# Patient Record
Sex: FEMALE | Race: BLACK OR AFRICAN AMERICAN | Employment: OTHER | URBAN - METROPOLITAN AREA
[De-identification: names, ages, dates, MRNs, and addresses within clinical notes are randomized per-mention and may not be internally consistent; named-entity substitution may affect disease eponyms.]

---

## 2019-01-01 ENCOUNTER — DOCUMENTATION ONLY (OUTPATIENT)
Dept: CARDIOLOGY CLINIC | Age: 84
End: 2019-01-01

## 2019-01-01 ENCOUNTER — TELEPHONE (OUTPATIENT)
Dept: CARDIOLOGY CLINIC | Age: 84
End: 2019-01-01

## 2019-01-01 ENCOUNTER — HOME CARE VISIT (OUTPATIENT)
Dept: HOME HEALTH SERVICES | Facility: HOME HEALTH | Age: 84
End: 2019-01-01
Payer: MEDICARE

## 2019-01-01 ENCOUNTER — ANESTHESIA (OUTPATIENT)
Dept: CARDIAC CATH/INVASIVE PROCEDURES | Age: 84
DRG: 242 | End: 2019-01-01
Payer: MEDICARE

## 2019-01-01 ENCOUNTER — OFFICE VISIT (OUTPATIENT)
Dept: CARDIOLOGY CLINIC | Age: 84
End: 2019-01-01

## 2019-01-01 ENCOUNTER — HOME CARE VISIT (OUTPATIENT)
Dept: SCHEDULING | Facility: HOME HEALTH | Age: 84
End: 2019-01-01
Payer: MEDICARE

## 2019-01-01 ENCOUNTER — APPOINTMENT (OUTPATIENT)
Dept: CT IMAGING | Age: 84
DRG: 286 | End: 2019-01-01
Attending: EMERGENCY MEDICINE
Payer: MEDICARE

## 2019-01-01 ENCOUNTER — PATIENT OUTREACH (OUTPATIENT)
Dept: FAMILY MEDICINE CLINIC | Age: 84
End: 2019-01-01

## 2019-01-01 ENCOUNTER — PATIENT OUTREACH (OUTPATIENT)
Dept: CARDIOLOGY CLINIC | Age: 84
End: 2019-01-01

## 2019-01-01 ENCOUNTER — APPOINTMENT (OUTPATIENT)
Dept: GENERAL RADIOLOGY | Age: 84
DRG: 286 | End: 2019-01-01
Attending: EMERGENCY MEDICINE
Payer: MEDICARE

## 2019-01-01 ENCOUNTER — APPOINTMENT (OUTPATIENT)
Dept: VASCULAR SURGERY | Age: 84
DRG: 286 | End: 2019-01-01
Attending: INTERNAL MEDICINE
Payer: MEDICARE

## 2019-01-01 ENCOUNTER — APPOINTMENT (OUTPATIENT)
Dept: CT IMAGING | Age: 84
DRG: 286 | End: 2019-01-01
Attending: INTERNAL MEDICINE
Payer: MEDICARE

## 2019-01-01 ENCOUNTER — HOSPITAL ENCOUNTER (INPATIENT)
Age: 84
LOS: 1 days | Discharge: HOME HEALTH | DRG: 194 | End: 2019-04-09
Attending: EMERGENCY MEDICINE | Admitting: INTERNAL MEDICINE
Payer: MEDICARE

## 2019-01-01 ENCOUNTER — APPOINTMENT (OUTPATIENT)
Dept: NON INVASIVE DIAGNOSTICS | Age: 84
DRG: 286 | End: 2019-01-01
Attending: INTERNAL MEDICINE
Payer: MEDICARE

## 2019-01-01 ENCOUNTER — PATIENT OUTREACH (OUTPATIENT)
Dept: CASE MANAGEMENT | Age: 84
End: 2019-01-01

## 2019-01-01 ENCOUNTER — HOME HEALTH ADMISSION (OUTPATIENT)
Dept: HOME HEALTH SERVICES | Facility: HOME HEALTH | Age: 84
End: 2019-01-01
Payer: MEDICARE

## 2019-01-01 ENCOUNTER — HOSPITAL ENCOUNTER (INPATIENT)
Age: 84
LOS: 3 days | Discharge: HOME | DRG: 242 | End: 2019-12-24
Attending: EMERGENCY MEDICINE | Admitting: INTERNAL MEDICINE
Payer: MEDICARE

## 2019-01-01 ENCOUNTER — APPOINTMENT (OUTPATIENT)
Dept: ULTRASOUND IMAGING | Age: 84
DRG: 286 | End: 2019-01-01
Attending: INTERNAL MEDICINE
Payer: MEDICARE

## 2019-01-01 ENCOUNTER — APPOINTMENT (OUTPATIENT)
Dept: GENERAL RADIOLOGY | Age: 84
DRG: 242 | End: 2019-01-01
Attending: INTERNAL MEDICINE
Payer: MEDICARE

## 2019-01-01 ENCOUNTER — ANESTHESIA EVENT (OUTPATIENT)
Dept: CARDIAC CATH/INVASIVE PROCEDURES | Age: 84
DRG: 242 | End: 2019-01-01
Payer: MEDICARE

## 2019-01-01 ENCOUNTER — DOCUMENTATION ONLY (OUTPATIENT)
Dept: FAMILY MEDICINE CLINIC | Age: 84
End: 2019-01-01

## 2019-01-01 ENCOUNTER — DOCUMENTATION ONLY (OUTPATIENT)
Dept: INTERNAL MEDICINE CLINIC | Age: 84
End: 2019-01-01

## 2019-01-01 ENCOUNTER — TELEPHONE (OUTPATIENT)
Dept: PALLATIVE CARE | Age: 84
End: 2019-01-01

## 2019-01-01 ENCOUNTER — HOSPITAL ENCOUNTER (INPATIENT)
Age: 84
LOS: 10 days | Discharge: HOME HEALTH | DRG: 286 | End: 2019-03-26
Attending: EMERGENCY MEDICINE | Admitting: INTERNAL MEDICINE
Payer: MEDICARE

## 2019-01-01 ENCOUNTER — APPOINTMENT (OUTPATIENT)
Dept: CT IMAGING | Age: 84
DRG: 194 | End: 2019-01-01
Attending: HOSPITALIST
Payer: MEDICARE

## 2019-01-01 ENCOUNTER — DOCUMENTATION ONLY (OUTPATIENT)
Dept: CASE MANAGEMENT | Age: 84
End: 2019-01-01

## 2019-01-01 ENCOUNTER — APPOINTMENT (OUTPATIENT)
Dept: GENERAL RADIOLOGY | Age: 84
DRG: 194 | End: 2019-01-01
Attending: EMERGENCY MEDICINE
Payer: MEDICARE

## 2019-01-01 VITALS
TEMPERATURE: 97.2 F | OXYGEN SATURATION: 97 % | HEART RATE: 62 BPM | RESPIRATION RATE: 18 BRPM | SYSTOLIC BLOOD PRESSURE: 165 MMHG | DIASTOLIC BLOOD PRESSURE: 52 MMHG

## 2019-01-01 VITALS
RESPIRATION RATE: 14 BRPM | DIASTOLIC BLOOD PRESSURE: 50 MMHG | SYSTOLIC BLOOD PRESSURE: 140 MMHG | HEIGHT: 60 IN | HEART RATE: 70 BPM | WEIGHT: 128 LBS | OXYGEN SATURATION: 97 % | BODY MASS INDEX: 25.13 KG/M2

## 2019-01-01 VITALS
BODY MASS INDEX: 28.71 KG/M2 | HEART RATE: 71 BPM | DIASTOLIC BLOOD PRESSURE: 52 MMHG | SYSTOLIC BLOOD PRESSURE: 150 MMHG | OXYGEN SATURATION: 97 % | WEIGHT: 147 LBS | TEMPERATURE: 98.2 F | RESPIRATION RATE: 18 BRPM

## 2019-01-01 VITALS
OXYGEN SATURATION: 96 % | RESPIRATION RATE: 18 BRPM | TEMPERATURE: 97.7 F | SYSTOLIC BLOOD PRESSURE: 166 MMHG | HEART RATE: 67 BPM | DIASTOLIC BLOOD PRESSURE: 53 MMHG

## 2019-01-01 VITALS
BODY MASS INDEX: 27.54 KG/M2 | SYSTOLIC BLOOD PRESSURE: 142 MMHG | DIASTOLIC BLOOD PRESSURE: 80 MMHG | RESPIRATION RATE: 18 BRPM | WEIGHT: 141 LBS | HEART RATE: 65 BPM | OXYGEN SATURATION: 97 % | TEMPERATURE: 98.2 F

## 2019-01-01 VITALS
RESPIRATION RATE: 18 BRPM | BODY MASS INDEX: 27.54 KG/M2 | WEIGHT: 141 LBS | TEMPERATURE: 98 F | HEART RATE: 84 BPM | SYSTOLIC BLOOD PRESSURE: 148 MMHG | DIASTOLIC BLOOD PRESSURE: 56 MMHG | OXYGEN SATURATION: 95 %

## 2019-01-01 VITALS
HEART RATE: 125 BPM | WEIGHT: 154.1 LBS | BODY MASS INDEX: 30.25 KG/M2 | RESPIRATION RATE: 16 BRPM | OXYGEN SATURATION: 92 % | DIASTOLIC BLOOD PRESSURE: 64 MMHG | SYSTOLIC BLOOD PRESSURE: 133 MMHG | HEIGHT: 60 IN | TEMPERATURE: 97.9 F

## 2019-01-01 VITALS
RESPIRATION RATE: 14 BRPM | SYSTOLIC BLOOD PRESSURE: 136 MMHG | DIASTOLIC BLOOD PRESSURE: 60 MMHG | OXYGEN SATURATION: 100 % | HEART RATE: 61 BPM | WEIGHT: 134 LBS | BODY MASS INDEX: 26.31 KG/M2 | HEIGHT: 60 IN

## 2019-01-01 VITALS
SYSTOLIC BLOOD PRESSURE: 154 MMHG | HEART RATE: 88 BPM | DIASTOLIC BLOOD PRESSURE: 72 MMHG | RESPIRATION RATE: 20 BRPM | TEMPERATURE: 98.2 F | OXYGEN SATURATION: 98 %

## 2019-01-01 VITALS
RESPIRATION RATE: 16 BRPM | SYSTOLIC BLOOD PRESSURE: 128 MMHG | HEIGHT: 60 IN | OXYGEN SATURATION: 95 % | WEIGHT: 147.4 LBS | TEMPERATURE: 97 F | DIASTOLIC BLOOD PRESSURE: 29 MMHG | BODY MASS INDEX: 28.94 KG/M2 | HEART RATE: 66 BPM

## 2019-01-01 VITALS — DIASTOLIC BLOOD PRESSURE: 40 MMHG | SYSTOLIC BLOOD PRESSURE: 150 MMHG | OXYGEN SATURATION: 96 % | HEART RATE: 63 BPM

## 2019-01-01 VITALS
OXYGEN SATURATION: 97 % | TEMPERATURE: 97.9 F | HEART RATE: 71 BPM | DIASTOLIC BLOOD PRESSURE: 59 MMHG | SYSTOLIC BLOOD PRESSURE: 140 MMHG | RESPIRATION RATE: 18 BRPM

## 2019-01-01 VITALS
TEMPERATURE: 98 F | OXYGEN SATURATION: 92 % | RESPIRATION RATE: 18 BRPM | SYSTOLIC BLOOD PRESSURE: 148 MMHG | HEART RATE: 81 BPM | DIASTOLIC BLOOD PRESSURE: 73 MMHG

## 2019-01-01 VITALS
DIASTOLIC BLOOD PRESSURE: 60 MMHG | HEART RATE: 68 BPM | RESPIRATION RATE: 18 BRPM | WEIGHT: 141 LBS | SYSTOLIC BLOOD PRESSURE: 124 MMHG | TEMPERATURE: 98.7 F | OXYGEN SATURATION: 95 % | BODY MASS INDEX: 27.54 KG/M2

## 2019-01-01 VITALS
HEART RATE: 82 BPM | SYSTOLIC BLOOD PRESSURE: 150 MMHG | TEMPERATURE: 97.8 F | OXYGEN SATURATION: 97 % | RESPIRATION RATE: 18 BRPM | DIASTOLIC BLOOD PRESSURE: 68 MMHG

## 2019-01-01 VITALS
RESPIRATION RATE: 18 BRPM | WEIGHT: 139 LBS | TEMPERATURE: 98.2 F | DIASTOLIC BLOOD PRESSURE: 62 MMHG | SYSTOLIC BLOOD PRESSURE: 132 MMHG | OXYGEN SATURATION: 97 % | HEART RATE: 64 BPM | BODY MASS INDEX: 27.15 KG/M2

## 2019-01-01 VITALS
RESPIRATION RATE: 16 BRPM | DIASTOLIC BLOOD PRESSURE: 58 MMHG | OXYGEN SATURATION: 97 % | WEIGHT: 147.1 LBS | HEART RATE: 77 BPM | SYSTOLIC BLOOD PRESSURE: 170 MMHG | HEIGHT: 60 IN | BODY MASS INDEX: 28.88 KG/M2

## 2019-01-01 VITALS — OXYGEN SATURATION: 97 % | DIASTOLIC BLOOD PRESSURE: 40 MMHG | SYSTOLIC BLOOD PRESSURE: 140 MMHG | HEART RATE: 72 BPM

## 2019-01-01 VITALS
DIASTOLIC BLOOD PRESSURE: 46 MMHG | SYSTOLIC BLOOD PRESSURE: 138 MMHG | OXYGEN SATURATION: 98 % | TEMPERATURE: 98.3 F | HEART RATE: 67 BPM

## 2019-01-01 VITALS
WEIGHT: 144 LBS | DIASTOLIC BLOOD PRESSURE: 68 MMHG | OXYGEN SATURATION: 97 % | HEART RATE: 72 BPM | TEMPERATURE: 98.2 F | SYSTOLIC BLOOD PRESSURE: 162 MMHG | RESPIRATION RATE: 18 BRPM | BODY MASS INDEX: 28.12 KG/M2

## 2019-01-01 VITALS
DIASTOLIC BLOOD PRESSURE: 52 MMHG | HEART RATE: 68 BPM | OXYGEN SATURATION: 95 % | SYSTOLIC BLOOD PRESSURE: 150 MMHG | TEMPERATURE: 97.5 F | RESPIRATION RATE: 18 BRPM

## 2019-01-01 VITALS
OXYGEN SATURATION: 97 % | DIASTOLIC BLOOD PRESSURE: 72 MMHG | BODY MASS INDEX: 27.86 KG/M2 | RESPIRATION RATE: 16 BRPM | HEART RATE: 42 BPM | HEIGHT: 60 IN | SYSTOLIC BLOOD PRESSURE: 158 MMHG | WEIGHT: 141.9 LBS

## 2019-01-01 VITALS
DIASTOLIC BLOOD PRESSURE: 60 MMHG | SYSTOLIC BLOOD PRESSURE: 124 MMHG | OXYGEN SATURATION: 95 % | HEART RATE: 68 BPM | TEMPERATURE: 98.6 F

## 2019-01-01 VITALS
RESPIRATION RATE: 18 BRPM | OXYGEN SATURATION: 97 % | HEART RATE: 78 BPM | SYSTOLIC BLOOD PRESSURE: 130 MMHG | TEMPERATURE: 98.2 F | DIASTOLIC BLOOD PRESSURE: 72 MMHG

## 2019-01-01 VITALS
OXYGEN SATURATION: 98 % | SYSTOLIC BLOOD PRESSURE: 131 MMHG | TEMPERATURE: 98.2 F | DIASTOLIC BLOOD PRESSURE: 52 MMHG | RESPIRATION RATE: 20 BRPM | HEART RATE: 71 BPM

## 2019-01-01 VITALS
WEIGHT: 138 LBS | TEMPERATURE: 98.3 F | BODY MASS INDEX: 26.95 KG/M2 | OXYGEN SATURATION: 98 % | DIASTOLIC BLOOD PRESSURE: 37 MMHG | HEART RATE: 77 BPM | SYSTOLIC BLOOD PRESSURE: 150 MMHG | RESPIRATION RATE: 18 BRPM

## 2019-01-01 DIAGNOSIS — I35.9 AORTIC VALVE DISORDER: ICD-10-CM

## 2019-01-01 DIAGNOSIS — N18.30 CKD (CHRONIC KIDNEY DISEASE), STAGE III (HCC): ICD-10-CM

## 2019-01-01 DIAGNOSIS — I50.32 CHRONIC DIASTOLIC CONGESTIVE HEART FAILURE (HCC): Primary | ICD-10-CM

## 2019-01-01 DIAGNOSIS — E78.00 PURE HYPERCHOLESTEROLEMIA: ICD-10-CM

## 2019-01-01 DIAGNOSIS — I10 ESSENTIAL HYPERTENSION: ICD-10-CM

## 2019-01-01 DIAGNOSIS — R94.31 ABNORMAL EKG: ICD-10-CM

## 2019-01-01 DIAGNOSIS — I48.0 PAROXYSMAL ATRIAL FIBRILLATION (HCC): Primary | ICD-10-CM

## 2019-01-01 DIAGNOSIS — F02.818 DEMENTIA ASSOCIATED WITH OTHER UNDERLYING DISEASE WITH BEHAVIORAL DISTURBANCE: ICD-10-CM

## 2019-01-01 DIAGNOSIS — Z79.01 ANTICOAGULANT LONG-TERM USE: Chronic | ICD-10-CM

## 2019-01-01 DIAGNOSIS — J18.9 PNEUMONIA OF RIGHT LOWER LOBE DUE TO INFECTIOUS ORGANISM: ICD-10-CM

## 2019-01-01 DIAGNOSIS — I48.91 ATRIAL FIBRILLATION WITH RVR (HCC): ICD-10-CM

## 2019-01-01 DIAGNOSIS — F02.818 DEMENTIA ASSOCIATED WITH OTHER UNDERLYING DISEASE WITH BEHAVIORAL DISTURBANCE: Chronic | ICD-10-CM

## 2019-01-01 DIAGNOSIS — I48.0 PAROXYSMAL ATRIAL FIBRILLATION (HCC): ICD-10-CM

## 2019-01-01 DIAGNOSIS — R60.0 BILATERAL LEG EDEMA: ICD-10-CM

## 2019-01-01 DIAGNOSIS — I48.91 ATRIAL FIBRILLATION WITH RVR (HCC): Primary | ICD-10-CM

## 2019-01-01 DIAGNOSIS — I05.9 MITRAL VALVE DISEASE: ICD-10-CM

## 2019-01-01 DIAGNOSIS — J90 PLEURAL EFFUSION ON RIGHT: ICD-10-CM

## 2019-01-01 DIAGNOSIS — R06.02 SOB (SHORTNESS OF BREATH): ICD-10-CM

## 2019-01-01 DIAGNOSIS — I48.92 ATRIAL FLUTTER, UNSPECIFIED TYPE (HCC): ICD-10-CM

## 2019-01-01 DIAGNOSIS — R09.02 HYPOXIA: ICD-10-CM

## 2019-01-01 DIAGNOSIS — I50.9 ACUTE ON CHRONIC CONGESTIVE HEART FAILURE, UNSPECIFIED HEART FAILURE TYPE (HCC): Primary | ICD-10-CM

## 2019-01-01 DIAGNOSIS — I10 ACCELERATED HYPERTENSION: Primary | ICD-10-CM

## 2019-01-01 DIAGNOSIS — I24.9 ACS (ACUTE CORONARY SYNDROME) (HCC): ICD-10-CM

## 2019-01-01 DIAGNOSIS — I50.32 CHRONIC DIASTOLIC CONGESTIVE HEART FAILURE (HCC): ICD-10-CM

## 2019-01-01 DIAGNOSIS — Z79.01 ANTICOAGULANT LONG-TERM USE: ICD-10-CM

## 2019-01-01 LAB
ALBUMIN SERPL-MCNC: 1.7 G/DL (ref 3.5–5)
ALBUMIN SERPL-MCNC: 1.7 G/DL (ref 3.5–5)
ALBUMIN SERPL-MCNC: 1.8 G/DL (ref 3.5–5)
ALBUMIN SERPL-MCNC: 1.9 G/DL (ref 3.5–5)
ALBUMIN SERPL-MCNC: 2 G/DL (ref 3.5–5)
ALBUMIN SERPL-MCNC: 2.1 G/DL (ref 3.5–5)
ALBUMIN SERPL-MCNC: 2.3 G/DL (ref 3.5–5)
ALBUMIN SERPL-MCNC: 2.7 G/DL (ref 3.5–5)
ALBUMIN/GLOB SERPL: 0.4 {RATIO} (ref 1.1–2.2)
ALBUMIN/GLOB SERPL: 0.5 {RATIO} (ref 1.1–2.2)
ALBUMIN/GLOB SERPL: 0.6 {RATIO} (ref 1.1–2.2)
ALP SERPL-CCNC: 104 U/L (ref 45–117)
ALP SERPL-CCNC: 122 U/L (ref 45–117)
ALP SERPL-CCNC: 123 U/L (ref 45–117)
ALP SERPL-CCNC: 77 U/L (ref 45–117)
ALP SERPL-CCNC: 85 U/L (ref 45–117)
ALP SERPL-CCNC: 92 U/L (ref 45–117)
ALP SERPL-CCNC: 94 U/L (ref 45–117)
ALP SERPL-CCNC: 97 U/L (ref 45–117)
ALP SERPL-CCNC: 97 U/L (ref 45–117)
ALT SERPL-CCNC: 11 U/L (ref 12–78)
ALT SERPL-CCNC: 12 U/L (ref 12–78)
ALT SERPL-CCNC: 15 U/L (ref 12–78)
ALT SERPL-CCNC: 15 U/L (ref 12–78)
ALT SERPL-CCNC: 16 U/L (ref 12–78)
ALT SERPL-CCNC: 16 U/L (ref 12–78)
ALT SERPL-CCNC: 17 U/L (ref 12–78)
ALT SERPL-CCNC: 19 U/L (ref 12–78)
ALT SERPL-CCNC: 28 U/L (ref 12–78)
AMMONIA PLAS-SCNC: <10 UMOL/L
ANION GAP SERPL CALC-SCNC: 10 MMOL/L (ref 5–15)
ANION GAP SERPL CALC-SCNC: 5 MMOL/L (ref 5–15)
ANION GAP SERPL CALC-SCNC: 5 MMOL/L (ref 5–15)
ANION GAP SERPL CALC-SCNC: 6 MMOL/L (ref 5–15)
ANION GAP SERPL CALC-SCNC: 7 MMOL/L (ref 5–15)
ANION GAP SERPL CALC-SCNC: 8 MMOL/L (ref 5–15)
ANION GAP SERPL CALC-SCNC: 9 MMOL/L (ref 5–15)
APPEARANCE UR: ABNORMAL
AST SERPL-CCNC: 15 U/L (ref 15–37)
AST SERPL-CCNC: 19 U/L (ref 15–37)
AST SERPL-CCNC: 20 U/L (ref 15–37)
AST SERPL-CCNC: 22 U/L (ref 15–37)
AST SERPL-CCNC: 23 U/L (ref 15–37)
AST SERPL-CCNC: 25 U/L (ref 15–37)
AST SERPL-CCNC: 27 U/L (ref 15–37)
AST SERPL-CCNC: 29 U/L (ref 15–37)
AST SERPL-CCNC: 32 U/L (ref 15–37)
ATRIAL RATE: 107 BPM
ATRIAL RATE: 63 BPM
ATRIAL RATE: 67 BPM
ATRIAL RATE: 77 BPM
ATRIAL RATE: 85 BPM
BACTERIA SPEC CULT: NORMAL
BACTERIA URNS QL MICRO: ABNORMAL /HPF
BASOPHILS # BLD: 0 K/UL (ref 0–0.1)
BASOPHILS # BLD: 0.1 K/UL (ref 0–0.1)
BASOPHILS NFR BLD: 0 % (ref 0–1)
BASOPHILS NFR BLD: 1 % (ref 0–1)
BILIRUB SERPL-MCNC: 0.1 MG/DL (ref 0.2–1)
BILIRUB SERPL-MCNC: 0.1 MG/DL (ref 0.2–1)
BILIRUB SERPL-MCNC: 0.2 MG/DL (ref 0.2–1)
BILIRUB SERPL-MCNC: 0.3 MG/DL (ref 0.2–1)
BILIRUB SERPL-MCNC: 0.3 MG/DL (ref 0.2–1)
BILIRUB SERPL-MCNC: 0.4 MG/DL (ref 0.2–1)
BILIRUB UR QL: NEGATIVE
BNP SERPL-MCNC: ABNORMAL PG/ML
BNP SERPL-MCNC: ABNORMAL PG/ML
BUN SERPL-MCNC: 32 MG/DL (ref 6–20)
BUN SERPL-MCNC: 33 MG/DL (ref 6–20)
BUN SERPL-MCNC: 33 MG/DL (ref 6–20)
BUN SERPL-MCNC: 34 MG/DL (ref 6–20)
BUN SERPL-MCNC: 35 MG/DL (ref 6–20)
BUN SERPL-MCNC: 36 MG/DL (ref 6–20)
BUN SERPL-MCNC: 40 MG/DL (ref 6–20)
BUN SERPL-MCNC: 49 MG/DL (ref 6–20)
BUN SERPL-MCNC: 52 MG/DL (ref 6–20)
BUN SERPL-MCNC: 56 MG/DL (ref 6–20)
BUN SERPL-MCNC: 57 MG/DL (ref 6–20)
BUN SERPL-MCNC: 59 MG/DL (ref 6–20)
BUN SERPL-MCNC: 60 MG/DL (ref 6–20)
BUN SERPL-MCNC: 61 MG/DL (ref 6–20)
BUN SERPL-MCNC: 61 MG/DL (ref 6–20)
BUN SERPL-MCNC: 62 MG/DL (ref 6–20)
BUN SERPL-MCNC: 63 MG/DL (ref 6–20)
BUN/CREAT SERPL: 18 (ref 12–20)
BUN/CREAT SERPL: 19 (ref 12–20)
BUN/CREAT SERPL: 19 (ref 12–20)
BUN/CREAT SERPL: 20 (ref 12–20)
BUN/CREAT SERPL: 24 (ref 12–20)
BUN/CREAT SERPL: 26 (ref 12–20)
BUN/CREAT SERPL: 30 (ref 12–20)
BUN/CREAT SERPL: 32 (ref 12–20)
BUN/CREAT SERPL: 33 (ref 12–20)
BUN/CREAT SERPL: 34 (ref 12–20)
BUN/CREAT SERPL: 34 (ref 12–20)
BUN/CREAT SERPL: 35 (ref 12–20)
CALCIUM SERPL-MCNC: 7.5 MG/DL (ref 8.5–10.1)
CALCIUM SERPL-MCNC: 7.7 MG/DL (ref 8.5–10.1)
CALCIUM SERPL-MCNC: 7.7 MG/DL (ref 8.5–10.1)
CALCIUM SERPL-MCNC: 7.9 MG/DL (ref 8.5–10.1)
CALCIUM SERPL-MCNC: 8.1 MG/DL (ref 8.5–10.1)
CALCIUM SERPL-MCNC: 8.2 MG/DL (ref 8.5–10.1)
CALCIUM SERPL-MCNC: 8.2 MG/DL (ref 8.5–10.1)
CALCIUM SERPL-MCNC: 8.3 MG/DL (ref 8.5–10.1)
CALCIUM SERPL-MCNC: 8.3 MG/DL (ref 8.5–10.1)
CALCIUM SERPL-MCNC: 8.5 MG/DL (ref 8.5–10.1)
CALCIUM SERPL-MCNC: 8.6 MG/DL (ref 8.5–10.1)
CALCULATED P AXIS, ECG09: 52 DEGREES
CALCULATED P AXIS, ECG09: 58 DEGREES
CALCULATED P AXIS, ECG09: 58 DEGREES
CALCULATED P AXIS, ECG09: 62 DEGREES
CALCULATED R AXIS, ECG10: -7 DEGREES
CALCULATED R AXIS, ECG10: 10 DEGREES
CALCULATED R AXIS, ECG10: 41 DEGREES
CALCULATED R AXIS, ECG10: 41 DEGREES
CALCULATED R AXIS, ECG10: 5 DEGREES
CALCULATED T AXIS, ECG11: -43 DEGREES
CALCULATED T AXIS, ECG11: 177 DEGREES
CALCULATED T AXIS, ECG11: 23 DEGREES
CALCULATED T AXIS, ECG11: 37 DEGREES
CALCULATED T AXIS, ECG11: 7 DEGREES
CC UR VC: NORMAL
CHLORIDE SERPL-SCNC: 107 MMOL/L (ref 97–108)
CHLORIDE SERPL-SCNC: 108 MMOL/L (ref 97–108)
CHLORIDE SERPL-SCNC: 109 MMOL/L (ref 97–108)
CHLORIDE SERPL-SCNC: 110 MMOL/L (ref 97–108)
CHLORIDE SERPL-SCNC: 111 MMOL/L (ref 97–108)
CHOLEST SERPL-MCNC: 153 MG/DL
CK SERPL-CCNC: 42 U/L (ref 26–192)
CK SERPL-CCNC: 63 U/L (ref 26–192)
CO2 SERPL-SCNC: 21 MMOL/L (ref 21–32)
CO2 SERPL-SCNC: 21 MMOL/L (ref 21–32)
CO2 SERPL-SCNC: 22 MMOL/L (ref 21–32)
CO2 SERPL-SCNC: 23 MMOL/L (ref 21–32)
CO2 SERPL-SCNC: 24 MMOL/L (ref 21–32)
CO2 SERPL-SCNC: 25 MMOL/L (ref 21–32)
CO2 SERPL-SCNC: 26 MMOL/L (ref 21–32)
CO2 SERPL-SCNC: 27 MMOL/L (ref 21–32)
CO2 SERPL-SCNC: 27 MMOL/L (ref 21–32)
CO2 SERPL-SCNC: 28 MMOL/L (ref 21–32)
COLOR UR: ABNORMAL
COMMENT, HOLDF: NORMAL
CREAT SERPL-MCNC: 1.37 MG/DL (ref 0.55–1.02)
CREAT SERPL-MCNC: 1.61 MG/DL (ref 0.55–1.02)
CREAT SERPL-MCNC: 1.66 MG/DL (ref 0.55–1.02)
CREAT SERPL-MCNC: 1.66 MG/DL (ref 0.55–1.02)
CREAT SERPL-MCNC: 1.69 MG/DL (ref 0.55–1.02)
CREAT SERPL-MCNC: 1.73 MG/DL (ref 0.55–1.02)
CREAT SERPL-MCNC: 1.73 MG/DL (ref 0.55–1.02)
CREAT SERPL-MCNC: 1.78 MG/DL (ref 0.55–1.02)
CREAT SERPL-MCNC: 1.82 MG/DL (ref 0.55–1.02)
CREAT SERPL-MCNC: 1.85 MG/DL (ref 0.55–1.02)
CREAT SERPL-MCNC: 1.87 MG/DL (ref 0.55–1.02)
CREAT SERPL-MCNC: 1.88 MG/DL (ref 0.55–1.02)
CREAT SERPL-MCNC: 1.9 MG/DL (ref 0.55–1.02)
CREAT SERPL-MCNC: 1.93 MG/DL (ref 0.55–1.02)
CREAT SERPL-MCNC: 1.97 MG/DL (ref 0.55–1.02)
CREAT UR-MCNC: 90.4 MG/DL
DIAGNOSIS, 93000: NORMAL
DIFFERENTIAL METHOD BLD: ABNORMAL
DIGOXIN SERPL-MCNC: 1.1 NG/ML (ref 0.9–2)
DIGOXIN SERPL-MCNC: 1.2 NG/ML (ref 0.9–2)
ECHO AO ROOT DIAM: 2.54 CM
ECHO AV AREA PEAK VELOCITY: 1.2 CM2
ECHO AV AREA VTI: 1.5 CM2
ECHO AV AREA/BSA PEAK VELOCITY: 0.7 CM2/M2
ECHO AV AREA/BSA VTI: 0.9 CM2/M2
ECHO AV MEAN GRADIENT: 33.3 MMHG
ECHO AV PEAK GRADIENT: 60.9 MMHG
ECHO AV PEAK VELOCITY: 390.33 CM/S
ECHO AV REGURGITANT PHT: 331.6 CM
ECHO AV VTI: 81.51 CM
ECHO EST RA PRESSURE: 10 MMHG
ECHO LV INTERNAL DIMENSION DIASTOLIC: 3.48 CM (ref 3.9–5.3)
ECHO LV INTERNAL DIMENSION SYSTOLIC: 2.47 CM
ECHO LV IVSD: 1.37 CM (ref 0.6–0.9)
ECHO LV MASS 2D: 187.3 G (ref 67–162)
ECHO LV MASS INDEX 2D: 112.1 G/M2 (ref 43–95)
ECHO LV POSTERIOR WALL DIASTOLIC: 1.32 CM (ref 0.6–0.9)
ECHO LVOT DIAM: 1.92 CM
ECHO LVOT PEAK GRADIENT: 10.2 MMHG
ECHO LVOT PEAK VELOCITY: 159.97 CM/S
ECHO LVOT SV: 125.5 ML
ECHO LVOT VTI: 43.33 CM
ECHO MV A VELOCITY: 185.13 CM/S
ECHO MV AREA PHT: 2.5 CM2
ECHO MV AREA VTI: 1.8 CM2
ECHO MV E DECELERATION TIME (DT): 317.6 MS
ECHO MV E VELOCITY: 137.1 CM/S
ECHO MV E/A RATIO: 0.74
ECHO MV MAX VELOCITY: 208.76 CM/S
ECHO MV MEAN GRADIENT: 6.8 MMHG
ECHO MV PEAK GRADIENT: 17.4 MMHG
ECHO MV PRESSURE HALF TIME (PHT): 88 MS
ECHO MV REGURGITANT PEAK GRADIENT: 131.1 MMHG
ECHO MV REGURGITANT PEAK VELOCITY: 572.43 CM/S
ECHO MV VTI: 68.99 CM
ECHO PULMONARY ARTERY SYSTOLIC PRESSURE (PASP): 49.9 MMHG
ECHO PV MAX VELOCITY: 154.01 CM/S
ECHO PV PEAK GRADIENT: 9.5 MMHG
ECHO RIGHT VENTRICULAR SYSTOLIC PRESSURE (RVSP): 49.9 MMHG
ECHO RV INTERNAL DIMENSION: 2.48 CM
ECHO TV REGURGITANT MAX VELOCITY: 315.96 CM/S
ECHO TV REGURGITANT PEAK GRADIENT: 39.9 MMHG
EOSINOPHIL # BLD: 0 K/UL (ref 0–0.4)
EOSINOPHIL # BLD: 0.1 K/UL (ref 0–0.4)
EOSINOPHIL NFR BLD: 0 % (ref 0–7)
EOSINOPHIL NFR BLD: 1 % (ref 0–7)
EOSINOPHIL NFR BLD: 1 % (ref 0–7)
EOSINOPHIL NFR BLD: 2 % (ref 0–7)
EOSINOPHIL NFR BLD: 2 % (ref 0–7)
EPITH CASTS URNS QL MICRO: ABNORMAL /LPF
ERYTHROCYTE [DISTWIDTH] IN BLOOD BY AUTOMATED COUNT: 13.7 % (ref 11.5–14.5)
ERYTHROCYTE [DISTWIDTH] IN BLOOD BY AUTOMATED COUNT: 13.9 % (ref 11.5–14.5)
ERYTHROCYTE [DISTWIDTH] IN BLOOD BY AUTOMATED COUNT: 13.9 % (ref 11.5–14.5)
ERYTHROCYTE [DISTWIDTH] IN BLOOD BY AUTOMATED COUNT: 14 % (ref 11.5–14.5)
ERYTHROCYTE [DISTWIDTH] IN BLOOD BY AUTOMATED COUNT: 14.1 % (ref 11.5–14.5)
ERYTHROCYTE [DISTWIDTH] IN BLOOD BY AUTOMATED COUNT: 14.4 % (ref 11.5–14.5)
ERYTHROCYTE [DISTWIDTH] IN BLOOD BY AUTOMATED COUNT: 14.4 % (ref 11.5–14.5)
ERYTHROCYTE [DISTWIDTH] IN BLOOD BY AUTOMATED COUNT: 14.5 % (ref 11.5–14.5)
ERYTHROCYTE [DISTWIDTH] IN BLOOD BY AUTOMATED COUNT: 14.6 % (ref 11.5–14.5)
ERYTHROCYTE [DISTWIDTH] IN BLOOD BY AUTOMATED COUNT: 14.7 % (ref 11.5–14.5)
ERYTHROCYTE [DISTWIDTH] IN BLOOD BY AUTOMATED COUNT: 14.7 % (ref 11.5–14.5)
ERYTHROCYTE [DISTWIDTH] IN BLOOD BY AUTOMATED COUNT: 14.9 % (ref 11.5–14.5)
ERYTHROCYTE [DISTWIDTH] IN BLOOD BY AUTOMATED COUNT: 15 % (ref 11.5–14.5)
ERYTHROCYTE [DISTWIDTH] IN BLOOD BY AUTOMATED COUNT: 15 % (ref 11.5–14.5)
ERYTHROCYTE [SEDIMENTATION RATE] IN BLOOD: >140 MM/HR (ref 0–30)
EST. AVERAGE GLUCOSE BLD GHB EST-MCNC: 105 MG/DL
FOLATE SERPL-MCNC: 9.9 NG/ML (ref 5–21)
GLOBULIN SER CALC-MCNC: 3.7 G/DL (ref 2–4)
GLOBULIN SER CALC-MCNC: 3.9 G/DL (ref 2–4)
GLOBULIN SER CALC-MCNC: 4 G/DL (ref 2–4)
GLOBULIN SER CALC-MCNC: 4.1 G/DL (ref 2–4)
GLOBULIN SER CALC-MCNC: 4.2 G/DL (ref 2–4)
GLOBULIN SER CALC-MCNC: 4.3 G/DL (ref 2–4)
GLOBULIN SER CALC-MCNC: 4.4 G/DL (ref 2–4)
GLOBULIN SER CALC-MCNC: 4.8 G/DL (ref 2–4)
GLOBULIN SER CALC-MCNC: 5.1 G/DL (ref 2–4)
GLUCOSE BLD STRIP.AUTO-MCNC: 107 MG/DL (ref 65–100)
GLUCOSE BLD STRIP.AUTO-MCNC: 110 MG/DL (ref 65–100)
GLUCOSE BLD STRIP.AUTO-MCNC: 111 MG/DL (ref 65–100)
GLUCOSE BLD STRIP.AUTO-MCNC: 120 MG/DL (ref 65–100)
GLUCOSE BLD STRIP.AUTO-MCNC: 70 MG/DL (ref 65–100)
GLUCOSE BLD STRIP.AUTO-MCNC: 75 MG/DL (ref 65–100)
GLUCOSE BLD STRIP.AUTO-MCNC: 77 MG/DL (ref 65–100)
GLUCOSE BLD STRIP.AUTO-MCNC: 78 MG/DL (ref 65–100)
GLUCOSE BLD STRIP.AUTO-MCNC: 82 MG/DL (ref 65–100)
GLUCOSE BLD STRIP.AUTO-MCNC: 88 MG/DL (ref 65–100)
GLUCOSE BLD STRIP.AUTO-MCNC: 89 MG/DL (ref 65–100)
GLUCOSE BLD STRIP.AUTO-MCNC: 92 MG/DL (ref 65–100)
GLUCOSE BLD STRIP.AUTO-MCNC: 98 MG/DL (ref 65–100)
GLUCOSE SERPL-MCNC: 100 MG/DL (ref 65–100)
GLUCOSE SERPL-MCNC: 103 MG/DL (ref 65–100)
GLUCOSE SERPL-MCNC: 117 MG/DL (ref 65–100)
GLUCOSE SERPL-MCNC: 123 MG/DL (ref 65–100)
GLUCOSE SERPL-MCNC: 134 MG/DL (ref 65–100)
GLUCOSE SERPL-MCNC: 145 MG/DL (ref 65–100)
GLUCOSE SERPL-MCNC: 151 MG/DL (ref 65–100)
GLUCOSE SERPL-MCNC: 168 MG/DL (ref 65–100)
GLUCOSE SERPL-MCNC: 73 MG/DL (ref 65–100)
GLUCOSE SERPL-MCNC: 74 MG/DL (ref 65–100)
GLUCOSE SERPL-MCNC: 77 MG/DL (ref 65–100)
GLUCOSE SERPL-MCNC: 77 MG/DL (ref 65–100)
GLUCOSE SERPL-MCNC: 79 MG/DL (ref 65–100)
GLUCOSE SERPL-MCNC: 81 MG/DL (ref 65–100)
GLUCOSE SERPL-MCNC: 86 MG/DL (ref 65–100)
GLUCOSE SERPL-MCNC: 91 MG/DL (ref 65–100)
GLUCOSE SERPL-MCNC: 95 MG/DL (ref 65–100)
GLUCOSE UR STRIP.AUTO-MCNC: NEGATIVE MG/DL
HBA1C MFR BLD: 5.3 % (ref 4.2–6.3)
HCT VFR BLD AUTO: 24.2 % (ref 35–47)
HCT VFR BLD AUTO: 24.8 % (ref 35–47)
HCT VFR BLD AUTO: 25.7 % (ref 35–47)
HCT VFR BLD AUTO: 25.9 % (ref 35–47)
HCT VFR BLD AUTO: 26.2 % (ref 35–47)
HCT VFR BLD AUTO: 26.2 % (ref 35–47)
HCT VFR BLD AUTO: 26.8 % (ref 35–47)
HCT VFR BLD AUTO: 27.1 % (ref 35–47)
HCT VFR BLD AUTO: 27.6 % (ref 35–47)
HCT VFR BLD AUTO: 27.6 % (ref 35–47)
HCT VFR BLD AUTO: 27.9 % (ref 35–47)
HCT VFR BLD AUTO: 29.1 % (ref 35–47)
HCT VFR BLD AUTO: 29.6 % (ref 35–47)
HCT VFR BLD AUTO: 29.9 % (ref 35–47)
HCT VFR BLD AUTO: 31.1 % (ref 35–47)
HCT VFR BLD AUTO: 32 % (ref 35–47)
HDLC SERPL-MCNC: 54 MG/DL
HDLC SERPL: 2.8 {RATIO} (ref 0–5)
HGB BLD-MCNC: 10.1 G/DL (ref 11.5–16)
HGB BLD-MCNC: 7.6 G/DL (ref 11.5–16)
HGB BLD-MCNC: 7.8 G/DL (ref 11.5–16)
HGB BLD-MCNC: 8 G/DL (ref 11.5–16)
HGB BLD-MCNC: 8.1 G/DL (ref 11.5–16)
HGB BLD-MCNC: 8.4 G/DL (ref 11.5–16)
HGB BLD-MCNC: 8.5 G/DL (ref 11.5–16)
HGB BLD-MCNC: 8.6 G/DL (ref 11.5–16)
HGB BLD-MCNC: 8.7 G/DL (ref 11.5–16)
HGB BLD-MCNC: 8.9 G/DL (ref 11.5–16)
HGB BLD-MCNC: 9.2 G/DL (ref 11.5–16)
HGB BLD-MCNC: 9.3 G/DL (ref 11.5–16)
HGB BLD-MCNC: 9.5 G/DL (ref 11.5–16)
HGB BLD-MCNC: 9.9 G/DL (ref 11.5–16)
HGB UR QL STRIP: NEGATIVE
IMM GRANULOCYTES # BLD AUTO: 0 K/UL (ref 0–0.04)
IMM GRANULOCYTES NFR BLD AUTO: 0 % (ref 0–0.5)
IMM GRANULOCYTES NFR BLD AUTO: 1 % (ref 0–0.5)
INR PPP: 1.1 (ref 0.9–1.1)
INR PPP: 1.2 (ref 0.9–1.1)
INR PPP: 1.7 (ref 0.9–1.1)
INR PPP: 2 (ref 0.9–1.1)
INR PPP: 2.4 (ref 0.9–1.1)
INR PPP: 2.4 (ref 0.9–1.1)
INR PPP: 2.5 (ref 0.9–1.1)
INR PPP: 2.7 (ref 0.9–1.1)
INR PPP: 2.8 (ref 0.9–1.1)
INR PPP: 3 (ref 0.9–1.1)
INR PPP: 3.2 (ref 0.9–1.1)
INR PPP: 4 (ref 0.9–1.1)
IRON SATN MFR SERPL: 19 % (ref 20–50)
IRON SERPL-MCNC: 33 UG/DL (ref 35–150)
KETONES UR QL STRIP.AUTO: ABNORMAL MG/DL
LACTATE SERPL-SCNC: 0.9 MMOL/L (ref 0.4–2)
LDLC SERPL CALC-MCNC: 84.2 MG/DL (ref 0–100)
LEFT CCA DIST DIAS: 0 CM/S
LEFT CCA DIST SYS: 84.9 CM/S
LEFT CCA PROX DIAS: 21.8 CM/S
LEFT CCA PROX SYS: 132.2 CM/S
LEFT ECA DIAS: 12.77 CM/S
LEFT ECA SYS: 237.3 CM/S
LEFT ICA DIST DIAS: 15.6 CM/S
LEFT ICA DIST SYS: 108.6 CM/S
LEFT ICA MID DIAS: 19.2 CM/S
LEFT ICA MID SYS: 133.4 CM/S
LEFT ICA PROX DIAS: 17 CM/S
LEFT ICA PROX SYS: 124.7 CM/S
LEFT ICA/CCA SYS: 1.57
LEFT SUBCLAVIAN DIAS: 0 CM/S
LEFT SUBCLAVIAN SYS: 295.1 CM/S
LEFT VERTEBRAL DIAS: 10.45 CM/S
LEFT VERTEBRAL SYS: 134.1 CM/S
LEUKOCYTE ESTERASE UR QL STRIP.AUTO: NEGATIVE
LIPID PROFILE,FLP: NORMAL
LYMPHOCYTES # BLD: 0.6 K/UL (ref 0.8–3.5)
LYMPHOCYTES # BLD: 1.1 K/UL (ref 0.8–3.5)
LYMPHOCYTES # BLD: 1.3 K/UL (ref 0.8–3.5)
LYMPHOCYTES # BLD: 1.6 K/UL (ref 0.8–3.5)
LYMPHOCYTES # BLD: 2.1 K/UL (ref 0.8–3.5)
LYMPHOCYTES # BLD: 2.3 K/UL (ref 0.8–3.5)
LYMPHOCYTES # BLD: 2.4 K/UL (ref 0.8–3.5)
LYMPHOCYTES # BLD: 2.8 K/UL (ref 0.8–3.5)
LYMPHOCYTES # BLD: 3.1 K/UL (ref 0.8–3.5)
LYMPHOCYTES NFR BLD: 14 % (ref 12–49)
LYMPHOCYTES NFR BLD: 14 % (ref 12–49)
LYMPHOCYTES NFR BLD: 21 % (ref 12–49)
LYMPHOCYTES NFR BLD: 22 % (ref 12–49)
LYMPHOCYTES NFR BLD: 24 % (ref 12–49)
LYMPHOCYTES NFR BLD: 33 % (ref 12–49)
LYMPHOCYTES NFR BLD: 40 % (ref 12–49)
LYMPHOCYTES NFR BLD: 40 % (ref 12–49)
LYMPHOCYTES NFR BLD: 42 % (ref 12–49)
MAGNESIUM SERPL-MCNC: 1.5 MG/DL (ref 1.6–2.4)
MAGNESIUM SERPL-MCNC: 1.6 MG/DL (ref 1.6–2.4)
MAGNESIUM SERPL-MCNC: 1.6 MG/DL (ref 1.6–2.4)
MAGNESIUM SERPL-MCNC: 1.7 MG/DL (ref 1.6–2.4)
MAGNESIUM SERPL-MCNC: 1.7 MG/DL (ref 1.6–2.4)
MCH RBC QN AUTO: 27.4 PG (ref 26–34)
MCH RBC QN AUTO: 27.6 PG (ref 26–34)
MCH RBC QN AUTO: 27.6 PG (ref 26–34)
MCH RBC QN AUTO: 27.7 PG (ref 26–34)
MCH RBC QN AUTO: 27.8 PG (ref 26–34)
MCH RBC QN AUTO: 28 PG (ref 26–34)
MCH RBC QN AUTO: 28 PG (ref 26–34)
MCH RBC QN AUTO: 28.1 PG (ref 26–34)
MCH RBC QN AUTO: 28.2 PG (ref 26–34)
MCH RBC QN AUTO: 28.3 PG (ref 26–34)
MCH RBC QN AUTO: 28.4 PG (ref 26–34)
MCH RBC QN AUTO: 28.4 PG (ref 26–34)
MCHC RBC AUTO-ENTMCNC: 30.5 G/DL (ref 30–36.5)
MCHC RBC AUTO-ENTMCNC: 30.5 G/DL (ref 30–36.5)
MCHC RBC AUTO-ENTMCNC: 30.9 G/DL (ref 30–36.5)
MCHC RBC AUTO-ENTMCNC: 31.1 G/DL (ref 30–36.5)
MCHC RBC AUTO-ENTMCNC: 31.2 G/DL (ref 30–36.5)
MCHC RBC AUTO-ENTMCNC: 31.3 G/DL (ref 30–36.5)
MCHC RBC AUTO-ENTMCNC: 31.4 G/DL (ref 30–36.5)
MCHC RBC AUTO-ENTMCNC: 31.4 G/DL (ref 30–36.5)
MCHC RBC AUTO-ENTMCNC: 31.5 G/DL (ref 30–36.5)
MCHC RBC AUTO-ENTMCNC: 31.6 G/DL (ref 30–36.5)
MCHC RBC AUTO-ENTMCNC: 31.6 G/DL (ref 30–36.5)
MCHC RBC AUTO-ENTMCNC: 31.8 G/DL (ref 30–36.5)
MCHC RBC AUTO-ENTMCNC: 31.9 G/DL (ref 30–36.5)
MCHC RBC AUTO-ENTMCNC: 32.1 G/DL (ref 30–36.5)
MCV RBC AUTO: 87.3 FL (ref 80–99)
MCV RBC AUTO: 87.9 FL (ref 80–99)
MCV RBC AUTO: 88 FL (ref 80–99)
MCV RBC AUTO: 88.3 FL (ref 80–99)
MCV RBC AUTO: 88.3 FL (ref 80–99)
MCV RBC AUTO: 88.6 FL (ref 80–99)
MCV RBC AUTO: 88.6 FL (ref 80–99)
MCV RBC AUTO: 88.7 FL (ref 80–99)
MCV RBC AUTO: 88.9 FL (ref 80–99)
MCV RBC AUTO: 89 FL (ref 80–99)
MCV RBC AUTO: 89.1 FL (ref 80–99)
MCV RBC AUTO: 89.3 FL (ref 80–99)
MCV RBC AUTO: 89.7 FL (ref 80–99)
MCV RBC AUTO: 90.6 FL (ref 80–99)
MCV RBC AUTO: 91.6 FL (ref 80–99)
MCV RBC AUTO: 91.6 FL (ref 80–99)
MONOCYTES # BLD: 0.1 K/UL (ref 0–1)
MONOCYTES # BLD: 0.4 K/UL (ref 0–1)
MONOCYTES # BLD: 0.5 K/UL (ref 0–1)
MONOCYTES # BLD: 0.5 K/UL (ref 0–1)
MONOCYTES # BLD: 0.6 K/UL (ref 0–1)
MONOCYTES # BLD: 0.6 K/UL (ref 0–1)
MONOCYTES # BLD: 0.7 K/UL (ref 0–1)
MONOCYTES # BLD: 0.7 K/UL (ref 0–1)
MONOCYTES # BLD: 0.8 K/UL (ref 0–1)
MONOCYTES NFR BLD: 10 % (ref 5–13)
MONOCYTES NFR BLD: 10 % (ref 5–13)
MONOCYTES NFR BLD: 11 % (ref 5–13)
MONOCYTES NFR BLD: 2 % (ref 5–13)
MONOCYTES NFR BLD: 6 % (ref 5–13)
MONOCYTES NFR BLD: 6 % (ref 5–13)
MONOCYTES NFR BLD: 7 % (ref 5–13)
MONOCYTES NFR BLD: 8 % (ref 5–13)
MONOCYTES NFR BLD: 9 % (ref 5–13)
NEUTS SEG # BLD: 2.3 K/UL (ref 1.8–8)
NEUTS SEG # BLD: 2.8 K/UL (ref 1.8–8)
NEUTS SEG # BLD: 3.1 K/UL (ref 1.8–8)
NEUTS SEG # BLD: 3.2 K/UL (ref 1.8–8)
NEUTS SEG # BLD: 3.9 K/UL (ref 1.8–8)
NEUTS SEG # BLD: 4 K/UL (ref 1.8–8)
NEUTS SEG # BLD: 5.9 K/UL (ref 1.8–8)
NEUTS SEG # BLD: 7.3 K/UL (ref 1.8–8)
NEUTS SEG # BLD: 9.1 K/UL (ref 1.8–8)
NEUTS SEG NFR BLD: 46 % (ref 32–75)
NEUTS SEG NFR BLD: 48 % (ref 32–75)
NEUTS SEG NFR BLD: 53 % (ref 32–75)
NEUTS SEG NFR BLD: 54 % (ref 32–75)
NEUTS SEG NFR BLD: 67 % (ref 32–75)
NEUTS SEG NFR BLD: 68 % (ref 32–75)
NEUTS SEG NFR BLD: 77 % (ref 32–75)
NEUTS SEG NFR BLD: 80 % (ref 32–75)
NEUTS SEG NFR BLD: 80 % (ref 32–75)
NITRITE UR QL STRIP.AUTO: NEGATIVE
NRBC # BLD: 0 K/UL (ref 0–0.01)
NRBC BLD-RTO: 0 PER 100 WBC
P-R INTERVAL, ECG05: 216 MS
P-R INTERVAL, ECG05: 220 MS
P-R INTERVAL, ECG05: 270 MS
P-R INTERVAL, ECG05: 278 MS
PH UR STRIP: 6 [PH] (ref 5–8)
PHOSPHATE SERPL-MCNC: 2.9 MG/DL (ref 2.6–4.7)
PHOSPHATE SERPL-MCNC: 3.2 MG/DL (ref 2.6–4.7)
PHOSPHATE SERPL-MCNC: 3.4 MG/DL (ref 2.6–4.7)
PHOSPHATE SERPL-MCNC: 3.6 MG/DL (ref 2.6–4.7)
PISA AR MAX VEL: 440.22 CM/S
PLATELET # BLD AUTO: 214 K/UL (ref 150–400)
PLATELET # BLD AUTO: 216 K/UL (ref 150–400)
PLATELET # BLD AUTO: 235 K/UL (ref 150–400)
PLATELET # BLD AUTO: 237 K/UL (ref 150–400)
PLATELET # BLD AUTO: 240 K/UL (ref 150–400)
PLATELET # BLD AUTO: 246 K/UL (ref 150–400)
PLATELET # BLD AUTO: 263 K/UL (ref 150–400)
PLATELET # BLD AUTO: 263 K/UL (ref 150–400)
PLATELET # BLD AUTO: 271 K/UL (ref 150–400)
PLATELET # BLD AUTO: 286 K/UL (ref 150–400)
PLATELET # BLD AUTO: 297 K/UL (ref 150–400)
PLATELET # BLD AUTO: 299 K/UL (ref 150–400)
PLATELET # BLD AUTO: 299 K/UL (ref 150–400)
PLATELET # BLD AUTO: 311 K/UL (ref 150–400)
PLATELET # BLD AUTO: 316 K/UL (ref 150–400)
PLATELET # BLD AUTO: 331 K/UL (ref 150–400)
PMV BLD AUTO: 10.7 FL (ref 8.9–12.9)
PMV BLD AUTO: 11 FL (ref 8.9–12.9)
PMV BLD AUTO: 11.1 FL (ref 8.9–12.9)
PMV BLD AUTO: 11.1 FL (ref 8.9–12.9)
PMV BLD AUTO: 11.3 FL (ref 8.9–12.9)
PMV BLD AUTO: 11.3 FL (ref 8.9–12.9)
PMV BLD AUTO: 11.4 FL (ref 8.9–12.9)
PMV BLD AUTO: 11.5 FL (ref 8.9–12.9)
PMV BLD AUTO: 11.6 FL (ref 8.9–12.9)
PMV BLD AUTO: 11.6 FL (ref 8.9–12.9)
PMV BLD AUTO: 11.8 FL (ref 8.9–12.9)
PMV BLD AUTO: 12.1 FL (ref 8.9–12.9)
POTASSIUM SERPL-SCNC: 3.6 MMOL/L (ref 3.5–5.1)
POTASSIUM SERPL-SCNC: 3.9 MMOL/L (ref 3.5–5.1)
POTASSIUM SERPL-SCNC: 4 MMOL/L (ref 3.5–5.1)
POTASSIUM SERPL-SCNC: 4 MMOL/L (ref 3.5–5.1)
POTASSIUM SERPL-SCNC: 4.1 MMOL/L (ref 3.5–5.1)
POTASSIUM SERPL-SCNC: 4.2 MMOL/L (ref 3.5–5.1)
POTASSIUM SERPL-SCNC: 4.2 MMOL/L (ref 3.5–5.1)
POTASSIUM SERPL-SCNC: 4.3 MMOL/L (ref 3.5–5.1)
POTASSIUM SERPL-SCNC: 4.4 MMOL/L (ref 3.5–5.1)
POTASSIUM SERPL-SCNC: 4.5 MMOL/L (ref 3.5–5.1)
POTASSIUM SERPL-SCNC: 4.6 MMOL/L (ref 3.5–5.1)
POTASSIUM SERPL-SCNC: 4.9 MMOL/L (ref 3.5–5.1)
PROCALCITONIN SERPL-MCNC: 1.28 NG/ML
PROT SERPL-MCNC: 5.7 G/DL (ref 6.4–8.2)
PROT SERPL-MCNC: 5.7 G/DL (ref 6.4–8.2)
PROT SERPL-MCNC: 5.8 G/DL (ref 6.4–8.2)
PROT SERPL-MCNC: 6 G/DL (ref 6.4–8.2)
PROT SERPL-MCNC: 6.1 G/DL (ref 6.4–8.2)
PROT SERPL-MCNC: 6.2 G/DL (ref 6.4–8.2)
PROT SERPL-MCNC: 6.9 G/DL (ref 6.4–8.2)
PROT SERPL-MCNC: 7.1 G/DL (ref 6.4–8.2)
PROT SERPL-MCNC: 7.4 G/DL (ref 6.4–8.2)
PROT UR STRIP-MCNC: >300 MG/DL
PROTHROMBIN TIME: 11.1 SEC (ref 9–11.1)
PROTHROMBIN TIME: 11.2 SEC (ref 9–11.1)
PROTHROMBIN TIME: 11.3 SEC (ref 9–11.1)
PROTHROMBIN TIME: 11.5 SEC (ref 9–11.1)
PROTHROMBIN TIME: 11.6 SEC (ref 9–11.1)
PROTHROMBIN TIME: 12.5 SEC (ref 9–11.1)
PROTHROMBIN TIME: 17 SEC (ref 9–11.1)
PROTHROMBIN TIME: 20 SEC (ref 9–11.1)
PROTHROMBIN TIME: 23.1 SEC (ref 9–11.1)
PROTHROMBIN TIME: 23.4 SEC (ref 9–11.1)
PROTHROMBIN TIME: 24 SEC (ref 9–11.1)
PROTHROMBIN TIME: 24.3 SEC (ref 9–11.1)
PROTHROMBIN TIME: 24.5 SEC (ref 9–11.1)
PROTHROMBIN TIME: 26.4 SEC (ref 9–11.1)
PROTHROMBIN TIME: 27 SEC (ref 9–11.1)
PROTHROMBIN TIME: 28.6 SEC (ref 9–11.1)
PROTHROMBIN TIME: 30.7 SEC (ref 9–11.1)
PROTHROMBIN TIME: 38.3 SEC (ref 9–11.1)
PTH-INTACT SERPL-MCNC: 168.3 PG/ML (ref 18.4–88)
Q-T INTERVAL, ECG07: 344 MS
Q-T INTERVAL, ECG07: 396 MS
Q-T INTERVAL, ECG07: 418 MS
Q-T INTERVAL, ECG07: 424 MS
Q-T INTERVAL, ECG07: 450 MS
QRS DURATION, ECG06: 86 MS
QRS DURATION, ECG06: 90 MS
QRS DURATION, ECG06: 98 MS
QTC CALCULATION (BEZET), ECG08: 441 MS
QTC CALCULATION (BEZET), ECG08: 460 MS
QTC CALCULATION (BEZET), ECG08: 471 MS
QTC CALCULATION (BEZET), ECG08: 479 MS
QTC CALCULATION (BEZET), ECG08: 492 MS
RBC # BLD AUTO: 2.75 M/UL (ref 3.8–5.2)
RBC # BLD AUTO: 2.81 M/UL (ref 3.8–5.2)
RBC # BLD AUTO: 2.86 M/UL (ref 3.8–5.2)
RBC # BLD AUTO: 2.86 M/UL (ref 3.8–5.2)
RBC # BLD AUTO: 2.9 M/UL (ref 3.8–5.2)
RBC # BLD AUTO: 2.91 M/UL (ref 3.8–5.2)
RBC # BLD AUTO: 3.02 M/UL (ref 3.8–5.2)
RBC # BLD AUTO: 3.07 M/UL (ref 3.8–5.2)
RBC # BLD AUTO: 3.1 M/UL (ref 3.8–5.2)
RBC # BLD AUTO: 3.11 M/UL (ref 3.8–5.2)
RBC # BLD AUTO: 3.15 M/UL (ref 3.8–5.2)
RBC # BLD AUTO: 3.3 M/UL (ref 3.8–5.2)
RBC # BLD AUTO: 3.31 M/UL (ref 3.8–5.2)
RBC # BLD AUTO: 3.34 M/UL (ref 3.8–5.2)
RBC # BLD AUTO: 3.49 M/UL (ref 3.8–5.2)
RBC # BLD AUTO: 3.6 M/UL (ref 3.8–5.2)
RBC #/AREA URNS HPF: ABNORMAL /HPF (ref 0–5)
RBC MORPH BLD: ABNORMAL
RIGHT CCA DIST DIAS: 0 CM/S
RIGHT CCA DIST SYS: 139.8 CM/S
RIGHT CCA PROX DIAS: 11.9 CM/S
RIGHT CCA PROX SYS: 130.9 CM/S
RIGHT ECA DIAS: 0 CM/S
RIGHT ECA SYS: 227.9 CM/S
RIGHT ICA DIST DIAS: 6.4 CM/S
RIGHT ICA DIST SYS: 100 CM/S
RIGHT ICA MID DIAS: 15 CM/S
RIGHT ICA MID SYS: 130.3 CM/S
RIGHT ICA PROX DIAS: 8.8 CM/S
RIGHT ICA PROX SYS: 164.4 CM/S
RIGHT ICA/CCA SYS: 1.2
RIGHT SUBCLAVIAN DIAS: 0 CM/S
RIGHT SUBCLAVIAN SYS: 164.4 CM/S
RIGHT VERTEBRAL DIAS: 17.35 CM/S
RIGHT VERTEBRAL SYS: 102.8 CM/S
SAMPLES BEING HELD,HOLD: NORMAL
SERVICE CMNT-IMP: ABNORMAL
SERVICE CMNT-IMP: NORMAL
SODIUM SERPL-SCNC: 137 MMOL/L (ref 136–145)
SODIUM SERPL-SCNC: 137 MMOL/L (ref 136–145)
SODIUM SERPL-SCNC: 139 MMOL/L (ref 136–145)
SODIUM SERPL-SCNC: 139 MMOL/L (ref 136–145)
SODIUM SERPL-SCNC: 140 MMOL/L (ref 136–145)
SODIUM SERPL-SCNC: 141 MMOL/L (ref 136–145)
SODIUM SERPL-SCNC: 142 MMOL/L (ref 136–145)
SODIUM SERPL-SCNC: 143 MMOL/L (ref 136–145)
SODIUM SERPL-SCNC: 143 MMOL/L (ref 136–145)
SODIUM SERPL-SCNC: 144 MMOL/L (ref 136–145)
SP GR UR REFRACTOMETRY: 1.02 (ref 1–1.03)
TIBC SERPL-MCNC: 174 UG/DL (ref 250–450)
TRIGL SERPL-MCNC: 74 MG/DL (ref ?–150)
TROPONIN I BLD-MCNC: <0.04 NG/ML (ref 0–0.08)
TROPONIN I SERPL-MCNC: 0.05 NG/ML
TROPONIN I SERPL-MCNC: <0.05 NG/ML
TSH SERPL DL<=0.05 MIU/L-ACNC: 6.14 UIU/ML (ref 0.36–3.74)
UA: UC IF INDICATED,UAUC: ABNORMAL
UROBILINOGEN UR QL STRIP.AUTO: 0.2 EU/DL (ref 0.2–1)
VENTRICULAR RATE, ECG03: 123 BPM
VENTRICULAR RATE, ECG03: 63 BPM
VENTRICULAR RATE, ECG03: 67 BPM
VENTRICULAR RATE, ECG03: 77 BPM
VENTRICULAR RATE, ECG03: 85 BPM
VIT B12 SERPL-MCNC: 1239 PG/ML (ref 193–986)
VLDLC SERPL CALC-MCNC: 14.8 MG/DL
WBC # BLD AUTO: 10.5 K/UL (ref 3.6–11)
WBC # BLD AUTO: 11.4 K/UL (ref 3.6–11)
WBC # BLD AUTO: 3 K/UL (ref 3.6–11)
WBC # BLD AUTO: 4.8 K/UL (ref 3.6–11)
WBC # BLD AUTO: 5.9 K/UL (ref 3.6–11)
WBC # BLD AUTO: 6.6 K/UL (ref 3.6–11)
WBC # BLD AUTO: 6.7 K/UL (ref 3.6–11)
WBC # BLD AUTO: 7 K/UL (ref 3.6–11)
WBC # BLD AUTO: 7.2 K/UL (ref 3.6–11)
WBC # BLD AUTO: 7.8 K/UL (ref 3.6–11)
WBC # BLD AUTO: 7.9 K/UL (ref 3.6–11)
WBC # BLD AUTO: 8.7 K/UL (ref 3.6–11)
WBC # BLD AUTO: 8.9 K/UL (ref 3.6–11)
WBC # BLD AUTO: 9.1 K/UL (ref 3.6–11)
WBC # BLD AUTO: 9.1 K/UL (ref 3.6–11)
WBC # BLD AUTO: 9.5 K/UL (ref 3.6–11)
WBC URNS QL MICRO: ABNORMAL /HPF (ref 0–4)

## 2019-01-01 PROCEDURE — 3331090002 HH PPS REVENUE DEBIT

## 2019-01-01 PROCEDURE — 74011250636 HC RX REV CODE- 250/636: Performed by: INTERNAL MEDICINE

## 2019-01-01 PROCEDURE — 85027 COMPLETE CBC AUTOMATED: CPT

## 2019-01-01 PROCEDURE — 65660000001 HC RM ICU INTERMED STEPDOWN

## 2019-01-01 PROCEDURE — 74011250637 HC RX REV CODE- 250/637: Performed by: HOSPITALIST

## 2019-01-01 PROCEDURE — 74011636320 HC RX REV CODE- 636/320: Performed by: INTERNAL MEDICINE

## 2019-01-01 PROCEDURE — 74011250637 HC RX REV CODE- 250/637: Performed by: INTERNAL MEDICINE

## 2019-01-01 PROCEDURE — 74174 CTA ABD&PLVS W/CONTRAST: CPT

## 2019-01-01 PROCEDURE — 3331090001 HH PPS REVENUE CREDIT

## 2019-01-01 PROCEDURE — 36415 COLL VENOUS BLD VENIPUNCTURE: CPT

## 2019-01-01 PROCEDURE — 83036 HEMOGLOBIN GLYCOSYLATED A1C: CPT

## 2019-01-01 PROCEDURE — 71045 X-RAY EXAM CHEST 1 VIEW: CPT

## 2019-01-01 PROCEDURE — 84100 ASSAY OF PHOSPHORUS: CPT

## 2019-01-01 PROCEDURE — C1769 GUIDE WIRE: HCPCS | Performed by: INTERNAL MEDICINE

## 2019-01-01 PROCEDURE — 65660000000 HC RM CCU STEPDOWN

## 2019-01-01 PROCEDURE — 93650 ICAR CATH ABLTJ AV NODE FUNC: CPT | Performed by: INTERNAL MEDICINE

## 2019-01-01 PROCEDURE — 77030010221 HC SPLNT WR POS TELE -B: Performed by: INTERNAL MEDICINE

## 2019-01-01 PROCEDURE — B246ZZ4 ULTRASONOGRAPHY OF RIGHT AND LEFT HEART, TRANSESOPHAGEAL: ICD-10-PCS | Performed by: INTERNAL MEDICINE

## 2019-01-01 PROCEDURE — 97535 SELF CARE MNGMENT TRAINING: CPT | Performed by: OCCUPATIONAL THERAPIST

## 2019-01-01 PROCEDURE — 99218 HC RM OBSERVATION: CPT

## 2019-01-01 PROCEDURE — 80053 COMPREHEN METABOLIC PANEL: CPT

## 2019-01-01 PROCEDURE — 83735 ASSAY OF MAGNESIUM: CPT

## 2019-01-01 PROCEDURE — C1732 CATH, EP, DIAG/ABL, 3D/VECT: HCPCS | Performed by: INTERNAL MEDICINE

## 2019-01-01 PROCEDURE — 99152 MOD SED SAME PHYS/QHP 5/>YRS: CPT

## 2019-01-01 PROCEDURE — 74011250636 HC RX REV CODE- 250/636: Performed by: HOSPITALIST

## 2019-01-01 PROCEDURE — C1894 INTRO/SHEATH, NON-LASER: HCPCS | Performed by: INTERNAL MEDICINE

## 2019-01-01 PROCEDURE — 97116 GAIT TRAINING THERAPY: CPT

## 2019-01-01 PROCEDURE — 84484 ASSAY OF TROPONIN QUANT: CPT

## 2019-01-01 PROCEDURE — G0152 HHCP-SERV OF OT,EA 15 MIN: HCPCS

## 2019-01-01 PROCEDURE — 82962 GLUCOSE BLOOD TEST: CPT

## 2019-01-01 PROCEDURE — 80048 BASIC METABOLIC PNL TOTAL CA: CPT

## 2019-01-01 PROCEDURE — 97530 THERAPEUTIC ACTIVITIES: CPT

## 2019-01-01 PROCEDURE — 82140 ASSAY OF AMMONIA: CPT

## 2019-01-01 PROCEDURE — 92960 CARDIOVERSION ELECTRIC EXT: CPT | Performed by: INTERNAL MEDICINE

## 2019-01-01 PROCEDURE — 74011250636 HC RX REV CODE- 250/636

## 2019-01-01 PROCEDURE — G0299 HHS/HOSPICE OF RN EA 15 MIN: HCPCS

## 2019-01-01 PROCEDURE — 97165 OT EVAL LOW COMPLEX 30 MIN: CPT | Performed by: OCCUPATIONAL THERAPIST

## 2019-01-01 PROCEDURE — 99152 MOD SED SAME PHYS/QHP 5/>YRS: CPT | Performed by: INTERNAL MEDICINE

## 2019-01-01 PROCEDURE — 74011000250 HC RX REV CODE- 250: Performed by: EMERGENCY MEDICINE

## 2019-01-01 PROCEDURE — 77030019908 HC STETH ESOPH SIMS -A: Performed by: NURSE ANESTHETIST, CERTIFIED REGISTERED

## 2019-01-01 PROCEDURE — 85610 PROTHROMBIN TIME: CPT

## 2019-01-01 PROCEDURE — 77030029065 HC DRSG HEMO QCLOT ZMED -B: Performed by: INTERNAL MEDICINE

## 2019-01-01 PROCEDURE — 85025 COMPLETE CBC W/AUTO DIFF WBC: CPT

## 2019-01-01 PROCEDURE — 83605 ASSAY OF LACTIC ACID: CPT

## 2019-01-01 PROCEDURE — 74011000250 HC RX REV CODE- 250: Performed by: INTERNAL MEDICINE

## 2019-01-01 PROCEDURE — 83970 ASSAY OF PARATHORMONE: CPT

## 2019-01-01 PROCEDURE — 97110 THERAPEUTIC EXERCISES: CPT

## 2019-01-01 PROCEDURE — 02HK3JZ INSERTION OF PACEMAKER LEAD INTO RIGHT VENTRICLE, PERCUTANEOUS APPROACH: ICD-10-PCS | Performed by: INTERNAL MEDICINE

## 2019-01-01 PROCEDURE — C1730 CATH, EP, 19 OR FEW ELECT: HCPCS | Performed by: INTERNAL MEDICINE

## 2019-01-01 PROCEDURE — B215YZZ FLUOROSCOPY OF LEFT HEART USING OTHER CONTRAST: ICD-10-PCS | Performed by: INTERNAL MEDICINE

## 2019-01-01 PROCEDURE — 77030018729 HC ELECTRD DEFIB PAD CARD -B: Performed by: INTERNAL MEDICINE

## 2019-01-01 PROCEDURE — 74011000258 HC RX REV CODE- 258: Performed by: EMERGENCY MEDICINE

## 2019-01-01 PROCEDURE — 93005 ELECTROCARDIOGRAM TRACING: CPT

## 2019-01-01 PROCEDURE — 74011636637 HC RX REV CODE- 636/637: Performed by: INTERNAL MEDICINE

## 2019-01-01 PROCEDURE — 70450 CT HEAD/BRAIN W/O DYE: CPT

## 2019-01-01 PROCEDURE — C1751 CATH, INF, PER/CENT/MIDLINE: HCPCS | Performed by: INTERNAL MEDICINE

## 2019-01-01 PROCEDURE — G0151 HHCP-SERV OF PT,EA 15 MIN: HCPCS

## 2019-01-01 PROCEDURE — 77030028837 HC SYR ANGI PWR INJ COEU -A: Performed by: INTERNAL MEDICINE

## 2019-01-01 PROCEDURE — 0JH604Z INSERTION OF PACEMAKER, SINGLE CHAMBER INTO CHEST SUBCUTANEOUS TISSUE AND FASCIA, OPEN APPROACH: ICD-10-PCS | Performed by: INTERNAL MEDICINE

## 2019-01-01 PROCEDURE — 77030004521 HC CATH ANGI DX COOK -B: Performed by: INTERNAL MEDICINE

## 2019-01-01 PROCEDURE — G0158 HHC OT ASSISTANT EA 15: HCPCS

## 2019-01-01 PROCEDURE — 77030008543 HC TBNG MON PRSS MRTM -A: Performed by: INTERNAL MEDICINE

## 2019-01-01 PROCEDURE — 77030027107 HC PTCH EXT REF CARTO3 J&J -F: Performed by: INTERNAL MEDICINE

## 2019-01-01 PROCEDURE — 77030002996 HC SUT SLK J&J -A: Performed by: INTERNAL MEDICINE

## 2019-01-01 PROCEDURE — 82607 VITAMIN B-12: CPT

## 2019-01-01 PROCEDURE — 97535 SELF CARE MNGMENT TRAINING: CPT

## 2019-01-01 PROCEDURE — 72125 CT NECK SPINE W/O DYE: CPT

## 2019-01-01 PROCEDURE — 85652 RBC SED RATE AUTOMATED: CPT

## 2019-01-01 PROCEDURE — 77030037713 HC CLOSR DEV INCIS ZIP STRY -B: Performed by: INTERNAL MEDICINE

## 2019-01-01 PROCEDURE — 97161 PT EVAL LOW COMPLEX 20 MIN: CPT

## 2019-01-01 PROCEDURE — 81001 URINALYSIS AUTO W/SCOPE: CPT

## 2019-01-01 PROCEDURE — 77030038269 HC DRN EXT URIN PURWCK BARD -A

## 2019-01-01 PROCEDURE — 3331090003 HH PPS REVENUE ADJ

## 2019-01-01 PROCEDURE — 80061 LIPID PANEL: CPT

## 2019-01-01 PROCEDURE — 93613 INTRACARDIAC EPHYS 3D MAPG: CPT

## 2019-01-01 PROCEDURE — 77030040361 HC SLV COMPR DVT MDII -B

## 2019-01-01 PROCEDURE — 94761 N-INVAS EAR/PLS OXIMETRY MLT: CPT

## 2019-01-01 PROCEDURE — 76770 US EXAM ABDO BACK WALL COMP: CPT

## 2019-01-01 PROCEDURE — 77010033678 HC OXYGEN DAILY

## 2019-01-01 PROCEDURE — 99153 MOD SED SAME PHYS/QHP EA: CPT | Performed by: INTERNAL MEDICINE

## 2019-01-01 PROCEDURE — 97530 THERAPEUTIC ACTIVITIES: CPT | Performed by: OCCUPATIONAL THERAPIST

## 2019-01-01 PROCEDURE — C1893 INTRO/SHEATH, FIXED,NON-PEEL: HCPCS | Performed by: INTERNAL MEDICINE

## 2019-01-01 PROCEDURE — 93312 ECHO TRANSESOPHAGEAL: CPT

## 2019-01-01 PROCEDURE — C1898 LEAD, PMKR, OTHER THAN TRANS: HCPCS | Performed by: INTERNAL MEDICINE

## 2019-01-01 PROCEDURE — B211YZZ FLUOROSCOPY OF MULTIPLE CORONARY ARTERIES USING OTHER CONTRAST: ICD-10-PCS | Performed by: INTERNAL MEDICINE

## 2019-01-01 PROCEDURE — 82746 ASSAY OF FOLIC ACID SERUM: CPT

## 2019-01-01 PROCEDURE — 77030015398 HC CBL EP EXT STJU -C: Performed by: INTERNAL MEDICINE

## 2019-01-01 PROCEDURE — 77030031139 HC SUT VCRL2 J&J -A: Performed by: INTERNAL MEDICINE

## 2019-01-01 PROCEDURE — 82550 ASSAY OF CK (CPK): CPT

## 2019-01-01 PROCEDURE — 97165 OT EVAL LOW COMPLEX 30 MIN: CPT

## 2019-01-01 PROCEDURE — 83880 ASSAY OF NATRIURETIC PEPTIDE: CPT

## 2019-01-01 PROCEDURE — 71275 CT ANGIOGRAPHY CHEST: CPT

## 2019-01-01 PROCEDURE — G0157 HHC PT ASSISTANT EA 15: HCPCS

## 2019-01-01 PROCEDURE — 90471 IMMUNIZATION ADMIN: CPT

## 2019-01-01 PROCEDURE — 74011000250 HC RX REV CODE- 250: Performed by: HOSPITALIST

## 2019-01-01 PROCEDURE — 65610000006 HC RM INTENSIVE CARE

## 2019-01-01 PROCEDURE — 400013 HH SOC

## 2019-01-01 PROCEDURE — 76060000034 HC ANESTHESIA 1.5 TO 2 HR: Performed by: INTERNAL MEDICINE

## 2019-01-01 PROCEDURE — 96374 THER/PROPH/DIAG INJ IV PUSH: CPT

## 2019-01-01 PROCEDURE — 77030004549 HC CATH ANGI DX PRF MRTM -A: Performed by: INTERNAL MEDICINE

## 2019-01-01 PROCEDURE — 77030016894 HC CBL EP DX CATH3 STJU -B: Performed by: INTERNAL MEDICINE

## 2019-01-01 PROCEDURE — 94760 N-INVAS EAR/PLS OXIMETRY 1: CPT

## 2019-01-01 PROCEDURE — 84443 ASSAY THYROID STIM HORMONE: CPT

## 2019-01-01 PROCEDURE — 77030019569 HC BND COMPR RAD TERU -B: Performed by: INTERNAL MEDICINE

## 2019-01-01 PROCEDURE — 87040 BLOOD CULTURE FOR BACTERIA: CPT

## 2019-01-01 PROCEDURE — 77030018673: Performed by: INTERNAL MEDICINE

## 2019-01-01 PROCEDURE — 5A2204Z RESTORATION OF CARDIAC RHYTHM, SINGLE: ICD-10-PCS | Performed by: INTERNAL MEDICINE

## 2019-01-01 PROCEDURE — 80069 RENAL FUNCTION PANEL: CPT

## 2019-01-01 PROCEDURE — 02K83ZZ MAP CONDUCTION MECHANISM, PERCUTANEOUS APPROACH: ICD-10-PCS | Performed by: INTERNAL MEDICINE

## 2019-01-01 PROCEDURE — 65390000012 HC CONDITION CODE 44 OBSERVATION

## 2019-01-01 PROCEDURE — 33207 INSERT HEART PM VENTRICULAR: CPT | Performed by: INTERNAL MEDICINE

## 2019-01-01 PROCEDURE — 93460 R&L HRT ART/VENTRICLE ANGIO: CPT | Performed by: INTERNAL MEDICINE

## 2019-01-01 PROCEDURE — 76450000000

## 2019-01-01 PROCEDURE — 77030030195 HC CATH ANGI DX PRF4 MRTM -A: Performed by: INTERNAL MEDICINE

## 2019-01-01 PROCEDURE — 74011250636 HC RX REV CODE- 250/636: Performed by: EMERGENCY MEDICINE

## 2019-01-01 PROCEDURE — 96375 TX/PRO/DX INJ NEW DRUG ADDON: CPT

## 2019-01-01 PROCEDURE — 93880 EXTRACRANIAL BILAT STUDY: CPT

## 2019-01-01 PROCEDURE — 80162 ASSAY OF DIGOXIN TOTAL: CPT

## 2019-01-01 PROCEDURE — 94640 AIRWAY INHALATION TREATMENT: CPT

## 2019-01-01 PROCEDURE — 77030015766: Performed by: INTERNAL MEDICINE

## 2019-01-01 PROCEDURE — 97162 PT EVAL MOD COMPLEX 30 MIN: CPT

## 2019-01-01 PROCEDURE — 77030039825 HC MSK NSL PAP SUPERNO2VA VYRM -B: Performed by: NURSE ANESTHETIST, CERTIFIED REGISTERED

## 2019-01-01 PROCEDURE — 99285 EMERGENCY DEPT VISIT HI MDM: CPT

## 2019-01-01 PROCEDURE — 77030010880 HC CBL EP SUPRME STJU -C: Performed by: INTERNAL MEDICINE

## 2019-01-01 PROCEDURE — 4A023N8 MEASUREMENT OF CARDIAC SAMPLING AND PRESSURE, BILATERAL, PERCUTANEOUS APPROACH: ICD-10-PCS | Performed by: INTERNAL MEDICINE

## 2019-01-01 PROCEDURE — C1887 CATHETER, GUIDING: HCPCS | Performed by: INTERNAL MEDICINE

## 2019-01-01 PROCEDURE — 71046 X-RAY EXAM CHEST 2 VIEWS: CPT

## 2019-01-01 PROCEDURE — 83540 ASSAY OF IRON: CPT

## 2019-01-01 PROCEDURE — 84145 PROCALCITONIN (PCT): CPT

## 2019-01-01 PROCEDURE — 74011250636 HC RX REV CODE- 250/636: Performed by: NURSE ANESTHETIST, CERTIFIED REGISTERED

## 2019-01-01 PROCEDURE — 02583ZZ DESTRUCTION OF CONDUCTION MECHANISM, PERCUTANEOUS APPROACH: ICD-10-PCS | Performed by: INTERNAL MEDICINE

## 2019-01-01 PROCEDURE — 90686 IIV4 VACC NO PRSV 0.5 ML IM: CPT | Performed by: INTERNAL MEDICINE

## 2019-01-01 PROCEDURE — 82570 ASSAY OF URINE CREATININE: CPT

## 2019-01-01 PROCEDURE — C1786 PMKR, SINGLE, RATE-RESP: HCPCS | Performed by: INTERNAL MEDICINE

## 2019-01-01 PROCEDURE — 74011250637 HC RX REV CODE- 250/637: Performed by: EMERGENCY MEDICINE

## 2019-01-01 PROCEDURE — 71250 CT THORAX DX C-: CPT

## 2019-01-01 PROCEDURE — 74011000258 HC RX REV CODE- 258: Performed by: INTERNAL MEDICINE

## 2019-01-01 PROCEDURE — 99153 MOD SED SAME PHYS/QHP EA: CPT

## 2019-01-01 PROCEDURE — 77030019698 HC SYR ANGI MDLON MRTM -A: Performed by: INTERNAL MEDICINE

## 2019-01-01 PROCEDURE — 93306 TTE W/DOPPLER COMPLETE: CPT

## 2019-01-01 PROCEDURE — 77030027138 HC INCENT SPIROMETER -A

## 2019-01-01 PROCEDURE — 87086 URINE CULTURE/COLONY COUNT: CPT

## 2019-01-01 PROCEDURE — 96365 THER/PROPH/DIAG IV INF INIT: CPT

## 2019-01-01 PROCEDURE — 74011250637 HC RX REV CODE- 250/637

## 2019-01-01 DEVICE — PACEMAKER
Type: IMPLANTABLE DEVICE | Status: FUNCTIONAL
Brand: ACCOLADE™ MRI SR

## 2019-01-01 DEVICE — ENDOCARDIAL STEROID-ELUTING MR CONDITIONAL STYLET DELIVERED PACE/SENSE LEAD
Type: IMPLANTABLE DEVICE | Status: FUNCTIONAL
Brand: INGEVITY™ MRI

## 2019-01-01 RX ORDER — HEPARIN SODIUM 1000 [USP'U]/ML
INJECTION, SOLUTION INTRAVENOUS; SUBCUTANEOUS AS NEEDED
Status: DISCONTINUED | OUTPATIENT
Start: 2019-01-01 | End: 2019-01-01 | Stop reason: HOSPADM

## 2019-01-01 RX ORDER — MIDAZOLAM HYDROCHLORIDE 1 MG/ML
INJECTION, SOLUTION INTRAMUSCULAR; INTRAVENOUS AS NEEDED
Status: DISCONTINUED | OUTPATIENT
Start: 2019-01-01 | End: 2019-01-01 | Stop reason: HOSPADM

## 2019-01-01 RX ORDER — ONDANSETRON 2 MG/ML
4 INJECTION INTRAMUSCULAR; INTRAVENOUS
Status: DISCONTINUED | OUTPATIENT
Start: 2019-01-01 | End: 2019-01-01 | Stop reason: HOSPADM

## 2019-01-01 RX ORDER — LEVOTHYROXINE SODIUM 50 UG/1
50 TABLET ORAL
Status: DISCONTINUED | OUTPATIENT
Start: 2019-01-01 | End: 2019-01-01 | Stop reason: HOSPADM

## 2019-01-01 RX ORDER — AMLODIPINE BESYLATE 5 MG/1
10 TABLET ORAL DAILY
Status: DISCONTINUED | OUTPATIENT
Start: 2019-01-01 | End: 2019-01-01

## 2019-01-01 RX ORDER — WARFARIN 2 MG/1
2 TABLET ORAL ONCE
Status: COMPLETED | OUTPATIENT
Start: 2019-01-01 | End: 2019-01-01

## 2019-01-01 RX ORDER — LEVOFLOXACIN 5 MG/ML
500 INJECTION, SOLUTION INTRAVENOUS
Status: DISCONTINUED | OUTPATIENT
Start: 2019-01-01 | End: 2019-01-01 | Stop reason: HOSPADM

## 2019-01-01 RX ORDER — WARFARIN 2.5 MG/1
2.5 TABLET ORAL ONCE
Status: COMPLETED | OUTPATIENT
Start: 2019-01-01 | End: 2019-01-01

## 2019-01-01 RX ORDER — IODIXANOL 320 MG/ML
INJECTION, SOLUTION INTRAVASCULAR AS NEEDED
Status: DISCONTINUED | OUTPATIENT
Start: 2019-01-01 | End: 2019-01-01 | Stop reason: HOSPADM

## 2019-01-01 RX ORDER — DIGOXIN 125 MCG
0.12 TABLET ORAL
COMMUNITY
End: 2019-01-01

## 2019-01-01 RX ORDER — HYDRALAZINE HYDROCHLORIDE 20 MG/ML
10 INJECTION INTRAMUSCULAR; INTRAVENOUS
Status: DISCONTINUED | OUTPATIENT
Start: 2019-01-01 | End: 2019-01-01 | Stop reason: HOSPADM

## 2019-01-01 RX ORDER — ALPRAZOLAM 0.25 MG/1
0.25 TABLET ORAL AS NEEDED
Status: DISCONTINUED | OUTPATIENT
Start: 2019-01-01 | End: 2019-01-01 | Stop reason: HOSPADM

## 2019-01-01 RX ORDER — ONDANSETRON 4 MG/1
4 TABLET, FILM COATED ORAL
Qty: 30 TAB | Refills: 0 | Status: SHIPPED | OUTPATIENT
Start: 2019-01-01 | End: 2019-01-01

## 2019-01-01 RX ORDER — LEVOFLOXACIN 5 MG/ML
750 INJECTION, SOLUTION INTRAVENOUS
Status: COMPLETED | OUTPATIENT
Start: 2019-01-01 | End: 2019-01-01

## 2019-01-01 RX ORDER — HYDRALAZINE HYDROCHLORIDE 25 MG/1
25 TABLET, FILM COATED ORAL 3 TIMES DAILY
Status: DISCONTINUED | OUTPATIENT
Start: 2019-01-01 | End: 2019-01-01

## 2019-01-01 RX ORDER — PROPOFOL 10 MG/ML
INJECTION, EMULSION INTRAVENOUS AS NEEDED
Status: DISCONTINUED | OUTPATIENT
Start: 2019-01-01 | End: 2019-01-01 | Stop reason: HOSPADM

## 2019-01-01 RX ORDER — METOPROLOL SUCCINATE 100 MG/1
100 TABLET, EXTENDED RELEASE ORAL DAILY
COMMUNITY

## 2019-01-01 RX ORDER — MIRTAZAPINE 15 MG/1
30 TABLET, FILM COATED ORAL
Status: DISCONTINUED | OUTPATIENT
Start: 2019-01-01 | End: 2019-01-01 | Stop reason: HOSPADM

## 2019-01-01 RX ORDER — FUROSEMIDE 10 MG/ML
40 INJECTION INTRAMUSCULAR; INTRAVENOUS EVERY 12 HOURS
Status: DISCONTINUED | OUTPATIENT
Start: 2019-01-01 | End: 2019-01-01

## 2019-01-01 RX ORDER — FENTANYL CITRATE 50 UG/ML
INJECTION, SOLUTION INTRAMUSCULAR; INTRAVENOUS AS NEEDED
Status: DISCONTINUED | OUTPATIENT
Start: 2019-01-01 | End: 2019-01-01 | Stop reason: HOSPADM

## 2019-01-01 RX ORDER — DEXTROSE 50 % IN WATER (D50W) INTRAVENOUS SYRINGE
12.5-25 AS NEEDED
Status: DISCONTINUED | OUTPATIENT
Start: 2019-01-01 | End: 2019-01-01 | Stop reason: HOSPADM

## 2019-01-01 RX ORDER — METOPROLOL SUCCINATE 50 MG/1
100 TABLET, EXTENDED RELEASE ORAL DAILY
Status: DISCONTINUED | OUTPATIENT
Start: 2019-01-01 | End: 2019-01-01

## 2019-01-01 RX ORDER — BACITRACIN 50000 [IU]/1
INJECTION, POWDER, FOR SOLUTION INTRAMUSCULAR AS NEEDED
Status: DISCONTINUED | OUTPATIENT
Start: 2019-01-01 | End: 2019-01-01 | Stop reason: HOSPADM

## 2019-01-01 RX ORDER — DILTIAZEM HYDROCHLORIDE 180 MG/1
180 CAPSULE, COATED, EXTENDED RELEASE ORAL DAILY
Qty: 90 CAP | Refills: 3 | Status: ON HOLD | OUTPATIENT
Start: 2019-01-01 | End: 2020-01-01

## 2019-01-01 RX ORDER — DILTIAZEM HYDROCHLORIDE 5 MG/ML
10 INJECTION INTRAVENOUS
Status: COMPLETED | OUTPATIENT
Start: 2019-01-01 | End: 2019-01-01

## 2019-01-01 RX ORDER — SODIUM CHLORIDE 0.9 % (FLUSH) 0.9 %
5-40 SYRINGE (ML) INJECTION AS NEEDED
Status: DISCONTINUED | OUTPATIENT
Start: 2019-01-01 | End: 2019-01-01 | Stop reason: HOSPADM

## 2019-01-01 RX ORDER — ACETAMINOPHEN 325 MG/1
650 TABLET ORAL
Status: DISCONTINUED | OUTPATIENT
Start: 2019-01-01 | End: 2019-01-01 | Stop reason: HOSPADM

## 2019-01-01 RX ORDER — SODIUM CHLORIDE 9 MG/ML
INJECTION, SOLUTION INTRAVENOUS
Status: DISCONTINUED | OUTPATIENT
Start: 2019-01-01 | End: 2019-01-01 | Stop reason: HOSPADM

## 2019-01-01 RX ORDER — POLYETHYLENE GLYCOL 3350 17 G/17G
17 POWDER, FOR SOLUTION ORAL ONCE
Status: DISPENSED | OUTPATIENT
Start: 2019-01-01 | End: 2019-01-01

## 2019-01-01 RX ORDER — SODIUM CHLORIDE 0.9 % (FLUSH) 0.9 %
5-40 SYRINGE (ML) INJECTION EVERY 8 HOURS
Status: DISCONTINUED | OUTPATIENT
Start: 2019-01-01 | End: 2019-01-01

## 2019-01-01 RX ORDER — IPRATROPIUM BROMIDE AND ALBUTEROL SULFATE 2.5; .5 MG/3ML; MG/3ML
3 SOLUTION RESPIRATORY (INHALATION)
Status: DISCONTINUED | OUTPATIENT
Start: 2019-01-01 | End: 2019-01-01 | Stop reason: HOSPADM

## 2019-01-01 RX ORDER — LANOLIN ALCOHOL/MO/W.PET/CERES
2 CREAM (GRAM) TOPICAL
COMMUNITY
Start: 2017-04-30 | End: 2019-01-01 | Stop reason: SDUPTHER

## 2019-01-01 RX ORDER — SODIUM BICARBONATE 650 MG/1
650 TABLET ORAL 3 TIMES DAILY
COMMUNITY
End: 2020-01-01

## 2019-01-01 RX ORDER — FUROSEMIDE 10 MG/ML
40 INJECTION INTRAMUSCULAR; INTRAVENOUS DAILY
Status: DISCONTINUED | OUTPATIENT
Start: 2019-01-01 | End: 2019-01-01

## 2019-01-01 RX ORDER — LEVALBUTEROL INHALATION SOLUTION 1.25 MG/3ML
1.25 SOLUTION RESPIRATORY (INHALATION)
Status: DISCONTINUED | OUTPATIENT
Start: 2019-01-01 | End: 2019-01-01 | Stop reason: HOSPADM

## 2019-01-01 RX ORDER — MUPIROCIN 20 MG/G
OINTMENT TOPICAL EVERY 12 HOURS
Status: DISCONTINUED | OUTPATIENT
Start: 2019-01-01 | End: 2019-01-01 | Stop reason: SDUPTHER

## 2019-01-01 RX ORDER — SODIUM CHLORIDE 0.9 % (FLUSH) 0.9 %
SYRINGE (ML) INJECTION
Status: DISPENSED
Start: 2019-01-01 | End: 2019-01-01

## 2019-01-01 RX ORDER — HYDRALAZINE HYDROCHLORIDE 50 MG/1
50 TABLET, FILM COATED ORAL 3 TIMES DAILY
Status: DISCONTINUED | OUTPATIENT
Start: 2019-01-01 | End: 2019-01-01 | Stop reason: HOSPADM

## 2019-01-01 RX ORDER — LOTEPREDNOL ETABONATE 5 MG/ML
SUSPENSION/ DROPS OPHTHALMIC
COMMUNITY
Start: 2019-01-01 | End: 2019-01-01

## 2019-01-01 RX ORDER — FUROSEMIDE 20 MG/1
20 TABLET ORAL DAILY
Status: DISCONTINUED | OUTPATIENT
Start: 2019-01-01 | End: 2019-01-01

## 2019-01-01 RX ORDER — LIDOCAINE HYDROCHLORIDE 20 MG/ML
15 SOLUTION OROPHARYNGEAL ONCE
Status: COMPLETED | OUTPATIENT
Start: 2019-01-01 | End: 2019-01-01

## 2019-01-01 RX ORDER — DIGOXIN 0.25 MG/ML
125 INJECTION INTRAMUSCULAR; INTRAVENOUS DAILY
Status: DISCONTINUED | OUTPATIENT
Start: 2019-01-01 | End: 2019-01-01

## 2019-01-01 RX ORDER — SODIUM CHLORIDE 0.9 % (FLUSH) 0.9 %
5-40 SYRINGE (ML) INJECTION AS NEEDED
Status: DISCONTINUED | OUTPATIENT
Start: 2019-01-01 | End: 2019-01-01

## 2019-01-01 RX ORDER — IPRATROPIUM BROMIDE AND ALBUTEROL SULFATE 2.5; .5 MG/3ML; MG/3ML
3 SOLUTION RESPIRATORY (INHALATION)
Status: COMPLETED | OUTPATIENT
Start: 2019-01-01 | End: 2019-01-01

## 2019-01-01 RX ORDER — HEPARIN SODIUM 200 [USP'U]/100ML
INJECTION, SOLUTION INTRAVENOUS
Status: COMPLETED | OUTPATIENT
Start: 2019-01-01 | End: 2019-01-01

## 2019-01-01 RX ORDER — HYDRALAZINE HYDROCHLORIDE 50 MG/1
50 TABLET, FILM COATED ORAL
Status: COMPLETED | OUTPATIENT
Start: 2019-01-01 | End: 2019-01-01

## 2019-01-01 RX ORDER — ALPRAZOLAM 0.25 MG/1
0.12 TABLET ORAL
COMMUNITY
End: 2020-01-01

## 2019-01-01 RX ORDER — TORSEMIDE 5 MG/1
5 TABLET ORAL DAILY
COMMUNITY
End: 2020-01-01

## 2019-01-01 RX ORDER — SODIUM CHLORIDE 0.9 % (FLUSH) 0.9 %
5-40 SYRINGE (ML) INJECTION EVERY 8 HOURS
Status: DISCONTINUED | OUTPATIENT
Start: 2019-01-01 | End: 2019-01-01 | Stop reason: HOSPADM

## 2019-01-01 RX ORDER — GUAIFENESIN/DEXTROMETHORPHAN 100-10MG/5
5 SYRUP ORAL
Status: DISCONTINUED | OUTPATIENT
Start: 2019-01-01 | End: 2019-01-01 | Stop reason: HOSPADM

## 2019-01-01 RX ORDER — VERAPAMIL HYDROCHLORIDE 2.5 MG/ML
INJECTION, SOLUTION INTRAVENOUS AS NEEDED
Status: DISCONTINUED | OUTPATIENT
Start: 2019-01-01 | End: 2019-01-01 | Stop reason: HOSPADM

## 2019-01-01 RX ORDER — HYDRALAZINE HYDROCHLORIDE 25 MG/1
50 TABLET, FILM COATED ORAL 3 TIMES DAILY
Status: DISCONTINUED | OUTPATIENT
Start: 2019-01-01 | End: 2019-01-01

## 2019-01-01 RX ORDER — LANOLIN ALCOHOL/MO/W.PET/CERES
1 CREAM (GRAM) TOPICAL
Status: DISCONTINUED | OUTPATIENT
Start: 2019-01-01 | End: 2019-01-01 | Stop reason: HOSPADM

## 2019-01-01 RX ORDER — METOPROLOL TARTRATE 100 MG/1
100 TABLET ORAL 2 TIMES DAILY
Qty: 180 TAB | Refills: 3 | Status: SHIPPED | OUTPATIENT
Start: 2019-01-01 | End: 2019-01-01 | Stop reason: ALTCHOICE

## 2019-01-01 RX ORDER — POLYETHYLENE GLYCOL 3350 17 G/17G
17 POWDER, FOR SOLUTION ORAL
COMMUNITY
Start: 2014-12-16 | End: 2020-01-01

## 2019-01-01 RX ORDER — FUROSEMIDE 10 MG/ML
20 INJECTION INTRAMUSCULAR; INTRAVENOUS 2 TIMES DAILY
Status: DISCONTINUED | OUTPATIENT
Start: 2019-01-01 | End: 2019-01-01 | Stop reason: HOSPADM

## 2019-01-01 RX ORDER — DIPHENHYDRAMINE HCL 25 MG
50 CAPSULE ORAL ONCE
Status: COMPLETED | OUTPATIENT
Start: 2019-01-01 | End: 2019-01-01

## 2019-01-01 RX ORDER — SODIUM CHLORIDE 450 MG/100ML
75 INJECTION, SOLUTION INTRAVENOUS CONTINUOUS
Status: DISCONTINUED | OUTPATIENT
Start: 2019-01-01 | End: 2019-01-01 | Stop reason: HOSPADM

## 2019-01-01 RX ORDER — LEVOFLOXACIN 500 MG/1
500 TABLET, FILM COATED ORAL
Qty: 2 TAB | Refills: 0 | Status: SHIPPED | OUTPATIENT
Start: 2019-01-01 | End: 2019-01-01

## 2019-01-01 RX ORDER — LEVOTHYROXINE SODIUM 88 UG/1
88 TABLET ORAL
Status: DISCONTINUED | OUTPATIENT
Start: 2019-01-01 | End: 2019-01-01 | Stop reason: HOSPADM

## 2019-01-01 RX ORDER — DOXAZOSIN 1 MG/1
1 TABLET ORAL
COMMUNITY
End: 2019-01-01

## 2019-01-01 RX ORDER — HYDRALAZINE HYDROCHLORIDE 100 MG/1
100 TABLET, FILM COATED ORAL 3 TIMES DAILY
Qty: 270 TAB | Refills: 2 | Status: SHIPPED | OUTPATIENT
Start: 2019-01-01 | End: 2020-01-01

## 2019-01-01 RX ORDER — ONDANSETRON 4 MG/1
4 TABLET, FILM COATED ORAL
COMMUNITY

## 2019-01-01 RX ORDER — IPRATROPIUM BROMIDE AND ALBUTEROL SULFATE 2.5; .5 MG/3ML; MG/3ML
3 SOLUTION RESPIRATORY (INHALATION)
Qty: 30 NEBULE | Refills: 0 | Status: SHIPPED | OUTPATIENT
Start: 2019-01-01

## 2019-01-01 RX ORDER — SODIUM CHLORIDE 0.9 % (FLUSH) 0.9 %
10 SYRINGE (ML) INJECTION
Status: COMPLETED | OUTPATIENT
Start: 2019-01-01 | End: 2019-01-01

## 2019-01-01 RX ORDER — WARFARIN SODIUM 5 MG/1
2.5 TABLET ORAL
Qty: 2.5 TAB | Refills: 0 | Status: ON HOLD
Start: 2019-01-01 | End: 2020-01-01

## 2019-01-01 RX ORDER — METOPROLOL TARTRATE 50 MG/1
100 TABLET ORAL 2 TIMES DAILY
Status: DISCONTINUED | OUTPATIENT
Start: 2019-01-01 | End: 2019-01-01

## 2019-01-01 RX ORDER — AMLODIPINE BESYLATE 2.5 MG/1
2.5 TABLET ORAL DAILY
Status: DISCONTINUED | OUTPATIENT
Start: 2019-01-01 | End: 2019-01-01

## 2019-01-01 RX ORDER — MAGNESIUM SULFATE 100 %
4 CRYSTALS MISCELLANEOUS AS NEEDED
Status: DISCONTINUED | OUTPATIENT
Start: 2019-01-01 | End: 2019-01-01 | Stop reason: HOSPADM

## 2019-01-01 RX ORDER — SODIUM CHLORIDE 0.9 % (FLUSH) 0.9 %
5-40 SYRINGE (ML) INJECTION AS NEEDED
Status: DISCONTINUED | OUTPATIENT
Start: 2019-01-01 | End: 2019-01-01 | Stop reason: SDUPTHER

## 2019-01-01 RX ORDER — LIDOCAINE HYDROCHLORIDE 10 MG/ML
INJECTION INFILTRATION; PERINEURAL AS NEEDED
Status: DISCONTINUED | OUTPATIENT
Start: 2019-01-01 | End: 2019-01-01 | Stop reason: HOSPADM

## 2019-01-01 RX ORDER — HYDRALAZINE HYDROCHLORIDE 50 MG/1
50 TABLET, FILM COATED ORAL 3 TIMES DAILY
Status: DISCONTINUED | OUTPATIENT
Start: 2019-01-01 | End: 2019-01-01

## 2019-01-01 RX ORDER — MORPHINE SULFATE 2 MG/ML
2 INJECTION, SOLUTION INTRAMUSCULAR; INTRAVENOUS
Status: DISCONTINUED | OUTPATIENT
Start: 2019-01-01 | End: 2019-01-01

## 2019-01-01 RX ORDER — FUROSEMIDE 20 MG/1
TABLET ORAL
Qty: 90 TAB | Refills: 1 | Status: SHIPPED | OUTPATIENT
Start: 2019-01-01 | End: 2019-01-01 | Stop reason: ALTCHOICE

## 2019-01-01 RX ORDER — FUROSEMIDE 40 MG/1
40 TABLET ORAL DAILY
Status: DISCONTINUED | OUTPATIENT
Start: 2019-01-01 | End: 2019-01-01 | Stop reason: HOSPADM

## 2019-01-01 RX ORDER — ACETAMINOPHEN 500 MG
500 TABLET ORAL AS NEEDED
COMMUNITY
End: 2019-01-01

## 2019-01-01 RX ORDER — INSULIN LISPRO 100 [IU]/ML
INJECTION, SOLUTION INTRAVENOUS; SUBCUTANEOUS
Status: DISCONTINUED | OUTPATIENT
Start: 2019-01-01 | End: 2019-01-01

## 2019-01-01 RX ORDER — ACETAMINOPHEN 650 MG/1
650 SUPPOSITORY RECTAL
Status: DISCONTINUED | OUTPATIENT
Start: 2019-01-01 | End: 2019-01-01 | Stop reason: HOSPADM

## 2019-01-01 RX ORDER — FENTANYL CITRATE 50 UG/ML
25-50 INJECTION, SOLUTION INTRAMUSCULAR; INTRAVENOUS
Status: DISCONTINUED | OUTPATIENT
Start: 2019-01-01 | End: 2019-01-01

## 2019-01-01 RX ORDER — ISOSORBIDE MONONITRATE 60 MG/1
1 TABLET, EXTENDED RELEASE ORAL DAILY
Refills: 5 | COMMUNITY
Start: 2019-01-01 | End: 2019-01-01

## 2019-01-01 RX ORDER — DIPHENHYDRAMINE HCL 25 MG
50 CAPSULE ORAL ONCE
Status: DISCONTINUED | OUTPATIENT
Start: 2019-01-01 | End: 2019-01-01

## 2019-01-01 RX ORDER — MUPIROCIN 20 MG/G
OINTMENT TOPICAL 2 TIMES DAILY
Status: DISPENSED | OUTPATIENT
Start: 2019-01-01 | End: 2019-01-01

## 2019-01-01 RX ORDER — POLYETHYLENE GLYCOL 3350 17 G/17G
17 POWDER, FOR SOLUTION ORAL
Status: DISCONTINUED | OUTPATIENT
Start: 2019-01-01 | End: 2019-01-01 | Stop reason: HOSPADM

## 2019-01-01 RX ORDER — PROPOFOL 10 MG/ML
INJECTION, EMULSION INTRAVENOUS
Status: DISCONTINUED | OUTPATIENT
Start: 2019-01-01 | End: 2019-01-01 | Stop reason: HOSPADM

## 2019-01-01 RX ORDER — HYDRALAZINE HYDROCHLORIDE 50 MG/1
50 TABLET, FILM COATED ORAL 4 TIMES DAILY
Qty: 360 TAB | Refills: 3 | Status: SHIPPED | OUTPATIENT
Start: 2019-01-01 | End: 2019-01-01

## 2019-01-01 RX ORDER — METOPROLOL TARTRATE 25 MG/1
50 TABLET, FILM COATED ORAL EVERY 12 HOURS
Status: DISCONTINUED | OUTPATIENT
Start: 2019-01-01 | End: 2019-01-01

## 2019-01-01 RX ORDER — LEVOFLOXACIN 5 MG/ML
750 INJECTION, SOLUTION INTRAVENOUS
Status: DISCONTINUED | OUTPATIENT
Start: 2019-01-01 | End: 2019-01-01

## 2019-01-01 RX ORDER — LABETALOL HCL 20 MG/4 ML
10 SYRINGE (ML) INTRAVENOUS
Status: DISCONTINUED | OUTPATIENT
Start: 2019-01-01 | End: 2019-01-01 | Stop reason: HOSPADM

## 2019-01-01 RX ORDER — LIDOCAINE HYDROCHLORIDE 10 MG/ML
INJECTION, SOLUTION EPIDURAL; INFILTRATION; INTRACAUDAL; PERINEURAL AS NEEDED
Status: DISCONTINUED | OUTPATIENT
Start: 2019-01-01 | End: 2019-01-01 | Stop reason: HOSPADM

## 2019-01-01 RX ORDER — WARFARIN 1 MG/1
0.5 TABLET ORAL ONCE
Status: COMPLETED | OUTPATIENT
Start: 2019-01-01 | End: 2019-01-01

## 2019-01-01 RX ORDER — SODIUM CHLORIDE 0.9 % (FLUSH) 0.9 %
SYRINGE (ML) INJECTION
Status: COMPLETED
Start: 2019-01-01 | End: 2019-01-01

## 2019-01-01 RX ORDER — HYDRALAZINE HYDROCHLORIDE 100 MG/1
100 TABLET, FILM COATED ORAL 3 TIMES DAILY
Qty: 270 TAB | Refills: 2
Start: 2019-01-01 | End: 2019-01-01 | Stop reason: SDUPTHER

## 2019-01-01 RX ORDER — ACETAMINOPHEN 325 MG/1
650 TABLET ORAL ONCE
Status: COMPLETED | OUTPATIENT
Start: 2019-01-01 | End: 2019-01-01

## 2019-01-01 RX ORDER — ACETAMINOPHEN 325 MG/1
TABLET ORAL
Status: COMPLETED
Start: 2019-01-01 | End: 2019-01-01

## 2019-01-01 RX ORDER — DILTIAZEM HYDROCHLORIDE 30 MG/1
30 TABLET, FILM COATED ORAL
Status: DISCONTINUED | OUTPATIENT
Start: 2019-01-01 | End: 2019-01-01 | Stop reason: HOSPADM

## 2019-01-01 RX ORDER — LISINOPRIL 5 MG/1
5 TABLET ORAL DAILY
Status: DISCONTINUED | OUTPATIENT
Start: 2019-01-01 | End: 2019-01-01

## 2019-01-01 RX ORDER — PANTOPRAZOLE SODIUM 40 MG/1
40 TABLET, DELAYED RELEASE ORAL
Status: DISCONTINUED | OUTPATIENT
Start: 2019-01-01 | End: 2019-01-01 | Stop reason: HOSPADM

## 2019-01-01 RX ORDER — MIDAZOLAM HYDROCHLORIDE 1 MG/ML
.5-1 INJECTION, SOLUTION INTRAMUSCULAR; INTRAVENOUS
Status: DISCONTINUED | OUTPATIENT
Start: 2019-01-01 | End: 2019-01-01

## 2019-01-01 RX ORDER — METOPROLOL TARTRATE 50 MG/1
100 TABLET ORAL 2 TIMES DAILY
Status: DISCONTINUED | OUTPATIENT
Start: 2019-01-01 | End: 2019-01-01 | Stop reason: HOSPADM

## 2019-01-01 RX ORDER — WARFARIN 1 MG/1
2.5 TABLET ORAL
Status: COMPLETED | OUTPATIENT
Start: 2019-01-01 | End: 2019-01-01

## 2019-01-01 RX ORDER — SODIUM CHLORIDE 0.9 % (FLUSH) 0.9 %
5-40 SYRINGE (ML) INJECTION EVERY 8 HOURS
Status: DISCONTINUED | OUTPATIENT
Start: 2019-01-01 | End: 2019-01-01 | Stop reason: SDUPTHER

## 2019-01-01 RX ORDER — WARFARIN SODIUM 5 MG/1
5 TABLET ORAL
Status: ON HOLD | COMMUNITY
End: 2020-01-01

## 2019-01-01 RX ORDER — DOXYCYCLINE HYCLATE 100 MG
100 TABLET ORAL 2 TIMES DAILY
Qty: 14 TAB | Refills: 0 | Status: SHIPPED | OUTPATIENT
Start: 2019-01-01 | End: 2019-01-01

## 2019-01-01 RX ORDER — WARFARIN SODIUM 5 MG/1
2.5 TABLET ORAL
Status: ON HOLD | COMMUNITY
End: 2019-01-01 | Stop reason: SDUPTHER

## 2019-01-01 RX ORDER — HYDRALAZINE HYDROCHLORIDE 50 MG/1
100 TABLET, FILM COATED ORAL 3 TIMES DAILY
Status: DISCONTINUED | OUTPATIENT
Start: 2019-01-01 | End: 2019-01-01 | Stop reason: HOSPADM

## 2019-01-01 RX ORDER — WARFARIN 2 MG/1
4 TABLET ORAL ONCE
Status: COMPLETED | OUTPATIENT
Start: 2019-01-01 | End: 2019-01-01

## 2019-01-01 RX ORDER — NALOXONE HYDROCHLORIDE 0.4 MG/ML
0.4 INJECTION, SOLUTION INTRAMUSCULAR; INTRAVENOUS; SUBCUTANEOUS AS NEEDED
Status: DISCONTINUED | OUTPATIENT
Start: 2019-01-01 | End: 2019-01-01 | Stop reason: HOSPADM

## 2019-01-01 RX ORDER — AMLODIPINE BESYLATE 5 MG/1
5 TABLET ORAL DAILY
Status: DISCONTINUED | OUTPATIENT
Start: 2019-01-01 | End: 2019-01-01

## 2019-01-01 RX ORDER — METOPROLOL TARTRATE 50 MG/1
100 TABLET ORAL EVERY 12 HOURS
Status: DISCONTINUED | OUTPATIENT
Start: 2019-01-01 | End: 2019-01-01 | Stop reason: HOSPADM

## 2019-01-01 RX ORDER — SODIUM BICARBONATE 650 MG/1
650 TABLET ORAL 2 TIMES DAILY
Status: DISCONTINUED | OUTPATIENT
Start: 2019-01-01 | End: 2019-01-01 | Stop reason: HOSPADM

## 2019-01-01 RX ORDER — BRINZOLAMIDE 10 MG/ML
1 SUSPENSION/ DROPS OPHTHALMIC 3 TIMES DAILY
COMMUNITY
Start: 2019-01-01

## 2019-01-01 RX ORDER — LEVOTHYROXINE SODIUM 25 UG/1
50 TABLET ORAL
Status: DISCONTINUED | OUTPATIENT
Start: 2019-01-01 | End: 2019-01-01 | Stop reason: HOSPADM

## 2019-01-01 RX ORDER — SIMETHICONE 80 MG
80 TABLET,CHEWABLE ORAL
COMMUNITY
End: 2019-01-01 | Stop reason: ALTCHOICE

## 2019-01-01 RX ORDER — AMLODIPINE BESYLATE 5 MG/1
10 TABLET ORAL DAILY
Status: DISCONTINUED | OUTPATIENT
Start: 2019-01-01 | End: 2019-01-01 | Stop reason: HOSPADM

## 2019-01-01 RX ORDER — ACETYLCYSTEINE 200 MG/ML
600 SOLUTION ORAL; RESPIRATORY (INHALATION) EVERY 6 HOURS
Status: COMPLETED | OUTPATIENT
Start: 2019-01-01 | End: 2019-01-01

## 2019-01-01 RX ORDER — DILTIAZEM HYDROCHLORIDE 30 MG/1
30 TABLET, FILM COATED ORAL
Qty: 120 TAB | Refills: 0 | Status: SHIPPED | OUTPATIENT
Start: 2019-01-01 | End: 2019-01-01 | Stop reason: ALTCHOICE

## 2019-01-01 RX ORDER — LORAZEPAM 2 MG/ML
0.5 INJECTION INTRAMUSCULAR
Status: COMPLETED | OUTPATIENT
Start: 2019-01-01 | End: 2019-01-01

## 2019-01-01 RX ORDER — PREDNISOLONE ACETATE 10 MG/ML
1 SUSPENSION/ DROPS OPHTHALMIC DAILY
COMMUNITY

## 2019-01-01 RX ORDER — FUROSEMIDE 40 MG/1
40 TABLET ORAL DAILY
Status: DISCONTINUED | OUTPATIENT
Start: 2019-01-01 | End: 2019-01-01

## 2019-01-01 RX ORDER — ENOXAPARIN SODIUM 100 MG/ML
1 INJECTION SUBCUTANEOUS EVERY 24 HOURS
Status: DISCONTINUED | OUTPATIENT
Start: 2019-01-01 | End: 2019-01-01 | Stop reason: HOSPADM

## 2019-01-01 RX ORDER — LEVOFLOXACIN 5 MG/ML
750 INJECTION, SOLUTION INTRAVENOUS EVERY 24 HOURS
Status: DISCONTINUED | OUTPATIENT
Start: 2019-01-01 | End: 2019-01-01

## 2019-01-01 RX ADMIN — MIRTAZAPINE 30 MG: 15 TABLET, FILM COATED ORAL at 21:46

## 2019-01-01 RX ADMIN — HYDRALAZINE HYDROCHLORIDE 100 MG: 50 TABLET, FILM COATED ORAL at 08:11

## 2019-01-01 RX ADMIN — HYDRALAZINE HYDROCHLORIDE 100 MG: 50 TABLET, FILM COATED ORAL at 22:15

## 2019-01-01 RX ADMIN — LEVOTHYROXINE SODIUM 50 MCG: 50 TABLET ORAL at 06:51

## 2019-01-01 RX ADMIN — MIDAZOLAM 1 MG: 1 INJECTION INTRAMUSCULAR; INTRAVENOUS at 08:20

## 2019-01-01 RX ADMIN — DEXTROSE MONOHYDRATE 10 MG/HR: 50 INJECTION, SOLUTION INTRAVENOUS at 22:43

## 2019-01-01 RX ADMIN — METOPROLOL TARTRATE 100 MG: 50 TABLET ORAL at 20:19

## 2019-01-01 RX ADMIN — METOPROLOL TARTRATE 100 MG: 50 TABLET ORAL at 08:36

## 2019-01-01 RX ADMIN — Medication 10 ML: at 05:10

## 2019-01-01 RX ADMIN — LEVOTHYROXINE SODIUM 88 MCG: 88 TABLET ORAL at 06:49

## 2019-01-01 RX ADMIN — LEVOFLOXACIN 750 MG: 5 INJECTION, SOLUTION INTRAVENOUS at 06:00

## 2019-01-01 RX ADMIN — PREDNISONE 50 MG: 20 TABLET ORAL at 00:17

## 2019-01-01 RX ADMIN — LISINOPRIL 5 MG: 5 TABLET ORAL at 12:20

## 2019-01-01 RX ADMIN — LEVOTHYROXINE SODIUM 50 MCG: 50 TABLET ORAL at 06:08

## 2019-01-01 RX ADMIN — ENOXAPARIN SODIUM 70 MG: 80 INJECTION, SOLUTION INTRAVENOUS; SUBCUTANEOUS at 17:22

## 2019-01-01 RX ADMIN — HYDRALAZINE HYDROCHLORIDE 25 MG: 25 TABLET, FILM COATED ORAL at 08:46

## 2019-01-01 RX ADMIN — Medication 10 ML: at 06:49

## 2019-01-01 RX ADMIN — METOPROLOL TARTRATE 100 MG: 50 TABLET ORAL at 09:05

## 2019-01-01 RX ADMIN — DILTIAZEM HYDROCHLORIDE 30 MG: 30 TABLET, FILM COATED ORAL at 21:53

## 2019-01-01 RX ADMIN — HYDRALAZINE HYDROCHLORIDE 50 MG: 50 TABLET, FILM COATED ORAL at 22:25

## 2019-01-01 RX ADMIN — MUPIROCIN: 20 OINTMENT TOPICAL at 09:02

## 2019-01-01 RX ADMIN — Medication 10 ML: at 06:02

## 2019-01-01 RX ADMIN — METOPROLOL TARTRATE 100 MG: 50 TABLET ORAL at 21:28

## 2019-01-01 RX ADMIN — FENTANYL CITRATE 50 MCG: 50 INJECTION, SOLUTION INTRAMUSCULAR; INTRAVENOUS at 08:25

## 2019-01-01 RX ADMIN — MIRTAZAPINE 30 MG: 15 TABLET, FILM COATED ORAL at 22:14

## 2019-01-01 RX ADMIN — LORAZEPAM 0.5 MG: 2 INJECTION INTRAMUSCULAR; INTRAVENOUS at 02:11

## 2019-01-01 RX ADMIN — LEVOTHYROXINE SODIUM 50 MCG: 25 TABLET ORAL at 07:16

## 2019-01-01 RX ADMIN — METOPROLOL TARTRATE 50 MG: 25 TABLET ORAL at 09:10

## 2019-01-01 RX ADMIN — HYDRALAZINE HYDROCHLORIDE 25 MG: 25 TABLET, FILM COATED ORAL at 11:41

## 2019-01-01 RX ADMIN — FUROSEMIDE 40 MG: 40 TABLET ORAL at 09:01

## 2019-01-01 RX ADMIN — SODIUM BICARBONATE 650 MG: 650 TABLET ORAL at 11:39

## 2019-01-01 RX ADMIN — SODIUM CHLORIDE 10 MG/HR: 900 INJECTION, SOLUTION INTRAVENOUS at 05:48

## 2019-01-01 RX ADMIN — WARFARIN SODIUM 2 MG: 2 TABLET ORAL at 17:17

## 2019-01-01 RX ADMIN — LEVOFLOXACIN 750 MG: 5 INJECTION, SOLUTION INTRAVENOUS at 06:26

## 2019-01-01 RX ADMIN — DEXTROSE MONOHYDRATE 12.5 MG/HR: 50 INJECTION, SOLUTION INTRAVENOUS at 17:40

## 2019-01-01 RX ADMIN — METOPROLOL TARTRATE 100 MG: 50 TABLET ORAL at 06:52

## 2019-01-01 RX ADMIN — HYDRALAZINE HYDROCHLORIDE 50 MG: 50 TABLET, FILM COATED ORAL at 16:45

## 2019-01-01 RX ADMIN — Medication 20 ML: at 13:29

## 2019-01-01 RX ADMIN — FUROSEMIDE 40 MG: 10 INJECTION, SOLUTION INTRAMUSCULAR; INTRAVENOUS at 09:17

## 2019-01-01 RX ADMIN — DILTIAZEM HYDROCHLORIDE 30 MG: 30 TABLET, FILM COATED ORAL at 17:11

## 2019-01-01 RX ADMIN — PANTOPRAZOLE SODIUM 40 MG: 40 TABLET, DELAYED RELEASE ORAL at 09:07

## 2019-01-01 RX ADMIN — METOPROLOL TARTRATE 100 MG: 50 TABLET ORAL at 08:54

## 2019-01-01 RX ADMIN — LEVOFLOXACIN 500 MG: 5 INJECTION, SOLUTION INTRAVENOUS at 06:09

## 2019-01-01 RX ADMIN — Medication 10 ML: at 17:03

## 2019-01-01 RX ADMIN — HYDRALAZINE HYDROCHLORIDE 100 MG: 50 TABLET, FILM COATED ORAL at 17:03

## 2019-01-01 RX ADMIN — Medication 10 ML: at 01:56

## 2019-01-01 RX ADMIN — HYDRALAZINE HYDROCHLORIDE 25 MG: 25 TABLET, FILM COATED ORAL at 09:10

## 2019-01-01 RX ADMIN — HYDRALAZINE HYDROCHLORIDE 50 MG: 50 TABLET, FILM COATED ORAL at 12:51

## 2019-01-01 RX ADMIN — HYDRALAZINE HYDROCHLORIDE 50 MG: 50 TABLET, FILM COATED ORAL at 09:05

## 2019-01-01 RX ADMIN — HYDRALAZINE HYDROCHLORIDE 50 MG: 50 TABLET, FILM COATED ORAL at 16:26

## 2019-01-01 RX ADMIN — ACETYLCYSTEINE 600 MG: 200 SOLUTION ORAL; RESPIRATORY (INHALATION) at 09:37

## 2019-01-01 RX ADMIN — Medication 10 ML: at 13:42

## 2019-01-01 RX ADMIN — DEXTROSE MONOHYDRATE 15 MG/HR: 50 INJECTION, SOLUTION INTRAVENOUS at 03:55

## 2019-01-01 RX ADMIN — ALPRAZOLAM 0.25 MG: 0.25 TABLET ORAL at 12:32

## 2019-01-01 RX ADMIN — MIDAZOLAM 1 MG: 1 INJECTION INTRAMUSCULAR; INTRAVENOUS at 08:00

## 2019-01-01 RX ADMIN — INFLUENZA VIRUS VACCINE 0.5 ML: 15; 15; 15; 15 SUSPENSION INTRAMUSCULAR at 10:21

## 2019-01-01 RX ADMIN — HYDRALAZINE HYDROCHLORIDE 50 MG: 50 TABLET, FILM COATED ORAL at 16:15

## 2019-01-01 RX ADMIN — ACETAMINOPHEN 650 MG: 325 TABLET ORAL at 02:23

## 2019-01-01 RX ADMIN — PANTOPRAZOLE SODIUM 40 MG: 40 TABLET, DELAYED RELEASE ORAL at 07:45

## 2019-01-01 RX ADMIN — WARFARIN SODIUM 2.5 MG: 2.5 TABLET ORAL at 17:15

## 2019-01-01 RX ADMIN — Medication 10 ML: at 06:45

## 2019-01-01 RX ADMIN — HYDRALAZINE HYDROCHLORIDE 50 MG: 50 TABLET, FILM COATED ORAL at 09:06

## 2019-01-01 RX ADMIN — Medication 10 ML: at 06:00

## 2019-01-01 RX ADMIN — HYDRALAZINE HYDROCHLORIDE 50 MG: 50 TABLET, FILM COATED ORAL at 08:56

## 2019-01-01 RX ADMIN — WARFARIN SODIUM 0.5 MG: 1 TABLET ORAL at 17:51

## 2019-01-01 RX ADMIN — FUROSEMIDE 40 MG: 10 INJECTION, SOLUTION INTRAMUSCULAR; INTRAVENOUS at 10:46

## 2019-01-01 RX ADMIN — METOPROLOL TARTRATE 100 MG: 50 TABLET ORAL at 21:49

## 2019-01-01 RX ADMIN — DILTIAZEM HYDROCHLORIDE 10 MG: 5 INJECTION INTRAVENOUS at 01:56

## 2019-01-01 RX ADMIN — HYDRALAZINE HYDROCHLORIDE 25 MG: 25 TABLET, FILM COATED ORAL at 08:36

## 2019-01-01 RX ADMIN — FENTANYL CITRATE 50 MCG: 50 INJECTION, SOLUTION INTRAMUSCULAR; INTRAVENOUS at 08:35

## 2019-01-01 RX ADMIN — FUROSEMIDE 40 MG: 10 INJECTION, SOLUTION INTRAMUSCULAR; INTRAVENOUS at 08:52

## 2019-01-01 RX ADMIN — Medication 10 ML: at 15:56

## 2019-01-01 RX ADMIN — MUPIROCIN: 20 OINTMENT TOPICAL at 19:52

## 2019-01-01 RX ADMIN — METOPROLOL TARTRATE 100 MG: 50 TABLET ORAL at 09:38

## 2019-01-01 RX ADMIN — Medication 10 ML: at 22:19

## 2019-01-01 RX ADMIN — HYDRALAZINE HYDROCHLORIDE 10 MG: 20 INJECTION INTRAMUSCULAR; INTRAVENOUS at 11:52

## 2019-01-01 RX ADMIN — DILTIAZEM HYDROCHLORIDE 30 MG: 30 TABLET, FILM COATED ORAL at 12:32

## 2019-01-01 RX ADMIN — SODIUM CHLORIDE 7.5 MG/HR: 900 INJECTION, SOLUTION INTRAVENOUS at 03:04

## 2019-01-01 RX ADMIN — HYDRALAZINE HYDROCHLORIDE 100 MG: 50 TABLET, FILM COATED ORAL at 22:34

## 2019-01-01 RX ADMIN — SODIUM CHLORIDE 500 ML: 900 INJECTION, SOLUTION INTRAVENOUS at 04:00

## 2019-01-01 RX ADMIN — HYDRALAZINE HYDROCHLORIDE 25 MG: 25 TABLET, FILM COATED ORAL at 21:42

## 2019-01-01 RX ADMIN — DIPHENHYDRAMINE HYDROCHLORIDE 50 MG: 25 CAPSULE ORAL at 11:21

## 2019-01-01 RX ADMIN — DEXTROSE MONOHYDRATE 12.5 MG/HR: 50 INJECTION, SOLUTION INTRAVENOUS at 02:19

## 2019-01-01 RX ADMIN — METOPROLOL TARTRATE 100 MG: 50 TABLET ORAL at 08:09

## 2019-01-01 RX ADMIN — Medication 10 ML: at 21:18

## 2019-01-01 RX ADMIN — MIRTAZAPINE 30 MG: 15 TABLET, FILM COATED ORAL at 21:49

## 2019-01-01 RX ADMIN — SODIUM CHLORIDE 75 ML/HR: 450 INJECTION, SOLUTION INTRAVENOUS at 10:57

## 2019-01-01 RX ADMIN — PANTOPRAZOLE SODIUM 40 MG: 40 TABLET, DELAYED RELEASE ORAL at 07:47

## 2019-01-01 RX ADMIN — FERROUS SULFATE TAB 325 MG (65 MG ELEMENTAL FE) 325 MG: 325 (65 FE) TAB at 07:47

## 2019-01-01 RX ADMIN — METOPROLOL TARTRATE 100 MG: 50 TABLET ORAL at 09:53

## 2019-01-01 RX ADMIN — HYDRALAZINE HYDROCHLORIDE 50 MG: 50 TABLET, FILM COATED ORAL at 09:51

## 2019-01-01 RX ADMIN — SODIUM CHLORIDE 5 MG/HR: 900 INJECTION, SOLUTION INTRAVENOUS at 15:10

## 2019-01-01 RX ADMIN — PROPOFOL 30 MG: 10 INJECTION, EMULSION INTRAVENOUS at 08:03

## 2019-01-01 RX ADMIN — FERROUS SULFATE TAB 325 MG (65 MG ELEMENTAL FE) 325 MG: 325 (65 FE) TAB at 08:59

## 2019-01-01 RX ADMIN — METOPROLOL TARTRATE 100 MG: 50 TABLET ORAL at 17:15

## 2019-01-01 RX ADMIN — LEVOTHYROXINE SODIUM 50 MCG: 25 TABLET ORAL at 07:23

## 2019-01-01 RX ADMIN — FUROSEMIDE 40 MG: 40 TABLET ORAL at 09:39

## 2019-01-01 RX ADMIN — HYDRALAZINE HYDROCHLORIDE 25 MG: 25 TABLET, FILM COATED ORAL at 16:50

## 2019-01-01 RX ADMIN — LABETALOL HYDROCHLORIDE 10 MG: 5 INJECTION, SOLUTION INTRAVENOUS at 23:51

## 2019-01-01 RX ADMIN — ENOXAPARIN SODIUM 70 MG: 80 INJECTION, SOLUTION INTRAVENOUS; SUBCUTANEOUS at 16:04

## 2019-01-01 RX ADMIN — WARFARIN SODIUM 2.5 MG: 1 TABLET ORAL at 19:53

## 2019-01-01 RX ADMIN — HYDRALAZINE HYDROCHLORIDE 50 MG: 50 TABLET, FILM COATED ORAL at 08:09

## 2019-01-01 RX ADMIN — LEVOTHYROXINE SODIUM 88 MCG: 88 TABLET ORAL at 05:17

## 2019-01-01 RX ADMIN — HYDRALAZINE HYDROCHLORIDE 100 MG: 50 TABLET, FILM COATED ORAL at 11:02

## 2019-01-01 RX ADMIN — SODIUM CHLORIDE 10 MG/HR: 900 INJECTION, SOLUTION INTRAVENOUS at 08:15

## 2019-01-01 RX ADMIN — LIDOCAINE HYDROCHLORIDE 15 ML: 20 SOLUTION ORAL; TOPICAL at 10:24

## 2019-01-01 RX ADMIN — AMLODIPINE BESYLATE 10 MG: 5 TABLET ORAL at 09:05

## 2019-01-01 RX ADMIN — HYDRALAZINE HYDROCHLORIDE 50 MG: 50 TABLET, FILM COATED ORAL at 16:40

## 2019-01-01 RX ADMIN — DEXTROSE MONOHYDRATE 15 MG/HR: 50 INJECTION, SOLUTION INTRAVENOUS at 08:39

## 2019-01-01 RX ADMIN — FUROSEMIDE 40 MG: 10 INJECTION, SOLUTION INTRAMUSCULAR; INTRAVENOUS at 20:38

## 2019-01-01 RX ADMIN — LEVOTHYROXINE SODIUM 50 MCG: 50 TABLET ORAL at 06:02

## 2019-01-01 RX ADMIN — METOPROLOL TARTRATE 100 MG: 50 TABLET ORAL at 17:51

## 2019-01-01 RX ADMIN — ENOXAPARIN SODIUM 70 MG: 80 INJECTION, SOLUTION INTRAVENOUS; SUBCUTANEOUS at 16:26

## 2019-01-01 RX ADMIN — PREDNISONE 50 MG: 20 TABLET ORAL at 11:21

## 2019-01-01 RX ADMIN — DEXTROSE MONOHYDRATE 10 MG/HR: 50 INJECTION, SOLUTION INTRAVENOUS at 09:06

## 2019-01-01 RX ADMIN — HYDRALAZINE HYDROCHLORIDE 50 MG: 50 TABLET, FILM COATED ORAL at 21:53

## 2019-01-01 RX ADMIN — SODIUM CHLORIDE 8 MG/HR: 900 INJECTION, SOLUTION INTRAVENOUS at 10:52

## 2019-01-01 RX ADMIN — MIDAZOLAM HYDROCHLORIDE 1 MG: 1 INJECTION, SOLUTION INTRAMUSCULAR; INTRAVENOUS at 10:39

## 2019-01-01 RX ADMIN — SODIUM BICARBONATE 650 MG: 650 TABLET ORAL at 09:07

## 2019-01-01 RX ADMIN — Medication 10 ML: at 22:16

## 2019-01-01 RX ADMIN — IPRATROPIUM BROMIDE AND ALBUTEROL SULFATE 3 ML: .5; 3 SOLUTION RESPIRATORY (INHALATION) at 03:32

## 2019-01-01 RX ADMIN — ACETAMINOPHEN 650 MG: 325 TABLET ORAL at 17:23

## 2019-01-01 RX ADMIN — LEVOFLOXACIN 500 MG: 5 INJECTION, SOLUTION INTRAVENOUS at 05:17

## 2019-01-01 RX ADMIN — AMLODIPINE BESYLATE 10 MG: 5 TABLET ORAL at 09:52

## 2019-01-01 RX ADMIN — MIRTAZAPINE 30 MG: 15 TABLET, FILM COATED ORAL at 22:26

## 2019-01-01 RX ADMIN — FUROSEMIDE 40 MG: 40 TABLET ORAL at 08:46

## 2019-01-01 RX ADMIN — Medication 10 ML: at 22:34

## 2019-01-01 RX ADMIN — METOPROLOL TARTRATE 100 MG: 50 TABLET ORAL at 20:03

## 2019-01-01 RX ADMIN — HYDRALAZINE HYDROCHLORIDE 25 MG: 25 TABLET, FILM COATED ORAL at 09:38

## 2019-01-01 RX ADMIN — SODIUM BICARBONATE 650 MG: 650 TABLET ORAL at 13:02

## 2019-01-01 RX ADMIN — DILTIAZEM HYDROCHLORIDE 30 MG: 30 TABLET, FILM COATED ORAL at 08:56

## 2019-01-01 RX ADMIN — POLYETHYLENE GLYCOL 3350 17 G: 17 POWDER, FOR SOLUTION ORAL at 09:52

## 2019-01-01 RX ADMIN — SODIUM BICARBONATE 650 MG: 650 TABLET ORAL at 17:03

## 2019-01-01 RX ADMIN — FUROSEMIDE 20 MG: 10 INJECTION, SOLUTION INTRAMUSCULAR; INTRAVENOUS at 08:39

## 2019-01-01 RX ADMIN — METOPROLOL TARTRATE 100 MG: 50 TABLET ORAL at 21:21

## 2019-01-01 RX ADMIN — Medication 10 ML: at 06:52

## 2019-01-01 RX ADMIN — MIRTAZAPINE 30 MG: 15 TABLET, FILM COATED ORAL at 21:53

## 2019-01-01 RX ADMIN — HYDRALAZINE HYDROCHLORIDE 10 MG: 20 INJECTION INTRAMUSCULAR; INTRAVENOUS at 12:25

## 2019-01-01 RX ADMIN — FUROSEMIDE 40 MG: 10 INJECTION, SOLUTION INTRAMUSCULAR; INTRAVENOUS at 08:36

## 2019-01-01 RX ADMIN — METOPROLOL TARTRATE 100 MG: 50 TABLET ORAL at 11:40

## 2019-01-01 RX ADMIN — PREDNISONE 50 MG: 20 TABLET ORAL at 07:24

## 2019-01-01 RX ADMIN — HYDRALAZINE HYDROCHLORIDE 100 MG: 50 TABLET, FILM COATED ORAL at 17:17

## 2019-01-01 RX ADMIN — ACETYLCYSTEINE 600 MG: 200 SOLUTION ORAL; RESPIRATORY (INHALATION) at 04:13

## 2019-01-01 RX ADMIN — SODIUM CHLORIDE 15 MG/HR: 900 INJECTION, SOLUTION INTRAVENOUS at 00:27

## 2019-01-01 RX ADMIN — FUROSEMIDE 40 MG: 10 INJECTION, SOLUTION INTRAMUSCULAR; INTRAVENOUS at 09:12

## 2019-01-01 RX ADMIN — MIRTAZAPINE 30 MG: 15 TABLET, FILM COATED ORAL at 22:15

## 2019-01-01 RX ADMIN — DIPHENHYDRAMINE HYDROCHLORIDE 50 MG: 25 CAPSULE ORAL at 07:23

## 2019-01-01 RX ADMIN — METOPROLOL TARTRATE 100 MG: 50 TABLET ORAL at 17:11

## 2019-01-01 RX ADMIN — Medication 10 ML: at 21:55

## 2019-01-01 RX ADMIN — MIRTAZAPINE 30 MG: 15 TABLET, FILM COATED ORAL at 21:21

## 2019-01-01 RX ADMIN — MIRTAZAPINE 30 MG: 15 TABLET, FILM COATED ORAL at 22:13

## 2019-01-01 RX ADMIN — WARFARIN SODIUM 3 MG: 2 TABLET ORAL at 17:03

## 2019-01-01 RX ADMIN — MIDAZOLAM HYDROCHLORIDE 1 MG: 1 INJECTION, SOLUTION INTRAMUSCULAR; INTRAVENOUS at 10:28

## 2019-01-01 RX ADMIN — DILTIAZEM HYDROCHLORIDE 30 MG: 30 TABLET, FILM COATED ORAL at 11:44

## 2019-01-01 RX ADMIN — SODIUM BICARBONATE 650 MG: 650 TABLET ORAL at 08:11

## 2019-01-01 RX ADMIN — AMLODIPINE BESYLATE 2.5 MG: 5 TABLET ORAL at 08:41

## 2019-01-01 RX ADMIN — MUPIROCIN: 20 OINTMENT TOPICAL at 09:11

## 2019-01-01 RX ADMIN — SODIUM CHLORIDE 1 G: 900 INJECTION, SOLUTION INTRAVENOUS at 08:56

## 2019-01-01 RX ADMIN — HYDRALAZINE HYDROCHLORIDE 50 MG: 50 TABLET, FILM COATED ORAL at 21:21

## 2019-01-01 RX ADMIN — DEXTROSE MONOHYDRATE 15 MG/HR: 50 INJECTION, SOLUTION INTRAVENOUS at 14:57

## 2019-01-01 RX ADMIN — PROPOFOL 50 MCG/KG/MIN: 10 INJECTION, EMULSION INTRAVENOUS at 08:03

## 2019-01-01 RX ADMIN — HYDRALAZINE HYDROCHLORIDE 50 MG: 50 TABLET, FILM COATED ORAL at 16:03

## 2019-01-01 RX ADMIN — ONDANSETRON 4 MG: 2 INJECTION INTRAMUSCULAR; INTRAVENOUS at 09:06

## 2019-01-01 RX ADMIN — LEVOTHYROXINE SODIUM 50 MCG: 25 TABLET ORAL at 06:52

## 2019-01-01 RX ADMIN — FUROSEMIDE 20 MG: 10 INJECTION, SOLUTION INTRAMUSCULAR; INTRAVENOUS at 17:17

## 2019-01-01 RX ADMIN — SODIUM BICARBONATE 650 MG: 650 TABLET ORAL at 17:42

## 2019-01-01 RX ADMIN — IPRATROPIUM BROMIDE AND ALBUTEROL SULFATE 3 ML: .5; 3 SOLUTION RESPIRATORY (INHALATION) at 10:09

## 2019-01-01 RX ADMIN — LEVOTHYROXINE SODIUM 50 MCG: 25 TABLET ORAL at 05:25

## 2019-01-01 RX ADMIN — LEVOTHYROXINE SODIUM 50 MCG: 25 TABLET ORAL at 07:18

## 2019-01-01 RX ADMIN — HYDRALAZINE HYDROCHLORIDE 50 MG: 50 TABLET, FILM COATED ORAL at 11:57

## 2019-01-01 RX ADMIN — DILTIAZEM HYDROCHLORIDE 30 MG: 30 TABLET, FILM COATED ORAL at 21:46

## 2019-01-01 RX ADMIN — SODIUM BICARBONATE 650 MG: 650 TABLET ORAL at 17:17

## 2019-01-01 RX ADMIN — MIRTAZAPINE 30 MG: 15 TABLET, FILM COATED ORAL at 21:41

## 2019-01-01 RX ADMIN — LEVOTHYROXINE SODIUM 50 MCG: 50 TABLET ORAL at 06:32

## 2019-01-01 RX ADMIN — MIRTAZAPINE 30 MG: 15 TABLET, FILM COATED ORAL at 22:34

## 2019-01-01 RX ADMIN — METOPROLOL TARTRATE 100 MG: 50 TABLET ORAL at 08:40

## 2019-01-01 RX ADMIN — HYDRALAZINE HYDROCHLORIDE 25 MG: 25 TABLET, FILM COATED ORAL at 15:36

## 2019-01-01 RX ADMIN — HYDRALAZINE HYDROCHLORIDE 25 MG: 25 TABLET, FILM COATED ORAL at 17:22

## 2019-01-01 RX ADMIN — BENZOCAINE, BUTAMBEN, AND TETRACAINE HYDROCHLORIDE 3 SPRAY: .028; .004; .004 AEROSOL, SPRAY TOPICAL at 10:25

## 2019-01-01 RX ADMIN — FUROSEMIDE 40 MG: 40 TABLET ORAL at 09:06

## 2019-01-01 RX ADMIN — WARFARIN SODIUM 3 MG: 2 TABLET ORAL at 20:15

## 2019-01-01 RX ADMIN — PREDNISONE 50 MG: 20 TABLET ORAL at 17:23

## 2019-01-01 RX ADMIN — HYDRALAZINE HYDROCHLORIDE 50 MG: 50 TABLET, FILM COATED ORAL at 22:14

## 2019-01-01 RX ADMIN — SODIUM CHLORIDE 7.5 MG/HR: 900 INJECTION, SOLUTION INTRAVENOUS at 22:19

## 2019-01-01 RX ADMIN — HYDRALAZINE HYDROCHLORIDE 25 MG: 25 TABLET, FILM COATED ORAL at 21:28

## 2019-01-01 RX ADMIN — HYDRALAZINE HYDROCHLORIDE 10 MG: 20 INJECTION INTRAMUSCULAR; INTRAVENOUS at 04:26

## 2019-01-01 RX ADMIN — HYDRALAZINE HYDROCHLORIDE 25 MG: 25 TABLET, FILM COATED ORAL at 16:14

## 2019-01-01 RX ADMIN — METOPROLOL TARTRATE 100 MG: 50 TABLET ORAL at 08:46

## 2019-01-01 RX ADMIN — FERROUS SULFATE TAB 325 MG (65 MG ELEMENTAL FE) 325 MG: 325 (65 FE) TAB at 08:57

## 2019-01-01 RX ADMIN — DEXTROSE MONOHYDRATE 12.5 MG/HR: 50 INJECTION, SOLUTION INTRAVENOUS at 20:20

## 2019-01-01 RX ADMIN — MIRTAZAPINE 30 MG: 15 TABLET, FILM COATED ORAL at 21:17

## 2019-01-01 RX ADMIN — HYDRALAZINE HYDROCHLORIDE 25 MG: 25 TABLET, FILM COATED ORAL at 21:24

## 2019-01-01 RX ADMIN — DILTIAZEM HYDROCHLORIDE 30 MG: 30 TABLET, FILM COATED ORAL at 07:47

## 2019-01-01 RX ADMIN — METOPROLOL TARTRATE 50 MG: 25 TABLET ORAL at 21:23

## 2019-01-01 RX ADMIN — Medication 10 ML: at 22:00

## 2019-01-01 RX ADMIN — METOPROLOL TARTRATE 100 MG: 50 TABLET ORAL at 08:59

## 2019-01-01 RX ADMIN — HYDRALAZINE HYDROCHLORIDE 10 MG: 20 INJECTION INTRAMUSCULAR; INTRAVENOUS at 04:36

## 2019-01-01 RX ADMIN — METOPROLOL TARTRATE 100 MG: 50 TABLET ORAL at 21:46

## 2019-01-01 RX ADMIN — ACETYLCYSTEINE 600 MG: 200 SOLUTION ORAL; RESPIRATORY (INHALATION) at 17:20

## 2019-01-01 RX ADMIN — HYDRALAZINE HYDROCHLORIDE 100 MG: 50 TABLET, FILM COATED ORAL at 21:17

## 2019-01-01 RX ADMIN — HYDRALAZINE HYDROCHLORIDE 25 MG: 25 TABLET, FILM COATED ORAL at 10:32

## 2019-01-01 RX ADMIN — LEVOTHYROXINE SODIUM 50 MCG: 25 TABLET ORAL at 06:51

## 2019-01-01 RX ADMIN — MUPIROCIN: 20 OINTMENT TOPICAL at 09:00

## 2019-01-01 RX ADMIN — AMLODIPINE BESYLATE 2.5 MG: 5 TABLET ORAL at 08:09

## 2019-01-01 RX ADMIN — ACETYLCYSTEINE 600 MG: 200 SOLUTION ORAL; RESPIRATORY (INHALATION) at 22:11

## 2019-01-01 RX ADMIN — HYDRALAZINE HYDROCHLORIDE 25 MG: 25 TABLET, FILM COATED ORAL at 22:13

## 2019-01-01 RX ADMIN — AMLODIPINE BESYLATE 2.5 MG: 5 TABLET ORAL at 11:40

## 2019-01-01 RX ADMIN — HYDRALAZINE HYDROCHLORIDE 100 MG: 50 TABLET, FILM COATED ORAL at 16:25

## 2019-01-01 RX ADMIN — FUROSEMIDE 40 MG: 40 TABLET ORAL at 08:56

## 2019-01-01 RX ADMIN — METOPROLOL TARTRATE 50 MG: 25 TABLET ORAL at 10:53

## 2019-01-01 RX ADMIN — Medication 10 ML: at 11:41

## 2019-01-01 RX ADMIN — AMLODIPINE BESYLATE 10 MG: 5 TABLET ORAL at 09:01

## 2019-01-01 RX ADMIN — Medication 10 ML: at 16:25

## 2019-01-01 RX ADMIN — ENOXAPARIN SODIUM 70 MG: 80 INJECTION, SOLUTION INTRAVENOUS; SUBCUTANEOUS at 17:35

## 2019-01-01 RX ADMIN — DILTIAZEM HYDROCHLORIDE 30 MG: 30 TABLET, FILM COATED ORAL at 16:15

## 2019-01-01 RX ADMIN — AMLODIPINE BESYLATE 2.5 MG: 5 TABLET ORAL at 09:38

## 2019-01-01 RX ADMIN — PANTOPRAZOLE SODIUM 40 MG: 40 TABLET, DELAYED RELEASE ORAL at 08:11

## 2019-01-01 RX ADMIN — FENTANYL CITRATE 25 MCG: 50 INJECTION, SOLUTION INTRAMUSCULAR; INTRAVENOUS at 10:30

## 2019-01-01 RX ADMIN — HYDRALAZINE HYDROCHLORIDE 100 MG: 50 TABLET, FILM COATED ORAL at 13:02

## 2019-01-01 RX ADMIN — METOPROLOL TARTRATE 100 MG: 50 TABLET ORAL at 20:13

## 2019-01-01 RX ADMIN — WARFARIN SODIUM 4 MG: 2 TABLET ORAL at 17:11

## 2019-01-01 RX ADMIN — Medication 10 ML: at 13:11

## 2019-01-01 RX ADMIN — ACETAMINOPHEN: 325 TABLET ORAL at 04:00

## 2019-01-01 RX ADMIN — HYDRALAZINE HYDROCHLORIDE 50 MG: 50 TABLET, FILM COATED ORAL at 09:01

## 2019-01-01 RX ADMIN — FUROSEMIDE 20 MG: 10 INJECTION, SOLUTION INTRAMUSCULAR; INTRAVENOUS at 09:07

## 2019-01-01 RX ADMIN — Medication 10 ML: at 15:09

## 2019-01-01 RX ADMIN — HYDRALAZINE HYDROCHLORIDE 25 MG: 25 TABLET, FILM COATED ORAL at 15:48

## 2019-01-01 RX ADMIN — HYDRALAZINE HYDROCHLORIDE 25 MG: 25 TABLET, FILM COATED ORAL at 17:34

## 2019-01-01 RX ADMIN — HYDRALAZINE HYDROCHLORIDE 50 MG: 50 TABLET, FILM COATED ORAL at 21:46

## 2019-01-01 RX ADMIN — AMLODIPINE BESYLATE 2.5 MG: 5 TABLET ORAL at 08:46

## 2019-01-01 RX ADMIN — IPRATROPIUM BROMIDE AND ALBUTEROL SULFATE 3 ML: .5; 3 SOLUTION RESPIRATORY (INHALATION) at 10:07

## 2019-01-01 RX ADMIN — PANTOPRAZOLE SODIUM 40 MG: 40 TABLET, DELAYED RELEASE ORAL at 09:53

## 2019-01-01 RX ADMIN — LEVOTHYROXINE SODIUM 50 MCG: 25 TABLET ORAL at 06:17

## 2019-01-01 RX ADMIN — IOPAMIDOL 100 ML: 755 INJECTION, SOLUTION INTRAVENOUS at 15:09

## 2019-01-01 RX ADMIN — ACETAMINOPHEN 650 MG: 325 TABLET ORAL at 20:11

## 2019-01-01 RX ADMIN — FUROSEMIDE 20 MG: 20 TABLET ORAL at 09:52

## 2019-01-01 RX ADMIN — HYDRALAZINE HYDROCHLORIDE 100 MG: 50 TABLET, FILM COATED ORAL at 09:07

## 2019-01-01 RX ADMIN — MUPIROCIN: 20 OINTMENT TOPICAL at 22:00

## 2019-01-01 RX ADMIN — LEVOTHYROXINE SODIUM 50 MCG: 25 TABLET ORAL at 05:46

## 2019-01-01 RX ADMIN — SODIUM CHLORIDE: 900 INJECTION, SOLUTION INTRAVENOUS at 08:00

## 2019-01-01 RX ADMIN — MUPIROCIN: 20 OINTMENT TOPICAL at 18:00

## 2019-01-01 RX ADMIN — FUROSEMIDE 40 MG: 10 INJECTION, SOLUTION INTRAMUSCULAR; INTRAVENOUS at 13:08

## 2019-01-01 RX ADMIN — METOPROLOL TARTRATE 100 MG: 50 TABLET ORAL at 08:52

## 2019-01-01 RX ADMIN — DEXTROSE MONOHYDRATE 10 MG/HR: 50 INJECTION, SOLUTION INTRAVENOUS at 20:53

## 2019-02-26 PROBLEM — F03.90 DEMENTIA (HCC): Chronic | Status: ACTIVE | Noted: 2019-01-01

## 2019-02-26 PROBLEM — I10 ESSENTIAL HYPERTENSION: Status: ACTIVE | Noted: 2019-01-01

## 2019-02-26 PROBLEM — E03.9 ACQUIRED HYPOTHYROIDISM: Chronic | Status: ACTIVE | Noted: 2019-01-01

## 2019-02-26 PROBLEM — J18.9 CAP (COMMUNITY ACQUIRED PNEUMONIA): Status: ACTIVE | Noted: 2019-01-01

## 2019-02-26 PROBLEM — I48.91 ATRIAL FIBRILLATION (HCC): Status: ACTIVE | Noted: 2019-01-01

## 2019-02-26 PROBLEM — Z79.01 ANTICOAGULANT LONG-TERM USE: Chronic | Status: ACTIVE | Noted: 2019-01-01

## 2019-02-28 PROBLEM — N18.4 ACUTE RENAL FAILURE SUPERIMPOSED ON STAGE 4 CHRONIC KIDNEY DISEASE (HCC): Chronic | Status: ACTIVE | Noted: 2019-01-01

## 2019-02-28 PROBLEM — N17.9 ACUTE RENAL FAILURE SUPERIMPOSED ON STAGE 4 CHRONIC KIDNEY DISEASE (HCC): Chronic | Status: ACTIVE | Noted: 2019-01-01

## 2019-03-16 PROBLEM — N18.9 CKD (CHRONIC KIDNEY DISEASE): Status: ACTIVE | Noted: 2019-01-01

## 2019-03-16 PROBLEM — I16.0 HYPERTENSIVE URGENCY: Status: ACTIVE | Noted: 2019-01-01

## 2019-03-16 NOTE — Clinical Note
TRANSFER - OUT REPORT:  
 
Verbal report given to: ROSSY Zapien. Report consisted of patient's Situation, Background, Assessment and  
Recommendations(SBAR). Opportunity for questions and clarification was provided. Patient transported with a Registered Nurse and [address] [place] [address] / [employer] [facility] [employer]. Patient transported to: IVCU.

## 2019-03-16 NOTE — PROGRESS NOTES
1800  TRANSFER - IN REPORT:    Verbal report received from [address], RN(name) on Marie Jean  being received from Emergency   Department (unit) for routine progression of care      Report consisted of patients Situation, Background, Assessment and   Recommendations(SBAR). Information from the following report(s) SBAR, Kardex, Procedure Summary, Intake/Output, MAR and Recent Results was reviewed with the receiving nurse. Opportunity for questions and clarification was provided. Assessment completed upon patients arrival to unit and care assumed. Patient received from ED just after BG was taken by ED Nurse [address]. When patient arrived in CCU I was advised that the patients BG was 73 and the patient was given 2 containers of Orange juice. Blood Glucose rechecked and BG found to be 77. Patient given another container of Orange juice. Will recheck    4180 Shift assessment complete, see flowsheets for details. 1930  Bedside and Verbal shift change report given to Lian Urbina RN (oncoming nurse) by Darek Soriano RN (offgoing nurse). Report included the following information SBAR, Kardex, Procedure Summary, Intake/Output, MAR, Recent Results and Cardiac Rhythm Normal Sinus Rhythm.

## 2019-03-16 NOTE — PROGRESS NOTES
[address] [place] [address]#[address] [place]  [phone]  Fax: [phone]        To Whom may it concern. This letter is to certify that  Alden Carmichael   was admitted  03/16/19                     . Alden Carmichael  At this time remains inpatient at [facility]      If you have any questions, please do not hesitate to contact me.             Dr. Alfonso Love  [id number]

## 2019-03-16 NOTE — H&P
Hospitalist Admission Note    NAME: Ron Mariscal   :  1935   MRN:  [id number]     Date/Time:  3/16/2019 3:45 PM    Patient PCP: Liliane Lopez NP  ______________________________________________________________________  Given the patient's current clinical presentation, I have a high level of concern for decompensation if discharged from the emergency department. Complex decision making was performed, which includes reviewing the patient's available past medical records, laboratory results, and x-ray films. My assessment of this patient's clinical condition and my plan of care is as follows. Assessment / Plan:  Acute Encephalopathy: related to dementia and HTN, will check CT head and get Neurology evaluation. Keep on Clear liquids for now, get speech evaluation. Hypertensive Urgency: with encephalopathy, will start Nicardipine drip, use hydralazine and labetalol prn, monitor in ICU. Hypothyroidism: c/w L-thyroxine. Acute on Chronic Diastolic Heart Failure: EF 55%, Cardiomegaly, will check new ECHO, start IV diuretics, monitor I/O and weight, get Cardiology evaluation. Chest pain/CAD: check serial enzymes, if CT head is OK will resume coumadin, check ECHO, get Cardiology consult. A,. Fib: C/w Digoxin, Resume Coumadin if CT is OK. DM?: on no meds? Will place on SSI, check HbA1C. Dementia: as per family, will monitor  Code Status: Full Code  Surrogate Decision Maker: desean Varma [id number], NCXM [phone] [id number] or Skip Newcomer [phone] [id number]  DVT Prophylaxis: Coumadin  GI Prophylaxis: not indicated  Baseline: lives with DTR Eldred Bloch, has dementia    Critically ill patient high risk for de-compensation      Subjective:   CHIEF COMPLAINT: Confused and lethargic unable to provide a meaningful history    HISTORY OF PRESENT ILLNESS:     Nixon Brooks is a 82 y.o.    female  with PMHx significant for hypertension, recent pneumonia at [facility], presents  to the ED with cc of worsening and progressive lower extremity edema, along with generalized weakness and lethargy. Patient denies acute chest pain, jaw tightness, reports some mild and unchanged shortness of breath. She also complains of posterior neck pain, somewhat aching in nature, exacerbated by cervical rotation or extension. Pain at times radiates from the neck into the left upper extremity. Family is uncertain about any changes in body weight. She has not taken her home medication this morning except her levothyroxine. She denies any abdominal pain, vomiting, diarrhea, or urinary changes.   At this time patient is lying in bed is lethargic and confused, unable to provide a meaningful history, data collected from chart and family in the room. We were asked to admit for work up and evaluation of the above problems. Past Medical History:   Diagnosis Date    Aortic valve disorders     AS    Asthma     Benign hypertensive heart disease without heart failure     Diabetes (HCC)     Fibromyalgia     Gastroparesis     GERD (gastroesophageal reflux disease)     Mitral valve disorders(424.0)     MR    LIVIA (obstructive sleep apnea)     Paroxysmal atrial fibrillation ([address]     Pure hypercholesterolemia 2010        Past Surgical History:   Procedure Laterality Date    ECHO 2D ADULT  2009    LVH, normal LV wall motion and ejection fraction, mild aortic stenosis, moderate aortic regurgitation and mild mitral regurgitation. The ejection fraction was 55-60%.      ECHO 2D ADULT  2011    EF 60%, LAE, mild AS, AI, MR, PA low 40s    EVENT MONITOR POST SYMPTOMS  2010    Rare PACs, no arrythmia with symptoms    LOOP MONITOR  9-10/2011    NSR    STRESS TEST MYOVIEW  2011    no ischemia, EF 63%    US DUPLEX CAROTID BILATERAL  2011    mild bilat disease       Social History     Tobacco Use    Smoking status: Former Smoker     Last attempt to quit: 1963     Years since quittin.2    Smokeless tobacco: Never Used   Substance Use Topics    Alcohol use: Yes        Family History   Problem Relation Age of Onset    Heart Disease Father     Dementia Brother     Heart Disease Brother     Diabetes Brother      Allergies   Allergen Reactions    Advil [Ibuprofen] Unknown (comments)    Aspirin Unknown (comments)    Meloxicam Unknown (comments)    Penicillin G Unknown (comments)    Shellfish Containing Products Rash    Sulfa (Sulfonamide Antibiotics) Unknown (comments)        Prior to Admission medications    Medication Sig Start Date End Date Taking? Authorizing Provider   hydrALAZINE (APRESOLINE) 50 mg tablet Take 1 Tab by mouth three (3) times daily. 3/1/19   Dareen Bernheim, MD   metoprolol tartrate (LOPRESSOR) 100 mg IR tablet Take 1 Tab by mouth two (2) times a day. 3/1/19   Dareen Bernheim, MD   amLODIPine (NORVASC) 10 mg tablet Take 1 Tab by mouth daily. 3/2/19   Dareen Bernheim, MD   isosorbide mononitrate ER (IMDUR) 30 mg tablet Take 1 Tab by mouth daily. 3/2/19   Dareen Bernheim, MD   levoFLOXacin (LEVAQUIN) 500 mg tablet Take 1 Tab by mouth daily. 3/1/19   Dareen Bernheim, MD   predniSONE (DELTASONE) 10 mg tablet Take 10 mg by mouth daily. 3/1/19   Dareen Bernheim, MD   ALPRAZolam Zaire Barcenas) 0.25 mg tablet Take 0.5 Tabs by mouth two (2) times daily as needed for Anxiety. Max Daily Amount: 0.25 mg. 3/1/19   Dareen Bernheim, MD   digoxin (LANOXIN) 0.125 mg tablet Take 1 Tab by mouth daily. 3/2/19   Dareen Bernheim, MD   Ferrous Fumarate 325 mg (106 mg iron) tab Take 1 Tab by mouth daily. 3/1/19   Dareen Bernheim, MD   warfarin (COUMADIN) 5 mg tablet Take 1 Tab by mouth daily. 3/1/19   Dareen Bernheim, MD   indapamide (LOZOL) 2.5 mg tablet Take 2.5 mg by mouth every morning. Gudelia Bar MD   mirtazapine (REMERON) 30 mg tablet Take 1 Tab by mouth nightly. 12/19/18   Altagracia Nagy MD   digoxin (LANOXIN) 0.125 mg tablet Take 0.125 mg by mouth daily. 0.25 mon, wed, fri, sat.  0.125 all other days 7/11/11   Provider, Historical   levothyroxine (SYNTHROID) 50 mcg tablet Take  by mouth daily (before breakfast). Provider, Historical   esomeprazole (NEXIUM) 40 mg capsule Take  by mouth daily. Provider, Historical   simvastatin (ZOCOR) 10 mg tablet Take  by mouth nightly. Provider, Historical       REVIEW OF SYSTEMS:     I am not able to complete the review of systems because:    The patient is intubated and sedated   y The patient has altered mental status due to his acute medical problems    The patient has baseline aphasia from prior stroke(s)   y The patient has baseline dementia and is not reliable historian    The patient is in acute medical distress and unable to provide information           Total of 12 systems reviewed as follows:       POSITIVE= underlined text  Negative = text not underlined  General:  fever, chills, sweats, generalized weakness, weight loss/gain,      loss of appetite   Eyes:    blurred vision, eye pain, loss of vision, double vision  ENT:    rhinorrhea, pharyngitis   Respiratory:   cough, sputum production, SOB, HASKINS, wheezing, pleuritic pain   Cardiology:   chest pain, palpitations, orthopnea, PND, edema, syncope   Gastrointestinal:  abdominal pain , N/V, diarrhea, dysphagia, constipation, bleeding   Genitourinary:  frequency, urgency, dysuria, hematuria, incontinence   Muskuloskeletal :  arthralgia, myalgia, back pain  Hematology:  easy bruising, nose or gum bleeding, lymphadenopathy   Dermatological: rash, ulceration, pruritis, color change / jaundice  Endocrine:   hot flashes or polydipsia   Neurological:  headache, dizziness, confusion, focal weakness, paresthesia,     Speech difficulties, memory loss, gait difficulty  Psychological: Feelings of anxiety, depression, agitation    Objective:   VITALS:    Visit Vitals  /45   Pulse 72   Temp 98.5 °F (36.9 °C)   Resp 11   Wt 69.9 kg (154 lb)   SpO2 98%   BMI 30.08 kg/m²       PHYSICAL EXAM:    General:    Lethargic, confused, no distress, appears stated age. HEENT: Atraumatic, anicteric sclerae, pink conjunctivae     No oral ulcers, mucosa moist, throat clear, dentition poor  Neck:  Supple, symmetrical,  thyroid: non tender  Lungs:   Coarse BS, mild crackles both sides, JVD +  Chest wall:  No tenderness  No Accessory muscle use. Heart:   Regular  rhythm,  No  murmur  + edema  Abdomen:   Soft, non-tender. Not distended. Bowel sounds normal  Extremities: No cyanosis. No clubbing,      Skin turgor normal, Capillary refill normal, Radial pulse 2+  Skin:     Not pale. Not Jaundiced  No rashes   Psych:  Poor  insight. Not depressed. Not anxious or agitated. Neurologic: Lethargic, oriented x2, GCS [id number], no gross focal deficits.    _______________________________________________________________________  Care Plan discussed with:    Comments   Patient y    Family  y    RN y    Care Manager                    Consultant:      _______________________________________________________________________  Expected  Disposition:   Home with Family    HH/PT/OT/RN    SNF/LTC [facility]    ________________________________________________________________________  TOTAL TIME:   Minutes    Critical Care Provided 61    Minutes non procedure based      Comments    y Reviewed previous records   >50% of visit spent in counseling and coordination of care y Discussion with patient and/or family and questions answered       ________________________________________________________________________  Signed: Wiliam Torres MD    Procedures: see electronic medical records for all procedures/Xrays and details which were not copied into this note but were reviewed prior to creation of Plan.     LAB DATA REVIEWED:    Recent Results (from the past 24 hour(s))   EKG, 12 LEAD, INITIAL    Collection Time: 03/16/19 10:12 AM   Result Value Ref Range    Ventricular Rate 77 BPM    Atrial Rate 77 BPM    P-R Interval 216 ms    QRS Duration 90 ms    Q-T Interval 424 ms    QTC Calculation (Bezet) 479 ms    Calculated P Axis 52 degrees    Calculated R Axis -7 degrees    Calculated T Axis 37 degrees    Diagnosis       Sinus rhythm with 1st degree AV block with occasional premature ventricular   complexes  Voltage criteria for left ventricular hypertrophy  Nonspecific T wave abnormality  Prolonged QT  No previous ECGs available     POC TROPONIN-I    Collection Time: 03/16/19 10:39 AM   Result Value Ref Range    Troponin-I (POC) <0.04 0.00 - 0.08 ng/mL   CBC WITH AUTOMATED DIFF    Collection Time: 03/16/19 10:58 AM   Result Value Ref Range    WBC 8.9 3.6 - 11.0 K/uL    RBC 3.49 (L) 3.80 - 5.20 M/uL    HGB 9.9 (L) 11.5 - 16.0 g/dL    HCT 31.1 (L) 35.0 - 47.0 %    MCV 89.1 80.0 - 99.0 FL    MCH 28.4 26.0 - 34.0 PG    MCHC 31.8 30.0 - 36.5 g/dL    RDW 14.0 11.5 - 14.5 %    PLATELET 725 580 - 828 K/uL    MPV 11.6 8.9 - 12.9 FL    NRBC 0.0 0  WBC    ABSOLUTE NRBC 0.00 0.00 - 0.01 K/uL    NEUTROPHILS 67 32 - 75 %    LYMPHOCYTES 24 12 - 49 %    MONOCYTES 9 5 - 13 %    EOSINOPHILS 0 0 - 7 %    BASOPHILS 0 0 - 1 %    IMMATURE GRANULOCYTES 0 0.0 - 0.5 %    ABS. NEUTROPHILS 5.9 1.8 - 8.0 K/UL    ABS. LYMPHOCYTES 2.1 0.8 - 3.5 K/UL    ABS. MONOCYTES 0.8 0.0 - 1.0 K/UL    ABS. EOSINOPHILS 0.0 0.0 - 0.4 K/UL    ABS. BASOPHILS 0.0 0.0 - 0.1 K/UL    ABS. IMM. GRANS. 0.0 0.00 - 0.04 K/UL    DF AUTOMATED     METABOLIC PANEL, COMPREHENSIVE    Collection Time: 03/16/19 10:58 AM   Result Value Ref Range    Sodium 139 136 - 145 mmol/L    Potassium 4.6 3.5 - 5.1 mmol/L    Chloride 108 97 - 108 mmol/L    CO2 22 21 - 32 mmol/L    Anion gap 9 5 - 15 mmol/L    Glucose 79 65 - 100 mg/dL    BUN 32 (H) 6 - 20 MG/DL    Creatinine 1.78 (H) 0.55 - 1.02 MG/DL    BUN/Creatinine ratio 18 12 - 20      GFR est AA 33 (L) >60 ml/min/1.73m2    GFR est non-AA 27 (L) >60 ml/min/1.73m2    Calcium 8.5 8.5 - 10.1 MG/DL    Bilirubin, total 0.4 0.2 - 1.0 MG/DL    ALT (SGPT) 16 12 - 78 U/L    AST (SGOT) 22 15 - 37 U/L    Alk.  phosphatase 122 (H) 45 - 117 U/L    Protein, total 7.4 6.4 - 8.2 g/dL    Albumin 2.3 (L) 3.5 - 5.0 g/dL    Globulin 5.1 (H) 2.0 - 4.0 g/dL    A-G Ratio 0.5 (L) 1.1 - 2.2     TROPONIN I    Collection Time: 03/16/19 10:58 AM   Result Value Ref Range    Troponin-I, Qt. <0.05 <0.05 ng/mL   CK W/ REFLX CKMB    Collection Time: 03/16/19 10:58 AM   Result Value Ref Range    CK 42 26 - 192 U/L   NT-PRO BNP    Collection Time: 03/16/19 10:58 AM   Result Value Ref Range    NT pro-BNP >35,000 (H) <450 PG/ML   URINALYSIS W/ REFLEX CULTURE    Collection Time: 03/16/19 10:58 AM   Result Value Ref Range    Color YELLOW/STRAW      Appearance CLOUDY (A) CLEAR      Specific gravity 1.016 1.003 - 1.030      pH (UA) 6.0 5.0 - 8.0      Protein >300 (A) NEG mg/dL    Glucose NEGATIVE  NEG mg/dL    Ketone TRACE (A) NEG mg/dL    Bilirubin NEGATIVE  NEG      Blood NEGATIVE  NEG      Urobilinogen 0.2 0.2 - 1.0 EU/dL    Nitrites NEGATIVE  NEG      Leukocyte Esterase NEGATIVE  NEG      WBC 5-10 0 - 4 /hpf    RBC 5-10 0 - 5 /hpf    Epithelial cells FEW FEW /lpf    Bacteria 1+ (A) NEG /hpf    UA:UC IF INDICATED URINE CULTURE ORDERED (A) CNI     PROTHROMBIN TIME + INR    Collection Time: 03/16/19 10:58 AM   Result Value Ref Range    INR 2.5 (H) 0.9 - 1.1      Prothrombin time 24.0 (H) 9.0 - 11.1 sec   DIGOXIN    Collection Time: 03/16/19  1:07 PM   Result Value Ref Range    Digoxin level 1.1 0.90 - 2.00 NG/ML

## 2019-03-16 NOTE — PROGRESS NOTES
Pharmacy Levothyroxine IV Dosing Protocol    PTA Levothyroxine PO dose:     Oral interacting medications:       - Approved by [employer], [facility] and [facility]:  o Three (3) day hold unless patient meets listed exception  o 75% bioavailability will be used when converting patients to and from IV/PO levothyroxine therapy per 2014 Guidelines for Treatment of Hypothyroidism recommendation   o Patients will be changed to PO as soon as enteral route is available  o Following 3-day chitra period, daily IV levothyroxine therapy will commence unless specifically stated to start earlier from a prescriber       Impression/Plan:   Monitor for need of IV levothyroxine, verify when/if appropriate     Pharmacy will follow daily     Thank you,  Michelle Matthews PHARMD         [email or web address] pdf#%5B%7B%22num%22%3A67%2C%22gen%22%3A0%7D%2C%7B%22name%22%3A%22XYZ%22%7D%2C33%2C351%2C0%5D

## 2019-03-16 NOTE — ED PROVIDER NOTES
EMERGENCY DEPARTMENT HISTORY AND PHYSICAL EXAM      Date: 3/16/2019  Patient Name: Moises Worley    History of Presenting Illness     Chief Complaint   Patient presents with    Swelling     Pt hospitalized for PNA and FVOL at [facility] 2 weeks ago, sent home with home health, PT, OT. Pts family concerned regarding increased generalized swelling and lethargy, pt on Aldactone, pt has dementia but is her normal self per family, eating well. History Provided By: Patient and Patient's Daughter    HPI: Moises Worley, 82 y.o. female with PMHx significant for hypertension, recent pneumonia at [facility], presents to the ED with cc of worsening and progressive lower extremity edema, along with generalized weakness and lethargy. Patient denies acute chest pain, jaw tightness, reports some mild and unchanged shortness of breath. She also complains of posterior neck pain, somewhat aching in nature, exacerbated by cervical rotation or extension. Pain at times radiates from the neck into the left upper extremity. Family is uncertain about any changes in body weight. She has not taken her home medication this morning except her levothyroxine. She denies any abdominal pain, vomiting, diarrhea, or urinary changes. There are no other complaints, changes, or physical findings at this time. PCP: Fatmata Yang NP    No current facility-administered medications on file prior to encounter. Current Outpatient Medications on File Prior to Encounter   Medication Sig Dispense Refill    hydrALAZINE (APRESOLINE) 50 mg tablet Take 1 Tab by mouth three (3) times daily. 90 Tab 0    metoprolol tartrate (LOPRESSOR) 100 mg IR tablet Take 1 Tab by mouth two (2) times a day. 60 Tab 0    amLODIPine (NORVASC) 10 mg tablet Take 1 Tab by mouth daily. 30 Tab 0    isosorbide mononitrate ER (IMDUR) 30 mg tablet Take 1 Tab by mouth daily. 30 Tab 0    levoFLOXacin (LEVAQUIN) 500 mg tablet Take 1 Tab by mouth daily.  5 Tab 0    predniSONE (DELTASONE) 10 mg tablet Take 10 mg by mouth daily. 5 Tab 0    ALPRAZolam (XANAX) 0.25 mg tablet Take 0.5 Tabs by mouth two (2) times daily as needed for Anxiety. Max Daily Amount: 0.25 mg. 1 Tab 0    digoxin (LANOXIN) 0.125 mg tablet Take 1 Tab by mouth daily. 1 Tab 0    Ferrous Fumarate 325 mg (106 mg iron) tab Take 1 Tab by mouth daily.  warfarin (COUMADIN) 5 mg tablet Take 1 Tab by mouth daily. 1 Tab 0    indapamide (LOZOL) 2.5 mg tablet Take 2.5 mg by mouth every morning.  mirtazapine (REMERON) 30 mg tablet Take 1 Tab by mouth nightly. 90 Tab 3    digoxin (LANOXIN) 0.125 mg tablet Take 0.125 mg by mouth daily. 0.25 mon, wed, fri, sat. 0.125 all other days      levothyroxine (SYNTHROID) 50 mcg tablet Take  by mouth daily (before breakfast).  esomeprazole (NEXIUM) 40 mg capsule Take  by mouth daily.  simvastatin (ZOCOR) 10 mg tablet Take  by mouth nightly. Past History     Past Medical History:  Past Medical History:   Diagnosis Date    Aortic valve disorders     AS    Asthma     Benign hypertensive heart disease without heart failure     Diabetes (HCC)     Fibromyalgia     Gastroparesis     GERD (gastroesophageal reflux disease)     Mitral valve disorders(424.0)     MR    LIVIA (obstructive sleep apnea)     Paroxysmal atrial fibrillation ([facility] [address]     Pure hypercholesterolemia 9/30/2010       Past Surgical History:  Past Surgical History:   Procedure Laterality Date    ECHO 2D ADULT  11/2009    LVH, normal LV wall motion and ejection fraction, mild aortic stenosis, moderate aortic regurgitation and mild mitral regurgitation. The ejection fraction was 55-60%.      ECHO 2D ADULT  1/2011    EF 60%, LAE, mild AS, AI, MR, PA low 40s    EVENT MONITOR POST SYMPTOMS  9/2010    Rare PACs, no arrythmia with symptoms    LOOP MONITOR  9-10/2011    NSR    STRESS TEST MYOVIEW  1/2011    no ischemia, EF 63%    US DUPLEX CAROTID BILATERAL  1/2011    mild bilat disease Family History:  Family History   Problem Relation Age of Onset    Heart Disease Father     Dementia Brother     Heart Disease Brother        Social History:  Social History     Tobacco Use    Smoking status: Former Smoker     Last attempt to quit: 1963     Years since quittin.2    Smokeless tobacco: Never Used   Substance Use Topics    Alcohol use: Yes    Drug use: No       Allergies: Allergies   Allergen Reactions    Advil [Ibuprofen] Unknown (comments)    Aspirin Unknown (comments)    Meloxicam Unknown (comments)    Penicillin G Unknown (comments)    Shellfish Containing Products Rash    Sulfa (Sulfonamide Antibiotics) Unknown (comments)         Review of Systems   Review of Systems   Constitutional: Negative for chills and fever. Denies significant weight gain   HENT: Negative for congestion. Eyes: Negative for visual disturbance. Respiratory: Positive for shortness of breath (mild, unchanged). Negative for cough and chest tightness. Cardiovascular: Positive for leg swelling. Negative for chest pain. Gastrointestinal: Negative for abdominal pain and vomiting. Endocrine: Negative for polyuria. Genitourinary: Negative for dysuria and frequency. Musculoskeletal: Positive for neck pain. Negative for myalgias. Skin: Negative for color change. Allergic/Immunologic: Negative for immunocompromised state. Neurological: Negative for syncope and numbness. Physical Exam   Physical Exam   Nursing note and vitals reviewed.   General appearance: non-toxic, appears generally weak  Eyes: PERRL, EOMI, conjunctiva normal, anicteric sclera  HEENT: mucous membranes moist, oropharynx is slightly pale, mild edema of the right face and periorbital region noted, no calor or rubor  Pulmonary: Breath sounds minimally coarse at the bases, no definite crackles or wheeze  Cardiac: normal rate and regular rhythm, no murmurs, gallops, or rubs, 2+DP pulses, 2+ radial pulses  Abdomen: soft, nontender, nondistended, bowel sounds present  MSK: 1+ pre-tibial and pedal edema  Neuro: Alert, answers questions, grossly moves all 4 extremities, face symmetric  Skin: capillary refill brisk      Diagnostic Study Results     Labs -     Recent Results (from the past 12 hour(s))   EKG, 12 LEAD, INITIAL    Collection Time: 03/16/19 10:12 AM   Result Value Ref Range    Ventricular Rate 77 BPM    Atrial Rate 77 BPM    P-R Interval 216 ms    QRS Duration 90 ms    Q-T Interval 424 ms    QTC Calculation (Bezet) 479 ms    Calculated P Axis 52 degrees    Calculated R Axis -7 degrees    Calculated T Axis 37 degrees    Diagnosis       Sinus rhythm with 1st degree AV block with occasional premature ventricular   complexes  Voltage criteria for left ventricular hypertrophy  Nonspecific T wave abnormality  Prolonged QT  No previous ECGs available     POC TROPONIN-I    Collection Time: 03/16/19 10:39 AM   Result Value Ref Range    Troponin-I (POC) <0.04 0.00 - 0.08 ng/mL   CBC WITH AUTOMATED DIFF    Collection Time: 03/16/19 10:58 AM   Result Value Ref Range    WBC 8.9 3.6 - 11.0 K/uL    RBC 3.49 (L) 3.80 - 5.20 M/uL    HGB 9.9 (L) 11.5 - 16.0 g/dL    HCT 31.1 (L) 35.0 - 47.0 %    MCV 89.1 80.0 - 99.0 FL    MCH 28.4 26.0 - 34.0 PG    MCHC 31.8 30.0 - 36.5 g/dL    RDW 14.0 11.5 - 14.5 %    PLATELET 991 389 - 299 K/uL    MPV 11.6 8.9 - 12.9 FL    NRBC 0.0 0  WBC    ABSOLUTE NRBC 0.00 0.00 - 0.01 K/uL    NEUTROPHILS 67 32 - 75 %    LYMPHOCYTES 24 12 - 49 %    MONOCYTES 9 5 - 13 %    EOSINOPHILS 0 0 - 7 %    BASOPHILS 0 0 - 1 %    IMMATURE GRANULOCYTES 0 0.0 - 0.5 %    ABS. NEUTROPHILS 5.9 1.8 - 8.0 K/UL    ABS. LYMPHOCYTES 2.1 0.8 - 3.5 K/UL    ABS. MONOCYTES 0.8 0.0 - 1.0 K/UL    ABS. EOSINOPHILS 0.0 0.0 - 0.4 K/UL    ABS. BASOPHILS 0.0 0.0 - 0.1 K/UL    ABS. IMM.  GRANS. 0.0 0.00 - 0.04 K/UL    DF AUTOMATED     METABOLIC PANEL, COMPREHENSIVE    Collection Time: 03/16/19 10:58 AM   Result Value Ref Range    Sodium 139 136 - 145 mmol/L    Potassium 4.6 3.5 - 5.1 mmol/L    Chloride 108 97 - 108 mmol/L    CO2 22 21 - 32 mmol/L    Anion gap 9 5 - 15 mmol/L    Glucose 79 65 - 100 mg/dL    BUN 32 (H) 6 - 20 MG/DL    Creatinine 1.78 (H) 0.55 - 1.02 MG/DL    BUN/Creatinine ratio 18 12 - 20      GFR est AA 33 (L) >60 ml/min/1.73m2    GFR est non-AA 27 (L) >60 ml/min/1.73m2    Calcium 8.5 8.5 - 10.1 MG/DL    Bilirubin, total 0.4 0.2 - 1.0 MG/DL    ALT (SGPT) 16 12 - 78 U/L    AST (SGOT) 22 15 - 37 U/L    Alk.  phosphatase 122 (H) 45 - 117 U/L    Protein, total 7.4 6.4 - 8.2 g/dL    Albumin 2.3 (L) 3.5 - 5.0 g/dL    Globulin 5.1 (H) 2.0 - 4.0 g/dL    A-G Ratio 0.5 (L) 1.1 - 2.2     TROPONIN I    Collection Time: 03/16/19 10:58 AM   Result Value Ref Range    Troponin-I, Qt. <0.05 <0.05 ng/mL   CK W/ REFLX CKMB    Collection Time: 03/16/19 10:58 AM   Result Value Ref Range    CK 42 26 - 192 U/L   NT-PRO BNP    Collection Time: 03/16/19 10:58 AM   Result Value Ref Range    NT pro-BNP >35,000 (H) <450 PG/ML   URINALYSIS W/ REFLEX CULTURE    Collection Time: 03/16/19 10:58 AM   Result Value Ref Range    Color YELLOW/STRAW      Appearance CLOUDY (A) CLEAR      Specific gravity 1.016 1.003 - 1.030      pH (UA) 6.0 5.0 - 8.0      Protein >300 (A) NEG mg/dL    Glucose NEGATIVE  NEG mg/dL    Ketone TRACE (A) NEG mg/dL    Bilirubin NEGATIVE  NEG      Blood NEGATIVE  NEG      Urobilinogen 0.2 0.2 - 1.0 EU/dL    Nitrites NEGATIVE  NEG      Leukocyte Esterase NEGATIVE  NEG      WBC 5-10 0 - 4 /hpf    RBC 5-10 0 - 5 /hpf    Epithelial cells FEW FEW /lpf    Bacteria 1+ (A) NEG /hpf    UA:UC IF INDICATED URINE CULTURE ORDERED (A) CNI     PROTHROMBIN TIME + INR    Collection Time: 03/16/19 10:58 AM   Result Value Ref Range    INR 2.5 (H) 0.9 - 1.1      Prothrombin time 24.0 (H) 9.0 - 11.1 sec   DIGOXIN    Collection Time: 03/16/19  1:07 PM   Result Value Ref Range    Digoxin level 1.1 0.90 - 2.00 NG/ML       Radiologic Studies -   CT SPINE CERV WO CONT   Final Result   IMPRESSION:   Spondylosis C5-6. No acute abnormality. XR CHEST PORT   Final Result   Impression: No acute process. CT Results  (Last 48 hours)               03/16/19 1322  CT SPINE CERV WO CONT Final result    Impression:  IMPRESSION:   Spondylosis C5-6. No acute abnormality. Narrative:  EXAM:  CT CERVICAL SPINE WITHOUT CONTRAST       INDICATION: Neck pain, cervical spine. COMPARISON: None. CONTRAST:  None. TECHNIQUE: Multislice helical CT of the cervical spine was performed without   intravenous contrast administration. Sagittal and coronal reconstructions were   generated. CT dose reduction was achieved through use of a standardized   protocol tailored for this examination and automatic exposure control for dose   modulation. FINDINGS:       The alignment is within normal limits. There is no fracture or subluxation. The   odontoid process is intact. The craniocervical junction is within normal limits. The prevertebral soft tissues are within normal limits. There appear to be   bilateral pleural effusions. Spondylosis is noted at C5-6 with bilateral   neuroforaminal narrowing secondary to uncovertebral osteophyte formation. No   substantial spinal stenosis. CXR Results  (Last 48 hours)               03/16/19 1130  XR CHEST PORT Final result    Impression:  Impression: No acute process. Narrative: Indication: Shortness of breath       Comparison: None       Portable exam of the chest obtained at 1119 demonstrates cardiomegaly. There is   no acute process in the lung fields. The osseous structures are unremarkable. Medical Decision Making   I am the first provider for this patient. I reviewed the vital signs, available nursing notes, past medical history, past surgical history, family history and social history. Vital Signs-Reviewed the patient's vital signs.   Patient Vitals for the past 12 hrs:   Temp Pulse Resp BP SpO2   03/16/19 1438  67 13 (!) 186/20 97 %   03/16/19 1400  77 13 (!) 217/51 99 %   03/16/19 1300  71 19 (!) 215/49 95 %   03/16/19 1251    (!) 222/41    03/16/19 1100  72 19 (!) 227/41 99 %   03/16/19 1045  65 15 (!) 222/33 98 %   03/16/19 1032    (!) 235/34    03/16/19 1013 98.5 °F (36.9 °C) 77 12 152/51 98 %       Pulse Oximetry Analysis - 98% on room air    Cardiac Monitor:   Rate: 77 bpm  Rhythm: Normal Sinus Rhythm     EKG interpretation: (Preliminary) 10:12 AM  Sinus rhythm, first-degree AV block, ventricular rate 77, DC interval 216, QRS 90, QTc 479, biphasic T waves noted in V1 and V2 which appear new from prior EKG. ST segments are marginally elevated in V1 and V2. There is no reciprocal ST depression. Records Reviewed: Nursing Notes, Old Medical Records, Previous Radiology Studies and Previous Laboratory Studies    Provider Notes (Medical Decision Making):   82-year-old female with history of hypertension, now accelerated and difficult to control, presents with increasing weakness and complaints of worsening edema. Systolic blood pressure has been over 220. Attempted 2 doses of oral hydralazine, with minimal improvement in SBP. Discussed with cardiology, concern for biphasic T wave changes in the septal leads, and they recommend a nicardipine infusion with admission to the hospitalist.  They will consult on the patient in the hospital.  Chest x-ray reviewed, without overt pulmonary edema despite elevated proBNP. ED Course:   Initial assessment performed. The patients presenting problems have been discussed, and they are in agreement with the care plan formulated and outlined with them. I have encouraged them to ask questions as they arise throughout their visit. ED Course as of Mar 16 1451   Sat Mar 16, 2019   1358 Case discussed with Santosh Rosado NP with [facility].   She will confer with Dr. Abelino Taylor, and have him call back with recommendations. Blood pressure remains elevated with systolic above 297 despite 100 mg of hydralazine. [FH]   0166 Case discussed with Dr. Andrea Michaels, hospitalist, will evaluate patient for admission. Nicardipine drip ordered. He request ED bedside ultrasound to evaluate for pericardial effusion.  [FH]      ED Course User Index  [FH] Kingsley Del Angel MD         Procedure Note - Bedside Ultrasound:  3:35 PM  Performed by: Elli Dos Santos MD  US of chest/heart performed at beside, showing no pericardial effusion. The procedure took 1-15 minutes, and pt tolerated well. CRITICAL CARE NOTE :    3:50 PM      IMPENDING DETERIORATION -Cardiovascular    ASSOCIATED RISK FACTORS - hypertensive urgency    MANAGEMENT- Bedside Assessment and Supervision of Care    INTERPRETATION -  Xrays, ECG, Blood Pressure and Screening Ultrasound    INTERVENTIONS - hemodynamic mngmt    CASE REVIEW - Hospitalist, Medical Sub-Specialist, Nursing and Family    TREATMENT RESPONSE -Improved    PERFORMED BY - Self        NOTES   :      I have spent 40 minutes of critical care time involved in lab review, consultations with specialist, family decision- making, bedside attention and documentation. During this entire length of time I was immediately available to the patient . Mari Garcia. rTudy Jaquez MD      Critical Care Time:   45 min       Disposition:    1. Admit to Hospitalist    Admission Note:  2:51 PM  Patient is being admitted to the hospital by Dr. Mulu Christopher. The results of their tests and reasons for their admission have been discussed with them and available family. They convey agreement and understanding for the need to be admitted and for their admission diagnosis. PLAN:  1. Admit to hospital      Diagnosis     Clinical Impression:   1. Accelerated hypertension    2. Abnormal EKG    3.  Bilateral leg edema        Attestations:    Elli Dos Santos MD

## 2019-03-17 NOTE — PROGRESS NOTES
1900 Bedside and Verbal shift change report given to Daly Parker RN (oncoming nurse) by Cliff Travis RN (offgoing nurse). Report included the following information SBAR, Kardex, ED Summary, Procedure Summary, Intake/Output, MAR, Recent Results and Cardiac Rhythm NSR/ST.      1948 Patient went into Afib 110s - paged cardiologist    2000 Shift assessment complete - patient very drowsy but easy to arouse; follows commands and answers questions appropriately; alert to self & place but disoriented to time & situation; breath sounds clear; bowel sounds hypoactive; skin intact; L PIV infusing - site clean, dry, & intact    Purwick placed at this time - pericare performed     2004 Patient back in NSR    2007 Spoke with Dr. Saira Cárdenas NP, Benji Clark, to notify her of afib & conversion back to NSR - will talk to Sonya Grullon to see if digoxin can be started sooner; okay with systolic being in 363J for cardene titration purposes so MAP can be within goal     2035 Cardene titrated up to 5mg/hr - /40 MAP 58    2141 Cardene titrated up to 7.5mg/hr - /32 MAP 58    2143 BS 82 - no insulin needed    2220 Cardene titrated up to 10mg/hr - /29 MAP 58    2303 Cardene titrated up to 12.5mg/hr - /34 MAP 59    0000 Reassessment - no changes noted to previous assessment     0031 Paged Dr. Sonya Grullon to notify him patient is now on max of cardene & goal MAP still not being reached with persistent widening pulse pressure - BP 130s-140s/20s-30s    0036 Received call back from MABEL Zapien with [facility] - she will speak to Dr. Sonya Grullon & call back with further orders    0041 Received call back from Dr. Sonya Grullon - orders received to keep systolic above 671, MAP does not matter in this case - so titrate cardene down accordingly (he is fine with systolic in [id number])    0044 Cardene titrated down to 12.5mg/hr     0055 Cardene titrated down to 10mg/hr    0128 Cardene titrated down to 7.5mg/hr    0305 Labs drawn & sent     0400 Reassessment - no changes noted to previous assessment     0414 Lab glucose showed 73 - gave 4oz orange juice     0417 HYPOGLYCEMIC EPISODE DOCUMENTATION    Patient with hypoglycemic episode(s) at 0417(time) on 03/17/19(date). BG value(s) pre-treatment 76    Was patient symptomatic? [] yes, [x] no  Patient was treated with the following rescue medications/treatments:    [x] 4oz juice        0420 Cardene titrated up to 10mg/hr    0432 Post treatment BG - 89     0700 Bedside and Verbal shift change report given to Wilfredo Ashby RN (oncoming nurse) by Vidal Mcgrath RN (offgoing nurse). Report included the following information SBAR, Kardex, ED Summary, Intake/Output, MAR, Recent Results and Cardiac Rhythm NSR.

## 2019-03-17 NOTE — PROGRESS NOTES
Problem: Pressure Injury - Risk of  Goal: *Prevention of pressure injury  Document Rafiq Scale and appropriate interventions in the flowsheet. Outcome: Progressing Towards Goal  Pressure Injury Interventions:       Moisture Interventions: Absorbent underpads, Apply protective barrier, creams and emollients, Internal/External urinary devices, Maintain skin hydration (lotion/cream), Minimize layers, Moisture barrier    Activity Interventions: Increase time out of bed, Pressure redistribution bed/mattress(bed type)    Mobility Interventions: Float heels, HOB 30 degrees or less, Pressure redistribution bed/mattress (bed type), Turn and reposition approx. every two hours(pillow and wedges)    Nutrition Interventions: Document food/fluid/supplement intake, Offer support with meals,snacks and hydration    Friction and Shear Interventions: Apply protective barrier, creams and emollients, Foam dressings/transparent film/skin sealants, HOB 30 degrees or less, Lift sheet, Lift team/patient mobility team, Minimize layers, Transferring/repositioning devices               Problem: Falls - Risk of  Goal: *Absence of Falls  Document Judd Fall Risk and appropriate interventions in the flowsheet.   Outcome: Progressing Towards Goal  Fall Risk Interventions:  Mobility Interventions: Bed/chair exit alarm, Patient to call before getting OOB, Strengthening exercises (ROM-active/passive), Utilize walker, cane, or other assistive device    Mentation Interventions: Adequate sleep, hydration, pain control, Bed/chair exit alarm, Evaluate medications/consider consulting pharmacy, Room close to nurse's station, Toileting rounds, Update white board    Medication Interventions: Bed/chair exit alarm, Evaluate medications/consider consulting pharmacy, Teach patient to arise slowly, Patient to call before getting OOB    Elimination Interventions: Bed/chair exit alarm, Call light in reach, Toileting schedule/hourly rounds, Toilet paper/wipes in reach

## 2019-03-17 NOTE — PROGRESS NOTES
Received report from Ana Luisa Gonzalez [facility]. Care assumed. 2030  Assessment as per flow. Son at bedside. 2245  Patient laying quietly in bed. Encouraged her to sleep, but she would not turn off the TV.    0030  No change in assessment. Patient still watching TV.    0330  Assessment unchanged. VSS. No c/o    0500  Labs sent. 0745  Report to Rita Jimenez RN. Bedside shift change report given to Rita Jimenez RN (oncoming nurse) by Chelsea Lobato RN (offgoing nurse). Report included the following information Kardex, Intake/Output, MAR, Recent Results and Cardiac Rhythm sinus arrhythmia.

## 2019-03-17 NOTE — H&P
Hospitalist Progress Note    NAME: Dk Ramachandran   :  1935   MRN:  [id number]     Admit date: 3/16/2019    Today's date: 19    PCP: MABEL Mckenna M.D. Cell [id number]    Assessment / Plan:    Hypertensive Urgency /34 in ED  Acute on Chronic Diastolic Heart Failure POA LVEF 55%  Aortic regurgitation POA with widened pulse pressure  Moderate mitral stenosis POA  Presumed rheumatic heart disease with MS and aortic valve disease  Chest pain/CAD POA  Admitted with increased edema  Did not take BP meds on day of admit, BP elevated  What jumps out at me is the huge pulse pressure, suspect related to the AI        Family says this wide discrepancy has only been in the past 3 months  Echo at 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 Anything needs to be done, can be done about the aortic valve        Will discuss with cardiology    Stage 4 chronic kldney disease POA baseline creat 1.6 to 2.0  Serial labs    Dementia POA   No clear encephalopathy per my assessment  Oriented x 2, family says at baseline  Head CT and C-s[pine CT scans without  Acute changes  I spoke with Dr Melven Severance, I canceled the neuro consult, will reconsult if acute issues arise  Neuro exam appears non focal    Hypothyroidism POA c/w L-thyroxine. Atrial Fib POA  INR 3.2  PO lopressor  Cards following    HbA1C 5.3    Obesity POA Body mass index is 30.08 kg/m².      Code Status: Full Code Surrogate Decision Maker: desean Finch [id number], IXWPD [id number] or Roxann Lopez    DVT Prophylaxis: Coumadin    GI Prophylaxis: not indicated    Baseline: lives with DTR Clarice Keller, has dementia       Subjective:     Chief Complaint / Reason for Physician Visit f/u HTN urgency  \"I felt swollen\". Discussed with RN events overnight. No complaints  Awake and talking, MS at baseline  No CP or SOB  Less facial edema  Legs still feel swollen  Still on cardene gtt    Review of Systems:  Symptom Y/N Comments  Symptom Y/N Comments   Fever/Chills n   Chest Pain n    Poor Appetite    Edema y    Cough n   Abdominal Pain n    Sputum    Joint Pain     SOB/HASKINS n   Headache     Nausea/vomit n   Tolerating PT/OT     Diarrhea n   Tolerating Diet y    Constipation    Other       Could NOT obtain due to:      Objective:     VITALS:   Last 24hrs VS reviewed since prior progress note.  Most recent are:  Patient Vitals for the past 24 hrs:   Temp Pulse Resp BP SpO2   03/17/19 1100  82 22 (!) 151/31 97 %   03/17/19 1000  77 16 (!) 147/32 95 %   03/17/19 0900  76 13 (!) 158/35 97 %   03/17/19 0800  67 10 (!) 137/29 98 %   03/17/19 0730 97.7 °F (36.5 °C) 68 11 (!) 142/34 97 %   03/17/19 0700  67 13 (!) 134/31 96 %   03/17/19 0600  68 10 (!) 143/35 98 %   03/17/19 0500  71 11 (!) 153/33 98 %   03/17/19 0400 98.2 °F (36.8 °C) 71 13 (!) 153/34 97 %   03/17/19 0300  70 17 (!) 148/38 98 %   03/17/19 0200  72 22 (!) 147/32 99 %   03/17/19 0100  65 15 (!) 134/26 97 %   03/17/19 0050  68 11 (!) 134/26 97 %   03/17/19 0000 98.1 °F (36.7 °C) 70 11 (!) 139/27 98 %   03/16/19 2300  69 11 (!) 135/34 97 %   03/16/19 2200  67 9 (!) 138/32 99 %   03/16/19 2100  71 10 (!) 138/33 99 %   03/16/19 2004  80      03/16/19 2000 98.4 °F (36.9 °C) (!) 108 13 160/59 97 %   03/16/19 1900  (!) 101 12 (!) 142/38 97 %   03/16/19 1818 98.3 °F (36.8 °C) 81 17 (!) 173/31 97 %   03/16/19 1730  86 15 (!) 185/37 95 %   03/16/19 1531    191/45  03/16/19 1530  72  191/45 98 %   03/16/19 1510  73  197/54    03/16/19 1500  65 11 197/54 99 %   03/16/19 1438  67 13 (!) 186/20 97 %   03/16/19 1400  77 13 (!) 217/51 99 %   03/16/19 1300  71 19 (!) 215/49 95 %   03/16/19 1251    (!) 222/41        Intake/Output Summary (Last 24 hours) at 3/17/2019 1239  Last data filed at 3/17/2019 0600  Gross per 24 hour   Intake 1010 ml   Output 200 ml   Net 810 ml        Wt Readings from Last 12 Encounters:   03/16/19 69.9 kg (154 lb)   02/27/19 70.4 kg (155 lb 3.3 oz)   07/12/18 61.7 kg (136 lb)   02/20/14 70.3 kg (155 lb)   10/21/13 83 kg (183 lb)   10/05/13 83.5 kg (184 lb)   09/17/12 92.4 kg (203 lb 12.8 oz)   08/13/12 92.4 kg (203 lb 9.6 oz)   05/18/12 96.8 kg (213 lb 4.8 oz)   04/16/12 98.2 kg (216 lb 8 oz)   02/23/12 100.2 kg (221 lb)   09/19/11 99.3 kg (219 lb)       PHYSICAL EXAM:  General: WD, WN. Alert, cooperative, no acute distress    EENT:  PERRL. Anicteric sclerae. MMM  Neck:  No meningismus, no thyromegaly JVP elevated  Resp:  CTA bilaterally, no wheezing or rales. No accessory muscle use  CV:  Regular  Rhythm, grade 2/6 SM at LSB 1+edema  GI:  Soft, Non distended, Non tender.  +Bowel sounds, no rebound  LN:  No cervical or inguinal [place]  Neurologic:  Alert and oriented to birthday, place, nit current year, recognized family in room     normal speech, non focal motor exam  Psych:   Not anxious nor agitated  Skin:  No rashes.   No jaundice    Reviewed most current lab test results and cultures  YES  Reviewed most current radiology test results   YES  Review and summation of old records today    NO  Reviewed patient's current orders and MAR    YES  PMH/SH reviewed - no change compared to H&P  ________________________________________________________________________  Care Plan discussed with:    Comments   Patient x    Family  x Children   RN x    Care Manager     Consultant                        Multidiciplinary team rounds were held today with case manager, nursing, pharmacist and clinical coordinator. Patient's plan of care was discussed; medications were reviewed and discharge planning was addressed. ________________________________________________________________________  Total NON critical care TIME:  35   Minutes    Total CRITICAL CARE TIME Spent:   Minutes non procedure based      Comments   >50% of visit spent in counseling and coordination of care y    ________________________________________________________________________  Mariia Costello MD     Procedures: see electronic medical records for all procedures/Xrays and details which were not copied into this note but were reviewed prior to creation of Plan. LABS:  I reviewed today's most current labs and imaging studies.   Pertinent labs include:  Recent Labs     03/17/19  0305 03/16/19  1058   WBC 7.0 8.9   HGB 8.7* 9.9*   HCT 27.6* 31.1*    286     Recent Labs     03/17/19  0305 03/16/19  1058    139   K 4.4 4.6   * 108   CO2 22 22   GLU 73 79   BUN 34* 32*   CREA 1.69* 1.78*   CA 7.9* 8.5   MG 1.7  --    ALB 1.7* 2.3*   TBILI 0.3 0.4   SGOT 19 22   ALT 12 16   INR 3.2* 2.5*

## 2019-03-17 NOTE — PROGRESS NOTES
[facility] Consult Service Progress NOTE  Pulmonary, Critical Care, and Sleep Medicine    Name: Lloyd Horowitz MRN: [id number]   : 1935 Hospital: [facility]   Date: 3/17/2019  Admission Date: 3/16/2019     Hospital Day: 2    Chart and notes reviewed. Data reviewed. I have evaluated and examined the patient. I have reviewed the notes from other providers and old records readily available and summarized findings below with the flowsheet. Seen earlier today on rounds. Pt is unstable and acutely ill. Excerpts from admission and consult notes reviewed as follows:     \" Jerilyn Miller is a 82 y.o.  female  with PMHx significant for hypertension, recent pneumonia at [facility], presents  to the ED with cc of worsening and progressive lower extremity edema, along with generalized weakness and lethargy. Patient denies acute chest pain, jaw tightness, reports some mild and unchanged shortness of breath. She also complains of posterior neck pain, somewhat aching in nature, exacerbated by cervical rotation or extension. Pain at times radiates from the neck into the left upper extremity. Family is uncertain about any changes in body weight. She has not taken her home medication this morning except her levothyroxine. She denies any abdominal pain, vomiting, diarrhea, or urinary changes. \"     Pt now in CCU. Sluggish. Poor historian. Lying in bed is lethargic and confused. No SOB. On IV cardene. No chest pain. No diaphoresis. Data collected from chart and family in the room. IMPRESSION:   1. HYpertensive Urgency with  2. Acute Encephalopathy: with dementia and HTN  3. Hypothyroidism:   4. Acute on Chronic Diastolic Heart Failure: EF 55%, Cardiomegaly  5. Chest pain/CAD:   6. A,. Fib:   7. Chronic anticoagulation  8. anemia  9. Structural heart disease - echo noted  10. Dementia:  11. Former smoker  15. Body mass index is 30.08 kg/m².   13. Additional workup outlined below  14. Multiorgan dysfunction as outlined above: Pt has one or more acute or chronic illnesses with severe exacerbation with progression or side effects of treatment that poses a threat to life or bodily function  15. Pt is unstable, unpredictable; at risk of sudden decline and decompensation. 12. Pt is requiring Drug therapy requiring intensive monitoring for toxicity      RECOMMENDATIONS/PLAN:   1. CCU monitoring  2. IV cardene  3. Cardiology following  4. Reorient  5. Replete electrolytes  6. Labs to follow electrolytes, renal function and and blood counts  7. DVT, SUP prophylaxis  8. Pt needs IV fluids with additives and Drug therapy requiring intensive monitoring for toxicity  9.  Prescription drug management with home med reconciliation done        Patient PCP: Pat Dean NP  Social History     Tobacco Use    Smoking status: Former Smoker     Last attempt to quit: 1963     Years since quittin.2    Smokeless tobacco: Never Used   Substance Use Topics    Alcohol use: Yes      Family History   Problem Relation Age of Onset    Heart Disease Father     Dementia Brother     Heart Disease Brother     Diabetes Brother         Allergies   Allergen Reactions    Advil [Ibuprofen] Unknown (comments)    Aspirin Unknown (comments)    Meloxicam Unknown (comments)    Penicillin G Unknown (comments)    Shellfish Containing Products Rash    Sulfa (Sulfonamide Antibiotics) Unknown (comments)        MAR reviewed and pertinent medications noted or modified as needed  Current Facility-Administered Medications   Medication    metoprolol tartrate (LOPRESSOR) tablet 50 mg    hydrALAZINE (APRESOLINE) tablet 25 mg    [START ON 3/18/2019] furosemide (LASIX) injection 40 mg    niCARdipine (CARDENE) 25 mg in 0.9% sodium chloride 250 mL infusion    labetalol (NORMODYNE;TRANDATE) 20 mg/4 mL (5 mg/mL) injection 10 mg    furosemide (LASIX) injection 40 mg    morphine injection 2 mg  acetaminophen (TYLENOL) suppository 650 mg    hydrALAZINE (APRESOLINE) 20 mg/mL injection 10 mg    insulin lispro (HUMALOG) injection    glucose chewable tablet 16 g    dextrose (D50W) injection syrg 12.5-25 g    glucagon (GLUCAGEN) injection 1 mg    [START ON 3/19/2019] levothyroxine (SYNTHROID) injection 50 mcg    WARFARIN INFORMATION NOTE (COUMADIN)    mupirocin (BACTROBAN) 2 % ointment    influenza vaccine - (6 mos+)(PF) (FLUARIX QUAD/FLULAVAL QUAD) injection 0.5 mL        Vital Signs: Intake/Output: Intake/Output:   Visit Vitals  /59   Pulse 69   Temp 98.2 °F (36.8 °C)   Resp 11   Wt 69.9 kg (154 lb)   SpO2 96%   BMI 30.08 kg/m²    Temp (24hrs), Av.2 °F (36.8 °C), Min:97.7 °F (36.5 °C), Max:98.4 °F (36.9 °C)        Telemetry:AFIB O2 Device: Room air       Wt Readings from Last 4 Encounters:   19 69.9 kg (154 lb)   19 70.4 kg (155 lb 3.3 oz)   18 61.7 kg (136 lb)   14 70.3 kg (155 lb)          Intake/Output Summary (Last 24 hours) at 3/17/2019 1356  Last data filed at 3/17/2019 1000  Gross per 24 hour   Intake 1010 ml   Output 400 ml   Net 610 ml       Last shift:       0701 -  1900  In: -   Out: 150 [Urine:150]    Last 3 shifts: 03/15 190 -  0700  In: 1010 [I.V.:1010]  Out: 1450 [Urine:1450]     Physical Exam:    General: moderately ill and disoriented;   female; NC O2,    HEAD: Normocephalic, without obvious abnormality, atraumatic   EYES: conjunctivae clear. PERRL, AN Icteric sclerae   NOSE: nares normal, no drainage, no flaring,    THROAT: lips, mucosa dry; No Thrush; ;   Neck: Supple, symmetrical, trachea midline,  No accessory mm use; No Stridor/ cuff leak, No goiter or thyroid tenderness   LYMPH: No abnormally enlarged lymph nodes.  in neck   Chest: normal   Lungs: decreased air exchange bilaterally   Heart: IRRegular rate and rhythm ; NO edema   Abdomen: soft, protuberant, nontender, without guarding   :    Extremity: negative, clubbing   Neuro: lethargic; speech soft, withdraws to pain; unable to check gait and station; does follow simple commands   Psych: disorganized, disoriented, lethargic; Unable to assess; No agitation;    Skin: Warm;    Pulses:Bilateral, Radial, 2+   Capillary refill: normal; well perfused, brisk capillary refill,      DATA:    Labs:    Recent Labs     03/17/19  0305 03/16/19  1058   WBC 7.0 8.9   HGB 8.7* 9.9*    286   INR 3.2* 2.5*     Recent Labs     03/17/19  0305 03/16/19  1058    139   K 4.4 4.6   * 108   CO2 22 22   GLU 73 79   BUN 34* 32*   CREA 1.69* 1.78*   CA 7.9* 8.5   MG 1.7  --    ALB 1.7* 2.3*   SGOT 19 22   ALT 12 16     No results for input(s): PH, PCO2, PO2, HCO3, FIO2 in the last 72 hours. Recent Labs     03/17/19  0305 03/16/19  1600 03/16/19  1058   TROIQ 0.05* <0.05 <0.05     No results found for: BNPP, BNP   Lab Results   Component Value Date/Time    Culture result: NO GROWTH 6 DAYS 02/25/2019 10:00 PM     Lab Results   Component Value Date/Time    TSH 6.14 (H) 03/16/2019 04:00 PM       Imaging:     CXR Results  (Last 48 hours)               03/16/19 1130  XR CHEST PORT Final result    Impression:  Impression: No acute process. Narrative: Indication: Shortness of breath       Comparison: None       Portable exam of the chest obtained at 1119 demonstrates cardiomegaly. There is   no acute process in the lung fields. The osseous structures are unremarkable. Results from Hospital Encounter encounter on 03/16/19   XR CHEST PORT    Narrative Indication: Shortness of breath    Comparison: None    Portable exam of the chest obtained at 1119 demonstrates cardiomegaly. There is  no acute process in the lung fields. The osseous structures are unremarkable. Impression Impression: No acute process.        Results from Hospital Encounter encounter on 03/16/19   XR CHEST PORT    Narrative Indication: Shortness of breath    Comparison: None    Portable exam of the chest obtained at 1119 demonstrates cardiomegaly. There is  no acute process in the lung fields. The osseous structures are unremarkable. Impression Impression: No acute process. Results from [place] encounter on 02/25/19   XR CHEST AP LAT    Narrative Chest dated 2/28/2019    Comparison chest dated 2/25/2019. History is shortness of breath and cough. PA and lateral views of the chest were obtained. It is difficult to accurately  assess the size of the cardiac silhouette. There is hyperaeration of the lungs. There is slight prominence of the pulmonary interstitial markings. There has  been a decrease in the conspicuity of the interstitial markings as compared with  the previous examination. There has been very marked improvement of the aeration  of the lung bases (particularly on the right). This is compatible with resolved  infiltration on the right. Some linear/hazy density is noted at the left lung  base. There is minimal blunting of the left costophrenic angle. This is  suggestive of the presence of a small pleural effusion. Impression IMPRESSION:  1. Apparent, complete resolution of the previously described right basilar  infiltrate. 2. Improved aeration of the left lung base with some residual linear/hazy  density. Presence of a small left pleural effusion. XR CHEST SNGL V    Narrative EXAM: XR CHEST SNGL V    INDICATION: Cough, difficulty breathing, and shortness of breath. COMPARISON: Chest x-ray 9/23/2014. FINDINGS: A portable AP radiograph of the chest was obtained at 22:07 hours. The  patient is on a cardiac monitor. The lungs show right perihilar airspace  infiltrate but otherwise are clear. Atherosclerotic calcifications affect the  aortic arch.  The cardiac and mediastinal contours and pulmonary vascularity are  normal.  The chest wall structures and visualized upper abdomen show no acute  findings with incidental note of degenerative spine and shoulder changes. Impression IMPRESSION: Right perihilar infiltrate suspicious for pneumonia in appropriate  clinical setting. Results from [place] encounter on 03/16/19   CT HEAD WO CONT    Narrative EXAM: CT HEAD WO CONT    INDICATION: encephaloapthy    COMPARISON: None. CONTRAST: None. TECHNIQUE: Unenhanced CT of the head was performed using 5 mm images. Brain and  bone windows were generated. CT dose reduction was achieved through use of a  standardized protocol tailored for this examination and automatic exposure  control for dose modulation. FINDINGS:  The ventricles and sulci are normal in size, shape and configuration and  midline. There is no significant white matter disease. There is no intracranial  hemorrhage, extra-axial collection, mass, mass effect or midline shift. The  basilar cisterns are open. No acute infarct is identified. The bone windows  demonstrate no abnormalities. The visualized portions of the paranasal sinuses  and mastoid air cells are clear. Impression IMPRESSION: No acute abnormality.              My assessment/management discussed with: Consultants, Nursing, Respiratory Therapy, Hospitalist and Family for coordination of care    Medical Decision Making Today:  · Reviewed the flowsheet and previous days notes  · Reviewed and summarized records or history from previous days note or discussions with staff, family  · Parenteral controlled substances - Reviewed/ Adjusted / Weaned / Started  · High Risk Drug therapy requiring intensive monitoring for toxicity: eg steroids, pressors, antibiotics  · Reviewed and/or ordered Clinical lab tests  · Reviewed and/or ordered Radiology tests  · Reviewed and/or ordered of Medicine tests  · Independently visualized radiologic Images  · I have personally reviewed the patients ECG / Bhupinder Pino MD

## 2019-03-17 NOTE — PROGRESS NOTES
0881  Bedside and Verbal shift change report given to Anish Ortega (oncoming nurse) by Zohra Tapia, ROSSY (offgoing nurse). Report included the following information SBAR, Kardex, Procedure Summary, Intake/Output, MAR, Recent Results and Cardiac Rhythm Normal Sinus Rhythm. 9827  Dr. Lacey Potter at bedside, see progress notes for details. 0915  Shift assessment complete, see flowsheets for details. 1200 Reassessment complete, see flowsheets for details. 1600  Reassessment complete, see flowsheets for details. [address] stopped at this time, per physician orders. Will continue to monitor. 1915  Bedside and Verbal shift change report given to Dev Phan (oncoming nurse) by Marilyn Keenan, RN (offgoing nurse). Report included the following information SBAR, Kardex, Procedure Summary, Intake/Output, MAR, Recent Results and Cardiac Rhythm Normal Sinus rhythm.

## 2019-03-17 NOTE — PROGRESS NOTES
Pharmacy Levothyroxine IV Dosing Protocol    PTA Levothyroxine PO dose: 50mCg PO Daily per PTA record regimen    Oral interacting medications: none    - Approved by [employer], [facility] and [facility]:  o Three (3) day hold unless patient meets listed exception  o 75% bioavailability will be used when converting patients to and from IV/PO levothyroxine therapy per 2014 Guidelines for Treatment of Hypothyroidism recommendation   o Patients will be changed to PO as soon as enteral route is available  o Following 3-day chitra period, daily IV levothyroxine therapy will commence unless specifically stated to start earlier from a prescriber     Impression/Plan:   3/17 po meds ordered thus changed Levothyroxine to 50mCg PO Daily per PTA record regimen       Thank you,  Lige Boas, [facility]

## 2019-03-17 NOTE — CONSULTS
Subjective:                 [address] [facility], [address]  [phone]    Date of  Admission: 3/16/2019 10:22 AM     Admission type:Emergency    Ron Mariscal is a 82 y.o. female admitted for Hypertensive urgency [I16.0]. Brought into ER for increased facial and LE edema. Found to be in htn urgency. Had recently been treated at [facility] for pneumonia. She has dementia and is unable to provide a hx - info obtained from review of chart and d/w nursing. Patient Active Problem List    Diagnosis Date Noted    Hypertensive urgency 03/16/2019    CKD (chronic kidney disease) 03/16/2019    Acute renal failure superimposed on stage 4 chronic kidney disease ([address] 02/28/2019    Atrial fibrillation ([address] 02/26/2019    CAP (community acquired pneumonia) 02/26/2019    Essential hypertension 02/26/2019    Anticoagulant long-term use 02/26/2019    Dementia 02/26/2019    Acquired hypothyroidism 02/26/2019    LIVIA on CPAP 05/18/2012    Paroxysmal atrial fibrillation ([address]     Aortic valve disorders 09/30/2010    Pure hypercholesterolemia 09/30/2010    Mitral valve disorders(424.0)     Benign hypertensive heart disease without heart failure       Mark Anthony Murphy NP  Past Medical History:   Diagnosis Date    Aortic valve disorders     AS    Asthma     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 The ejection fraction was 55-60%.      ECHO 2D ADULT  1/2011    EF 60%, LAE, mild AS, AI, MR, PA low 40s    EVENT MONITOR POST SYMPTOMS  2010    Rare PACs, no arrythmia with symptoms    LOOP MONITOR  9-10/2011    NSR    STRESS TEST MYOVIEW  2011    no ischemia, EF 63%    US DUPLEX CAROTID BILATERAL  2011    mild bilat disease     Allergies   Allergen Reactions    Advil [Ibuprofen] Unknown (comments)    Aspirin Unknown (comments)    Meloxicam Unknown (comments)    Penicillin G Unknown (comments)    Shellfish Containing Products Rash    Sulfa (Sulfonamide Antibiotics) Unknown (comments)      Social History     Tobacco Use    Smoking status: Former Smoker     Last attempt to quit: 1963     Years since quittin.2    Smokeless tobacco: Never Used   Substance Use Topics    Alcohol use:  Yes    Drug use: No     Family History   Problem Relation Age of Onset    Heart Disease Father     Dementia Brother     Heart Disease Brother     Diabetes Brother       Current Facility-Administered Medications   Medication Dose Route Frequency    metoprolol tartrate (LOPRESSOR) tablet 50 mg  50 mg Oral Q12H    niCARdipine (CARDENE) 25 mg in 0.9% sodium chloride 250 mL infusion  0-15 mg/hr IntraVENous TITRATE    labetalol (NORMODYNE;TRANDATE) 20 mg/4 mL (5 mg/mL) injection 10 mg  10 mg IntraVENous Q6H PRN    furosemide (LASIX) injection 40 mg  40 mg IntraVENous Q12H    morphine injection 2 mg  2 mg IntraVENous Q4H PRN    acetaminophen (TYLENOL) suppository 650 mg  650 mg Rectal Q4H PRN    hydrALAZINE (APRESOLINE) 20 mg/mL injection 10 mg  10 mg IntraVENous Q6H PRN    insulin lispro (HUMALOG) injection   SubCUTAneous AC&HS    glucose chewable tablet 16 g  4 Tab Oral PRN    dextrose (D50W) injection syrg 12.5-25 g  12.5-25 g IntraVENous PRN    glucagon (GLUCAGEN) injection 1 mg  1 mg IntraMUSCular PRN    [START ON 3/19/2019] levothyroxine (SYNTHROID) injection 50 mcg  50 mcg IntraVENous Q24H    WARFARIN INFORMATION NOTE (COUMADIN)   Other QPM    mupirocin (BACTROBAN) 2 % ointment   Both Nostrils BID    influenza vaccine 2018-19 (6 mos+)(PF) (FLUARIX QUAD/FLULAVAL QUAD) injection 0.5 mL  0.5 mL IntraMUSCular PRIOR TO DISCHARGE         Review of Symptoms:  uto     Subjective:      Visit Vitals  BP (!) 158/35   Pulse 76   Temp 97.7 °F (36.5 °C)   Resp 13   Wt 154 lb (69.9 kg)   SpO2 97%   BMI 30.08 kg/m²       Physical Exam  Abdomen: soft, non-tender. Extremities: trace edema  Heart: regular rate and rhythm  Lungs: crackles at bases  Pulses: 2+ and symmetric    Cardiographics    Telemetry: nsr    ECG: nsr    Echocardiogram: lvef 50-55, pul htn, +TR, +MS    Labs:  Recent Labs     03/17/19  0305 03/16/19  1058   WBC 7.0 8.9   HGB 8.7* 9.9*   HCT 27.6* 31.1*    286     Recent Labs     03/17/19  0305 03/16/19  1058    139   K 4.4 4.6   * 108   CO2 22 22   GLU 73 79   BUN 34* 32*   CREA 1.69* 1.78*   CA 7.9* 8.5   MG 1.7  --    ALB 1.7* 2.3*   TBILI 0.3 0.4   SGOT 19 22   ALT 12 16   INR 3.2* 2.5*       Recent Labs     03/17/19  0305 03/16/19  1600 03/16/19  1058   TROIQ 0.05* <0.05 <0.05         Intake/Output Summary (Last 24 hours) at 3/17/2019 0916  Last data filed at 3/17/2019 0600  Gross per 24 hour   Intake 1010 ml   Output 1400 ml   Net -390 ml         Assessment:     Assessment:       Active Problems:    Acquired hypothyroidism (2/26/2019)      Hypertensive urgency (3/16/2019)      CKD (chronic kidney disease) (3/16/2019)         Plan:     Mariya Luong is hemodynamically stable. D/cing digoxin. Will start bb and try to wean nicardipine gtt. R/o MI by enzymes. She does not need an ischemic w/u. lvef 50-55% with pul htn and MS,TR. Diuresis per primary service - watch cr closely.  Will cont to follow     Jovana Brown MD, [facility]    3/17/2019

## 2019-03-18 PROBLEM — R55 VASO VAGAL EPISODE: Status: ACTIVE | Noted: 2019-01-01

## 2019-03-18 PROBLEM — I65.23 BILATERAL CAROTID ARTERY STENOSIS: Status: ACTIVE | Noted: 2019-01-01

## 2019-03-18 NOTE — ROUTINE PROCESS
1830 Unit unable to take report d/t pat not assigned. [place] still not assigned despite phone call from NS stating pat to go to [phone]. Floor will not accept report. R Andres Katy 69 son updated w/ pat pending transfer. 1845 Bed assigned. Floor unable to take report. 1900 Report given to accepting floor.

## 2019-03-18 NOTE — PROGRESS NOTES
Reason for Admission:   Hypertensive urgency               RRAT Score:   23               Resources/supports as identified by patient/family:   Lives in the home of her daughter and son in law (C/Giuseppe Jot Final) and patient's son Marilyn Nova)                Top Challenges facing patient (as identified by patient/family and CM): Finances/Medication cost?      No concern for financial              Transportation? Transportation provided by family (daughters/son)              Support system or lack thereof? Very involved and supportive family                     Living arrangements? 1 story living, 4-5 steps into home. Self-care/ADLs/Cognition? Patient requires total help with IADL's and some hands-on assistance with ADL's           Current Advanced Directive/Advance Care Plan:  Full code/No AD on file. Discussed benefit of ACP and naming a decision maker with patient, son Marilyn Nova)  and daughter (daughter, Keara Keller was on speaker phone). Family all agreed to meet with ACP agent tomorrow. Chelsea Danny has POA, but patient stated she would name Keara Keller as HCA. Plan for utilizing home health:    Patient has been with [facility] and would like to resume services when she discharges back to home. Likelihood of readmission: Moderate                 Transition of Care Plan:     Patient will transfer out to [facility] today, then expect D/C to home with order to resume skilled home care services with [facility]. Spiritual consult placed for ACP. Care Management Interventions  Palliative Care Criteria Met (RRAT>21 & CHF Dx)?: Yes  Palliative Consult Recommended?: (To set up consult for ACP with )  Mode of Transport at Discharge:  Other (see comment)(Family can transport (daughters/son))  Transition of Care Consult (CM Consult): (S) Home Health(Patient currently has services with [facility], will send referral to resume services at [facility])  [facility] Care: No  Reason Outside [place]: Patient already serviced by other home care/hospice agency  Physical Therapy Consult: Yes  Occupational Therapy Consult: Yes  Speech Therapy Consult: Yes  Current Support Network: Relative's Home(Lives with daughter/S-I-L (Daxa Muñoz) and patient's son Swapnil Erazo). )  Confirm Follow Up Transport: Family  Plan discussed with Pt/Family/Caregiver: Yes  Discharge Location  Discharge Placement: (Expected D/C to home and resume [place] [id number] services with [facility])      CM will continue to assist with transition of care planning. Dontae Nuñez RN, CHPN, CMC     1.  notified regarding ACP  2.   Referral sent to [facility] to resume services

## 2019-03-18 NOTE — PROGRESS NOTES
Hospitalist Progress Note    NAME: Rosa M Eli   :  1935   MRN:  [id number]     Admit date: 3/16/2019    Today's date: 19    PCP: MABEL Alvarado M.D. Cell [phone]    Assessment / Plan:    Hypertensive Urgency /34 in ED  Acute on Chronic Diastolic Heart Failure POA LVEF 55%  Aortic regurgitation POA with widened pulse pressure  Moderate mitral stenosis POA  Presumed rheumatic heart disease with MS and aortic valve disease  Chest pain/CAD POA  Admitted with increased edema, elevated BP  Did not take BP meds on day of admit, BP elevated  Initially on nicardipine gtt in ICU, now weaned off  Markedly increased pulse pressure, suspect related to the AI  Echo at [facility] 2019 LVEF 51 to 55%, severely dilated left atrium         Mild AS, mod to severe aortic regurg         Moderate MS         Holodiastolic flow reversal in the descending aorta         Moderate pulmonary HTN PA pressure  Appears to be isolated systolic HTN  Still on cardene gtt, systolic stable, due to widened pulse pressure, MAP in low 60s      The aortic valve appears to be severely compromised  Also has MS and severely dilated left atrium  Resume oral medications, cards increased lopressor  IV lasix daily  ? Anything needs to be done, can be done about the aortic valve  Start getting OOB  Transfer out of the ICU    Stage 4 chronic kldney disease POA baseline creat 1.6 to 2.0  Serial labs    Dementia POA   No clear encephalopathy per my assessment  Oriented x 2, family says at baseline  Head CT and C-s[pine CT scans without  Acute changes  Canceled neuro consult  Neuro exam appears non focal    Hypothyroidism POA c/w L-thyroxine. Atrial Fib POA  INR 4.0  PO lopressor  Cards following    HbA1C 5.3    Obesity POA Body mass index is 30.08 kg/m².      Code Status: Full Code     Surrogate Decision Maker: desean Santamaria [phone], Alana Haywood [phone] or KRISTOFER [phone]    DVT Prophylaxis: Coumadin    GI Prophylaxis: not indicated    Baseline: lives with DTR Mouna Enriquez, has dementia       Subjective:     Chief Complaint / Reason for Physician Visit f/u HTN urgency  \"I just do not feel good\". Discussed with RN events overnight. No specific complaints  Awake and talking  No CP or SOB  Off of the cardene gtt    Review of Systems:  Symptom Y/N Comments  Symptom Y/N Comments   Fever/Chills n   Chest Pain n    Poor Appetite    Edema n    Cough n   Abdominal Pain n    Sputum    Joint Pain     SOB/HASKINS n   Headache     Nausea/vomit n   Tolerating PT/OT     Diarrhea n   Tolerating Diet y    Constipation    Other       Could NOT obtain due to:      Objective:     VITALS:   Last 24hrs VS reviewed since prior progress note.  Most recent are:  Patient Vitals for the past 24 hrs:   Temp Pulse Resp BP SpO2   03/18/19 1000  73 13 145/46 99 %   03/18/19 0900  83 14 177/43 100 %   03/18/19 0800  73 12 (!) 166/36 98 %   03/18/19 0720 97.8 °F (36.6 °C)       03/18/19 0700  78 14 167/41 97 %   03/18/19 0600  82 12  98 %   03/18/19 0500  74 11  98 %   03/18/19 0400  81 18  98 %   03/18/19 0300 98.3 °F (36.8 °C) 79 17 172/46 98 %   03/18/19 0200  78 14 (!) 158/37 98 %   03/18/19 0100  76 18 (!) 167/18 98 %   03/18/19 0000  75 16  99 %   03/17/19 2300  75 16 (!) 140/36 97 %   03/17/19 2200  90 14 (!) 156/36 97 %   03/17/19 2100  93 21 (!) 167/33 98 %   03/17/19 2000 98.3 °F (36.8 °C) 80 14 (!) 154/31 98 %   03/17/19 1900  78 15 (!) 157/34 97 %   03/17/19 1800  90 22 132/77 98 %   03/17/19 1700  89 19 (!) 179/25 96 %   03/17/19 1650  84  (!) 158/34    03/17/19 1618  69 13  97 %   03/17/19 1608  73  (!) 147/36    03/17/19 1600 98.1 °F (36.7 °C) 70 14 (!) 147/36 96 %   03/17/19 1537  64  (!) 140/38    03/17/19 1500  68 14 (!) 146/32 96 %   03/17/19 1400  61 13 (!) 140/31 97 %   03/17/19 1341    132/59    03/17/19 1300  73 16 (!) 148/32 96 %   03/17/19 1200 98.2 °F (36.8 °C) 69 11 (!) 136/27 96 %   03/17/19 1100  82 22 (!) 151/31 97 %       Intake/Output Summary (Last 24 hours) at 3/18/2019 1045  Last data filed at 3/18/2019 0918  Gross per 24 hour   Intake 941.82 ml   Output 1700 ml   Net -758.18 ml        Wt Readings from Last 12 Encounters:   03/18/19 69.9 kg (154 lb)   02/27/19 70.4 kg (155 lb 3.3 oz)   07/12/18 61.7 kg (136 lb)   02/20/14 70.3 kg (155 lb)   10/21/13 83 kg (183 lb)   10/05/13 83.5 kg (184 lb)   09/17/12 92.4 kg (203 lb 12.8 oz)   08/13/12 92.4 kg (203 lb 9.6 oz)   05/18/12 96.8 kg (213 lb 4.8 oz)   04/16/12 98.2 kg (216 lb 8 oz)   02/23/12 100.2 kg (221 lb)   09/19/11 99.3 kg (219 lb)       PHYSICAL EXAM:  General: WD, WN. Alert, cooperative, no acute distress    EENT:  PERRL. Anicteric sclerae. MMM  Neck:  No meningismus, no thyromegaly JVP elevated  Resp:  CTA bilaterally, no wheezing or rales. No accessory muscle use  CV:  Regular  Rhythm, grade 2/6 SM at LSB, no edema  GI:  Soft, Non distended, Non tender.  +Bowel sounds, no rebound  Neurologic:  Alert and oriented to birthday, place, not current year     normal speech, non focal motor exam  Psych:   Not anxious nor agitated  Skin:  No rashes. No jaundice    Reviewed most current lab test results and cultures  YES  Reviewed most current radiology test results   YES  Review and summation of old records today    NO  Reviewed patient's current orders and MAR    YES  PMH/SH reviewed - no change compared to H&P  ________________________________________________________________________  Care Plan discussed with:    Comments   Patient x    Family      RN x    Care Manager     Consultant                        Multidiciplinary team rounds were held today with , nursing, pharmacist and clinical coordinator. Patient's plan of care was discussed; medications were reviewed and discharge planning was addressed.      ________________________________________________________________________  Total NON critical care TIME:  25 Minutes    Total CRITICAL CARE TIME Spent:   Minutes non procedure based      Comments   >50% of visit spent in counseling and coordination of care y    ________________________________________________________________________  Haseeb Del Castillo MD     Procedures: see electronic medical records for all procedures/Xrays and details which were not copied into this note but were reviewed prior to creation of Plan. LABS:  I reviewed today's most current labs and imaging studies.   Pertinent labs include:  Recent Labs     03/17/19  0305 03/16/19  1058   WBC 7.0 8.9   HGB 8.7* 9.9*   HCT 27.6* 31.1*    286     Recent Labs     03/18/19  0346 03/17/19  0305 03/16/19  1058   NA  --  140 139   K  --  4.4 4.6   CL  --  110* 108   CO2  --  22 22   GLU  --  73 79   BUN  --  34* 32*   CREA  --  1.69* 1.78*   CA  --  7.9* 8.5   MG  --  1.7  --    ALB  --  1.7* 2.3*   TBILI  --  0.3 0.4   SGOT  --  19 22   ALT  --  12 16   INR 4.0* 3.2* 2.5*

## 2019-03-18 NOTE — PHYSICIAN ADVISORY
Letter of Status Determination: Current Status   INPATIENT is Appropriate         Pt Name:  Nikolas Esteban   MR#  [id number]   [facility]#   [id number]   Ej Allen7  @ [facility]   Hospitalization date  3/16/2019 10:22 AM   Current Attending Physician  Shannan Friend MD   Principal diagnosis  Hypertensive encephalopathy    Clinicals  Katarzyna Bhatti is a 82 y.o.  female  with PMHx significant for hypertension, recent pneumonia at [facility], presents  to the ED with cc of worsening and progressive lower extremity edema, along with generalized weakness and lethargy.  Patient denies acute chest pain, jaw tightness, reports some mild and unchanged shortness of breath.  She also complains of posterior neck pain, somewhat aching in nature, exacerbated by cervical rotation or extension.  Pain at times radiates from the neck into the left upper extremity.  Family is uncertain about any changes in body weight.  She has not taken her home medication this morning except her levothyroxine.  She denies any abdominal pain, vomiting, diarrhea, or urinary changes.   At this time patient is lying in bed is lethargic and confused, unable to provide a meaningful history, data collected from chart and family in the room. Pt was placed on Niacrdipine gtt from 3/16 to 3/17, needing critical care monitoring, Pt with acute on chronic CHF, needing IV diuretics, ?  NSTEMI v/s demand ischemia with elevated trop, BP still suboptimally controlled, needs medical optimization          [facility]       STATUS DETERMINATION  On the basis of clinical data, available documentaion, we believe that the current status of this patient as INPATIENT is Appropriate     The final decision of the patient's hospitalization status depends on the attending physician's judgment    Additional comments     Insurance  Payor: Cami Gomez / Plan: [place] & [place] / Product Type: Medicare / Insurance Information                [place] [address] PART A & B Phone:     Subscriber: Yanick Tompkins Subscriber#: [id number]    Group#:  Precert#:         [id number] Phone:     Subscriber: Deena Alvarenga Subscriber#: [place]    Group#: [id number] Precert#:                    Edna Tolbert MD  Cell: [phone]  Physician Advisor

## 2019-03-18 NOTE — PROGRESS NOTES
Problem: Self Care Deficits Care Plan (Adult)  Goal: *Acute Goals and Plan of Care (Insert Text)  Occupational Therapy Goals  Initiated 3/18/2019  1. Patient will perform grooming standing at sink with supervision/set-up within 7 day(s). 2.  Patient will perform upper body dressing with supervision/set-up within 7 day(s). 3.  Patient will perform lower body dressing with supervision/set-up within 7 day(s). 4.  Patient will perform toilet transfers with supervision/set-up within 7 day(s). 5.  Patient will perform all aspects of toileting with supervision/set-up within 7 day(s). 6.  Patient will perform sponge bathing with supervision/set-up within 7 day(s). Occupational Therapy EVALUATION  Patient: Army Cash (2 y.o. female)  Date: 3/18/2019  Primary Diagnosis: Hypertensive urgency [I16.0]       Precautions:  Fall(dementia)    ASSESSMENT :  Based on the objective data described below, the patient presents with GW, decreased activity tolerance, decreased safety awareness and decreased balance which is impairing her functional independence. She is functioning below her independent to supervision/setup baseline, now performing ADLs at a supervision setup to max A level and is supervision to min A for functional mobility. Patient will benefit from acute skilled intervention to address the above impairments. She does have a supportive family. Anticipate she will progress well and be able to return home with [facility] OT and PT at time of discharge.    Patients rehabilitation potential is considered to be Good  Factors which may influence rehabilitation potential include:   []             None noted  []             Mental ability/status  []             Medical condition  []             Home/family situation and support systems  [x]             Safety awareness  []             Pain tolerance/management  []             Other:      PLAN :  Recommendations and Planned Interventions:  [x]               Self Care Training                  []        Therapeutic Activities  [x]               Functional Mobility Training    []        Cognitive Retraining  [x]               Therapeutic Exercises           [x]        Endurance Activities  [x]               Balance Training                   []        Neuromuscular Re-Education  []               Visual/Perceptual Training     [x]   Home Safety Training  [x]               Patient Education                 [x]        Family Training/Education  []               Other (comment):    Frequency/Duration: Patient will be followed by occupational therapy 4 times a week to address goals. Discharge Recommendations: Home Health OT and PT with supervision    Further Equipment Recommendations for Discharge: TBD         OBJECTIVE DATA SUMMARY:     Past Medical History:   Diagnosis Date    Aortic valve disorders     AS    Asthma     Benign hypertensive heart disease without heart failure     Diabetes ([facility] [address] [facility]     Fibromyalgia     Gastroparesis     GERD (gastroesophageal reflux disease)     Mitral valve disorders(424.0)     MR    LIVIA (obstructive sleep apnea)     Paroxysmal atrial fibrillation ([facility]     Pure hypercholesterolemia 9/30/2010     Past Surgical History:   Procedure Laterality Date    ECHO 2D ADULT  11/2009    LVH, normal LV wall motion and ejection fraction, mild aortic stenosis, moderate aortic regurgitation and mild mitral regurgitation. The ejection fraction was 55-60%.  ECHO 2D ADULT  1/2011    EF 60%, LAE, mild AS, AI, MR, PA low 40s    EVENT MONITOR POST SYMPTOMS  9/2010    Rare PACs, no arrythmia with symptoms    LOOP MONITOR  9-10/2011    NSR    STRESS TEST MYOVIEW  1/2011    no ischemia, EF 63%    US DUPLEX CAROTID BILATERAL  1/2011    mild bilat disease     Prior Level of Function/Home Situation: lives with family and was performing ADLs at an independent to supervision level. Ambulates without an AD, but has been encouraged in the past to walk with a RW. Expanded or extensive additional review of patient history:     Home Situation  Home Environment: Private residence  # Steps to Enter: 0  Wheelchair Ramp: Yes  One/Two Story Residence: One story  Living Alone: No(lives with daughter 24/7)  Support Systems: Margaret Cat / lisa [facility], Family member(s)  Patient Expects to be Discharged to[de-identified] Private residence  Current DME Used/Available at Home: Judah Abdiasronnie, gibran, [address] chair  [x]  Right hand dominant   []  Left hand dominant  Cognitive/Behavioral Status:  Neurologic State: Confused; Alert  Orientation Level: Oriented to person;Disoriented to situation;Disoriented to place; Disoriented to time  Cognition: Decreased command following;Decreased attention/concentration;Memory loss  Perception: Appears intact  Perseveration: No perseveration noted  Safety/Judgement: Fall prevention;Decreased insight into deficits    Vision/Perceptual:         Acuity: Within Defined Limits    Corrective Lenses: Reading glasses  Range of Motion:  AROM: Generally decreased, functional  PROM: Within functional limits  Strength:  Strength: Generally decreased, functional  Coordination:  Coordination: Generally decreased, functional          Tone & Sensation:  Tone: Normal  Balance:  Sitting: Impaired  Sitting - Static: Good (unsupported)  Sitting - Dynamic: Fair (occasional)  Standing: Impaired  Standing - Static: Constant support;Good  Standing - Dynamic : Fair  Functional Mobility and Transfers for ADLs:  Bed Mobility:  Rolling: Supervision  Supine to Sit: Supervision     Scooting: Stand-by assistance  Transfers:  Sit to Stand: Minimum assistance;Assist x1     Bed to Chair: Minimum assistance;Assist x1          Toilet Transfer : Minimum assistance           Bathroom Mobility: Minimum assistance  ADL Assessment:  Feeding: Supervision;Setup    Oral Facial Hygiene/Grooming: Supervision;Setup(seated)    Bathing:  Moderate assistance    Upper Body Dressing: Minimum assistance    Lower Body Dressing: Maximum assistance    Toileting: Moderate assistance                Functional Measure:  Barthel Index:    Bathin  Bladder: 0  Bowels: 5  Groomin  Dressin  Feedin  Mobility: 0  Stairs: 0  Toilet Use: 5  Transfer (Bed to Chair and Back): 10  Total: 30       Barthel and G-code impairment scale:  Percentage of impairment CH  0% CI  1-19% CJ  20-39% CK  40-59% CL  60-79% CM  80-99% CN  100%   Barthel Score 0-100 100 99-80 79-60 59-40 20-39 1-19   0   Barthel Score 0-20 20 17-19 13-16 9-12 5-8 1-4 0      The Barthel ADL Index: Guidelines  1. The index should be used as a record of what a patient does, not as a record of what a patient could do. 2. The main aim is to establish degree of independence from any help, physical or verbal, however minor and for whatever reason. 3. The need for supervision renders the patient not independent. 4. A patient's performance should be established using the best available evidence. Asking the patient, friends/relatives and nurses are the usual sources, but direct observation and common sense are also important. However direct testing is not needed. 5. Usually the patient's performance over the preceding 24-48 hours is important, but occasionally longer periods will be relevant. 6. Middle categories imply that the patient supplies over 50 per cent of the effort. 7. Use of aids to be independent is allowed. Ivone Hassan., Barthel, D.W. (0009). Functional evaluation: the Barthel Index. [facility] (14)2. [facility], HONORIO, Delilah Baker.Guero., Vilma Farris, [address] [place] [address] (). Measuring the change indisability after inpatient rehabilitation; comparison of the responsiveness of the Barthel Index and Functional [place] Measure. Journal of Neurology, Neurosurgery, and Psychiatry, 66(4), 120-211. LUCAS Lane, CHARLENE Sorto, & Noreen Marsh MRashardA. (2004.) Assessment of post-stroke quality of life in cost-effectiveness studies:  The usefulness of the Barthel Index and the EuroQoL-5D. Quality of Life Research, 13, 152-35       ADL Intervention and task modifications:  Initiated bed mobility, dressing ADL, transfer, toileting, grooming and safety training. Pain:  Pain Scale 1: Numeric (0 - 10)  Pain Intensity 1: 0              Activity Tolerance:   VSS, activity tolerance was fair. Please refer to the flowsheet for vital signs taken during this treatment. After treatment:   [x] Patient left in no apparent distress sitting up in chair  [] Patient left in no apparent distress in bed  [x] Call bell left within reach  [x] Nursing notified  [x] Caregiver present  [] Bed alarm activated    COMMUNICATION/EDUCATION:   The patients plan of care was discussed with: Physical Therapist and Registered Nurse.  [] Home safety education was provided and the patient/caregiver indicated understanding. [] Patient/family have participated as able in goal setting and plan of care. [] Patient/family agree to work toward stated goals and plan of care. [x] Patient understands intent and goals of therapy, but is neutral about his/her participation. [] Patient is unable to participate in goal setting and plan of care. This patients plan of care is appropriate for delegation to hospitals.     Thank you for this referral.  Ean Martínez, OTR/L  Time Calculation: 32 mins

## 2019-03-18 NOTE — PROGRESS NOTES
[facility] Consult Service Progress NOTE  Pulmonary, Critical Care, and Sleep Medicine    Name: Rosa M Eli MRN: [id number]   : 1935 Hospital: [facility]   Date: 3/18/2019  Admission Date: 3/16/2019     Hospital Day: 3      IMPRESSION:   1. HYpertensive Urgency with  2. Acute Encephalopathy: with dementia and HTN  3. Hypothyroidism:   4. Acute on Chronic Diastolic Heart Failure: EF 55%, Cardiomegaly  5. Chest pain/CAD:   6. A,. Fib:   7. Chronic anticoagulation  8. anemia  9. Structural heart disease - echo noted  10. Dementia:  11. Former smoker  15. Additional workup outlined below  13. Multiorgan dysfunction as outlined above: Pt has one or more acute or chronic illnesses with severe exacerbation with progression or side effects of treatment that poses a threat to life or bodily function      RECOMMENDATIONS/PLAN:   1. CCU monitoring  2. IV cardene off  3. Cardiology following  4. Reoriented  5. Replete electrolytes as needed  6. Labs to follow electrolytes, renal function and and blood counts  7. DVT, SUP prophylaxis  8. Mobilize  9. Advance diet  10.  Transfer to floor today          No acute events overnight  BP better controlled  No acute distress      MAR reviewed and pertinent medications noted or modified as needed  Current Facility-Administered Medications   Medication    metoprolol tartrate (LOPRESSOR) tablet 50 mg    hydrALAZINE (APRESOLINE) tablet 25 mg    furosemide (LASIX) injection 40 mg    levothyroxine (SYNTHROID) tablet 50 mcg    niCARdipine (CARDENE) 25 mg in 0.9% sodium chloride 250 mL infusion    labetalol (NORMODYNE;TRANDATE) 20 mg/4 mL (5 mg/mL) injection 10 mg    morphine injection 2 mg    acetaminophen (TYLENOL) suppository 650 mg    hydrALAZINE (APRESOLINE) 20 mg/mL injection 10 mg    insulin lispro (HUMALOG) injection    glucose chewable tablet 16 g    dextrose (D50W) injection syrg 12.5-25 g    glucagon (GLUCAGEN) injection 1 mg    WARFARIN INFORMATION NOTE (COUMADIN)    mupirocin (BACTROBAN) 2 % ointment    influenza vaccine - (6 mos+)(PF) (FLUARIX QUAD/FLULAVAL QUAD) injection 0.5 mL        Vital Signs: Intake/Output: Intake/Output:   Visit Vitals  BP (!) 166/36   Pulse 73   Temp 97.8 °F (36.6 °C)   Resp 12   Wt 69.8 kg (153 lb 14.1 oz)   SpO2 98%   BMI 30.05 kg/m²    Temp (24hrs), Av.1 °F (36.7 °C), Min:97.8 °F (36.6 °C), Max:98.3 °F (36.8 °C)        Telemetry:AFIB O2 Device: Room air       Wt Readings from Last 4 Encounters:   19 69.8 kg (153 lb 14.1 oz)   19 70.4 kg (155 lb 3.3 oz)   18 61.7 kg (136 lb)   14 70.3 kg (155 lb)          Intake/Output Summary (Last 24 hours) at 3/18/2019 0906  Last data filed at 3/18/2019 0704  Gross per 24 hour   Intake 641.82 ml   Output 1800 ml   Net -1158.18 ml       Last shift:       07 -  1900  In: 50 [P.O.:50]  Out: -     Last 3 shifts: 1901 -  0700  In: 1814.7 [P.O.:120; I.V.:1694.7]  Out: 2100 [Urine:2100]     Physical Exam:    General: moderately ill and disoriented;   female;     HEAD: Normocephalic, without obvious abnormality, atraumatic   EYES: conjunctivae clear. PERRL, AN Icteric sclerae   NOSE: nares normal, no drainage, no flaring,    THROAT: lips, mucosa dry; No Thrush; ;   Neck: Supple, symmetrical, trachea midline,  No accessory mm use; No Stridor/ cuff leak, No goiter or thyroid tenderness   LYMPH: No abnormally enlarged lymph nodes.  in neck   Chest: normal   Lungs: decreased air exchange bilaterally   Heart: IRRegular rate and rhythm ; NO edema   Abdomen: soft, protuberant, nontender, without guarding   :    Extremity: negative, clubbing   Neuro: awake, alert   Psych: No agitation;    Skin: Warm;    Pulses:Bilateral, Radial, 2+   Capillary refill: normal; well perfused, brisk capillary refill,      DATA:    Labs:    Recent Labs     19  0346 19  0305 19  1058   WBC  --  7.0 8. 9   HGB  --  8.7* 9.9*   PLT  --  237 286   INR 4.0* 3.2* 2.5*     Recent Labs     03/17/19  0305 03/16/19  1058    139   K 4.4 4.6   * 108   CO2 22 22   GLU 73 79   BUN 34* 32*   CREA 1.69* 1.78*   CA 7.9* 8.5   MG 1.7  --    ALB 1.7* 2.3*   SGOT 19 22   ALT 12 16     No results for input(s): PH, PCO2, PO2, HCO3, FIO2 in the last 72 hours.   Recent Labs     03/17/19  0305 03/16/19  1600 03/16/19  1058   TROIQ 0.05* <0.05 <0.05     No results found for: BNPP, BNP   Lab Results   Component Value Date/Time    Culture result: MIXED UROGENITAL JOSE ISOLATED 03/16/2019 10:58 AM    Culture result: NO GROWTH 6 DAYS 02/25/2019 10:00 PM     Lab Results   Component Value Date/Time    TSH 6.14 (H) 03/16/2019 04:00 PM       Imaging:           My assessment/management discussed with: Consultants, Nursing, Respiratory Therapy, Hospitalist and Family for coordination of care    Medical Decision Making Today:  · Reviewed the flowsheet and previous days notes  · Reviewed and summarized records or history from previous days note or discussions with staff, family  · Parenteral controlled substances - Reviewed/ Adjusted / Weaned / Started  · High Risk Drug therapy requiring intensive monitoring for toxicity: eg steroids, pressors, antibiotics  · Reviewed and/or ordered Clinical lab tests  · Reviewed and/or ordered Radiology tests  · Reviewed and/or ordered of Medicine tests  · Independently visualized radiologic Images  · I have personally reviewed the patients ECG / Kulwant Gore MD

## 2019-03-18 NOTE — PROGRESS NOTES
Speech pathology  Orders received, chart reviewed and spoke with RN who reports patient tolerating clear liquid diet well and no formal speech evaluation is indicated. RN to discuss advancing diet with MD. If any difficulties arise once diet advanced, please re-consult.   Ramiro Lea M.S. CCC-SLP

## 2019-03-18 NOTE — PROGRESS NOTES
1925: Patient transported to [address] [place] [address] via wheelchair with telemetry monitoring. Belongings taken with her. Report given by day shift nurse. No issues with transport. [address] [place] [address] nurses were at bedside upon patient's arrival to floor.

## 2019-03-18 NOTE — PROGRESS NOTES
Cardiology Progress Note            [address]  [phone]    3/18/2019 9:19 AM    Admit Date: 3/16/2019    Admit Diagnosis: Hypertensive urgency [I16.0]    Subjective:     Melia    denies chest pain.     Visit Vitals  BP (!) 166/36   Pulse 73   Temp 97.8 °F (36.6 °C)   Resp 12   Wt 153 lb 14.1 oz (69.8 kg)   SpO2 98%   BMI 30.05 kg/m²     Current Facility-Administered Medications   Medication Dose Route Frequency    metoprolol tartrate (LOPRESSOR) tablet 100 mg  100 mg Oral Q12H    hydrALAZINE (APRESOLINE) tablet 25 mg  25 mg Oral TID    furosemide (LASIX) injection 40 mg  40 mg IntraVENous DAILY    levothyroxine (SYNTHROID) tablet 50 mcg  50 mcg Oral 6am    niCARdipine (CARDENE) 25 mg in 0.9% sodium chloride 250 mL infusion  0-15 mg/hr IntraVENous TITRATE    labetalol (NORMODYNE;TRANDATE) 20 mg/4 mL (5 mg/mL) injection 10 mg  10 mg IntraVENous Q6H PRN    morphine injection 2 mg  2 mg IntraVENous Q4H PRN    acetaminophen (TYLENOL) suppository 650 mg  650 mg Rectal Q4H PRN    hydrALAZINE (APRESOLINE) 20 mg/mL injection 10 mg  10 mg IntraVENous Q6H PRN    insulin lispro (HUMALOG) injection   SubCUTAneous AC&HS    glucose chewable tablet 16 g  4 Tab Oral PRN    dextrose (D50W) injection syrg 12.5-25 g  12.5-25 g IntraVENous PRN    glucagon (GLUCAGEN) injection 1 mg  1 mg IntraMUSCular PRN    WARFARIN INFORMATION NOTE (COUMADIN)   Other QPM    mupirocin (BACTROBAN) 2 % ointment   Both Nostrils BID    influenza vaccine 2018-19 (6 mos+)(PF) (FLUARIX QUAD/FLULAVAL QUAD) injection 0.5 mL  0.5 mL IntraMUSCular PRIOR TO DISCHARGE         Objective:      Visit Vitals  BP (!) 166/36   Pulse 73   Temp 97.8 °F (36.6 °C)   Resp 12   Wt 153 lb 14.1 oz (69.8 kg)   SpO2 98%   BMI 30.05 kg/m²       Physical Exam:  Abdomen: soft, non-tender  Extremities: extremities normal  Heart: regular rate and rhythm  Lungs: clear to auscultation bilaterally  Pulses: 2+ and symmetric    Data Review:   Labs:    Recent Labs     03/17/19  0305 03/16/19  1058   WBC 7.0 8.9   HGB 8.7* 9.9*   HCT 27.6* 31.1*    286     Recent Labs     03/18/19  0346 03/17/19  0305 03/16/19  1058   NA  --  140 139   K  --  4.4 4.6   CL  --  110* 108   CO2  --  22 22   GLU  --  73 79   BUN  --  34* 32*   CREA  --  1.69* 1.78*   CA  --  7.9* 8.5   MG  --  1.7  --    ALB  --  1.7* 2.3*   TBILI  --  0.3 0.4   SGOT  --  19 22   ALT  --  12 16   INR 4.0* 3.2* 2.5*       Recent Labs     03/17/19  0305 03/16/19  1600 03/16/19  1058   TROIQ 0.05* <0.05 <0.05         Intake/Output Summary (Last 24 hours) at 3/18/2019 0919  Last data filed at 3/18/2019 7544  Gross per 24 hour   Intake 641.82 ml   Output 1800 ml   Net -1158.18 ml        Telemetry: nsr    Assessment:     Active Problems:    Acquired hypothyroidism (2/26/2019)      Hypertensive urgency (3/16/2019)      CKD (chronic kidney disease) (3/16/2019)        Plan:     Marie Jean is doing well - diuresing. Will cont to uptitrate bb for bp control. Cr is improving as well.     Juan Manuel Howell MD, [facility]    3/18/2019

## 2019-03-18 NOTE — PROGRESS NOTES
Pharmacy Dosing of Warfarin    Indication: AFIB    Goal INR: 2-3    PTA Warfarin Dose: 5 mg M and F and 2.5 mg all other days    Concurrent anticoagulants: none    Concurrent antiplatelet: none    Major Interacting Medications    Drugs that may increase INR: PTA levothyroxine may be insignificant   Drugs that may decrease INR:     Conditions that may increase/decrease INR (CHF, C. diff, cirrhosis, thyroid disorder, hypoalbuminemia):   none    Labs:  Recent Labs     03/18/19  0346 03/17/19  0305 03/16/19  1058   INR 4.0* 3.2* 2.5*   HGB  --  8.7* 9.9*   PLT  --  237 286   SGOT  --  19 22   TBILI  --  0.3 0.4   ALB  --  1.7* 2.3*       Impression/Plan:    - INR above goal and rising.  - Will continue to hold warfarin today. - Daily INR ordered.      Thanks,  Bruna Solo, PHARMD

## 2019-03-18 NOTE — PROGRESS NOTES
Problem: Mobility Impaired (Adult and Pediatric)  Goal: *Acute Goals and Plan of Care (Insert Text)  Physical Therapy Goals  Initiated 3/18/2019  1. Patient will move from supine to sit and sit to supine , scoot up and down and roll side to side in bed with modified independence within 7 day(s). 2.  Patient will transfer from bed to chair and chair to bed with supervision/set-up using the least restrictive device within 7 day(s). 3.  Patient will perform sit to stand with modified independence within 7 day(s). 4.  Patient will ambulate with supervision/set-up for 125 feet with the least restrictive device within 7 day(s). 5.  Patient will ascend/descend 2 stairs with 1 handrail(s) with supervision/set-up within 7 day(s). physical Therapy EVALUATION  Patient: Nikolas Esteban (81 y.o. female)  Date: 3/18/2019  Primary Diagnosis: Hypertensive urgency [I16.0]       Precautions:  Fall(dementia)    ASSESSMENT :  Based on the objective data described below, the patient presents with generalized weakness, decreased activity tolerance and  mobility skills. She was lying in bed in when PT arrived. Agreed to therapy and cleared by nursing. Her daughter and son in law were present. PTA she lives with another daughter  in a 1 story home with a ramp. Has a rollator walker she just got and has not been using. No falls reported per family. Patient would like to go home once discharged. Currently demonstrates supervised skill for supine to sit transfers. Started to have a loose stool once on edge of bed. Transferred to [facility] with min a x 1. Needed extended time to have BM and following clean up. Transferred from bsc to chair with min a x 1. All vitals remained stable before, during and after session. She was left up in chair with all needs met and daughter present when the session ended. Patient will benefit from skilled intervention to address the above impairments.   Patients rehabilitation potential is considered to be Fair  Factors which may influence rehabilitation potential include:   []         None noted  [x]         Mental ability/status  [x]         Medical condition  []         Home/family situation and support systems  []         Safety awareness  []         Pain tolerance/management  []         Other:      PLAN :  Recommendations and Planned Interventions:  [x]           Bed Mobility Training             []    Neuromuscular Re-Education  [x]           Transfer Training                   []    Orthotic/Prosthetic Training  [x]           Gait Training                         []    Modalities  [x]           Therapeutic Exercises           []    Edema Management/Control  [x]           Therapeutic Activities            [x]    Patient and Family Training/Education  []           Other (comment):    Frequency/Duration: Patient will be followed by physical therapy  4 times a week to address goals. Discharge Recommendations: Home Health PT  Further Equipment Recommendations for Discharge: None - has rollator at home     SUBJECTIVE:   Patient stated [facility] long do I need to stay in the chair? Raina Boyer    OBJECTIVE DATA SUMMARY:   HISTORY:    Past Medical History:   Diagnosis Date    Aortic valve disorders     AS    Asthma     Benign hypertensive heart disease without heart failure     Diabetes [facility]     Fibromyalgia     Gastroparesis     GERD (gastroesophageal reflux disease)     Mitral valve disorders(424.0)     MR    LIVIA (obstructive sleep apnea)     Paroxysmal atrial fibrillation ([facility]     Pure hypercholesterolemia 9/30/2010     Past Surgical History:   Procedure Laterality Date    ECHO 2D ADULT  11/2009    LVH, normal LV wall motion and ejection fraction, mild aortic stenosis, moderate aortic regurgitation and mild mitral regurgitation. The ejection fraction was 55-60%.      ECHO 2D ADULT  1/2011    EF 60%, LAE, mild AS, AI, MR, PA low 40s    EVENT MONITOR POST SYMPTOMS  9/2010    Rare PACs, no arrythmia with symptoms    LOOP MONITOR  9-10/2011    NSR    STRESS TEST MYOVIEW  1/2011    no ischemia, EF 63%    US DUPLEX CAROTID BILATERAL  1/2011    mild bilat disease     Prior Level of Function/Home Situation:  she lives with another daughter 24/7 in a 1 story home with a ramp. Has a rollator walker she just got and has not been using. No falls reported per family. Patient would like to go home once discharged. Personal factors and/or comorbidities impacting plan of care: None - good home support. Home Situation  Home Environment: Private residence  # Steps to Enter: 0  Wheelchair Ramp: Yes  One/Two Story Residence: One story  Living Alone: No(lives with daughter 24/7)  Support Systems: Hitesh Ko / [facility], Family member(s)  Patient Expects to be Discharged to[de-identified] Private residence  Current DME Used/Available at Home: Shilpi Ott, rollator, Shower chair    EXAMINATION/PRESENTATION/DECISION MAKING:   Critical Behavior:  Neurologic State: Confused, Alert  Orientation Level: Oriented to person, Disoriented to situation, Disoriented to place, Disoriented to time  Cognition: Decreased command following, Decreased attention/concentration, Memory loss  Safety/Judgement: Fall prevention, Decreased insight into deficits  Hearing:   Auditory  Auditory Impairment: None  Skin:    Edema:   Range Of Motion:  AROM: Generally decreased, functional           PROM: Within functional limits           Strength:    Strength: Generally decreased, functional                    Tone & Sensation:   Tone: Normal              Sensation: Intact               Coordination:  Coordination: Generally decreased, functional  Vision:      Functional Mobility:  Bed Mobility:  Rolling: Supervision  Supine to Sit: Supervision     Scooting: Stand-by assistance  Transfers:  Sit to Stand: Minimum assistance;Assist x1  Stand to Sit: Minimum assistance;Assist x1        Bed to Chair: Minimum assistance;Assist x1              Balance:   Sitting: Impaired  Sitting - Static: Good (unsupported)  Sitting - Dynamic: Fair (occasional)  Standing: Impaired  Standing - Static: Constant support;Good  Standing - Dynamic : Fair  Ambulation/Gait Training:              Gait Description (WDL): (deferred due to bowel incontinence)                                         Functional Measure:  Barthel Index:    Bathin  Bladder: 0  Bowels: 5  Groomin  Dressin  Feedin  Mobility: 0  Stairs: 0  Toilet Use: 5  Transfer (Bed to Chair and Back): 10  Total: 35/100       Percentage of impairment   0%   1-19%   20-39%   40-59%   60-79%   80-99%   100%   Barthel Score 0-100 100 99-80 79-60 59-40 20-39 1-19   0     The Barthel ADL Index: Guidelines  1. The index should be used as a record of what a patient does, not as a record of what a patient could do. 2. The main aim is to establish degree of independence from any help, physical or verbal, however minor and for whatever reason. 3. The need for supervision renders the patient not independent. 4. A patient's performance should be established using the best available evidence. Asking the patient, friends/relatives and nurses are the usual sources, but direct observation and common sense are also important. However direct testing is not needed. 5. Usually the patient's performance over the preceding 24-48 hours is important, but occasionally longer periods will be relevant. 6. Middle categories imply that the patient supplies over 50 per cent of the effort. 7. Use of aids to be independent is allowed. Rubie Osler., Barthel, D.W. (7950). Functional evaluation: the Barthel Index. [facility] (14)2. HONORIO Dyer, Katelyn Nguyễn., Bertha [facility] [place], [address] [place] [address] (). Measuring the change indisability after inpatient rehabilitation; comparison of the responsiveness of the Barthel Index and Functional [place] Measure. Journal of Neurology, Neurosurgery, and Psychiatry, 66(4), 523-642.   Antwon Emmanuel LUCAS, CHARLENE Sorto, & Tita Cherry M.A. (2004.) Assessment of post-stroke quality of life in cost-effectiveness studies: The usefulness of the Barthel Index and the EuroQoL-5D. Quality of Life Research, 15, 774-97       Physical Therapy Evaluation Charge Determination   History Examination Presentation Decision-Making   HIGH Complexity :3+ comorbidities / personal factors will impact the outcome/ POC  MEDIUM Complexity : 3 Standardized tests and measures addressing body structure, function, activity limitation and / or participation in recreation  MEDIUM Complexity : Evolving with changing characteristics  Other outcome measures Barthel  HIGH       Based on the above components, the patient evaluation is determined to be of the following complexity level: MEDIUM    Pain:  Pain Scale 1: Numeric (0 - 10)  Pain Intensity 1: 0              Activity Tolerance:   Limited. Please refer to the flowsheet for vital signs taken during this treatment. After treatment:   [x]         Patient left in no apparent distress sitting up in chair  []         Patient left in no apparent distress in bed  [x]         Call bell left within reach  [x]         Nursing notified  [x]         Caregiver present  []         Bed alarm activated    COMMUNICATION/EDUCATION:   The patients plan of care was discussed with: Occupational Therapist and Registered Nurse. [x]         Fall prevention education was provided and the patient/caregiver indicated understanding. [x]         Patient/family have participated as able in goal setting and plan of care. [x]         Patient/family agree to work toward stated goals and plan of care. []         Patient understands intent and goals of therapy, but is neutral about his/her participation. []         Patient is unable to participate in goal setting and plan of care.     Thank you for this referral.  Greg Malone, PT   Time Calculation: 32 mins

## 2019-03-19 PROBLEM — I05.0 MITRAL STENOSIS: Status: ACTIVE | Noted: 2019-01-01

## 2019-03-19 PROBLEM — I35.1 AORTIC INSUFFICIENCY: Status: ACTIVE | Noted: 2019-01-01

## 2019-03-19 PROBLEM — I48.19 PERSISTENT ATRIAL FIBRILLATION (HCC): Status: ACTIVE | Noted: 2019-01-01

## 2019-03-19 NOTE — CARDIO/PULMONARY
Cardiopulmonary Rehab Nursing Entry: 
 
Pt is on CHF Bundle List 
 
Chart reviewed and pt visited for home CHF care instruction. Pt is an 83 yo admitted with hypertensive urgency, acute on chronic diastolic heart failure, EF 55%, mitral stenosis. Former smoker. Cardiac Rehab: Living with Heart Failure Booklet given to Jose Angel Entermary. Educated using teach back method. Discussed diagnosis definition and assessed patient understanding. Reviewed importance of daily weight monitoring and Low Sodium diet (2620-7506 mg. daily). Encouraged activity and rest periods within symptom limitations and as ordered by physician. Reviewed the purpose of medication, potential side effects, compliance, and what to do if dose is missed. Discussed importance of reporting signs and symptoms of exacerbation, and when to report them to the doctor, to prevent re-hospitalization. [facility] all appointments with doctor. Pt aware of evaluation in progress to address valve issue. Per son at bedside pt lives with her daughter who helps provide her care. Family assist with meal prep following a low sodium diet by purchasing frozen and fresh vegetables, avoiding added table salt. Pt get weighed daily at home and daughter tracks her weight. Medications are provided by family as well. Pt is ambulatory inside of her home on a regular basis. Pt and son without additional questions.

## 2019-03-19 NOTE — PROGRESS NOTES
Hospitalist Progress Note NAME: Lars Vera :  1935 MRN:  [id number] Assessment / Plan: Hypertensive Urgency /34 in ED Acute on Chronic Diastolic Heart Failure POA LVEF 55% Aortic regurgitation POA with widened pulse pressure Moderate mitral stenosis POA Presumed rheumatic heart disease with MS and aortic valve disease Chest pain/CAD POA 
-s/p nicardipine gtt in ICU, now weaned off 
-Markedly increased pulse pressure, suspect related to the AI Echo at [facility] 2019 LVEF 51 to 55%, severely dilated left atrium Mild AS, mod to severe aortic regurg Moderate MS Holodiastolic flow reversal in the descending aorta Moderate pulmonary HTN PA pressure 
-continue on metoprolol, lasix and hydralazine 
-continue prn hydralazine and prn labetalol 
-Appreciate CT Surgery evaluation: cardiac cath and TAVR protocol CT C/A/P recommended Stage 3 chronic kidney disease POA baseline creat 1.6 to 2.0 
-monitor 
  
Dementia POA No clear encephalopathy per my assessment Oriented x 2, family says at baseline Head CT and C-spine CT scans without  Acute changes Neuro consult discontinued by Dr. Keller Cranker since patient at cognitive baseline and neuro exam non-focal 
  
Hypothyroidism POA  
-continue L-thyroxine. 
  
Atrial Fib POA 
-INR 3.0 today 
-PO lopressor 
-Cards following 
  
HbA1C 5.3 
  
Obesity POA Body mass index is 30.08 kg/m².  
  
Code Status: Full Code  
  
Surrogate Decision Maker: son Christopher Arambula [phone], Hannah [phone] or [facility] [phone] 
  
DVT Prophylaxis: Coumadin 
  
GI Prophylaxis: not indicated 
  
Baseline: lives with DTR Philomena Singer, has dementia Subjective: Chief Complaint / Reason for Physician Visit: follow-up Hypertension and AI Patient seen for follow-up Family at bedside Patient complains of low energy RN at bedside Review of Systems: 
Symptom Y/N Comments  Symptom Y/N Comments Fever/Chills n   Chest Pain n   
 Poor Appetite    Edema Cough    Abdominal Pain n   
Sputum    Joint Pain SOB/HASKINS n Reports PND  Pruritis/Rash Nausea/vomit    Tolerating PT/OT Diarrhea n   Tolerating Diet y Constipation    Other Could NOT obtain due to:   
 
Objective: VITALS:  
Last 24hrs VS reviewed since prior progress note. Most recent are: 
Patient Vitals for the past 24 hrs: 
 Temp Pulse Resp BP SpO2  
03/19/19 1312    (!) 174/32   
03/19/19 1144 98 °F (36.7 °C) 68 16 (!) 188/28 99 % 03/19/19 0839 97.9 °F (36.6 °C) 87 16 (!) 184/37 99 % 03/19/19 0205 97.9 °F (36.6 °C) 89 16 149/60 98 % 03/19/19 0105  85  185/44   
03/18/19 2335 97.5 °F (36.4 °C) 79 16 183/66 97 % 03/18/19 1943 98 °F (36.7 °C) 94 16 186/40 98 % 03/18/19 1800  92 15 156/57 99 % 03/18/19 1700  95 19 127/55 98 % 03/18/19 1400  80 18 149/43 97 % 03/18/19 1342 97.8 °F (36.6 °C)     Intake/Output Summary (Last 24 hours) at 3/19/2019 1333 Last data filed at 3/19/2019 2148 Gross per 24 hour Intake 750 ml Output 900 ml Net -150 ml PHYSICAL EXAM: 
General: WD, WN. Alert, cooperative, no acute distress   
EENT:  EOMI. Anicteric sclerae. MMM Resp:  CTA bilaterally, no wheezing or rales. No accessory muscle use CV:  Regular  rhythm,  No edema GI:  Soft, Non distended, Non tender.  +Bowel sounds Neurologic:  Alert and oriented X 2, normal speech, Psych:   Poor insight. Not anxious nor agitated Skin:  No rashes. No jaundice Reviewed most current lab test results and cultures  YES Reviewed most current radiology test results   YES Review and summation of old records today    NO Reviewed patient's current orders and MAR    YES 
PMH/SH reviewed - no change compared to H&P 
________________________________________________________________________ Care Plan discussed with: 
  Comments Patient x Family RN x Care Manager Consultant  x Cards (prior to placing CT Surg consult) Multidiciplinary team rounds were held today with , nursing, pharmacist and clinical coordinator. Patient's plan of care was discussed; medications were reviewed and discharge planning was addressed. ________________________________________________________________________ Total NON critical care TIME:  35   Minutes Total CRITICAL CARE TIME Spent:   Minutes non procedure based Comments >50% of visit spent in counseling and coordination of care x   
________________________________________________________________________ Sandra Gomez MD  
 
Procedures: see electronic medical records for all procedures/Xrays and details which were not copied into this note but were reviewed prior to creation of Plan. LABS: 
I reviewed today's most current labs and imaging studies. Pertinent labs include: 
Recent Labs  
  03/19/19 
0159 03/17/19 
0305 WBC 7.9 7.0 HGB 10.1* 8.7* HCT 32.0* 27.6*  
 237 Recent Labs  
  03/19/19 
0159 03/18/19 
0346 03/17/19 
0305   --  140  
K 4.3  --  4.4 *  --  110* CO2 22  --  22 *  --  73 BUN 33*  --  34* CREA 1.37*  --  1.69* CA 8.6  --  7.9*  
MG  --   --  1.7 ALB  --   --  1.7* TBILI  --   --  0.3 SGOT  --   --  19 ALT  --   --  12 INR 3.0* 4.0* 3.2* Signed: Sandra Gomez MD

## 2019-03-19 NOTE — PROGRESS NOTES
Problem: Self Care Deficits Care Plan (Adult) Goal: *Acute Goals and Plan of Care (Insert Text) Occupational Therapy Goals Initiated 3/18/2019 1. Patient will perform grooming standing at sink with supervision/set-up within 7 day(s). 2.  Patient will perform upper body dressing with supervision/set-up within 7 day(s). 3.  Patient will perform lower body dressing with supervision/set-up within 7 day(s). 4.  Patient will perform toilet transfers with supervision/set-up within 7 day(s). 5.  Patient will perform all aspects of toileting with supervision/set-up within 7 day(s). 6.  Patient will perform sponge bathing with supervision/set-up within 7 day(s). Occupational Therapy TREATMENT Patient: Charlotte Coates (83 y.o. female) Date: 3/19/2019 Diagnosis: Hypertensive urgency [I16.0] <principal problem not specified> Precautions: Fall(dementia) Chart, occupational therapy assessment, plan of care, and goals were reviewed. ASSESSMENT: 
Received on [facility] with PCT and Pts two daughters. Cleared for OT and Pt agreeable for therapy. HR remained in mid-high 70s and O2 saturation remained between 93-95% throughout functional activity performance this session. Continues to require Min A with transfer to [facility] and bathroom to wash hands standing at sink using RW for ambulation. Required Min A with donning fresh hospital gown while standing at RW following BM on BSC. Educated Pt on safe HR levels and updated Pt on her vitals throughout functional task performance to increase Pt education and self-efficacy. Returned sitting in chair with Dtr. Present. Progression toward goals: 
[x]       Improving appropriately and progressing toward goals 
[]       Improving slowly and progressing toward goals 
[]       Not making progress toward goals and plan of care will be adjusted PLAN: 
Patient continues to benefit from skilled intervention to address the above impairments. Continue treatment per established plan of care. Discharge Recommendations:  Home Health OT with family assist/supervision, pending progress in skilled acute OT Further Equipment Recommendations for Discharge:  TBD based on progress SUBJECTIVE:  
Patient stated I'm not moving around as well as I was, that's for sure. \" OBJECTIVE DATA SUMMARY:  
Cognitive/Behavioral Status: 
Neurologic State: Alert Orientation Level: Oriented to person;Oriented to place Cognition: Follows commands Functional Mobility and Transfers for ADLs: 
Transfers: 
  
Functional Transfers Bathroom Mobility: Minimum assistance Toilet Transfer : Minimum assistance(to [facility] Balance: 
Sitting: Intact; With support(using BUEs for support) Sitting - Static: Good (unsupported) Sitting - Dynamic: Fair (occasional) Standing: Impaired Standing - Static: Constant support Standing - Dynamic : Fair(using RW) ADL Intervention: 
 
Upper Body Dressing Assistance Hospital Gown: Minimum  assistance(standing at [facility]) Activity Tolerance:  
Fair. Anticipate this will continue to improve with functional activity task performance OOB. Please refer to the flowsheet for vital signs taken during this treatment. After treatment:  
[x] Patient left in no apparent distress sitting up in chair 
[] Patient left in no apparent distress in bed 
[x] Call bell left within reach [x] Nursing notified 
[x] Caregiver present 
[] Bed alarm activated COMMUNICATION/COLLABORATION:  
The patients plan of care was discussed with: Patient and Patient's daughter [facility], OTR/L Time Calculation: 19 mins

## 2019-03-19 NOTE — CONSULTS
Cardiothoracic Surgery Consult Subjective: Chief Complaint: neck pain Jeanette Ndiaye is a 82 y.o. Chronic AFIB, hypertensive, type 2 diabetic, Stage 4 CKD, dementia, remote smoking,black   female who was referred for opinion and advise regarding moderate to severe aortic stenosis and mild to moderate aortic valve regurgitation  by Manuel Jones  / Aniyah Greer NP. Patient was admitted with hypertension, several day history of neck pain and palpations. She lives with her daughter and mostly stays inside her home. Annamary Ripa Her legs have been swollen for several days. Echo for heart murmur revealed AS/AI and MS. Warfarin. AFIB persistent. INR 3 Prednisone indication? Cardiac Testing:  
 
Echocardiogram films were personally reviewed Left Ventricle Normal cavity size and systolic function (ejection fraction normal). Mild concentric hypertrophy observed. The estimated ejection fraction is 61 - 65%. No regional wall motion abnormality noted. Left Atrium Normal cavity size. Right Ventricle Normal cavity size and global systolic function. Right Atrium The cavity size is mildly dilated. Aortic Valve Normal valve structure. There is leaflet calcification with reduced excursion. Aortic valve peak gradient is 60.9 mmHg. Aortic valve mean gradient is 33.3 mmHg. Aortic valve area is 1.5 cm2. There is moderate to severe aortic stenosis. Mild to moderate aortic valve regurgitation Mitral Valve Mitral valve thickening. There is posterior leaflet calcification. Mitral annular calcification. Mild stenosis present. Moderate regurgitation. Tricuspid Valve Normal valve structure and no stenosis. Moderate tricuspid valve regurgitation. Pulmonary arterial systolic pressure is 87.4 mmHg. Moderate pulmonary hypertension. Pulmonic Valve Normal valve structure and no stenosis. Mild regurgitation. Aorta Normal aortic root, ascending aortic, and aortic arch. IVC/Hepatic Veins Normal structure. Pericardium Normal pericardium and no evidence of pericardial effusion. Cardiac catheretization films were personally reviewed Past Medical History:  
Diagnosis Date  Aortic valve disorders AS  Asthma  Benign hypertensive heart disease without heart failure  Diabetes ([address]  Fibromyalgia  Gastroparesis  GERD (gastroesophageal reflux disease)  Mitral valve disorders(424.0) MR  
 LIVIA (obstructive sleep apnea)  Paroxysmal atrial fibrillation (HCC)  Pure hypercholesterolemia 2010 Past Surgical History:  
Procedure Laterality Date  ECHO 2D ADULT  2009 LVH, normal LV wall motion and ejection fraction, mild aortic stenosis, moderate aortic regurgitation and mild mitral regurgitation. The ejection fraction was 55-60%.  ECHO 2D ADULT  2011 EF 60%, LAE, mild AS, AI, MR, PA low 40s  EVENT MONITOR POST SYMPTOMS  2010 Rare PACs, no arrythmia with symptoms  LOOP MONITOR  9-10/2011 NSR  
 STRESS TEST MYOVIEW  2011  
 no ischemia, EF 63%  US DUPLEX CAROTID BILATERAL  2011  
 mild bilat disease Social History Tobacco Use  Smoking status: Former Smoker Last attempt to quit: 1963 Years since quittin.2  Smokeless tobacco: Never Used Substance Use Topics  Alcohol use: Yes Family History Problem Relation Age of Onset  Heart Disease Father  Dementia Brother  Heart Disease Brother  Diabetes Brother Prior to Admission medications Medication Sig Start Date End Date Taking? Authorizing Provider  
hydrALAZINE (APRESOLINE) 50 mg tablet Take 1 Tab by mouth three (3) times daily. 3/1/19   Antoni Cortes MD  
metoprolol tartrate (LOPRESSOR) 100 mg IR tablet Take 1 Tab by mouth two (2) times a day. 3/1/19   Antoni Cortes MD  
amLODIPine (NORVASC) 10 mg tablet Take 1 Tab by mouth daily.  3/2/19   Antoni Cortes MD  
 isosorbide mononitrate ER (IMDUR) 30 mg tablet Take 1 Tab by mouth daily. 3/2/19   Lewis Kern MD  
levoFLOXacin (LEVAQUIN) 500 mg tablet Take 1 Tab by mouth daily. 3/1/19   Lewis Kern MD  
predniSONE (DELTASONE) 10 mg tablet Take 10 mg by mouth daily. 3/1/19   Lewis Kern MD  
ALPRAZolam Diana Nest) 0.25 mg tablet Take 0.5 Tabs by mouth two (2) times daily as needed for Anxiety. Max Daily Amount: 0.25 mg. 3/1/19   Lewis Kern MD  
digoxin (LANOXIN) 0.125 mg tablet Take 1 Tab by mouth daily. 3/2/19   Lewis Kern MD  
Ferrous Fumarate 325 mg (106 mg iron) tab Take 1 Tab by mouth daily. 3/1/19   Lewis Kern MD  
warfarin (COUMADIN) 5 mg tablet Take 1 Tab by mouth daily. 3/1/19   Lewis Kern MD  
indapamide (LOZOL) 2.5 mg tablet Take 2.5 mg by mouth every morning. Other, MD Gudelia  
mirtazapine (REMERON) 30 mg tablet Take 1 Tab by mouth nightly. 12/19/18   Trisha Alex MD  
digoxin (LANOXIN) 0.125 mg tablet Take 0.125 mg by mouth daily. 0.25 mon, wed, fri, sat. 0.125 all other days 7/11/11   Provider, Historical  
levothyroxine (SYNTHROID) 50 mcg tablet Take  by mouth daily (before breakfast). Provider, Historical  
esomeprazole (NEXIUM) 40 mg capsule Take  by mouth daily. Provider, Historical  
simvastatin (ZOCOR) 10 mg tablet Take  by mouth nightly. Provider, Historical  
   
Allergies Allergen Reactions  Advil [Ibuprofen] Unknown (comments)  Aspirin Unknown (comments)  Meloxicam Unknown (comments)  Penicillin G Unknown (comments)  Shellfish Containing Products Rash  Sulfa (Sulfonamide Antibiotics) Unknown (comments) Recreational drug use:NO Magdaleno status or cause of death in parents and siblings if 
Heart problems, stroke, or vascular disease in family members : NO 
 
HISTORY OF NON COMPLIANCE WITH MEDICINES:  NO?? REVIEW OF SYSTEMS:   
 [x] Unable to obtain  ROS due to  [x]mental status change  []sedated   []intubated []Total of 13 systems reviewed as follows: 
Constitutional: negative fever, negative chills Eyes:   negative for amauroses fugax ENT:   negative sore throat,oral absecess Endocrine Negative for thyroid replacement Rx; goiter; DM Respiratory:  negative chronic cough,sputum production Cards:  negative for  palpitations, lower extremity edema, varicosities, Claudication GI:   negative for dysphagia, bleeding, nausea, vomiting, diarrhea, and  
  abdominal pain Genitourinary: negative for frequency, dysuria, BPH in men. Integument:  negative for rash and pruritus Hematologic:  negative for easy bruising; bleeding dyscarsia Musculoskel: negative for muscle weakness or implants inhibiting ambulation Neurological:  negative for stroke, TIA, syncope, dizziness Behavl/Psych: negative for feelings of anxiety, depression Objective:  
 
Visit Vitals BP (!) 174/32 (BP 1 Location: Right arm, BP Patient Position: Sitting; Other (comment)) Comment (BP Patient Position): legs elevated Pulse 68 Temp 98 °F (36.7 °C) Resp 16 Ht 5' (1.524 m) Wt 148 lb 14.4 oz (67.5 kg) SpO2 99% BMI 29.08 kg/m² EXAM: 
General:  Alert, cooperative, no distress, poor historian Mouth/Throat: edentulous Chest: No lesions, surgical scars or pectus. Neck: Supple, symmetrical, trachea midline, no adenopathy, no thyroid enlargement/tenderness/nodules, no carotid bruit and no JVD. Lungs:   Clear to auscultation bilaterally. Heart:  Irregular rate and rhythm  2/6 RUSB and left axilla murmur, no click, no rub and no gallop. Abdomen:   Soft, non-tender. Bowel sounds normal. No masses,  No organomegaly. Extremities: Extremities normal, atraumatic, no cyanosis . +2 lower leg edema. Pulses: 2+ and symmetric all extremities. Skin:  No rashes or lesions Neurologic:  Gross motor and sensory apparatus intact. Labs:  
Recent Labs  
  03/19/19 
0159 03/18/19 
4130 WBC 7.9  --   
HGB 10.1*  --   
 HCT 32.0*  --   
  --   
  --   
K 4.3  --   
BUN 33*  --   
CREA 1.37*  --   
*  --   
GLUCPOC  --  92 INR 3.0*  --   
 
 
DIAGNOSTICS: 
 
Carotid Artery Doppler: · There is moderate stenosis in the right ICA (50-69%). · The right vertebral is antegrade. · There is moderate stenosis in the left ICA (50-69%). · The left vertebral is antegrade. Assessment:  
 
Active Problems: 
  Persistent atrial fibrillation ([address] (2/26/2019) Acquired hypothyroidism (2/26/2019) Hypertensive urgency (3/16/2019) CKD (chronic kidney disease) (3/16/2019) Aortic insufficiency (3/19/2019) Mitral stenosis (3/19/2019) STS Risk Calculator: 
Procedure: Isolated AVR CALCULATE Risk of Mortality:  5.743% Renal Failure:  6.381% Permanent Stroke:  4.696% Prolonged Ventilation:  24.835% DSW Infection:  0.122% Reoperation:  5.856% Morbidity or Mortality:  35.196% Short Length of Stay:  10.471% Long Length of Stay:  17.573% Plan:  
 
Recommed cardiac cath TAVR protocol CTA Chest/ABD Signed By: ANTONETTE Lyons March 19, 2019   
  
reneeons

## 2019-03-19 NOTE — PROGRESS NOTES
Pharmacy Dosing of Warfarin Indication: AFIB Goal INR: 2-3 PTA Warfarin Dose: 5 mg M and F and 2.5 mg all other days Concurrent anticoagulants: none Concurrent antiplatelet: none Major Interacting Medications ? Drugs that may increase INR: PTA levothyroxine may be insignificant ? Drugs that may decrease INR:  
 
Conditions that may increase/decrease INR (CHF, C. diff, cirrhosis, thyroid disorder, hypoalbuminemia):   none Labs: 
Recent Labs  
  03/19/19 
0159 03/18/19 
0346 03/17/19 
0305 03/16/19 
1058 INR 3.0* 4.0* 3.2* 2.5* HGB 10.1*  --  8.7* 9.9*  
  --  237 286 SGOT  --   --  19 22 TBILI  --   --  0.3 0.4 ALB  --   --  1.7* 2.3* Impression/Plan: - INR 3 today. - Will be confederative and hold warfarin x 1 more day 
- Daily INR ordered. Thanks, Nusrat Doan, PHARMD

## 2019-03-19 NOTE — PROGRESS NOTES
Problem: Falls - Risk of  Goal: *Absence of Falls  Document Judd Fall Risk and appropriate interventions in the flowsheet.   Outcome: Progressing Towards Goal  Fall Risk Interventions:  Mobility Interventions: Bed/chair exit alarm, Patient to call before getting OOB, PT Consult for mobility concerns, PT Consult for assist device competence    Mentation Interventions: Bed/chair exit alarm, Adequate sleep, hydration, pain control, Family/sitter at bedside    Medication Interventions: Bed/chair exit alarm, Patient to call before getting OOB, Teach patient to arise slowly    Elimination Interventions: Bed/chair exit alarm, Call light in reach, Toilet paper/wipes in reach

## 2019-03-19 NOTE — ACP (ADVANCE CARE PLANNING)
Request by  to assist with Advance Medical Directive (AMD) in CardioPulmonary Care. Consulted with patients nurse. Explained document to patient and patient's daughters, who were present in the room. Left paperwork with patient and patient's daughters to read over and complete. Advised of  availability. Chaplains will follow up as needed. [address]  Paging Service [phone]

## 2019-03-19 NOTE — PROGRESS NOTES
Cardiac Electrophysiology Progress Note            [address], [place], [address] [facility]  [phone]    3/19/2019 9:01 AM    Admit Date: 3/16/2019    Admit Diagnosis: Hypertensive urgency [I16.0]    Subjective:     Army Croft   denies chest pain, chest pressure/discomfort, dyspnea. Continues to complain of neck pain. Transferred to floor yesterday. Visit Vitals  BP (!) 184/37 (BP 1 Location: Right arm, BP Patient Position: Sitting; Other (comment)) Comment (BP Patient Position): legs elevated   Pulse 87   Temp 97.9 °F (36.6 °C)   Resp 16   Ht 5' (1.524 m)   Wt 148 lb 14.4 oz (67.5 kg)   SpO2 99%   BMI 29.08 kg/m²     Current Facility-Administered Medications   Medication Dose Route Frequency    metoprolol tartrate (LOPRESSOR) tablet 100 mg  100 mg Oral Q12H    hydrALAZINE (APRESOLINE) tablet 25 mg  25 mg Oral TID    furosemide (LASIX) injection 40 mg  40 mg IntraVENous DAILY    levothyroxine (SYNTHROID) tablet 50 mcg  50 mcg Oral 6am    labetalol (NORMODYNE;TRANDATE) 20 mg/4 mL (5 mg/mL) injection 10 mg  10 mg IntraVENous Q6H PRN    acetaminophen (TYLENOL) suppository 650 mg  650 mg Rectal Q4H PRN    hydrALAZINE (APRESOLINE) 20 mg/mL injection 10 mg  10 mg IntraVENous Q6H PRN    glucose chewable tablet 16 g  4 Tab Oral PRN    dextrose (D50W) injection syrg 12.5-25 g  12.5-25 g IntraVENous PRN    glucagon (GLUCAGEN) injection 1 mg  1 mg IntraMUSCular PRN    WARFARIN INFORMATION NOTE (COUMADIN)   Other QPM    mupirocin (BACTROBAN) 2 % ointment   Both Nostrils BID    influenza vaccine 2018-19 (6 mos+)(PF) (FLUARIX QUAD/FLULAVAL QUAD) injection 0.5 mL  0.5 mL IntraMUSCular PRIOR TO DISCHARGE         Objective:      Visit Vitals  BP (!) 184/37 (BP 1 Location: Right arm, BP Patient Position: Sitting; Other (comment))   Pulse 87   Temp 97.9 °F (36.6 °C)   Resp 16   Ht 5' (1.524 m)   Wt 148 lb 14.4 oz (67.5 kg)   SpO2 99%   BMI 29.08 kg/m²       Physical Exam:  Abdomen: soft, non-tender  Extremities: extremities normal  Heart: regular rate and rhythm, + LAKEISHA  Lungs: clear to auscultation bilaterally  Pulses: 2+ and symmetric    Data Review:   Labs:    Recent Labs     03/19/19  0159 03/17/19  0305 03/16/19  1058   WBC 7.9 7.0 8.9   HGB 10.1* 8.7* 9.9*   HCT 32.0* 27.6* 31.1*    237 286     Recent Labs     03/19/19  0159 03/18/19  0346 03/17/19  0305 03/16/19  1058     --  140 139   K 4.3  --  4.4 4.6   *  --  110* 108   CO2 22  --  22 22   *  --  73 79   BUN 33*  --  34* 32*   CREA 1.37*  --  1.69* 1.78*   CA 8.6  --  7.9* 8.5   MG  --   --  1.7  --    ALB  --   --  1.7* 2.3*   TBILI  --   --  0.3 0.4   SGOT  --   --  19 22   ALT  --   --  12 16   INR 3.0* 4.0* 3.2* 2.5*       Recent Labs     03/17/19  0305 03/16/19  1600 03/16/19  1058   TROIQ 0.05* <0.05 <0.05         Intake/Output Summary (Last 24 hours) at 3/19/2019 0901  Last data filed at 3/19/2019 2611  Gross per 24 hour   Intake 1150 ml   Output 900 ml   Net 250 ml        Telemetry: sinus rhythm, PACs, PVCs    Assessment:     Active Problems:    Acquired hypothyroidism (2/26/2019)      Hypertensive urgency (3/16/2019)      CKD (chronic kidney disease) (3/16/2019)        Plan:     Keturah Navarro continues to improve. Cr improving. SBP remains high. Bp responded well to PRN labetalol earlier today. Will add amlodipine. Will continue to follow. Rupali Ríos ANP  Patient seen and examined by me with nurse practitioner. I personally performed all components of the history, physical, and medical decision making and agree with the assessment and plan with minor modifications as noted. Cr improving. Max dose of lopressor. Will start amlodipine.  Cont to follow    Ramirez Moreira MD, [facility]        3/19/2019  9:01 AM

## 2019-03-19 NOTE — PROGRESS NOTES
0700  Bedside shift change report given to Maria Luz Selby RN (oncoming nurse) by [address] ROSSY Sawant (offgoing nurse). Report included the following information SBAR, Kardex, ED Summary, Intake/Output, MAR, Accordion, Recent Results, Med Rec Status and Cardiac Rhythm irregular.

## 2019-03-19 NOTE — ACP (ADVANCE CARE PLANNING)
Request by Julia Plunkett to assist with Advance Medical Directive (AMD) in CardioPulmonary Care. Consulted with patients nurse. Explained document to patient and daughters, who were present in the room. Assisted patient in completing the document. Made a copy for patients chart and handed it to patient's nurse. Returned original document and another copy to the patient. CHUY Gamble [facility]  Paging Service [phone])

## 2019-03-19 NOTE — PROGRESS NOTES
Request by Jason Goldberg to assist with Advance Medical Directive (AMD) in CardioPulmonary Care. Consulted with patients nurse. Explained document to patient and daughters, who were present in the room. Assisted patient in completing the document. Made a copy for patients chart and handed it to patient's nurse. Returned original document and another copy to the patient. CHUY Gamble [facility]  Paging Service [phone])

## 2019-03-19 NOTE — PROGRESS NOTES
RAPID RESPONSE TEAM    Rounded on patient due to recent transfer out of CCU on 3/18. Discussed with primary RN Aurelia John. No acute concerns. No patient complaints. BP is elevated, primary RN will administer PRN anti-hypertensive. MEWS 1. No RRT interventions indicated at this time. RN encouraged to call with any questions or concerns.     [facility]  Rapid Response ROSSY Aguilar

## 2019-03-19 NOTE — PROGRESS NOTES
Bedside shift change report given to Rigoberto Morrison RN (oncoming nurse). Report included the following information SBAR, Kardex, MAR and Recent Results. SHIFT SUMMARY:  1363 /66. HR 79     2350 PRN dose of labetalol given. 0105 /44. HR 85    0205 /60    CONCERNS TO ADDRESS WITH MD:        [address] NURSING NOTE   Admission Date 3/16/2019   Admission Diagnosis Hypertensive urgency [I16.0]   Consults IP CONSULT TO CARDIOLOGY      Cardiac Monitoring [x] Yes [] No      Purposeful Hourly Rounding [x] Yes    Judd Score Total Score: 4   Judd score 3 or > [x] Bed Alarm [] Avasys [] 1:1 sitter [] Patient refused (Signed refusal form in chart)   Rafiq Score Rafiq Score: 18   Rafiq score 14 or < [] PMT consult [] Wound Care consult    []  Specialty bed  [] Nutrition consult      Influenza Vaccine Received Flu Vaccine for Current Season (usually Sept-March): No    Patient/Guardian Refused (Notify MD): No      Oxygen needs? [x] Room air Oxygen @  []1L    []2L    []3L   []4L    []5L   []6L via  NC   Chronic home O2 use?  [] Yes [] No  Perform O2 challenge test and document in progress note using smartphrase (.Homeoxygen)      Last bowel movement Last Bowel Movement Date: 03/18/19      Urinary Catheter       External Female Catheter 03/16/19-Urine Output (mL): 300 ml     LDAs               Peripheral IV 03/17/19 Anterior;Proximal;Right Forearm (Active)   Site Assessment Clean, dry, & intact 3/18/2019  7:43 PM   Phlebitis Assessment 0 3/18/2019  7:43 PM   Infiltration Assessment 0 3/18/2019  7:43 PM   Dressing Status Clean, dry, & intact 3/18/2019  7:43 PM   Dressing Type Transparent 3/18/2019  7:43 PM   Hub Color/Line Status Pink 3/18/2019  7:43 PM   Alcohol Cap Used Yes 3/17/2019  8:00 PM          External Female Catheter 03/16/19 (Active)   Site Assessment Clean, dry, & intact 3/18/2019  7:43 PM   Repositioned Yes 3/18/2019  7:43 PM   Perineal Care Yes 3/18/2019  7:43 PM   Wick Changed Yes 3/18/2019  7:43 PM Suction Canister/Tubing Changed Yes 3/18/2019  7:43 PM   Urine Output (mL) 300 ml 3/18/2019 11:57 PM                   Readmission Risk Assessment Tool Score High Risk            23       Total Score        3 Has Seen PCP in Last 6 Months (Yes=3, No=0)    4 IP Visits Last 12 Months (1-3=4, 4=9, >4=11)    5 Pt. Coverage (Medicare=5 , Medicaid, or Self-Pay=4)    11 Charlson Comorbidity Score (Age + Comorbid Conditions)        Criteria that do not apply:    . Living with Significant Other. Assisted Living. LTAC. SNF.  or   Rehab    Patient Length of Stay (>5 days = 3)       Expected Length of Stay 4d 2h   Actual Length of Stay 3

## 2019-03-19 NOTE — PROGRESS NOTES
[address] [place] [address] met with patient today to provide an introduction to self, the [address] at [facility]. Bundled Payment Care Program: 
 
Patient was provided a copy of the [facility] letter. Patient states that they have a functioning scale at home. Patient was not provided a scale during this visit. Reinforced the importance of checking their weight at the same time daily and calling the cardiologist if their weight is up 3 lbs. in a day or 5 lbs. in a week (or per MD guidelines). Patient  has not received a \"Living with Heart Failure\" patient education book. Hug At Home Program: 
 
Provided patient demonstration of Thumb Friendly program on training iPad. Patient was interested in the [id number] given to patient. Lulu at [facility] nurse set up patients profile on the [address] [facility] and assisted patient in  setting up a PIN for iPad, Logging into iPad, recording data on iPad, exploring FAQ section, exploring sodium samara Patient was educated on the return process for iPad at the completion of the [address] program.  
 
All questions answered at this time. Provided patient contact number for the [address] [place] [address] for further questions or concerns. Patient verbalized understanding of all information discussed. Appointments:  
 
Patient stated best day(s) of the week for provider appointment(s)any day Patient stated best time of day for provider appointment(s):  AM

## 2019-03-19 NOTE — PROGRESS NOTES
Spiritual Care Assessment/Progress Note [address] 
 
 
NAME: Aspen Enriquez      MRN: [id number] AGE: 82 y.o. SEX: female Judaism Affiliation: [facility]  
Language: [place] 3/19/2019     Total Time (in minutes): 30 Spiritual Assessment begun in [facility] CARDIOPULMONARY CARE through conversation with: 
  
    [x]Patient        [x] Family    [] Friend(s) Reason for Consult: Advance medical directive consult Spiritual beliefs: (Please include comment if needed) [x] Identifies with a lisa tradition:     
   [] Supported by a lisa community:        
   [] Claims no spiritual orientation:       
   [] Seeking spiritual identity:            
   [] Adheres to an individual form of spirituality:       
   [] Not able to assess:                   
 
    
Identified resources for coping:  
   [] Prayer                           
   [] Music                  [] Guided Imagery [x] Family/friends                 [] Pet visits [] Devotional reading                         [] Unknown 
   [] Other:                                        
 
 
Interventions offered during this visit: (See comments for more details) Patient Interventions: Advance medical directive consult, Affirmation of emotions/emotional suffering, Coping skills reviewed/reinforced, Initial/Spiritual assessment, patient floor, Normalization of emotional/spiritual concerns Family/Friend(s): Advance medical directive consult, Affirmation of emotions/emotional suffering, Normalization of emotional/spiritual concerns, Coping skills reviewed/reinforced Plan of Care: 
 
 [x] Support spiritual and/or cultural needs [x] Support AMD and/or advance care planning process    
 [] Support grieving process 
 [] Coordinate Rites and/or Rituals  
 [] Coordination with community clergy  [] No spiritual needs identified at this time 
 [] Detailed Plan of Care below (See Comments)  [] Make referral to Music Therapy 
[] Make referral to Pet Therapy    
[] Make referral to Addiction services 
[] Make referral to [facility] 
[] Make referral to Spiritual Care Partner 
[] No future visits requested       
[x] Follow up visits as needed Comments: Request by  to assist with Advance Medical Directive (AMD) in CardioPulmonary Care. Consulted with patients nurse. Explained document to patient and patient's daughters, who were present in the room. Left paperwork with patient and patient's daughters to read over and complete. Advised of  availability. Chaplains will follow up as needed. [address]  Paging Service [phone])

## 2019-03-20 NOTE — PROGRESS NOTES
Problem: Pressure Injury - Risk of 
Goal: *Prevention of pressure injury Description Document Rafiq Scale and appropriate interventions in the flowsheet. Outcome: Progressing Towards Goal 
  
Problem: Falls - Risk of 
Goal: *Absence of Falls Description Document Carolina Nicholsonian Fall Risk and appropriate interventions in the flowsheet.  
Outcome: Progressing Towards Goal

## 2019-03-20 NOTE — PROGRESS NOTES
Problem: Pressure Injury - Risk of 
Goal: *Prevention of pressure injury Description Document Rafiq Scale and appropriate interventions in the flowsheet. Outcome: Progressing Towards Goal 
  
Problem: Patient Education: Go to Patient Education Activity Goal: Patient/Family Education Outcome: Progressing Towards Goal 
  
Problem: Falls - Risk of 
Goal: *Absence of Falls Description Document Fang Shaina Fall Risk and appropriate interventions in the flowsheet. Outcome: Progressing Towards Goal 
  
Problem: Patient Education: Go to Patient Education Activity Goal: Patient/Family Education Outcome: Progressing Towards Goal

## 2019-03-20 NOTE — PROGRESS NOTES
Problem: Self Care Deficits Care Plan (Adult) Goal: *Acute Goals and Plan of Care (Insert Text) Description Occupational Therapy Goals Initiated 3/18/2019 1. Patient will perform grooming standing at sink with supervision/set-up within 7 day(s). 2.  Patient will perform upper body dressing with supervision/set-up within 7 day(s). 3.  Patient will perform lower body dressing with supervision/set-up within 7 day(s). 4.  Patient will perform toilet transfers with supervision/set-up within 7 day(s). 5.  Patient will perform all aspects of toileting with supervision/set-up within 7 day(s). 6.  Patient will perform sponge bathing with supervision/set-up within 7 day(s). Outcome: Progressing Towards Goal 
  
OCCUPATIONAL THERAPY TREATMENT Patient: Aspen Enriquez (46 y.o. female) Date: 3/20/2019 Diagnosis: Hypertensive urgency [I16.0] <principal problem not specified> Precautions: Fall(dementia) Chart, occupational therapy assessment, plan of care, and goals were reviewed. ASSESSMENT: 
Received sitting in chair on arrival, cleared for therapy by RN and agreeable to participate. RN reports Pt's BP has averaged in 150s/30s today and she has not been symptomatic. This continued throughout session (see below). Pt with no c/o dizziness this session, yet verbalized she generally feels \"groggy\" today. Physically she is moving around well (CGA with RW for functional mobility), yet continues to be limited by fatigue following performance of 1 dynamic ADL task. She is still functioning below her independent to Supervision/Setup level at baseline with ADL tasks, without using AD for mobility, and is currently performing grooming tasks standing at sink with SBA using RW for mobility. [facility] upon D/C, once BP stabilizes, in order to maximize Pt's safety and independence with dynamic ADL task performance in her home environment at [facility]. BP: 
Sitting; Pre-Activity: 145/29 (HR 60) Standing at RW: 141/34 (HR 60) Progression toward goals: 
?       Improving appropriately and progressing toward goals ? Improving slowly and progressing toward goals ? Not making progress toward goals and plan of care will be adjusted PLAN: 
Patient continues to benefit from skilled intervention to address the above impairments. Continue treatment per established plan of care. Discharge Recommendations:  Home Health OT Further Equipment Recommendations for Discharge:  None for OT SUBJECTIVE:  
Patient stated ? I feel groggy. \" OBJECTIVE DATA SUMMARY:  
Cognitive/Behavioral Status: 
Neurologic State: Alert Orientation Level: Disoriented to time;Oriented to person;Oriented to place;Oriented to situation Cognition: Follows commands Functional Mobility and Transfers for ADLs: 
Transfers: 
Sit to Stand: Contact guard assistance Bed to Chair: Contact guard assistance Balance: 
Sitting: Intact; Without support Sitting - Static: Good (unsupported) Sitting - Dynamic: Not tested Standing: Intact; With support Standing - Static: Good;Constant support Standing - Dynamic : Good;Constant support ADL Intervention: 
  
Grooming Brushing Teeth: Stand-by assistance(standing at sink using RW) Activity Tolerance:  
Fair. Required RB following brushing her teeth and performing item retrieval in preparation for functional task performance using RW . Please refer to the flowsheet for vital signs taken during this treatment. After treatment:  
? Patient left in no apparent distress sitting up in chair ? Patient left in no apparent distress in bed 
? Call bell left within reach ? Nursing notified ? Caregiver present ? Chair alarm activated COMMUNICATION/COLLABORATION:  
The patient?s plan of care was discussed with: Registered Nurse and Patient and Patient's 3 [facility], OTR/L Time Calculation: 22 mins

## 2019-03-20 NOTE — PROGRESS NOTES
Pharmacy Dosing of Warfarin Indication: AFIB Goal INR: 2-3 PTA Warfarin Dose: 5 mg M and F and 2.5 mg all other days Concurrent anticoagulants: none Concurrent antiplatelet: none Major Interacting Medications Drugs that may increase INR: PTA levothyroxine may be insignificant Drugs that may decrease INR:  
 
Conditions that may increase/decrease INR (CHF, C. diff, cirrhosis, thyroid disorder, hypoalbuminemia):   none Labs: 
Recent Labs  
  03/20/19 
0358 03/19/19 
0159  03/18/19 
1501 INR 1.7* 3.0*  --  4.0* HGB 8.4* 10.1*   < >  --   
 331  --   --   
SGOT 20  --   --   --   
TBILI 0.2  --   --   --   
ALB 1.7*  --   --   --   
 < > = values in this interval not displayed. Impression/Plan: - INR was above goal and we have been holding warfarin. 
- INR is now down to 1.7 
- Per Dr. Anya Rojo and mandi Rogers continue holding warfarin for now (cath planned for Friday; no heparin either). - Daily INR ordered. Thanks, Lalo Gandara, PHARMD

## 2019-03-20 NOTE — PROGRESS NOTES
Bedside shift change report given to Muriel Morrow RN (oncoming nurse). Report included the following information SBAR, Kardex, MAR and Cardiac Rhythm SR.  
 
SHIFT SUMMARY: 
No c/o CONCERNS TO ADDRESS WITH MD: 
Decreasing diastolic. Parameters? [address] NURSING NOTE Admission Date 3/16/2019 Admission Diagnosis Hypertensive urgency [I16.0] Consults IP CONSULT TO CARDIOLOGY 
IP CONSULT TO THORACIC SURGERY Cardiac Monitoring [x] Yes [] No  
  
Purposeful Hourly Rounding [x] Yes   
Judd Score Total Score: 4 Judd score 3 or > [x] Bed Alarm [] Avasys [] 1:1 sitter [] Patient refused (Signed refusal form in chart) Rafiq Score Rafiq Score: 19 Rafiq score 14 or < [] PMT consult [] Wound Care consult  
 []  Specialty bed  [] Nutrition consult Influenza Vaccine Received Flu Vaccine for Current Season (usually Sept-March): No  
 Patient/Guardian Refused (Notify MD): No  
  
Oxygen needs? [x] Room air Oxygen @  []1L    []2L    []3L   []4L    []5L   []6L via NC Chronic home O2 use? [] Yes [x] No 
Perform O2 challenge test and document in progress note using smartphrase (.Homeoxygen) Last bowel movement Last Bowel Movement Date: 03/18/19 Urinary Catheter External Female Catheter 03/16/19-Urine Output (mL): 150 ml LDAs Peripheral IV 03/17/19 Anterior;Proximal;Right Forearm (Active) Site Assessment Clean, dry, & intact 3/19/2019  8:30 PM  
Phlebitis Assessment 0 3/19/2019  8:30 PM  
Infiltration Assessment 0 3/19/2019  8:30 PM  
Dressing Status Clean, dry, & intact 3/19/2019  8:30 PM  
Dressing Type Transparent 3/19/2019  8:30 PM  
Hub Color/Line Status Pink;Flushed 3/19/2019  8:30 PM  
Alcohol Cap Used Yes 3/17/2019  8:00 PM  
      
External Female Catheter 03/16/19 (Active) Site Assessment Clean, dry, & intact 3/20/2019  6:34 AM  
Repositioned Yes 3/20/2019  6:34 AM  
Perineal Care Yes 3/20/2019  6:34 AM  
Wick Changed Yes 3/20/2019  6:34 AM  
 Suction Canister/Tubing Changed No 3/19/2019  8:39 AM  
Urine Output (mL) 150 ml 3/19/2019  8:30 PM  
            
  
Readmission Risk Assessment Tool Score High Risk   
      
 23 Total Score 3 Has Seen PCP in Last 6 Months (Yes=3, No=0)  
 4 IP Visits Last 12 Months (1-3=4, 4=9, >4=11) 5 Pt. Coverage (Medicare=5 , Medicaid, or Self-Pay=4) 11 Charlson Comorbidity Score (Age + Comorbid Conditions) Criteria that do not apply:  
 . Living with Significant Other. Assisted Living. LTAC. SNF. or  
Rehab Patient Length of Stay (>5 days = 3) Expected Length of Stay 4d 2h Actual Length of Stay 4

## 2019-03-20 NOTE — PROGRESS NOTES
Cardiac Electrophysiology Progress Note [address], [address] [facility]  [phone] 
 
3/20/2019 8:51 AM 
 
Admit Date: 3/16/2019 Admit Diagnosis: Hypertensive urgency [I16.0] Subjective:  
 
Renee Morgan   denies chest pain, chest pressure/discomfort, dyspnea, palpitations. Aware of lower extremity edema today. Visit Vitals BP (!) 157/32 (BP 1 Location: Right arm, BP Patient Position: Lying right side) Pulse 68 Temp 98.4 °F (36.9 °C) Resp 16 Ht 5' (1.524 m) Wt 148 lb 9.4 oz (67.4 kg) SpO2 96% BMI 29.02 kg/m² Current Facility-Administered Medications Medication Dose Route Frequency  metoprolol tartrate (LOPRESSOR) tablet 100 mg  100 mg Oral Q12H  furosemide (LASIX) injection 40 mg  40 mg IntraVENous DAILY  levothyroxine (SYNTHROID) tablet 50 mcg  50 mcg Oral 6am  
 labetalol (NORMODYNE;TRANDATE) 20 mg/4 mL (5 mg/mL) injection 10 mg  10 mg IntraVENous Q6H PRN  
 acetaminophen (TYLENOL) suppository 650 mg  650 mg Rectal Q4H PRN  
 hydrALAZINE (APRESOLINE) 20 mg/mL injection 10 mg  10 mg IntraVENous Q6H PRN  
 glucose chewable tablet 16 g  4 Tab Oral PRN  
 dextrose (D50W) injection syrg 12.5-25 g  12.5-25 g IntraVENous PRN  
 glucagon (GLUCAGEN) injection 1 mg  1 mg IntraMUSCular PRN  
 WARFARIN INFORMATION NOTE (COUMADIN)   Other QPM  
 mupirocin (BACTROBAN) 2 % ointment   Both Nostrils BID  influenza vaccine 2018-19 (6 mos+)(PF) (FLUARIX QUAD/FLULAVAL QUAD) injection 0.5 mL  0.5 mL IntraMUSCular PRIOR TO DISCHARGE Objective:  
  
Visit Vitals BP (!) 157/32 (BP 1 Location: Right arm, BP Patient Position: Lying right side) Pulse 68 Temp 98.4 °F (36.9 °C) Resp 16 Ht 5' (1.524 m) Wt 148 lb 9.4 oz (67.4 kg) SpO2 96% BMI 29.02 kg/m² Physical Exam: Abdomen: soft, non-tender Extremities:+ edmea Heart: regular rate and rhythm, II/VI murmur Lungs: clear to auscultation bilaterally Pulses: 2+ and symmetric Data Review:  
Labs:   
Recent Labs  
  03/20/19 
0358 03/19/19 
0159 WBC 6.6 7.9 HGB 8.4* 10.1* HCT 26.8* 32.0*  
 331 Recent Labs  
  03/20/19 0358 03/19/19 0159 03/18/19 
0346  140  --   
K 4.3 4.3  --   
* 109*  --   
CO2 24 22  --   
GLU 74 103*  --   
BUN 40* 33*  --   
CREA 1.97* 1.37*  --   
CA 7.7* 8.6  --   
MG 1.5*  --   --   
PHOS 3.6  --   --   
ALB 1.7*  --   --   
TBILI 0.2  --   --   
SGOT 20  --   --   
ALT 11*  --   --   
INR 1.7* 3.0* 4.0* No results for input(s): TROIQ, CPK, CKMB in the last 72 hours. Intake/Output Summary (Last 24 hours) at 3/20/2019 9523 Last data filed at 3/20/2019 9697 Gross per 24 hour Intake 200 ml Output 150 ml Net 50 ml Telemetry: sinus, pacs, pvcs Assessment:  
 
Active Problems: 
  Persistent atrial fibrillation ([address] (2/26/2019) Acquired hypothyroidism (2/26/2019) Hypertensive urgency (3/16/2019) CKD (chronic kidney disease) (3/16/2019) Aortic insufficiency (3/19/2019) Mitral stenosis (3/19/2019) Plan:  
 
Jeanette Ndiaye is a pleasant 82 y.o. PAF, hypertensive, DM II, Stage 4 CKD, dementia, remote smoking hx with EF 61-65%, Moderate to severe aortic valve stenosis. Cr up today - will continue to follow. Continues to be hypertensive. Per CTS consult, will plan for cardiac cath on Friday with Dr Vianney Boyer. CASA Ivan Patient seen and examined by me with nurse practitioner. I personally performed all components of the history, physical, and medical decision making and agree with the assessment and plan with minor modifications as noted. Cr up. Will cont to follow. Per cts - will plan for lhc per dr Patrick Pettit on Friday. I discussed the risks/benefits/alternatives of the procedure with the patients son (POA) Risks include (but are not limited to) bleeding, heart block, infection, cva/mi/tamponade/death. The patients son [facility]) understands and agrees to proceed. Jade Ball MD, [facility] 
 
 
 
3/20/2019 
8:51 AM

## 2019-03-20 NOTE — PROGRESS NOTES
0700: Received bedside report from Joycelyn Honeycutt, off going nurse. Assumed care of patient, 
 
[phone]: Cardiology aware of /82, with diastolic trending down. HR 65. Ordered to give PRN hydralazine, will recheck.

## 2019-03-20 NOTE — CARDIO/PULMONARY
Cardiopulmonary Rehab Nursing Entry: 
  
Pt is on CHF Bundle List 
  
Chart reviewed and pt visited for home CHF care instruction. Pt is an 81 yo admitted with hypertensive urgency, acute on chronic diastolic heart failure, EF 55%, mitral stenosis. Former smoker. 
  
Cardiac Rehab: Living with Heart Failure Booklet given to Agapito Pulido during teaching session yesterday. This was a follow-up visit to answer questions and reinforce prior teaching re: CHF, S&Ss, medication management, Low NA diet, daily weights and when to call the doctor. Family verbalized understanding of heart failure information. Plans for pt to have cardiac cath pre-TAVR per CTS entry. Test discussed with daughter. Questions addressed, no additional concerns voiced.

## 2019-03-20 NOTE — PROGRESS NOTES
Hospitalist Progress Note NAME: Ron Mariscal :  1935 MRN:  [id number] Assessment / Plan: Hypertensive Urgency /34 in ED Acute on Chronic Diastolic Heart Failure POA LVEF 55% Aortic regurgitation POA with widened pulse pressure Moderate mitral stenosis POA Presumed rheumatic heart disease with MS and aortic valve disease Chest pain/CAD POA 
-s/p nicardipine gtt in ICU, now weaned off 
-Markedly increased pulse pressure, related to the AI Echo at [facility] 2019 LVEF 51 to 55%, severely dilated left atrium Mild AS, mod to severe aortic regurg Moderate MS Holodiastolic flow reversal in the descending aorta Moderate pulmonary HTN PA pressure 
-continue on metoprolol, lasix 
-continue prn hydralazine and prn labetalol 
-Appreciate CT Surgery evaluation: cardiac cath and TAVR protocol CT C/A/P recommended; Dr. Patric Wilburn to do cardiac cath ? on Friday Stage 3/4 chronic kidney disease POA baseline creat 1.6 to 2.0 
-monitor 
  
Dementia POA No clear encephalopathy per my assessment Oriented x 2, family says at baseline Head CT and C-spine CT scans without  Acute changes Neuro consult discontinued by Dr. Donn Alcala since patient at cognitive baseline and neuro exam non-focal 
  
Hypothyroidism POA  
-continue L-thyroxine. 
  
Atrial Fib POA 
-INR 1.7 today 
-PO lopressor 
-Cards following 
  
HbA1C 5.3 
  
Obesity POA Body mass index is 30.08 kg/m².  
  
Code Status: Full Code  
  
Surrogate Decision Maker: desean Varma [phone], Hannah [phone] or Skip [facility] [phone] 
  
DVT Prophylaxis: Coumadin 
  
GI Prophylaxis: not indicated 
  
Baseline: lives with DTR Eldred Bloch, has dementia Subjective: Chief Complaint / Reason for Physician Visit: follow-up Hypertension and AI Patient seen for follow-up Family at bedside Patient complains of low energy RN at bedside Review of Systems: 
Symptom Y/N Comments  Symptom Y/N Comments Fever/Chills n   Chest Pain n   
Poor Appetite    Edema Cough    Abdominal Pain n   
Sputum    Joint Pain SOB/HASKINS n   Pruritis/Rash Nausea/vomit    Tolerating PT/OT Diarrhea n   Tolerating Diet y Constipation    Other Could NOT obtain due to:   
 
Objective: VITALS:  
Last 24hrs VS reviewed since prior progress note. Most recent are: 
Patient Vitals for the past 24 hrs: 
 Temp Pulse Resp BP SpO2  
03/20/19 0852 98 °F (36.7 °C) 73 16 (!) 153/34 97 % 03/20/19 0634 98.4 °F (36.9 °C) 68 16 (!) 157/32 96 % 03/19/19 2250 98.4 °F (36.9 °C) 75 18 (!) 156/27   
03/19/19 2030 98 °F (36.7 °C) 65 18 (!) 144/20 95 % 03/19/19 1857 97.7 °F (36.5 °C) 64 20 (!) 182/24 99 % 03/19/19 1800    (!) 157/20   
03/19/19 1556 98.3 °F (36.8 °C) 75 20 (!) 184/32 97 % 03/19/19 1312    (!) 174/32   
03/19/19 1144 98 °F (36.7 °C) 68 16 (!) 188/28 99 % Intake/Output Summary (Last 24 hours) at 3/20/2019 3993 Last data filed at 3/20/2019 1211 Gross per 24 hour Intake 200 ml Output 150 ml Net 50 ml PHYSICAL EXAM: 
General: WD, WN. Alert, cooperative, no acute distress   
EENT:  EOMI. Anicteric sclerae. MMM Resp:  CTA bilaterally, no wheezing or rales. No accessory muscle use CV:  regular  Rhythm, normal rate,  Trace BLE edema GI:  Soft, Non distended, Non tender.  +Bowel sounds Neurologic:  Alert and oriented X 2, normal speech, Psych:   Poor insight. Not anxious nor agitated Skin:  No rashes. No jaundice Reviewed most current lab test results and cultures  YES Reviewed most current radiology test results   YES Review and summation of old records today    NO Reviewed patient's current orders and MAR    YES 
PMH/SH reviewed - no change compared to H&P 
________________________________________________________________________ Care Plan discussed with: 
  Comments Patient x Family  x 2 brothers and sister RN x Care Manager Consultant  x Cards and CTS service Multidiciplinary team rounds were held today with , nursing, pharmacist and clinical coordinator. Patient's plan of care was discussed; medications were reviewed and discharge planning was addressed. ________________________________________________________________________ Total NON critical care TIME:  45   Minutes Total CRITICAL CARE TIME Spent:   Minutes non procedure based Comments >50% of visit spent in counseling and coordination of care x prolonged discussion with family about plan, candidacy concerns for TAVR, importance of work-up, risks of TAVR work-up, etc...   
________________________________________________________________________ Marsha Lira MD  
 
Procedures: see electronic medical records for all procedures/Xrays and details which were not copied into this note but were reviewed prior to creation of Plan. LABS: 
I reviewed today's most current labs and imaging studies. Pertinent labs include: 
Recent Labs  
  03/20/19 0358 03/19/19 0159 WBC 6.6 7.9 HGB 8.4* 10.1* HCT 26.8* 32.0*  
 331 Recent Labs  
  03/20/19 0358 03/19/19 0159 03/18/19 
0346  140  --   
K 4.3 4.3  --   
* 109*  --   
CO2 24 22  --   
GLU 74 103*  --   
BUN 40* 33*  --   
CREA 1.97* 1.37*  --   
CA 7.7* 8.6  --   
MG 1.5*  --   --   
PHOS 3.6  --   --   
ALB 1.7*  --   --   
TBILI 0.2  --   --   
SGOT 20  --   --   
ALT 11*  --   --   
INR 1.7* 3.0* 4.0* Signed: Marsha Lira MD

## 2019-03-20 NOTE — PROGRESS NOTES
Problem: Mobility Impaired (Adult and Pediatric) Goal: *Acute Goals and Plan of Care (Insert Text) Description Physical Therapy Goals Initiated 3/18/2019 1. Patient will move from supine to sit and sit to supine , scoot up and down and roll side to side in bed with modified independence within 7 day(s). 2.  Patient will transfer from bed to chair and chair to bed with supervision/set-up using the least restrictive device within 7 day(s). 3.  Patient will perform sit to stand with modified independence within 7 day(s). 4.  Patient will ambulate with supervision/set-up for 125 feet with the least restrictive device within 7 day(s). 5.  Patient will ascend/descend 2 stairs with 1 handrail(s) with supervision/set-up within 7 day(s). Note: PHYSICAL THERAPY TREATMENT Patient: Army Cash (5 y.o. female) Date: 3/20/2019 Diagnosis: Hypertensive urgency [I16.0] Precautions: Fall, (dementia) Chart, physical therapy assessment, plan of care and goals were reviewed. ASSESSMENT: pt tolerated tx well, no LOB or SOB, does well with bed mob and transfers, stable with RW, did well with toilet transfers and ther-ex, vc's for safety and proper RW use. Progression toward goals: 
?    Improving appropriately and progressing toward goals ? Improving slowly and progressing toward goals ? Not making progress toward goals and plan of care will be adjusted PLAN: 
Patient continues to benefit from skilled intervention to address the above impairments. Continue treatment per established plan of care. Discharge Recommendations:  Home Health Further Equipment Recommendations for Discharge:  rollator OBJECTIVE DATA SUMMARY:  
Critical Behavior: 
Neurologic State: Alert Orientation Level: Oriented to person, Oriented to place Cognition: Follows commands Safety/Judgement: Fall prevention, Decreased insight into deficits Functional Mobility Training: 
Bed Mobility: Rolling: Supervision Supine to Sit: Supervision Scooting: Supervision Level of Assistance: Supervision Interventions: Verbal cues Transfers: 
Sit to Stand: Contact guard assistance Stand to Sit: Contact guard assistance Bed to Chair: Contact guard assistance Interventions: Tactile cues; Verbal cues Level of Assistance: Contact guard assistance Balance: 
Sitting: Intact; Without support Sitting - Static: Good (unsupported) Sitting - Dynamic: Not tested Standing: Intact; With support Standing - Static: Good;Constant support Standing - Dynamic : Good;Constant support Ambulation/Gait Training: 
Distance (ft): 80 Feet (ft) Assistive Device: Walker, rolling;Gait belt Ambulation - Level of Assistance: Contact guard assistance Gait Abnormalities: Decreased step clearance Right Side Weight Bearing: Full Left Side Weight Bearing: Full Base of Support: Narrowed Stance: (equal) Speed/Rivka: Pace decreased (<100 feet/min) Step Length: Left shortened;Right shortened Therapeutic Exercises:  
sitting EXERCISE Sets Reps Active Active Assist  
Passive Comments Ankle pumps 1 10 ? ? ? bilat Heel raises 1 10 ? ? ? \" Toe tap 1 10 ? ? ? \" Knee ext 1 10 ? ? ? \" Hip flex 1 10 ? ? ? \"  
 
Pain: 
Pain Scale 1: Numeric (0 - 10) Pain Intensity 1: 0 Activity Tolerance: fair After treatment:  
?    Patient left in no apparent distress sitting up in chair ? Patient left in no apparent distress in bed 
? Call bell left within reach ? Nursing notified ? Caregiver present ? Bed alarm activated COMMUNICATION/COLLABORATION:  
The patient?s plan of care was discussed with: Registered Nurse Alexandr Guidry PTA Time Calculation: 30 mins

## 2019-03-21 NOTE — PROGRESS NOTES
Attempted to see pt this am and pt is refusing all meds and tx. Will continue to follow ans tx when pt is agreeable and when cleared by nursing.

## 2019-03-21 NOTE — CONSULTS
Palliative Medicine Consult [place]: [phone] ([phone]) Patient Name: Yaniv Lock YOB: 1935 Received consult and reviewed chart Spoke with attending who asked that we defer consult until after patient has had cardiac cath, as her family has had many conversation regarding goals of care over the past few days, and might feel bombarded with another specialty coming in Will defer this consult until after patient has cath tomorrow Tiffani Clark NP

## 2019-03-21 NOTE — PROGRESS NOTES
Hospitalist Progress Note NAME: Arden Berg :  1935 MRN:  [id number] Assessment / Plan: Hypertensive Urgency /34 in ED Acute on Chronic Diastolic Heart Failure POA LVEF 55% Aortic regurgitation POA with widened pulse pressure Moderate mitral stenosis POA Presumed rheumatic heart disease with MS and aortic valve disease Chest pain/CAD POA 
-s/p nicardipine gtt in ICU, now weaned off 
-Markedly increased pulse pressure, related to the AI Echo at [facility] 2019 LVEF 51 to 55%, severely dilated left atrium Mild AS, mod to severe aortic regurg Moderate MS Holodiastolic flow reversal in the descending aorta Moderate pulmonary HTN PA pressure 
-continue on metoprolol, lasix 
-continue prn hydralazine and prn labetalol 
-Case discussed with Dr. Dionne Gutiérrez today, appreciate assistance; he plans to discuss further with family but still planning for cardiac cath tomorrow 
-patient needs pre-treatment for Iodine allergy, this has been ordered (clarified approximate timing of cardiac cath with Dr. 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 
-monitor, remains around baseline today 
  
Dementia POA : No clear encephalopathy per my assessment, Oriented x 2, family says at baseline; Head CT and C-spine CT scans without  Acute changes Neuro consult discontinued by Dr. True Beltran since patient at cognitive baseline and neuro exam non-focal 
-restart home remeron 
  
Hypothyroidism POA  
-continue L-thyroxine. 
  
Atrial Fib POA 
-INR 1.2 today 
-PO lopressor 
-Cards following 
  
HbA1C 5.3 
  
Obesity POA Body mass index is 30.08 kg/m².  
  
Code Status: Full Code  
  
Surrogate Decision Maker: son Juice Mendoza [phone], Hannah [phone] or Dorota Manuel [phone] 
  
DVT Prophylaxis: Coumadin 
  
GI Prophylaxis: not indicated 
  
 Baseline: lives with DTR Yunior Montesinos, has dementia Subjective: Chief Complaint / Reason for Physician Visit: follow-up Hypertension and AI Patient seen for follow-up Patient denies complaints other than fatigue and not sleeping well Family at bedside Review of Systems: 
Symptom Y/N Comments  Symptom Y/N Comments Fever/Chills    Chest Pain n   
Poor Appetite    Edema y persists Cough    Abdominal Pain n   
Sputum    Joint Pain SOB/HASKINS n   Pruritis/Rash Nausea/vomit    Tolerating PT/OT Diarrhea n   Tolerating Diet y Constipation    Other Could NOT obtain due to:   
 
Objective: VITALS:  
Last 24hrs VS reviewed since prior progress note. Most recent are: 
Patient Vitals for the past 24 hrs: 
 Temp Pulse Resp BP SpO2  
03/21/19 1025 97.8 °F (36.6 °C) 64 18 (!) 160/37 96 % 03/21/19 0802 97.6 °F (36.4 °C) 70 16 (!) 181/31 97 % 03/21/19 0342 97.6 °F (36.4 °C) 65 18 157/47 97 % 03/20/19 2311 97.6 °F (36.4 °C) 78 18 (!) 154/29 98 % 03/20/19 1924 98 °F (36.7 °C) 70 18 168/46 99 % 03/20/19 1300    (!) 150/38   
03/20/19 1200    (!) 162/36  Intake/Output Summary (Last 24 hours) at 3/21/2019 1149 Last data filed at 3/21/2019 1128 Gross per 24 hour Intake 720 ml Output 800 ml Net -80 ml PHYSICAL EXAM: 
General: WD, WN. Alert, cooperative, no acute distress   
EENT:  EOMI. Anicteric sclerae. MMM Resp:  CTA bilaterally, no wheezing or rales. No accessory muscle use CV:  regular  Rhythm, normal rate,  BLE edema unchanged and overall mild GI:  Soft, Non distended, Non tender.  +Bowel sounds Neurologic:  Alert and oriented X 2, normal speech, Psych:   Poor insight. Not anxious nor agitated Skin:  No rashes. No jaundice Reviewed most current lab test results and cultures  YES Reviewed most current radiology test results   YES Review and summation of old records today    NO Reviewed patient's current orders and MAR    YES 
 PMH/SH reviewed - no change compared to H&P 
________________________________________________________________________ Care Plan discussed with: 
  Comments Patient x Family  x daughters RN x Care Manager Consultant  x Cards Multidiciplinary team rounds were held today with , nursing, pharmacist and clinical coordinator. Patient's plan of care was discussed; medications were reviewed and discharge planning was addressed. ________________________________________________________________________ Total NON critical care TIME:  35   Minutes Total CRITICAL CARE TIME Spent:   Minutes non procedure based Comments >50% of visit spent in counseling and coordination of care x   
________________________________________________________________________ Alisson Ward MD  
 
Procedures: see electronic medical records for all procedures/Xrays and details which were not copied into this note but were reviewed prior to creation of Plan. LABS: 
I reviewed today's most current labs and imaging studies. Pertinent labs include: 
Recent Labs  
  03/21/19 
1052 03/20/19 
0358 03/19/19 
0159 WBC 4.8 6.6 7.9 HGB 8.6* 8.4* 10.1* HCT 27.6* 26.8* 32.0*  
 263 331 Recent Labs  
  03/21/19 
1052 03/20/19 
0358 03/19/19 
0159  140 140  
K 4.2 4.3 4.3  109* 109* CO2 28 24 22 * 74 103* BUN 49* 40* 33* CREA 1.87* 1.97* 1.37* CA 8.1* 7.7* 8.6 MG  --  1.5*  --   
PHOS  --  3.6  --   
ALB 2.0* 1.7*  --   
TBILI 0.2 0.2  --   
SGOT 27 20  --   
ALT 17 11*  --   
INR 1.2* 1.7* 3.0* Signed: Alisson Ward MD

## 2019-03-21 NOTE — PROGRESS NOTES
0700: Received bedside report from Natasha [facility], off going nurse. Assumed care of patient. Nightshift unable to draw labs, pt refusing but also confused. Pt still refusing. Will try once Pt's daughter arrives. 0830: Cardiology aware pt refusing labs, and medications. Palliative consult ordered. Will continue to monitor. 1100: Patient reoriented and cooperative, agreeable to taking meds and drawing labs. Agreeable to cardiac cath on Friday, Consent signed by Son COLBY and on chart. 1150: Magnesium lab ordered for add on, lab aware. 1820: TRANSFER - OUT REPORT: 
 
Verbal report given to Ronda Daugherty (name[facility]  being [facility] to [facility] ([facility] for routine progression of care Report consisted of patients Situation, Background, Assessment and  
Recommendations(SBAR). Information from the following report(s) SBAR, Kardex, Intake/Output, MAR, Recent Results and Cardiac Rhythm NSR was reviewed with the receiving nurse. Lines:  
Peripheral IV 03/17/19 Anterior;Proximal;Right Forearm (Active) Site Assessment Clean, dry, & intact 3/21/2019  3:59 PM  
Phlebitis Assessment 0 3/21/2019  3:59 PM  
Infiltration Assessment 0 3/21/2019  3:59 PM  
Dressing Status Clean, dry, & intact 3/21/2019  3:59 PM  
Dressing Type Transparent 3/21/2019  3:59 PM  
Hub Color/Line Status Blue;Flushed;Patent 3/21/2019  3:59 PM  
Alcohol Cap Used Yes 3/17/2019  8:00 PM  
  
 
Opportunity for questions and clarification was provided. Patient transported with: 
 Monitor Tech

## 2019-03-21 NOTE — CONSULTS
Subjective:  
  
Date of  Admission: 3/16/2019 10:22 AM  
 
Admission type:Emergency Jay Berg is a 82 y.o. female admitted for Hypertensive urgency [I16.0]. Initially presented with leg swelling, facial swelling and elevated BP. Reportedly was recently admitted at OSH for pneumonia. on recent Echo valvular heart disease was noted. Seen by CTS. Cardiac cath requested. Per family and patient clinically she is much better with BP management. SOB has also improved with treatment of pneumonia. She or daughters denies chest pain, chest pressure/discomfort, dyspnea, palpitations, irregular heart beats, near-syncope, syncope, orthopnea, paroxysmal nocturnal dyspnea, exertional chest pressure/discomfort, claudication. Patient Active Problem List  
 Diagnosis Date Noted  Aortic insufficiency 03/19/2019  Mitral stenosis 03/19/2019  Bilateral carotid artery stenosis 03/18/2019  Vaso vagal episode 03/18/2019  Hypertensive urgency 03/16/2019  CKD (chronic kidney disease) 03/16/2019  Acute renal failure superimposed on stage 4 chronic kidney disease ([address] 02/28/2019  Persistent atrial fibrillation ([address] 02/26/2019  CAP (community acquired pneumonia) 02/26/2019  Essential hypertension 02/26/2019  Anticoagulant long-term use 02/26/2019  Dementia 02/26/2019  Acquired hypothyroidism 02/26/2019  LIVIA on CPAP 05/18/2012  Paroxysmal atrial fibrillation (HCC)  Aortic valve disorders 09/30/2010  Pure hypercholesterolemia 09/30/2010  Mitral valve disorders(424.0)  Benign hypertensive heart disease without heart failure Tabby Burnett NP Past Medical History:  
Diagnosis Date  Aortic valve disorders AS  Asthma  Benign hypertensive heart disease without heart failure  Diabetes ([address]  Fibromyalgia  Gastroparesis  GERD (gastroesophageal reflux disease)  Mitral valve disorders(424.0)  MR  
  LIVIA (obstructive sleep apnea)  Paroxysmal atrial fibrillation (HCC)  Pure hypercholesterolemia 9/30/2010 Past Surgical History:  
Procedure Laterality Date  ECHO 2D ADULT  11/2009 LVH, normal LV wall motion and ejection fraction, mild aortic stenosis, moderate aortic regurgitation and mild mitral regurgitation. The ejection fraction was 55-60%.  ECHO 2D ADULT  1/2011 EF 60%, LAE, mild AS, AI, MR, PA low 40s  EVENT MONITOR POST SYMPTOMS  9/2010 Rare PACs, no arrythmia with symptoms  LOOP MONITOR  9-10/2011 NSR  
 STRESS TEST MYOVIEW  1/2011  
 no ischemia, EF 63%  US DUPLEX CAROTID BILATERAL  1/2011  
 mild bilat disease Allergies Allergen Reactions  Advil [Ibuprofen] Unknown (comments)  Aspirin Unknown (comments)  Meloxicam Unknown (comments)  Penicillin G Unknown (comments)  Shellfish Containing Products Rash  Sulfa (Sulfonamide Antibiotics) Unknown (comments) Family History Problem Relation Age of Onset  Heart Disease Father  Dementia Brother  Heart Disease Brother  Diabetes Brother Current Facility-Administered Medications Medication Dose Route Frequency  hydrALAZINE (APRESOLINE) tablet 25 mg  25 mg Oral TID  predniSONE (DELTASONE) tablet 50 mg  50 mg Oral ONCE  
 [START ON 3/22/2019] predniSONE (DELTASONE) tablet 50 mg  50 mg Oral ONCE  
 [START ON 3/22/2019] predniSONE (DELTASONE) tablet 50 mg  50 mg Oral ONCE  
 [START ON 3/22/2019] diphenhydrAMINE (BENADRYL) capsule 50 mg  50 mg Oral ONCE  
 No warfarin today (cath planned 3/22)  1 Each Other ONCE  mirtazapine (REMERON) tablet 30 mg  30 mg Oral QHS  amLODIPine (NORVASC) tablet 2.5 mg  2.5 mg Oral DAILY  metoprolol tartrate (LOPRESSOR) tablet 100 mg  100 mg Oral Q12H  furosemide (LASIX) injection 40 mg  40 mg IntraVENous DAILY  levothyroxine (SYNTHROID) tablet 50 mcg  50 mcg Oral 6am  
  labetalol (NORMODYNE;TRANDATE) 20 mg/4 mL (5 mg/mL) injection 10 mg  10 mg IntraVENous Q6H PRN  
 acetaminophen (TYLENOL) suppository 650 mg  650 mg Rectal Q4H PRN  
 hydrALAZINE (APRESOLINE) 20 mg/mL injection 10 mg  10 mg IntraVENous Q6H PRN  
 glucose chewable tablet 16 g  4 Tab Oral PRN  
 dextrose (D50W) injection syrg 12.5-25 g  12.5-25 g IntraVENous PRN  
 glucagon (GLUCAGEN) injection 1 mg  1 mg IntraMUSCular PRN  
 WARFARIN INFORMATION NOTE (COUMADIN)   Other QPM  
 mupirocin (BACTROBAN) 2 % ointment   Both Nostrils BID Review of Symptoms: 
Constitutional: negative Eyes: negative Ears, nose, mouth, throat, and face: negative Respiratory: No exertional dyspnea, orthopnea, PND, cough, hemoptysis, URI. Cardiovascular: No CP, palpitations, sweating, lightheadedness, dizziness, syncope, presyncope. Gastrointestinal: No nausea, vomiting, diarrhea, constipation, abdominal pain, hematemesis, melena, hematochezia Genitourinary:No urinary complaints. Musculoskeletal:negative Neurological: negative. Per daughter she has some memory issues, but functional at home. Behvioral/Psych: negative Endocrine: negative Subjective:  
  
Visit Vitals BP (!) 145/30 Pulse 73 Temp 97.8 °F (36.6 °C) Resp 18 Ht 5' (1.524 m) Wt 149 lb 7.6 oz (67.8 kg) SpO2 99% BMI 29.19 kg/m² Physical Exam 
Abdomen: soft, non-tender. Bowel sounds normal.  
Extremities: no cyanosis, 1+edema Heart: regular rate and rhythm, S1 normal, A2 decrease intensity but audible. 2/6 soft systolic murmur over aortic are and LSB. No click, rub or gallop Lungs: clear to auscultation bilaterally Neck: supple, no carotid bruit and no JVD Neurologic: Grossly normal 
Pulses: 2+  
 
 
Cardiographics Telemetry: normal sinus rhythm ECG: normal sinus rhythm, LVH, repolarization changes Echocardiogram: reviewed. Labs:  
Recent Results (from the past 24 hour(s)) CBC WITH AUTOMATED DIFF  
 Collection Time: 03/21/19 10:52 AM  
Result Value Ref Range WBC 4.8 3.6 - 11.0 K/uL  
 RBC 3.11 (L) 3.80 - 5.20 M/uL HGB 8.6 (L) 11.5 - 16.0 g/dL HCT 27.6 (L) 35.0 - 47.0 % MCV 88.7 80.0 - 99.0 FL  
 MCH 27.7 26.0 - 34.0 PG  
 MCHC 31.2 30.0 - 36.5 g/dL  
 RDW 14.0 11.5 - 14.5 % PLATELET 719 587 - 274 K/uL MPV 11.0 8.9 - 12.9 FL  
 NRBC 0.0 0  WBC ABSOLUTE NRBC 0.00 0.00 - 0.01 K/uL NEUTROPHILS 68 32 - 75 % LYMPHOCYTES 22 12 - 49 % MONOCYTES 8 5 - 13 % EOSINOPHILS 2 0 - 7 % BASOPHILS 0 0 - 1 % IMMATURE GRANULOCYTES 0 0.0 - 0.5 % ABS. NEUTROPHILS 3.2 1.8 - 8.0 K/UL  
 ABS. LYMPHOCYTES 1.1 0.8 - 3.5 K/UL  
 ABS. MONOCYTES 0.4 0.0 - 1.0 K/UL  
 ABS. EOSINOPHILS 0.1 0.0 - 0.4 K/UL  
 ABS. BASOPHILS 0.0 0.0 - 0.1 K/UL  
 ABS. IMM. GRANS. 0.0 0.00 - 0.04 K/UL  
 DF AUTOMATED METABOLIC PANEL, COMPREHENSIVE Collection Time: 03/21/19 10:52 AM  
Result Value Ref Range Sodium 140 136 - 145 mmol/L Potassium 4.2 3.5 - 5.1 mmol/L Chloride 107 97 - 108 mmol/L  
 CO2 28 21 - 32 mmol/L Anion gap 5 5 - 15 mmol/L Glucose 134 (H) 65 - 100 mg/dL BUN 49 (H) 6 - 20 MG/DL Creatinine 1.87 (H) 0.55 - 1.02 MG/DL  
 BUN/Creatinine ratio 26 (H) 12 - 20 GFR est AA 31 (L) >60 ml/min/1.73m2 GFR est non-AA 26 (L) >60 ml/min/1.73m2 Calcium 8.1 (L) 8.5 - 10.1 MG/DL Bilirubin, total 0.2 0.2 - 1.0 MG/DL  
 ALT (SGPT) 17 12 - 78 U/L  
 AST (SGOT) 27 15 - 37 U/L Alk. phosphatase 97 45 - 117 U/L Protein, total 6.2 (L) 6.4 - 8.2 g/dL Albumin 2.0 (L) 3.5 - 5.0 g/dL Globulin 4.2 (H) 2.0 - 4.0 g/dL A-G Ratio 0.5 (L) 1.1 - 2.2 PROTHROMBIN TIME + INR Collection Time: 03/21/19 10:52 AM  
Result Value Ref Range INR 1.2 (H) 0.9 - 1.1 Prothrombin time 12.5 (H) 9.0 - 11.1 sec MAGNESIUM Collection Time: 03/21/19 10:52 AM  
Result Value Ref Range Magnesium 1.6 1.6 - 2.4 mg/dL Assessment: 
 
 Assessment:  
  
 Active Problems: Persistent atrial fibrillation [address] (2/26/2019) Acquired hypothyroidism (2/26/2019) Hypertensive urgency (3/16/2019) CKD (chronic kidney disease) (3/16/2019) Aortic insufficiency (3/19/2019) Mitral stenosis (3/19/2019) Plan: 1. Valvular heart disease: mod MS, mild to mod AR, AS. Reviewed her TTE study. AS based upon reading appears more moderate than sever. Clinically she does not appear to have any symptoms to suggest severe valvular heart disease. Option of clinical f/u with out pt repeat TTE, IAN or right and left heart cath d/w pt and family. Recommend IAN for better evaluation, prior to invasive evaluation. Will schedule her for IAN tomorrow. Further recommendations based upon IAN findings. 2. HTN: continue current meds. 3. Renal insuff: monitor. Improving. 4. PAF: coumadin on hold.

## 2019-03-21 NOTE — PROGRESS NOTES
RAPID RESPONSE TEAM 
 
Rounded on patient due to recent transfer out of CCU. Discussed with primary RN, Moy Flanagan. No acute concerns, VSS, MEWS 1. No RRT interventions indicated at this time. Please call with any questions or concerns. Srikanth Breaux Rapid Response RN Justyn Carbajal

## 2019-03-21 NOTE — PROGRESS NOTES
IDR completed with hospitalist, pharmacy, and RN. Pt is scheduled to have cardiac cath tomorrow. Plan is for pt to return home and receive [place] (resume with [facility] [address]). Palliative was consulted to discuss goals of care with pt and family; however, they will defer until procedure is done tomorrow. Possible d/c on 3/23. GIOVANY Weeks Care Manager [phone]

## 2019-03-21 NOTE — PROGRESS NOTES
Bedside shift change report given to [id number] (oncoming nurse) by Judy Desai (offgoing nurse). Report included the following information SBAR, updates Melvin Howard

## 2019-03-21 NOTE — PROGRESS NOTES
Cardiology Progress Note [address]  [phone] 
 
3/21/2019 9:16 AM 
 
Admit Date: 3/16/2019 Admit Diagnosis: Hypertensive urgency [I16.0] Subjective:  
 
Marie Jean   Is refusing blood draws and medicine Visit Vitals BP (!) 181/31 (BP 1 Location: Left arm, BP Patient Position: Sitting) Pulse 70 Temp 97.6 °F (36.4 °C) Resp 16 Ht 5' (1.524 m) Wt 149 lb 7.6 oz (67.8 kg) SpO2 97% BMI 29.19 kg/m² Current Facility-Administered Medications Medication Dose Route Frequency  hydrALAZINE (APRESOLINE) tablet 25 mg  25 mg Oral TID  amLODIPine (NORVASC) tablet 2.5 mg  2.5 mg Oral DAILY  metoprolol tartrate (LOPRESSOR) tablet 100 mg  100 mg Oral Q12H  furosemide (LASIX) injection 40 mg  40 mg IntraVENous DAILY  levothyroxine (SYNTHROID) tablet 50 mcg  50 mcg Oral 6am  
 labetalol (NORMODYNE;TRANDATE) 20 mg/4 mL (5 mg/mL) injection 10 mg  10 mg IntraVENous Q6H PRN  
 acetaminophen (TYLENOL) suppository 650 mg  650 mg Rectal Q4H PRN  
 hydrALAZINE (APRESOLINE) 20 mg/mL injection 10 mg  10 mg IntraVENous Q6H PRN  
 glucose chewable tablet 16 g  4 Tab Oral PRN  
 dextrose (D50W) injection syrg 12.5-25 g  12.5-25 g IntraVENous PRN  
 glucagon (GLUCAGEN) injection 1 mg  1 mg IntraMUSCular PRN  
 WARFARIN INFORMATION NOTE (COUMADIN)   Other QPM  
 mupirocin (BACTROBAN) 2 % ointment   Both Nostrils BID Objective:  
  
Visit Vitals BP (!) 181/31 (BP 1 Location: Left arm, BP Patient Position: Sitting) Pulse 70 Temp 97.6 °F (36.4 °C) Resp 16 Ht 5' (1.524 m) Wt 149 lb 7.6 oz (67.8 kg) SpO2 97% BMI 29.19 kg/m² Physical Exam: Abdomen: soft, non-tender Extremities: extremities normal 
Heart: regular rate and rhythm Lungs: clear to auscultation bilaterally Pulses: 2+ and symmetric Data Review:  
Labs:   
Recent Labs  
  03/20/19 
0358 03/19/19 
0159 WBC 6.6 7.9 HGB 8.4* 10.1* HCT 26.8* 32.0*  
  331 Recent Labs  
  03/20/19 
0358 03/19/19 
0159  140  
K 4.3 4.3 * 109* CO2 24 22 GLU 74 103* BUN 40* 33* CREA 1.97* 1.37* CA 7.7* 8.6 MG 1.5*  --   
PHOS 3.6  --   
ALB 1.7*  --   
TBILI 0.2  --   
SGOT 20  --   
ALT 11*  --   
INR 1.7* 3.0* No results for input(s): TROIQ, CPK, CKMB in the last 72 hours. Intake/Output Summary (Last 24 hours) at 3/21/2019 0472 Last data filed at 3/20/2019 1740 Gross per 24 hour Intake 480 ml Output 600 ml Net -120 ml Telemetry: nsr Assessment:  
 
Active Problems: 
  Persistent atrial fibrillation ([facility] [address] (2/26/2019) Acquired hypothyroidism (2/26/2019) Hypertensive urgency (3/16/2019) CKD (chronic kidney disease) (3/16/2019) Aortic insufficiency (3/19/2019) Mitral stenosis (3/19/2019) Plan:  
 
Marie Jean is refusing blood draws and any meds. Ideally, would have started hydralazine for bp. D/w daughter - had to fight her to take some meds earlier today. Will enter palliative care consult. Juan Manuel Howell MD, [facility] 
 
3/21/2019

## 2019-03-21 NOTE — PROGRESS NOTES
Pt's family stating \"We talked with , and don't want to do the cath, only want to do the IAN. \" Magdi Wolf will be back in the morning to talk with MD & sign consents.

## 2019-03-21 NOTE — PROGRESS NOTES
Pharmacy Dosing of Warfarin Indication: AFIB Goal INR: 2-3 PTA Warfarin Dose: 5 mg M and F and 2.5 mg all other days Concurrent anticoagulants: none Concurrent antiplatelet: none Major Interacting Medications ? Drugs that may increase INR: PTA levothyroxine may be insignificant ? Drugs that may decrease INR:  
 
Conditions that may increase/decrease INR (CHF, C. diff, cirrhosis, thyroid disorder, hypoalbuminemia):   none Labs: 
Recent Labs  
  03/21/19 
1052 03/20/19 
0358  03/19/19 
0159 INR 1.2* 1.7*  --  3.0* HGB 8.6* 8.4*  --  10.1*  263  --  331 SGOT 27 20  --   --   
TBILI 0.2 0.2  --   --   
ALB 2.0* 1.7*   < >  --   
 < > = values in this interval not displayed. Impression/Plan: - INR is now down to 1.2 
- Per Dr. Abelino Taylor and Dylan Hand cath planned for Friday 3/22; no warfarin/heparin - Palliative care consulted - Daily INR ordered. Thanks, Ivanna Ward, [facility]

## 2019-03-21 NOTE — PROGRESS NOTES
Problem: Self Care Deficits Care Plan (Adult) Goal: *Acute Goals and Plan of Care (Insert Text) Description Occupational Therapy Goals Initiated 3/18/2019 1. Patient will perform grooming standing at sink with supervision/set-up within 7 day(s). 2.  Patient will perform upper body dressing with supervision/set-up within 7 day(s). 3.  Patient will perform lower body dressing with supervision/set-up within 7 day(s). 4.  Patient will perform toilet transfers with supervision/set-up within 7 day(s). 5.  Patient will perform all aspects of toileting with supervision/set-up within 7 day(s). 6.  Patient will perform sponge bathing with supervision/set-up within 7 day(s). Outcome: Progressing Towards Goal 
 OCCUPATIONAL THERAPY TREATMENT Patient: Kian Solis (37 y.o. female) Date: 3/21/2019 Diagnosis: Hypertensive urgency [I16.0] <principal problem not specified> Procedure(s) (LRB): LEFT AND RIGHT HEART CATH / CORONARY ANGIOGRAPHY (N/A) Precautions: Fall(dementia) Chart, occupational therapy assessment, plan of care, and goals were reviewed. ASSESSMENT: 
Patient requires cues for encouragement to participate in therapy session, then requires cues for safety and proper/most efficient technique throughout. Patient had multiple family members in the room at the time. Left her up in the chair with the bed alarm activated and call bell and phone within reach. Progression toward goals: 
?       Improving appropriately and progressing toward goals ? Improving slowly and progressing toward goals ? Not making progress toward goals and plan of care will be adjusted PLAN: 
Patient continues to benefit from skilled intervention to address the above impairments. Continue treatment per established plan of care. Discharge Recommendations:  Home Health Further Equipment Recommendations for Discharge:  None anticipated SUBJECTIVE:  
Patient stated ? Can we do it later? ? 
 
 OBJECTIVE DATA SUMMARY:  
Cognitive/Behavioral Status: 
Neurologic State: Alert;Confused Orientation Level: Disoriented to time;Disoriented to situation;Oriented to person;Oriented to place Cognition: Decreased command following;Poor safety awareness Perception: Appears intact Perseveration: No perseveration noted Safety/Judgement: Awareness of environment;Decreased awareness of need for safety Functional Mobility and Transfers for ADLs: 
Bed Mobility: 
  
 
Transfers: 
Sit to Stand: Contact guard assistance Functional Transfers Bathroom Mobility: Contact guard assistance Toilet Transfer : Contact guard assistance Cues: Verbal cues provided;Visual cues provided Balance: 
Standing: Impaired Standing - Static: Good;Constant support Standing - Dynamic : Fair ADL Intervention: 
  
 
Grooming Washing Hands: Stand-by assistance Brushing/Combing Hair: Stand-by assistance Cues: Verbal cues provided Lower Body Dressing Assistance Socks: Moderate assistance Position Performed: Seated in chair Cues: Verbal cues provided;Visual cues provided Toileting Bladder Hygiene: Contact guard assistance Clothing Management: Contact guard assistance Cues: Verbal cues provided Cognitive Retraining Safety/Judgement: Awareness of environment;Decreased awareness of need for safety Pain: 
Pain Scale 1: Numeric (0 - 10) Pain Intensity 1: 0 Activity Tolerance:  
Fair Please refer to the flowsheet for vital signs taken during this treatment. After treatment:  
? Patient left in no apparent distress sitting up in chair ? Patient left in no apparent distress in bed 
? Call bell left within reach ? Nursing notified ? Caregiver present ? Bed alarm activated COMMUNICATION/COLLABORATION:  
The patient?s plan of care was discussed with: Physical Therapist and Registered Nurse Genevieve Lundborg, OTR/L Time Calculation: 24 mins

## 2019-03-22 NOTE — PROGRESS NOTES
made follow up visit to patient for a palliative consult. Patient had just returned from a heart cath and it did not seem like the best time for this visit. There was several family members in the room as well.  just visited for a couple of minutes and excused himself from the room. Spiritual Care will follow up as needed. Phil Blackwood Pager: [phone]

## 2019-03-22 NOTE — PROGRESS NOTES
Hospitalist Progress Note NAME: Marie Jean :  1935 MRN:  [id number] Assessment / Plan: Hypertensive Urgency /34 in ED Acute on Chronic Diastolic Heart Failure POA LVEF 55% Aortic regurgitation POA with widened pulse pressure Moderate mitral stenosis POA Presumed rheumatic heart disease with MS and aortic valve disease Chest pain/CAD POA 
-s/p nicardipine gtt in ICU, now weaned off 
-Markedly increased pulse pressure, related to the AI Echo at [facility] 2019 LVEF 51 to 55%, severely dilated left atrium Mild AS, mod to severe aortic regurg Moderate MS Holodiastolic flow reversal in the descending aorta Moderate pulmonary HTN PA pressure 
-continue on metoprolol, lasix 
-continue prn hydralazine and prn labetalol 
-Case discussed with Dr. Lance Ritchie today post IAN, appreciate assistance 
-CT TAVR protocol ordered (patient will need pretreatment for IV contrast allergy as she did not receive yesterday - ordered again with Prednisone 50 mg to be dosed at 13 hours, 7 hours and 1 hour before and 50 mg Benadryl ordered at 1 hour before - CT ordered changed to occur tomorrow am 
 
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 
-monitor, remains around baseline today 
-will receive mucomyst and IVF's (500 mL over 240 minutes) starting tomorrow am 4 am to hydrate kidneys before CT with contrast 
 
Dementia POA : No clear encephalopathy per my assessment, Oriented x 2, family says at baseline; Head CT and C-spine CT scans without  Acute changes Neuro consult discontinued by Dr. Rios Bound since patient at cognitive baseline and neuro exam non-focal 
-restart home remeron 
  
Hypothyroidism POA  
-continue L-thyroxine. 
  
Atrial Fib POA 
-INR 1.1 today-->cardiology ordered treatment dose Lovenox; monitor Hgb 
-PO lopressor 
-Cards following 
  
HbA1C 5.3 
  
Home Remeron restarted on 3/21 Obesity POA Body mass index is 30.08 kg/m².  
  
 Code Status: Full Code  
  
Surrogate Decision Maker: desean Michelle [phone], Mynor Mejia [phone] or XOKA [phone] 
  
DVT Prophylaxis: Coumadin 
  
GI Prophylaxis: not indicated 
  
Baseline: lives with DTR Mynor Mejia, has dementia Subjective: Chief Complaint / Reason for Physician Visit: follow-up Hypertension and AI Patient seen for follow-up Patient denies complaints other than mild sore throat s/p IAN; tolerating PO; slept better Family at bedside Review of Systems: 
Symptom Y/N Comments  Symptom Y/N Comments Fever/Chills    Chest Pain n   
Poor Appetite    Edema y Mild and unchanged Cough    Abdominal Pain n   
Sputum    Joint Pain SOB/HASKINS n   Pruritis/Rash Nausea/vomit    Tolerating PT/OT Diarrhea n   Tolerating Diet y Constipation    Other Could NOT obtain due to:   
 
Objective: VITALS:  
Last 24hrs VS reviewed since prior progress note. Most recent are: 
Patient Vitals for the past 24 hrs: 
 Temp Pulse Resp BP SpO2  
03/22/19 1137 98.3 °F (36.8 °C) 71 16 (!) 152/32 97 % 03/22/19 1125  75 13 (!) 158/33 96 % 03/22/19 1120  74 13 (!) 160/39 97 % 03/22/19 1115  73 12 (!) 159/31 96 % 03/22/19 1110  75 14 (!) 165/35 97 % 03/22/19 1105  74 13 (!) 158/36 98 % 03/22/19 1100  74 14 (!) 156/36 100 % 03/22/19 1055  71 13 168/57 100 % 03/22/19 1050  70 17 195/60 100 % 03/22/19 1045  73 13 149/53 100 % 03/22/19 1040  79 14 172/60 100 % 03/22/19 1035  84 15 191/67 100 % 03/22/19 1030  67 15 181/51 99 % 03/22/19 1014  78 18 (!) 175/36 97 % 03/22/19 0733 98.3 °F (36.8 °C) 72 17 (!) 151/37 99 % 03/22/19 0546  69  (!) 137/30   
03/22/19 0436  73  (!) 164/29   
03/22/19 0431  62  (!) 164/29   
03/22/19 0407 97.9 °F (36.6 °C) 67 16 (!) 171/36 99 % 03/22/19 0003  72   99 % 03/22/19 0002 97.9 °F (36.6 °C) 74 16 125/50 99 % 03/21/19 2141  72  (!) 135/35   
03/21/19 2003  73  (!) 170/33   
03/21/19 1842  72  (!) 166/37 99 % 03/21/19 1841 98.1 °F (36.7 °C) 77 18 (!) 166/37 99 % 03/21/19 1834  78  (!) 185/33 98 % 03/21/19 1832 98.1 °F (36.7 °C) 71 18 (!) 185/33  No intake or output data in the 24 hours ending 03/22/19 1715 PHYSICAL EXAM: 
General: WD, WN. Alert, cooperative, no acute distress   
EENT:  EOMI. Anicteric sclerae. MMM Resp:  CTA bilaterally, no wheezing or rales. No accessory muscle use CV:  regular  Rhythm, normal rate,  BLE edema unchanged and overall mild GI:  Soft, Non distended, Non tender.  +Bowel sounds Neurologic:  Alert and oriented X 2, normal speech, Psych:   Poor insight. Not anxious nor agitated Skin:  No rashes. No jaundice Reviewed most current lab test results and cultures  YES Reviewed most current radiology test results   YES Review and summation of old records today    NO Reviewed patient's current orders and MAR    YES 
PMH/SH reviewed - no change compared to H&P 
________________________________________________________________________ Care Plan discussed with: 
  Comments Patient x Family  x daughters RN x Care Manager Consultant  x  [address] [place] [address] Multidiciplinary team rounds were held today with , nursing, pharmacist and clinical coordinator. Patient's plan of care was discussed; medications were reviewed and discharge planning was addressed. ________________________________________________________________________ Total NON critical care TIME:  35   Minutes Total CRITICAL CARE TIME Spent:   Minutes non procedure based Comments >50% of visit spent in counseling and coordination of care x   
________________________________________________________________________ Samara Davila MD  
 
Procedures: see electronic medical records for all procedures/Xrays and details which were not copied into this note but were reviewed prior to creation of Plan. LABS: 
I reviewed today's most current labs and imaging studies. Pertinent labs include: 
Recent Labs  
  03/22/19 
0429 03/21/19 
1052 03/20/19 
0358 WBC 5.9 4.8 6.6 HGB 7.6* 8.6* 8.4* HCT 24.2* 27.6* 26.8*  
 299 263 Recent Labs  
  03/22/19 
0429 03/21/19 
1052 03/20/19 
0358  140 140  
K 4.2 4.2 4.3  107 109* CO2 27 28 24 GLU 77 134* 74 BUN 56* 49* 40* CREA 1.66* 1.87* 1.97* CA 7.9* 8.1* 7.7* MG 1.6 1.6 1.5* PHOS 3.4  --  3.6 ALB 1.9* 2.0* 1.7* TBILI 0.2 0.2 0.2 SGOT 23 27 20 ALT 16 17 11* INR 1.1 1.2* 1.7* Signed: Jd Villasenor MD

## 2019-03-22 NOTE — PROGRESS NOTES
Cardiac Electrophysiology Progress Note [address] [place] [address], [place], [address] [facility]  [phone] 
 
3/22/2019 10:07 AM 
 
Admit Date: 3/16/2019 Admit Diagnosis: Hypertensive urgency [I16.0] Subjective:  
 
Lars Vera   denies chest pain, chest pressure/discomfort, dyspnea, palpitations. Reports lower extremity edema. Awaiting IAN. Visit Vitals BP (!) 151/37 Pulse 72 Temp 98.3 °F (36.8 °C) Resp 17 Ht 5' (1.524 m) Wt 152 lb 4.8 oz (69.1 kg) SpO2 99% BMI 29.74 kg/m² Current Facility-Administered Medications Medication Dose Route Frequency  hydrALAZINE (APRESOLINE) tablet 25 mg  25 mg Oral TID  predniSONE (DELTASONE) tablet 50 mg  50 mg Oral ONCE  predniSONE (DELTASONE) tablet 50 mg  50 mg Oral ONCE  predniSONE (DELTASONE) tablet 50 mg  50 mg Oral ONCE  
 diphenhydrAMINE (BENADRYL) capsule 50 mg  50 mg Oral ONCE  mirtazapine (REMERON) tablet 30 mg  30 mg Oral QHS  amLODIPine (NORVASC) tablet 2.5 mg  2.5 mg Oral DAILY  metoprolol tartrate (LOPRESSOR) tablet 100 mg  100 mg Oral Q12H  furosemide (LASIX) injection 40 mg  40 mg IntraVENous DAILY  levothyroxine (SYNTHROID) tablet 50 mcg  50 mcg Oral 6am  
 labetalol (NORMODYNE;TRANDATE) 20 mg/4 mL (5 mg/mL) injection 10 mg  10 mg IntraVENous Q6H PRN  
 acetaminophen (TYLENOL) suppository 650 mg  650 mg Rectal Q4H PRN  
 hydrALAZINE (APRESOLINE) 20 mg/mL injection 10 mg  10 mg IntraVENous Q6H PRN  
 glucose chewable tablet 16 g  4 Tab Oral PRN  
 dextrose (D50W) injection syrg 12.5-25 g  12.5-25 g IntraVENous PRN  
 glucagon (GLUCAGEN) injection 1 mg  1 mg IntraMUSCular PRN Objective:  
  
Visit Vitals BP (!) 151/37 Pulse 72 Temp 98.3 °F (36.8 °C) Resp 17 Ht 5' (1.524 m) Wt 152 lb 4.8 oz (69.1 kg) SpO2 99% BMI 29.74 kg/m² Physical Exam: Abdomen: soft, non-tender Extremities: extremities normal 
Heart: regular rate and rhythm, + LAKEISHA Lungs: clear to auscultation bilaterally Pulses: 2+ and symmetric Data Review:  
Labs:   
Recent Labs  
  03/22/19 
0429 03/21/19 
1052 03/20/19 
0358 WBC 5.9 4.8 6.6 HGB 7.6* 8.6* 8.4* HCT 24.2* 27.6* 26.8*  
 299 263 Recent Labs  
  03/22/19 
0429 03/21/19 
1052 03/20/19 
0358  140 140  
K 4.2 4.2 4.3  107 109* CO2 27 28 24 GLU 77 134* 74 BUN 56* 49* 40* CREA 1.66* 1.87* 1.97* CA 7.9* 8.1* 7.7* MG 1.6 1.6 1.5* PHOS 3.4  --  3.6 ALB 1.9* 2.0* 1.7* TBILI 0.2 0.2 0.2 SGOT 23 27 20 ALT 16 17 11* INR 1.1 1.2* 1.7* No results for input(s): TROIQ, CPK, CKMB in the last 72 hours. Intake/Output Summary (Last 24 hours) at 3/22/2019 1007 Last data filed at 3/21/2019 1700 Gross per 24 hour Intake 540 ml Output 900 ml Net -360 ml Telemetry: sinus, PACs, ventricular rate 78 Assessment:  
 
Active Problems: 
  Persistent atrial fibrillation ([address] (2/26/2019) Acquired hypothyroidism (2/26/2019) Hypertensive urgency (3/16/2019) CKD (chronic kidney disease) (3/16/2019) Aortic insufficiency (3/19/2019) Mitral stenosis (3/19/2019) Plan:  
 
Nati Taveras is a pleasant 82 y.o. PAF, hypertensive, DM II, Stage 4 CKD, dementia, remote smoking hx with EF 61-65%, Moderate to severe aortic valve stenosis. Cr elevated but stable. Pt without cardiac complaints, awaiting IAN to assess AI. Dr Nuñez Nones to perform R/L heart cath later today. Rupali Velez ANP Patient seen and examined by me with nurse practitioner. I personally performed all components of the history, physical, and medical decision making and agree with the assessment and plan with minor modifications as noted. For IAN and r/l HC today. Cr improved. Cont med rx for htn.  
 
Tata Harrington MD, [facility] 
 
 
 
3/22/2019 
10:07 AM

## 2019-03-22 NOTE — PROGRESS NOTES
Physical Therapy 3/22/2019 Pt off the floor this AM for IAN. Returned this afternoon and pt in room with multiple family members present. Pt reports she is exhausted from testing this morning and can not do anything with therapy right now. Per family pt will be here through next week. Will follow up next treatment day.  
 
Thank Nuzhat Dow, PT, DPT

## 2019-03-22 NOTE — PROGRESS NOTES
3/22/2019 3:30 PM 
 
Admit Date: 3/16/2019 Admit Diagnosis: Hypertensive urgency [I16.0] Subjective:  
 
Keturah Navarro   denies chest pain, chest pressure/discomfort, dyspnea, palpitations, irregular heart beats, near-syncope. Visit Vitals BP (!) 152/32 Pulse 71 Temp 98.3 °F (36.8 °C) Resp 16 Ht 5' (1.524 m) Wt 152 lb 4.8 oz (69.1 kg) SpO2 97% BMI 29.74 kg/m² Current Facility-Administered Medications Medication Dose Route Frequency  hydrALAZINE (APRESOLINE) tablet 25 mg  25 mg Oral TID  predniSONE (DELTASONE) tablet 50 mg  50 mg Oral ONCE  predniSONE (DELTASONE) tablet 50 mg  50 mg Oral ONCE  
 diphenhydrAMINE (BENADRYL) capsule 50 mg  50 mg Oral ONCE  mirtazapine (REMERON) tablet 30 mg  30 mg Oral QHS  amLODIPine (NORVASC) tablet 2.5 mg  2.5 mg Oral DAILY  metoprolol tartrate (LOPRESSOR) tablet 100 mg  100 mg Oral Q12H  furosemide (LASIX) injection 40 mg  40 mg IntraVENous DAILY  levothyroxine (SYNTHROID) tablet 50 mcg  50 mcg Oral 6am  
 labetalol (NORMODYNE;TRANDATE) 20 mg/4 mL (5 mg/mL) injection 10 mg  10 mg IntraVENous Q6H PRN  
 acetaminophen (TYLENOL) suppository 650 mg  650 mg Rectal Q4H PRN  
 hydrALAZINE (APRESOLINE) 20 mg/mL injection 10 mg  10 mg IntraVENous Q6H PRN  
 glucose chewable tablet 16 g  4 Tab Oral PRN  
 dextrose (D50W) injection syrg 12.5-25 g  12.5-25 g IntraVENous PRN  
 glucagon (GLUCAGEN) injection 1 mg  1 mg IntraMUSCular PRN Objective:  
  
Visit Vitals BP (!) 152/32 Pulse 71 Temp 98.3 °F (36.8 °C) Resp 16 Ht 5' (1.524 m) Wt 152 lb 4.8 oz (69.1 kg) SpO2 97% BMI 29.74 kg/m² Physical Exam: Abdomen: soft, non-tender. Bowel sounds normal.  
Extremities: no cyanosis or edema Heart: regular rate and rhythm, S1, S2 normal, no click, rub or gallop. 2/6 SM over aortic area. Lungs: clear to auscultation bilaterally Neurologic: Grossly normal 
 
Data Review:  
Labs: Recent Results (from the past 24 hour(s)) PROTHROMBIN TIME + INR Collection Time: 03/22/19  4:29 AM  
Result Value Ref Range INR 1.1 0.9 - 1.1 Prothrombin time 11.6 (H) 9.0 - 11.1 sec CBC WITH AUTOMATED DIFF Collection Time: 03/22/19  4:29 AM  
Result Value Ref Range WBC 5.9 3.6 - 11.0 K/uL  
 RBC 2.75 (L) 3.80 - 5.20 M/uL HGB 7.6 (L) 11.5 - 16.0 g/dL HCT 24.2 (L) 35.0 - 47.0 % MCV 88.0 80.0 - 99.0 FL  
 MCH 27.6 26.0 - 34.0 PG  
 MCHC 31.4 30.0 - 36.5 g/dL  
 RDW 14.1 11.5 - 14.5 % PLATELET 433 187 - 503 K/uL MPV 11.1 8.9 - 12.9 FL  
 NRBC 0.0 0  WBC ABSOLUTE NRBC 0.00 0.00 - 0.01 K/uL NEUTROPHILS 48 32 - 75 % LYMPHOCYTES 40 12 - 49 % MONOCYTES 10 5 - 13 % EOSINOPHILS 2 0 - 7 % BASOPHILS 0 0 - 1 % IMMATURE GRANULOCYTES 0 0.0 - 0.5 % ABS. NEUTROPHILS 2.8 1.8 - 8.0 K/UL  
 ABS. LYMPHOCYTES 2.4 0.8 - 3.5 K/UL  
 ABS. MONOCYTES 0.6 0.0 - 1.0 K/UL  
 ABS. EOSINOPHILS 0.1 0.0 - 0.4 K/UL  
 ABS. BASOPHILS 0.0 0.0 - 0.1 K/UL  
 ABS. IMM. GRANS. 0.0 0.00 - 0.04 K/UL  
 DF AUTOMATED METABOLIC PANEL, COMPREHENSIVE Collection Time: 03/22/19  4:29 AM  
Result Value Ref Range Sodium 141 136 - 145 mmol/L Potassium 4.2 3.5 - 5.1 mmol/L Chloride 108 97 - 108 mmol/L  
 CO2 27 21 - 32 mmol/L Anion gap 6 5 - 15 mmol/L Glucose 77 65 - 100 mg/dL BUN 56 (H) 6 - 20 MG/DL Creatinine 1.66 (H) 0.55 - 1.02 MG/DL  
 BUN/Creatinine ratio 34 (H) 12 - 20 GFR est AA 36 (L) >60 ml/min/1.73m2 GFR est non-AA 30 (L) >60 ml/min/1.73m2 Calcium 7.9 (L) 8.5 - 10.1 MG/DL Bilirubin, total 0.2 0.2 - 1.0 MG/DL  
 ALT (SGPT) 16 12 - 78 U/L  
 AST (SGOT) 23 15 - 37 U/L Alk. phosphatase 94 45 - 117 U/L Protein, total 5.8 (L) 6.4 - 8.2 g/dL Albumin 1.9 (L) 3.5 - 5.0 g/dL Globulin 3.9 2.0 - 4.0 g/dL A-G Ratio 0.5 (L) 1.1 - 2.2 MAGNESIUM Collection Time: 03/22/19  4:29 AM  
Result Value Ref Range Magnesium 1.6 1.6 - 2.4 mg/dL PHOSPHORUS Collection Time: 03/22/19  4:29 AM  
Result Value Ref Range Phosphorus 3.4 2.6 - 4.7 MG/DL Telemetry: normal sinus rhythm Assessment:  
 
Active Problems: 
  Persistent atrial fibrillation ([address] (2/26/2019) Acquired hypothyroidism (2/26/2019) Hypertensive urgency (3/16/2019) CKD (chronic kidney disease) (3/16/2019) Aortic insufficiency (3/19/2019) Mitral stenosis (3/19/2019) Plan: 1. IAN reviewed. Severe AR, mod to severe MR, Mod MS. D/w CTS. Not candidate for open surgery. Case reviewed with Dr. Beverly Rueda. Will get CT TAVR protocal to assess for aortic valve and annulus calcification and may be candidate for TAVR if calcification is noted. Based upon CT scan will get right and left heart cath next wk. Switch lasix to PO. D/w pt and family.

## 2019-03-22 NOTE — PROGRESS NOTES
TRANSFER - OUT REPORT: 
 
Verbal report given to Rupali BLAIR (name) on Vicente Larson  being transferred to [facility] (unit) for routine progression of care Report consisted of patients Situation, Background, Assessment and  
Recommendations(SBAR). Information from the following report(s) SBAR, Procedure Summary, Intake/Output, MAR and Recent Results was reviewed with the receiving nurse. Lines:  
Peripheral IV 03/17/19 Anterior;Proximal;Right Forearm (Active) Site Assessment Clean, dry, & intact 3/22/2019  4:36 AM  
Phlebitis Assessment 0 3/22/2019  4:36 AM  
Infiltration Assessment 0 3/22/2019  4:36 AM  
Dressing Status Clean, dry, & intact 3/22/2019  4:36 AM  
Dressing Type Transparent 3/22/2019 12:03 AM  
Hub Color/Line Status Patent 3/22/2019  4:36 AM  
Alcohol Cap Used Yes 3/17/2019  8:00 PM  
  
 
Opportunity for questions and clarification was provided. Patient transported with: 
 Monitor

## 2019-03-23 NOTE — PROGRESS NOTES
Initial Nutrition Assessment: 
 
INTERVENTIONS/RECOMMENDATIONS:  
· Meals/Snacks: General/healthful diet: Recommend Cardiac diet ASSESSMENT:  
Patient medically noted for HTN, HF, aortic valve stenosis, mitral stenosis, CKD, and AFIB. PMH for dementia. Chart reviewed for length of stay. Plans for CT scan to determine if patient is a candidate for TAVR. Has been eating 75% of meals per flowsheets. Continue to encourage intake of meals. Diet Order: Consistent carb 
% Eaten:   
Patient Vitals for the past 72 hrs: 
 % Diet Eaten  
03/21/19 0900 75 %  
03/20/19 1740 75 %  
03/20/19 1443 75 % Pertinent Medications: [x]Reviewed []Other: Norvasc, Lasix, Hydralazine, Synthroid, Lopressor, Remeron, Prednisone Pertinent Labs: [x]Reviewed []Other:  002--11 Food Allergies: []None [x]Other: Shellfish Last BM: 3/19   [x]Active     []Hyperactive  []Hypoactive       [] Absent BS Skin:    [x] Intact   [] Incision  [] Breakdown: [] Edema []Other: Anthropometrics:  
Height: 5' (152.4 cm) Weight: 67.4 kg (148 lb 8 oz) IBW (%IBW):   ( ) UBW (%UBW):   (  %) Last Weight Metrics: 
Weight Loss Metrics 3/22/2019 2/27/2019 7/12/2018 2/20/2014 10/21/2013 10/5/2013 9/17/2012 Today's Wt 148 lb 8 oz 155 lb 3.3 oz 136 lb 155 lb 183 lb 184 lb 203 lb 12.8 oz  
BMI 29 kg/m2 30.31 kg/m2 21.95 kg/m2 29.3 kg/m2 34.6 kg/m2 34.78 kg/m2 39.8 kg/m2 BMI: Body mass index is 29 kg/m². This BMI is indicative of: 
 []Underweight    []Normal    [x]Overweight    [] Obesity   [] Extreme Obesity (BMI>40) Estimated Nutrition Needs (Based on):  
1492 Kcals/day(BMR (1067) x 1. 3AF) , 68 g(1.0 g/kg bw) Protein Carbohydrate: At Least 130 g/day  Fluids: 1400 mL/day (1ml/kcal) Pt expected to meet estimated nutrient needs: [x]Yes []No 
 
NUTRITION DIAGNOSES:  
Problem:  No nutritional diagnosis at this time Etiology: related to Signs/Symptoms: as evidenced by NUTRITION INTERVENTIONS: 
 Meals/Snacks: General/healthful diet GOAL:  
PO intake >50% of meals next 3-5 days LEARNING NEEDS (Diet, Food/Nutrient-Drug Interaction):  
 [x] None Identified 
 [] Identified and Education Provided/Documented 
 [] Identified and Pt declined/was not appropriate Cultural, Confucianism, OR Ethnic Dietary Needs:  
 [x] None Identified 
 [] Identified and Addressed 
 
 [x] Interdisciplinary Care Plan Reviewed/Documented  
 [x] Discharge Planning: Heart healthy diet MONITORING /EVALUATION:  
Food/Nutrient Intake Outcomes: Total energy intake Physical Signs/Symptoms Outcomes: Weight/weight change NUTRITION RISK:  
 [] High              [x] Moderate           []  Low  []  Minimal/Uncompromised PT SEEN FOR:  
 []  MD Consult: []Calorie Count []Diabetic Diet Education []Diet Education []Electrolyte Management []General Nutrition Management and Supplements []Management of Tube Feeding []TPN Recommendations []  RN Referral:  []MST score >=2 
   []Enteral/Parenteral Nutrition PTA []Pregnant: Gestational DM or Multigestation 
   []Pressure Ulcer/Wound Care needs 
     
[]  Low BMI [x]  DANDRE Sousa Pager [phone] Weekend Pager [phone]

## 2019-03-23 NOTE — PROGRESS NOTES
Hospitalist Progress Note NAME: Kian Solis :  1935 MRN:  [id number] Assessment / Plan: Hypertensive Urgency /34 in ED Acute on Chronic Diastolic Heart Failure POA LVEF 55% Aortic regurgitation POA with widened pulse pressure Moderate mitral stenosis POA Presumed rheumatic heart disease with MS and aortic valve disease Chest pain/CAD POA 
-s/p nicardipine gtt in ICU, now weaned off 
-Markedly increased pulse pressure, related to the AI Echo at [facility] 2019 LVEF 51 to 55%, severely dilated left atrium Mild AS, mod to severe aortic regurg Moderate MS Holodiastolic flow reversal in the descending aorta Moderate pulmonary HTN PA pressure 
-continue on metoprolol, lasix 
-continue prn hydralazine and prn labetalol 
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 
-monitor, remains around baseline today 
-did receive mucomyst and IVF's this am in attempts to protect kidneys Dementia POA : No clear encephalopathy per my assessment, Oriented x 2, family says at baseline; Head CT and C-spine CT scans without  Acute changes Neuro consult discontinued by Dr. Troy Potter since patient at cognitive baseline and neuro exam non-focal 
-restart home remeron 
  
Hypothyroidism POA  
-continue L-thyroxine. 
  
Atrial Fib POA 
-INR 1.1 today-->cardiology ordered treatment dose Lovenox; monitor Hgb 
-PO lopressor 
-Cards following 
  
HbA1C 5.3 
  
Home Remeron restarted on 3/21 Obesity POA Body mass index is 30.08 kg/m².  
  
Code Status: Full Code  
  
Surrogate Decision Maker: desean Dyer [phone], [facility] [phone] or Danna Alfaro [phone] 
  
DVT Prophylaxis: Coumadin 
  
GI Prophylaxis: not indicated 
  
 Baseline: lives with DTR Meredith Rojas, has dementia Subjective: Chief Complaint / Reason for Physician Visit: follow-up Hypertension and AI Patient seen for follow-up Awaiting CT Case discussed with RN Review of Systems: 
Symptom Y/N Comments  Symptom Y/N Comments Fever/Chills    Chest Pain n   
Poor Appetite    Edema Cough    Abdominal Pain n   
Sputum    Joint Pain SOB/HASKINS n   Pruritis/Rash Nausea/vomit    Tolerating PT/OT Diarrhea n   Tolerating Diet y Constipation    Other Could NOT obtain due to:   
 
Objective: VITALS:  
Last 24hrs VS reviewed since prior progress note. Most recent are: 
Patient Vitals for the past 24 hrs: 
 Temp Pulse Resp BP SpO2  
03/23/19 0759 97.5 °F (36.4 °C) 67 17 (!) 152/103 96 % 03/23/19 0423 98.1 °F (36.7 °C) 71 12 (!) 171/39 94 % 03/23/19 0029 98.3 °F (36.8 °C) 69 14 159/45 96 % 03/22/19 2219   14 (!) 158/32   
03/22/19 1958 98.2 °F (36.8 °C) 84 18 169/47 97 % 03/22/19 1700 98 °F (36.7 °C) 67 18 (!) 161/35 98 % 03/22/19 1137 98.3 °F (36.8 °C) 71 16 (!) 152/32 97 % 03/22/19 1125  75 13 (!) 158/33 96 % 03/22/19 1120  74 13 (!) 160/39 97 % Intake/Output Summary (Last 24 hours) at 3/23/2019 1115 Last data filed at 3/22/2019 2310 Gross per 24 hour Intake 480 ml Output 300 ml Net 180 ml PHYSICAL EXAM: 
General: WD, WN. Alert, cooperative, no acute distress   
EENT:  EOMI. Anicteric sclerae. MMM Resp:  CTA bilaterally, no wheezing or rales. No accessory muscle use CV:  regular  Rhythm, normal rate,  BLE edema unchanged and overall mild GI:  Soft, Non distended, Non tender.  +Bowel sounds Neurologic:  Alert and oriented X 2, normal speech, Psych:   Poor insight. Not anxious nor agitated Skin:  No rashes. No jaundice Reviewed most current lab test results and cultures  YES Reviewed most current radiology test results   YES Review and summation of old records today    NO 
 Reviewed patient's current orders and MAR    YES 
PMH/SH reviewed - no change compared to H&P 
________________________________________________________________________ Care Plan discussed with: 
  Comments Patient x Family  x daughter RN x Care Manager Consultant Multidiciplinary team rounds were held today with , nursing, pharmacist and clinical coordinator. Patient's plan of care was discussed; medications were reviewed and discharge planning was addressed. ________________________________________________________________________ Total NON critical care TIME:  20   Minutes Total CRITICAL CARE TIME Spent:   Minutes non procedure based Comments >50% of visit spent in counseling and coordination of care    
________________________________________________________________________ Julius Danielle MD  
 
Procedures: see electronic medical records for all procedures/Xrays and details which were not copied into this note but were reviewed prior to creation of Plan. LABS: 
I reviewed today's most current labs and imaging studies. Pertinent labs include: 
Recent Labs  
  03/23/19 0420 03/22/19 0429 03/21/19 
1052 WBC 3.0* 5.9 4.8 HGB 9.2* 7.6* 8.6* HCT 29.1* 24.2* 27.6*  
 271 299 Recent Labs  
  03/23/19 0420 03/22/19 0429 03/21/19 
1052  141 140  
K 4.9 4.2 4.2  108 107 CO2 25 27 28 * 77 134* BUN 52* 56* 49* CREA 1.61* 1.66* 1.87* CA 8.5 7.9* 8.1*  
MG  --  1.6 1.6 PHOS  --  3.4  --   
ALB 2.1* 1.9* 2.0*  
TBILI 0.2 0.2 0.2 SGOT 25 23 27 ALT 15 16 17 INR 1.1 1.1 1.2* Signed: Julius Danielle MD

## 2019-03-23 NOTE — PROGRESS NOTES
[facility] FLOOR Progress Note Admit Date: 3/16/2019 POD: * No surgery date entered * Procedure:  Procedure(s): LEFT AND RIGHT HEART CATH / CORONARY ANGIOGRAPHY Subjective:  
 
TAVR work-up; denies chest pain or dyspnea Objective:  
 
Blood pressure (!) 152/103, pulse 67, temperature 97.5 °F (36.4 °C), resp. rate 17, height 5' (1.524 m), weight 148 lb 8 oz (67.4 kg), SpO2 96 %. Temp (24hrs), Av.1 °F (36.7 °C), Min:97.5 °F (36.4 °C), Max:98.3 °F (36.8 °C) Oxygen: CXR Medications reviewed Admission Weight: Last Weight Weight: 154 lb (69.9 kg) Weight: 148 lb 8 oz (67.4 kg) Intake / Output / Drain: 
Current Shift: No intake/output data recorded. Last 24 hrs.:  1901 -  0700 In: 480 [P.O.:480] Out: 300 [Urine:300] EXAM: 
Visit Vitals BP (!) 152/103 Pulse 67 Temp 97.5 °F (36.4 °C) Resp 17 Ht 5' (1.524 m) Wt 148 lb 8 oz (67.4 kg) SpO2 96% BMI 29.00 kg/m² Labs: 
Recent Results (from the past 24 hour(s)) PROTHROMBIN TIME + INR Collection Time: 19  4:20 AM  
Result Value Ref Range INR 1.1 0.9 - 1.1 Prothrombin time 11.2 (H) 9.0 - 11.1 sec CBC WITH AUTOMATED DIFF Collection Time: 19  4:20 AM  
Result Value Ref Range WBC 3.0 (L) 3.6 - 11.0 K/uL  
 RBC 3.31 (L) 3.80 - 5.20 M/uL HGB 9.2 (L) 11.5 - 16.0 g/dL HCT 29.1 (L) 35.0 - 47.0 % MCV 87.9 80.0 - 99.0 FL  
 MCH 27.8 26.0 - 34.0 PG  
 MCHC 31.6 30.0 - 36.5 g/dL  
 RDW 14.0 11.5 - 14.5 % PLATELET 029 734 - 511 K/uL MPV 11.1 8.9 - 12.9 FL  
 NRBC 0.0 0  WBC ABSOLUTE NRBC 0.00 0.00 - 0.01 K/uL NEUTROPHILS 77 (H) 32 - 75 % LYMPHOCYTES 21 12 - 49 % MONOCYTES 2 (L) 5 - 13 % EOSINOPHILS 0 0 - 7 % BASOPHILS 0 0 - 1 % IMMATURE GRANULOCYTES 0 0.0 - 0.5 % ABS. NEUTROPHILS 2.3 1.8 - 8.0 K/UL  
 ABS. LYMPHOCYTES 0.6 (L) 0.8 - 3.5 K/UL  
 ABS. MONOCYTES 0.1 0.0 - 1.0 K/UL  
 ABS. EOSINOPHILS 0.0 0.0 - 0.4 K/UL ABS. BASOPHILS 0.0 0.0 - 0.1 K/UL  
 ABS. IMM. GRANS. 0.0 0.00 - 0.04 K/UL  
 DF MANUAL    
 RBC COMMENTS NORMOCYTIC, NORMOCHROMIC METABOLIC PANEL, COMPREHENSIVE Collection Time: 03/23/19  4:20 AM  
Result Value Ref Range Sodium 137 136 - 145 mmol/L Potassium 4.9 3.5 - 5.1 mmol/L Chloride 107 97 - 108 mmol/L  
 CO2 25 21 - 32 mmol/L Anion gap 5 5 - 15 mmol/L Glucose 168 (H) 65 - 100 mg/dL BUN 52 (H) 6 - 20 MG/DL Creatinine 1.61 (H) 0.55 - 1.02 MG/DL  
 BUN/Creatinine ratio 32 (H) 12 - 20 GFR est AA 37 (L) >60 ml/min/1.73m2 GFR est non-AA 31 (L) >60 ml/min/1.73m2 Calcium 8.5 8.5 - 10.1 MG/DL Bilirubin, total 0.2 0.2 - 1.0 MG/DL  
 ALT (SGPT) 15 12 - 78 U/L  
 AST (SGOT) 25 15 - 37 U/L Alk. phosphatase 104 45 - 117 U/L Protein, total 6.9 6.4 - 8.2 g/dL Albumin 2.1 (L) 3.5 - 5.0 g/dL Globulin 4.8 (H) 2.0 - 4.0 g/dL A-G Ratio 0.4 (L) 1.1 - 2.2 A/P Plan for CTA today Signed By: Kay Barton MD

## 2019-03-23 NOTE — PROGRESS NOTES
Cardiology Progress Note [address], [address] [facility]  [phone] 
 
3/23/2019 8:29 AM 
 
Admit Date: 3/16/2019 Admit Diagnosis: Hypertensive urgency [I16.0] Subjective:  
 
Glen Stoll   denies chest pain. Visit Vitals BP (!) 152/103 Pulse 67 Temp 97.5 °F (36.4 °C) Resp 17 Ht 5' (1.524 m) Wt 148 lb 8 oz (67.4 kg) SpO2 96% BMI 29.00 kg/m² Current Facility-Administered Medications Medication Dose Route Frequency  furosemide (LASIX) tablet 40 mg  40 mg Oral DAILY  acetylcysteine (MUCOMYST) 200 mg/mL (20 %) solution 600 mg  600 mg Oral Q6H  
 enoxaparin (LOVENOX) injection 70 mg ++Partial Syringe++  1 mg/kg SubCUTAneous Q24H  hydrALAZINE (APRESOLINE) tablet 25 mg  25 mg Oral TID  mirtazapine (REMERON) tablet 30 mg  30 mg Oral QHS  amLODIPine (NORVASC) tablet 2.5 mg  2.5 mg Oral DAILY  metoprolol tartrate (LOPRESSOR) tablet 100 mg  100 mg Oral Q12H  levothyroxine (SYNTHROID) tablet 50 mcg  50 mcg Oral 6am  
 labetalol (NORMODYNE;TRANDATE) 20 mg/4 mL (5 mg/mL) injection 10 mg  10 mg IntraVENous Q6H PRN  
 acetaminophen (TYLENOL) suppository 650 mg  650 mg Rectal Q4H PRN  
 hydrALAZINE (APRESOLINE) 20 mg/mL injection 10 mg  10 mg IntraVENous Q6H PRN  
 glucose chewable tablet 16 g  4 Tab Oral PRN  
 dextrose (D50W) injection syrg 12.5-25 g  12.5-25 g IntraVENous PRN  
 glucagon (GLUCAGEN) injection 1 mg  1 mg IntraMUSCular PRN Objective:  
  
Visit Vitals BP (!) 152/103 Pulse 67 Temp 97.5 °F (36.4 °C) Resp 17 Ht 5' (1.524 m) Wt 148 lb 8 oz (67.4 kg) SpO2 96% BMI 29.00 kg/m² Physical Exam: Abdomen: soft, non-tender Extremities: extremities normal 
Heart: regular rate and rhythm Lungs: clear to auscultation bilaterally Pulses: 2+ and symmetric Data Review:  
Labs:   
Recent Labs  
  03/23/19 
0420 03/22/19 
0429 03/21/19 
1052 WBC 3.0* 5.9 4.8 HGB 9.2* 7.6* 8.6* HCT 29.1* 24.2* 27.6*  
  271 299 Recent Labs  
  03/23/19 
0420 03/22/19 
0429 03/21/19 
1052  141 140  
K 4.9 4.2 4.2  108 107 CO2 25 27 28 * 77 134* BUN 52* 56* 49* CREA 1.61* 1.66* 1.87* CA 8.5 7.9* 8.1*  
MG  --  1.6 1.6 PHOS  --  3.4  --   
ALB 2.1* 1.9* 2.0*  
TBILI 0.2 0.2 0.2 SGOT 25 23 27 ALT 15 16 17 INR 1.1 1.1 1.2* No results for input(s): TROIQ, CPK, CKMB in the last 72 hours. Intake/Output Summary (Last 24 hours) at 3/23/2019 3631 Last data filed at 3/22/2019 2310 Gross per 24 hour Intake 480 ml Output 300 ml Net 180 ml Telemetry: nsr Assessment:  
 
Active Problems: 
  Persistent atrial fibrillation ([facility] [address] (2/26/2019) Acquired hypothyroidism (2/26/2019) Hypertensive urgency (3/16/2019) CKD (chronic kidney disease) (3/16/2019) Aortic insufficiency (3/19/2019) Mitral stenosis (3/19/2019) Plan:  
 
Reza Lucas is doing well. Dinesh demonstrated AV issues - not a surgical candidate. CT scan pending to determine whether she is a candidate for TAVR. Cont med rx for htn Ez Rowe MD, [facility] 
 
3/23/2019

## 2019-03-24 NOTE — PROGRESS NOTES
Cardiology Progress Note [address], [address] [place] [address]  [phone] 
 
3/24/2019 9:20 AM 
 
Admit Date: 3/16/2019 Admit Diagnosis: Hypertensive urgency [I16.0] Subjective:  
 
Nati Taveras   denies chest pain. Visit Vitals BP (!) 174/34 Pulse 69 Temp 98.2 °F (36.8 °C) Resp 16 Ht 5' (1.524 m) Wt 147 lb 12.8 oz (67 kg) SpO2 98% BMI 28.87 kg/m² Current Facility-Administered Medications Medication Dose Route Frequency  hydrALAZINE (APRESOLINE) tablet 50 mg  50 mg Oral TID  furosemide (LASIX) tablet 40 mg  40 mg Oral DAILY  enoxaparin (LOVENOX) injection 70 mg ++Partial Syringe++  1 mg/kg SubCUTAneous Q24H  
 mirtazapine (REMERON) tablet 30 mg  30 mg Oral QHS  amLODIPine (NORVASC) tablet 2.5 mg  2.5 mg Oral DAILY  metoprolol tartrate (LOPRESSOR) tablet 100 mg  100 mg Oral Q12H  levothyroxine (SYNTHROID) tablet 50 mcg  50 mcg Oral 6am  
 labetalol (NORMODYNE;TRANDATE) 20 mg/4 mL (5 mg/mL) injection 10 mg  10 mg IntraVENous Q6H PRN  
 acetaminophen (TYLENOL) suppository 650 mg  650 mg Rectal Q4H PRN  
 hydrALAZINE (APRESOLINE) 20 mg/mL injection 10 mg  10 mg IntraVENous Q6H PRN  
 glucose chewable tablet 16 g  4 Tab Oral PRN  
 dextrose (D50W) injection syrg 12.5-25 g  12.5-25 g IntraVENous PRN  
 glucagon (GLUCAGEN) injection 1 mg  1 mg IntraMUSCular PRN Objective:  
  
Visit Vitals BP (!) 174/34 Pulse 69 Temp 98.2 °F (36.8 °C) Resp 16 Ht 5' (1.524 m) Wt 147 lb 12.8 oz (67 kg) SpO2 98% BMI 28.87 kg/m² Physical Exam: Abdomen: soft, non-tender Extremities: extremities normal 
Heart: regular rate and rhythm Lungs: clear to auscultation bilaterally Pulses: 2+ and symmetric Data Review:  
Labs:   
Recent Labs  
  03/24/19 
0500 03/23/19 
0420 03/22/19 
0429 WBC 9.1 3.0* 5.9 HGB 8.1* 9.2* 7.6* HCT 25.7* 29.1* 24.2*  
 316 271 Recent Labs  
  03/24/19 
0500 03/23/19 
0420 03/22/19 2449 03/21/19 
1052  137 141 140  
K 4.3 4.9 4.2 4.2  107 108 107 CO2 27 25 27 28 * 168* 77 134* BUN 61* 52* 56* 49* CREA 1.93* 1.61* 1.66* 1.87* CA 8.5 8.5 7.9* 8.1*  
MG  --   --  1.6 1.6 PHOS  --   --  3.4  --   
ALB 2.0* 2.1* 1.9* 2.0*  
TBILI 0.1* 0.2 0.2 0.2 SGOT 15 25 23 27 ALT 15 15 16 17 INR 1.1 1.1 1.1 1.2* No results for input(s): TROIQ, CPK, CKMB in the last 72 hours. Intake/Output Summary (Last 24 hours) at 3/24/2019 0920 Last data filed at 3/23/2019 2144 Gross per 24 hour Intake 980 ml Output 750 ml Net 230 ml Telemetry: nsr Assessment:  
 
Active Problems: 
  Persistent atrial fibrillation ([address] (2/26/2019) Acquired hypothyroidism (2/26/2019) Hypertensive urgency (3/16/2019) CKD (chronic kidney disease) (3/16/2019) Aortic insufficiency (3/19/2019) Mitral stenosis (3/19/2019) Plan:  
 
Lillie Farooq is hypertensive. Will increase hydralazine. Cr bumped post ct scan. Dr Mckenna Parrish will review ct scan tmrw with CT surgery. Van Martins MD, [facility] 
 
3/24/2019

## 2019-03-24 NOTE — PROGRESS NOTES
[facility] FLOOR Progress Note Admit Date: 3/16/2019 POD: * No surgery date entered * Procedure:  Procedure(s): LEFT AND RIGHT HEART CATH / CORONARY ANGIOGRAPHY Subjective:  
 
Denies chest pain or dyspnea; CT scan yesterday Objective:  
 
Blood pressure (!) 153/34, pulse 65, temperature 97.6 °F (36.4 °C), resp. rate 14, height 5' (1.524 m), weight 147 lb 12.8 oz (67 kg), SpO2 98 %. Temp (24hrs), Av.5 °F (36.4 °C), Min:97.4 °F (36.3 °C), Max:97.6 °F (36.4 °C) Oxygen: CXR Medications reviewed Admission Weight: Last Weight Weight: 154 lb (69.9 kg) Weight: 147 lb 12.8 oz (67 kg) Intake / Output / Drain: 
Current Shift: No intake/output data recorded. Last 24 hrs.:  1901 -  0700 In: 1700 [P.O.:1700] Out: 7869 [[id number]] EXAM: 
Visit Vitals BP (!) 153/34 Pulse 65 Temp 97.6 °F (36.4 °C) Resp 14 Ht 5' (1.524 m) Wt 147 lb 12.8 oz (67 kg) SpO2 98% BMI 28.87 kg/m² Labs: 
Recent Results (from the past 24 hour(s)) PROTHROMBIN TIME + INR Collection Time: 19  5:00 AM  
Result Value Ref Range INR 1.1 0.9 - 1.1 Prothrombin time 11.5 (H) 9.0 - 11.1 sec CBC WITH AUTOMATED DIFF Collection Time: 19  5:00 AM  
Result Value Ref Range WBC 9.1 3.6 - 11.0 K/uL  
 RBC 2.91 (L) 3.80 - 5.20 M/uL HGB 8.1 (L) 11.5 - 16.0 g/dL HCT 25.7 (L) 35.0 - 47.0 % MCV 88.3 80.0 - 99.0 FL  
 MCH 27.8 26.0 - 34.0 PG  
 MCHC 31.5 30.0 - 36.5 g/dL  
 RDW 14.4 11.5 - 14.5 % PLATELET 213 666 - 732 K/uL MPV 11.4 8.9 - 12.9 FL  
 NRBC 0.0 0  WBC ABSOLUTE NRBC 0.00 0.00 - 0.01 K/uL NEUTROPHILS 80 (H) 32 - 75 % LYMPHOCYTES 14 12 - 49 % MONOCYTES 6 5 - 13 % EOSINOPHILS 0 0 - 7 % BASOPHILS 0 0 - 1 % IMMATURE GRANULOCYTES 0 0.0 - 0.5 % ABS. NEUTROPHILS 7.3 1.8 - 8.0 K/UL  
 ABS. LYMPHOCYTES 1.3 0.8 - 3.5 K/UL  
 ABS. MONOCYTES 0.5 0.0 - 1.0 K/UL  
 ABS. EOSINOPHILS 0.0 0.0 - 0.4 K/UL ABS. BASOPHILS 0.0 0.0 - 0.1 K/UL  
 ABS. IMM. GRANS. 0.0 0.00 - 0.04 K/UL  
 DF AUTOMATED METABOLIC PANEL, COMPREHENSIVE Collection Time: 03/24/19  5:00 AM  
Result Value Ref Range Sodium 143 136 - 145 mmol/L Potassium 4.3 3.5 - 5.1 mmol/L Chloride 108 97 - 108 mmol/L  
 CO2 27 21 - 32 mmol/L Anion gap 8 5 - 15 mmol/L Glucose 145 (H) 65 - 100 mg/dL BUN 61 (H) 6 - 20 MG/DL Creatinine 1.93 (H) 0.55 - 1.02 MG/DL  
 BUN/Creatinine ratio 32 (H) 12 - 20 GFR est AA 30 (L) >60 ml/min/1.73m2 GFR est non-AA 25 (L) >60 ml/min/1.73m2 Calcium 8.5 8.5 - 10.1 MG/DL Bilirubin, total 0.1 (L) 0.2 - 1.0 MG/DL  
 ALT (SGPT) 15 12 - 78 U/L  
 AST (SGOT) 15 15 - 37 U/L Alk. phosphatase 85 45 - 117 U/L Protein, total 6.1 (L) 6.4 - 8.2 g/dL Albumin 2.0 (L) 3.5 - 5.0 g/dL Globulin 4.1 (H) 2.0 - 4.0 g/dL A-G Ratio 0.5 (L) 1.1 - 2.2 A/P Monitor Will discuss with Dr Fabiola Gamez Signed By: Elio Mckeon MD

## 2019-03-24 NOTE — PROGRESS NOTES
Hospitalist Progress Note NAME: Ainsley Sicard :  1935 MRN:  [id number] Assessment / Plan: Hypertensive Urgency /34 in ED Acute on Chronic Diastolic Heart Failure POA LVEF 55% Aortic regurgitation POA with widened pulse pressure Moderate mitral stenosis POA Presumed rheumatic heart disease with MS and aortic valve disease Chest pain/CAD POA 
-s/p nicardipine gtt in ICU, now weaned off 
-Markedly increased pulse pressure, related to the AI Echo at [facility] 2019 LVEF 51 to 55%, severely dilated left atrium Mild AS, mod to severe aortic regurg Moderate MS Holodiastolic flow reversal in the descending aorta Moderate pulmonary HTN PA pressure 
-continue on metoprolol, lasix; Hydralazine started today 
-continue prn hydralazine and prn labetalol 
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 
-monitor, Cr a little higher today post CT but still within baseline range; may hold daily lasix if increases in BUN/Cr tomorrow 
-did receive mucomyst and IVF's prior to CT on 3/23 Dementia POA : No clear encephalopathy per my assessment, Oriented x 2, family says at baseline; Head CT and C-spine CT scans without  Acute changes Neuro consult discontinued by Dr. Tony Wu since patient at cognitive baseline and neuro exam non-focal 
-continue home remeron 
  
Hypothyroidism POA  
-continue L-thyroxine. 
  
Atrial Fib POA 
-INR 1.1 today-->cardiology ordered treatment dose Lovenox; monitor Hgb 
-PO lopressor 
-Cards following 
  
HbA1C 5.3 Obesity POA Body mass index is 30.08 kg/m².  
  
Code Status: Full Code  
  
Surrogate Decision Maker: desean Grewal [phone], [facility] [phone] or Rebecca Toribio [phone] 
  
DVT Prophylaxis: Coumadin 
  
GI Prophylaxis: not indicated 
  
Baseline: lives with DTR Zulema Reeder, has dementia Subjective: Chief Complaint / Reason for Physician Visit: follow-up Hypertension and AI 
Patient seen for follow-up Denies complaints BP elevated and Cardiology restarted hydralazine Daughter at bedside Review of Systems: 
Symptom Y/N Comments  Symptom Y/N Comments Fever/Chills    Chest Pain n   
Poor Appetite    Edema Cough    Abdominal Pain n   
Sputum    Joint Pain SOB/HASKINS n   Pruritis/Rash Nausea/vomit    Tolerating PT/OT Diarrhea n   Tolerating Diet y Constipation    Other Could NOT obtain due to:   
 
Objective: VITALS:  
Last 24hrs VS reviewed since prior progress note. Most recent are: 
Patient Vitals for the past 24 hrs: 
 Temp Pulse Resp BP SpO2  
03/24/19 1126 97.4 °F (36.3 °C) 68 18 (!) 170/31 97 % 03/24/19 0720  69   98 % 03/24/19 0719 98.2 °F (36.8 °C) 68 16 (!) 174/34   
03/24/19 0454 97.6 °F (36.4 °C) 65 14 (!) 153/34 98 % 03/24/19 0045 97.6 °F (36.4 °C) 69 14 (!) 140/31 94 % 03/23/19 2144  72  (!) 137/33   
03/23/19 2038 97.5 °F (36.4 °C) 77 16 (!) 131/29 93 % 03/23/19 1527 97.4 °F (36.3 °C) 79 17 (!) 151/32 95 % Intake/Output Summary (Last 24 hours) at 3/24/2019 1338 Last data filed at 3/24/2019 1126 Gross per 24 hour Intake 1220 ml Output 750 ml Net 470 ml PHYSICAL EXAM: 
General: WD, WN. Alert, cooperative, no acute distress   
EENT:  EOMI. Anicteric sclerae. MMM Resp:  CTA bilaterally, no wheezing or rales. No accessory muscle use CV:  Regular  Rhythm, normal rate,  BLE edema mild GI:  Soft, Non distended, Non tender.  +Bowel sounds Neurologic:  Alert and oriented X 2, normal speech, Psych:   Poor insight. Not anxious nor agitated Skin:  No rashes. No jaundice Reviewed most current lab test results and cultures  YES Reviewed most current radiology test results   YES Review and summation of old records today    NO Reviewed patient's current orders and MAR    YES 
PMH/SH reviewed - no change compared to H&P 
________________________________________________________________________ Care Plan discussed with: 
  Comments Patient x Family  x daughter RN x Care Manager Consultant Multidiciplinary team rounds were held today with , nursing, pharmacist and clinical coordinator. Patient's plan of care was discussed; medications were reviewed and discharge planning was addressed. ________________________________________________________________________ Total NON critical care TIME:  20   Minutes Total CRITICAL CARE TIME Spent:   Minutes non procedure based Comments >50% of visit spent in counseling and coordination of care    
________________________________________________________________________ Carmine Stark MD  
 
Procedures: see electronic medical records for all procedures/Xrays and details which were not copied into this note but were reviewed prior to creation of Plan. LABS: 
I reviewed today's most current labs and imaging studies. Pertinent labs include: 
Recent Labs  
  03/24/19 
0500 03/23/19 0420 03/22/19 0429 WBC 9.1 3.0* 5.9 HGB 8.1* 9.2* 7.6* HCT 25.7* 29.1* 24.2*  
 316 271 Recent Labs  
  03/24/19 
0500 03/23/19 0420 03/22/19 0429  137 141  
K 4.3 4.9 4.2  107 108 CO2 27 25 27 * 168* 77 BUN 61* 52* 56* CREA 1.93* 1.61* 1.66* CA 8.5 8.5 7.9*  
MG  --   --  1.6 PHOS  --   --  3.4 ALB 2.0* 2.1* 1.9* TBILI 0.1* 0.2 0.2 SGOT 15 25 23 ALT 15 15 16 INR 1.1 1.1 1.1 Signed: Carmine Stark MD

## 2019-03-25 NOTE — PROGRESS NOTES
Attempted to see patient who is currently LASHAE for an echo. Will follow up as able today vs tomorrow. Family in the room and notified.   
Shirley Arizmendi, PT, DPT

## 2019-03-25 NOTE — ROUTINE PROCESS
2340: Pt's daughter and MD in the room. Pt's family and pt WOULD like to proceed with [facility], however Cr is elevated. Renal consult placed. I will call it in once office opens. Per MD, if Cr improves tomorrow, we might cath pt tomorrow. 36: RN paged hospitalist bc pt's daughter requesting Miralax for pt, hasnt had BM in several days. Nephro cx called in. 
 
1710: Pt finally able to provide urine sample, encouraged her to let us know when she has to urinate again so that we can send remainder of samples.

## 2019-03-25 NOTE — PROGRESS NOTES
Pharmacy Medication Reconciliation The patient was unable to be interviewed regarding current PTA medication list, use and drug allergies because daughter helps patient with medications. Daughter, Donald Ramirez, was present in room and obtained permission from patient to discuss drug regimen with visitor(s) present. The patient's  daughter was questioned regarding use of any other inhalers, topical products, over the counter medications, herbal medications, vitamin products or ophthalmic/nasal/otic medication use. Daughter stated that patient had be recently discharged from another hospital ([address] [place] [address]) and the discharge medication list given to them there is what the patient follows at home/before [facility] [address] [facility]. Daughter also stated that amlodipine was stopped because MD thought was causing fluid retention. However, patient still has the medication at home and saw that [facility] was [facility] [address]. Daughter also stated that patient has not had all natural adithya and tumeric supplements within the past couple of weeks but patient's Md, Dr Yissel Bland, told them they could continue daily. Allergy Update: Advil [ibuprofen]; Aspirin; Meloxicam; Penicillin g; Shellfish containing products; and Sulfa (sulfonamide antibiotics) Recommendations/Findings: The following amendments were made to the patient's active medication list [facility] [address] [facility]:  
1) Additions: Tumeric, adtihya 2) Deletions: none 3) Changes:  
- ALPRAZolam (XANAX) 0.25 mg tablet:  1 tablet Daily PRN 
- digoxin (LANOXIN) 0.125 mg tablet: 1 tab 3x per week (M, W, F) 
- isosorbide mononitrate ER 30 daily to isosorbide mononitrate 60mg CR 1 tablet daily - warfarin 5mg tablet daily to warfarin 2.5 mg 5x per week: Sun, Tues, Wed, Thurs, Sat 
 
 
-Clarified PTA med list with Patient's daughter, Donald Ramirez. PTA medication list was corrected to the following:  
 
Prior to Admission Medications Prescriptions Last Dose Informant Patient Reported? Taking? ALPRAZolam (XANAX) 0.25 mg tablet 2/25/2019 at Unknown time Child Yes Yes Sig: Take 0.25 mg by mouth as needed for Anxiety. Ferrous Fumarate 325 mg (106 mg iron) tab 3/18/2019 at Unknown time Child Yes Yes Sig: Take 1 Tab by mouth daily. TURMERIC PO 2/25/2019 at Unknown time Child Yes Yes Sig: Take 1 Cap by mouth daily. amLODIPine (NORVASC) 10 mg tablet Not Taking at Unknown time Child No No  
Sig: Take 1 Tab by mouth daily. digoxin (LANOXIN) 0.125 mg tablet 3/18/2019 at Unknown time Child Yes Yes Sig: Take 0.125 mg by mouth every Monday, Wednesday, Friday. esomeprazole (NEXIUM) 40 mg capsule 3/18/2019 at Unknown time Child Yes Yes Sig: Take 40mg  by mouth every morning. ginger, Zingiber officinalis, (GINGER EXTRACT) 250 mg cap 2/25/2019 at Unknown time Child Yes Yes Sig: Take 1 Cap by mouth daily. hydrALAZINE (APRESOLINE) 50 mg tablet 3/18/2019 at Unknown time Child No Yes Sig: Take 1 Tab by mouth three (3) times daily. indapamide (LOZOL) 2.5 mg tablet 3/18/2019 at Unknown time Child Yes Yes Sig: Take 2.5 mg by mouth every morning. isosorbide mononitrate ER (IMDUR) 60 mg CR tablet 3/18/2019 at Unknown time Child Yes Yes Sig: Take 1 Tab by mouth daily. levothyroxine (SYNTHROID) 50 mcg tablet 3/18/2019 at Unknown time Child Yes Yes Sig: Take  by mouth daily (before breakfast). metoprolol tartrate (LOPRESSOR) 100 mg IR tablet 3/18/2019 at Unknown time Child No Yes Sig: Take 1 Tab by mouth two (2) times a day. mirtazapine (REMERON) 30 mg tablet 3/18/2019 at Unknown time Child No Yes Sig: Take 1 Tab by mouth nightly. simvastatin (ZOCOR) 10 mg tablet 3/18/2019 at Unknown time Child Yes Yes Sig: Take  by mouth nightly. warfarin (COUMADIN) 2.5 mg tablet 3/18/2019 at Unknown time Child Yes Yes Sig: Take 2.5 mg by mouth five (5) days a week.  Catrachita Tay, Wed, [address], Sat  
 warfarin (COUMADIN) 5 mg tablet 3/18/2019 at Unknown time Child Yes Yes Sig: Take 5 mg by mouth two (2) days a week. Mon, Fri Facility-Administered Medications: None Thank you, Violeta Mitchell PharmD. Candidate, Class of 2019

## 2019-03-25 NOTE — PROGRESS NOTES
Problem: Self Care Deficits Care Plan (Adult) Goal: *Acute Goals and Plan of Care (Insert Text) Description Occupational Therapy Goals Initiated 3/18/2019; Reviewed 3/25/19 1. Patient will perform grooming standing at sink with supervision/set-up within 7 day(s). MET 2. Patient will perform upper body dressing with supervision/set-up within 7 day(s). 3.  Patient will perform lower body dressing with supervision/set-up within 7 day(s). 4.  Patient will perform toilet transfers with supervision/set-up within 7 day(s). SBA on 3/25/19 5. Patient will perform all aspects of toileting with supervision/set-up within 7 day(s). MET 6. Patient will perform sponge bathing with supervision/set-up within 7 day(s). Outcome: Progressing Towards Goal 
 OCCUPATIONAL THERAPY TREATMENT: WEEKLY REASSESSMENT Patient: Lillie Farooq (22 y.o. female) Date: 3/25/2019 Diagnosis: Hypertensive urgency [I16.0] <principal problem not specified> Procedure(s) (LRB): LEFT AND RIGHT HEART CATH / CORONARY ANGIOGRAPHY (N/A) Precautions: Fall(dementia) Chart, occupational therapy assessment, plan of care, and goals were reviewed. ASSESSMENT: Patient seen by OT for weekly re-assessment. Per daughter will always decline activity, but she makes sure patient does things. Daughter is a CNA who stays with her and provides her care. Participated in toileting and grooming at the sink. She is supervision/SBA for this. Declined bathing activity at this time. Daughter verbalized being upset that OT giving patient the option to do things if she feels like it and is not receptive to this technique. Plans to resume HH. Progression toward goals: ?            Improving appropriately and progressing toward goals ? Improving slowly and progressing toward goals ? Not making progress toward goals and plan of care will be adjusted PLAN: 
Goals have been updated based on progression since last assessment. Patient continues to benefit from skilled intervention to address the above impairments. Continue to follow patient 2 times a week to address goals. Planned Interventions: 
?                    Self Care Training                  ? Therapeutic Activities ? Functional Mobility Training    ? Cognitive Retraining 
? Therapeutic Exercises           ? Endurance Activities ? Balance Training                   ? Neuromuscular Re-Education ? Visual/Perceptual Training     ? Home Safety Training 
? Patient Education                 ? Family Training/Education ? Other (comment): 
Discharge Recommendations: Home Health Further Equipment Recommendations for Discharge: none SUBJECTIVE:  
Patient stated ? can I go to bed now?? OBJECTIVE DATA SUMMARY:  
Cognitive/Behavioral Status: 
Neurologic State: Alert Orientation Level: Oriented X4 Cognition: Appropriate decision making; Appropriate for age attention/concentration Functional Mobility and Transfers for ADLs: 
Bed Mobility: 
Supine to Sit: Supervision Sit to Supine: Supervision Transfers: 
Sit to Stand: Supervision ADL Intervention: 
  
 
Grooming Grooming Assistance: Set-up Toileting Toileting Assistance: Stand-by assistance Bladder Hygiene: Stand-by assistance Pain: 
Pain Scale 1: Numeric (0 - 10) Pain Intensity 1: 0 Activity Tolerance:  
fair Please refer to the flowsheet for vital signs taken during this treatment. After treatment:  
? Patient left in no apparent distress sitting up in chair ? Patient left in no apparent distress in bed 
? Call bell left within reach ? Nursing notified ? Caregiver present ? Bed alarm activated COMMUNICATION/COLLABORATION:  
The patient?s plan of care was discussed with: Registered Nurse Holger Morrison Time Calculation: 12 mins

## 2019-03-25 NOTE — PROGRESS NOTES
Hospitalist Progress Note NAME: Mariya Luong :  1935 MRN:  [id number] Assessment / Plan: Hypertensive Urgency /34 in ED Acute on Chronic Diastolic Heart Failure POA LVEF 55% Aortic regurgitation POA with widened pulse pressure Moderate mitral stenosis POA Presumed rheumatic heart disease with MS and aortic valve disease Chest pain/CAD POA 
-s/p nicardipine gtt in ICU, now weaned off 
-Markedly increased pulse pressure, related to the AI Echo at [facility] 2019 LVEF 51 to 55%, severely dilated left atrium Mild AS, mod to severe aortic regurg Moderate MS Holodiastolic flow reversal in the descending aorta Moderate pulmonary HTN PA pressure 
-continue on metoprolol, Hydralazine; Lasix stopped this am before dose give with increasing Cr 
-continue prn hydralazine and prn labetalol 
-CT TAVR protocol shoed: \"IMPRESSION: 1. CTA TAVR  protocol performed. Measurements provided. 2. Occluded appearing celiac artery and SMA. 3. Renal artery origin stenoses. 4. Pleural effusions. \" 
 
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 
-Nephrology consulted today 
-renal ultrasound without hydronephrosis 
-monitor renal function and avoid nephrotoxins as able 
-IVF's per Nephrology Dementia POA : No clear encephalopathy per my assessment, Oriented x 2, family says at baseline; Head CT and C-spine CT scans without  Acute changes Neuro consult discontinued by Dr. Freddy Arnett since patient at cognitive baseline and neuro exam non-focal 
-continue home remeron 
  
Hypothyroidism POA  
-continue L-thyroxine. 
  
Atrial Fib POA 
-INR 1.1 today-->cardiology ordered treatment dose Lovenox; monitor Hgb (essentially stable) -PO lopressor 
-Cards following 
  
HbA1C 5.3 Miralax ordered today Obesity POA Body mass index is 30.08 kg/m².  
  
Code Status: Full Code  
  
Surrogate Decision Maker: desean Campuzano [phone], [facility] [phone] or Kati Paniagua [phone] 
  
 DVT Prophylaxis: Coumadin 
  
GI Prophylaxis: not indicated 
  
Baseline: lives with DTR Flavia Montenegro, has dementia Subjective: Chief Complaint / Reason for Physician Visit: follow-up Hypertension and AI Patient seen for follow-up Denies complaints No cath today with renal function trends Review of Systems: 
Symptom Y/N Comments  Symptom Y/N Comments Fever/Chills    Chest Pain n   
Poor Appetite    Edema Cough    Abdominal Pain n   
Sputum    Joint Pain SOB/HASKINS n   Pruritis/Rash Nausea/vomit    Tolerating PT/OT Diarrhea    Tolerating Diet y Constipation y   Other Could NOT obtain due to:   
 
Objective: VITALS:  
Last 24hrs VS reviewed since prior progress note. Most recent are: 
Patient Vitals for the past 24 hrs: 
 Temp Pulse Resp BP SpO2  
03/25/19 1100 98.2 °F (36.8 °C) 60 16 (!) 146/36 99 % 03/25/19 0717 98.2 °F (36.8 °C) (!) 58 14 (!) 159/30 97 % 03/25/19 0312 98.5 °F (36.9 °C) 63 14 (!) 146/31 94 % 03/24/19 2259 98.1 °F (36.7 °C) 62 18 (!) 148/37 93 % 03/24/19 2012  77     
03/24/19 2008 98.4 °F (36.9 °C) (!) 54 14 (!) 148/25 96 % 03/24/19 1556 97.5 °F (36.4 °C) 72 17 (!) 151/31 95 % Intake/Output Summary (Last 24 hours) at 3/25/2019 1346 Last data filed at 3/25/2019 4641 Gross per 24 hour Intake 680 ml Output 550 ml Net 130 ml PHYSICAL EXAM: 
General: WD, WN. Alert, cooperative, no acute distress   
EENT:  EOMI. Anicteric sclerae. MMM Resp:  CTA bilaterally, no wheezing or rales. No accessory muscle use CV:  Regular  Rhythm, normal rate,  BLE edema mild GI:  Soft, Non distended, Non tender.  +Bowel sounds Neurologic:  Alert and oriented X 2, normal speech, Psych:   Poor insight. Not anxious nor agitated Skin:  No rashes. No jaundice Reviewed most current lab test results and cultures  YES Reviewed most current radiology test results   YES Review and summation of old records today    NO 
 Reviewed patient's current orders and MAR    YES 
PMH/SH reviewed - no change compared to H&P 
________________________________________________________________________ Care Plan discussed with: 
  Comments Patient x Family  x daughter RN x Care Manager Consultant Multidiciplinary team rounds were held today with , nursing, pharmacist and clinical coordinator. Patient's plan of care was discussed; medications were reviewed and discharge planning was addressed. ________________________________________________________________________ Total NON critical care TIME:  25   Minutes Total CRITICAL CARE TIME Spent:   Minutes non procedure based Comments >50% of visit spent in counseling and coordination of care    
________________________________________________________________________ Carmine Stark MD  
 
Procedures: see electronic medical records for all procedures/Xrays and details which were not copied into this note but were reviewed prior to creation of Plan. LABS: 
I reviewed today's most current labs and imaging studies. Pertinent labs include: 
Recent Labs  
  03/25/19 0323 03/24/19 
0500 03/23/19 
0420 WBC 7.8 9.1 3.0* HGB 8.0* 8.1* 9.2* HCT 25.9* 25.7* 29.1*  
 299 316 Recent Labs  
  03/25/19 
0323 03/24/19 
0500 03/23/19 
0420  143 137  
K 4.0 4.3 4.9 * 108 107 CO2 25 27 25 GLU 77 145* 168* BUN 63* 61* 52* CREA 1.97* 1.93* 1.61* CA 8.1* 8.5 8.5 ALB 2.0* 2.0* 2.1* TBILI 0.1* 0.1* 0.2 SGOT 29 15 25 ALT 19 15 15 INR 1.1 1.1 1.1 Signed: Carmine Stark MD

## 2019-03-25 NOTE — CONSULTS
Nephrology Consult Note Alcides Han  
 
www. [email or web address]              Phone - [phone] Patient: Lars Vera YOB: 1935 Date- 3/25/2019 MRN: [id number] REASON FOR CONSULTATION: RENAL FAILURE  
CONSULTING PHYSICIAN:    
ADMIT DATE:3/16/2019 PATIENT PCP:Mike Murphy NP  
 
ASSESSMENT:  
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 
  Persistent atrial fibrillation ([address] (2/26/2019) Acquired hypothyroidism (2/26/2019) Hypertensive urgency (3/16/2019) CKD (chronic kidney disease) (3/16/2019) Aortic insufficiency (3/19/2019) Mitral stenosis (3/19/2019) PLAN:  
· Start ivf · Avoid diuretics if possible · Avoid acei or arb · Start epogen · Check iron panel · Check renal usg · Check urine pr/cr · Increase norvasc dose to 5 mg daily [x] High complexity decision making was performed 
[x] Patient is at high-risk of decompensation with multiple organ involvement Subjective: HPI: Lars Vera is a 82 y.o.  female. She is admitted with hypertensive urgency and diastolic heart failure. She has aortic regurgitation. Her cr. Level is 1.6- 2.1 in last 2 weeks. She has h/o dm and htn. She was on antidm meds in past 
She denies any h/o renal stone No h/o NSAIDS use She had CT WITH IV contrast on 23rd for preparation for TAVR Her bp is high No c/o sob, No c/o chest pain, No c/o nausea or vomiting No c/o  fever. She was give mucomyst po with her iv dye for CT CHEST. She will need cardiac cath this week per DR. Leung Review of Systems:     A 11 point review of system was performed today. Pertinent positives and negatives are mentioned in the HPI. The reminder of the ROS is negative and noncontributory. Past Medical History:  
Diagnosis Date  Aortic valve disorders AS  Asthma  Benign hypertensive heart disease without heart failure  Diabetes [address] [address]  Fibromyalgia  Gastroparesis  GERD (gastroesophageal reflux disease)  Hypertension  Mitral valve disorders(424.0) MR  
 LIVIA (obstructive sleep apnea)  Paroxysmal atrial fibrillation (HCC)  Pure hypercholesterolemia 9/30/2010 Past Surgical History:  
Procedure Laterality Date  ECHO 2D ADULT  11/2009 LVH, normal LV wall motion and ejection fraction, mild aortic stenosis, moderate aortic regurgitation and mild mitral regurgitation. The ejection fraction was 55-60%.  ECHO 2D ADULT  1/2011 EF 60%, LAE, mild AS, AI, MR, PA low 40s  EVENT MONITOR POST SYMPTOMS  9/2010 Rare PACs, no arrythmia with symptoms  LOOP MONITOR  9-10/2011 NSR  
 STRESS TEST MYOVIEW  1/2011  
 no ischemia, EF 63%  US DUPLEX CAROTID BILATERAL  1/2011  
 mild bilat disease Prior to Admission medications Medication Sig Start Date End Date Taking? Authorizing Provider  
warfarin (COUMADIN) 5 mg tablet Take 5 mg by mouth two (2) days a week. Mon, Fri   Yes Provider, Historical  
amLODIPine (NORVASC) 10 mg tablet Take 1 Tab by mouth daily. 3/2/19  Yes Semaj Rangel MD  
hydrALAZINE (APRESOLINE) 50 mg tablet Take 1 Tab by mouth three (3) times daily. 3/1/19   Semaj Rangel MD  
metoprolol tartrate (LOPRESSOR) 100 mg IR tablet Take 1 Tab by mouth two (2) times a day. 3/1/19   Semaj Rangel MD  
isosorbide mononitrate ER (IMDUR) 30 mg tablet Take 1 Tab by mouth daily. 3/2/19   Semaj Rangel MD  
ALPRAZolam Cole Meres) 0.25 mg tablet Take 0.5 Tabs by mouth two (2) times daily as needed for Anxiety. Max Daily Amount: 0.25 mg. 3/1/19   Semaj Rangel MD  
digoxin (LANOXIN) 0.125 mg tablet Take 1 Tab by mouth daily. Patient taking differently: Take 0.125 mg by mouth every Monday, Wednesday, Friday.  3/2/19   Semaj Rangel MD  
 Ferrous Fumarate 325 mg (106 mg iron) tab Take 1 Tab by mouth daily. 3/1/19   Shwetha Elizabeth MD  
warfarin (COUMADIN) 5 mg tablet Take 1 Tab by mouth daily. Patient taking differently: Take 2.5 mg by mouth five (5) days a week. Emmanuel Villatoro, Sat 3/1/19   Shwetha Elizabeth MD  
indapamide (LOZOL) 2.5 mg tablet Take 2.5 mg by mouth every morning. Other, MD Gudelia  
mirtazapine (REMERON) 30 mg tablet Take 1 Tab by mouth nightly. 18   José Manuel Amor MD  
levothyroxine (SYNTHROID) 50 mcg tablet Take  by mouth daily (before breakfast). Provider, Historical  
esomeprazole (NEXIUM) 40 mg capsule Take  by mouth daily. Provider, Historical  
simvastatin (ZOCOR) 10 mg tablet Take  by mouth nightly. Provider, Historical  
 
Allergies Allergen Reactions  Advil [Ibuprofen] Unknown (comments)  Aspirin Unknown (comments)  Meloxicam Unknown (comments)  Penicillin G Unknown (comments)  Shellfish Containing Products Rash  Sulfa (Sulfonamide Antibiotics) Unknown (comments) Social History Tobacco Use  Smoking status: Former Smoker Last attempt to quit: 1963 Years since quittin.2  Smokeless tobacco: Never Used Substance Use Topics  Alcohol use: Yes Family History Problem Relation Age of Onset  Heart Disease Father  Dementia Brother  Heart Disease Brother  Diabetes Brother Objective:   
 
Patient Vitals for the past 24 hrs: 
 Temp Pulse Resp BP SpO2  
19 0717 98.2 °F (36.8 °C) (!) 58 14 (!) 159/30 97 % 19 0312 98.5 °F (36.9 °C) 63 14 (!) 146/31 94 % 19 2259 98.1 °F (36.7 °C) 62 18 (!) 148/37 93 % 19  77     
19 98.4 °F (36.9 °C) (!) 54 14 (!) 148/25 96 % 19 1556 97.5 °F (36.4 °C) 72 17 (!) 151/31 95 % 19 1126 97.4 °F (36.3 °C) 68 18 (!) 170/31 97 % No intake/output data recorded.  
Physical Exam: 
General:Alert, No distress,  
 Eyes:No scleral icterus, No conjunctival pallor Neck:Supple,no mass palpable,no thyromegaly Lungs:Clears to auscultation Bilaterally, normal respiratory effort CVS:RRR, S1 S2 normal,  No rub, Abdomen:Soft, Non tender, No hepatosplenomegaly Extremities: no LE edema Skin:No rash or lesions, Warm and DRY Psych: appropriate affect :  No velasquez Musculoskeletal : no redness, no joint tenderness NEURO: Normal Speech, Non focal     
 
CODE STATUS:  full Care Plan discussed with:  Patient and family Chart reviewed. 
 
  TOTAL TIME: 76 Minutes  
y Reviewed previous records  
y Discussion with patient and/or family and questions answered  
y >50% of visit spent in counseling and coordination of care  
  
 
ECG[de-identified] Rev:no Xray/CT/US/MRI REV:yes Lab Data Personally Reviewed: (see below) Recent Labs  
  03/25/19 
0323 03/24/19 
0500 03/23/19 
0420 WBC 7.8 9.1 3.0* HGB 8.0* 8.1* 9.2*  
 299 316 ANEU 4.0 7.3 2.3 INR 1.1 1.1 1.1  143 137  
K 4.0 4.3 4.9 GLU 77 145* 168* BUN 63* 61* 52* CREA 1.97* 1.93* 1.61* ALT 19 15 15 SGOT 29 15 25 TBILI 0.1* 0.1* 0.2 AP 77 85 104 CA 8.1* 8.5 8.5 Lab Results Component Value Date/Time Color YELLOW/STRAW 03/16/2019 10:58 AM  
 Appearance CLOUDY (A) 03/16/2019 10:58 AM  
 Specific gravity 1.016 03/16/2019 10:58 AM  
 Specific gravity 1.025 02/26/2019 08:08 AM  
 pH (UA) 6.0 03/16/2019 10:58 AM  
 Protein >300 (A) 03/16/2019 10:58 AM  
 Glucose NEGATIVE  03/16/2019 10:58 AM  
 Ketone TRACE (A) 03/16/2019 10:58 AM  
 Bilirubin NEGATIVE  03/16/2019 10:58 AM  
 Urobilinogen 0.2 03/16/2019 10:58 AM  
 Nitrites NEGATIVE  03/16/2019 10:58 AM  
 Leukocyte Esterase NEGATIVE  03/16/2019 10:58 AM  
 Epithelial cells FEW 03/16/2019 10:58 AM  
 Bacteria 1+ (A) 03/16/2019 10:58 AM  
 WBC 5-10 03/16/2019 10:58 AM  
 RBC 5-10 03/16/2019 10:58 AM  
 
 
No results found for: IRON, FE, TIBC, IBCT, PSAT, FERR Lab Results Component Value Date/Time Culture result: MIXED UROGENITAL JOSE ISOLATED 03/16/2019 10:58 AM  
 Culture result: NO GROWTH 6 DAYS 02/25/2019 10:00 PM  
 
Prior to Admission Medications Prescriptions Last Dose Informant Patient Reported? Taking? ALPRAZolam (XANAX) 0.25 mg tablet Unknown at Unknown time  No No  
Sig: Take 0.5 Tabs by mouth two (2) times daily as needed for Anxiety. Max Daily Amount: 0.25 mg. Ferrous Fumarate 325 mg (106 mg iron) tab 3/15/2019 at Unknown time  Yes No  
Sig: Take 1 Tab by mouth daily. amLODIPine (NORVASC) 10 mg tablet 2/21/2019 at Unknown time  No Yes Sig: Take 1 Tab by mouth daily. digoxin (LANOXIN) 0.125 mg tablet 3/15/2019 at Unknown time  No No  
Sig: Take 1 Tab by mouth daily. Patient taking differently: Take 0.125 mg by mouth every Monday, Wednesday, Friday. esomeprazole (NEXIUM) 40 mg capsule 3/15/2019 at Unknown time  Yes No  
Sig: Take  by mouth daily. hydrALAZINE (APRESOLINE) 50 mg tablet 3/15/2019 at Unknown time  No No  
Sig: Take 1 Tab by mouth three (3) times daily. indapamide (LOZOL) 2.5 mg tablet 3/15/2019 at Unknown time  Yes No  
Sig: Take 2.5 mg by mouth every morning. isosorbide mononitrate ER (IMDUR) 30 mg tablet 3/15/2019 at Unknown time  No No  
Sig: Take 1 Tab by mouth daily. levothyroxine (SYNTHROID) 50 mcg tablet 3/16/2019 at Unknown time  Yes No  
Sig: Take  by mouth daily (before breakfast). metoprolol tartrate (LOPRESSOR) 100 mg IR tablet 3/15/2019 at Unknown time  No No  
Sig: Take 1 Tab by mouth two (2) times a day. mirtazapine (REMERON) 30 mg tablet 3/15/2019 at Unknown time  No No  
Sig: Take 1 Tab by mouth nightly. simvastatin (ZOCOR) 10 mg tablet 3/15/2019 at Unknown time  Yes No  
Sig: Take  by mouth nightly. warfarin (COUMADIN) 5 mg tablet 3/14/2019 at Unknown time  No No  
Sig: Take 1 Tab by mouth daily. Patient taking differently: Take 2.5 mg by mouth five (5) days a week.  Josiane Medel, Wed, [address], Sat  
 warfarin (COUMADIN) 5 mg tablet 3/15/2019  Yes Yes Sig: Take 5 mg by mouth two (2) days a week. Mon, Fri Facility-Administered Medications: None Imaging: 
 
Medications list Personally Reviewed   [x]      Yes     []               No   
Thank you for allowing us to participate in the care this patient. We will follow patient with you. Signed By: Ermias Brizuela MD 
[facility] [address] [facility] [address] [place], [address] [facility] Phone - [phone] Fax - [phone] [address], [address] [facility] Phone - [phone] Fax - [phone]    
www. [facility]

## 2019-03-25 NOTE — PROGRESS NOTES
3/25/2019 10:05 AM 
 
Admit Date: 3/16/2019 Admit Diagnosis: Hypertensive urgency [I16.0] Subjective:  
 
Aspen Enriquez denies chest pain, chest pressure/discomfort, dyspnea, palpitations, irregular heart beats, near-syncope, syncope, fatigue, orthopnea, paroxysmal nocturnal dyspnea. Visit Vitals BP (!) 159/30 Pulse (!) 58 Temp 98.2 °F (36.8 °C) Resp 14 Ht 5' (1.524 m) Wt 148 lb 14.4 oz (67.5 kg) SpO2 97% BMI 29.08 kg/m² Current Facility-Administered Medications Medication Dose Route Frequency  sodium chloride (NS) flush  polyethylene glycol (MIRALAX) packet 17 g  17 g Oral ONCE  polyethylene glycol (MIRALAX) packet 17 g  17 g Oral DAILY PRN  
 hydrALAZINE (APRESOLINE) tablet 50 mg  50 mg Oral TID  enoxaparin (LOVENOX) injection 70 mg ++Partial Syringe++  1 mg/kg SubCUTAneous Q24H  
 mirtazapine (REMERON) tablet 30 mg  30 mg Oral QHS  amLODIPine (NORVASC) tablet 2.5 mg  2.5 mg Oral DAILY  metoprolol tartrate (LOPRESSOR) tablet 100 mg  100 mg Oral Q12H  levothyroxine (SYNTHROID) tablet 50 mcg  50 mcg Oral 6am  
 labetalol (NORMODYNE;TRANDATE) 20 mg/4 mL (5 mg/mL) injection 10 mg  10 mg IntraVENous Q6H PRN  
 acetaminophen (TYLENOL) suppository 650 mg  650 mg Rectal Q4H PRN  
 hydrALAZINE (APRESOLINE) 20 mg/mL injection 10 mg  10 mg IntraVENous Q6H PRN  
 glucose chewable tablet 16 g  4 Tab Oral PRN  
 dextrose (D50W) injection syrg 12.5-25 g  12.5-25 g IntraVENous PRN  
 glucagon (GLUCAGEN) injection 1 mg  1 mg IntraMUSCular PRN Objective:  
  
Visit Vitals BP (!) 159/30 Pulse (!) 58 Temp 98.2 °F (36.8 °C) Resp 14 Ht 5' (1.524 m) Wt 148 lb 14.4 oz (67.5 kg) SpO2 97% BMI 29.08 kg/m² Physical Exam: Abdomen: soft, non-tender. Bowel sounds normal. No masses,  no organomegaly Extremities: extremities normal, atraumatic, no cyanosis or edema Heart: regular rate and rhythm, S1, S2 normal, no click, rub or gallop. 2/6 SM over aortic area and LSB. Lungs: clear to auscultation bilaterally Neurologic: Grossly normal 
 
Data Review:  
Labs:   
Recent Results (from the past 24 hour(s)) PROTHROMBIN TIME + INR Collection Time: 03/25/19  3:23 AM  
Result Value Ref Range INR 1.1 0.9 - 1.1 Prothrombin time 11.3 (H) 9.0 - 11.1 sec CBC WITH AUTOMATED DIFF Collection Time: 03/25/19  3:23 AM  
Result Value Ref Range WBC 7.8 3.6 - 11.0 K/uL  
 RBC 2.90 (L) 3.80 - 5.20 M/uL HGB 8.0 (L) 11.5 - 16.0 g/dL HCT 25.9 (L) 35.0 - 47.0 % MCV 89.3 80.0 - 99.0 FL  
 MCH 27.6 26.0 - 34.0 PG  
 MCHC 30.9 30.0 - 36.5 g/dL  
 RDW 14.5 11.5 - 14.5 % PLATELET 040 127 - 442 K/uL MPV 11.0 8.9 - 12.9 FL  
 NRBC 0.0 0  WBC ABSOLUTE NRBC 0.00 0.00 - 0.01 K/uL NEUTROPHILS 53 32 - 75 % LYMPHOCYTES 40 12 - 49 % MONOCYTES 7 5 - 13 % EOSINOPHILS 0 0 - 7 % BASOPHILS 0 0 - 1 % IMMATURE GRANULOCYTES 0 0.0 - 0.5 % ABS. NEUTROPHILS 4.0 1.8 - 8.0 K/UL  
 ABS. LYMPHOCYTES 3.1 0.8 - 3.5 K/UL  
 ABS. MONOCYTES 0.5 0.0 - 1.0 K/UL  
 ABS. EOSINOPHILS 0.0 0.0 - 0.4 K/UL  
 ABS. BASOPHILS 0.0 0.0 - 0.1 K/UL  
 ABS. IMM. GRANS. 0.0 0.00 - 0.04 K/UL  
 DF AUTOMATED METABOLIC PANEL, COMPREHENSIVE Collection Time: 03/25/19  3:23 AM  
Result Value Ref Range Sodium 144 136 - 145 mmol/L Potassium 4.0 3.5 - 5.1 mmol/L Chloride 109 (H) 97 - 108 mmol/L  
 CO2 25 21 - 32 mmol/L Anion gap 10 5 - 15 mmol/L Glucose 77 65 - 100 mg/dL BUN 63 (H) 6 - 20 MG/DL Creatinine 1.97 (H) 0.55 - 1.02 MG/DL  
 BUN/Creatinine ratio 32 (H) 12 - 20 GFR est AA 29 (L) >60 ml/min/1.73m2 GFR est non-AA 24 (L) >60 ml/min/1.73m2 Calcium 8.1 (L) 8.5 - 10.1 MG/DL Bilirubin, total 0.1 (L) 0.2 - 1.0 MG/DL  
 ALT (SGPT) 19 12 - 78 U/L  
 AST (SGOT) 29 15 - 37 U/L Alk. phosphatase 77 45 - 117 U/L Protein, total 5.7 (L) 6.4 - 8.2 g/dL Albumin 2.0 (L) 3.5 - 5.0 g/dL Globulin 3.7 2.0 - 4.0 g/dL A-G Ratio 0.5 (L) 1.1 - 2.2 Telemetry: normal sinus rhythm Assessment:  
 
Active Problems: 
  Persistent atrial fibrillation ([address] (2/26/2019) Acquired hypothyroidism (2/26/2019) Hypertensive urgency (3/16/2019) CKD (chronic kidney disease) (3/16/2019) Aortic insufficiency (3/19/2019) Mitral stenosis (3/19/2019) Plan:  
 
1. Will review CT chest today to assess for calcification and feasibility for TAVR. Due to rising Cr hold cardiac cath today. 2. CKD: consult nephrology due to rising Cr. 3. On lovenox for AC. Coumadin on hold.

## 2019-03-26 NOTE — PROGRESS NOTES
Transitional Care Discharge HUG Note [facility] Patient: Renee Morgan MRN: [id number]  SSN: [id number] YOB: 1935  Age: 82 y.o. Sex: female Admit Date: 3/16/2019 LOS: 10 days Assessment /Risk 
 
SURINDER Diagnosis: 1. Acute chronic diastolic HF - OFF HF bundle d/t Catherization 2. A fib- persistent 3. Hypertensive urgeny Readmission Risks:  HIGH 1. Family knowledge deficit regarding end of life issues - 
2. Fluctuating AMS -  
3. Multiple end organ disease processe 4. Noncompliant intermittent Fluid Status: not accurate Nurse Navigator: Cathleen Engle SURINDER appointment with Dr Jordan Alonso 4/2/2019 @1040 
 
25.0 - 29.9 Overweight / Body mass index is 28.79 kg/m². Code status: Full Recommended Disposition: Home w/Family Subjective:  
 
81 yo female who was recently admitted at [facility] for PNA. Discharged  Then developed progressive SOB, BLE edema , fatigue with weakness. She has been admitted at [facility] for heart failure and was included in the bundle until yesterday, status changed due to cardiac cath that demonstrated severe AS, moderate MS. Family at this time has decline the TAVR patient would be a poor surgical candidate. [facility]. Strong family support from multiple children, she lives with mPOA. Family states patient is often noncompliant with meds, \"refuses them\". Number of Admissions this year  2, 1 ED obs Heart Failure Bundle NO Advance Care Plan:  
 
Persons included in Conversation:  POA Length of ACP Conversation in minutes:  45 minutes Authorized Decision Maker (if patient is incapable of making informed decisions): This person is: Other Legally Authorized Decision Maker (e.g. Next of Kin) Araceli Rain, daughter Review of Existing Advance Directive: 
No changes made this admission This conversation needs to be continued in view of no further cardiac procedures recommended. ROS:  
 
A comprehensive ROS was performed and was negative except for as per HPI. Objective: Allergies Allergen Reactions  Advil [Ibuprofen] Unknown (comments)  Aspirin Unknown (comments)  Meloxicam Unknown (comments)  Penicillin G Unknown (comments)  Shellfish Containing Products Rash  Sulfa (Sulfonamide Antibiotics) Unknown (comments) Past Medical History:  
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  
 LIVIA (obstructive sleep apnea)  Paroxysmal atrial fibrillation (HCC)  Pure hypercholesterolemia 2010 Past Surgical History:  
Procedure Laterality Date  ECHO 2D ADULT  2009 LVH, normal LV wall motion and ejection fraction, mild aortic stenosis, moderate aortic regurgitation and mild mitral regurgitation. The ejection fraction was 55-60%.  ECHO 2D ADULT  2011 EF 60%, LAE, mild AS, AI, MR, PA low 40s  EVENT MONITOR POST SYMPTOMS  2010 Rare PACs, no arrythmia with symptoms  LOOP MONITOR  -10/2011 NSR  
 STRESS TEST MYOVIEW  2011  
 no ischemia, EF 63%  US DUPLEX CAROTID BILATERAL  2011  
 mild bilat disease Social History Tobacco Use Smoking Status Former Smoker  Last attempt to quit: 1963  Years since quittin.2 Smokeless Tobacco Never Used Current Facility-Administered Medications Medication Dose Route Frequency  amLODIPine (NORVASC) tablet 10 mg  10 mg Oral DAILY  sodium chloride (NS) flush 5-40 mL  5-40 mL IntraVENous Q8H  
 sodium chloride (NS) flush 5-40 mL  5-40 mL IntraVENous PRN  polyethylene glycol (MIRALAX) packet 17 g  17 g Oral DAILY PRN  
  0.45% sodium chloride infusion  75 mL/hr IntraVENous CONTINUOUS  
 hydrALAZINE (APRESOLINE) tablet 50 mg  50 mg Oral TID  enoxaparin (LOVENOX) injection 70 mg ++Partial Syringe++  1 mg/kg SubCUTAneous Q24H  
 mirtazapine (REMERON) tablet 30 mg  30 mg Oral QHS  metoprolol tartrate (LOPRESSOR) tablet 100 mg  100 mg Oral Q12H  levothyroxine (SYNTHROID) tablet 50 mcg  50 mcg Oral 6am  
 labetalol (NORMODYNE;TRANDATE) 20 mg/4 mL (5 mg/mL) injection 10 mg  10 mg IntraVENous Q6H PRN  
 acetaminophen (TYLENOL) suppository 650 mg  650 mg Rectal Q4H PRN  
 hydrALAZINE (APRESOLINE) 20 mg/mL injection 10 mg  10 mg IntraVENous Q6H PRN  
 glucose chewable tablet 16 g  4 Tab Oral PRN  
 dextrose (D50W) injection syrg 12.5-25 g  12.5-25 g IntraVENous PRN  
 glucagon (GLUCAGEN) injection 1 mg  1 mg IntraMUSCular PRN Prior to Admission Medications Prescriptions Last Dose Informant Patient Reported? Taking? ALPRAZolam (XANAX) 0.25 mg tablet 2/25/2019 at Unknown time Child Yes Yes Sig: Take 0.25 mg by mouth as needed for Anxiety. Ferrous Fumarate 325 mg (106 mg iron) tab 3/18/2019 at Unknown time Child Yes Yes Sig: Take 1 Tab by mouth daily. TURMERIC PO 2/25/2019 at Unknown time Child Yes Yes Sig: Take 1 Cap by mouth daily. amLODIPine (NORVASC) 10 mg tablet Not Taking at Unknown time Child No No  
Sig: Take 1 Tab by mouth daily. digoxin (LANOXIN) 0.125 mg tablet 3/18/2019 at Unknown time Child Yes Yes Sig: Take 0.125 mg by mouth every Monday, Wednesday, Friday. esomeprazole (NEXIUM) 40 mg capsule 3/18/2019 at Unknown time Child Yes Yes Sig: Take 40 mg by mouth daily. ginger, Zingiber officinalis, (GINGER EXTRACT) 250 mg cap 2/25/2019 at Unknown time Child Yes Yes Sig: Take 1 Cap by mouth daily. hydrALAZINE (APRESOLINE) 50 mg tablet 3/18/2019 at Unknown time Child No Yes Sig: Take 1 Tab by mouth three (3) times daily. indapamide (LOZOL) 2.5 mg tablet 3/18/2019 at Unknown time Child Yes Yes Sig: Take 2.5 mg by mouth every morning. isosorbide mononitrate ER (IMDUR) 60 mg CR tablet 3/18/2019 at Unknown time Child Yes Yes Sig: Take 1 Tab by mouth daily. levothyroxine (SYNTHROID) 50 mcg tablet 3/18/2019 at Unknown time Child Yes Yes Sig: Take  by mouth daily (before breakfast). metoprolol tartrate (LOPRESSOR) 100 mg IR tablet 3/18/2019 at Unknown time Child No Yes Sig: Take 1 Tab by mouth two (2) times a day. mirtazapine (REMERON) 30 mg tablet 3/18/2019 at Unknown time Child No Yes Sig: Take 1 Tab by mouth nightly. simvastatin (ZOCOR) 10 mg tablet 3/18/2019 at Unknown time Child Yes Yes Sig: Take  by mouth nightly. warfarin (COUMADIN) 2.5 mg tablet 3/18/2019 at Unknown time Child Yes Yes Sig: Take 2.5 mg by mouth five (5) days a week. Sun, Tues, Wed, Thurs, Sat  
warfarin (COUMADIN) 5 mg tablet 3/18/2019 at Unknown time Child Yes Yes Sig: Take 5 mg by mouth two (2) days a week. Mon, Fri Facility-Administered Medications: None Patient Vitals for the past 24 hrs: 
 Temp Pulse Resp BP SpO2  
03/26/19 1400  65  (!) 130/29 96 % 03/26/19 1300  65  (!) 128/31 96 % 03/26/19 1230  63  (!) 149/32 95 % 03/26/19 1200  69  (!) 157/38 97 % 03/26/19 1152     96 % 03/26/19 1145  61  (!) 141/31 96 % 03/26/19 1130  61  (!) 149/29 96 % 03/26/19 1115  60  (!) 134/33 97 % 03/26/19 1100  64  (!) 150/30 97 % 03/26/19 1055  (!) 59  (!) 146/27 98 % 03/26/19 1050  62  (!) 154/34 97 % 03/26/19 1025  65  (!) 149/31 98 % 03/26/19 1020  (!) 57  (!) 154/26 97 % 03/26/19 1015  (!) 58  (!) 151/33 97 % 03/26/19 1000  61  (!) 156/30 98 % 03/26/19 0945  61  (!) 155/29 98 % 03/26/19 0930  (!) 58  (!) 170/22 98 % 03/26/19 0915  60  103/81 100 % 03/26/19 0900  (!) 59  (!) 163/27 98 % 03/26/19 0855  62  (!) 178/37 99 % 03/26/19 0853 97 °F (36.1 °C) 63 16 (!) 178/33 99 % 03/26/19 0740  71 17 (!) 204/82 100 % 03/26/19 0651  71  (!) 170/35   
03/26/19 0348 98.3 °F (36.8 °C) 67 16 (!) 132/32 98 % 03/25/19 2259 98.5 °F (36.9 °C) 92 16 155/43 95 % 03/25/19 2121  81  179/85   
03/25/19 1900 98.3 °F (36.8 °C) 67 16 (!) 156/35 97 % Intake and Output: 
 
Intake/Output Summary (Last 24 hours) at 3/26/2019 1528 Last data filed at 3/25/2019 1807 Gross per 24 hour Intake  Output 350 ml Net -350 ml PHYSICAL EXAM: 
 
Visit Vitals BP (!) 130/29 Pulse 65 Temp 97 °F (36.1 °C) Resp 16 Ht 5' (1.524 m) Wt 66.9 kg (147 lb 6.4 oz) SpO2 96% BMI 28.79 kg/m² PHYSICAL EXAM: 
 
Alert, answers questions, fatigued Family at bedside BLE +1 edema Moves all ext equally. CTA 
RRR Non focal 
 
 
Lab/Data Review:  
Recent Results (from the past 24 hour(s)) CREATININE, UR, RANDOM Collection Time: 03/25/19  5:11 PM  
Result Value Ref Range Creatinine, urine 90.40 mg/dL PROTHROMBIN TIME + INR Collection Time: 03/26/19  4:01 AM  
Result Value Ref Range INR 1.1 0.9 - 1.1 Prothrombin time 11.1 9.0 - 11.1 sec VITAMIN B12 Collection Time: 03/26/19  4:01 AM  
Result Value Ref Range Vitamin B12 1,239 (H) 193 - 986 pg/mL FOLATE Collection Time: 03/26/19  4:01 AM  
Result Value Ref Range Folate 9.9 5.0 - 21.0 ng/mL PTH INTACT Collection Time: 03/26/19  4:01 AM  
Result Value Ref Range Calcium 7.5 (L) 8.5 - 10.1 MG/DL  
 PTH, Intact 168.3 (H) 18.4 - 88.0 pg/mL RENAL FUNCTION PANEL Collection Time: 03/26/19  4:01 AM  
Result Value Ref Range Sodium 143 136 - 145 mmol/L Potassium 4.3 3.5 - 5.1 mmol/L Chloride 110 (H) 97 - 108 mmol/L  
 CO2 26 21 - 32 mmol/L Anion gap 7 5 - 15 mmol/L Glucose 95 65 - 100 mg/dL BUN 62 (H) 6 - 20 MG/DL Creatinine 1.82 (H) 0.55 - 1.02 MG/DL  
 BUN/Creatinine ratio 34 (H) 12 - 20 GFR est AA 32 (L) >60 ml/min/1.73m2 GFR est non-AA 27 (L) >60 ml/min/1.73m2 Calcium 7.7 (L) 8.5 - 10.1 MG/DL Phosphorus 2.9 2.6 - 4.7 MG/DL Albumin 1.8 (L) 3.5 - 5.0 g/dL CBC W/O DIFF Collection Time: 03/26/19  4:01 AM  
Result Value Ref Range WBC 7.2 3.6 - 11.0 K/uL  
 RBC 2.86 (L) 3.80 - 5.20 M/uL HGB 8.0 (L) 11.5 - 16.0 g/dL HCT 26.2 (L) 35.0 - 47.0 % MCV 91.6 80.0 - 99.0 FL  
 MCH 28.0 26.0 - 34.0 PG  
 MCHC 30.5 30.0 - 36.5 g/dL  
 RDW 14.6 (H) 11.5 - 14.5 % PLATELET 287 918 - 160 K/uL MPV 11.5 8.9 - 12.9 FL  
 NRBC 0.0 0  WBC ABSOLUTE NRBC 0.00 0.00 - 0.01 K/uL IRON PROFILE Collection Time: 03/26/19  4:01 AM  
Result Value Ref Range Iron 33 (L) 35 - 150 ug/dL TIBC 174 (L) 250 - 450 ug/dL Iron % saturation 19 (L) 20 - 50 % Imaging Reviewed:  
 
Ct Head Wo Cont Result Date: 3/16/2019 IMPRESSION: No acute abnormality. Cta Chest W Or W Wo Cont Result Date: 3/25/2019 IMPRESSION: 1. CTA TAVR  protocol performed. Measurements provided. 2. Occluded appearing celiac artery and SMA. 3. Renal artery origin stenoses. 4. Pleural effusions. Ct Spine Cerv Wo Cont Result Date: 3/16/2019 IMPRESSION: Spondylosis C5-6. No acute abnormality. Us Retroperitoneum Comp Result Date: 3/25/2019 IMPRESSION: Left renal cyst. Otherwise unremarkable exam.  
 
Xr Chest [facility] Result Date: 3/16/2019 Impression: No acute process. Cta Abdomen Pelv W Cont Result Date: 3/25/2019 IMPRESSION: 1. CTA TAVR  protocol performed. Measurements provided. 2. Occluded appearing celiac artery and SMA. 3. Renal artery origin stenoses. 4. Pleural effusions. . 
Assessment:  
 
Active Problems: 
  Persistent atrial fibrillation ([address] (2/26/2019) Acquired hypothyroidism (2/26/2019) Hypertensive urgency (3/16/2019) CKD (chronic kidney disease) (3/16/2019) Aortic insufficiency (3/19/2019) Mitral stenosis (3/19/2019) Plan: The objective of todays visit was to review the transitional care plan with the patient. We discussed the importance of transitional care as it relates to reducing the likelihood of readmission. We reviewed the goals for the first 30 days following hospital discharge. The patient and I discussed wrap around services including nurse navigation, care coordination, home health, transitional care appointments with their primary care provider and specialist team and the role of dispatch health. Patient education focused on readmission zones as described as The Red Zone: High risk for readmission, days 1-21 The Yellow Zone: Moderate risk for readmission, days 22-29 The Green Zone: Lower risk for readmission, days 30 and after 1. Family declined DNR status 2. HU Tool Education for HF, PNA given to daughter . Family education needed, patient probably qualifies for hospice. Family unlikely to accept at this time 3. [facility] [place] [place] Calendar distributed. 3. All labs, I/O's and imaging have been reviewed. 4. Medication Reconciliation  performed at discharge. Accessiblity good RX rationale explained not completed dc not complete at time of this note Compliancy noncompliant at times 5. Collaborated with  yancy on this plan of care. Greater than 45 minutes were spent in patient management, greater than half of which was spent in counseling and coordination of care. 
   
Signed By: Michael Wright NP March 26, 2019

## 2019-03-26 NOTE — ROUTINE PROCESS
0720: PT taken off of floor for CCL. 0850: Pt just got back to [facility], site cdi, no complaints. 1250: RN paged nephro doctor to inquire about d/c. RN also informed CM of pt's possible d/c. Pt okay for d/c from cardiologists standpoint. 1315: Per nephrology, pt okay for d/c. 
 
: RN paging hospitalist to inquire about d/c. 
 
[phone]: Per hospitalist, he would like PT to see pt before d/c. I have called PT to facilitate. 1440: Per hospitalist, pt does NOT need to see PT before leaving, as long as home health is set up. Will stay in touch with CM. 
 
1545: RN called cardiologist to confirm that pt is not being started on new cardiac meds. 1614: Discharge info reviewed with pt and family, who verbalized understanding. All questions answered. Pt discharged safely via wheelchair.

## 2019-03-26 NOTE — PROGRESS NOTES
3/26/2019 8:45 AM 
 
Admit Date: 3/16/2019 Admit Diagnosis: Hypertensive urgency [I16.0] Subjective:  
 
Yaniv Lock   denies chest pain, chest pressure/discomfort, dyspnea, palpitations, irregular heart beats, near-syncope, syncope, fatigue, orthopnea, paroxysmal nocturnal dyspnea, exertional chest pressure/discomfort, claudication, lower extremity edema. Visit Vitals BP (!) 204/82 (BP 1 Location: Left arm, BP Patient Position: At rest) Pulse 71 Temp 98.3 °F (36.8 °C) Resp 17 Ht 5' (1.524 m) Wt 147 lb 6.4 oz (66.9 kg) SpO2 100% BMI 28.79 kg/m² Current Facility-Administered Medications Medication Dose Route Frequency  fentaNYL citrate (PF) injection    PRN  
 midazolam (VERSED) injection    PRN  
 lidocaine (PF) (XYLOCAINE) 10 mg/mL (1 %) injection    PRN  
 heparinized saline 2 units/mL infusion    CONTINUOUS  
 heparin (porcine) 1,000 unit/mL injection    PRN  
 nitroglycerin 100 mcg/ml compounded injection    PRN  
 verapamil (ISOPTIN) 2.5 mg/mL injection    PRN  
 iodixanol (VISIPAQUE) 320 mg iodine/mL contrast injection    PRN  
 amLODIPine (NORVASC) tablet 10 mg  10 mg Oral DAILY  polyethylene glycol (MIRALAX) packet 17 g  17 g Oral DAILY PRN  
 0.45% sodium chloride infusion  75 mL/hr IntraVENous CONTINUOUS  
 hydrALAZINE (APRESOLINE) tablet 50 mg  50 mg Oral TID  enoxaparin (LOVENOX) injection 70 mg ++Partial Syringe++  1 mg/kg SubCUTAneous Q24H  
 mirtazapine (REMERON) tablet 30 mg  30 mg Oral QHS  metoprolol tartrate (LOPRESSOR) tablet 100 mg  100 mg Oral Q12H  levothyroxine (SYNTHROID) tablet 50 mcg  50 mcg Oral 6am  
 labetalol (NORMODYNE;TRANDATE) 20 mg/4 mL (5 mg/mL) injection 10 mg  10 mg IntraVENous Q6H PRN  
 acetaminophen (TYLENOL) suppository 650 mg  650 mg Rectal Q4H PRN  
  hydrALAZINE (APRESOLINE) 20 mg/mL injection 10 mg  10 mg IntraVENous Q6H PRN  
 glucose chewable tablet 16 g  4 Tab Oral PRN  
 dextrose (D50W) injection syrg 12.5-25 g  12.5-25 g IntraVENous PRN  
 glucagon (GLUCAGEN) injection 1 mg  1 mg IntraMUSCular PRN Objective:  
  
Visit Vitals BP (!) 204/82 (BP 1 Location: Left arm, BP Patient Position: At rest) Pulse 71 Temp 98.3 °F (36.8 °C) Resp 17 Ht 5' (1.524 m) Wt 147 lb 6.4 oz (66.9 kg) SpO2 100% BMI 28.79 kg/m² Physical Exam: Abdomen: soft, non-tender. Bowel sounds normal.  
Extremities: no cyanosis or edema Heart: regular rate and rhythm, S1, S2 normal, no murmur, click, rub or gallop Lungs: clear to auscultation bilaterally Neurologic: Grossly normal 
 
Data Review:  
Labs:   
Recent Results (from the past 24 hour(s)) CREATININE, UR, RANDOM Collection Time: 03/25/19  5:11 PM  
Result Value Ref Range Creatinine, urine 90.40 mg/dL PROTHROMBIN TIME + INR Collection Time: 03/26/19  4:01 AM  
Result Value Ref Range INR 1.1 0.9 - 1.1 Prothrombin time 11.1 9.0 - 11.1 sec VITAMIN B12 Collection Time: 03/26/19  4:01 AM  
Result Value Ref Range Vitamin B12 1,239 (H) 193 - 986 pg/mL FOLATE Collection Time: 03/26/19  4:01 AM  
Result Value Ref Range Folate 9.9 5.0 - 21.0 ng/mL PTH INTACT Collection Time: 03/26/19  4:01 AM  
Result Value Ref Range Calcium 7.5 (L) 8.5 - 10.1 MG/DL  
 PTH, Intact PENDING pg/mL RENAL FUNCTION PANEL Collection Time: 03/26/19  4:01 AM  
Result Value Ref Range Sodium 143 136 - 145 mmol/L Potassium 4.3 3.5 - 5.1 mmol/L Chloride 110 (H) 97 - 108 mmol/L  
 CO2 26 21 - 32 mmol/L Anion gap 7 5 - 15 mmol/L Glucose 95 65 - 100 mg/dL BUN 62 (H) 6 - 20 MG/DL Creatinine 1.82 (H) 0.55 - 1.02 MG/DL  
 BUN/Creatinine ratio 34 (H) 12 - 20 GFR est AA 32 (L) >60 ml/min/1.73m2 GFR est non-AA 27 (L) >60 ml/min/1.73m2 Calcium 7.7 (L) 8.5 - 10.1 MG/DL Phosphorus 2.9 2.6 - 4.7 MG/DL Albumin 1.8 (L) 3.5 - 5.0 g/dL CBC W/O DIFF Collection Time: 03/26/19  4:01 AM  
Result Value Ref Range WBC 7.2 3.6 - 11.0 K/uL  
 RBC 2.86 (L) 3.80 - 5.20 M/uL HGB 8.0 (L) 11.5 - 16.0 g/dL HCT 26.2 (L) 35.0 - 47.0 % MCV 91.6 80.0 - 99.0 FL  
 MCH 28.0 26.0 - 34.0 PG  
 MCHC 30.5 30.0 - 36.5 g/dL  
 RDW 14.6 (H) 11.5 - 14.5 % PLATELET 594 786 - 456 K/uL MPV 11.5 8.9 - 12.9 FL  
 NRBC 0.0 0  WBC ABSOLUTE NRBC 0.00 0.00 - 0.01 K/uL IRON PROFILE Collection Time: 03/26/19  4:01 AM  
Result Value Ref Range Iron 33 (L) 35 - 150 ug/dL TIBC 174 (L) 250 - 450 ug/dL Iron % saturation 19 (L) 20 - 50 % Telemetry: normal sinus rhythm Assessment:  
 
Active Problems: 
  Persistent atrial fibrillation ([address] (2/26/2019) Acquired hypothyroidism (2/26/2019) Hypertensive urgency (3/16/2019) CKD (chronic kidney disease) (3/16/2019) Aortic insufficiency (3/19/2019) Mitral stenosis (3/19/2019) Plan: 1. On cath today no significant CAD. Based upon cath mod MS, normal CO and CI, mod pulmonary HTN, no evidence of severe MR or AS, wide pulse pressure consistent with AI. CT reviewed. Will d/w family regarding further option. Concern is if TAVR fails, she won't be have any open surgical option. D/w Dr. Yola Thomas. Will also d/w Derek. 2. CKD: nephrology following. IV contrast for cath use 40 ml.  
3. On lovenox for AC. Coumadin on hold. 4. BP elevated. Increase norvasc dose.

## 2019-03-26 NOTE — PROGRESS NOTES
Occupational Therapy: Pt currently off the floor in CCL. Will return and continue to follow.  
 
[facility], OTR/L

## 2019-03-26 NOTE — PROGRESS NOTES
Physical Therapy Note: 
Call received from CM to see patient prior to discharge. Arrived and spoke with nsg who indicated patient is discharging shortly and deferred. Agree with plan in place with HHPT recommended.

## 2019-03-26 NOTE — PROGRESS NOTES
Patient converted to Afib/Flutter at 2235 per Telemetry. Per Telemetry, pt converted back to NSR at 0200. Bedside report given to ALAN ROBISON [facility], RN. Made cath lab nurses aware that patient was premedicated prior to her last procedure due to her shellfish/contrast allergy.

## 2019-03-26 NOTE — PROGRESS NOTES
Cm acknowledged patient on IVCU. Chart review. HH - on services with At [id number] [facility]. Review of referrals. Not yet submitted. This CM completed referral to [facility] to verify services. PT/OTnotes pending - patient recently returned from Cardiac Cath Lab. Anticipate discharge home with home health. CM will continue to follow. 2070 Matt Moore is onsite and requested to see CM. Family has informed this CM they would like to change [facility] agencies. They have requested [facility]. New FOC signed. Copy to family and on chart. 830 Krystle Wang Referral for [facility] submitted to [facility]. [facility] informed of families decision to choose another agency. April CM acknowledged discharge order. Nursing has informed CM that attending would like patient evaluated by PT prior to discharge. Nursing has informed PT. CM contacted Eliana Thacker at [facility] office. [phone] They can accept services. SOC to be determined by agency. They will contact the patient. [address] Quesada informed we are waiting on PCP appointment and presentation of 2nd IM letter. Nursing has informed this CM PT evaluation prior to discharge is no longer necessary per attending as long as SNPTOT are set up after discharge. Informed Nursing [facility] has accepted. Chart review. Patient was designated as [facility]. No longer designated as [facility]. CM reviewed with Yumiko Crawley, RN  and Bel CARMEN, patient is no longer appropriate for [facility] due to cardiac intervention. [address] Cm acknowledge additional consult for [facility]. Submitted referral to [facility]. CM Specialist has been informed of need for 2nd IM. 
 
1459 CM Specialist informed CM that family had questions regarding discharge. CM met with family. Informed them that [facility] office has agreed to provide service. Informed family they should be contacted within 48 hours to schedule first appointment.   [facility] contact number is included on AVS. Also informed family that follow up appointments will be listed on AVS and that nursing will review with them prior to discharge. CM asked if there were any questions and there was no response. CM tasks are complete for discharge planning. Izora Homans, RN CM Ext [phone]

## 2019-03-26 NOTE — PROGRESS NOTES
Patient LASHAE to cardiac cath lab and now on bedrest for hemostasis protocol post procedure. Will defer and follow up as able once bed rest is released.  
Lavelle Nieves, PT, DPT

## 2019-03-26 NOTE — PROGRESS NOTES
Medicare pt has received, reviewed, and signed 2nd IM letter informing them of their right to appeal the discharge. Signed copied has been placed on pt bedside chart. Michelle Badillo [phone]

## 2019-03-26 NOTE — PROGRESS NOTES
Nephrology Progress Note Alcides Han  
 
[facility]. [facility]                  Phone - [phone] Patient: Nati Taveras Date- 3/26/2019 Admit Date: 3/16/2019 YOB: 1935 CC: Follow up for ckd Subjective: Interval History:  
-  Cr. Slightly better S/p cath this am  
bp high No c/o sob, No c/o chest pain, No c/o nausea or vomiting No c/o  fever. ROS:- as above Assessment:  
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 · proteinuria Plan:  
· Increase norvasc dose · Start po iron · May need epogen if hb remains low after iron replacement Physical Exam:  
GEN: NAD NECK- Supple, no mass RESP: Clear b/l, no wheezing, No accessory muscle use CVS: RRR,S1,S2 ABDO: soft , non tender, No mass EXT:+ Edema NEURO: normal speech, non focal 
 
Care Plan discussed with: patient and family Objective:  
Patient Vitals for the past 24 hrs: 
 Temp Pulse Resp BP SpO2  
03/26/19 1100  64  (!) 150/30 97 % 03/26/19 1055  (!) 59  (!) 146/27 98 % 03/26/19 1050  62  (!) 154/34 97 % 03/26/19 1025  65  (!) 149/31 98 % 03/26/19 1020  (!) 57  (!) 154/26 97 % 03/26/19 1015  (!) 58  (!) 151/33 97 % 03/26/19 1000  61  (!) 156/30 98 % 03/26/19 0945  61  (!) 155/29 98 % 03/26/19 0930  (!) 58  (!) 170/22 98 % 03/26/19 0915  60  103/81 100 % 03/26/19 0900  (!) 59  (!) 163/27 98 % 03/26/19 0855  62  (!) 178/37 99 % 03/26/19 0853 97 °F (36.1 °C) 63 16 (!) 178/33 99 % 03/26/19 0740  71 17 (!) 204/82 100 % 03/26/19 0651  71  (!) 170/35   
03/26/19 0348 98.3 °F (36.8 °C) 67 16 (!) 132/32 98 % 03/25/19 2259 98.5 °F (36.9 °C) 92 16 155/43 95 % 03/25/19 2121  81  179/85   
03/25/19 1900 98.3 °F (36.8 °C) 67 16 (!) 156/35 97 % 03/25/19 1521 97.6 °F (36.4 °C) 69 16 155/41 98 % Last 3 Recorded Weights in this Encounter 03/24/19 5238 03/25/19 0320 03/26/19 2728 Weight: 67 kg (147 lb 12.8 oz) 67.5 kg (148 lb 14.4 oz) 66.9 kg (147 lb 6.4 oz) 03/24 1901 - 03/26 0700 In: 200 [P.O.:200] Out: 900 [Urine:900] Chart reviewed. Pertinent Notes reviewed. Medication list  reviewed Current Facility-Administered Medications Medication  amLODIPine (NORVASC) tablet 10 mg  
 sodium chloride (NS) flush 5-40 mL  sodium chloride (NS) flush 5-40 mL  polyethylene glycol (MIRALAX) packet 17 g  
 0.45% sodium chloride infusion  hydrALAZINE (APRESOLINE) tablet 50 mg  
 enoxaparin (LOVENOX) injection 70 mg ++Partial Syringe++  mirtazapine (REMERON) tablet 30 mg  
 metoprolol tartrate (LOPRESSOR) tablet 100 mg  levothyroxine (SYNTHROID) tablet 50 mcg  labetalol (NORMODYNE;TRANDATE) 20 mg/4 mL (5 mg/mL) injection 10 mg  
 acetaminophen (TYLENOL) suppository 650 mg  
 hydrALAZINE (APRESOLINE) 20 mg/mL injection 10 mg  
 glucose chewable tablet 16 g  
 dextrose (D50W) injection syrg 12.5-25 g  
 glucagon (GLUCAGEN) injection 1 mg Data Review : 
Recent Labs  
  03/26/19 
0401 03/25/19 
0323 03/24/19 
0500 WBC 7.2 7.8 9.1 HGB 8.0* 8.0* 8.1*  
 297 299 ANEU  --  4.0 7.3 INR 1.1 1.1 1.1  144 143  
K 4.3 4.0 4.3 GLU 95 77 145* BUN 62* 63* 61* CREA 1.82* 1.97* 1.93* ALT  --  19 15 SGOT  --  29 15 TBILI  --  0.1* 0.1* AP  --  77 85  
CA 7.5*  7.7* 8.1* 8.5 PHOS 2.9  --   --   
 
Lab Results Component Value Date/Time Culture result: MIXED UROGENITAL JOSE ISOLATED 03/16/2019 10:58 AM  
 Culture result: NO GROWTH 6 DAYS 02/25/2019 10:00 PM  
 
No results found for: SDES Recent Labs  
  03/26/19 
0401 TIBC 174* PSAT 19* Lab Results Component Value Date/Time Creatinine, urine 90.40 03/25/2019 05:11 PM  
 
João Chairez MD 
[facility] 
 www. [facility] [employer] [address] [place], [address] Phone - [phone] Fax - [phone]

## 2019-03-27 NOTE — TELEPHONE ENCOUNTER
Returned daughter's call, Hannah on HIPPA and emergency contact-as pt has dementia. She advised that pt has been taking Spironolactone 50 mg daily since last year. Daughter not sure if given in hospital and if pt should continue. I tried looking in hospital notes and could not find any mention of Spironolactone. The daughter wants to know if mother should continue medication. appt on 4/11/19 with Allison Mcfarlane. Please advise, thanks. Message   Received: Yesterday   Message Contents   MD Toni Sena LPN   Caller: Unspecified (Yesterday,  9:04 AM)             Hold for now. Returned Hannah's call,verified on HIPPA and emergency contact, advised to hold the Spironolactone for now. She advised that her mom's face looks swollen. No other body parts are swollen. No reaction to medication or anything else. Advised to monitor for swelling in feet, ankles, legs, spreading up and any trouble breathing. Advised to call us back if this should start to happen. Daughter verbalized understanding.

## 2019-03-27 NOTE — TELEPHONE ENCOUNTER
Pt daughter called w/ concerns about what medication the pt should take.  One of the medications the pt used to take was spironolactone before she was in the hospital. Dr. Mj Hugo told her to stop taking certain med but that medication was not on the list to continue to take or to stop taking, Please give daughter a call regarding her concerns    thanks

## 2019-03-28 NOTE — PROGRESS NOTES
Hospital Discharge Follow-Up Date/Time:  3/28/2019 1:54 PM 
 
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  
BOGDAN Dementia POA Hypothyroidism POA Atrial Fib POA The physician discharge summary was not available at the time of outreach. Patient was contacted within two business days of discharge. Challenges reviewed: - Complains of \"anxiety at times\" and generalized fatigue. 
- Not currently taking Luli extract 250mg and not taking Turmeric PO, has utilized all that was on hand and has not ordered more. - Currently on a 1800 calorie AHA diet, per daughter. Method of communication with provider :phone with PCP office. Inpatient RRAT score: 26 on 3-26-19. Was this a readmission? no  
Patient stated reason for the readmission: not applicable Nurse Navigator (NN) contacted the patient and daughter via speakerphone to perform post hospital discharge assessment. Verified name and  with daughterRonaldo, as identifiers. Provided introduction to self, and explanation of the Nurse Navigator role. Reviewed discharge instructions and red flags with patient and daughter who verbalized understanding. Patient and daughter given an opportunity to ask questions and does not have any further questions or concerns at this time. The patient and daughter agrees to contact the PCP office for questions related to their healthcare. NN provided contact information for future reference. Disease Specific:   N/A to this admission. Summary of patient's problems: 1. Complains of \"anxiety at times\" and generalized fatigue. 2. Not currently taking Luli extract 250mg and not taking Turmeric PO, has utilized all that was on hand and has not ordered more. Notation made on today's medication reconciliation. 3. Currently on a 1800 calorie AHA diet, per daughter and tolerating well. Patient states she is getting enough to eat. Home Health orders at discharge: PT, OT, SN, Personal Care Aide Home Health company: [employer] [facility] office. Date of initial visit: SN plans to admit either 3-29-19 or 3-30-19. [facility] office does not have aide services available for patient at this time, daughter and PCP office have been notified per [facility] office. Durable Medical Equipment ordered/company: None upon discharge. Durable Medical Equipment received: None upon discharge. Barriers to care? depression, lack of knowledge about disease, stages of grief, support system Advance Care Planning:  
Does patient have an Advance Directive:  Patient has a [address] Directive for [facility], dated 3-, scanned into [employer] [facility]. NN left a message for  [employer] Reasoning[employer]ABRA JUNIOR, with [employer] and a message for SANDI Dey with [employer] to discuss. Patient has a diagnosis of dementia and on physician note by Genna Stout, dated 3-25-19, dementia is listed as a diagnosis. Daughter, Bertha Lindquist, states AMD was completed during last hospital admission and Bertha Lindquist states she completed the document and the patient signed the document. Medications from [facility] After Visit Summary dated 3-26-19:  
New Medications at Discharge: none Changed Medications at Discharge: none Discontinued Medications at Discharge: STOP taking: 
digoxin 0.125 mg tablet (LANOXIN) indapamide 2.5 mg tablet (LOZOL) 
isosorbide mononitrate ER 60 mg CR tablet (IMDUR) 
simvastatin 10 mg tablet (ZOCOR) Medication reconciliation was performed with patient's daughter, Maria Elena, who verbalizes understanding of administration of home medications. There were no barriers to obtaining medications identified at this time. Maria Elena states patient takes medications as ordered; however they are currently out of Ginger extract 250mg and Turmeric PO and they have not ordered more. Notation was made on today's medication reconciliation. Referral to Pharm D needed: no  
 
Current Outpatient Medications Medication Sig  warfarin (COUMADIN) 2.5 mg tablet Take 2.5 mg by mouth five (5) days a week. Michelle Riedel, Wed, Sandip, Sat  ALPRAZolam (XANAX) 0.25 mg tablet Take 0.25 mg by mouth as needed for Anxiety.  warfarin (COUMADIN) 5 mg tablet Take 5 mg by mouth two (2) days a week. Mon, Fri  
 hydrALAZINE (APRESOLINE) 50 mg tablet Take 1 Tab by mouth three (3) times daily.  metoprolol tartrate (LOPRESSOR) 100 mg IR tablet Take 1 Tab by mouth two (2) times a day.  amLODIPine (NORVASC) 10 mg tablet Take 1 Tab by mouth daily.  Ferrous Fumarate 325 mg (106 mg iron) tab Take 1 Tab by mouth daily.  mirtazapine (REMERON) 30 mg tablet Take 1 Tab by mouth nightly.  levothyroxine (SYNTHROID) 50 mcg tablet Take  by mouth daily (before breakfast).  esomeprazole (NEXIUM) 40 mg capsule Take 40 mg by mouth daily.  ginger, Zingiber officinalis, (GINGER EXTRACT) 250 mg cap Take 1 Cap by mouth daily.  TURMERIC PO Take 1 Cap by mouth daily. No current facility-administered medications for this visit. [facility] follow up appointment(s):  
Future Appointments Date Time Provider Fernando Laura 4/11/2019 11:00 AM Ekta Azlu NP [facility] [facility] follow up appointment(s): Patient has SURINDER visit scheduled with Dr. Rosalie Rico on 4-2-19, appointment with Dr. Roya Quesada Nephrology on 5-3-19, appointment with Dr. Aylin Bernal. [facility] on 5-23-19. [facility]:  out of service area Goals  Attends follow-up appointments as directed. 3-28-19: Patient has SURINDER visit scheduled with Dr. Shahab Alvarez on 4-2-19, appointment with Dr. Nati Guerrier Nephrology on 5-3-19, appointment with Dr. Aylin Bernal. [facility] on 5-23-19, and cardio follow-up with Domingo Beck NP (at the office of Dr. Shen Stanford. Madhu/[facility]) on 4-11-19. Family members provide all transportation. ALVARO  
  
  Returns to baseline activity level. 3-2819: Per daughter, Evens Vallejo, patient has not returned to her baseline activity level. [facility] resources in place to maintain patient in the community (ie. Home Health, DME equipment, refer to, medication assistant plan, etc.) 3-28-19: [address] [facility]  [place] office to provide SN, PT, and OT services. SN plans to admit either 3-29-19 or 3-30-19. Aide services were also ordered through home health; however [facility] notified daughter that they did not have aide services available at this time and notification has also been sent to patient's PCP, joelle Singer/[facility]. Daughter states that patient is never left unattended and a family member is with her at all times.  [place]

## 2019-03-29 NOTE — TELEPHONE ENCOUNTER
Daughter called in , Cruz Hilliard on HIPPA and emergency contact, she advised that pt's face if swelling more than yesterday. No swelling in feet,ankles, arms hands. No sob. No pain or other symptoms. Pt is not on prednisone. She is not showing any signs of allergic reaction to any thing. She states that pt face is starting to swell like before she went in the hospital. She wants to know if she should give her the Spironolactone 50 mg. Please advise if okay for daily or as needed or at all. You did advise to hold yesterday until f/u visit on 4/11/19 but the daughter is worried with the swelling. Thanks. Message   Received: Today   Message Contents   MD Tano Wayne LPN   Caller: Unspecified (Today,  8:46 AM)             No. Needs to f/u with her PCP. F/u with us in office. Left message to call me back. Pt daughter returned my call, verified pt with two pt identifiers, told her that  is advising not to restart the [address]. He is advising to f/u with the PCP or us in office. She advised she did see NP today and she noted no swelling. I advised ER if worse or try to f/u with us sooner. Daughter verbalized understanding.

## 2019-03-30 NOTE — DISCHARGE SUMMARY
Discharge Summary PATIENT ID: Adalid Peña MRN: [id number] YOB: 1935 DATE OF ADMISSION: 3/16/2019 10:22 AM   
DATE OF DISCHARGE: 3/30/2019 PRIMARY CARE PROVIDER: Corie Potter MD  
 
 
DISCHARGING PHYSICIAN: Isidoro Pérez MD   
To contact this individual call [phone]. CONSULTATIONS: IP CONSULT TO CARDIOLOGY 
IP CONSULT TO NEPHROLOGY PROCEDURES/SURGERIES: Procedure(s): LEFT AND RIGHT HEART CATH / CORONARY ANGIOGRAPHY Left Ventriculography [id number] [facility] COURSE:  
 
Reinaldo Romberg is a 82 y.o.  female  with PMHx significant for hypertension, recent pneumonia at [facility], presents  to the ED with cc of worsening and progressive lower extremity edema, along with generalized weakness and lethargy.  Patient denies acute chest pain, jaw tightness, reports some mild and unchanged shortness of breath.  S 
 
Hypertensive Urgency /34 in ED 
s/p nicardipine gtt in ICU, now weaned off ,adjusted meds no the blood  pressure is fairly controlled Acute on Chronic Diastolic Heart Failure POA LVEF 55%  Compensated now Aortic regurgitation POA with widened pulse pressure Moderate mitral stenosis POA Presumed rheumatic heart disease with MS and aortic valve disease Chest pain/CAD POA 
-Markedly increased pulse pressure, related to the AI Echo at [facility] 2/2019 LVEF 51 to 55%, severely dilated left atrium 
       Mild AS, mod to severe aortic regurg 
       Moderate MS 
       Holodiastolic flow reversal in the descending aorta        Moderate pulmonary HTN PA pressure 
-continue on metoprolol, Hydralazine  Adjusted meds now  The blood pressure is fairly controlled 
-CT TAVR protocol shoed: \"IMPRESSION: 1. CTA TAVR  protocol performed. Measurements provided. 2. Occluded appearing celiac artery and SMA. 3. Renal artery origin stenoses. 4. Pleural effusions.   Family would like to proceed with conservative management. 
  
 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  
BOGDAN 
-Nephrology consulted  
-renal ultrasound without hydronephrosis 
-monitor renal function and avoid nephrotoxins a 
-IVF's per Nephrology - BMP  tomrrow 
  Dementia POA : No clear encephalopathy per my assessment, Oriented x 2, family says at baseline; Head CT and C-spine CT scans without  Acute changes Neuro consult discontinued by Dr. Abraham Amaya since patient at cognitive baseline and neuro exam non-focal 
-continue home remeron 
  
Hypothyroidism POA  
-continue L-thyroxine. 
  
Atrial Fib POA 
-INR 1.1 today-->cardiology ordered treatment dose Lovenox; monitor Hgb (essentially stable) -PO lopressor 
-Cards following 
  
HbA1C 5.3 
  
Miralax ordered today 
  
Obesity POA Body mass index is 30.08 kg/m².  
  
Code Status: Full Code  
  
Surrogate Decision Maker: desean Zaragoza Angry [phone], Hannah [phone] or [place] [phone] 
 Dementia POA No clear encephalopathy per my assessment Oriented x 2, family says at baseline Head CT and C-s[pine CT scans without  Acute changes I spoke with Dr Thuy Valero, I canceled the neuro consult, will reconsult if acute issues arise Neuro exam appears non focal 
  
Hypothyroidism POA c/w L-thyroxine. 
  
Atrial Fib POA INR 3.2 PO lopressor Cards following 
  
HbA1C 5.3 
  
Obesity POA Body mass index is 30.08 kg/m².  
  
Code Status: Full Code  
  
Surrogate Decision Maker: desean Zaragoza Angry [phone], BBQAU [phone] or [address] [facility] [phone] 
  
DVT Prophylaxis: Coumadin 
  
GI Prophylaxis: not indicated 
  
Baseline: lives with DTR Mynor Mejia, has dementia DISCHARGE DIAGNOSES / PLAN:   
 
1. Hypertensive urgency and pulmonary edema 2. Acute Kidney Injury No results found for this or any previous visit (from the past 24 hour(s)). PENDING TEST RESULTS:  
At the time of discharge the following test results are still pending: none FOLLOW UP APPOINTMENTS:   
Follow-up Information Follow up With Specialties Details Why Contact Info Nikole Guerrero NP Nurse Practitioner Go on 4/2/2019 For hospital follow up appointment at 10:40AM  [address] [facility] [address] [facility] 
[phone] Galdino Longo MD Cardiology On 4/11/2019 Cardiology - follow up with Cathleen Leigh NP, April 11 at [address] [facility] 
[phone] [facility]   They have accepted services and will call you to schedule home visit. [facility] [phone] ADDITIONAL CARE RECOMMENDATIONS: none DIET: Cardiac Diet ACTIVITY: Activity as tolerated DISCHARGE MEDICATIONS: 
Discharge Medication List as of 3/26/2019  3:48 PM  
  
CONTINUE these medications which have NOT CHANGED Details  
!! warfarin (COUMADIN) 2.5 mg tablet Take 2.5 mg by mouth five (5) days a week. Sun, Tues, Wed, Thurs, Sat, Historical Med ALPRAZolam (XANAX) 0.25 mg tablet Take 0.25 mg by mouth as needed for Anxiety. , Historical Med  
  
ginger, Zingiber officinalis, (GINGER EXTRACT) 250 mg cap Take 1 Cap by mouth daily. , Historical Med  
  
TURMERIC PO Take 1 Cap by mouth daily. , Historical Med  
  
!! warfarin (COUMADIN) 5 mg tablet Take 5 mg by mouth two (2) days a week. Mon, Fri, Historical Med  
  
hydrALAZINE (APRESOLINE) 50 mg tablet Take 1 Tab by mouth three (3) times daily. , Normal, Disp-90 Tab, R-0  
  
metoprolol tartrate (LOPRESSOR) 100 mg IR tablet Take 1 Tab by mouth two (2) times a day., Normal, Disp-60 Tab, R-0 Ferrous Fumarate 325 mg (106 mg iron) tab Take 1 Tab by mouth daily. , OTC  
  
mirtazapine (REMERON) 30 mg tablet Take 1 Tab by mouth nightly., Normal, Disp-90 Tab, R-3  
  
levothyroxine (SYNTHROID) 50 mcg tablet Take  by mouth daily (before breakfast). , Historical Med  
  
esomeprazole (NEXIUM) 40 mg capsule Take 40 mg by mouth daily. , Historical Med  
  
amLODIPine (NORVASC) 10 mg tablet Take 1 Tab by mouth daily. , Normal, Disp-30 Tab, R-0  
  
 !! - Potential duplicate medications found. Please discuss with provider. STOP taking these medications  
  
 digoxin (LANOXIN) 0.125 mg tablet Comments:  
Reason for Stopping:   
   
 isosorbide mononitrate ER (IMDUR) 60 mg CR tablet Comments:  
Reason for Stopping:   
   
 indapamide (LOZOL) 2.5 mg tablet Comments:  
Reason for Stopping:   
   
 simvastatin (ZOCOR) 10 mg tablet Comments:  
Reason for Stopping:   
   
  
 
 
 
DISPOSITION: 
  Home With: 
 OT  PT  HH  RN  
  
 Long term SNF/Inpatient Rehab Independent/assisted living Hospice Other:  
 
 
PATIENT CONDITION AT DISCHARGE:  
 
Functional status Poor Deconditioned Independent Cognition Prudence Brittany Forgetful Dementia Code status Full code DNR   
 
PHYSICAL EXAMINATION AT DISCHARGE Visit Vitals BP (!) 128/29 Pulse 66 Temp 97 °F (36.1 °C) Resp 16 Ht 5' (1.524 m) Wt 66.9 kg (147 lb 6.4 oz) SpO2 95% BMI 28.79 kg/m² No data recorded. O2 Flow Rate (L/min): 2 l/min O2 Device: Room air No data found. No intake or output data in the 24 hours ending 03/30/19 1644 Last shift: 
  No intake/output data recorded. Last 3 shifts: 
  No intake/output data recorded. General:   Alert, cooperative, no acute distress Head:   Atraumatic Eyes:   Conjunctivae clear ENT:  Oral mucosa normal  
Neck:  Supple, trachea midline, no adenopathy No JVD Back:    No CVA tenderness Chest wall:    No tenderness or deformities Lungs:   bilateral wheezing Heart:   Regular rhythm, no murmur Abdomen:    Soft, non-tender No masses or organomegaly Extremities:  No edema or DVT signs Pulses:  Symmetric all extremities Skin:  Warm and dry No rashes or lesions Neurologic:  Oriented No focal deficits CHRONIC MEDICAL DIAGNOSES: 
Problem List as of 3/26/2019 Date Reviewed: 3/16/2019 Codes Class Noted - Resolved Aortic insufficiency ICD-10-CM: I35.1 ICD-9-CM: 424.1  3/19/2019 - Present Mitral stenosis ICD-10-CM: I05.0 ICD-9-CM: 394.0  3/19/2019 - Present Bilateral carotid artery stenosis ICD-10-CM: I65.23 ICD-9-CM: 433.10, 433.30  3/18/2019 - Present Vaso vagal episode ICD-10-CM: R55 
ICD-9-CM: 780.2  3/18/2019 - Present Hypertensive urgency ICD-10-CM: I16.0 ICD-9-CM: 401.9  3/16/2019 - Present CKD (chronic kidney disease) ICD-10-CM: N18.9 ICD-9-CM: 585.9  3/16/2019 - Present Acute renal failure superimposed on stage 4 chronic kidney disease (HCC) (Chronic) ICD-10-CM: N17.9, N18.4 ICD-9-CM: 584.9, 585.4  2/28/2019 - Present Persistent atrial fibrillation (HCC) ICD-10-CM: I48.1 ICD-9-CM: 427.31  2/26/2019 - Present CAP (community acquired pneumonia) ICD-10-CM: J18.9 ICD-9-CM: 807  2/26/2019 - Present Essential hypertension ICD-10-CM: I10 
ICD-9-CM: 401.9  2/26/2019 - Present Anticoagulant long-term use (Chronic) ICD-10-CM: Z79.01 
ICD-9-CM: V58.61  2/26/2019 - Present Dementia (Chronic) ICD-10-CM: F03.90 ICD-9-CM: 294.20  2/26/2019 - Present Acquired hypothyroidism (Chronic) ICD-10-CM: E03.9 ICD-9-CM: 244.9  2/26/2019 - Present LIVIA on CPAP ICD-10-CM: G47.33, Z99.89 ICD-9-CM: 327.23, V46.8  5/18/2012 - Present Paroxysmal atrial fibrillation (HCC) ICD-10-CM: I48.0 ICD-9-CM: 427.31  Unknown - Present Mitral valve disorders(424.0) ICD-10-CM: I05.9 ICD-9-CM: 424.0  Unknown - Present Overview Signed 9/30/2010  3:30 PM by Cyn Short MR Benign hypertensive heart disease without heart failure ICD-10-CM: I11.9 ICD-9-CM: 402.10  Unknown - Present Aortic valve disorders ICD-10-CM: I35.9 ICD-9-CM: 424.1  9/30/2010 - Present Overview Signed 9/30/2010  3:34 PM by Cyn Short AS Pure hypercholesterolemia ICD-10-CM: E78.00 ICD-9-CM: 272.0  9/30/2010 - Present RESOLVED: Atrial fibrillation ([facility] [address] ICD-10-CM: I48.91 
ICD-9-CM: 427.31  9/30/2010 - 9/19/2011 RESOLVED: Aortic valve disorders ICD-10-CM: I35.9 ICD-9-CM: 424.1  9/30/2010 - 9/30/2010 Overview Signed 9/30/2010  3:32 PM by Maynor Valadez AS Greater than  45 minutes were spent with the patient on counseling and coordination of care Signed:  
Cassidy Parra MD 
3/30/2019 
4:44 PM

## 2019-04-03 NOTE — PROGRESS NOTES
NN received notification from Valentine Christian, [facility], that patient had had a 3 pound weight gain. This NN met with patient's daughter, Tyrone Moran, who reports difficulty reading their home scale and states she believes patient has been 154 pounds all week; however [facility] is currently traveling and does not have access to patient's weight log. Tyrone Hollisalana reports patient saw her PCP, Dr. Bernice Barger, on 4-2-19 and was given a new prescription for Lasix; however [facility] does not currently have access to the dosage. This NN confirmed with Kandice Escoto, at the office of Dr. Anna Miller that new prescription for Lasix 20mg take 1/2 daily as needed for swelling. Tyrone Hollisalana states she has gotten the Lasix filled and gave patient 1-2 tab this morning. Tyrone Hollisalana also reports patient \"has some swelling in her face but it is better today. \" Lolytisha Bunnyalana states she keeps patient's feet elevated many times throughout the day. Tyrone Bunnyalana states patient has no SOB today. Red flags reviewed with Tyrone Moran today and she verbalizes an understanding, states she will call cardiologist and/or PCP right away should red flags present or should patient have additional 3 pound weight gain overnight or 5 pounds in one week.

## 2019-04-05 NOTE — TELEPHONE ENCOUNTER
Verified patient with two identifiers. Spoke with Tyrone Moran on HIPAA, pt was taken off of Lasix by Dr. Leela Armendariz d/t kidney function being strained. She has gained weight, weighting 149, face full, legs swollen. PCP told her to take 1/2 tab of 20 mg today and tomorrow and to only take if weight rises and/or swelling occurs. Tyrone Moran asking is ok, pt has an appt with you on 4/11.

## 2019-04-05 NOTE — TELEPHONE ENCOUNTER
Pt's daughter wanted to inform that pts pcp put her back on lasix due to 10 lb weight gain.   Thanks, [facility] Bita

## 2019-04-05 NOTE — TELEPHONE ENCOUNTER
Verified patient with two identifiers. Spoke with Jacey Méndez on HIPAA, advising her pt can take lasix for 2 days as prescribed by PCP then only as needed. She verbalized understanding.

## 2019-04-06 PROBLEM — R06.02 SHORTNESS OF BREATH: Status: ACTIVE | Noted: 2019-01-01

## 2019-04-06 PROBLEM — I48.91 ATRIAL FIBRILLATION WITH RVR (HCC): Status: ACTIVE | Noted: 2019-01-01

## 2019-04-06 NOTE — ED PROVIDER NOTES
EMERGENCY DEPARTMENT HISTORY AND PHYSICAL EXAM  
     
 
Date: 4/5/2019 Patient Name: Wellington Barger History of Presenting Illness Chief Complaint Patient presents with  Wheezing Daughter brought the patient in because her lungs sounded wheezy and her BP was elevated on their home machine. No wheezing heard in triage. Patient has a known mitral and aortic valve insufficiency and a fib. History Provided By:  Patient and daughter HPI: Wellington Barger is a 82 y.o. female, pmhx mitral and ao valve insufficiency, who presents ambulatory to the ED with her daughter who reports she was concerned about the patient's blood pressure this evening and felt that she was \"wheezing\" while talking this evening. Patient has no complaints herself. Per daughter she checked her blood pressure at 8 PM tonight it was 188/34 after which she gave her her nighttime dose of hydralazine and metoprolol. She did not provide any additional doses and was previously been prescribed. Patient reportedly \"never has complaints\" patient's daughter was concerned due to her recent hospitalization and new diagnosis of TAVR. Patient was hospitalized from February 16-27 and evaluated by Dr. Jaxon Rust of cardiology. Patient had transesophageal echo and catheterization that confirmed diagnosis of her valve abnormalities. Patient and family had extensive discussion with cardiology about potential for valvular repair. Due to patient's age and other comorbidities it was determined that surgery was too risky and patient, family and cardiology elected for medical management. Patient previously on digoxin, simvastatin and isosorbide which is currently being held until the 11th of this month. Patient chronically takes Coumadin for anticoagulation. Also takes Lasix as needed.   Patient's daughter reports that she spoke with nurse practitioner with primary care who recommended patient receive 10 mg twice daily of Lasix today and tomorrow due to intermittent \"facial swelling\" that the daughter noticed. Patient is return to her 10 mg daily dosing in 2 days. Patient specifically denies any associated fevers, chills, nausea, vomiting, diarrhea, abd pain, CP,  urinary sxs, changes in BM, or headache. PCP: Amie Phillips MD 
 
Social Hx: former tobacco  denies EtOH , denies Illicit Drugs There are no other complaints, changes, or physical findings at this time. Allergies Allergen Reactions  Advil [Ibuprofen] Unknown (comments)  Aspirin Unknown (comments)  Meloxicam Unknown (comments)  Penicillin G Unknown (comments)  Shellfish Containing Products Rash  Sulfa (Sulfonamide Antibiotics) Unknown (comments) Current Facility-Administered Medications Medication Dose Route Frequency Provider Last Rate Last Dose  levoFLOXacin (LEVAQUIN) 750 mg in D5W IVPB  750 mg IntraVENous Q24H Qian Bennett MD      
 sodium chloride (NS) flush 5-40 mL  5-40 mL IntraVENous Q8H Ann Marie Ivy MD      
 sodium chloride (NS) flush 5-40 mL  5-40 mL IntraVENous PRN Ann Marie Ivy MD      
 acetaminophen (TYLENOL) tablet 650 mg  650 mg Oral Q4H PRN Gely Ratliff MD      
 ondansetron [facility]) injection 4 mg  4 mg IntraVENous Q4H PRN Ann Marie Ivy MD      
 ALPRAZolam Anupam Stone) tablet 0.25 mg  0.25 mg Oral PRN Gely Ratliff MD      
 pantoprazole (PROTONIX) tablet 40 mg  40 mg Oral ACB Ann Marie Ivy MD      
 levothyroxine (SYNTHROID) tablet 50 mcg  50 mcg Oral ACB Ann Marie Ivy MD      
 mirtazapine (REMERON) tablet 30 mg  30 mg Oral QHS Gely Ratliff MD      
 albuterol-ipratropium (DUO-NEB) 2.5 MG-0.5 MG/3 ML  3 mL Nebulization Q4H PRN Ann Marie Ivy MD      
 furosemide (LASIX) tablet 20 mg  20 mg Oral DAILY Ann Marie Ivy MD      
 hydrALAZINE (APRESOLINE) tablet 50 mg  50 mg Oral TID Gely Ratliff MD      
  amLODIPine (NORVASC) tablet 10 mg  10 mg Oral DAILY Ian Ivy MD      
 metoprolol tartrate (LOPRESSOR) tablet 100 mg  100 mg Oral BID Emily Guerra MD      
 sodium chloride (NS) flush 5-40 mL  5-40 mL IntraVENous Q8H Pacheco Landeros MD   10 mL at 19 [id number]  sodium chloride (NS) flush 5-40 mL  5-40 mL IntraVENous PRN Pacheco Landeros MD   10 mL at 19 9812 Past History Past Medical History: 
Past Medical History:  
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  
 LIVIA (obstructive sleep apnea)  Paroxysmal atrial fibrillation (HCC)  Pure hypercholesterolemia 2010 Past Surgical History: 
Past Surgical History:  
Procedure Laterality Date  ECHO 2D ADULT  2009 LVH, normal LV wall motion and ejection fraction, mild aortic stenosis, moderate aortic regurgitation and mild mitral regurgitation. The ejection fraction was 55-60%.  ECHO 2D ADULT  2011 EF 60%, LAE, mild AS, AI, MR, PA low 40s  EVENT MONITOR POST SYMPTOMS  2010 Rare PACs, no arrythmia with symptoms  LOOP MONITOR  9-10/2011 NSR  
 STRESS TEST MYOVIEW  2011  
 no ischemia, EF 63%  US DUPLEX CAROTID BILATERAL  2011  
 mild bilat disease Family History: 
Family History Problem Relation Age of Onset  Heart Disease Father  Dementia Brother  Heart Disease Brother  Diabetes Brother Social History: 
Social History Tobacco Use  Smoking status: Former Smoker Last attempt to quit: 1963 Years since quittin.3  Smokeless tobacco: Never Used Substance Use Topics  Alcohol use: Yes  Drug use: No  
 
 
Allergies: Allergies Allergen Reactions  Advil [Ibuprofen] Unknown (comments)  Aspirin Unknown (comments)  Meloxicam Unknown (comments)  Penicillin G Unknown (comments)  Shellfish Containing Products Rash  Sulfa (Sulfonamide Antibiotics) Unknown (comments) Review of Systems Review of Systems Constitutional: Negative. Negative for fever. Eyes: Negative. Respiratory: Negative. Negative for shortness of breath. Cardiovascular: Negative for chest pain. Gastrointestinal: Negative for abdominal pain, nausea and vomiting. Endocrine: Negative. Genitourinary: Negative. Negative for difficulty urinating, dysuria and hematuria. Musculoskeletal: Negative. Skin: Negative. Neurological: Negative. Psychiatric/Behavioral: Negative for suicidal ideas. All other systems reviewed and are negative. Physical Exam  
Physical Exam  
Constitutional: She is oriented to person, place, and time. She appears well-developed and well-nourished. No distress. HENT:  
Head: Normocephalic and atraumatic. Nose: Nose normal.  
Eyes: Conjunctivae and EOM are normal. No scleral icterus. Neck: Normal range of motion. No tracheal deviation present. Cardiovascular: Normal rate, regular rhythm, normal heart sounds and intact distal pulses. Exam reveals no friction rub. No murmur heard. Pulmonary/Chest: Effort normal and breath sounds normal. No stridor. No respiratory distress. She has no wheezes. She has no rales. Abdominal: Soft. Bowel sounds are normal. She exhibits no distension. There is no tenderness. There is no rebound. Musculoskeletal: Normal range of motion. She exhibits no tenderness. Neurological: She is alert and oriented to person, place, and time. No cranial nerve deficit. Skin: Skin is warm and dry. No rash noted. She is not diaphoretic. Psychiatric: She has a normal mood and affect. Her speech is normal and behavior is normal. Judgment and thought content normal. Cognition and memory are normal.  
Nursing note and vitals reviewed. Diagnostic Study Results Labs - Recent Results (from the past 12 hour(s)) EKG, 12 LEAD, INITIAL Collection Time: 04/05/19 10:13 PM  
Result Value Ref Range Ventricular Rate 85 BPM  
 Atrial Rate 85 BPM  
 P-R Interval 220 ms QRS Duration 98 ms Q-T Interval 396 ms QTC Calculation (Bezet) 471 ms Calculated P Axis 58 degrees Calculated R Axis 5 degrees Calculated T Axis 23 degrees Diagnosis Sinus rhythm with 1st degree AV block with frequent premature ventricular  
complexes Voltage criteria for left ventricular hypertrophy When compared with ECG of 16-MAR-2019 10:12, 
T wave inversion no longer evident in Anterior leads CBC WITH AUTOMATED DIFF Collection Time: 04/05/19 11:13 PM  
Result Value Ref Range WBC 11.4 (H) 3.6 - 11.0 K/uL  
 RBC 3.34 (L) 3.80 - 5.20 M/uL HGB 9.5 (L) 11.5 - 16.0 g/dL HCT 29.6 (L) 35.0 - 47.0 % MCV 88.6 80.0 - 99.0 FL  
 MCH 28.4 26.0 - 34.0 PG  
 MCHC 32.1 30.0 - 36.5 g/dL  
 RDW 15.0 (H) 11.5 - 14.5 % PLATELET 931 792 - 799 K/uL MPV 12.1 8.9 - 12.9 FL  
 NRBC 0.0 0  WBC ABSOLUTE NRBC 0.00 0.00 - 0.01 K/uL NEUTROPHILS 80 (H) 32 - 75 % LYMPHOCYTES 14 12 - 49 % MONOCYTES 6 5 - 13 % EOSINOPHILS 0 0 - 7 % BASOPHILS 0 0 - 1 % IMMATURE GRANULOCYTES 0 0.0 - 0.5 % ABS. NEUTROPHILS 9.1 (H) 1.8 - 8.0 K/UL  
 ABS. LYMPHOCYTES 1.6 0.8 - 3.5 K/UL  
 ABS. MONOCYTES 0.7 0.0 - 1.0 K/UL  
 ABS. EOSINOPHILS 0.0 0.0 - 0.4 K/UL  
 ABS. BASOPHILS 0.0 0.0 - 0.1 K/UL  
 ABS. IMM. GRANS. 0.0 0.00 - 0.04 K/UL  
 DF AUTOMATED METABOLIC PANEL, COMPREHENSIVE Collection Time: 04/05/19 11:13 PM  
Result Value Ref Range Sodium 137 136 - 145 mmol/L Potassium 3.6 3.5 - 5.1 mmol/L Chloride 107 97 - 108 mmol/L  
 CO2 21 21 - 32 mmol/L Anion gap 9 5 - 15 mmol/L Glucose 123 (H) 65 - 100 mg/dL BUN 33 (H) 6 - 20 MG/DL  Creatinine 1.66 (H) 0.55 - 1.02 MG/DL  
 BUN/Creatinine ratio 20 12 - 20    
 GFR est AA 36 (L) >60 ml/min/1.73m2 GFR est non-AA 30 (L) >60 ml/min/1.73m2 Calcium 8.2 (L) 8.5 - 10.1 MG/DL Bilirubin, total 0.3 0.2 - 1.0 MG/DL  
 ALT (SGPT) 28 12 - 78 U/L  
 AST (SGOT) 32 15 - 37 U/L Alk. phosphatase 123 (H) 45 - 117 U/L Protein, total 7.1 6.4 - 8.2 g/dL Albumin 2.7 (L) 3.5 - 5.0 g/dL Globulin 4.4 (H) 2.0 - 4.0 g/dL A-G Ratio 0.6 (L) 1.1 - 2.2 CK W/ REFLX CKMB Collection Time: 04/05/19 11:13 PM  
Result Value Ref Range CK 63 26 - 192 U/L  
TROPONIN I Collection Time: 04/05/19 11:13 PM  
Result Value Ref Range Troponin-I, Qt. <0.05 <0.05 ng/mL NT-PRO BNP Collection Time: 04/05/19 11:13 PM  
Result Value Ref Range NT pro-BNP 34,252 (H) <450 PG/ML  
SAMPLES BEING HELD Collection Time: 04/05/19 11:13 PM  
Result Value Ref Range SAMPLES BEING HELD DK GRN   
 COMMENT Add-on orders for these samples will be processed based on acceptable specimen integrity and analyte stability, which may vary by analyte. EKG, 12 LEAD, SUBSEQUENT Collection Time: 04/06/19  1:09 AM  
Result Value Ref Range Ventricular Rate 123 BPM  
 Atrial Rate 107 BPM  
 QRS Duration 90 ms Q-T Interval 344 ms QTC Calculation (Bezet) 492 ms Calculated R Axis 10 degrees Calculated T Axis 177 degrees Diagnosis Atrial fibrillation with rapid ventricular response Minimal voltage criteria for LVH, may be normal variant ST & T wave abnormality, consider lateral ischemia When compared with ECG of 05-APR-2019 22:13, 
MANUAL COMPARISON REQUIRED, DATA IS UNCONFIRMED PROTHROMBIN TIME + INR Collection Time: 04/06/19  5:28 AM  
Result Value Ref Range INR 2.8 (H) 0.9 - 1.1 Prothrombin time 27.0 (H) 9.0 - 11.1 sec LACTIC ACID Collection Time: 04/06/19  5:29 AM  
Result Value Ref Range Lactic acid 0.9 0.4 - 2.0 MMOL/L Radiologic Studies -  
XR CHEST PA LAT Final Result IMPRESSION:  
 Right basilar airspace disease and associated pleural effusion. CT CHEST WO CONT    (Results Pending) CT Results  (Last 48 hours) None CXR Results  (Last 48 hours) 04/05/19 2339  XR CHEST PA LAT Final result Impression:  IMPRESSION:  
Right basilar airspace disease and associated pleural effusion. Narrative:  INDICATION:   sob COMPARISON: March 16, 2019 FINDINGS:  
   
Frontal and lateral views of the chest demonstrate borderline heart enlargement. The lungs are adequately expanded. Right basilar airspace disease and associated  
pleural effusion. Possible small left pleural effusion. The osseous structures  
are unremarkable. Medical Decision Making I am the first provider for this patient. I reviewed the vital signs, available nursing notes, past medical history, past surgical history, family history and social history. Vital Signs-Reviewed the patient's vital signs. Patient Vitals for the past 12 hrs: 
 Temp Pulse Resp BP SpO2  
04/06/19 0642 98.5 °F (36.9 °C) 89 17 172/52 95 % 04/06/19 0434 97.6 °F (36.4 °C) 78 16 (!) 123/33 95 % 04/06/19 0430  77 16 (!) 127/33 95 % 04/06/19 0415  77 16 (!) 128/32 96 % 04/06/19 0400  77 17 147/46 94 % 04/06/19 0345  77 21 (!) 156/36 93 % 04/06/19 0330  75 17 (!) 158/34 93 % 04/06/19 0315  76 28 (!) 159/32 92 % 04/06/19 0300  79 26 144/83 94 % 04/06/19 0245  79 19 176/40 94 % 04/06/19 0230  79 23 (!) 172/37 94 % 04/06/19 0215  81 23 (!) 167/37 92 % 04/06/19 0204  90 24 142/55 92 % 04/06/19 0200  (!) 120 23 166/56 91 % 04/06/19 0159  (!) 132  166/56   
04/06/19 0158  (!) 119 20 159/69 92 % 04/06/19 0156  (!) 130  159/69   
04/06/19 0140  (!) 125 24  95 % 04/06/19 0117  (!) 126 26  94 % 04/06/19 0033  (!) 119 26  94 % 04/06/19 0004     98 % 04/05/19 2340  (!) 120 25  97 % 04/05/19 2211 98 °F (36.7 °C) (!) 55 18 (!) 174/31 100 % Pulse Oximetry Analysis - 100% on RA Cardiac Monitor:  
Rate: 55 bpm 
Rhythm: Sinus bradycardia Records Reviewed: Nursing Notes, Old Medical Records, Previous electrocardiograms, Previous Radiology Studies and Previous Laboratory Studies Provider Notes (Medical Decision Making): DDX: 
Acute exacerbation of chronic disease, pneumonia, URI, transient elevated blood pressure Plan: 
Labs, chest x-ray, EKG Impression: 
afib with rvr, hypoxia, sob ED Course:  
Initial assessment performed. The patients presenting problems have been discussed, and they are in agreement with the care plan formulated and outlined with them. I have encouraged them to ask questions as they arise throughout their visit. I reviewed our electronic medical record system for any past medical records that were available that may contribute to the patients current condition, the nursing notes and and vital signs from today's visit Nursing notes will be reviewed as they become available in realtime while the pt has been in the ED. Shaina Olivier MD 
 
EKG interpretation 2213: NSR, frequent PVCs, normal Axis, rate 85; , QRS 98, QTc 471; no acute ischemia; Shaina Olivier MD 
 
I personally reviewed pt's imaging. Official read by radiology noted above. Shaina Olivier MD 
 
ED Course as of Apr 06 0647 Sat Apr 06, 2019  
0124 1:25 AM 
Patient noted to have irregularity to cardiac rhythm on monitor. Repeat EKG shows A. fib with RVR. Will provide diltiazem bolus and reevaluate. [MK] 2238 2:53 AM 
Patient with improvement in heart rate after developed and Ativan administration. Patient noted to continue to be in A. fib but at a rate of 70s. Will attempt to trial patient off of supplemental oxygen and that she was put on for satting in the high 80s with A. fib with RVR.   If patient maintains sats and continues to feel well we will plan for discharge with cardiology follow-up. [MK] ED Course User Index [MK] Roberto Schwartz MD  
 
 
PROGRESS NOTE: 
4:23 AM 
Pt noted to have improvement in HR and respiratory status after dilt and neb. Weaned off supplemental o2. Will have staff ambulate pt without o2 to determine if she desats Carlin Silva MD 
 
PROGRESS NOTE Nursing staff patient weaned off of oxygen completely and continue. Patient ambulated down the hallway of treatment area and did not desat. Patient needs to be without complaints. CONSULT NOTE:  
5:37 AM 
Carlin Silva MD spoke with Dr. Bella Sung, Specialty: Cardiology Consulted Dr. Bella Sung due to pt with afib and rvr off dig. Discussed pt's HPI and available diagnostic results thus far, specifically noting improvement of rvr after single adminstration of dilt. Requested conditions as far as restarting patient's digoxin at this time are awaiting further consultation by cardiology this morning. Dr. Bella Sung notes we can hold off on restarting her dig at this time and that they will evaluate her this morning with further recommendations. Carlin Silva MD 
 
 
CONSULT NOTE:  
Kirsten Marques MD spoke with Dr. Kika Mckinley, Specialty: Gerald Mckinley due to concerns for healthcare associated pna with pleural effusion and afib with rvr. Discussed pt's HPI and available diagnostic results thus far. Expressed concerns for needed admission. Consultant will evaluate for admission. Carlin Silva MD 
 
ADMISSION NOTE: 
6045 Patient is being admitted to the hospital by Dr. Kika Mckinley. The results of their tests and reasons for their admission have been discussed with them and/or available family. They convey agreement and understanding for the need to be admitted and for their admission diagnosis. Carlin Silva MD 
 
 
 
Critical Care Time: CRITICAL CARE NOTE:6:35 AM 
 IMPENDING DETERIORATION -Airway, Respiratory, Cardiovascular, CNS and Metabolic ASSOCIATED RISK FACTORS - Hypotension, Hypoxia, Dysrhythmia, Vascular Compromise and CNS Decompensation MANAGEMENT- Bedside Assessment and Supervision of Care INTERPRETATION -  Xrays, ECG and Blood Pressure INTERVENTIONS - hemodynamic mngmt, vascular control and Metobolic interventions CASE REVIEW - Hospitalist, Medical Sub-Specialist, Nursing and Family TREATMENT RESPONSE -Improved PERFORMED BY - Self NOTES   : 
I have spent 75 minutes of critical care time involved in lab review, consultations with specialist, family decision- making, bedside attention and documentation excluding time spent on any separately billed procedures. During this entire length of time I was immediately available to the patient . Bhupinder Diaz MD 
 
 
Diagnosis Clinical Impression: 1. Atrial fibrillation with RVR ([address] 2. SOB (shortness of breath) 3. Pleural effusion on right 4. Pneumonia of right lower lobe due to infectious organism [facility]) Disposition: 
Admit to hospitalist 
 
 
 
 
 
 
 
Please note that this dictation was completed with [employer], the computer voice recognition software. Quite often unanticipated grammatical, syntax, homophones, and other interpretive errors are inadvertently transcribed by the computer software. Please disregard these errors. Please excuse any errors that have escaped final proofreading This note will not be viewable in [address]

## 2019-04-06 NOTE — PROGRESS NOTES
Pt admitted ~ 6:30AM.  Chart reviewed. Discussed with RN Change to IV lasix due to elevated probnp and evidence of pleural effusion on CT chest 
Will cont' with other plans set forth by Dr Dg Delgado. [address] cardiology consultation

## 2019-04-06 NOTE — CONSULTS
Cardiology Consult Note Date of  Admission: 4/5/2019 10:18 PM  
 
Admission type:Emergency Subjective:  
 
Ms. Alex Quevedo is admitted with SOB, ? Wheezing. Noted to have rapid afib in the ER and converted to sinus rhythm with bolus of IV cardizem. Asked to see her regarding digoxin therapy. She has moderate to severe AI and MR. Being evaluated for TAVR. Cath revealed no significant CAD. Per daughter plan is to pursue medical therapy due to comorbid conditions. She is on oxygen mask. Breathing is better. Denies any chest pain. Her diuretics were held due to renal insufficiency. She is in sinus rhythm with PAC's. Patient Active Problem List  
 Diagnosis Date Noted  Shortness of breath 04/06/2019  Atrial fibrillation with RVR ([address] 04/06/2019  Aortic insufficiency 03/19/2019  Mitral stenosis 03/19/2019  Bilateral carotid artery stenosis 03/18/2019  Vaso vagal episode 03/18/2019  Hypertensive urgency 03/16/2019  CKD (chronic kidney disease) 03/16/2019  Acute renal failure superimposed on stage 4 chronic kidney disease ([address] 02/28/2019  Persistent atrial fibrillation ([address] 02/26/2019  CAP (community acquired pneumonia) 02/26/2019  Essential hypertension 02/26/2019  Anticoagulant long-term use 02/26/2019  Dementia 02/26/2019  Acquired hypothyroidism 02/26/2019  LIVIA on CPAP 05/18/2012  Paroxysmal atrial fibrillation (HCC)  Aortic valve disorder 09/30/2010  Pure hypercholesterolemia 09/30/2010  Mitral valve disease  Benign hypertensive heart disease without heart failure Leonard Rodriguez MD 
Past Medical History:  
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  
 LIVIA (obstructive sleep apnea)  Paroxysmal atrial fibrillation (HCC)  Pure hypercholesterolemia 9/30/2010 Past Surgical History:  
Procedure Laterality Date  ECHO 2D ADULT  11/2009 LVH, normal LV wall motion and ejection fraction, mild aortic stenosis, moderate aortic regurgitation and mild mitral regurgitation. The ejection fraction was 55-60%.  ECHO 2D ADULT  1/2011 EF 60%, LAE, mild AS, AI, MR, PA low 40s  EVENT MONITOR POST SYMPTOMS  9/2010 Rare PACs, no arrythmia with symptoms  LOOP MONITOR  9-10/2011 NSR  
 STRESS TEST MYOVIEW  1/2011  
 no ischemia, EF 63%  US DUPLEX CAROTID BILATERAL  1/2011  
 mild bilat disease Allergies Allergen Reactions  Advil [Ibuprofen] Unknown (comments)  Aspirin Unknown (comments)  Meloxicam Unknown (comments)  Penicillin G Unknown (comments)  Shellfish Containing Products Rash  Sulfa (Sulfonamide Antibiotics) Unknown (comments) Family History Problem Relation Age of Onset  Heart Disease Father  Dementia Brother  Heart Disease Brother  Diabetes Brother Current Facility-Administered Medications Medication Dose Route Frequency  sodium chloride (NS) flush 5-40 mL  5-40 mL IntraVENous Q8H  
 sodium chloride (NS) flush 5-40 mL  5-40 mL IntraVENous PRN  
 acetaminophen (TYLENOL) tablet 650 mg  650 mg Oral Q4H PRN  
 ondansetron (ZOFRAN) injection 4 mg  4 mg IntraVENous Q4H PRN  
 ALPRAZolam (XANAX) tablet 0.25 mg  0.25 mg Oral PRN  pantoprazole (PROTONIX) tablet 40 mg  40 mg Oral ACB  levothyroxine (SYNTHROID) tablet 50 mcg  50 mcg Oral ACB  mirtazapine (REMERON) tablet 30 mg  30 mg Oral QHS  albuterol-ipratropium (DUO-NEB) 2.5 MG-0.5 MG/3 ML  3 mL Nebulization Q4H PRN  
 hydrALAZINE (APRESOLINE) tablet 50 mg  50 mg Oral TID  amLODIPine (NORVASC) tablet 10 mg  10 mg Oral DAILY  metoprolol tartrate (LOPRESSOR) tablet 100 mg  100 mg Oral BID  WARFARIN INFORMATION NOTE (COUMADIN)   Other QPM  
  [START ON 4/8/2019] levoFLOXacin (LEVAQUIN) 750 mg in D5W IVPB  750 mg IntraVENous Q48H  warfarin (COUMADIN) tablet 0.5 mg  0.5 mg Oral ONCE  
 furosemide (LASIX) injection 40 mg  40 mg IntraVENous DAILY Review of Symptoms: A comprehensive review of systems was negative except for that written in the HPI. Physical Exam 
 
Visit Vitals /48 (BP 1 Location: Left arm, BP Patient Position: At rest) Pulse 84 Temp 98.5 °F (36.9 °C) Resp 18 Ht 5' (1.524 m) Wt 156 lb 15.5 oz (71.2 kg) SpO2 96% BMI 30.66 kg/m² General Appearance:  Well developed, well nourished,alert and oriented x 1, and individual in mild distress. Ears/Nose/Mouth/Throat:   Hearing grossly normal. 
  
    Neck: Supple. Chest:   Lungs clear to auscultation bilaterally. Cardiovascular:  Regular rate and rhythm, S1, S2 normal, no murmur. Abdomen:   Soft, non-tender, bowel sounds are active. Extremities: No edema bilaterally. Skin: Warm and dry. Cardiographics Telemetry: normal sinus rhythm ECG: normal EKG, normal sinus rhythm, unchanged from previous tracings, nonspecific ST and T waves changes Echocardiogram: Not done Labs:  
Recent Results (from the past 24 hour(s)) EKG, 12 LEAD, INITIAL Collection Time: 04/05/19 10:13 PM  
Result Value Ref Range Ventricular Rate 85 BPM  
 Atrial Rate 85 BPM  
 P-R Interval 220 ms QRS Duration 98 ms Q-T Interval 396 ms QTC Calculation (Bezet) 471 ms Calculated P Axis 58 degrees Calculated R Axis 5 degrees Calculated T Axis 23 degrees Diagnosis Sinus rhythm with 1st degree AV block with frequent premature ventricular  
complexes Voltage criteria for left ventricular hypertrophy When compared with ECG of 16-MAR-2019 10:12, 
T wave inversion no longer evident in Anterior leads CBC WITH AUTOMATED DIFF Collection Time: 04/05/19 11:13 PM  
Result Value Ref Range WBC 11.4 (H) 3.6 - 11.0 K/uL RBC 3.34 (L) 3.80 - 5.20 M/uL HGB 9.5 (L) 11.5 - 16.0 g/dL HCT 29.6 (L) 35.0 - 47.0 % MCV 88.6 80.0 - 99.0 FL  
 MCH 28.4 26.0 - 34.0 PG  
 MCHC 32.1 30.0 - 36.5 g/dL  
 RDW 15.0 (H) 11.5 - 14.5 % PLATELET 154 030 - 747 K/uL MPV 12.1 8.9 - 12.9 FL  
 NRBC 0.0 0  WBC ABSOLUTE NRBC 0.00 0.00 - 0.01 K/uL NEUTROPHILS 80 (H) 32 - 75 % LYMPHOCYTES 14 12 - 49 % MONOCYTES 6 5 - 13 % EOSINOPHILS 0 0 - 7 % BASOPHILS 0 0 - 1 % IMMATURE GRANULOCYTES 0 0.0 - 0.5 % ABS. NEUTROPHILS 9.1 (H) 1.8 - 8.0 K/UL  
 ABS. LYMPHOCYTES 1.6 0.8 - 3.5 K/UL  
 ABS. MONOCYTES 0.7 0.0 - 1.0 K/UL  
 ABS. EOSINOPHILS 0.0 0.0 - 0.4 K/UL  
 ABS. BASOPHILS 0.0 0.0 - 0.1 K/UL  
 ABS. IMM. GRANS. 0.0 0.00 - 0.04 K/UL  
 DF AUTOMATED METABOLIC PANEL, COMPREHENSIVE Collection Time: 04/05/19 11:13 PM  
Result Value Ref Range Sodium 137 136 - 145 mmol/L Potassium 3.6 3.5 - 5.1 mmol/L Chloride 107 97 - 108 mmol/L  
 CO2 21 21 - 32 mmol/L Anion gap 9 5 - 15 mmol/L Glucose 123 (H) 65 - 100 mg/dL BUN 33 (H) 6 - 20 MG/DL Creatinine 1.66 (H) 0.55 - 1.02 MG/DL  
 BUN/Creatinine ratio 20 12 - 20 GFR est AA 36 (L) >60 ml/min/1.73m2 GFR est non-AA 30 (L) >60 ml/min/1.73m2 Calcium 8.2 (L) 8.5 - 10.1 MG/DL Bilirubin, total 0.3 0.2 - 1.0 MG/DL  
 ALT (SGPT) 28 12 - 78 U/L  
 AST (SGOT) 32 15 - 37 U/L Alk. phosphatase 123 (H) 45 - 117 U/L Protein, total 7.1 6.4 - 8.2 g/dL Albumin 2.7 (L) 3.5 - 5.0 g/dL Globulin 4.4 (H) 2.0 - 4.0 g/dL A-G Ratio 0.6 (L) 1.1 - 2.2 CK W/ REFLX CKMB Collection Time: 04/05/19 11:13 PM  
Result Value Ref Range CK 63 26 - 192 U/L  
TROPONIN I Collection Time: 04/05/19 11:13 PM  
Result Value Ref Range Troponin-I, Qt. <0.05 <0.05 ng/mL NT-PRO BNP Collection Time: 04/05/19 11:13 PM  
Result Value Ref Range NT pro-BNP 34,252 (H) <450 PG/ML  
SAMPLES BEING HELD  Collection Time: 04/05/19 11:13 PM  
 Result Value Ref Range SAMPLES BEING HELD DK GRN   
 COMMENT Add-on orders for these samples will be processed based on acceptable specimen integrity and analyte stability, which may vary by analyte. EKG, 12 LEAD, SUBSEQUENT Collection Time: 04/06/19  1:09 AM  
Result Value Ref Range Ventricular Rate 123 BPM  
 Atrial Rate 107 BPM  
 QRS Duration 90 ms Q-T Interval 344 ms QTC Calculation (Bezet) 492 ms Calculated R Axis 10 degrees Calculated T Axis 177 degrees Diagnosis Atrial fibrillation with rapid ventricular response Minimal voltage criteria for LVH, may be normal variant ST & T wave abnormality, consider lateral ischemia When compared with ECG of 05-APR-2019 22:13, 
MANUAL COMPARISON REQUIRED, DATA IS UNCONFIRMED CULTURE, BLOOD Collection Time: 04/06/19  5:26 AM  
Result Value Ref Range Special Requests: NO SPECIAL REQUESTS Culture result: NO GROWTH AFTER 2 HOURS    
PROTHROMBIN TIME + INR Collection Time: 04/06/19  5:28 AM  
Result Value Ref Range INR 2.8 (H) 0.9 - 1.1 Prothrombin time 27.0 (H) 9.0 - 11.1 sec CULTURE, BLOOD Collection Time: 04/06/19  5:29 AM  
Result Value Ref Range Special Requests: NO SPECIAL REQUESTS Culture result: NO GROWTH AFTER 2 HOURS    
LACTIC ACID Collection Time: 04/06/19  5:29 AM  
Result Value Ref Range Lactic acid 0.9 0.4 - 2.0 MMOL/L Assessment: 
 
 Assessment:  
  
 Active Problems: 
  Mitral valve disease () Overview: MR Aortic valve disorder (9/30/2010) Overview: AS Shortness of breath (4/6/2019) Atrial fibrillation with RVR ([address] (4/6/2019) Plan: Active Problems: 
  Mitral valve disease () Overview: MR Aortic valve disorder (9/30/2010) Overview: AS Shortness of breath (4/6/2019)- will diurese. Atrial fibrillation with RVR ([address] (4/6/2019)- transient.  She is on high dose beta blocker. Continue for now. Has normal LVEF. With her renal insufficiency, hold off on digoxin. Thank you for the consult. Will follow.  
 
 
Bethanie Marie MD

## 2019-04-06 NOTE — PROGRESS NOTES
PCU SHIFT NURSING NOTE Bedside and Verbal shift change report given to Jensen Carl RN (oncoming nurse) by Daisha Varma (offgoing nurse). Report included the following information SBAR, Kardex, Procedure Summary, Intake/Output, MAR, Recent Results, Med Rec Status and Cardiac Rhythm Sinus Tach. Shift Summary:  
 
 
Admission Date 4/5/2019 Admission Diagnosis Shortness of breath [R06.02] Atrial fibrillation with RVR ([address] [I48.91] Consults IP CONSULT TO CARDIOLOGY 
IP CONSULT TO HOSPITALIST Consults []PT []OT []Speech  
[]Case Management  
  
[] Palliative Cardiac Monitoring Order []Yes []No  
 
IV drips []Yes Drip:                            Dose: 
Drip:                            Dose: 
Drip:                            Dose:  
[]No  
 
GI Prophylaxis []Yes []No  
 
 
 
DVT Prophylaxis SCDs:     
     
 Song stockings:     
  
[] Medication []Contraindicated []None Activity Level Activity Level: Up with Assistance Activity Assistance: Partial (one person) Purposeful Rounding every 1-2 hour? []Yes Jade Score  Total Score: 1 Bed Alarm (If score 3 or >) []Yes  
[] Refused (See signed refusal form in chart) Rafiq Score  Rafiq Score: 20 Rafiq Score (if score 14 or less) []PMT consult  
[]Wound Care consult []Specialty bed  
[] Nutrition consult Needs prior to discharge:  
Home O2 required:   
[]Yes []No  
 If yes, how much O2 required? Other:  
 Last Bowel Movement:    
  
Influenza Vaccine Received Flu Vaccine for Current Season (usually Sept-March): Yes Pneumonia Vaccine Diet Active Orders Diet DIET CARDIAC Regular LDAs Peripheral IV 04/06/19 Left Hand (Active) Site Assessment Clean, dry, & intact 4/6/2019  5:44 PM  
Phlebitis Assessment 0 4/6/2019  5:44 PM  
Infiltration Assessment 0 4/6/2019  5:44 PM  
Dressing Status Clean, dry, & intact 4/6/2019  5:44 PM  
 Dressing Type Tape;Transparent 4/6/2019  5:44 PM  
Hub Color/Line Status Blue;Capped;Flushed 4/6/2019  5:44 PM  
Action Taken Open ports on tubing capped 4/6/2019  5:44 PM  
Alcohol Cap Used Yes 4/6/2019  5:44 PM  
                  
Urinary Catheter Intake & Output Date 04/05/19 1900 - 04/06/19 0659 04/06/19 0700 - 04/07/19 5556 Shift 8666-2954 24 Hour Total 0932-2745 5957-1781 24 Hour Total  
INTAKE  
I.V.(mL/kg/hr) 2 2 0(0)  0 Cardizem Volume 2 2 Volume (levoFLOXacin (LEVAQUIN) 750 mg in D5W IVPB) 0 0 0  0 Shift Total(mL/kg) 2(0) 2(0) 0(0)  0(0) OUTPUT Urine(mL/kg/hr)   1450(1.7)  1450 Urine Voided   1450  1450 Urine Occurrence(s)   1 x  1 x Shift Total(mL/kg)   1450(20.4)  1450(20.4) NET 2 2 -1450  -1450 Weight (kg) 71.2 71.2 71.2 71.2 71.2 Readmission Risk Assessment Tool Score High Risk   
      
 23 Total Score 3 Has Seen PCP in Last 6 Months (Yes=3, No=0)  
 4 IP Visits Last 12 Months (1-3=4, 4=9, >4=11) 5 Pt. Coverage (Medicare=5 , Medicaid, or Self-Pay=4) 11 Charlson Comorbidity Score (Age + Comorbid Conditions) Criteria that do not apply:  
 . Living with Significant Other. Assisted Living. LTAC. SNF. or  
Rehab Patient Length of Stay (>5 days = 3) Expected Length of Stay - - - Actual Length of Stay 1

## 2019-04-06 NOTE — PROGRESS NOTES
Pharmacy Daily Dosing of Warfarin Indication: A.fib Goal INR: 2-3 PTA Warfarin Dose: warfarin 5 mg /daily Concurrent anticoagulants Concurrent antiplatelet: 
 
Major Interacting Medications Drugs that may increase INR:  Levofloxacin Drugs that may decrease INR: 
 
Conditions that may increase/decrease INR (CHF, C. diff, cirrhosis, thyroid disorder, hypoalbuminemia): 
 
Labs: 
Recent Labs 04/06/19 
0528 04/05/19 
2313 INR 2.8*  --   
HGB  --  9.5* PLT  --  263 SGOT  --  32  
TBILI  --  0.3 ALB  --  2.7* Impression/Plan:  
Therapeutic INR 2.8 with pt started on LQ 4/6 am 
Warfarin 0.5mg 4/6 to monitor INR direction Daily INR 
CBC w/o diff [facility]ODay Pharmacy will follow daily and adjust the dose as appropriate.  
 
Thank you,  
Sonido Seals, [facility]

## 2019-04-06 NOTE — DISCHARGE INSTRUCTIONS
Patient Education        Atrial Fibrillation: Care Instructions  Your Care Instructions    Atrial fibrillation is an irregular and often fast heartbeat. Treating this condition is important for several reasons. It can cause blood clots, which can travel from your heart to your brain and cause a stroke. If you have a fast heartbeat, you may feel lightheaded, dizzy, and weak. An irregular heartbeat can also increase your risk for heart failure. Atrial fibrillation is often the result of another heart condition, such as high blood pressure or coronary artery disease. Making changes to improve your heart condition will help you stay healthy and active. Follow-up care is a key part of your treatment and safety. Be sure to make and go to all appointments, and call your doctor if you are having problems. It's also a good idea to know your test results and keep a list of the medicines you take. How can you care for yourself at home? Medicines    · Take your medicines exactly as prescribed. Call your doctor if you think you are having a problem with your medicine. You will get more details on the specific medicines your doctor prescribes.     · If your doctor has given you a blood thinner to prevent a stroke, be sure you get instructions about how to take your medicine safely. Blood thinners can cause serious bleeding problems.     · Do not take any vitamins, over-the-counter drugs, or herbal products without talking to your doctor first.    Lifestyle changes    · Do not smoke. Smoking can increase your chance of a stroke and heart attack. If you need help quitting, talk to your doctor about stop-smoking programs and medicines. These can increase your chances of quitting for good.     · Eat a heart-healthy diet.     · Stay at a healthy weight. Lose weight if you need to.     · Limit alcohol to 2 drinks a day for men and 1 drink a day for women. Too much alcohol can cause health problems.     · Avoid colds and flu.  Get a pneumococcal vaccine shot. If you have had one before, ask your doctor whether you need another dose. Get a flu shot every year. If you must be around people with colds or flu, wash your hands often. Activity    · If your doctor recommends it, get more exercise. Walking is a good choice. Bit by bit, increase the amount you walk every day. Try for at least 30 minutes on most days of the week. You also may want to swim, bike, or do other activities. Your doctor may suggest that you join a cardiac rehabilitation program so that you can have help increasing your physical activity safely.     · Start light exercise if your doctor says it is okay. Even a small amount will help you get stronger, have more energy, and manage stress. Walking is an easy way to get exercise. Start out by walking a little more than you did in the hospital. Gradually increase the amount you walk.     · When you exercise, watch for signs that your heart is working too hard. You are pushing too hard if you cannot talk while you are exercising. If you become short of breath or dizzy or have chest pain, sit down and rest immediately.     · Check your pulse regularly. Place two fingers on the artery at the palm side of your wrist, in line with your thumb. If your heartbeat seems uneven or fast, talk to your doctor. When should you call for help? Call 911 anytime you think you may need emergency care. For example, call if:    · You have symptoms of a heart attack. These may include:  ? Chest pain or pressure, or a strange feeling in the chest.  ? Sweating. ? Shortness of breath. ? Nausea or vomiting. ? Pain, pressure, or a strange feeling in the back, neck, jaw, or upper belly or in one or both shoulders or arms. ? Lightheadedness or sudden weakness. ? A fast or irregular heartbeat. After you call 911, the  may tell you to chew 1 adult-strength or 2 to 4 low-dose aspirin. Wait for an ambulance.  Do not try to drive yourself.     · You have symptoms of a stroke. These may include:  ? Sudden numbness, tingling, weakness, or loss of movement in your face, arm, or leg, especially on only one side of your body. ? Sudden vision changes. ? Sudden trouble speaking. ? Sudden confusion or trouble understanding simple statements. ? Sudden problems with walking or balance. ? A sudden, severe headache that is different from past headaches.     · You passed out (lost consciousness).    Call your doctor now or seek immediate medical care if:    · You have new or increased shortness of breath.     · You feel dizzy or lightheaded, or you feel like you may faint.     · Your heart rate becomes irregular.     · You can feel your heart flutter in your chest or skip heartbeats. Tell your doctor if these symptoms are new or worse.    Watch closely for changes in your health, and be sure to contact your doctor if you have any problems. Where can you learn more? Go to http://temi-eric.info/. Enter U020 in the search box to learn more about \"Atrial Fibrillation: Care Instructions. \"  Current as of: July 22, 2018  Content Version: 11.9  © 3539-2970 [employer]. Care instructions adapted under license by [employer] (which disclaims liability or warranty for this information). If you have questions about a medical condition or this instruction, always ask your healthcare professional. [address] any warranty or liability for your use of this information. Patient Education        Shortness of Breath: Care Instructions  Your Care Instructions  Shortness of breath has many causes. Sometimes conditions such as anxiety can lead to shortness of breath. Some people get mild shortness of breath when they exercise. Trouble breathing also can be a symptom of a serious problem, such as asthma, lung disease, emphysema, heart problems, and pneumonia.   If your shortness of breath continues, you may need tests and treatment. Watch for any changes in your breathing and other symptoms. Follow-up care is a key part of your treatment and safety. Be sure to make and go to all appointments, and call your doctor if you are having problems. It's also a good idea to know your test results and keep a list of the medicines you take. How can you care for yourself at home? · Do not smoke or allow others to smoke around you. If you need help quitting, talk to your doctor about stop-smoking programs and medicines. These can increase your chances of quitting for good. · Get plenty of rest and sleep. · Take your medicines exactly as prescribed. Call your doctor if you think you are having a problem with your medicine. · Find healthy ways to deal with stress. ? Exercise daily. ? Get plenty of sleep. ? Eat regularly and well. When should you call for help? Call 911 anytime you think you may need emergency care. For example, call if:    · You have severe shortness of breath.     · You have symptoms of a heart attack. These may include:  ? Chest pain or pressure, or a strange feeling in the chest.  ? Sweating. ? Shortness of breath. ? Nausea or vomiting. ? Pain, pressure, or a strange feeling in the back, neck, jaw, or upper belly or in one or both shoulders or arms. ? Lightheadedness or sudden weakness. ? A fast or irregular heartbeat. After you call 911, the  may tell you to chew 1 adult-strength or 2 to 4 low-dose aspirin. Wait for an ambulance. Do not try to drive yourself.    Call your doctor now or seek immediate medical care if:    · Your shortness of breath gets worse or you start to wheeze.  Wheezing is a high-pitched sound when you breathe.     · You wake up at night out of breath or have to prop your head up on several pillows to breathe.     · You are short of breath after only light activity or while at rest.    Watch closely for changes in your health, and be sure to contact your doctor if:    · You do not get better over the next 1 to 2 days. Where can you learn more? Go to http://temi-eric.info/. Enter S780 in the search box to learn more about \"Shortness of Breath: Care Instructions. \"  Current as of: September 5, 2018  Content Version: 11.9  © 8433-5402 [employer]. Care instructions adapted under license by [employer] (which disclaims liability or warranty for this information). If you have questions about a medical condition or this instruction, always ask your healthcare professional. [address] any warranty or liability for your use of this information.

## 2019-04-06 NOTE — PROGRESS NOTES
PCU SHIFT NURSING NOTE Bedside shift change report given to Nikhil Lang RN (oncoming nurse) by Rosemary Quinn RN (offgoing nurse). Report included the following information SBAR, Kardex, ED Summary, Procedure Summary, Intake/Output, MAR, Recent Results, Med Rec Status, Cardiac Rhythm ST, Alarm Parameters  and Quality Measures. Shift Summary:  
 
 
 
Admission Date 4/5/2019 Admission Diagnosis Shortness of breath [R06.02] Atrial fibrillation with RVR ([address] [I48.91] Consults IP CONSULT TO CARDIOLOGY 
IP CONSULT TO HOSPITALIST Consults [x]PT [x]OT []Speech [x]Case Management  
  
[] Palliative Cardiac Monitoring Order  
[x]Yes []No  
 
IV drips []Yes Drip:                            Dose: 
Drip:                            Dose: 
Drip:                            Dose:  
[x]No  
 
GI Prophylaxis [x]Yes []No  
 
 
 
DVT Prophylaxis SCDs:     
     
 Song stockings:     
  
[x] Medication []Contraindicated []None Activity Level Activity Level: Up with Assistance Activity Assistance: Partial (one person) Purposeful Rounding every 1-2 hour? [x]Yes Jade Score  Total Score: 1 Bed Alarm (If score 3 or >) [x]Yes  
[] Refused (See signed refusal form in chart) Rafiq Score  Rafiq Score: 20 Rafiq Score (if score 14 or less) [x]PMT consult  
[]Wound Care consult []Specialty bed  
[x] Nutrition consult Needs prior to discharge:  
Home O2 required:   
[]Yes  
[x]No  
 If yes, how much O2 required? Other:  
 Last Bowel Movement:    
  
Influenza Vaccine Received Flu Vaccine for Current Season (usually Sept-March): Yes Pneumonia Vaccine Diet Active Orders Diet DIET CARDIAC Regular LDAs Peripheral IV 04/06/19 Left Hand (Active) Site Assessment Clean, dry, & intact 4/6/2019  3:00 PM  
Phlebitis Assessment 0 4/6/2019  3:00 PM  
Infiltration Assessment 0 4/6/2019  3:00 PM  
 Dressing Status Clean, dry, & intact 4/6/2019  3:00 PM  
Dressing Type Tape;Transparent 4/6/2019  3:00 PM  
Hub Color/Line Status Blue;Capped;Flushed 4/6/2019  3:00 PM  
Action Taken Open ports on tubing capped 4/6/2019  3:00 PM  
Alcohol Cap Used Yes 4/6/2019  3:00 PM  
                  
Urinary Catheter Intake & Output Date 04/05/19 0700 - 04/06/19 0659 04/06/19 0700 - 04/07/19 5691 Shift 0700-1859 1900-0659 24 Hour Total 0700-1859 1900-0659 24 Hour Total  
INTAKE  
I.V.  2(0) 2(0) 0  0 Cardizem Volume  2 2 Volume (levoFLOXacin (LEVAQUIN) 750 mg in D5W IVPB)  0 0 0  0 Shift Total(mL/kg)  2(0) 2(0) 0(0)  0(0) OUTPUT Urine    950  950 Urine Voided    950  950 Urine Occurrence(s)    1 x  1 x Shift Total(mL/kg)    950(13.3)  950(13.3) NET  2 2 -950  -950 Weight (kg)  71.2 71.2 71.2 71.2 71.2 Readmission Risk Assessment Tool Score High Risk   
      
 23 Total Score 3 Has Seen PCP in Last 6 Months (Yes=3, No=0)  
 4 IP Visits Last 12 Months (1-3=4, 4=9, >4=11) 5 Pt. Coverage (Medicare=5 , Medicaid, or Self-Pay=4) 11 Charlson Comorbidity Score (Age + Comorbid Conditions) Criteria that do not apply:  
 . Living with Significant Other. Assisted Living. LTAC. SNF. or  
Rehab Patient Length of Stay (>5 days = 3) Expected Length of Stay - - - Actual Length of Stay 1

## 2019-04-06 NOTE — ED NOTES
Verbal report given to Mika Ray RN on Link Perkin at shift change for routine progression of care. Report consisted of patients Situation, Background, Assessment and Recommendations(SBAR). Information from the following report(s) SBAR was reviewed with the receiving nurse. Opportunity for questions and clarification was provided.

## 2019-04-06 NOTE — PROGRESS NOTES
Problem: Falls - Risk of 
Goal: *Absence of Falls Description Document Sondra Oconnor Fall Risk and appropriate interventions in the flowsheet. Outcome: Progressing Towards Goal 
  
Problem: Patient Education: Go to Patient Education Activity Goal: Patient/Family Education Outcome: Progressing Towards Goal 
  
Problem: Breathing Pattern - Ineffective Goal: *Absence of hypoxia Outcome: Progressing Towards Goal 
Goal: *Use of effective breathing techniques Outcome: Progressing Towards Goal 
  
Problem: Patient Education: Go to Patient Education Activity Goal: Patient/Family Education Outcome: Progressing Towards Goal

## 2019-04-06 NOTE — H&P
HISTORY AND PHYSICAL 
 
 
PCP: Khloe Mac MD 
History source: the patient's daughter CC: wheezing HPI: 82 y.o lady w/ HFPEF, HTN, dementia, paroxysmal afib, moderate MV stenosis and regurgitation, severe aortic valve regurgitation, who was brought by her daughter for wheezing. The patient is a limited historian. The daughter reports that the patient has been having intermittent wheezing throughout the day, but never complained of any dyspnea. She checked her blood pressure and it was 188/134, so she brought here here. The patient denies any complaints to me; no dyspnea, chest pain, wheezing, but had a cough in the ED that resolved on its own. She developed afib w/ rvr in the ED but converted to NSR after receiving IV diltiazem. The daughter also endorses that the patient developed facial swelling but only on the left side of her face over the past week. She called her PCP's office and was instructed to take furosemide 10 mg PRN. She normally takes furosemide 20 mg, digoxin, ISMN, simvastatin, all of which have been stopped until follow-up with her cardiologist on 4/11/19 (unclear why). She was recently admitted here for CHF and hypertensive urgency, underwent a IAN and a cath, but it was ultimately decided against a TAVR after risk/benefit assessment. PMH/PSH: 
Past Medical History:  
Diagnosis Date  Aortic valve disorders AS  Asthma  Benign hypertensive heart disease without heart failure  Diabetes ([address]  Fibromyalgia  Gastroparesis  GERD (gastroesophageal reflux disease)  Hypertension  Mitral valve disorders(424.0) MR  
 LIVIA (obstructive sleep apnea)  Paroxysmal atrial fibrillation (HCC)  Pure hypercholesterolemia 9/30/2010 Past Surgical History:  
Procedure Laterality Date  ECHO 2D ADULT  11/2009  LVH, normal LV wall motion and ejection fraction, mild aortic stenosis, moderate aortic regurgitation and mild mitral regurgitation. The ejection fraction was 55-60%.  ECHO 2D ADULT  1/2011 EF 60%, LAE, mild AS, AI, MR, PA low 40s  EVENT MONITOR POST SYMPTOMS  9/2010 Rare PACs, no arrythmia with symptoms  LOOP MONITOR  9-10/2011 NSR  
 STRESS TEST MYOVIEW  1/2011  
 no ischemia, EF 63%  US DUPLEX CAROTID BILATERAL  1/2011  
 mild bilat disease Home meds:  
Prior to Admission medications Medication Sig Start Date End Date Taking? Authorizing Provider  
doxycycline (VIBRA-TABS) 100 mg tablet Take 1 Tab by mouth two (2) times a day for 7 days. 4/6/19 4/13/19 Yes Chacorta Ballard MD  
furosemide (LASIX) 20 mg tablet Take 20 mg by mouth daily as needed for Other (swelling). pt takes . 5 of  a  20 mg tab by mouth as needed for swelling[10 mg] start 4-5-19 pt to take 10 mg 2 x day x 2 days start 4-5-19-4-6-19 then resume 10 mg daily prn  swelling., by mouth. 4/2/19   Onelia Cruz MD  
levothyroxine (SYNTHROID) 50 mcg tablet Take 50 mcg by mouth Daily (before breakfast). 4/1/19   Provider, Historical  
ondansetron (ZOFRAN ODT) 4 mg disintegrating tablet Take 4 mg by mouth every eight (8) hours as needed for Nausea. 4/1/19   Provider, Historical  
warfarin (COUMADIN) 2.5 mg tablet Take 2.5 mg by mouth five (5) days a week. Sun, Tues, Wed, Thurs, Sat    Provider, Historical  
ALPRAZolam Marjean Pepper) 0.25 mg tablet Take 0.25 mg by mouth as needed for Anxiety. Provider, Historical  
ginger, Zingiber officinalis, (GINGER EXTRACT) 250 mg cap Take 1 Cap by mouth daily. Provider, Historical  
TURMERIC PO Take 1 Cap by mouth daily. Provider, Historical  
warfarin (COUMADIN) 5 mg tablet Take 5 mg by mouth two (2) days a week. Mon, Fri    Provider, Historical  
hydrALAZINE (APRESOLINE) 50 mg tablet Take 1 Tab by mouth three (3) times daily.  3/1/19   Jerzy Sheppard MD  
metoprolol tartrate (LOPRESSOR) 100 mg IR tablet Take 1 Tab by mouth two (2) times a day. 3/1/19   Alfredo Farris MD  
amLODIPine (NORVASC) 10 mg tablet Take 1 Tab by mouth daily. 3/2/19   Alfredo Farris MD  
Ferrous Fumarate 325 mg (106 mg iron) tab Take 1 Tab by mouth daily. 3/1/19   Alfredo Farris MD  
mirtazapine (REMERON) 30 mg tablet Take 1 Tab by mouth nightly. 18   Malachi Scales MD  
esomeprazole (NEXIUM) 40 mg capsule Take 40 mg by mouth daily. Provider, Historical  
 
 
Allergies: Allergies Allergen Reactions  Advil [Ibuprofen] Unknown (comments)  Aspirin Unknown (comments)  Meloxicam Unknown (comments)  Penicillin G Unknown (comments)  Shellfish Containing Products Rash  Sulfa (Sulfonamide Antibiotics) Unknown (comments) FH: 
Family History Problem Relation Age of Onset  Heart Disease Father  Dementia Brother  Heart Disease Brother  Diabetes Brother SH: Social History Tobacco Use  Smoking status: Former Smoker Last attempt to quit: 1963 Years since quittin.3  Smokeless tobacco: Never Used Substance Use Topics  Alcohol use: Yes ROS: Review of systems not obtained due to patient factors. PHYSICAL EXAM: 
Visit Vitals BP (!) 123/33 Pulse 78 Temp 97.6 °F (36.4 °C) Resp 16 Ht 5' (1.524 m) Wt 71.2 kg (156 lb 15.5 oz) SpO2 95% BMI 30.66 kg/m² Gen: NAD, non-toxic, sleeping comfortably HEENT: anicteric sclerae, normal conjunctiva, MMM Neck: supple, trachea midline, no adenopathy Heart: RRR, systolic and diastolic murmurs, no JVD, trace b/l LE edema but has compression socks on 
Lungs: diminished breath sounds in R base otherwise clear, non-labored respirations Abd: soft, NT, ND, BS+ Extr: warm Skin: dry, no rash Neuro/psych: grossly non-focal, not anxious nor agitated Labs/Imaging: 
Recent Results (from the past 24 hour(s)) EKG, 12 LEAD, INITIAL Collection Time: 19 10:13 PM  
Result Value Ref Range  Ventricular Rate 85 BPM  
 Atrial Rate 85 BPM  
 P-R Interval 220 ms QRS Duration 98 ms Q-T Interval 396 ms QTC Calculation (Bezet) 471 ms Calculated P Axis 58 degrees Calculated R Axis 5 degrees Calculated T Axis 23 degrees Diagnosis Sinus rhythm with 1st degree AV block with frequent premature ventricular  
complexes Voltage criteria for left ventricular hypertrophy When compared with ECG of 16-MAR-2019 10:12, 
T wave inversion no longer evident in Anterior leads CBC WITH AUTOMATED DIFF Collection Time: 04/05/19 11:13 PM  
Result Value Ref Range WBC 11.4 (H) 3.6 - 11.0 K/uL  
 RBC 3.34 (L) 3.80 - 5.20 M/uL HGB 9.5 (L) 11.5 - 16.0 g/dL HCT 29.6 (L) 35.0 - 47.0 % MCV 88.6 80.0 - 99.0 FL  
 MCH 28.4 26.0 - 34.0 PG  
 MCHC 32.1 30.0 - 36.5 g/dL  
 RDW 15.0 (H) 11.5 - 14.5 % PLATELET 658 447 - 132 K/uL MPV 12.1 8.9 - 12.9 FL  
 NRBC 0.0 0  WBC ABSOLUTE NRBC 0.00 0.00 - 0.01 K/uL NEUTROPHILS 80 (H) 32 - 75 % LYMPHOCYTES 14 12 - 49 % MONOCYTES 6 5 - 13 % EOSINOPHILS 0 0 - 7 % BASOPHILS 0 0 - 1 % IMMATURE GRANULOCYTES 0 0.0 - 0.5 % ABS. NEUTROPHILS 9.1 (H) 1.8 - 8.0 K/UL  
 ABS. LYMPHOCYTES 1.6 0.8 - 3.5 K/UL  
 ABS. MONOCYTES 0.7 0.0 - 1.0 K/UL  
 ABS. EOSINOPHILS 0.0 0.0 - 0.4 K/UL  
 ABS. BASOPHILS 0.0 0.0 - 0.1 K/UL  
 ABS. IMM. GRANS. 0.0 0.00 - 0.04 K/UL  
 DF AUTOMATED METABOLIC PANEL, COMPREHENSIVE Collection Time: 04/05/19 11:13 PM  
Result Value Ref Range Sodium 137 136 - 145 mmol/L Potassium 3.6 3.5 - 5.1 mmol/L Chloride 107 97 - 108 mmol/L  
 CO2 21 21 - 32 mmol/L Anion gap 9 5 - 15 mmol/L Glucose 123 (H) 65 - 100 mg/dL BUN 33 (H) 6 - 20 MG/DL Creatinine 1.66 (H) 0.55 - 1.02 MG/DL  
 BUN/Creatinine ratio 20 12 - 20 GFR est AA 36 (L) >60 ml/min/1.73m2 GFR est non-AA 30 (L) >60 ml/min/1.73m2 Calcium 8.2 (L) 8.5 - 10.1 MG/DL  Bilirubin, total 0.3 0.2 - 1.0 MG/DL  
 ALT (SGPT) 28 12 - 78 U/L  
 AST (SGOT) 32 15 - 37 U/L Alk. phosphatase 123 (H) 45 - 117 U/L Protein, total 7.1 6.4 - 8.2 g/dL Albumin 2.7 (L) 3.5 - 5.0 g/dL Globulin 4.4 (H) 2.0 - 4.0 g/dL A-G Ratio 0.6 (L) 1.1 - 2.2 CK W/ REFLX CKMB Collection Time: 04/05/19 11:13 PM  
Result Value Ref Range CK 63 26 - 192 U/L  
TROPONIN I Collection Time: 04/05/19 11:13 PM  
Result Value Ref Range Troponin-I, Qt. <0.05 <0.05 ng/mL NT-PRO BNP Collection Time: 04/05/19 11:13 PM  
Result Value Ref Range NT pro-BNP 34,252 (H) <450 PG/ML  
SAMPLES BEING HELD Collection Time: 04/05/19 11:13 PM  
Result Value Ref Range SAMPLES BEING HELD DK GRN   
 COMMENT Add-on orders for these samples will be processed based on acceptable specimen integrity and analyte stability, which may vary by analyte. EKG, 12 LEAD, SUBSEQUENT Collection Time: 04/06/19  1:09 AM  
Result Value Ref Range Ventricular Rate 123 BPM  
 Atrial Rate 107 BPM  
 QRS Duration 90 ms Q-T Interval 344 ms QTC Calculation (Bezet) 492 ms Calculated R Axis 10 degrees Calculated T Axis 177 degrees Diagnosis Atrial fibrillation with rapid ventricular response Minimal voltage criteria for LVH, may be normal variant ST & T wave abnormality, consider lateral ischemia When compared with ECG of 05-APR-2019 22:13, 
MANUAL COMPARISON REQUIRED, DATA IS UNCONFIRMED PROTHROMBIN TIME + INR Collection Time: 04/06/19  5:28 AM  
Result Value Ref Range INR 2.8 (H) 0.9 - 1.1 Prothrombin time 27.0 (H) 9.0 - 11.1 sec LACTIC ACID Collection Time: 04/06/19  5:29 AM  
Result Value Ref Range Lactic acid 0.9 0.4 - 2.0 MMOL/L Recent Labs 04/05/19 2313 WBC 11.4* HGB 9.5* HCT 29.6*  Recent Labs 04/05/19 2313   
K 3.6  CO2 21 BUN 33* CREA 1.66* * CA 8.2* Recent Labs 04/05/19 2313 SGOT 32 ALT 28 * TBILI 0.3 TP 7.1 ALB 2.7*  
GLOB 4.4*  
 
 Recent Labs 04/05/19 
2313 TROIQ <0.05 Recent Labs 04/06/19 
7046 INR 2.8* PTP 27.0* No results for input(s): PH, PCO2, PO2 in the last 72 hours. Xr Chest Pa Lat Result Date: 4/5/2019 IMPRESSION: Right basilar airspace disease and associated pleural effusion. Assessment & Plan:  
 
Wheezing: on history but no evidence of this here. CXR notes R basilar ASD and associated effusion. Clinically not c/w with pneumonia and aside from a minimal leukocytosis no other indication of infection. Currently stable on room air. 
-admit to tele observation 
-will obtain CT chest wo contrast to further evaluate this 
-received IV levofloxacin in the ED, will hold additional abx until CT 
-resume PO furosemide 20 mg daily. Depending on clinical progression and CT findings she may need IV Paroxysmal afib w/ rvr: 
-s/p IV diltiazem in the ED, now in NSR, monitor on tele 
-continue metoprolol 
-continue coumadin - consult pharmacy to dose Severe aortic valve regurgitation: decision was made not to undergo TAVR during last admit. Cardiology has been consulted by the ED. Moderate mitral valve stenosis and regurgitation Chronic diastolic CHF 
 
HTN: 
-continue metoprolol, amlodipine, hydralazine with hold parameters CKD stage III-IV: stable Hypothyroidism 
-continue synthroid Dementia without behavioral disturbances DVT ppx: anticoagulated Code status: full, pt's POA is son Artemio Barroso per the daughter Disposition: prior living arrangement when ready Signed By: Mustapha Burnett MD   
 April 6, 2019

## 2019-04-07 NOTE — PROGRESS NOTES
Problem: Falls - Risk of 
Goal: *Absence of Falls Description Document Alvinat President Fall Risk and appropriate interventions in the flowsheet. Outcome: Progressing Towards Goal 
  
Problem: Patient Education: Go to Patient Education Activity Goal: Patient/Family Education Outcome: Progressing Towards Goal 
  
Problem: Heart Failure: Day 1 Goal: Consults, if ordered Outcome: Progressing Towards Goal 
Goal: Medications Outcome: Progressing Towards Goal 
Goal: Respiratory Outcome: Progressing Towards Goal 
Goal: Psychosocial 
Outcome: Progressing Towards Goal 
Goal: *Anxiety reduced or absent Outcome: Progressing Towards Goal

## 2019-04-07 NOTE — PROGRESS NOTES
Pharmacy Daily Dosing of Warfarin Indication: A.fib Goal INR: 2-3 PTA Warfarin Dose: warfarin 5 mg /daily Concurrent anticoagulants Concurrent antiplatelet: 
 
Major Interacting Medications ? Drugs that may increase INR:  Levofloxacin ? Drugs that may decrease INR: 
 
Conditions that may increase/decrease INR (CHF, C. diff, cirrhosis, thyroid disorder, hypoalbuminemia): 
 
Labs: 
Recent Labs 04/07/19 
1350 04/07/19 
0431 04/06/19 
0528 04/05/19 
2313 INR 2.5*  --  2.8*  --   
HGB  --  8.9*  --  9.5* PLT  --  246  --  263 SGOT  --   --   --  32  
TBILI  --   --   --  0.3 ALB  --   --   --  2.7* Diet:  cardiac Impression/Plan: · Therapeutic INR 2.5 with pt started on LQ 4/6 am 
· Warfarin 2.5mg 4/7 to monitor INR direction · Daily INR · CBC w/o diff QODay Pharmacy will follow daily and adjust the dose as appropriate.  
 
Thank you,  
Dian Verma, [facility]

## 2019-04-07 NOTE — PROGRESS NOTES
Pharmacy Automatic Renal Dosing Protocol - Antimicrobials Indication for Antimicrobials: HAP Current Regimen of Each Antimicrobial: 
Levaquin 750 mg every 48 hrs (Start Date ; Day # 2) Micro: 
 BCx - ng - pending Recent Labs 19 
0431 19 
2313 CREA 1.88* 1.66* BUN 35* 33* WBC 9.1 11.4* Temp (24hrs), Av.4 °F (36.9 °C), Min:97.7 °F (36.5 °C), Max:100.1 °F (37.8 °C) Creatinine Clearance (mL/min) or Dialysis:  
Estimated Creatinine Clearance: 21.1 mL/min (A) (based on SCr of 1.88 mg/dL (H)). Estimated Creatinine Clearance (using IBW):16.9 mL/min Impression/Plan: · Afebrile · WBC wnl · SCr worse · Adjusted Levofloxacin to 500mg IV q48h with next dose  6am 
· Antimicrobial stop date - pending Pharmacy will follow daily and adjust medications as appropriate for renal function and/or serum levels.  
 
Thank you, 
Juanita Sloan, [facility]

## 2019-04-07 NOTE — PROGRESS NOTES
Problem: Falls - Risk of 
Goal: *Absence of Falls Description Document Jeremiah Madison Fall Risk and appropriate interventions in the flowsheet. Outcome: Progressing Towards Goal 
  
Problem: Patient Education: Go to Patient Education Activity Goal: Patient/Family Education Outcome: Progressing Towards Goal 
  
Problem: Breathing Pattern - Ineffective Goal: *Absence of hypoxia Outcome: Progressing Towards Goal 
Goal: *Use of effective breathing techniques Outcome: Progressing Towards Goal 
  
Problem: Patient Education: Go to Patient Education Activity Goal: Patient/Family Education Outcome: Progressing Towards Goal 
  
Problem: Patient Education: Go to Patient Education Activity Goal: Patient/Family Education Outcome: Progressing Towards Goal 
  
Problem: Heart Failure: Day 1 Goal: Off Pathway (Use only if patient is Off Pathway) Outcome: Progressing Towards Goal 
  
Problem: Heart Failure: Day 2 Goal: Off Pathway (Use only if patient is Off Pathway) Outcome: Progressing Towards Goal

## 2019-04-07 NOTE — PROGRESS NOTES
PCU SHIFT NURSING NOTE Bedside and Verbal shift change report given to Carla Robison RN (oncoming nurse) by Tyshawn Lindsey RN//Surendra Dumont RN (offgoing nurse). Report included the following information SBAR, Kardex, ED Summary, Procedure Summary, Intake/Output, MAR, Recent Results, Med Rec Status, Cardiac Rhythm ST, Alarm Parameters  and Quality Measures. Shift Summary:  
 
 
 
Admission Date 4/5/2019 Admission Diagnosis Shortness of breath [R06.02] Atrial fibrillation with RVR ([address] [I48.91] Consults IP CONSULT TO CARDIOLOGY 
IP CONSULT TO HOSPITALIST Consults [x]PT [x]OT []Speech [x]Case Management  
  
[x] Palliative Cardiac Monitoring Order  
[x]Yes []No  
 
IV drips []Yes Drip:                            Dose: 
Drip:                            Dose: 
Drip:                            Dose:  
[x]No  
 
GI Prophylaxis [x]Yes []No  
 
 
 
DVT Prophylaxis SCDs:     
     
 Song stockings:     
  
[x] Medication []Contraindicated []None Activity Level Activity Level: Up with Assistance Activity Assistance: Partial (one person) Purposeful Rounding every 1-2 hour? [x]Yes Jade Score  Total Score: 1 Bed Alarm (If score 3 or >) [x]Yes  
[] Refused (See signed refusal form in chart) Rafiq Score  Rafiq Score: 20 Rafiq Score (if score 14 or less) [x]PMT consult  
[]Wound Care consult [x]Specialty bed  
[x] Nutrition consult Needs prior to discharge:  
Home O2 required:   
[]Yes  
[x]No  
 If yes, how much O2 required? Other:  
 Last Bowel Movement: Last Bowel Movement Date: 04/07/19 Influenza Vaccine Received Flu Vaccine for Current Season (usually Sept-March): Yes Pneumonia Vaccine Diet Active Orders Diet DIET CARDIAC Regular LDAs Peripheral IV 04/06/19 Left Hand (Active) Site Assessment Clean, dry, & intact 4/7/2019  7:25 AM  
Phlebitis Assessment 0 4/7/2019  7:25 AM  
 Infiltration Assessment 0 4/7/2019  7:25 AM  
Dressing Status Clean, dry, & intact 4/7/2019  7:25 AM  
Dressing Type Tape;Transparent 4/7/2019  7:25 AM  
Hub Color/Line Status Blue;Capped;Flushed 4/7/2019  7:25 AM  
Action Taken Open ports on tubing capped 4/7/2019  7:25 AM  
Alcohol Cap Used Yes 4/7/2019  7:25 AM  
                  
Urinary Catheter Intake & Output Date 04/06/19 0700 - 04/07/19 0659 04/07/19 0700 - 04/08/19 7604 Shift 1664-7179 9208-7664 24 Hour Total 5682-3003 0725-1297 24 Hour Total  
INTAKE  
I.V.(mL/kg/hr) 0(0)  0(0) Volume (levoFLOXacin (LEVAQUIN) 750 mg in D5W IVPB) 0  0 Shift Total(mL/kg) 0(0)  0(0) OUTPUT Urine(mL/kg/hr) 1450(1.7) 400(0.4) 1850(1) 400  400 Urine Voided 1500 650 6827 400  400 Urine Occurrence(s) 1 x  1 x Shift Total(mL/kg) 1450(20.4) 400(5.4) 1850(24.9) 400(5.4)  400(5.4) NET -1450 -400 -1850 -400  -400 Weight (kg) 71.2 74.3 74.3 74.3 74.3 74.3 Readmission Risk Assessment Tool Score High Risk   
      
 23 Total Score 3 Has Seen PCP in Last 6 Months (Yes=3, No=0)  
 4 IP Visits Last 12 Months (1-3=4, 4=9, >4=11) 5 Pt. Coverage (Medicare=5 , Medicaid, or Self-Pay=4) 11 Charlson Comorbidity Score (Age + Comorbid Conditions) Criteria that do not apply:  
 . Living with Significant Other. Assisted Living. LTAC. SNF. or  
Rehab Patient Length of Stay (>5 days = 3) Expected Length of Stay - - - Actual Length of Stay 1

## 2019-04-07 NOTE — PROGRESS NOTES
Cardiology Progress Note 4/7/2019 11:48 AM 
 
Admit Date: 4/5/2019 Admit Diagnosis: Shortness of breath [R06.02]; Atrial fibrillation with RVR ([address] [I48.91] Subjective:  
 
[place] has paroxysmal afib. Now in sinus rhythm. Visit Vitals /62 (BP 1 Location: Left arm, BP Patient Position: At rest) Pulse 73 Temp 97.8 °F (36.6 °C) Resp 18 Ht 5' (1.524 m) Wt 163 lb 12.8 oz (74.3 kg) SpO2 97% Breastfeeding? No  
BMI 31.99 kg/m² Current Facility-Administered Medications Medication Dose Route Frequency  furosemide (LASIX) tablet 40 mg  40 mg Oral DAILY  [START ON 4/8/2019] levoFLOXacin (LEVAQUIN) 500 mg in D5W IVPB  500 mg IntraVENous Q48H  
 sodium chloride (NS) flush 5-40 mL  5-40 mL IntraVENous Q8H  
 sodium chloride (NS) flush 5-40 mL  5-40 mL IntraVENous PRN  
 acetaminophen (TYLENOL) tablet 650 mg  650 mg Oral Q4H PRN  
 ondansetron (ZOFRAN) injection 4 mg  4 mg IntraVENous Q4H PRN  
 ALPRAZolam (XANAX) tablet 0.25 mg  0.25 mg Oral PRN  pantoprazole (PROTONIX) tablet 40 mg  40 mg Oral ACB  levothyroxine (SYNTHROID) tablet 50 mcg  50 mcg Oral ACB  mirtazapine (REMERON) tablet 30 mg  30 mg Oral QHS  albuterol-ipratropium (DUO-NEB) 2.5 MG-0.5 MG/3 ML  3 mL Nebulization Q4H PRN  
 hydrALAZINE (APRESOLINE) tablet 50 mg  50 mg Oral TID  amLODIPine (NORVASC) tablet 10 mg  10 mg Oral DAILY  metoprolol tartrate (LOPRESSOR) tablet 100 mg  100 mg Oral BID  WARFARIN INFORMATION NOTE (COUMADIN)   Other QPM  
 ferrous sulfate tablet 325 mg  1 Tab Oral DAILY WITH BREAKFAST Objective:  
  
Physical Exam: 
Visit Vitals /62 (BP 1 Location: Left arm, BP Patient Position: At rest) Pulse 73 Temp 97.8 °F (36.6 °C) Resp 18 Ht 5' (1.524 m) Wt 163 lb 12.8 oz (74.3 kg) SpO2 97% Breastfeeding? No  
BMI 31.99 kg/m² General Appearance:  Well developed, well nourished,alert and oriented x 3, and individual in no acute distress. Ears/Nose/Mouth/Throat:   Hearing grossly normal. 
  
    Neck: Supple. Chest:   Lungs clear to auscultation bilaterally. Cardiovascular:  Regular rate and rhythm, S1, S2 normal, no murmur. Abdomen:   Soft, non-tender, bowel sounds are active. Extremities: No edema bilaterally. Skin: Warm and dry. Data Review:  
Labs:   
Recent Results (from the past 24 hour(s)) CBC W/O DIFF Collection Time: 04/07/19  4:31 AM  
Result Value Ref Range WBC 9.1 3.6 - 11.0 K/uL  
 RBC 3.15 (L) 3.80 - 5.20 M/uL HGB 8.9 (L) 11.5 - 16.0 g/dL HCT 27.9 (L) 35.0 - 47.0 % MCV 88.6 80.0 - 99.0 FL  
 MCH 28.3 26.0 - 34.0 PG  
 MCHC 31.9 30.0 - 36.5 g/dL  
 RDW 15.0 (H) 11.5 - 14.5 % PLATELET 636 310 - 044 K/uL MPV 11.8 8.9 - 12.9 FL  
 NRBC 0.0 0  WBC ABSOLUTE NRBC 0.00 0.00 - 0.01 K/uL METABOLIC PANEL, BASIC Collection Time: 04/07/19  4:31 AM  
Result Value Ref Range Sodium 139 136 - 145 mmol/L Potassium 3.6 3.5 - 5.1 mmol/L Chloride 109 (H) 97 - 108 mmol/L  
 CO2 21 21 - 32 mmol/L Anion gap 9 5 - 15 mmol/L Glucose 81 65 - 100 mg/dL BUN 35 (H) 6 - 20 MG/DL Creatinine 1.88 (H) 0.55 - 1.02 MG/DL  
 BUN/Creatinine ratio 19 12 - 20 GFR est AA 31 (L) >60 ml/min/1.73m2 GFR est non-AA 26 (L) >60 ml/min/1.73m2 Calcium 8.3 (L) 8.5 - 10.1 MG/DL MAGNESIUM Collection Time: 04/07/19  4:31 AM  
Result Value Ref Range Magnesium 1.7 1.6 - 2.4 mg/dL PHOSPHORUS Collection Time: 04/07/19  4:31 AM  
Result Value Ref Range Phosphorus 3.2 2.6 - 4.7 MG/DL Telemetry: normal sinus rhythm Assessment:  
 
Active Problems: 
  Mitral valve disease () Overview: MR Aortic valve disorder (9/30/2010) Overview: AS Shortness of breath (4/6/2019) Atrial fibrillation with RVR ([address] (4/6/2019) Plan: Will d/c amlodipine and start oral cardizem to help with afib. Continue metoprolol.  
 
Merlyn Lemus MD

## 2019-04-07 NOTE — PROGRESS NOTES
Hospitalist Progress Note NAME: Alphonso Valdivia :  1935 MRN:  [id number] Assessment / Plan: PNA and b/l effusion 
-CT chest showed 1. Moderate layering bilateral pleural effusions have increased in size since the prior study. Bilateral lower lobe and right middle lobe atelectasis has worsened. Mild patchy airspace disease throughout aerated portions of both lungs may 
represent infection or sequela of pulmonary edema. -empiric Levaquin started 
-received IV lasix on , change to PO lasix today 
-PT/OT/CM consulted 
  
Paroxysmal afib w/ rvr 
-s/p IV diltiazem in the ED, back in afib this morning but now in NSR 
-continue metoprolol, digoxin held due to renal failure 
-continue coumadin - consult pharmacy to dose 
 
-family declined TAVR during last admit. 
  
Moderate mitral valve stenosis and regurgitation 
  
Chronic diastolic CHF  
HTN  
Severe aortic valve regurgitation 
-continue metoprolol, amlodipine, hydralazine with hold parameters -appreciate cardiology following 
  
CKD stage III-IV, stable 
  
Hypothyroidism 
-continue synthroid 
  
Dementia without behavioral disturbances 
  
DVT ppx: anticoagulated Code status: full, pt's POA is son [place] per the daughter Disposition: prior living arrangement when ready Subjective: Chief Complaint / Reason for Physician Visit Pt seen with family at bedside. No issue overnight. Discussed with RN events overnight. Review of Systems: 
Symptom Y/N Comments  Symptom Y/N Comments Fever/Chills    Chest Pain Poor Appetite    Edema Cough    Abdominal Pain Sputum    Joint Pain SOB/HASKINS    Pruritis/Rash Nausea/vomit    Tolerating PT/OT Diarrhea    Tolerating Diet Constipation    Other Could NOT obtain due to: dementia Objective: VITALS:  
Last 24hrs VS reviewed since prior progress note. Most recent are: 
Patient Vitals for the past 24 hrs: 
 Temp Pulse Resp BP SpO2 04/07/19 1007     99 % 04/07/19 0859  87  135/41   
04/07/19 0725 97.9 °F (36.6 °C) 86 16 159/48 97 % 04/07/19 0432 97.7 °F (36.5 °C) 81 16 139/72 99 % 04/06/19 2328 97.9 °F (36.6 °C) 90 18 (!) 137/38 93 % 04/06/19 2004 100.1 °F (37.8 °C) 88 18 151/49 93 % 04/06/19 2000     96 % 04/06/19 1744 98.3 °F (36.8 °C) 89 18 152/51 96 % 04/06/19 1600 98.4 °F (36.9 °C) 84 18 150/48 98 % 04/06/19 1308 98.4 °F (36.9 °C) 83 18 142/41 97 % 04/06/19 1051 98.5 °F (36.9 °C) 84 18 132/48 96 % Intake/Output Summary (Last 24 hours) at 4/7/2019 1045 Last data filed at 4/7/2019 7115 Gross per 24 hour Intake 0 ml Output 1800 ml Net -1800 ml PHYSICAL EXAM: 
General: WD, WN. Alert, cooperative, no acute distress   
EENT:  EOMI. Anicteric sclerae. MMM Resp:  CTA bilaterally, no wheezing or rales. No accessory muscle use CV:  Regular  rhythm,  No edema GI:  Soft, Non distended, Non tender.  +Bowel sounds Neurologic:  Alert and oriented X 1, normal speech Psych:   Not anxious nor agitated Skin:  No rashes. No jaundice Reviewed most current lab test results and cultures  YES Reviewed most current radiology test results   YES Review and summation of old records today    NO Reviewed patient's current orders and MAR    YES 
PMH/SH reviewed - no change compared to H&P 
________________________________________________________________________ Care Plan discussed with: 
  Comments Patient x Family  x Daughter/son RN x Care Manager Consultant Multidiciplinary team rounds were held today with , nursing, pharmacist and clinical coordinator. Patient's plan of care was discussed; medications were reviewed and discharge planning was addressed. ________________________________________________________________________ Total NON critical care TIME:  35   Minutes Total CRITICAL CARE TIME Spent:   Minutes non procedure based Comments >50% of visit spent in counseling and coordination of care    
________________________________________________________________________ Homar Philippe MD  
 
Procedures: see electronic medical records for all procedures/Xrays and details which were not copied into this note but were reviewed prior to creation of Plan. LABS: 
I reviewed today's most current labs and imaging studies. Pertinent labs include: 
Recent Labs 04/07/19 
0431 04/05/19 
2313 WBC 9.1 11.4* HGB 8.9* 9.5* HCT 27.9* 29.6*  263 Recent Labs 04/07/19 
2395 04/06/19 
6791 04/05/19 
2313   --  137  
K 3.6  --  3.6 *  --  107 CO2 21  --  21  
GLU 81  --  123* BUN 35*  --  33* CREA 1.88*  --  1.66* CA 8.3*  --  8.2* MG 1.7  --   --   
PHOS 3.2  --   --   
ALB  --   --  2.7* TBILI  --   --  0.3 SGOT  --   --  32 ALT  --   --  28 INR  --  2.8*  --   
 
 
Signed: Homar Philippe MD

## 2019-04-08 PROBLEM — I48.91 ATRIAL FIBRILLATION, RAPID (HCC): Status: ACTIVE | Noted: 2019-01-01

## 2019-04-08 NOTE — PROGRESS NOTES
Problem: Mobility Impaired (Adult and Pediatric) Goal: *Acute Goals and Plan of Care (Insert Text) Description Physical Therapy Goals Initiated 4/8/2019 1. Patient will move from supine to sit and sit to supine , scoot up and down and roll side to side in bed with modified independence within 7 day(s). 2.  Patient will transfer from bed to chair and chair to bed with modified independence using the least restrictive device within 7 day(s). 3.  Patient will perform sit to stand with modified independence within 7 day(s). 4.  Patient will ambulate with supervision/set-up for 200 feet with the least restrictive device within 7 day(s). 5.  Patient will ascend/descend 4 stairs with 1 handrail(s) with supervision/set-up within 7 day(s). Outcome: Progressing Towards Goal 
 PHYSICAL THERAPY EVALUATION Patient: Rocío Horton (70 y.o. female) Date: 4/8/2019 Primary Diagnosis: Shortness of breath [R06.02] Atrial fibrillation with RVR ([address] [I48.91] Atrial fibrillation, rapid ([address] [I48.91] Precautions:     
 
ASSESSMENT : 
Based on the objective data described below, the patient presents with mild gait and balance impairments, generalized weakness, decreased AROM, and decreased activity tolerance/endurance following admission for a-fib with RVR. Pt received supine in bed with DTR at bedside and agreeable to PT evaluation. Pt cleared by nursing for mobility. Patient alert and oriented x 4. No pain complaints. VSS on RA during session. All functional mobility performed with CGA to SBA, including bed mobility, transfers, and gait training. She ambulated 80' with CGA and no AD, however demonstrates mildly unsteady gait (occasionally reaching out for support) with narrowed KENDRICK and slow gait speed. Gait distance limited by patient due to increased fatigue and patient reports that she does not ambulate long distances at baseline.  Pt was left sitting in bedside chair with all needs met, RN aware, and DTR present following therapy session. Patient has 24/7 family support at baseline and had recently begun HHPT, therefore recommend patient return home with family support and resumption of HHPT services at discharge. PT will continue to follow to address mobility impairments as noted above. Recommend with nursing patient to complete as able in order to maintain strength, endurance and independence: OOB to chair 3x/day and walking daily with CG assist. Thank you for your assistance. Patient will benefit from skilled intervention to address the above impairments. Patient?s rehabilitation potential is considered to be Good Factors which may influence rehabilitation potential include:  
? None noted ? Mental ability/status ? Medical condition ? Home/family situation and support systems ? Safety awareness 
? Pain tolerance/management 
? Other: PLAN : 
Recommendations and Planned Interventions: 
?           Bed Mobility Training             ? Neuromuscular Re-Education ? Transfer Training                   ? Orthotic/Prosthetic Training 
? Gait Training                         ? Modalities ? Therapeutic Exercises           ? Edema Management/Control ? Therapeutic Activities            ? Patient and Family Training/Education ? Other (comment): Frequency/Duration: Patient will be followed by physical therapy  3 times a week to address goals. Discharge Recommendations: Home Health Further Equipment Recommendations for Discharge: None SUBJECTIVE:  
Patient stated ? Are we going out there?? OBJECTIVE DATA SUMMARY:  
HISTORY:   
Past Medical History:  
Diagnosis Date Aortic valve disorders AS Asthma Benign hypertensive heart disease without heart failure Diabetes ([address] Fibromyalgia Gastroparesis GERD (gastroesophageal reflux disease) Hypertension Mitral valve disorders(424.0) MR  
 LIVIA (obstructive sleep apnea) Paroxysmal atrial fibrillation (HCC) Pure hypercholesterolemia 9/30/2010 Past Surgical History:  
Procedure Laterality Date ECHO 2D ADULT  11/2009 LVH, normal LV wall motion and ejection fraction, mild aortic stenosis, moderate aortic regurgitation and mild mitral regurgitation. The ejection fraction was 55-60%. ECHO 2D ADULT  1/2011 EF 60%, LAE, mild AS, AI, MR, PA low 40s EVENT MONITOR POST SYMPTOMS  9/2010 Rare PACs, no arrythmia with symptoms LOOP MONITOR  9-10/2011 NSR  
 STRESS TEST MYOVIEW  1/2011  
 no ischemia, EF 63% US DUPLEX CAROTID BILATERAL  1/2011  
 mild bilat disease Prior Level of Function/Home Situation: patient is independent for mobility at baseline. DTR lives with patient and patient has other family members who provide support as needed (has 24/7 supervision). Denies fall history. Does get minimal assist from family for bathing and dressing due to limited endurance. Personal factors and/or comorbidities impacting plan of care: asthma; mitral valve disorder; HTN; aortic valve disorder Home Situation Home Environment: Private residence # Steps to Enter: 4 Wheelchair Ramp: Yes One/Two Story Residence: One story Living Alone: No 
Support Systems: Child(raul), Family member(s), Friends \ neighbors Patient Expects to be Discharged to[de-identified] Private residence Current DME Used/Available at Home: Grab bars, [employer], rollator Tub or Shower Type: Shower(built-in seat) EXAMINATION/PRESENTATION/DECISION MAKING:  
Critical Behavior: 
Neurologic State: Alert Orientation Level: Oriented to time, Oriented to place, Oriented to person, Oriented to situation Hearing: Auditory Auditory Impairment: None Skin:  Intact Edema: None Range Of Motion: 
AROM: Generally decreased, functional 
  
  
  
 PROM: Within functional limits Strength:   
Strength: Generally decreased, functional 
  
  
  
  
  
  
Tone & Sensation:  
Tone: Normal 
  
  
  
  
Sensation: Intact Coordination: 
Coordination: Generally decreased, functional 
Vision:  
Acuity: Within Defined Limits Corrective Lenses: Glasses(Dtr reports Pt has glasses yet does not use) Functional Mobility: 
Bed Mobility: 
Rolling: Stand-by assistance Supine to Sit: Stand-by assistance(HOB elevated) Scooting: Stand-by assistance Transfers: 
Sit to Stand: Contact guard assistance Stand to Sit: Stand-by assistance Balance:  
Sitting: Intact Standing: Impaired Standing - Static: Good Standing - Dynamic : Good Ambulation/Gait Training: 
Distance (ft): 115 Feet (ft) Assistive Device: Gait belt Ambulation - Level of Assistance: Contact guard assistance;Assist x1;Additional time Gait Description (WDL): Exceptions to [facility] ([place], [place]) Gait Abnormalities: Decreased step clearance; Altered arm swing Base of Support: Narrowed Speed/Rivka: Slow Step Length: Left shortened;Right shortened Functional Measure: 
Barthel Index: 
Bathin Bladder: 10 Bowels: 10 
Groomin Dressin Feeding: 10 Mobility: 5 Stairs: 5 Toilet Use: 5 Transfer (Bed to Chair and Back): 10 Total: 65/100 Percentage of impairment  
0% 1-19% 20-39% 40-59% 60-79% 80-99% 100% Barthel Score 0-100 100 99-80 79-60 59-40 20-39 1-19 
 0 The Barthel ADL Index: Guidelines 1. The index should be used as a record of what a patient does, not as a record of what a patient could do. 2. The main aim is to establish degree of independence from any help, physical or verbal, however minor and for whatever reason. 3. The need for supervision renders the patient not independent.  
4. A patient's performance should be established using the best available evidence. Asking the patient, friends/relatives and nurses are the usual sources, but direct observation and common sense are also important. However direct testing is not needed. 5. Usually the patient's performance over the preceding 24-48 hours is important, but occasionally longer periods will be relevant. 6. Middle categories imply that the patient supplies over 50 per cent of the effort. 7. Use of aids to be independent is allowed. Irene Saver., Barthel, D.W. (2794). Functional evaluation: the Barthel Index. [address] [facility] (14)2. Natan King carl HONORIO Chávez, Bebo Post., Maribel Metcalf., Linda, [facility] (1999). Measuring the change indisability after inpatient rehabilitation; comparison of the responsiveness of the Barthel Index and Functional [place] Measure. Journal of Neurology, Neurosurgery, and Psychiatry, 66(4), 808-453. Alcira Plummer NTRENA, CHARLENE Sorto, & Carol Oquendo M.A. (2004.) Assessment of post-stroke quality of life in cost-effectiveness studies: The usefulness of the Barthel Index and the EuroQoL-5D. [facility], 13, 847-67 Physical Therapy Evaluation Charge Determination History Examination Presentation Decision-Making HIGH Complexity :3+ comorbidities / personal factors will impact the outcome/ POC  HIGH Complexity : 4+ Standardized tests and measures addressing body structure, function, activity limitation and / or participation in recreation  LOW Complexity : Stable, uncomplicated  Other outcome measures Barthel index  MEDIUM Based on the above components, the patient evaluation is determined to be of the following complexity level: MEDIUM Pain: 
Pain Scale 1: Numeric (0 - 10) Pain Intensity 1: 0 Activity Tolerance:  
Fair - limited endurance; VSS on RA; no pain complaints Please refer to the flowsheet for vital signs taken during this treatment. After treatment: ?         Patient left in no apparent distress sitting up in chair ? Patient left in no apparent distress in bed 
? Call bell left within reach ? Nursing notified ? Caregiver present ? Bed alarm activated COMMUNICATION/EDUCATION:  
The patient?s plan of care was discussed with: Physical Therapist, Occupational Therapist, Registered Nurse and . ?         Fall prevention education was provided and the patient/caregiver indicated understanding. ? Patient/family have participated as able in goal setting and plan of care. ?         Patient/family agree to work toward stated goals and plan of care. ?         Patient understands intent and goals of therapy, but is neutral about his/her participation. ? Patient is unable to participate in goal setting and plan of care. Thank you for this referral. 
Romayne Garret, PT, DPT Time Calculation: 19 mins

## 2019-04-08 NOTE — PROGRESS NOTES
Pharmacy Daily Dosing of Warfarin Indication: A.fib Goal INR: 2-3 PTA Warfarin Dose: warfarin 5 mg /daily Concurrent anticoagulants Concurrent antiplatelet: 
 
Major Interacting Medications Drugs that may increase INR:  Levofloxacin Drugs that may decrease INR: 
 
Conditions that may increase/decrease INR (CHF, C. diff, cirrhosis, thyroid disorder, hypoalbuminemia): 
 
Labs: 
Recent Labs 04/08/19 
0402 04/07/19 
1350 04/07/19 
0431 04/06/19 
0528 04/05/19 
2313 INR 2.4* 2.5*  --  2.8*  --   
HGB  --   --  8.9*  --  9.5* PLT  --   --  246  --  263 SGOT  --   --   --   --  32  
TBILI  --   --   --   --  0.3 ALB  --   --   --   --  2.7* Impression/Plan:  
Therapeutic INR. Warfarin 4 mg for tonight Daily INR 
CBC w/o diff [facility] will follow daily and adjust the dose as appropriate. Thank you, Hina Fatih, PHARMD

## 2019-04-08 NOTE — PROGRESS NOTES
Orders received, chart reviewed and patient evaluated by physical therapy. Recommend patient to discharge to home with 24/7 family support and HHPT pending progress with skilled acute care physical therapy. Recommend with nursing patient to complete as able in order to maintain strength, endurance and independence: OOB to chair 3x/day with CGA and ambulating with CGA. Thank you for your assistance. Full evaluation to follow.   
 
Thank you, 
Saturnino Steinberg, PT, DPT

## 2019-04-08 NOTE — PROGRESS NOTES
PCU SHIFT NURSING NOTE Bedside and Verbal shift change report given to Heaven Ozuna RN (oncoming nurse) by Ousmane Dalal (offgoing nurse). Report included the following information SBAR, Kardex, Procedure Summary, Intake/Output, MAR, Recent Results, Med Rec Status and Cardiac Rhythm NSR. Shift Summary:  
 
 
Admission Date 4/5/2019 Admission Diagnosis Shortness of breath [R06.02] Atrial fibrillation with RVR ([address] [I48.91] Consults IP CONSULT TO CARDIOLOGY 
IP CONSULT TO HOSPITALIST Consults [x]PT [x]OT []Speech  
[]Case Management  
  
[] Palliative Cardiac Monitoring Order  
[x]Yes []No  
 
IV drips []Yes Drip:                            Dose: 
Drip:                            Dose: 
Drip:                            Dose:  
[]No  
 
GI Prophylaxis [x]Yes []No  
 
 
 
DVT Prophylaxis SCDs:     
     
 Song stockings:     
  
[x] Medication []Contraindicated []None Activity Level Activity Level: Recliner, Up with Assistance Activity Assistance: Partial (one person) Purposeful Rounding every 1-2 hour? [x]Yes Jade Score  Total Score: 1 Bed Alarm (If score 3 or >) [x]Yes  
[] Refused (See signed refusal form in chart) Rafiq Score  Rafiq Score: 20 Rafiq Score (if score 14 or less) [x]PMT consult  
[]Wound Care consult []Specialty bed  
[x] Nutrition consult Needs prior to discharge:  
Home O2 required:   
[]Yes  
[x]No  
 If yes, how much O2 required? Other:  
 Last Bowel Movement: Last Bowel Movement Date: 04/07/19 Influenza Vaccine Received Flu Vaccine for Current Season (usually Sept-March): Yes Pneumonia Vaccine Diet Active Orders Diet DIET CARDIAC Regular LDAs Peripheral IV 04/06/19 Left Hand (Active) Site Assessment Clean, dry, & intact 4/7/2019  5:00 PM  
Phlebitis Assessment 0 4/7/2019  5:00 PM  
Infiltration Assessment 0 4/7/2019  5:00 PM  
 Dressing Status Clean, dry, & intact 4/7/2019  5:00 PM  
Dressing Type Tape;Transparent 4/7/2019  5:00 PM  
Hub Color/Line Status Blue;Capped;Flushed 4/7/2019  5:00 PM  
Action Taken Open ports on tubing capped 4/7/2019  5:00 PM  
Alcohol Cap Used Yes 4/7/2019  5:00 PM  
                  
Urinary Catheter Intake & Output Date 04/06/19 1900 - 04/07/19 0659 04/07/19 0700 - 04/08/19 9118 Shift 5364-5734 24 Hour Total [id number] 1887-4404 24 Hour Total  
INTAKE  
P.O.   1100  1100  
  P. O.   1100  1100  
I. V.(mL/kg/hr)  0 0(0)  0 Volume (levoFLOXacin (LEVAQUIN) 750 mg in D5W IVPB)  0 0  0 Shift Total(mL/kg)  0(0) 1100(14.8)  1100(14.8) OUTPUT Urine(mL/kg/hr) 400 1850 1750(2)  1750 Urine Voided 400 1850 1750  1750 Urine Occurrence(s)  1 x Shift Total(mL/kg) 400(5.4) 1850(24.9) 1750(23.6)  1750(23.6) NET -400 -1850 -650  -650 Weight (kg) 74.3 74.3 74.3 74.3 74.3 Readmission Risk Assessment Tool Score High Risk   
      
 23 Total Score 3 Has Seen PCP in Last 6 Months (Yes=3, No=0)  
 4 IP Visits Last 12 Months (1-3=4, 4=9, >4=11) 5 Pt. Coverage (Medicare=5 , Medicaid, or Self-Pay=4) 11 Charlson Comorbidity Score (Age + Comorbid Conditions) Criteria that do not apply:  
 . Living with Significant Other. Assisted Living. LTAC. SNF. or  
Rehab Patient Length of Stay (>5 days = 3) Expected Length of Stay - - - Actual Length of Stay 1

## 2019-04-08 NOTE — PROGRESS NOTES
[facility] unable to provide DME to pt. CM sent referral to [facility] via [employer] and they can only provide the nebulizer for pt. They cannot supply a hospital bed to pt's location. They will deliver the nebulizer to the pt tomorrow at the hospital. MARQUISE sent referral for the hospital bed to [facility] via [employer]. [facility] accepted pt for home health.  
4:40pm - MARQUISE met with pt and pt's daughter to inform them about the nebulizer being delivered tomorrow to their room. Pt's daughter stated they don't need a hospital bed now. They got one from a family member that was not using it. Watson Yip, [facility]

## 2019-04-08 NOTE — PROGRESS NOTES
Orders received, chart reviewed and patient evaluated by Occupational Therapy. Recommend patient to discharge home with 24/7 family assist and resume Home Health OT.   
 
Full evaluation to follow.  
  
Stone Barrera, OTR/L

## 2019-04-08 NOTE — FACE TO FACE
Home Health Care Discharge Planning: [facility] Face to Face Encounter NAME: Haider Hill :  1935 MRN:  [id number] Primary Diagnosis:  
 
 
Date of Face to Face:  2019 11:28 AM        
PNA Afib rvr Face to Face Encounter findings are related to primary reason for home care:   YES 
 
1. I certify that the patient needs intermittent skilled nursing care, physical therapy and/or speech therapy. I will not be following this patient in the Community and Dr. Annie Olea, Sherry Grubbs MD will be responsible for signing the [address] 2. Initial Orders for Care: Physical Therapy and Occupational Therapy 3. I certify that this patient is homebound because of illness or injury, need the aid of supportive devices such as crutches, canes, wheelchairs, and walkers; the use of special transportation; or the assistance of another person in order to leave their place of residence. There exists a normal inability to leave home and leaving home requires a considerable and taxing effort. 4. I certify that this patient is under my care and that I had a Face-to-Face Encounter that meets the physician Face-to-Face Encounter requirements. Document the physical findings from the [address] Encounter that support the need for skilled services: Has new finding of weakness and altered mobility that requires skilled physical/occupational and/or speech therapy services for evaluation and interventions. Yennifer Hitchcock MD 
Discharging Physician Office:  [phone] Fax:    [phone]

## 2019-04-08 NOTE — PROGRESS NOTES
Reason for Readmission:    Shortness of breath, Atrial fibrillation with RVR  
      
RRAT Score and Risk Level:  23 high risk Level of Readmission:    1 Care Conference scheduled:   Not at this time Resources/supports as identified by patient/family:   Good family support Top Challenges facing patient (as identified by patient/family and CM): Finances/Medication cost?  No issues - has Medicare and [employer] as a secondary Transportation  family can transport pt by car Support system or lack thereof? Good support system - daughter Tony Armas and son in law Vanita Lady and son Archana Cash Living arrangements? Lives with children Self-care/ADLs/Cognition? Receives assistance with her ADL's and IADL's/ alert Current Advanced Directive/Advance Care Plan:  Full Code/ on file Plan for utilizing home health:   Yes - [facility] Likelihood of additional readmission:   Moderate risk Transition of Care Plan:    Based on readmission, the patient's previous Plan of Care - home with [facility]. 
 has been evaluated and/or modified. The current Transition of Care Plan is:  Home with f/u appts and [facility]. Pt is a Level 1 Readmit with a recent discharge from [facility] on  3/26/19 and went home with home health. Pt is a 82 y.o  [place] female admitted with Shortness of Breath, Atrial Fibrillation. CM met with pt and pt's daughter at the bedside. Demographic information verified and this CM will add Archana Cahs to the emergency contact list. Archana Cash is the POA. Pt lives with her children in a 1 story home with 3 steps to the entrance and has a ramp. Pt had [facility] prior to admission and would like them again at discharge. FOC form signed and referral sent via connect care and they accepted. CM consult noted for a nebulizer and hospital bed. CM sent referral to [facility] via [employer] for the DME.  Pt's family can transport pt home by car at discharge. Care Management Interventions PCP Verified by CM: Yes(Dr. Shaina Christensen) Mode of Transport at Discharge: Other (see comment)(family can transport by car) Transition of Care Consult (CM Consult): Discharge Planning, Home Health [address] [place] [address] Road: Yes Discharge Durable Medical Equipment: Yes(nebulizer and hospital bed) Physical Therapy Consult: Yes Occupational Therapy Consult: Yes Speech Therapy Consult: No 
Current Support Network: Own Home, Other(lives with son, daughter, and son in law in a 1 story home with a 3 steps and a ramp) Confirm Follow Up Transport: Family Plan discussed with Pt/Family/Caregiver: Yes Freedom of Choice Offered: Yes Discharge Location Discharge Placement: Home with home health Geni Bhatti, [address] [place] [address]

## 2019-04-08 NOTE — PHYSICIAN ADVISORY
Letter of Status Determination:  
Recommend hospitalization status upgraded from OBSERVATION  to INPATIENT  Status Pt Name:  Margarita Singer MR#  
ROBERTO # [id number] / 
[id number] Payor: Gregory Craven / Plan: Brigid Loc Patiñojordin / Product Type: Medicare /   
Tipser#  [id number] Room and Hospital  2241/01  @ [facility] Hospitalization date  4/5/2019 10:18 PM  
Current Attending Physician  Ela Parekh MD  
Principal diagnosis  SOB Clinicals 82 y.o lady w/ HFPEF, HTN, dementia, paroxysmal afib, moderate MV stenosis and regurgitation, severe aortic valve regurgitation, who was brought by her daughter for wheezing. The patient is a limited historian. The daughter reports that the patient has been having intermittent wheezing throughout the day, but never complained of any dyspnea. She checked her blood pressure and it was 188/134, so she brought here here. The patient denies any complaints to me; no dyspnea, chest pain, wheezing, but had a cough in the ED that resolved on its own. She developed afib w/ rvr in the ED but converted to NSR after receiving IV diltiazem. The daughter also endorses that the patient developed facial swelling but only on the left side of her face over the past week. She called her PCP's office and was instructed to take furosemide 10 mg PRN. She normally takes furosemide 20 mg, digoxin, ISMN, simvastatin, all of which have been stopped until follow-up with her cardiologist on 4/11/19 (unclear why). She was recently admitted here for CHF and hypertensive urgency, underwent a IAN and a cath, but it was ultimately decided against a TAVR after risk/benefit assessment Pt diagnosed with pneumonia, Pleural effusion,In A fib with RVR in ED was on IV diltiazem, on IV abx, severe AVR, mildly hypotensive this am,  
  
Milliman (MCG) criteria Does  NOT apply STATUS DETERMINATION  INPATIENT The final decision of the patient's hospitalization status depends on the attending physician's judgment Additional comments Payor: Ilia Molina / Plan: [facility] / Product Type: Medicare /   
  
 
Rebecca Coulter MD 
Cell: [phone] Physician Advisor

## 2019-04-08 NOTE — PROGRESS NOTES
Problem: Self Care Deficits Care Plan (Adult) Goal: *Acute Goals and Plan of Care (Insert Text) Description Occupational Therapy Goals Initiated 4/8/2019 1. Patient will perform toilet transfers with Supervision within 7 day(s). 2.  Patient will perform CM and sada care aspects of toileting with Supervision within 7 day(s). 3.  Patient will perform grooming tasks standing at sink with Supervision within 7 day(s). 4.  Patient will complete safe item retrieval from high/low surfaces with Supervision usingleast restrictive device in preparation for self-care/ADL tasks within 7 day(s). Outcome: Progressing Towards Goal 
 OCCUPATIONAL THERAPY EVALUATION Patient: Lyn Webster (79 y.o. female) Date: 4/8/2019 Primary Diagnosis: Shortness of breath [R06.02] Atrial fibrillation with RVR ([address] [I48.91] Atrial fibrillation, rapid ([address] [I48.91] Precautions:    
 
ASSESSMENT : 
Based on the objective data described below, the patient presents with mildly decreased functional activity tolerance following episode of Afib this admission. Pt transported by Dtr to ED on 4/5 with c/o SOB and L sided facial swelling and high BP. Pt with recent hospitalization at [facility] from 2/16-2/27. Chest xray revealed moderate pleural effusions; CT Chest revealed lower/right lobe atelectasis. Cleared for OT by nursing and agreeable to participate in therapy. Pt. received supine in bed on arrival, A&Ox4, vitals stable and daughter present. Dtr lives at home with Pt and provides Min A to Pt with LB ADLs at baseline. UE ROM and strength are generally decreased, yet functional.  Requires SBA with bed mobility and CGA with bathroom mobility. Pt is performing UB ADLs independently and LB ADLs with Min A which is Pts reported baseline, per Dtr. Guided Pt through safe toilet transfer with CGA. Pt requires Min verbal/visual cues during ADL task performance d/t baseline dementia.  Recommend skilled acute OT to maximize Pts functional activity tolerance and independence with safely performing dynamic ADL routine at [facility] and to reduce overall caregiver burden. Recommend patient discharge home with Home Health OT. Patient will benefit from skilled intervention to address the above impairments. Patients rehabilitation potential is considered to be Good Factors which may influence rehabilitation potential include: ? None noted ? Mental ability/status 
x? Medical condition ? Home/family situation and support systems ? Safety awareness ? Pain tolerance/management ? Other: PLAN : 
Recommendations and Planned Interventions: 
x               Self Care Training                   ? Therapeutic Activities ? Functional Mobility Training    ? Cognitive Retraining 
? Therapeutic Exercises            x       Endurance Activities ? Balance Training                      ? Neuromuscular Re-Education ? Visual/Perceptual Training     ? Home Safety Training 
x              Patient Education                   ? Family Training/Education ? Other (comment): Frequency/Duration: Patient will be followed by occupational therapy 3 times a week to address goals. Discharge Recommendations: Continue/Resume Home Health OT Further Equipment Recommendations for Discharge: TBD based on progress SUBJECTIVE:  
Patient stated You want me to go in there?\"  (prior to toilet transfer) OBJECTIVE DATA SUMMARY:  
HISTORY:  
Past Medical History:  
Diagnosis Date  Aortic valve disorders AS  Asthma  Benign hypertensive heart disease without heart failure  Diabetes ([address]  Fibromyalgia  Gastroparesis  GERD (gastroesophageal reflux disease)  Hypertension  Mitral valve disorders(424.0)  MR  
  LIVIA (obstructive sleep apnea)  Paroxysmal atrial fibrillation (HCC)  Pure hypercholesterolemia 9/30/2010 Past Surgical History:  
Procedure Laterality Date  ECHO 2D ADULT  11/2009 LVH, normal LV wall motion and ejection fraction, mild aortic stenosis, moderate aortic regurgitation and mild mitral regurgitation. The ejection fraction was 55-60%.  ECHO 2D ADULT  1/2011 EF 60%, LAE, mild AS, AI, MR, PA low 40s  EVENT MONITOR POST SYMPTOMS  9/2010 Rare PACs, no arrythmia with symptoms  LOOP MONITOR  9-10/2011 NSR  
 STRESS TEST MYOVIEW  1/2011  
 no ischemia, EF 63%  US DUPLEX CAROTID BILATERAL  1/2011  
 mild bilat disease Prior Level of Function/Environment/Context: Lives at home with daughter who provides 24/7 supervision and assistance with LB ADLs, bathing/dressing and all IADL tasks at baseline. Does not use AD for functional mobility at baseline. Home Situation Home Environment: Private residence # Steps to Enter: 4 Wheelchair Ramp: Yes One/Two Story Residence: One story Living Alone: No 
Support Systems: Child(raul), Family member(s), Friends \ neighbors Patient Expects to be Discharged to[de-identified] Private residence Current DME Used/Available at Home: [employer], [address], rollator Tub or Shower Type: Shower(built-in seat) Hand dominance: Right EXAMINATION OF PERFORMANCE DEFICITS: 
Cognitive/Behavioral Status: 
Neurologic State: Alert Orientation Level: Oriented to time;Oriented to place;Oriented to person;Oriented to situation Skin: Intact Edema: None observed Hearing: Auditory Auditory Impairment: None Vision/Perceptual:   
 
Acuity: Within Defined Limits Corrective Lenses: Glasses(Dtr reports Pt has glasses yet does not use) Range of Motion: 
AROM: Generally decreased, functional 
PROM: Within functional limits Strength: 
Strength: Generally decreased, functional 
Coordination: 
Coordination: Generally decreased, functional 
 Fine Motor Skills-Upper: Left Intact; Right Intact Gross Motor Skills-Upper: Left Intact; Right Intact Tone & Sensation: 
Tone: Normal 
Sensation: Intact Balance: 
Sitting: Intact Standing: Impaired Standing - Static: Good Standing - Dynamic : Good Functional Mobility and Transfers for ADLs: 
Bed Mobility: 
Rolling: Stand-by assistance Supine to Sit: Stand-by assistance(HOB elevated) Scooting: Stand-by assistance Transfers: 
Sit to Stand: Contact guard assistance Stand to Sit: Stand-by assistance Bathroom Mobility: Contact guard assistance Toilet Transfer : Contact guard assistance Shower Transfer: Contact guard assistance ADL Assessment: 
Feeding: Independent Oral Facial Hygiene/Grooming: Setup Bathing: Minimum assistance(this is Pts reported baseline, per Dtr) Upper Body Dressing: Independent Lower Body Dressing: Minimum assistance(verbal/visual cueing for sequencing) Toileting: Supervision ADL Intervention and task modifications: 
 
Lower Body Dressing Assistance Socks: Minimum assistance Functional Measure: 
Barthel Index: 
 
Bathin Bladder: 10 Bowels: 10 
Groomin Dressin Feeding: 10 Mobility: 5 Stairs: 5 Toilet Use: 5 Transfer (Bed to Chair and Back): 10 Total: 65/100 Percentage of impairment  
0% 1-19% 20-39% 40-59% 60-79% 80-99% 100% Barthel Score 0-100 100 99-80 79-60 59-40 20-39 1-19 
 0 The Barthel ADL Index: Guidelines 1. The index should be used as a record of what a patient does, not as a record of what a patient could do. 2. The main aim is to establish degree of independence from any help, physical or verbal, however minor and for whatever reason. 3. The need for supervision renders the patient not independent. 4. A patient's performance should be established using the best available evidence.  Asking the patient, friends/relatives and nurses are the usual sources, but direct observation and common sense are also important. However direct testing is not needed. 5. Usually the patient's performance over the preceding 24-48 hours is important, but occasionally longer periods will be relevant. 6. Middle categories imply that the patient supplies over 50 per cent of the effort. 7. Use of aids to be independent is allowed. Link Heir., Barthel, D.W. (9276). Functional evaluation: the Barthel Index. [address] (14)2. HONORIO Mcconnell, Barney Donato., Vicky Domingo., Linda, [address] (1999). Measuring the change indisability after inpatient rehabilitation; comparison of the responsiveness of the Barthel Index and Functional Bennett Measure. Journal of Neurology, Neurosurgery, and Psychiatry, 66(4), 243-827. Ronda Schmitz NRashardJKAELA, CHARLENE Sorto, & Laura Otto MKAELA. (2004.) Assessment of post-stroke quality of life in cost-effectiveness studies: The usefulness of the Barthel Index and the EuroQoL-5D. [facility], 13, 996-29 Occupational Therapy Evaluation Charge Determination History Examination Decision-Making LOW Complexity : Brief history review  LOW Complexity : 1-3 performance deficits relating to physical, cognitive , or psychosocial skils that result in activity limitations and / or participation restrictions  MEDIUM Complexity : Patient may present with comorbidities that affect occupational performnce. Miniml to moderate modification of tasks or assistance (eg, physical or verbal ) with assesment(s) is necessary to enable patient to complete evaluation Based on the above components, the patient evaluation is determined to be of the following complexity level: MEDIUM Activity Tolerance:  
Fair. See assessment above. Please refer to the flowsheet for vital signs taken during this treatment. After treatment:  
x? Patient left in no apparent distress sitting up in chair ? Patient left in no apparent distress in bed 
x? Call bell left within reach 
x? Nursing notified 
x? Caregiver present 
x? Chair alarm activated COMMUNICATION/EDUCATION:  
The patients plan of care was discussed with: Physical Therapist, Registered Nurse and Patient. x? Home safety education was provided and the patient/caregiver indicated understanding. x? Patient/family have participated as able in goal setting and plan of care. x? Patient/family agree to work toward stated goals and plan of care. ? Patient understands intent and goals of therapy, but is neutral about his/her participation. ? Patient is unable to participate in goal setting and plan of care. Thank you for this referral. 
Marleny Vila, OTR/L Time Calculation: 20 mins

## 2019-04-08 NOTE — PROGRESS NOTES
PCU SHIFT NURSING NOTE Bedside and Verbal shift change report given to Debbie Quezada RN (oncoming nurse) by Barbara Flores RN//Surendra Diaz RN (offgoing nurse). Report included the following information SBAR, Kardex, ED Summary, Procedure Summary, Intake/Output, MAR, Recent Results, Med Rec Status, Cardiac Rhythm NSR with PAC's, Alarm Parameters  and Quality Measures. Shift Summary:  
 
 
 
Admission Date 4/5/2019 Admission Diagnosis Shortness of breath [R06.02] Atrial fibrillation with RVR ([address] [I48.91] Consults IP CONSULT TO CARDIOLOGY 
IP CONSULT TO HOSPITALIST Consults [x]PT [x]OT []Speech [x]Case Management  
  
[] Palliative Cardiac Monitoring Order  
[x]Yes []No  
 
IV drips []Yes Drip:                            Dose: 
Drip:                            Dose: 
Drip:                            Dose:  
[x]No  
 
GI Prophylaxis [x]Yes []No  
 
 
 
DVT Prophylaxis SCDs:     
     
 Song stockings:     
  
[x] Medication []Contraindicated []None Activity Level Activity Level: Recliner, Up with Assistance Activity Assistance: Partial (one person) Purposeful Rounding every 1-2 hour? [x]Yes Jade Score  Total Score: 1 Bed Alarm (If score 3 or >) [x]Yes  
[] Refused (See signed refusal form in chart) Rafiq Score  Rafiq Score: 20 Rafiq Score (if score 14 or less) [x]PMT consult  
[]Wound Care consult [x]Specialty bed  
[x] Nutrition consult Needs prior to discharge:  
Home O2 required:   
[]Yes  
[x]No  
 If yes, how much O2 required? Other:  
 Last Bowel Movement: Last Bowel Movement Date: 04/07/19 Influenza Vaccine Received Flu Vaccine for Current Season (usually Sept-March): Yes Pneumonia Vaccine Diet Active Orders Diet DIET CARDIAC Regular LDAs Peripheral IV 04/06/19 Left Hand (Active) Site Assessment Clean, dry, & intact 4/8/2019  7:08 AM  
 Phlebitis Assessment 0 4/8/2019  7:08 AM  
Infiltration Assessment 0 4/8/2019  7:08 AM  
Dressing Status Clean, dry, & intact 4/8/2019  7:08 AM  
Dressing Type Tape;Transparent 4/8/2019  7:08 AM  
Hub Color/Line Status Blue;Capped;Flushed 4/8/2019  7:08 AM  
Action Taken Open ports on tubing capped 4/8/2019  7:08 AM  
Alcohol Cap Used Yes 4/8/2019  7:08 AM  
                  
Urinary Catheter Intake & Output Date 04/07/19 0700 - 04/08/19 0659 04/08/19 0700 - 04/09/19 5312 Shift 1581-6535 7028-6197 24 Hour Total 0700-1859 1900-0659 24 Hour Total  
INTAKE  
P.O. 1100  1100     
  P. O. 1100  1100     
I. V.(mL/kg/hr) 0(0) 100(0.1) 100(0.1) 0  0 Volume (levoFLOXacin (LEVAQUIN) 750 mg in D5W IVPB) 0  0 Volume (levoFLOXacin (LEVAQUIN) 500 mg in D5W IVPB)  100 100 0  0 Shift Total(mL/kg) 1100(14.8) 100(1.4) 1200(17) 0(0)  0(0) OUTPUT Urine(mL/kg/hr) 1750(2) 250(0.3) 2000(1.2) 275  275 Urine Voided 2851 036 6644 275  275 Shift Total(mL/kg) 1750(23.6) 250(3.5) 0748(88.5) 275(3.9)  275(3.9) NET -650 -150 -800 -275  -275 Weight (kg) 74.3 70.5 70.5 70.5 70.5 70.5 Readmission Risk Assessment Tool Score High Risk   
      
 23 Total Score 3 Has Seen PCP in Last 6 Months (Yes=3, No=0)  
 4 IP Visits Last 12 Months (1-3=4, 4=9, >4=11) 5 Pt. Coverage (Medicare=5 , Medicaid, or Self-Pay=4) 11 Charlson Comorbidity Score (Age + Comorbid Conditions) Criteria that do not apply:  
 . Living with Significant Other. Assisted Living. LTAC. SNF. or  
Rehab Patient Length of Stay (>5 days = 3) Expected Length of Stay - - - Actual Length of Stay 1

## 2019-04-08 NOTE — PROGRESS NOTES
Problem: Falls - Risk of 
Goal: *Absence of Falls Description Document Durenda Dennis Fall Risk and appropriate interventions in the flowsheet. 4/8/2019 0718 by Jared Hernández RN Outcome: Progressing Towards Goal 
4/8/2019 0715 by Jared Hernández RN Outcome: Progressing Towards Goal 
  
Problem: Patient Education: Go to Patient Education Activity Goal: Patient/Family Education 4/8/2019 0718 by Jared Hernández RN Outcome: Progressing Towards Goal 
4/8/2019 0715 by Jared Hernández RN Outcome: Progressing Towards Goal 
  
Problem: Breathing Pattern - Ineffective Goal: *Absence of hypoxia 4/8/2019 0718 by Jared Hernández RN Outcome: Progressing Towards Goal 
4/8/2019 0715 by Jared Hernández RN Outcome: Progressing Towards Goal 
Goal: *Use of effective breathing techniques 4/8/2019 0718 by Jared Hernández RN Outcome: Progressing Towards Goal 
4/8/2019 0715 by Jared Hernández RN Outcome: Progressing Towards Goal 
  
Problem: Patient Education: Go to Patient Education Activity Goal: Patient/Family Education 4/8/2019 0718 by Jared Hernández RN Outcome: Progressing Towards Goal 
4/8/2019 0715 by Jared Hernández RN Outcome: Progressing Towards Goal 
  
Problem: Patient Education: Go to Patient Education Activity Goal: Patient/Family Education Outcome: Progressing Towards Goal 
  
Problem: Afib Pathway: Day 1 Goal: Off Pathway (Use only if patient is Off Pathway) Outcome: Progressing Towards Goal 
Goal: Activity/Safety Outcome: Progressing Towards Goal 
Goal: Consults, if ordered Outcome: Progressing Towards Goal 
Goal: Diagnostic Test/Procedures Outcome: Progressing Towards Goal 
Goal: Nutrition/Diet Outcome: Progressing Towards Goal 
Goal: Discharge Planning Outcome: Progressing Towards Goal 
Goal: Medications Outcome: Progressing Towards Goal 
Goal: Respiratory Outcome: Progressing Towards Goal 
Goal: Treatments/Interventions/Procedures Outcome: Progressing Towards Goal 
Goal: Psychosocial 
Outcome: Progressing Towards Goal 
Goal: *Optimal pain control at patient's stated goal 
Outcome: Progressing Towards Goal 
Goal: *Hemodynamically stable Outcome: Progressing Towards Goal 
Goal: *Stable cardiac rhythm Outcome: Progressing Towards Goal 
Goal: *Lungs clear or at baseline Outcome: Progressing Towards Goal 
Goal: *Labs within defined limits Outcome: Progressing Towards Goal 
Goal: *Describes available resources and support systems Outcome: Progressing Towards Goal 
  
Problem: Afib Pathway: Day 2 Goal: Off Pathway (Use only if patient is Off Pathway) Outcome: Progressing Towards Goal 
Goal: Activity/Safety Outcome: Progressing Towards Goal 
Goal: Consults, if ordered Outcome: Progressing Towards Goal 
Goal: Diagnostic Test/Procedures Outcome: Progressing Towards Goal 
Goal: Nutrition/Diet Outcome: Progressing Towards Goal 
Goal: Discharge Planning Outcome: Progressing Towards Goal 
Goal: Medications Outcome: Progressing Towards Goal 
Goal: Respiratory Outcome: Progressing Towards Goal 
Goal: Treatments/Interventions/Procedures Outcome: Progressing Towards Goal 
Goal: Psychosocial 
Outcome: Progressing Towards Goal 
Goal: *Hemodynamically stable Outcome: Progressing Towards Goal 
Goal: *Optimal pain control at patient's stated goal 
Outcome: Progressing Towards Goal 
Goal: *Stable cardiac rhythm Outcome: Progressing Towards Goal 
Goal: *Lungs clear or at baseline Outcome: Progressing Towards Goal 
Goal: *Describes available resources and support systems Outcome: Progressing Towards Goal 
  
Problem: Afib Pathway: Day 3 Goal: Off Pathway (Use only if patient is Off Pathway) 4/8/2019 0718 by Jaron Katz RN Outcome: Progressing Towards Goal 
4/8/2019 0715 by Jaron Katz RN Outcome: Progressing Towards Goal 
Goal: Activity/Safety Outcome: Progressing Towards Goal 
Goal: Diagnostic Test/Procedures Outcome: Progressing Towards Goal 
Goal: Nutrition/Diet Outcome: Progressing Towards Goal 
Goal: Discharge Planning Outcome: Progressing Towards Goal 
Goal: Medications Outcome: Progressing Towards Goal 
Goal: Respiratory Outcome: Progressing Towards Goal 
Goal: Treatments/Interventions/Procedures Outcome: Progressing Towards Goal 
Goal: Psychosocial 
Outcome: Progressing Towards Goal 
  
Problem: Afib: Discharge Outcomes (not in CAT) Goal: *Hemodynamically stable Outcome: Progressing Towards Goal 
Goal: *Stable cardiac rhythm Outcome: Progressing Towards Goal 
Goal: *Lungs clear or at baseline Outcome: Progressing Towards Goal 
Goal: *Optimal pain control at patient's stated goal 
Outcome: Progressing Towards Goal 
Goal: *Identifies cardiac risk factors Outcome: Progressing Towards Goal 
Goal: *Verbalizes home exercise program, activity guidelines, cardiac precautions Outcome: Progressing Towards Goal 
Goal: *Verbalizes understanding and describes prescribed diet Outcome: Progressing Towards Goal 
Goal: *Verbalizes understanding and describes medication purposes and frequencies Outcome: Progressing Towards Goal 
Goal: *Anxiety reduced or absent Outcome: Progressing Towards Goal 
Goal: *Understands and describes signs and symptoms to report to providers(Stroke Metric) Outcome: Progressing Towards Goal 
Goal: *Describes follow-up/return visits to physicians Outcome: Progressing Towards Goal 
Goal: *Describes available resources and support systems Outcome: Progressing Towards Goal 
Goal: *Influenza immunization Outcome: Progressing Towards Goal 
Goal: *Pneumococcal immunization Outcome: Progressing Towards Goal 
Goal: *Describes smoking cessation resources Outcome: Progressing Towards Goal 
  
Problem: Patient Education: Go to Patient Education Activity Goal: Patient/Family Education 4/8/2019 0718 by Mathieu Smith RN Outcome: Progressing Towards Goal 
 4/8/2019 0715 by Rajiv Rodriguez RN Outcome: Progressing Towards Goal 
  
Problem: Heart Failure: Day 1 Goal: Off Pathway (Use only if patient is Off Pathway) Outcome: Progressing Towards Goal 
Goal: Activity/Safety Outcome: Progressing Towards Goal 
Goal: Consults, if ordered Outcome: Progressing Towards Goal 
Goal: Diagnostic Test/Procedures Outcome: Progressing Towards Goal 
Goal: Nutrition/Diet Outcome: Progressing Towards Goal 
Goal: Discharge Planning Outcome: Progressing Towards Goal 
Goal: Medications Outcome: Progressing Towards Goal 
Goal: Respiratory Outcome: Progressing Towards Goal 
Goal: Treatments/Interventions/Procedures Outcome: Progressing Towards Goal 
Goal: Psychosocial 
Outcome: Progressing Towards Goal 
Goal: *Oxygen saturation within defined limits Outcome: Progressing Towards Goal 
Goal: *Hemodynamically stable Outcome: Progressing Towards Goal 
Goal: *Optimal pain control at patient's stated goal 
Outcome: Progressing Towards Goal 
Goal: *Anxiety reduced or absent Outcome: Progressing Towards Goal 
  
Problem: Heart Failure: Day 2 Goal: Off Pathway (Use only if patient is Off Pathway) Outcome: Progressing Towards Goal 
Goal: Activity/Safety Outcome: Progressing Towards Goal 
Goal: Consults, if ordered Outcome: Progressing Towards Goal 
Goal: Diagnostic Test/Procedures Outcome: Progressing Towards Goal 
Goal: Nutrition/Diet Outcome: Progressing Towards Goal 
Goal: Discharge Planning Outcome: Progressing Towards Goal 
Goal: Medications Outcome: Progressing Towards Goal 
Goal: Respiratory Outcome: Progressing Towards Goal 
Goal: Treatments/Interventions/Procedures Outcome: Progressing Towards Goal 
Goal: Psychosocial 
Outcome: Progressing Towards Goal 
Goal: *Oxygen saturation within defined limits Outcome: Progressing Towards Goal 
Goal: *Hemodynamically stable Outcome: Progressing Towards Goal 
Goal: *Optimal pain control at patient's stated goal 
 Outcome: Progressing Towards Goal 
Goal: *Anxiety reduced or absent Outcome: Progressing Towards Goal 
Goal: *Demonstrates progressive activity Outcome: Progressing Towards Goal 
  
Problem: Heart Failure: Day 3 Goal: Off Pathway (Use only if patient is Off Pathway) Outcome: Progressing Towards Goal 
Goal: Activity/Safety 4/8/2019 0718 by Nallely Daly RN Outcome: Progressing Towards Goal 
4/8/2019 0715 by Nallely Daly RN Outcome: Progressing Towards Goal 
Goal: Diagnostic Test/Procedures 4/8/2019 0718 by Nallely Daly RN Outcome: Progressing Towards Goal 
4/8/2019 0715 by Nallely Daly RN Outcome: Progressing Towards Goal 
Goal: Nutrition/Diet 4/8/2019 0718 by Nallely Daly RN Outcome: Progressing Towards Goal 
4/8/2019 0715 by Nallely Daly RN Outcome: Progressing Towards Goal 
Goal: Discharge Planning 4/8/2019 0718 by Nallely Daly RN Outcome: Progressing Towards Goal 
4/8/2019 0715 by Nallely Daly RN Outcome: Progressing Towards Goal 
Goal: Medications 4/8/2019 0718 by Nallely Daly RN Outcome: Progressing Towards Goal 
4/8/2019 0715 by Nallely Daly RN Outcome: Progressing Towards Goal 
Goal: Respiratory 4/8/2019 0718 by Nallely Daly RN Outcome: Progressing Towards Goal 
4/8/2019 0715 by Nallely Daly RN Outcome: Progressing Towards Goal 
Goal: Treatments/Interventions/Procedures 4/8/2019 0718 by Nallely Daly RN Outcome: Progressing Towards Goal 
4/8/2019 0715 by Nallely Daly RN Outcome: Progressing Towards Goal 
Goal: Psychosocial 
4/8/2019 0718 by Nallely Daly RN Outcome: Progressing Towards Goal 
4/8/2019 0715 by Nallely Daly RN Outcome: Progressing Towards Goal 
Goal: *Oxygen saturation within defined limits 4/8/2019 0718 by Nallely Daly RN Outcome: Progressing Towards Goal 
4/8/2019 0715 by Nallely Daly RN Outcome: Progressing Towards Goal 
Goal: *Hemodynamically stable 4/8/2019 0718 by Mathieu Smith RN Outcome: Progressing Towards Goal 
4/8/2019 0715 by Mathieu Smith RN Outcome: Progressing Towards Goal 
Goal: *Optimal pain control at patient's stated goal 
4/8/2019 0718 by Mathieu Smith RN Outcome: Progressing Towards Goal 
4/8/2019 0715 by Mathieu Smith RN Outcome: Progressing Towards Goal 
Goal: *Anxiety reduced or absent 4/8/2019 0718 by Mathieu Smith RN Outcome: Progressing Towards Goal 
4/8/2019 0715 by Mathieu Smith RN Outcome: Progressing Towards Goal 
Goal: *Demonstrates progressive activity 4/8/2019 0718 by Mathieu Smith RN Outcome: Progressing Towards Goal 
4/8/2019 0715 by Mathieu Smith RN Outcome: Progressing Towards Goal 
  
Problem: Heart Failure: Day 4 Goal: Off Pathway (Use only if patient is Off Pathway) Outcome: Progressing Towards Goal 
Goal: Activity/Safety Outcome: Progressing Towards Goal 
Goal: Diagnostic Test/Procedures Outcome: Progressing Towards Goal 
Goal: Nutrition/Diet Outcome: Progressing Towards Goal 
Goal: Discharge Planning Outcome: Progressing Towards Goal 
Goal: Medications Outcome: Progressing Towards Goal 
Goal: Respiratory Outcome: Progressing Towards Goal 
Goal: Treatments/Interventions/Procedures Outcome: Progressing Towards Goal 
Goal: Psychosocial 
Outcome: Progressing Towards Goal 
Goal: *Oxygen saturation within defined limits Outcome: Progressing Towards Goal 
Goal: *Hemodynamically stable Outcome: Progressing Towards Goal 
Goal: *Optimal pain control at patient's stated goal 
Outcome: Progressing Towards Goal 
Goal: *Anxiety reduced or absent Outcome: Progressing Towards Goal 
Goal: *Demonstrates progressive activity Outcome: Progressing Towards Goal 
  
Problem: Heart Failure: Day 5 Goal: Off Pathway (Use only if patient is Off Pathway) Outcome: Progressing Towards Goal 
Goal: Activity/Safety Outcome: Progressing Towards Goal 
Goal: Diagnostic Test/Procedures Outcome: Progressing Towards Goal 
Goal: Nutrition/Diet Outcome: Progressing Towards Goal 
Goal: Discharge Planning Outcome: Progressing Towards Goal 
Goal: Medications Outcome: Progressing Towards Goal 
Goal: Respiratory Outcome: Progressing Towards Goal 
Goal: Treatments/Interventions/Procedures Outcome: Progressing Towards Goal 
Goal: Psychosocial 
Outcome: Progressing Towards Goal 
  
Problem: Heart Failure: Discharge Outcomes Goal: *Demonstrates ability to perform prescribed activity without shortness of breath or discomfort Outcome: Progressing Towards Goal 
Goal: *Left ventricular function assessment completed prior to or during stay, or planned for post-discharge Outcome: Progressing Towards Goal 
Goal: *ACEI prescribed if LVEF less than 40% and no contraindications or ARB prescribed Outcome: Progressing Towards Goal 
Goal: *Verbalizes understanding and describes prescribed diet Outcome: Progressing Towards Goal 
Goal: *Verbalizes understanding/describes prescribed medications Outcome: Progressing Towards Goal 
Goal: *Describes available resources and support systems Description 
(eg: Home Health, Palliative Care, [employer]ive) Outcome: Progressing Towards Goal 
Goal: *Describes smoking cessation resources Outcome: Progressing Towards Goal 
Goal: *Understands and describes signs and symptoms to report to providers(Stroke Metric) Outcome: Progressing Towards Goal 
Goal: *Describes/verbalizes understanding of follow-up/return appt Description 
(eg: to physicians, diabetes treatment coordinator, and other resources Outcome: Progressing Towards Goal 
Goal: *Describes importance of continuing daily weights and changes to report to physician Outcome: Progressing Towards Goal

## 2019-04-08 NOTE — PROGRESS NOTES
Hospitalist Progress Note NAME: Evaristo Friday :  1935 MRN:  [id number] Assessment / Plan: PNA and b/l effusion 
-CT chest showed 1. Moderate layering bilateral pleural effusions have increased in size since the prior study. Bilateral lower lobe and right middle lobe atelectasis has worsened. Mild patchy airspace disease throughout aerated portions of both lungs may 
represent infection or sequela of pulmonary edema. -empiric Levaquin started 
-received IV lasix on , will cont' with PO lasix tody 
-PT/OT/CM consulted 
  
Paroxysmal afib w/ rvr  
Moderate mitral valve stenosis and regurgitation Severe AV regurg 
-s/p IV diltiazem in the ED, NSR during my encounter this AM, but went back into afib  
-continue metoprolol, digoxin held due to renal failure 
-cont' cardizem, titrate prn.  norvasc discontinued 
-continue coumadin - consult pharmacy to dose 
-family declined TAVR during last admit. Chronic diastolic CHF  
HTN  
Severe aortic valve regurgitation 
-continue metoprolol, amlodipine, hydralazine with hold parameters -appreciate cardiology following 
  
CKD stage III-IV, stable 
  
Hypothyroidism 
-continue synthroid 
  
Dementia without behavioral disturbances 
  
DVT ppx: anticoagulated Code status: full, pt's POA is son Yola [place] per the daughter Disposition: prior living arrangement when ready Subjective: Chief Complaint / Reason for Physician Visit Pt seen with family at bedside. No issue overnight. Was in NSR during my initial encounter today, however went back into afib RVR. Discussed with RN events overnight. Review of Systems: 
Symptom Y/N Comments  Symptom Y/N Comments Fever/Chills    Chest Pain Poor Appetite    Edema Cough    Abdominal Pain Sputum    Joint Pain SOB/HASKINS    Pruritis/Rash Nausea/vomit    Tolerating PT/OT Diarrhea    Tolerating Diet Constipation    Other Could NOT obtain due to: dementia Objective: VITALS:  
Last 24hrs VS reviewed since prior progress note. Most recent are: 
Patient Vitals for the past 24 hrs: 
 Temp Pulse Resp BP SpO2  
04/08/19 1144  91  (!) 138/35   
04/08/19 1053 98.3 °F (36.8 °C) 79 18 149/46 96 % 04/08/19 0900 98.4 °F (36.9 °C) 97 18 154/70 96 % 04/08/19 0854 98.4 °F (36.9 °C) 96 18 (!) 149/98 97 % 04/08/19 0708 98.4 °F (36.9 °C) 91 18 93/63 96 % 04/08/19 0344 98.6 °F (37 °C) 80 19 150/58 93 % 04/07/19 2326 99.3 °F (37.4 °C) 85 18 123/48 95 % 04/07/19 2010 98.7 °F (37.1 °C) 88 18 (!) 152/37 97 % 04/07/19 2000     98 % 04/07/19 1819 98 °F (36.7 °C) 90 18 156/54 98 % 04/07/19 1643 97.9 °F (36.6 °C) 84 18 150/47 98 % 04/07/19 1456 97.9 °F (36.6 °C) 87 18 142/59 96 % Intake/Output Summary (Last 24 hours) at 4/8/2019 1253 Last data filed at 4/8/2019 1000 Gross per 24 hour Intake 850 ml Output 1875 ml Net -1025 ml PHYSICAL EXAM: 
General: WD, WN. Alert, cooperative, no acute distress   
EENT:  EOMI. Anicteric sclerae. MMM Resp:  CTA bilaterally, no wheezing or rales. No accessory muscle use CV:  Regular  rhythm,  No edema GI:  Soft, Non distended, Non tender.  +Bowel sounds Neurologic:  Alert and oriented X 1, normal speech Psych:   Not anxious nor agitated Skin:  No rashes. No jaundice Reviewed most current lab test results and cultures  YES Reviewed most current radiology test results   YES Review and summation of old records today    NO Reviewed patient's current orders and MAR    YES 
PMH/SH reviewed - no change compared to H&P 
________________________________________________________________________ Care Plan discussed with: 
  Comments Patient x Family  x Daughter/son RN x Care Manager Consultant Multidiciplinary team rounds were held today with , nursing, pharmacist and clinical coordinator.   Patient's plan of care was discussed; medications were reviewed and discharge planning was addressed. ________________________________________________________________________ Total NON critical care TIME:  35   Minutes Total CRITICAL CARE TIME Spent:   Minutes non procedure based Comments >50% of visit spent in counseling and coordination of care    
________________________________________________________________________ Robina Murillo MD  
 
Procedures: see electronic medical records for all procedures/Xrays and details which were not copied into this note but were reviewed prior to creation of Plan. LABS: 
I reviewed today's most current labs and imaging studies. Pertinent labs include: 
Recent Labs 04/07/19 
0431 04/05/19 
2313 WBC 9.1 11.4* HGB 8.9* 9.5* HCT 27.9* 29.6*  263 Recent Labs 04/08/19 
0402 04/07/19 
1350 04/07/19 
0431 04/06/19 
0528 04/05/19 
2313   --  139  --  137  
K 3.6  --  3.6  --  3.6 *  --  109*  --  107 CO2 23  --  21  --  21  
  --  81  --  123* BUN 36*  --  35*  --  33* CREA 1.85*  --  1.88*  --  1.66* CA 8.1*  --  8.3*  --  8.2* MG  --   --  1.7  --   --   
PHOS  --   --  3.2  --   --   
ALB  --   --   --   --  2.7* TBILI  --   --   --   --  0.3 SGOT  --   --   --   --  32 ALT  --   --   --   --  28 INR 2.4* 2.5*  --  2.8*  --   
 
 
Signed: Robina Murillo MD

## 2019-04-09 NOTE — PROGRESS NOTES
Problem: Falls - Risk of 
Goal: *Absence of Falls Description Document Panda Lambert Fall Risk and appropriate interventions in the flowsheet. Outcome: Progressing Towards Goal 
  
Problem: Patient Education: Go to Patient Education Activity Goal: Patient/Family Education Outcome: Progressing Towards Goal 
  
Problem: Breathing Pattern - Ineffective Goal: *Absence of hypoxia Outcome: Progressing Towards Goal 
Goal: *Use of effective breathing techniques Outcome: Progressing Towards Goal 
  
Problem: Patient Education: Go to Patient Education Activity Goal: Patient/Family Education Outcome: Progressing Towards Goal 
  
Problem: Patient Education: Go to Patient Education Activity Goal: Patient/Family Education Outcome: Progressing Towards Goal 
  
Problem: Afib Pathway: Day 1 Goal: Off Pathway (Use only if patient is Off Pathway) Outcome: Progressing Towards Goal 
Goal: Activity/Safety Outcome: Progressing Towards Goal 
Goal: Consults, if ordered Outcome: Progressing Towards Goal 
Goal: Diagnostic Test/Procedures Outcome: Progressing Towards Goal 
Goal: Nutrition/Diet Outcome: Progressing Towards Goal 
Goal: Discharge Planning Outcome: Progressing Towards Goal 
Goal: Medications Outcome: Progressing Towards Goal 
Goal: Respiratory Outcome: Progressing Towards Goal 
Goal: Treatments/Interventions/Procedures Outcome: Progressing Towards Goal 
Goal: Psychosocial 
Outcome: Progressing Towards Goal 
Goal: *Optimal pain control at patient's stated goal 
Outcome: Progressing Towards Goal 
Goal: *Hemodynamically stable Outcome: Progressing Towards Goal 
Goal: *Stable cardiac rhythm Outcome: Progressing Towards Goal 
Goal: *Lungs clear or at baseline Outcome: Progressing Towards Goal 
Goal: *Labs within defined limits Outcome: Progressing Towards Goal 
Goal: *Describes available resources and support systems Outcome: Progressing Towards Goal 
  
Problem: Afib Pathway: Day 2 
 Goal: Off Pathway (Use only if patient is Off Pathway) Outcome: Progressing Towards Goal 
Goal: Activity/Safety Outcome: Progressing Towards Goal 
Goal: Consults, if ordered Outcome: Progressing Towards Goal 
Goal: Diagnostic Test/Procedures Outcome: Progressing Towards Goal 
Goal: Nutrition/Diet Outcome: Progressing Towards Goal 
Goal: Discharge Planning Outcome: Progressing Towards Goal 
Goal: Medications Outcome: Progressing Towards Goal 
Goal: Respiratory Outcome: Progressing Towards Goal 
Goal: Treatments/Interventions/Procedures Outcome: Progressing Towards Goal 
Goal: Psychosocial 
Outcome: Progressing Towards Goal 
Goal: *Hemodynamically stable Outcome: Progressing Towards Goal 
Goal: *Optimal pain control at patient's stated goal 
Outcome: Progressing Towards Goal 
Goal: *Stable cardiac rhythm Outcome: Progressing Towards Goal 
Goal: *Lungs clear or at baseline Outcome: Progressing Towards Goal 
Goal: *Describes available resources and support systems Outcome: Progressing Towards Goal 
  
Problem: Afib Pathway: Day 3 Goal: Off Pathway (Use only if patient is Off Pathway) Outcome: Progressing Towards Goal 
Goal: Activity/Safety Outcome: Progressing Towards Goal 
Goal: Diagnostic Test/Procedures Outcome: Progressing Towards Goal 
Goal: Nutrition/Diet Outcome: Progressing Towards Goal 
Goal: Discharge Planning Outcome: Progressing Towards Goal 
Goal: Medications Outcome: Progressing Towards Goal 
Goal: Respiratory Outcome: Progressing Towards Goal 
Goal: Treatments/Interventions/Procedures Outcome: Progressing Towards Goal 
Goal: Psychosocial 
Outcome: Progressing Towards Goal 
  
Problem: Afib: Discharge Outcomes (not in CAT) Goal: *Hemodynamically stable Outcome: Progressing Towards Goal 
Goal: *Stable cardiac rhythm Outcome: Progressing Towards Goal 
Goal: *Lungs clear or at baseline Outcome: Progressing Towards Goal 
Goal: *Optimal pain control at patient's stated goal 
 Outcome: Progressing Towards Goal 
Goal: *Identifies cardiac risk factors Outcome: Progressing Towards Goal 
Goal: *Verbalizes home exercise program, activity guidelines, cardiac precautions Outcome: Progressing Towards Goal 
Goal: *Verbalizes understanding and describes prescribed diet Outcome: Progressing Towards Goal 
Goal: *Verbalizes understanding and describes medication purposes and frequencies Outcome: Progressing Towards Goal 
Goal: *Anxiety reduced or absent Outcome: Progressing Towards Goal 
Goal: *Understands and describes signs and symptoms to report to providers(Stroke Metric) Outcome: Progressing Towards Goal 
Goal: *Describes follow-up/return visits to physicians Outcome: Progressing Towards Goal 
Goal: *Describes available resources and support systems Outcome: Progressing Towards Goal 
Goal: *Influenza immunization Outcome: Progressing Towards Goal 
Goal: *Pneumococcal immunization Outcome: Progressing Towards Goal 
Goal: *Describes smoking cessation resources Outcome: Progressing Towards Goal 
  
Problem: Patient Education: Go to Patient Education Activity Goal: Patient/Family Education Outcome: Progressing Towards Goal 
  
Problem: Heart Failure: Day 1 Goal: Off Pathway (Use only if patient is Off Pathway) Outcome: Progressing Towards Goal 
Goal: Activity/Safety Outcome: Progressing Towards Goal 
Goal: Consults, if ordered Outcome: Progressing Towards Goal 
Goal: Diagnostic Test/Procedures Outcome: Progressing Towards Goal 
Goal: Nutrition/Diet Outcome: Progressing Towards Goal 
Goal: Discharge Planning Outcome: Progressing Towards Goal 
Goal: Medications Outcome: Progressing Towards Goal 
Goal: Respiratory Outcome: Progressing Towards Goal 
Goal: Treatments/Interventions/Procedures Outcome: Progressing Towards Goal 
Goal: Psychosocial 
Outcome: Progressing Towards Goal 
Goal: *Oxygen saturation within defined limits Outcome: Progressing Towards Goal 
 Goal: *Hemodynamically stable Outcome: Progressing Towards Goal 
Goal: *Optimal pain control at patient's stated goal 
Outcome: Progressing Towards Goal 
Goal: *Anxiety reduced or absent Outcome: Progressing Towards Goal 
  
Problem: Heart Failure: Day 2 Goal: Off Pathway (Use only if patient is Off Pathway) Outcome: Progressing Towards Goal 
Goal: Activity/Safety Outcome: Progressing Towards Goal 
Goal: Consults, if ordered Outcome: Progressing Towards Goal 
Goal: Diagnostic Test/Procedures Outcome: Progressing Towards Goal 
Goal: Nutrition/Diet Outcome: Progressing Towards Goal 
Goal: Discharge Planning Outcome: Progressing Towards Goal 
Goal: Medications Outcome: Progressing Towards Goal 
Goal: Respiratory Outcome: Progressing Towards Goal 
Goal: Treatments/Interventions/Procedures Outcome: Progressing Towards Goal 
Goal: Psychosocial 
Outcome: Progressing Towards Goal 
Goal: *Oxygen saturation within defined limits Outcome: Progressing Towards Goal 
Goal: *Hemodynamically stable Outcome: Progressing Towards Goal 
Goal: *Optimal pain control at patient's stated goal 
Outcome: Progressing Towards Goal 
Goal: *Anxiety reduced or absent Outcome: Progressing Towards Goal 
Goal: *Demonstrates progressive activity Outcome: Progressing Towards Goal 
  
Problem: Heart Failure: Day 3 Goal: Off Pathway (Use only if patient is Off Pathway) Outcome: Progressing Towards Goal 
Goal: Activity/Safety Outcome: Progressing Towards Goal 
Goal: Diagnostic Test/Procedures Outcome: Progressing Towards Goal 
Goal: Nutrition/Diet Outcome: Progressing Towards Goal 
Goal: Discharge Planning Outcome: Progressing Towards Goal 
Goal: Medications Outcome: Progressing Towards Goal 
Goal: Respiratory Outcome: Progressing Towards Goal 
Goal: Treatments/Interventions/Procedures Outcome: Progressing Towards Goal 
Goal: Psychosocial 
Outcome: Progressing Towards Goal 
Goal: *Oxygen saturation within defined limits Outcome: Progressing Towards Goal 
Goal: *Hemodynamically stable Outcome: Progressing Towards Goal 
Goal: *Optimal pain control at patient's stated goal 
Outcome: Progressing Towards Goal 
Goal: *Anxiety reduced or absent Outcome: Progressing Towards Goal 
Goal: *Demonstrates progressive activity Outcome: Progressing Towards Goal 
  
Problem: Heart Failure: Day 4 Goal: Off Pathway (Use only if patient is Off Pathway) Outcome: Progressing Towards Goal 
Goal: Activity/Safety Outcome: Progressing Towards Goal 
Goal: Diagnostic Test/Procedures Outcome: Progressing Towards Goal 
Goal: Nutrition/Diet Outcome: Progressing Towards Goal 
Goal: Discharge Planning Outcome: Progressing Towards Goal 
Goal: Medications Outcome: Progressing Towards Goal 
Goal: Respiratory Outcome: Progressing Towards Goal 
Goal: Treatments/Interventions/Procedures Outcome: Progressing Towards Goal 
Goal: Psychosocial 
Outcome: Progressing Towards Goal 
Goal: *Oxygen saturation within defined limits Outcome: Progressing Towards Goal 
Goal: *Hemodynamically stable Outcome: Progressing Towards Goal 
Goal: *Optimal pain control at patient's stated goal 
Outcome: Progressing Towards Goal 
Goal: *Anxiety reduced or absent Outcome: Progressing Towards Goal 
Goal: *Demonstrates progressive activity Outcome: Progressing Towards Goal 
  
Problem: Heart Failure: Day 5 Goal: Off Pathway (Use only if patient is Off Pathway) Outcome: Progressing Towards Goal 
Goal: Activity/Safety Outcome: Progressing Towards Goal 
Goal: Diagnostic Test/Procedures Outcome: Progressing Towards Goal 
Goal: Nutrition/Diet Outcome: Progressing Towards Goal 
Goal: Discharge Planning Outcome: Progressing Towards Goal 
Goal: Medications Outcome: Progressing Towards Goal 
Goal: Respiratory Outcome: Progressing Towards Goal 
Goal: Treatments/Interventions/Procedures Outcome: Progressing Towards Goal 
Goal: Psychosocial 
Outcome: Progressing Towards Goal 
  
 Problem: Heart Failure: Discharge Outcomes Goal: *Demonstrates ability to perform prescribed activity without shortness of breath or discomfort Outcome: Progressing Towards Goal 
Goal: *Left ventricular function assessment completed prior to or during stay, or planned for post-discharge Outcome: Progressing Towards Goal 
Goal: *ACEI prescribed if LVEF less than 40% and no contraindications or ARB prescribed Outcome: Progressing Towards Goal 
Goal: *Verbalizes understanding and describes prescribed diet Outcome: Progressing Towards Goal 
Goal: *Verbalizes understanding/describes prescribed medications Outcome: Progressing Towards Goal 
Goal: *Describes available resources and support systems Description 
(eg: Home Health, Palliative Care, [employer] Directive) Outcome: Progressing Towards Goal 
Goal: *Describes smoking cessation resources Outcome: Progressing Towards Goal 
Goal: *Understands and describes signs and symptoms to report to providers(Stroke Metric) Outcome: Progressing Towards Goal 
Goal: *Describes/verbalizes understanding of follow-up/return appt Description 
(eg: to physicians, diabetes treatment coordinator, and other resources Outcome: Progressing Towards Goal 
Goal: *Describes importance of continuing daily weights and changes to report to physician Outcome: Progressing Towards Goal

## 2019-04-09 NOTE — PROGRESS NOTES
PCU SHIFT NURSING NOTE Bedside shift change report given to Xochitl Ivan RN (oncoming nurse) by Cynthia Clark RN (offgoing nurse). Report included the following information SBAR, Kardex, Recent Results and Cardiac Rhythm intermittent a fib, sinus arrhythmia. Shift Summary:  
1920: Pt up in recliner watching TV in no distress, daughter in room. Denies needs at this time. 2200: Daughter assisting pt to bathroom, then to bed. Pt ambulating well c daughter's assistance. 0715: Bedside shift change report given to Nory BLAIR (oncoming nurse) by Tonia Avery RN (offgoing nurse). Report included the following information SBAR, Kardex, Intake/Output, MAR, Recent Results and Cardiac Rhythm a fib/ sinus arhythmia c PACs. Admission Date 4/5/2019 Admission Diagnosis Shortness of breath [R06.02] Atrial fibrillation with RVR ([address] [I48.91] Atrial fibrillation, rapid ([address] [I48.91] Consults IP CONSULT TO CARDIOLOGY 
IP CONSULT TO HOSPITALIST Consults []PT []OT []Speech  
[]Case Management  
  
[] Palliative Cardiac Monitoring Order []Yes []No  
 
IV drips []Yes Drip:                            Dose: 
Drip:                            Dose: 
Drip:                            Dose:  
[]No  
 
GI Prophylaxis []Yes []No  
 
 
 
DVT Prophylaxis SCDs:     
     
 Song stockings:     
  
[] Medication []Contraindicated []None Activity Level Activity Level: Recliner, Up with Assistance Activity Assistance: Partial (one person) Purposeful Rounding every 1-2 hour? []Yes Jade Score  Total Score: 1 Bed Alarm (If score 3 or >) []Yes  
[] Refused (See signed refusal form in chart) Rafiq Score  Rafiq Score: 20 Rafiq Score (if score 14 or less) []PMT consult  
[]Wound Care consult []Specialty bed  
[] Nutrition consult Needs prior to discharge:  
Home O2 required:   
[]Yes []No  
 If yes, how much O2 required? Other: Last Bowel Movement: Last Bowel Movement Date: 04/07/19 Influenza Vaccine Received Flu Vaccine for Current Season (usually Sept-March): Yes Pneumonia Vaccine Diet Active Orders Diet DIET CARDIAC Regular LDAs Peripheral IV 04/06/19 Left Hand (Active) Site Assessment Clean, dry, & intact 4/8/2019  5:09 PM  
Phlebitis Assessment 0 4/8/2019  5:09 PM  
Infiltration Assessment 0 4/8/2019  5:09 PM  
Dressing Status Clean, dry, & intact 4/8/2019  5:09 PM  
Dressing Type Tape;Transparent 4/8/2019  5:09 PM  
Hub Color/Line Status Blue;Capped;Flushed 4/8/2019  5:09 PM  
Action Taken Open ports on tubing capped 4/8/2019  5:09 PM  
Alcohol Cap Used Yes 4/8/2019  5:09 PM  
                  
Urinary Catheter Intake & Output Date 04/07/19 1900 - 04/08/19 0659 04/08/19 0700 - 04/09/19 5962 Shift 7192-4885 24 Hour Total 0074-4963 7135-6363 24 Hour Total  
INTAKE  
P.O.  1100 400  400  
  P. O.  1100 400  400  
I. V.(mL/kg/hr) 100 100 0(0)  0 Volume (levoFLOXacin (LEVAQUIN) 750 mg in D5W IVPB)  0 Volume (levoFLOXacin (LEVAQUIN) 500 mg in D5W IVPB) 100 100 0  0 Shift Total(mL/kg) 100(1.4) 1200(17) 400(5.7)  400(5.7) OUTPUT Urine(mL/kg/hr) 250 2000 1225(1.4)  1225 Urine Voided 250 2000 1225  1225 Shift Total(mL/kg) 250(3.5) 4961(38.8) 6034(69.2)  8693(43.9) NET -150 -800 -825  -825 Weight (kg) 70.5 70.5 70.5 70.5 70.5 Readmission Risk Assessment Tool Score High Risk   
      
 23 Total Score 3 Has Seen PCP in Last 6 Months (Yes=3, No=0)  
 4 IP Visits Last 12 Months (1-3=4, 4=9, >4=11) 5 Pt. Coverage (Medicare=5 , Medicaid, or Self-Pay=4) 11 Charlson Comorbidity Score (Age + Comorbid Conditions) Criteria that do not apply:  
 . Living with Significant Other. Assisted Living. LTAC. SNF. or  
Rehab Patient Length of Stay (>5 days = 3) Expected Length of Stay - - - Actual Length of Stay 1

## 2019-04-09 NOTE — DISCHARGE SUMMARY
Discharge Summary Name: Sesar German [id number] YOB: 1935 (Age: 82 y.o.) Date of Admission: 4/5/2019 Date of Discharge: 4/9/2019 Attending Physician: Alfonse Simmonds, MD 
 
Discharge Diagnosis:  
CAP, POA with b/l effusion  
Paroxysmal afib w/ rvr  
Moderate mitral valve stenosis and regurgitation Severe AV regurg   
Chronic diastolic CHF  
HTN  
Severe aortic valve regurgitation CKD stage III-IV  
Hypothyroidism  
Dementia CKD 3 Consultations: 
IP CONSULT TO CARDIOLOGY 
IP CONSULT TO HOSPITALIST Brief Admission History/Reason for Admission Per Zi Tobar MD:  
82 y.o lady w/ HFPEF, HTN, dementia, paroxysmal afib, moderate MV stenosis and regurgitation, severe aortic valve regurgitation, who was brought by her daughter for wheezing. The patient is a limited historian. The daughter reports that the patient has been having intermittent wheezing throughout the day, but never complained of any dyspnea. She checked her blood pressure and it was 188/134, so she brought here here. The patient denies any complaints to me; no dyspnea, chest pain, wheezing, but had a cough in the ED that resolved on its own. She developed afib w/ rvr in the ED but converted to NSR after receiving IV diltiazem. The daughter also endorses that the patient developed facial swelling but only on the left side of her face over the past week. She called her PCP's office and was instructed to take furosemide 10 mg PRN. She normally takes furosemide 20 mg, digoxin, ISMN, simvastatin, all of which have been stopped until follow-up with her cardiologist on 4/11/19 (unclear why). She was recently admitted here for CHF and hypertensive urgency, underwent a IAN and a cath, but it was ultimately decided against a TAVR after risk/benefit assessment. 
  
Brief Hospital Course by Main Problems: PNA and b/l effusion CT chest showed moderate layering bilateral pleural effusions have increased in size since the prior study. Bilateral lower lobe and right middle lobe atelectasis has worsened. Mild patchy airspace disease throughout aerated portions of both lungs may represent infection or sequela of pulmonary edema. Cont' Levaquin, will need to complete as prescribed. 
  
Paroxysmal afib w/ rvr  
Moderate mitral valve stenosis and regurgitation Severe AV regurg 
s/p IV diltiazem in the ED, NSR during my encounter this AM, but went back into afib . Continue metoprolol, digoxin held due to renal failure. Cont' cardizem and warfarin. Norvasc discontinued as per cardiology's recs. Family declined TAVR during last admit. 
   
Chronic diastolic CHF  
HTN  
Severe aortic valve regurgitation Continue metoprolol, amlodipine, hydralazine with hold parameters CKD stage III-IV, stable 
  
Hypothyroidism Continue synthroid 
  
Dementia without behavioral disturbances 
  
 
Discharge Exam: 
Patient seen and examined by me on discharge day. Pertinent Findings: 
Visit Vitals /64 Pulse (!) 125 Temp 97.9 °F (36.6 °C) Resp 16 Ht 5' (1.524 m) Wt 69.9 kg (154 lb 1.6 oz) SpO2 92% Breastfeeding? No  
BMI 30.10 kg/m² Gen:    Not in distress Chest: Clear lungs CVS:   Regular rhythm. No edema Abd:  Soft, not distended, not tender Discharge/Recent Laboratory Results: 
Recent Labs 04/08/19 
0402 04/07/19 
4001  139  
K 3.6 3.6 * 109* CO2 23 21 BUN 36* 35*  81 CA 8.1* 8.3* PHOS  --  3.2 MG  --  1.7 Recent Labs 04/07/19 
0431 HGB 8.9* HCT 27.9* WBC 9.1  Discharge Medications: 
Current Discharge Medication List  
  
START taking these medications Details  
albuterol-ipratropium (DUO-NEB) 2.5 mg-0.5 mg/3 ml nebu 3 mL by Nebulization route every six (6) hours as needed for Other (wheeze).  
Qty: 30 Nebule, Refills: 0  
  
dilTIAZem (CARDIZEM) 30 mg tablet Take 1 Tab by mouth Before breakfast, lunch, dinner and at bedtime. Qty: 120 Tab, Refills: 0  
  
levoFLOXacin (LEVAQUIN) 500 mg tablet Take 1 Tab by mouth every fourty-eight (48) hours for 2 doses. Qty: 2 Tab, Refills: 0  
  
OTHER 1 nebulizer machine1 Qty: 1 Act, Refills: 0 CONTINUE these medications which have NOT CHANGED Details  
furosemide (LASIX) 20 mg tablet Take 20 mg by mouth daily as needed for Other (swelling). pt takes . 5 of  a  20 mg tab by mouth as needed for swelling[10 mg] start 4-5-19 pt to take 10 mg 2 x day x 2 days start 4-5-19-4-6-19 then resume 10 mg daily prn  swelling., by mouth.  
  
levothyroxine (SYNTHROID) 50 mcg tablet Take 50 mcg by mouth Daily (before breakfast). ondansetron (ZOFRAN ODT) 4 mg disintegrating tablet Take 4 mg by mouth every eight (8) hours as needed for Nausea. !! warfarin (COUMADIN) 2.5 mg tablet Take 2.5 mg by mouth five (5) days a week. Esther Camacho, Wed, [address] [place] [address], Sat ALPRAZolam (XANAX) 0.25 mg tablet Take 0.25 mg by mouth as needed for Anxiety. ginger, Zingiber officinalis, (GINGER EXTRACT) 250 mg cap Take 1 Cap by mouth daily. TURMERIC PO Take 1 Cap by mouth daily. !! warfarin (COUMADIN) 5 mg tablet Take 5 mg by mouth two (2) days a week. Mon, Fri  
  
hydrALAZINE (APRESOLINE) 50 mg tablet Take 1 Tab by mouth three (3) times daily. Qty: 90 Tab, Refills: 0  
  
metoprolol tartrate (LOPRESSOR) 100 mg IR tablet Take 1 Tab by mouth two (2) times a day. Qty: 60 Tab, Refills: 0 Ferrous Fumarate 325 mg (106 mg iron) tab Take 1 Tab by mouth daily. mirtazapine (REMERON) 30 mg tablet Take 1 Tab by mouth nightly. Qty: 90 Tab, Refills: 3  
  
esomeprazole (NEXIUM) 40 mg capsule Take 40 mg by mouth daily. !! - Potential duplicate medications found. Please discuss with provider. STOP taking these medications  
  
 amLODIPine (NORVASC) 10 mg tablet Comments:  
Reason for Stopping:   
   
  
 
 
DISPOSITION:   
Home with Family: Home with HH/PT/OT/RN: x  
SNF/LTC:   
LUL:   
OTHER:   
 
 
 
Follow up with: PCP : Carey Tamayo MD 
Follow-up Information Follow up With Specialties Details Why Contact Info Carey Tamayo MD [facility] [address] 
[phone] Herrera Pearl NP Nurse Practitioner Go on 4/11/2019 For scheduled Cardiology appointment at 11:00AM [address] 
[phone] [facility]  PT, OT and Nursing services  [address] [place] [id number] 
[phone] Gutierrez Mcelroy MD Cardiology In 2 weeks  [address] [place] [address] [address] 
[phone] Code: Full Diet: cardiac Total time in minutes spent coordinating this discharge (includes going over instructions, follow-up, prescriptions, and preparing report for sign off to her PCP) :  35 minutes

## 2019-04-09 NOTE — PROGRESS NOTES
Transitional Care Team: Initial HUG Note    Date of Assessment: 04/09/19  Time of Assessment:  9:58 AM    Nikkie Lewis is a 82 y.o. female inpatient at [facility]. Assessment & Plan   Pt alert without dyspnea. Dementia limits ability to understand her disease process- but daughter at bedside is very aware of her progressive nature of her HF. Planning for return home with [facility] services to resume         Primary Diagnosis: heart failure  Advance Directive:  Advanced care plan with designated health care proxy  . Current Code Status:  full    Referral to Hospice/ Palliative Care Appropriate: yes. Awareness of Medical Conditions: (Trajectory of illness and pts expectations). Pt not aware. Daughter is. Still wants full care, not thinking palliative yet    Discharge Needs: (to include safety issues) risk to fall    Barriers Identified: acceptance of disease progression  Patient is willing to go to SNF/Inpatient Rehab if recommended: if recommended    Medication Review:  Was performed. Can patient afford medications:  yes. Patient is Compliant with Medication regimen:yes    Who manages medications at home: daughter    Best Patient Contact Number: [phone] DTR    HUG (Healthy Understanding of Goals) program introduced to patient/family. The Transitional Care Team bridges the gaps in care and education surrounding discharge from the acute care facility. The objective is to empower the patient and family in taking a proactive role in preventing readmission within the first thirty days after discharge. The team is also involved in the efforts to reduce readmission to the acute care setting after stabilization and discharge from the acute care environment either to skilled nursing facilities or community.      HUG RN/NP reviewed [facility] Calendar/ follow up appointments/ Ambulatory Nurse Navigator name and contact information   Future Appointments   Date Time Provider Fernando Laura   4/10/2019 10:00 AM Sonia Myers RN [facility]   4/11/2019 11:00 AM [facility]eda [facility]   4/11/2019 To Be Determined Oleg Worrell [facility]   4/16/2019 To Be Determined Oleg Worrell [facility]   4/18/2019 To Be Determined Oleg [facility]   4/23/2019 To Be Determined O[facility]   4/26/2019 To Be Determined Michi Kirk, PT [address] [facility]         [facility]: call information given to outside service area       Patient education focused on readmission zones as described as: The Red Zone: High risk for readmission, days 1-21  The Yellow Zone: Moderate  risk for readmission, days 22-29   The Green Zone: Lower risk for readmission, days                30 and after    The [facility] Team will attempt to follow the patient from a distance while inpatient as well as be available for further transition/disposition needs. [facility] will continue to offer support during the 30- 90 day discharge from acute care setting. Will notify Ambulatory , SURINDER RN.     Past Medical History:   Diagnosis Date    Aortic valve disorders     AS    Asthma     Benign hypertensive heart disease without heart failure     Diabetes (HCC)     Fibromyalgia     Gastroparesis     GERD (gastroesophageal reflux disease)     Hypertension     Mitral valve disorders(424.0)     MR    LIVIA (obstructive sleep apnea)     Paroxysmal atrial fibrillation ([facility] [address]     Pure hypercholesterolemia 9/30/2010

## 2019-04-09 NOTE — CDMP QUERY
Account Number: [de-identified] MRN: [id number] Patient: Colette Rodriguez Created: [id number]:00 Clinician Name: Chaparro Hutton RN, CCDS Dr. Thania Tillman : 
Patient admitted with SOB &Afib noted to have CXR R basilar ASD & associated effusion. If possible, please document in progress notes and d/c summary if you are evaluating and/or treating any of the following:  
 
=> SOB & Afib  d/t Pneumonia POA  
=> Pneumonia not POA 
=> Other explanation of clinical findings  
=> Clinically Undetermined (no explanation for clinical findings) The medical record reflects the following: 
   Risk Factors: advanced age, valve disorders. Clinical Indicators: tachypneic 22-28, SOB,  slightly elevated WBC 11.4  w/ neutrophils 80%; ED imp: Pneumonia.  
   Treatment: IV levaquin,duoneb, blood cx, CXR & CT CHest.  
Thank You, Chaparro Hutton RN, CCDS

## 2019-04-09 NOTE — PROGRESS NOTES
[facility]. 
 
 
 
 
 
4/9/2019 RE: Jaciel Schwartz (YOB: 1935) To Whom it May Concern: This is to certify that Jaciel Schwartz was admitted to [facility] from 4/5/2019 to 4/9/2019 for treatment of a medical illness. Please feel free to contact my office if you have any questions or concerns. Thank you for your assistance in this matter. Sincerely, Everardo Chen MD 
[phone] office

## 2019-04-09 NOTE — PROGRESS NOTES
Pt will receive the nebulizer this morning from [facility]. F/u appts made and documented on the discharge paperwork. [facility] will provide [facility] services. Pt's son will transport pt home by car. Care Management Interventions PCP Verified by CM: Yes(Dr. Ayana Meek) Mode of Transport at Discharge: Other (see comment)(pt's son will transport pt home by car) Transition of Care Consult (CM Consult): Home Health [facility] - INPATIENT: Yes Discharge Durable Medical Equipment: Yes(nebulizer) Physical Therapy Consult: Yes Occupational Therapy Consult: Yes Speech Therapy Consult: No 
Current Support Network: Own Home, Other(lives with son, daughter, and son in law in a 1 story home with a 3 steps and a ramp) Confirm Follow Up Transport: Family Plan discussed with Pt/Family/Caregiver: Yes Freedom of Choice Offered: Yes Discharge Location Discharge Placement: Home with home health [facility], [address]

## 2019-04-09 NOTE — PROGRESS NOTES
Problem: Self Care Deficits Care Plan (Adult) Goal: *Acute Goals and Plan of Care (Insert Text) Description Occupational Therapy Goals Initiated 4/8/2019 1. Patient will perform toilet transfers with Supervision within 7 day(s). 2.  Patient will perform CM and sada care aspects of toileting with Supervision within 7 day(s). 3.  Patient will perform grooming tasks standing at sink with Supervision within 7 day(s). 4.  Patient will complete safe item retrieval from high/low surfaces with Supervision using least restrictive device in preparation for self-care/ADL tasks within 7 day(s). Outcome: Progressing Towards Goal 
 OCCUPATIONAL THERAPY TREATMENT Patient: Haider Hill (12 y.o. female) Date: 4/9/2019 Diagnosis: Shortness of breath [R06.02] Atrial fibrillation with RVR ([address] [I48.91] Atrial fibrillation, rapid ([address] [I48.91] <principal problem not specified> Precautions:   
Chart, occupational therapy assessment, plan of care, and goals were reviewed. ASSESSMENT: 
Cleared for therapy by RN and agreeable to participate, Dtr present and vitals stable yet HR fluctuating between 104-116. Pt donned her socks with Setup Assist sitting in chair. Guided Pt through brushing her teeth standing at sink with verbal cues to scan for self care items and complete item retrieval from shelf. Progressing to only requiring Supervision with performing self-care tasks at sink and CGA with bathroom mobility. Pt returned sitting in chair, all needs met and RN present. Progression toward goals: 
?       Improving appropriately and progressing toward goals ? Improving slowly and progressing toward goals ? Not making progress toward goals and plan of care will be adjusted PLAN: 
Patient continues to benefit from skilled intervention to address the above impairments. Continue treatment per established plan of care.  
Discharge Recommendations:  Home Health OT 
 Further Equipment Recommendations for Discharge:  None for OT SUBJECTIVE:  
Patient stated ? Where's my medication? \"  (perseveration on medication during ADLs) OBJECTIVE DATA SUMMARY:  
Cognitive/Behavioral Status: 
  
 
Functional Mobility and Transfers for ADLs: 
Transfers: 
  
Functional Transfers Bathroom Mobility: Stand-by assistance ADL Intervention: 
  
 
Grooming Brushing Teeth: Supervision/set-up(standing at sink) Lower Body Dressing Assistance Socks: Supervision/set-up(sitting in chair) Activity Tolerance:  
Good. See assessment above. Please refer to the flowsheet for vital signs taken during this treatment. After treatment:  
? Patient left in no apparent distress sitting up in chair ? Patient left in no apparent distress in bed 
? Call bell left within reach ? Nursing notified ? Caregiver present ? Bed alarm activated COMMUNICATION/COLLABORATION:  
The patient?s plan of care was discussed with: Patient's Daughter and Patient [facility]", OTR/L Time Calculation: 17 mins

## 2019-04-09 NOTE — CDMP QUERY
Account Number: [de-identified] MRN: [id number] Patient: Shira Breaux Created: [id number]:21:00 Clinician Name: Tom Buchanan RN, CCDS Dr. Jacque Cardenas : 
Patient admitted with SOB & AFib, noted to have  elevated probnp and evidence of pleural effusion on CT chest. If possible, please document in progress notes and d/c summary if you are evaluating and/or treating any of the following:  
 
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  
=> Acute on Chronic Diastolic CHF POA  
=> Chronic Diastolic CHF  
=> Other explanation of clinical findings  
=> Clinically Undetermined (no explanation for clinical findings) The medical record reflects the following: 
   Risk Factors: 83 yo F w/ moderate MV stenosis and regurgitation, severe aortic valve regurgitation,:PAfib, elevated BP Clinical Indicators: SOB, trace b/l LE edema but has compression socks on, elevated NTpBNP 34,252, CT chest showed 1. Moderate layering bilateral pleural effusions have increased in size since the prior study. Bilateral lower lobe and right middle lobe atelectasis has worsened. Treatment: cardiology c/s, diurese, continue metoprolol, amlodipine, hydralazine, strict I&O, daiily wgt.   
Thank Mika Arboleda RN, CCDS

## 2019-04-11 PROBLEM — I50.32 CHRONIC DIASTOLIC CONGESTIVE HEART FAILURE (HCC): Status: ACTIVE | Noted: 2019-01-01

## 2019-04-11 NOTE — PROGRESS NOTES
Chief Complaint   Patient presents with   [address] Follow up      1. Have you been to the ER, urgent care clinic since your last visit? Hospitalized since your last visit? Yes When: [address] 4/5/2019    2. Have you seen or consulted any other health care providers outside of the [address] [place] [address] since your last visit? Include any pap smears or colon screening.  No

## 2019-04-11 NOTE — PROGRESS NOTES
Starr Davidson DNP, ANP-BC  Subjective/HPI:     Alphonso Valdivia is a 82 y.o. female is here for hospital follow-up admitted for A. fib RVR, severe aortic stenosis, diastolic heart failure, CKD and dementia. Transesophageal echocardiogram  · Aortic valve leaflet calcification present. Abnormal left aortic leaflet mobility. Severe aortic valve regurgitation is present. · Mitral annular calcification. Moderate mitral valve stenosis. Moderate mitral valve regurgitation. · Estimated left ventricular ejection fraction is 61 - 65%. · Left atrial cavity size is mildly dilated. · Mild tricuspid valve regurgitation is present. Mild pulmonary hypertension is present. Diagnostic   Dominance: Left   Left Main   The vessel is angiographically normal.   Left Anterior Descending   The vessel is angiographically normal.   First Diagonal Branch   The vessel is angiographically normal.   Second Diagonal Branch   The vessel is angiographically normal.   Left Circumflex   The vessel was visualized by angiography. The vessel is angiographically normal.   First Obtuse Marginal Branch   The vessel is angiographically normal.   Right Coronary Artery   The vessel is small. The vessel is angiographically normal.     Aortic Valve 3/26/19 0731--3/26/19 0846 before discharge     Date/Time AV SEP AV Area AV Flow AV Index AV Gradient   03/26/19 07:33:02 19.95 sec/beat 2.09 cm2 261.38 cc 1.28 cm2/m2 7.98 mmHg   Mitral Valve 3/26/19 0731--3/26/19 0846 before discharge     MV DFP MV Area MV Flow MV Index MV Gradient   29.79 sec/beat 1.64 cm2 175.05 cc 1 cm2/m2 7.97 mmHg        Hospitalist note    Brief Admission History/Reason for Admission Per Magda Villalba MD:   82 y.o lady w/ HFPEF, HTN, dementia, paroxysmal afib, moderate MV stenosis and regurgitation, severe aortic valve regurgitation, who was brought by her daughter for wheezing. The patient is a limited historian.  The daughter reports that the patient has been having intermittent wheezing throughout the day, but never complained of any dyspnea. She checked her blood pressure and it was 188/134, so she brought here here. The patient denies any complaints to me; no dyspnea, chest pain, wheezing, but had a cough in the ED that resolved on its own. She developed afib w/ rvr in the ED but converted to NSR after receiving IV diltiazem. The daughter also endorses that the patient developed facial swelling but only on the left side of her face over the past week. She called her PCP's office and was instructed to take furosemide 10 mg PRN. She normally takes furosemide 20 mg, digoxin, ISMN, simvastatin, all of which have been stopped until follow-up with her cardiologist on 4/11/19 (unclear why). She was recently admitted here for CHF and hypertensive urgency, underwent a IAN and a cath, but it was ultimately decided against a TAVR after risk/benefit assessment.     Brief Hospital Course by Main Problems:   PNA and b/l effusion  CT chest showed moderate layering bilateral pleural effusions have increased in size since the prior study.  Bilateral lower lobe and right middle lobe atelectasis has worsened. Mild patchy airspace disease throughout aerated portions of both lungs may represent infection or sequela of pulmonary edema. Cont' Levaquin, will need to complete as prescribed.     Paroxysmal afib w/ rvr   Moderate mitral valve stenosis and regurgitation  Severe AV regurg  s/p IV diltiazem in the ED, NSR during my encounter this AM, but went back into afib .  Continue metoprolol, digoxin held due to renal failure. Cont' cardizem and warfarin. Norvasc discontinued as per cardiology's recs.     Family declined TAVR during last admit.      Chronic diastolic CHF   HTN   Severe aortic valve regurgitation  Continue metoprolol, amlodipine, hydralazine with hold parameters     CKD stage III-IV, stable     Hypothyroidism  Continue synthroid     Dementia without behavioral disturbances      PCP Provider  Linda Cassidy MD  Past Medical History:   Diagnosis Date    Aortic valve disorders     AS    Asthma     Benign hypertensive heart disease without heart failure     Diabetes ([address]     Fibromyalgia     Gastroparesis     GERD (gastroesophageal reflux disease)     Hypertension     Mitral valve disorders(424.0)     MR    LIVIA (obstructive sleep apnea)     Paroxysmal atrial fibrillation [address] [address]     Pure hypercholesterolemia 9/30/2010      Past Surgical History:   Procedure Laterality Date    ECHO 2D ADULT  11/2009    LVH, normal LV wall motion and ejection fraction, mild aortic stenosis, moderate aortic regurgitation and mild mitral regurgitation. The ejection fraction was 55-60%.  ECHO 2D ADULT  1/2011    EF 60%, LAE, mild AS, AI, MR, PA low 40s    EVENT MONITOR POST SYMPTOMS  9/2010    Rare PACs, no arrythmia with symptoms    LOOP MONITOR  9-10/2011    NSR    STRESS TEST MYOVIEW  1/2011    no ischemia, EF 63%    US DUPLEX CAROTID BILATERAL  1/2011    mild bilat disease     Allergies   Allergen Reactions    Latex, Natural Rubber Itching    Metformin Nausea and Vomiting     Other reaction(s): nausea  HEADACHE      Advil [Ibuprofen] Unknown (comments)    Aspirin Unknown (comments)    Citalopram Other (comments)     HEADACHE    Codeine Other (comments)    Iodinated Contrast- Oral And Iv Dye Itching    Meloxicam Unknown (comments)    Penicillin G Unknown (comments)    Shellfish Containing Products Rash    Sulfa (Sulfonamide Antibiotics) Unknown (comments)    Tramadol Nausea and Vomiting and Other (comments)     HEADACHE        Family History   Problem Relation Age of Onset    Heart Disease Father     Dementia Brother     Heart Disease Brother     Diabetes Brother       Current Outpatient Medications   Medication Sig    polyethylene glycol (MIRALAX) 17 gram packet MIRALAX POWD    acetaminophen (TYLENOL) 500 mg tablet Take 500 mg by mouth.     doxazosin (CARDURA) 1 mg tablet Take 1 mg by mouth.  simethicone (GAS-X) 80 mg chewable tablet Take 80 mg by mouth every six (6) hours as needed for Flatulence.  furosemide (LASIX) 20 mg tablet Take 1/2 tab daily as needed if weight is >148lbs.  hydrALAZINE (APRESOLINE) 50 mg tablet Take 1 Tab by mouth four (4) times daily.  metoprolol tartrate (LOPRESSOR) 100 mg IR tablet Take 1 Tab by mouth two (2) times a day.  dilTIAZem CD (CARDIZEM CD) 180 mg ER capsule Take 1 Cap by mouth daily. D/C QID dose    ondansetron hcl (ZOFRAN) 4 mg tablet Take 1 Tab by mouth every eight (8) hours as needed for Nausea.  albuterol-ipratropium (DUO-NEB) 2.5 mg-0.5 mg/3 ml nebu 3 mL by Nebulization route every six (6) hours as needed for Other (wheeze).  levoFLOXacin (LEVAQUIN) 500 mg tablet Take 1 Tab by mouth every fourty-eight (48) hours for 2 doses.  OTHER 1 nebulizer machine1    levothyroxine (SYNTHROID) 50 mcg tablet Take 50 mcg by mouth Daily (before breakfast).  warfarin (COUMADIN) 2.5 mg tablet Take 2.5 mg by mouth five (5) days a week. Sun, Tues, Wed, Thurs, Sat    ALPRAZolam (XANAX) 0.25 mg tablet Take 0.25 mg by mouth as needed for Anxiety.  warfarin (COUMADIN) 5 mg tablet Take 5 mg by mouth two (2) days a week. Mon, Fri    Ferrous Fumarate 325 mg (106 mg iron) tab Take 1 Tab by mouth daily.  mirtazapine (REMERON) 30 mg tablet Take 1 Tab by mouth nightly.  esomeprazole (NEXIUM) 40 mg capsule Take 40 mg by mouth daily.  ginger, Zingiber officinalis, (GINGER EXTRACT) 250 mg cap Take 1 Cap by mouth daily.  TURMERIC PO Take 1 Cap by mouth daily. No current facility-administered medications for this visit.        Vitals:    04/11/19 1111 04/11/19 1135   BP: 168/60 170/58   Pulse: 77    Resp: 16    SpO2: 97%    Weight: 147 lb 1.6 oz (66.7 kg)    Height: 5' (1.524 m)      Social History     Socioeconomic History    Marital status:      Spouse name: Not on file    Number of children: Not on file    Years of education: Not on file    Highest education level: Not on file   Occupational History    Not on file   Social Needs    Financial resource strain: Not on file    Food insecurity:     Worry: Not on file     Inability: Not on file    Transportation needs:     Medical: Not on file     Non-medical: Not on file   Tobacco Use    Smoking status: Former Smoker     Last attempt to quit: 1963     Years since quittin.3    Smokeless tobacco: Never Used   Substance and Sexual Activity    Alcohol use: Yes    Drug use: No    Sexual activity: Not on file   Lifestyle    Physical activity:     Days per week: Not on file     Minutes per session: Not on file    Stress: Not on file   Relationships    Social connections:     Talks on phone: Not on file     Gets together: Not on file     Attends Scientologist service: Not on file     Active member of club or organization: Not on file     Attends meetings of clubs or organizations: Not on file     Relationship status: Not on file    Intimate partner violence:     Fear of current or ex partner: Not on file     Emotionally abused: Not on file     Physically abused: Not on file     Forced sexual activity: Not on file   Other Topics Concern    Not on file   Social History Narrative    Not on file       I have reviewed the nurses notes, vitals, problem list, allergy list, medical history, family, social history and medications. Review of Symptoms:    General: Pt denies excessive weight gain or loss. HEENT: Denies blurred vision, headaches, epistaxis and difficulty swallowing. Respiratory: Denies shortness of breath, + mild HASKINS, denies wheezing or stridor.   Cardiovascular: Denies precordial pain, palpitations, edema or PND  Gastrointestinal: Denies poor appetite, indigestion, abdominal pain or blood in stool  Musculoskeletal: Denies pain or swelling from muscles or joints  Neurologic: Denies tremor, paresthesias, or sensory motor disturbance  Skin: Denies rash, itching or texture change. Physical Exam:      General: Well developed, in no acute distress, cooperative and alert  HEENT: No carotid bruits, no JVD, trach is midline. Neck Supple  Heart:  Normal S1/S2 negative S3 or S4. Regular, 2/6 diastolic murmur, no gallop or rub.   Respiratory: Clear bilaterally x 4, no wheezing or rales  Abdomen:   Soft, non-tender, no masses, bowel sounds are active.   Extremities:  No edema, normal cap refill, no cyanosis, atraumatic. Neuro: A&Ox2, speech clear, gait slow and cautious  Skin: Skin color is normal. No rashes or lesions.  Non diaphoretic  Vascular: 2+ pulses symmetric in all extremities    Cardiographics    ECG: Normal sinus rhythm  Results for orders placed or performed during the hospital encounter of 04/05/19   EKG, 12 LEAD, INITIAL   Result Value Ref Range    Ventricular Rate 85 BPM    Atrial Rate 85 BPM    P-R Interval 220 ms    QRS Duration 98 ms    Q-T Interval 396 ms    QTC Calculation (Bezet) 471 ms    Calculated P Axis 58 degrees    Calculated R Axis 5 degrees    Calculated T Axis 23 degrees    Diagnosis       Sinus rhythm with 1st degree AV block with frequent premature ventricular   complexes  Left ventricular hypertrophy  Confirmed by [facility] ([id number]) on 4/6/2019 8:46:30 PM           Cardiology Labs:  Lab Results   Component Value Date/Time    Cholesterol, total 153 03/16/2019 04:00 PM    HDL Cholesterol 54 03/16/2019 04:00 PM    LDL, calculated 84.2 03/16/2019 04:00 PM    Triglyceride 74 03/16/2019 04:00 PM    CHOL/HDL Ratio 2.8 03/16/2019 04:00 PM       Lab Results   Component Value Date/Time    Sodium 140 04/08/2019 04:02 AM    Potassium 3.6 04/08/2019 04:02 AM    Chloride 109 (H) 04/08/2019 04:02 AM    CO2 23 04/08/2019 04:02 AM    Anion gap 8 04/08/2019 04:02 AM    Glucose 100 04/08/2019 04:02 AM    BUN 36 (H) 04/08/2019 04:02 AM    Creatinine 1.85 (H) 04/08/2019 04:02 AM    BUN/Creatinine ratio 19 04/08/2019 04:02 AM    GFR est AA 32 (L) 04/08/2019 04:02 AM    GFR est non-AA 26 (L) 04/08/2019 04:02 AM    Calcium 8.1 (L) 04/08/2019 04:02 AM    Bilirubin, total 0.3 04/05/2019 11:13 PM    AST (SGOT) 32 04/05/2019 11:13 PM    Alk. phosphatase 123 (H) 04/05/2019 11:13 PM    Protein, total 7.1 04/05/2019 11:13 PM    Albumin 2.7 (L) 04/05/2019 11:13 PM    Globulin 4.4 (H) 04/05/2019 11:13 PM    A-G Ratio 0.6 (L) 04/05/2019 11:13 PM    ALT (SGPT) 28 04/05/2019 11:13 PM           Assessment:     Assessment:     Diagnoses and all orders for this visit:    1. Paroxysmal atrial fibrillation (HCC)  -     AMB POC EKG ROUTINE W/ 12 LEADS, INTER & REP    2. Mitral valve disease    3. Dementia associated with other underlying disease with behavioral disturbance    4. Anticoagulant long-term use    5. Aortic valve disorder    Other orders  -     furosemide (LASIX) 20 mg tablet; Take 1/2 tab daily as needed if weight is >148lbs. -     hydrALAZINE (APRESOLINE) 50 mg tablet; Take 1 Tab by mouth four (4) times daily. -     metoprolol tartrate (LOPRESSOR) 100 mg IR tablet; Take 1 Tab by mouth two (2) times a day. -     dilTIAZem CD (CARDIZEM CD) 180 mg ER capsule; Take 1 Cap by mouth daily. D/C QID dose        ICD-10-CM ICD-9-CM    1. Paroxysmal atrial fibrillation (HCC) I48.0 427.31 AMB POC EKG ROUTINE W/ 12 LEADS, INTER & REP   2. Mitral valve disease I05.9 394.9    3. Dementia associated with other underlying disease with behavioral disturbance F02.81 294.8      294.11    4. Anticoagulant long-term use Z79.01 V58.61    5. Aortic valve disorder I35.9 424.1      Orders Placed This Encounter    AMB POC EKG ROUTINE W/ 12 LEADS, INTER & REP     Order Specific Question:   Reason for Exam:     Answer:   routine    polyethylene glycol (MIRALAX) 17 gram packet     Sig: MIRALAX POWD    acetaminophen (TYLENOL) 500 mg tablet     Sig: Take 500 mg by mouth.  doxazosin (CARDURA) 1 mg tablet     Sig: Take 1 mg by mouth.     DISCONTD: ferrous sulfate 325 mg (65 mg iron) tablet     Sig: Take 2 Tabs by mouth.  simethicone (GAS-X) 80 mg chewable tablet     Sig: Take 80 mg by mouth every six (6) hours as needed for Flatulence.  furosemide (LASIX) 20 mg tablet     Sig: Take 1/2 tab daily as needed if weight is >148lbs. Dispense:  90 Tab     Refill:  1    hydrALAZINE (APRESOLINE) 50 mg tablet     Sig: Take 1 Tab by mouth four (4) times daily. Dispense:  360 Tab     Refill:  3    metoprolol tartrate (LOPRESSOR) 100 mg IR tablet     Sig: Take 1 Tab by mouth two (2) times a day. Dispense:  180 Tab     Refill:  3    dilTIAZem CD (CARDIZEM CD) 180 mg ER capsule     Sig: Take 1 Cap by mouth daily. D/C QID dose     Dispense:  90 Cap     Refill:  3        Plan:   Patient is a 82-year-old female admitted for pleural effusion, pneumonia, A. fib RVR, diastolic dysfunction. 1.  Atrial fibrillation: Remains in sinus rhythm continue metoprolol and diltiazem. For ease of medication administration diltiazem was changed to once a day dosing and given slightly higher dose for hypertension management. Continue warfarin therapy followed by primary care  2. Severe aortic valve regurgitation: Minimally symptomatic has some mild dyspnea on exertion multifactorial, patient and family have declined pursuing TAVR, to do medical management  3. Diastolic dysfunction: Weight stable 147 pounds, has been getting as needed 10 mg furosemide since hospital discharge. Changed instructions for patient to take 10 mg of furosemide if her weight is greater than or equal to 148 pounds in the morning. If her weight goes above 150 pounds to call office for dosing instructions. 4.  Hypertension: Increase dose of diltiazem 180 mg daily  Patient to be followed by nurse navigator heart failure team.  Follow-up with Dr. Blair Denver in 3 months    Craven Cogan, NP    This note was created using voice recognition software. Despite editing, there may be syntax errors.

## 2019-04-12 NOTE — PROGRESS NOTES
Hospital Discharge Follow-Up Date/Time:  2019 3:06 PM 
 
Patient was admitted to [facility] on 19 and discharged on  for: 
 
Discharge Diagnosis:  
CAP, POA with b/l effusion  
Paroxysmal afib w/ rvr  
Moderate mitral valve stenosis and regurgitation Severe AV regurg   
Chronic diastolic CHF  
HTN  
Severe aortic valve regurgitation CKD stage III-IV  
Hypothyroidism  
Dementia The physician discharge summary was available at the time of outreach. Challenges:  
- Last dose of Levaquin will be today, 19.  
- Patient is not taking Cardura 1mg, Luli Extract 250mg, and Turmeric, and not taking Gas-X 80mg per daughter, Maximo Vera. Notation made on today's medication reconciliation. 
- Daughter, Maximo Vera, states patient weighed 145 pounds this morning and Maximo Vera reports she has recently purchased a new digital scale as she had difficulty reading the lines on the old scale. Duran Morris is requesting hospital bed for patient and NN encouraged her to speak with patient's PCP during their 19 visit. Component Latest Ref Rng & Units 2019 2019 2019 3/25/2019  
 
      4:02 AM  4:31 AM 11:13 PM  3:23 AM  
BUN 
    6 - 20 MG/DL 36 (H) 35 (H) 33 (H) 63 (H) Creatinine 
    0.55 - 1.02 MG/DL 1.85 (H) 1.88 (H) 1.66 (H) 1.97 (H) Component Latest Ref Rng & Units 2019  
 
      3:23 AM  4:02 AM  1:50 PM  5:28 AM  
INR 
    0.9 - 1.1   2.0 (H) 2.4 (H) 2.5 (H) 2.8 (H) Prothrombin time 9.0 - 11.1 sec 20.0 (H) 23.4 (H) 24.5 (H) 27.0 (H) Inpatient RRAT score: 23 on 19. Was this a readmission? yes Patient stated reason for the readmission: Patient unable to respond to this question, no answer given. Nurse Navigator (NN) contacted the patient and daughter, Maximo Vera, by telephone to perform post hospital discharge assessment. Verified name and  with daughter, Maximo Vera, as identifiers.  Provided introduction to self, and explanation of the Nurse Navigator role. Reviewed discharge instructions and red flags with patient and daughter and daughter verbalized understanding. Patient and daughter given an opportunity to ask questions and does not have any further questions or concerns at this time. The daughter agrees to contact the PCP office for questions related to their healthcare. NN provided contact information for future reference. Disease Specific:   N/A to this admission; however patient has a history of Chronic diastolic CHF  
 
Summary of patient's problems: 
1. Last dose of Levaquin will be today, 4-12-19. 2. Patient is not taking Cardura 1mg, Ginger Extract 250mg, and Turmeric, and not taking Gas-X 80mg per daughter, Amy Courtney. Notation made on today's medication reconciliation. 3. Daughter, Amy Courtney, states patient weighed 145 pounds this morning and Amy Courtney reports she has recently purchased a new digital scale as she had difficulty reading the lines on the old scale. 4. Amy Courtney is requesting hospital bed for patient and NN encouraged her to speak with patient's PCP during their 4-12-19 visit. Home Health orders at discharge: PT, OT, SN Home Health company: [facility] care - [facility] office. Date of initial visit:  on 4-10-19. Durable Medical Equipment ordered/company: None upon discharge. Durable Medical Equipment received: None upon discharge. Barriers to care? lack of knowledge about disease, stages of grief Advance Care Planning:  
Does patient have an Advance Directive:  Patient has [place] [facility], dated 3-19-19, in [employer]. Medications per After Visit Summary from [facility] dated 4-9-19:  
New Medications at Discharge: START taking these medications  
  Details  
albuterol-ipratropium (DUO-NEB) 2.5 mg-0.5 mg/3 ml nebu 3 mL by Nebulization route every six (6) hours as needed for Other (wheeze).  
Qty: 30 Nebule, Refills: 0  
   
 dilTIAZem (CARDIZEM) 30 mg tablet Take 1 Tab by mouth Before breakfast, lunch, dinner and at bedtime. Qty: 120 Tab, Refills: 0  
   
levoFLOXacin (LEVAQUIN) 500 mg tablet Take 1 Tab by mouth every fourty-eight (48) hours for 2 doses. Qty: 2 Tab, Refills: 0  
   
OTHER 1 nebulizer machine1 Qty: 1 Act, Refills: 0 Changed Medications at Discharge: none Discontinued Medications at Discharge: STOP taking these medications  
   
  amLODIPine (NORVASC) 10 mg tablet Comments:  
Reason for Stopping:   
     
 
Medication reconciliation was performed with patient's daughter, Nataly Rogers,, who verbalizes understanding of administration of home medications. Per Nataly Rogers, patient is not taking: Cardura 1mg, Ginger Extract 250mg, Turmeric, and Gas-X 80mg chewable tab. Notation was made on today's medication reconciliation. There were no barriers to obtaining medications identified at this time and Nataly Rogers states patient's medications are affordable. Referral to Pharm D needed: no  
 
Current Outpatient Medications Medication Sig  polyethylene glycol (MIRALAX) 17 gram packet MIRALAX POWD  
 acetaminophen (TYLENOL) 500 mg tablet Take 500 mg by mouth.  furosemide (LASIX) 20 mg tablet Take 1/2 tab daily as needed if weight is >148lbs.  hydrALAZINE (APRESOLINE) 50 mg tablet Take 1 Tab by mouth four (4) times daily.  metoprolol tartrate (LOPRESSOR) 100 mg IR tablet Take 1 Tab by mouth two (2) times a day.  dilTIAZem CD (CARDIZEM CD) 180 mg ER capsule Take 1 Cap by mouth daily. D/C QID dose  ondansetron hcl (ZOFRAN) 4 mg tablet Take 1 Tab by mouth every eight (8) hours as needed for Nausea.  albuterol-ipratropium (DUO-NEB) 2.5 mg-0.5 mg/3 ml nebu 3 mL by Nebulization route every six (6) hours as needed for Other (wheeze).  OTHER 1 nebulizer machine1  
 levothyroxine (SYNTHROID) 50 mcg tablet Take 50 mcg by mouth Daily (before breakfast).  warfarin (COUMADIN) 2.5 mg tablet Take 2.5 mg by mouth five (5) days a week. Vincent Man, Wed, [address], Sat  ALPRAZolam (XANAX) 0.25 mg tablet Take 0.25 mg by mouth as needed for Anxiety.  warfarin (COUMADIN) 5 mg tablet Take 5 mg by mouth two (2) days a week. Mon, Fri  
 Ferrous Fumarate 325 mg (106 mg iron) tab Take 1 Tab by mouth daily.  mirtazapine (REMERON) 30 mg tablet Take 1 Tab by mouth nightly.  esomeprazole (NEXIUM) 40 mg capsule Take 40 mg by mouth daily.  doxazosin (CARDURA) 1 mg tablet Take 1 mg by mouth.  simethicone (GAS-X) 80 mg chewable tablet Take 80 mg by mouth every six (6) hours as needed for Flatulence.  ginger, Zingiber officinalis, (GINGER EXTRACT) 250 mg cap Take 1 Cap by mouth daily.  TURMERIC PO Take 1 Cap by mouth daily. No current facility-administered medications for this visit. [facility] follow up appointment(s):  
Future Appointments Date Time Provider Fernando Laura 4/16/2019 To Be Determined [facility] [address] [facility]  
4/18/2019 To Be Determined [facility] [address]  
4/23/2019 To Be Determined [facility] [address]  
4/26/2019 To Be Determined [place] [address]  
7/2/2019 11:00 AM Antonio Leung MD [address] [facility] [facility] follow up appointment(s): Patient has hospital follow-up appointment scheduled with Dr. Jeramie Colby/PCP on 4-16-19. [facility]:  out of service area Goals  Attends follow-up appointments as directed. 3-28-19: Patient has SURINDER visit scheduled with Dr. Laura Schwratz on 4-2-19, appointment with Dr. Sona vIey Nephrology on 5-3-19, appointment with Dr. Chandrakant Leong. Niya/[facility] on 5-23-19, and cardio follow-up with Suni Rausch NP (at the office of Dr. Anat Barnard. Madhu/[facility]) on 4-11-19. Family members provide all transportation.  Ever Bentley  
 
 4-12-19: Patient has hospital follow-up scheduled with Dr. Chad Johnson on 4-16-19 and patient saw Suni Rausch NP/cardio on 4-11-19 as scheduled. Patient's daughter, Derek Albright, states that Suni Rausch NP asked patient to return for follow-up visit with Dr. Liza Kennedy in 3 months; however discharge instructions dated 4-9-19 states that patient should see Dr. Liza Kennedy in 2 weeks. Daughter, Derek Albright, states that she will call the office of Dr. Liza Kennedy to double-check on correct follow-up appointment date. Currently, patient has appointment with Dr. Liza Kennedy in 3 months. ALVARO  
  
  Returns to baseline activity level. 3-2819: Per daughter, Derek Albright, patient has not returned to her baseline activity level. Ever Bentley  
 
4-12-19: Patient states she is not back to her baseline activity level; however she states, \"I'm getting there though. \" Sia Velázquez resources in place to maintain patient in the community (ie. Home Health, DME equipment, refer to, medication assistant plan, etc.) 3-28-19: [address] [facility] to provide SN, PT, and OT services. SN plans to admit either 3-29-19 or 3-30-19. Aide services were also ordered through home health; however [facility] notified daughter that they did not have aide services available at this time and notification has also been sent to patient's PCP, per Lucrecia/LITTLE. Daughter states that patient is never left unattended and a family member is with her at all times. ALVARO 
 
4-12-19: Per daughter, Junaid Hoffman [facility] ([facility] Neck office) SN saw patient on 4-10-19, PT and OT plan to come soon and have contacted daughter.  Ever Bentley

## 2019-04-25 NOTE — PROGRESS NOTES
Goals  Attends follow-up appointments as directed. 3-28-19: Patient has SURINDER visit scheduled with Dr. Rancho Sultana on 4-2-19, appointment with Dr. Francoise Carcamo Nephrology on 5-3-19, appointment with Dr. Daniel Pozo. Niya/[facility] on 5-23-19, and cardio follow-up with Coretta Mireles NP (at the office of Dr. Trent Vanessa. Madhu/[facility]) on 4-11-19. Family members provide all transportation. Rosalia Peterson  
 
4-12-19: Patient has hospital follow-up scheduled with Dr. Conenr Marsh on 4-16-19 and patient saw Coretta Mireles NP/cardio on 4-11-19 as scheduled. Patient's daughter, Vanita Storey, states that Coretta Mireles NP asked patient to return for follow-up visit with Dr. Kinza Burgos in 3 months; however discharge instructions dated 4-9-19 states that patient should see Dr. Kinza Burgos in 2 weeks. Daughter, Vanita Storey, states that she will call the office of Dr. Kinza Burgos to double-check on correct follow-up appointment date. Currently, patient has appointment with Dr. Kinza Burgos in 3 months. ALVARO  
 
4-25-19: Per daughter, patient saw Dr. Conner Marsh on 4-16-19 as scheduled. Next follow-up with Coretta Mireles NP is scheduled for July 2019 (daughter unable to give exact date of appointment). Patient has attended all follow-up appointments to date as scheduled and daughter provides all transportation. ALVARO  
  
  Returns to baseline activity level. 3-2819: Per daughter, Vanita Storey, patient has not returned to her baseline activity level. Rosalia Peterson  
 
4-12-19: Patient states she is not back to her baseline activity level; however she states, \"I'm getting there though. \" Rosalia Peterson  
 
4-25-19: Per daughter, Vanita Storey, patient has returned to her baseline activity level. [address] [place] [address] resources in [address] to maintain patient in the community (ie. Home Health, DME equipment, refer to, medication assistant plan, etc.) 3-28-19:  [address] [facility]  Neck office to provide SN, PT, and OT services. SN plans to admit either 3-29-19 or 3-30-19. Aide services were also ordered through home health; however [facility] notified daughter that they did not have aide services available at this time and notification has also been sent to patient's PCP, per Lucrecia/LITTLE. Daughter states that patient is never left unattended and a family member is with her at all times. ALVARO 
 
4-12-19: Per daughter Shahla Cabezas [facility] ([facility] Neck office) SN saw patient on 4-10-19, PT and OT plan to come soon and have contacted daughter. Delos Barthel  
 
4-25-19: Per Lucia Carbajal, [facility] continues to provide services; however she believes SN will discharge next week and PT plans to discharge patient on 4-26-19. Hannah states OT plans to visit again on 5-2-19.  Delos Barthel

## 2019-04-29 NOTE — PROGRESS NOTES
called and advised patient 30 day period up, and she can return Ipad. Patient verbalized understanding, and was grateful for the opportunity to use Ipad.

## 2019-05-07 NOTE — PROGRESS NOTES
Patient has graduated from the Transitions of Care Coordination  program on 5-7-19. Patient's symptoms are stable at this time. Patient/family has the ability to self-manage. Care management goals have been completed at this time. No further nurse navigator follow up scheduled. Goals Addressed This Visit's Progress  COMPLETED: Attends follow-up appointments as directed. 3-28-19: Patient has SURINDER visit scheduled with Dr. Moses Whitmore on 4-2-19, appointment with Dr. Teresa Will Nephrology on 5-3-19, appointment with Dr. Siva Núñez/[facility] on 5-23-19, and cardio follow-up with Sp Lopez NP (at the office of Dr. Rakel Leung/[facility]) on 4-11-19. Family members provide all transportation. Ermias Son  
 
4-12-19: Patient has hospital follow-up scheduled with Dr. Danna Canales on 4-16-19 and patient saw Sp Lopez NP/cardio on 4-11-19 as scheduled. Patient's daughter, Rachele Rosenberg, states that Sp Lopez NP asked patient to return for follow-up visit with Dr. Daniel Aviles in 3 months; however discharge instructions dated 4-9-19 states that patient should see Dr. Daniel Aviles in 2 weeks. Daughter, Rachele Rosenberg, states that she will call the office of Dr. Daniel Aviles to double-check on correct follow-up appointment date. Currently, patient has appointment with Dr. Daniel Aviles in 3 months. ALVARO  
 
4-25-19: Per daughter, patient saw Dr. Danna Canales on 4-16-19 as scheduled. Next follow-up with Sp Lopez NP is scheduled for July 2019 (daughter unable to give exact date of appointment). Patient has attended all follow-up appointments to date as scheduled and daughter provides all transportation. ALVARO  
 
5-7-19: Per daughter, Herson Meyer, patient has kept all appointments to date as scheduled. Goal met. ALVARO  
  
  COMPLETED: Returns to baseline activity level.      
  3-2819: Per daughter, Rachele Rosenberg, patient has not returned to her baseline activity level. Joshua Kaurase  
 
4-12-19: Patient states she is not back to her baseline activity level; however she states, \"I'm getting there though. \" Johsua Lease  
 
4-25-19: Per daughter, Carissa Awad, patient has returned to her baseline activity level. Joshua Kaurase  
 
5-7-19: Per daughter, Carissa Awad, patient remains at her baseline activity level and is \"doing good. \" Goal met. ALVARO  
  
  COMPLETED: Supportive resources in place to maintain patient in the community (ie. Home Health, DME equipment, refer to, medication assistant plan, etc.) 3-28-19: [address] [facility] office to provide SN, PT, and OT services. SN plans to admit either 3-29-19 or 3-30-19. Aide services were also ordered through [facility]; however [facility] notified daughter that they did not have aide services available at this time and notification has also been sent to patient's PCP, joelle Singer/LITTLE. Daughter states that patient is never left unattended and a family member is with her at all times. ALVARO 
 
4-12-19: Per daughter, Susan Samson [facility] ([facility] Neck office) SN saw patient on 4-10-19, PT and OT plan to come soon and have contacted daughter. Joshua Cook  
 
4-25-19: Per Carissa Awad, [facility] continues to provide services; however she believes SN will discharge next week and PT plans to discharge patient on 4-26-19. Hannah states OT plans to visit again on 5-2-19. ALVARO  
 
5-7-19: Per daughter, Agustín Dalal, PT and OT have discharged and SN last visit is planned for today. Goal met. Joshua Cook Patient's daughter, Carissa Awad, has nurse navigator's contact information for any further questions, concerns, or needs. Patients upcoming visits:   
Future Appointments Date Time Provider Fernando Laura 7/2/2019 11:00 AM Roslyn Leung MD [address] [facility]

## 2019-07-01 NOTE — PROGRESS NOTES
Received labs and office notes from [facility]. They are questioning what blood thinner pt should be on.  Will give to another nurse in office for the appt tomorrow-for  to review, as I will be at another office in the am.

## 2019-07-02 NOTE — PROGRESS NOTES
Chief Complaint   Patient presents with    Irregular Heart Beat     1. Have you been to the ER, urgent care clinic since your last visit? Hospitalized since your last visit? Yes [facility]  ED 6/30/2019    2. Have you seen or consulted any other health care providers outside of the [address] [place] [address] since your last visit? Include any pap smears or colon screening.  No

## 2019-07-02 NOTE — PROGRESS NOTES
[address] [place] [address], [address] [place] [address]  [phone]     Subjective:      Arturo Conn is a 82 y.o. female is here for 3 month f/u. However, was in the ED 6/30 for sob and cp. EKG showed AFL, rate 118. Labs at baseline except BNP, which is quite high >35 K, likely chronic and is related to her CKD. Her CXR shows small effusions but no pulmonary or interstitial edema.  -ve cardiac enzymes. No significant CAD per cath in 3/19. Saw her PCP yesterday who increased her Lasix to 20 mg daily until f/u on Friday  Today, she reports improved sob, not back at baseline but better than when she presented to the ED. No further cp. Has some mild ankle swelling  Daughter reports that mother was wheezing when she went to the ED. The patient denies chest pain, orthopnea, PND, palpitations, syncope, or presyncope.        Patient Active Problem List    Diagnosis Date Noted    Chronic diastolic congestive heart failure ([address] 04/11/2019    Atrial fibrillation, rapid ([address] 04/08/2019    Shortness of breath 04/06/2019    Atrial fibrillation with RVR ([address] 04/06/2019    Aortic insufficiency 03/19/2019    Mitral stenosis 03/19/2019    Bilateral carotid artery stenosis 03/18/2019    Vaso vagal episode 03/18/2019    Hypertensive urgency 03/16/2019    CKD (chronic kidney disease) 03/16/2019    Acute renal failure superimposed on stage 4 chronic kidney disease ([address] 02/28/2019    Persistent atrial fibrillation ([address] 02/26/2019    CAP (community acquired pneumonia) 02/26/2019    Essential hypertension 02/26/2019    Anticoagulant long-term use 02/26/2019    Dementia 02/26/2019    Acquired hypothyroidism 02/26/2019    LIVIA on CPAP 05/18/2012    Paroxysmal atrial fibrillation ([address]     Aortic valve disorder 09/30/2010    Pure hypercholesterolemia 09/30/2010    Mitral valve disease     Benign hypertensive heart disease without heart failure       Antonia Landa MD  Past Medical History:   Diagnosis Date    Aortic valve disorders     AS    Asthma     Benign hypertensive heart disease without heart failure     Diabetes (HCC)     Fibromyalgia     Gastroparesis     GERD (gastroesophageal reflux disease)     Hypertension     Mitral valve disorders(424.0)     MR    LIVIA (obstructive sleep apnea)     Paroxysmal [facility] fibrillation ([facility]     Pure hypercholesterolemia 9/30/2010      Past Surgical History:   Procedure Laterality Date    ECHO 2D ADULT  11/2009    LVH, normal LV wall motion and ejection fraction, mild aortic stenosis, moderate aortic regurgitation and mild mitral regurgitation. The ejection fraction was 55-60%.      ECHO 2D ADULT  1/2011    EF 60%, LAE, mild AS, AI, MR, PA low 40s    EVENT MONITOR POST SYMPTOMS  9/2010    Rare PACs, no arrythmia with symptoms    LOOP MONITOR  9-10/2011    NSR    STRESS TEST MYOVIEW  1/2011    no ischemia, EF 63%    US DUPLEX CAROTID BILATERAL  1/2011    mild bilat disease     Allergies   Allergen Reactions    Latex, Natural Rubber Itching    Metformin Nausea and Vomiting     Other reaction(s): nausea  HEADACHE      Advil [Ibuprofen] Unknown (comments)    Aspirin Unknown (comments)    Citalopram Other (comments)     HEADACHE    Codeine Other (comments)    Iodinated Contrast- Oral And Iv Dye Itching    Meloxicam Unknown (comments)    Penicillin G Unknown (comments)    Shellfish Containing Products Rash    Sulfa (Sulfonamide Antibiotics) Unknown (comments)    Tramadol Nausea and Vomiting and Other (comments)     HEADACHE        Family History   Problem Relation Age of Onset    Heart Disease Father     Dementia Brother     Heart Disease Brother     Diabetes Brother       Social History     Socioeconomic History    Marital status:      Spouse name: Not on file    Number of children: Not on file    Years of education: Not on file    Highest education level: Not on file   Occupational History    Not on file Social Needs    Financial resource strain: Not on file    Food insecurity:     Worry: Not on file     Inability: Not on file    Transportation needs:     Medical: Not on file     Non-medical: Not on file   Tobacco Use    Smoking status: Former Smoker     Last attempt to quit: 1963     Years since quittin.5    Smokeless tobacco: Never Used   Substance and Sexual Activity    Alcohol use: Yes    Drug use: No    Sexual activity: Not on file   Lifestyle    Physical activity:     Days per week: Not on file     Minutes per session: Not on file    Stress: Not on file   Relationships    Social connections:     Talks on phone: Not on file     Gets together: Not on file     Attends Sikhism service: Not on file     Active member of club or organization: Not on file     Attends meetings of clubs or organizations: Not on file     Relationship status: Not on file    Intimate partner violence:     Fear of current or ex partner: Not on file     Emotionally abused: Not on file     Physically abused: Not on file     Forced sexual activity: Not on file   Other Topics Concern    Not on file   Social History Narrative    Not on file      Current Outpatient Medications   Medication Sig    besifloxacin (BESIVANCE) 0.6 % drps ophthalmic suspension PLACE 1 DROP IN THE RIGHT EYE 4 TIMES A DAY FOR 2 WEEKS    brinzolamide (AZOPT) 1 % ophthalmic suspension INSTILL 1 DROP INTO RIGHT EYE 3 TIMES DAILY.  loteprednol etabonate (LOTEMAX) 0.5 % ophthalmic suspension PLACE 1 DROP IN THE RIGHT EYE 6 TIMES DAILY FOR 2 WEEKS    polyethylene glycol (MIRALAX) 17 gram packet MIRALAX POWD    acetaminophen (TYLENOL) 500 mg tablet Take 500 mg by mouth.  simethicone (GAS-X) 80 mg chewable tablet Take 80 mg by mouth every six (6) hours as needed for Flatulence.  furosemide (LASIX) 20 mg tablet Take 1/2 tab daily as needed if weight is >148lbs.     hydrALAZINE (APRESOLINE) 50 mg tablet Take 1 Tab by mouth four (4) times daily.    metoprolol tartrate (LOPRESSOR) 100 mg IR tablet Take 1 Tab by mouth two (2) times a day.  dilTIAZem CD (CARDIZEM CD) 180 mg ER capsule Take 1 Cap by mouth daily. D/C QID dose    albuterol-ipratropium (DUO-NEB) 2.5 mg-0.5 mg/3 ml nebu 3 mL by Nebulization route every six (6) hours as needed for Other (wheeze).  OTHER 1 nebulizer machine1    levothyroxine (SYNTHROID) 50 mcg tablet Take 80 mcg by mouth Daily (before breakfast).  warfarin (COUMADIN) 2.5 mg tablet Take 2.5 mg by mouth five (5) days a week. Sun, Tues, Wed, Thurs, Sat    ALPRAZolam (XANAX) 0.25 mg tablet Take 0.25 mg by mouth as needed for Anxiety.  warfarin (COUMADIN) 5 mg tablet Take 5 mg by mouth two (2) days a week. Mon, Fri    mirtazapine (REMERON) 30 mg tablet Take 1 Tab by mouth nightly.  esomeprazole (NEXIUM) 40 mg capsule Take 40 mg by mouth daily.  vit C/E/Zn/coppr/lutein/zeaxan (PRESERVISION AREDS-2 PO) Take 1 Cap by mouth two (2) times a day.  doxazosin (CARDURA) 1 mg tablet Take 1 mg by mouth.  ondansetron hcl (ZOFRAN) 4 mg tablet Take 1 Tab by mouth every eight (8) hours as needed for Nausea.  ginger, Zingiber officinalis, (GINGER EXTRACT) 250 mg cap Take 1 Cap by mouth daily.  TURMERIC PO Take 1 Cap by mouth daily.  Ferrous Fumarate 325 mg (106 mg iron) tab Take 1 Tab by mouth daily. No current facility-administered medications for this visit. Review of Symptoms:  11 systems reviewed, negative other than as stated in the HPI    Physical ExamPhysical Exam:    Vitals:    07/02/19 1110   Weight: 141 lb 14.4 oz (64.4 kg)   Height: 5' (1.524 m)     Body mass index is 27.71 kg/m². General PE   Gen:  NAD  Mental Status - Alert. General Appearance - Not in acute distress. Chest and Lung Exam   Inspection: Accessory muscles - No use of accessory muscles in breathing.    Auscultation:   Breath sounds: - Normal.   Cardiovascular   Inspection: Jugular vein - Bilateral - Inspection Normal. Palpation/Percussion:   Apical Impulse: - Normal.   Auscultation: Rhythm - Regular. Heart Sounds - S1 WNL and S2 WNL. No S3 or S4. Murmurs & Other Heart Sounds: Auscultation of the heart reveals - 3/6 LAKEISHA  Peripheral Vascular   Upper Extremity: Inspection - Bilateral - No Cyanotic nailbeds or Digital clubbing. Lower Extremity:   Palpation: Edema - Bilateral - No edema. Abdomen:   Soft, non-tender, bowel sounds are active. Neuro: A&O times 3, CN and motor grossly WNL    Labs:   Lab Results   Component Value Date/Time    Cholesterol, total 153 03/16/2019 04:00 PM    Cholesterol, total 164 03/01/2019 04:55 AM    HDL Cholesterol 54 03/16/2019 04:00 PM    HDL Cholesterol 64 03/01/2019 04:55 AM    LDL, calculated 84.2 03/16/2019 04:00 PM    LDL, calculated 84.6 03/01/2019 04:55 AM    Triglyceride 74 03/16/2019 04:00 PM    Triglyceride 77 03/01/2019 04:55 AM    CHOL/HDL Ratio 2.8 03/16/2019 04:00 PM    CHOL/HDL Ratio 2.6 03/01/2019 04:55 AM     Lab Results   Component Value Date/Time    CK 81 06/30/2019 06:45 AM     Lab Results   Component Value Date/Time    Sodium 144 06/30/2019 06:45 AM    Potassium 4.2 06/30/2019 06:45 AM    Chloride 106 06/30/2019 06:45 AM    CO2 27 06/30/2019 06:45 AM    Anion gap 11 06/30/2019 06:45 AM    Glucose 105 (H) 06/30/2019 06:45 AM    BUN 45 (H) 06/30/2019 06:45 AM    Creatinine 1.66 (H) 06/30/2019 06:45 AM    BUN/Creatinine ratio 27 (H) 06/30/2019 06:45 AM    GFR est AA 36 (L) 06/30/2019 06:45 AM    GFR est non-AA 30 (L) 06/30/2019 06:45 AM    Calcium 8.4 (L) 06/30/2019 06:45 AM    Bilirubin, total 0.2 06/30/2019 06:45 AM    AST (SGOT) 44 (H) 06/30/2019 06:45 AM    Alk. phosphatase 190 (H) 06/30/2019 06:45 AM    Protein, total 7.0 06/30/2019 06:45 AM    Albumin 2.7 (L) 06/30/2019 06:45 AM    Globulin 4.3 (H) 06/30/2019 06:45 AM    A-G Ratio 0.6 (L) 06/30/2019 06:45 AM    ALT (SGPT) 55 06/30/2019 06:45 AM       EKG:  SR     Assessment:     Assessment:      1.  Paroxysmal atrial fibrillation ([facility]    2. 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 Aortic valve disorder    4. Mitral valve disease    5. Essential hypertension    6. Chronic diastolic congestive heart failure ([address]    7. CKD (chronic kidney disease), stage III ([facility]        Orders Placed This Encounter    AMB POC EKG ROUTINE W/ 12 LEADS, INTER & REP     Order Specific Question:   Reason for Exam:     Answer:   routine    besifloxacin (BESIVANCE) 0.6 % drps ophthalmic suspension     Sig: PLACE 1 DROP IN THE RIGHT EYE 4 TIMES A DAY FOR 2 WEEKS    brinzolamide (AZOPT) 1 % ophthalmic suspension     Sig: INSTILL 1 DROP INTO RIGHT EYE 3 TIMES DAILY.  loteprednol etabonate (LOTEMAX) 0.5 % ophthalmic suspension     Sig: PLACE 1 DROP IN THE RIGHT EYE 6 TIMES DAILY FOR 2 WEEKS    vit C/E/Zn/coppr/lutein/zeaxan (PRESERVISION AREDS-2 PO)     Sig: Take 1 Cap by mouth two (2) times a day. Plan:     Patient presents for 3 month f/u. However, was in the ED 6/30 for sob and cp. EKG showed AFL, rate 118. Labs at baseline except BNP, which is quite high >35 K, likely chronic and is related to her CKD. Her CXR shows small effusions but no pulmonary or interstitial edema.  -ve cardiac enzymes. No significant CAD per cath in 3/19. Saw her PCP yesterday who increased her Lasix to 20 mg daily until f/u on Friday  Today, she reports improved sob, not back at baseline but better than when she presented to the ED. No further cp. Has some mild ankle swelling    Daughter reports that mother was wheezing when she went to the ED. Atrial fibrillation:   Presenting in sinus rhythm continue metoprolol and diltiazem. For ease of medication administration diltiazem was changed to once a day dosing and given slightly higher dose for hypertension management.   Continue warfarin therapy followed by primary care    Severe aortic valve regurgitation per IAN in 3/19  Some mild vargas, multifactorial, patient and family have declined pursuing TAVR, to do medical management  If s/s worsens, will recommend comfort measures  Refer to palliative medicine to assist with goal planning and symptom management    No significant CAD. No significant MR. Mod MS. Per L/R heart cath 8/29    Diastolic dysfunction:   Weight stable 141 lbs. Changed instructions for patient to take 10 mg of furosemide if her weight is greater than or equal to 148 pounds in the morning. If her weight goes above 150 pounds to call office for dosing instructions. However, Dr Gomez Lackey (kidney md) changed it to 10 mg daily. Her pcp increased it to 20 mg daily 7/1 - 7/5 and has f/u with pcp on friday    Hypertension:   Elevated. Will simplify her dosing of hydralazine to 100 mg TID. Has f/u with PCP on Friday  Continue Diltiazem, BB    Hx Inpatient admission in April 2019 for pleural effusion, pneumonia, A. fib RVR, diastolic dysfunction. Continue current care and will f/u with Dr Rosalina Piña in one month as they live on that side of [facility]    Opal Elkins NP       Patient seen and examined by me with nurse practitioner. Esteban Jovel personally performed all components of the history, physical, and medical decision making and agree with the assessment and plan with minor modifications as noted. Clinically impression is that her valvular heart disease progressing. As per previous decision by pt and family, conservative management. Palliative medicine consult. If resp status worsen, consider comfort measures. Daughter requesting to f/u with Dr. Rosalina Piña at [facility], due to driving distance.      Logan Donovan MD

## 2019-07-10 NOTE — TELEPHONE ENCOUNTER
[address] [facility] Office  Nursing Note  [phone]  Fax [phone]      Name:  Angelito Austin  YOB: 1935    Received outpatient Palliative Medicine referral from Dora Bonilla NP to see pt for goal of care planning and symptom management. Chart  reviewed. Angelito Austin is a 82 y.o. female with PMH that includes chronic diastolic CHF, afib, CKD (stage 4), DM2, gastroparesis, dementia. Pt underwent a cardiac cath in March 2019 during a hospitalization at [facility] which showed no significant CAD. IAN in March 2019 showed severe aortic valve regurgitation (EF was 61-65%). Patient and family have declined pursuing TAVR, have elected medical management. Pt was seen at [facility] ED on 6/30/19 for SOB, chest pain, afib. Pt's most recent office visit with cardiologist  Luke Howe was on 7/2/19. See his note for complete HPI, treatment history, and plan. Nurse called and pt's daughter and caregiver Britni Newton answered the phone (listed on Permission to Release PHI Form). Daughter had been told that Palliative Medicine would see pt in her home. Pt lives in [place] [address] which is 66 miles from [address] [place] [address] program has a geographic radius of 20 miles from [facility]. Nurse offered clinic appt for pt. Closest office is at [facility] which is about 1 1/2 hours from pt's home. 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 She monitors her mother's symptoms and feels comfortable in her knowledge of when to call pt's PCP or cardiologist.  She reports that pt has occasional SOB and foot, ankle edema. Dtr weighs pt daily at 7am and knows to call MD if pt gains more than 3 lbs in a day or 5 lbs in a week. Dtr says pt has not experienced SOB or chest pain since the 6/30/19 ED visit.   Dtr says pt's goals of care are clear right now- pt wants to pursue medical management, and even when pt reaches the point that she qualifies for hospice dtr does not think they will be interested. Nurse encouraged dtr to be open to hospice when that time comes - Hospice would be available in pt's area and things that are covered include meds, DME, oxygen, RN visits, aides, respite. Due to the distance from pt's home and the fact that pt/family want to continue with current plan of care, dtr declines scheduling a Palliative office visit for her mother at this time. She wrote down our phone number and says she will call back if she has questions. Nurse also suggested that if pt is re-hospitalized at [facility], dtr can request that the inpatient Palliative team see pt when she is in the hospital.    ACP:    LEIGHANN WEISS signed on 3/19/19.   Pt indicates that she wants all treatments to prolong her life within the limits of generally accepted health care standards.                                                                                                                                                  RUBI RaiN, RN-BC  Clinical Referral Navigator  [phone]

## 2019-08-19 NOTE — PROGRESS NOTES
Verified patient with two patient identifiers. Medications reviewed/approved by Dr. Aislinn Caballero. A verbal from Dr. Aislinn Caballero was given to remove any medications that were deleted during the visit. Medication(s) removed:  metoprolol tartrate (LOPRESSOR) 100 mg IR tablet   metoprolol tartrate (LOPRESSOR) 100 mg IR tablet   vit C/E/Zn/coppr/lutein/zeaxan (PRESERVISION AREDS-2 PO)     Chief Complaint   Patient presents with    Irregular Heart Beat     1 month follow up    CHF    Valvular Heart Disease    Carotid Artery Stenosis    Hypertension    Cholesterol Problem     1. Have you been to the ER, urgent care clinic since your last visit? Hospitalized since your last visit?no    2. Have you seen or consulted any other health care providers outside of the [address] [place] [address] since your last visit? Include any pap smears or colon screening.  8/16/19 pcp Dr. Anil Paiz seen for chf.

## 2019-08-19 NOTE — LETTER
8/19/19 Patient: Cheryl Wang YOB: 1935 Date of Visit: 8/19/2019 Sabi Ruiz MD 
[address] [facility] [place] [address] VIA Facsimile: [phone] Dear Sabi Ruiz MD, Thank you for referring Ms. Anila Patel to [address] [place] [address] for evaluation. My notes for this consultation are attached. If you have questions, please do not hesitate to call me. I look forward to following your patient along with you.  
 
 
Sincerely, 
 
Clovis Louis MD

## 2019-08-19 NOTE — PROGRESS NOTES
Lalito Cline is a 82 y.o. female is here for hospitla f/u/establish local cardiac care. S/p multiple hospitalizations over past several mos--[facility], then [facility]. Hx hypertension/hypertensive urgency, chronic diastolic CHF, PAF/RVR, mod AS with severe AI, mod MS w/ MAC, ARF/CKD, mild dementia, carotid artery disease, dyslipidemia, LIVIA, hypothyroidism, on antihypertensives, anticoag (warfarin)--followed by PCP, Renal.  S/p w/u incl cardiac cath at [facility] by Structural Heart Team for aortic/mitral valvular disease--ultimately opted for conservative medical rx. Seen by Dr. Ferny Hernandes last month at [facility], and recently by PCP. Lasix dosing adjusted, now \"prn\" as has been losing weigh. Poor appetite, some constipation. Stable HASKINS, minimal edema, occasional palpitations. .  The patient denies chest pain, orthopnea, PND,  syncope, presyncope.        Patient Active Problem List    Diagnosis Date Noted    Chronic diastolic congestive heart failure ([address] 04/11/2019    Atrial fibrillation, rapid ([address] 04/08/2019    Shortness of breath 04/06/2019    Atrial fibrillation with RVR ([address] 04/06/2019    Aortic insufficiency 03/19/2019    Mitral stenosis 03/19/2019    Bilateral carotid artery stenosis 03/18/2019    Vaso vagal episode 03/18/2019    Hypertensive urgency 03/16/2019    CKD (chronic kidney disease) 03/16/2019    Acute renal failure superimposed on stage 4 chronic kidney disease ([address] 02/28/2019    Persistent atrial fibrillation ([address] 02/26/2019    CAP (community acquired pneumonia) 02/26/2019    Essential hypertension 02/26/2019    Anticoagulant long-term use 02/26/2019    Dementia 02/26/2019    Acquired hypothyroidism 02/26/2019    LIVIA on CPAP 05/18/2012    Paroxysmal atrial fibrillation ([address]     Aortic valve disorder 09/30/2010    Pure hypercholesterolemia 09/30/2010    Mitral valve disease     Benign hypertensive heart disease without heart failure       Dallas Ewing MD  Past Medical History:   Diagnosis Date    Aortic valve disorders     AS    Asthma     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 The ejection fraction was 55-60%.      ECHO 2D ADULT  1/2011    EF 60%, LAE, mild AS, AI, MR, PA low 40s    EVENT MONITOR POST SYMPTOMS  9/2010    Rare PACs, no arrythmia with symptoms    LOOP MONITOR  9-10/2011    NSR    STRESS TEST MYOVIEW  1/2011    no ischemia, EF 63%    US DUPLEX CAROTID BILATERAL  1/2011    mild bilat disease     Allergies   Allergen Reactions    Latex, Natural Rubber Itching    Metformin Nausea and Vomiting     Other reaction(s): nausea  HEADACHE      Advil [Ibuprofen] Unknown (comments)    Aspirin Unknown (comments)    Citalopram Other (comments)     HEADACHE    Codeine Other (comments)    Iodinated Contrast Media Itching    Meloxicam Unknown (comments)    Penicillin G Unknown (comments)    Shellfish Containing Products Rash    Sulfa (Sulfonamide Antibiotics) Unknown (comments)    Tramadol Nausea and Vomiting and Other (comments)     HEADACHE        Family History   Problem Relation Age of Onset    Heart Disease Father     Dementia Brother     Heart Disease Brother     Diabetes Brother       Social History     Socioeconomic History    Marital status:      Spouse name: Not on file    Number of children: Not on file    Years of education: Not on file    Highest education level: Not on file   Occupational History    Not on file   Social Needs    Financial resource strain: Not on file    Food insecurity:     Worry: Not on file     Inability: Not on file    Transportation needs:     Medical: Not on file     Non-medical: Not on file   Tobacco Use    Smoking status: Former Smoker     Last attempt to quit: 1963     Years since quittin.8    Smokeless tobacco: Never Used   Substance and Sexual Activity    Alcohol use: Yes    Drug use: No    Sexual activity: Not on file   Lifestyle    Physical activity:     Days per week: Not on file     Minutes per session: Not on file    Stress: Not on file   Relationships    Social connections:     Talks on phone: Not on file     Gets together: Not on file     Attends Yazdanism service: Not on file     Active member of club or organization: Not on file     Attends meetings of clubs or organizations: Not on file     Relationship status: Not on file    Intimate partner violence:     Fear of current or ex partner: Not on file     Emotionally abused: Not on file     Physically abused: Not on file     Forced sexual activity: Not on file   Other Topics Concern    Not on file   Social History Narrative    Not on file      Current Outpatient Medications   Medication Sig    prednisoLONE acetate (PRED FORTE) 1 % ophthalmic suspension Administer 1 Drop to right eye three (3) times daily.  ondansetron hcl (ZOFRAN) 4 mg tablet Take 4 mg by mouth every eight (8) hours as needed for Nausea.  sodium bicarbonate 650 mg tablet Take 650 mg by mouth two (2) times a day.  metoprolol succinate (TOPROL-XL) 100 mg tablet Take 200 mg by mouth daily.  besifloxacin (BESIVANCE) 0.6 % drps ophthalmic suspension Administer 1 Drop to right eye three (3) times daily.  brinzolamide (AZOPT) 1 % ophthalmic suspension Administer 1 Drop to right eye three (3) times daily.  hydrALAZINE (APRESOLINE) 100 mg tablet Take 1 Tab by mouth three (3) times daily.     polyethylene glycol (MIRALAX) 17 gram packet MIRALAX POWD    dilTIAZem CD (CARDIZEM CD) 180 mg ER capsule Take 1 Cap by mouth daily. D/C QID dose    albuterol-ipratropium (DUO-NEB) 2.5 mg-0.5 mg/3 ml nebu 3 mL by Nebulization route every six (6) hours as needed for Other (wheeze).  OTHER 1 nebulizer machine1    levothyroxine (SYNTHROID) 88 mcg tablet Take 88 mcg by mouth Daily (before breakfast).  warfarin (COUMADIN) 2.5 mg tablet Take 5 mg by mouth. Sun, Tues, Wed, Thurs, Sat     ALPRAZolam (XANAX) 0.25 mg tablet Take 0.25 mg by mouth as needed for Anxiety.  warfarin (COUMADIN) 5 mg tablet Take 5 mg by mouth two (2) days a week. Mon, Fri    mirtazapine (REMERON) 30 mg tablet Take 1 Tab by mouth nightly.  esomeprazole (NEXIUM) 40 mg capsule Take 40 mg by mouth daily.  loteprednol etabonate (LOTEMAX) 0.5 % ophthalmic suspension PLACE 1 DROP IN THE RIGHT EYE 6 TIMES DAILY FOR 2 WEEKS    acetaminophen (TYLENOL) 500 mg tablet Take 500 mg by mouth as needed.  furosemide (LASIX) 20 mg tablet Take 1/2 tab daily as needed if weight is >148lbs. (Patient taking differently: Take 1 tab daily as needed if weight is >148lbs.)     No current facility-administered medications for this visit. Review of Symptoms:    CONST  No weight change. No fever, chills, sweats    ENT No visual changes, URI sx, sore throat    CV  See HPI   RESP  No cough, or sputum, wheezing. Also see HPI   GI  No abdominal pain or change in bowel habits. No heartburn or dysphagia. No melena or rectal bleeding.   No dysuria, urgency, frequency, hematuria   MSKEL  No joint pain, swelling. No muscle pain. SKIN  No rash or lesions. NEURO  No headache, syncope, or seizure. No weakness, loss of sensation, or paresthesias. PSYCH  No low mood or depression  No anxiety. HE/LYMPH  No easy bruising, abnormal bleeding, or enlarged glands.         Physical ExamPhysical Exam:    Visit Vitals  /50 (BP 1 Location: Left arm, BP Patient Position: Sitting)   Pulse 70   Resp 14 Ht 5' (1.524 m)   Wt 128 lb (58.1 kg)   SpO2 97% Comment: ra   BMI 25.00 kg/m²     Gen: NAD thin AAF alert   HEENT:  PERRL, throat clear  Neck: no adenopathy, no thyromegaly, no JVD   Heart:  sl irregular,Nl P4M5,  II/VI systolic/diastolic murmurs, no gallop or rub.   Lungs:  clear  Abdomen:   Soft, non-tender, bowel sounds are active.   Extremities:  minimal edema  Pulse: symmetric  Neuro: A&O times 3, No focal neuro deficits    Cardiographics    Labs:   Lab Results   Component Value Date/Time    Sodium 144 06/30/2019 06:45 AM    Sodium 140 04/08/2019 04:02 AM    Sodium 139 04/07/2019 04:31 AM    Sodium 137 04/05/2019 11:13 PM    Sodium 143 03/26/2019 04:01 AM    Potassium 4.2 06/30/2019 06:45 AM    Potassium 3.6 04/08/2019 04:02 AM    Potassium 3.6 04/07/2019 04:31 AM    Potassium 3.6 04/05/2019 11:13 PM    Potassium 4.3 03/26/2019 04:01 AM    Chloride 106 06/30/2019 06:45 AM    Chloride 109 (H) 04/08/2019 04:02 AM    Chloride 109 (H) 04/07/2019 04:31 AM    Chloride 107 04/05/2019 11:13 PM    Chloride 110 (H) 03/26/2019 04:01 AM    CO2 27 06/30/2019 06:45 AM    CO2 23 04/08/2019 04:02 AM    CO2 21 04/07/2019 04:31 AM    CO2 21 04/05/2019 11:13 PM    CO2 26 03/26/2019 04:01 AM    Anion gap 11 06/30/2019 06:45 AM    Anion gap 8 04/08/2019 04:02 AM    Anion gap 9 04/07/2019 04:31 AM    Anion gap 9 04/05/2019 11:13 PM    Anion gap 7 03/26/2019 04:01 AM    Glucose 105 (H) 06/30/2019 06:45 AM    Glucose 100 04/08/2019 04:02 AM    Glucose 81 04/07/2019 04:31 AM    Glucose 123 (H) 04/05/2019 11:13 PM    Glucose 95 03/26/2019 04:01 AM    BUN 45 (H) 06/30/2019 06:45 AM    BUN 36 (H) 04/08/2019 04:02 AM    BUN 35 (H) 04/07/2019 04:31 AM    BUN 33 (H) 04/05/2019 11:13 PM    BUN 62 (H) 03/26/2019 04:01 AM    Creatinine 1.66 (H) 06/30/2019 06:45 AM    Creatinine 1.85 (H) 04/08/2019 04:02 AM    Creatinine 1.88 (H) 04/07/2019 04:31 AM    Creatinine 1.66 (H) 04/05/2019 11:13 PM    Creatinine 1.82 (H) 03/26/2019 04:01 AM BUN/Creatinine ratio 27 (H) 06/30/2019 06:45 AM    BUN/Creatinine ratio 19 04/08/2019 04:02 AM    BUN/Creatinine ratio 19 04/07/2019 04:31 AM    BUN/Creatinine ratio 20 04/05/2019 11:13 PM    BUN/Creatinine ratio 34 (H) 03/26/2019 04:01 AM    GFR est AA 36 (L) 06/30/2019 06:45 AM    GFR est AA 32 (L) 04/08/2019 04:02 AM    GFR est AA 31 (L) 04/07/2019 04:31 AM    GFR est AA 36 (L) 04/05/2019 11:13 PM    GFR est AA 32 (L) 03/26/2019 04:01 AM    GFR est non-AA 30 (L) 06/30/2019 06:45 AM    GFR est non-AA 26 (L) 04/08/2019 04:02 AM    GFR est non-AA 26 (L) 04/07/2019 04:31 AM    GFR est non-AA 30 (L) 04/05/2019 11:13 PM    GFR est non-AA 27 (L) 03/26/2019 04:01 AM    Calcium 8.4 (L) 06/30/2019 06:45 AM    Calcium 8.1 (L) 04/08/2019 04:02 AM    Calcium 8.3 (L) 04/07/2019 04:31 AM    Calcium 8.2 (L) 04/05/2019 11:13 PM    Calcium 7.5 (L) 03/26/2019 04:01 AM    Calcium 7.7 (L) 03/26/2019 04:01 AM    Bilirubin, total 0.2 06/30/2019 06:45 AM    Bilirubin, total 0.3 04/05/2019 11:13 PM    Bilirubin, total 0.1 (L) 03/25/2019 03:23 AM    Bilirubin, total 0.1 (L) 03/24/2019 05:00 AM    Bilirubin, total 0.2 03/23/2019 04:20 AM    AST (SGOT) 44 (H) 06/30/2019 06:45 AM    AST (SGOT) 32 04/05/2019 11:13 PM    AST (SGOT) 29 03/25/2019 03:23 AM    AST (SGOT) 15 03/24/2019 05:00 AM    AST (SGOT) 25 03/23/2019 04:20 AM    Alk. phosphatase 190 (H) 06/30/2019 06:45 AM    Alk. phosphatase 123 (H) 04/05/2019 11:13 PM    Alk. phosphatase 77 03/25/2019 03:23 AM    Alk. phosphatase 85 03/24/2019 05:00 AM    Alk.  phosphatase 104 03/23/2019 04:20 AM    Protein, total 7.0 06/30/2019 06:45 AM    Protein, total 7.1 04/05/2019 11:13 PM    Protein, total 5.7 (L) 03/25/2019 03:23 AM    Protein, total 6.1 (L) 03/24/2019 05:00 AM    Protein, total 6.9 03/23/2019 04:20 AM    Albumin 2.7 (L) 06/30/2019 06:45 AM    Albumin 2.7 (L) 04/05/2019 11:13 PM    Albumin 1.8 (L) 03/26/2019 04:01 AM    Albumin 2.0 (L) 03/25/2019 03:23 AM    Albumin 2.0 (L) 03/24/2019 05:00 AM    Globulin 4.3 (H) 06/30/2019 06:45 AM    Globulin 4.4 (H) 04/05/2019 11:13 PM    Globulin 3.7 03/25/2019 03:23 AM    Globulin 4.1 (H) 03/24/2019 05:00 AM    Globulin 4.8 (H) 03/23/2019 04:20 AM    A-G Ratio 0.6 (L) 06/30/2019 06:45 AM    A-G Ratio 0.6 (L) 04/05/2019 11:13 PM    A-G Ratio 0.5 (L) 03/25/2019 03:23 AM    A-G Ratio 0.5 (L) 03/24/2019 05:00 AM    A-G Ratio 0.4 (L) 03/23/2019 04:20 AM    ALT (SGPT) 55 06/30/2019 06:45 AM    ALT (SGPT) 28 04/05/2019 11:13 PM    ALT (SGPT) 19 03/25/2019 03:23 AM    ALT (SGPT) 15 03/24/2019 05:00 AM    ALT (SGPT) 15 03/23/2019 04:20 AM     Lab Results   Component Value Date/Time    CK 81 06/30/2019 06:45 AM     Lab Results   Component Value Date/Time    Cholesterol, total 153 03/16/2019 04:00 PM    Cholesterol, total 164 03/01/2019 04:55 AM    HDL Cholesterol 54 03/16/2019 04:00 PM    HDL Cholesterol 64 03/01/2019 04:55 AM    LDL, calculated 84.2 03/16/2019 04:00 PM    LDL, calculated 84.6 03/01/2019 04:55 AM    Triglyceride 74 03/16/2019 04:00 PM    Triglyceride 77 03/01/2019 04:55 AM    CHOL/HDL Ratio 2.8 03/16/2019 04:00 PM    CHOL/HDL Ratio 2.6 03/01/2019 04:55 AM     No results found for this or any previous visit.     Assessment:         Patient Active Problem List    Diagnosis Date Noted    Chronic diastolic congestive heart failure ([facility] 04/11/2019    Atrial fibrillation, rapid ([facility] 04/08/2019    Shortness of breath 04/06/2019    Atrial fibrillation with RVR ([facility] 04/06/2019    Aortic insufficiency 03/19/2019    Mitral stenosis 03/19/2019    Bilateral carotid artery stenosis 03/18/2019    Vaso vagal episode 03/18/2019    Hypertensive urgency 03/16/2019    CKD (chronic kidney disease) 03/16/2019    Acute renal failure superimposed on stage 4 chronic kidney disease ([facility] 02/28/2019    Persistent atrial fibrillation ([facility] 02/26/2019    CAP (community acquired pneumonia) 02/26/2019    Essential hypertension 02/26/2019    Anticoagulant long-term use 02/26/2019    Dementia 02/26/2019    Acquired hypothyroidism 02/26/2019    LIVIA on CPAP 05/18/2012    Paroxysmal atrial fibrillation (HCC)     Aortic valve disorder 09/30/2010    Pure hypercholesterolemia 09/30/2010    Mitral valve disease     Benign hypertensive heart disease without heart failure      82 y.o. female is here for hospitla f/u/establish local cardiac care. S/p multiple hospitalizations over past several mos--[facility], then [facility]. Hx hypertension/hypertensive urgency, chronic diastolic CHF, PAF/RVR, mod AS with severe AI, mod MS w/ MAC, ARF/CKD, mild dementia, carotid artery disease, dyslipidemia, LIVIA, hypothyroidism, on antihypertensives, anticoag (warfarin)--followed by PCP, Renal.  S/p w/u incl cardiac cath at [facility] by Structural Heart Team for aortic/mitral valvular disease--ultimately opted for conservative medical rx. Seen by Dr. Jessica Mcginnis last month at [facility], and recently by PCP. Lasix dosing adjusted, now \"prn\" as has been losing weigh. Poor appetite, some constipation. Stable HASKINS, minimal edema, occasional palpitations. .     Plan:     Stable on current meds  Daily weights, lasix \"prn\"  Weight loss, poor intake--Boost or equivalent  Labs per PCP--f/u as planned  Conservative/medical rx (as discussed), palliative if needed  Discussed at length with pt and daughter  RTC 3 mos, sooner prn      Ben Richmond MD

## 2019-08-29 NOTE — TELEPHONE ENCOUNTER
8/19/19 pt was seen. AVS states:     STOP taking:    GAS-X 80 mg chewable tablet     metoprolol tartrate 100 mg IR tablet (LOPRESSOR)     PRESERVISION AREDS-2 PO    Her metoprolol is now succinate (long acting).

## 2019-08-29 NOTE — TELEPHONE ENCOUNTER
Pt's daughter Jamie Blackwell called about pt's avs from 8/19/19 visit, she read on there that she is to stop taking metoprolol and preservision she was wanting a call back thank you!  [phone]

## 2019-11-11 NOTE — LETTER
11/11/19 Patient: Moises Dunn YOB: 1935 Date of Visit: 11/11/2019 Wilkie Cockayne, MD 
[address] [facility] [place] [address] VIA Facsimile: [phone] Dear Wilkie Cockayne, MD, Thank you for referring Ms. Lloyd Vila to [address] [place] [address] Kathleen for evaluation. My notes for this consultation are attached. If you have questions, please do not hesitate to call me. I look forward to following your patient along with you.  
 
 
Sincerely, 
 
Justo Barrett MD

## 2019-11-11 NOTE — PROGRESS NOTES
Dora Pascal is a 82 y.o. female is here for routine f/u. 82 y.o. female is here for hospitla f/u/establish local cardiac care. S/p multiple hospitalizations over past several mos--[facility], then [facility]. Hx hypertension/hypertensive urgency, chronic diastolic CHF, PAF/RVR, mod AS with severe AI, mod MS w/ MAC, ARF/CKD, mild dementia, carotid artery disease, dyslipidemia, LIVIA, hypothyroidism, on antihypertensives, anticoag (warfarin)--followed by PCP, Renal.  S/p w/u incl cardiac cath at [facility] by Structural Heart Team for aortic/mitral valvular disease--ultimately opted for conservative medical rx. Seen by Dr. Cristal Henderson previously at [facility], and recently by PCP. Lasix dosing adjusted, now on demadex (more prn). Poor appetite, some constipation. Stable HASKINS, minimal edema, occasional palpitations. Christen Scott Patient Active Problem List  
 Diagnosis Date Noted  Chronic diastolic congestive heart failure ([address] 04/11/2019  Atrial fibrillation, rapid ([address] 04/08/2019  Shortness of breath 04/06/2019  Atrial fibrillation with RVR ([address] 04/06/2019  Aortic insufficiency 03/19/2019  Mitral stenosis 03/19/2019  Bilateral carotid artery stenosis 03/18/2019  Vaso vagal episode 03/18/2019  Hypertensive urgency 03/16/2019  CKD (chronic kidney disease) 03/16/2019  Acute renal failure superimposed on stage 4 chronic kidney disease ([address] 02/28/2019  Persistent atrial fibrillation 02/26/2019  CAP (community acquired pneumonia) 02/26/2019  Essential hypertension 02/26/2019  Anticoagulant long-term use 02/26/2019  Dementia ([address] 02/26/2019  Acquired hypothyroidism 02/26/2019  LIVIA on CPAP 05/18/2012  Paroxysmal atrial fibrillation (HCC)  Aortic valve disorder 09/30/2010  Pure hypercholesterolemia 09/30/2010  Mitral valve disease  Benign hypertensive heart disease without heart failure Keegan De Souza MD 
Past Medical History:  
Diagnosis Date  Aortic valve disorders AS  Asthma  Benign hypertensive heart disease without heart failure  Diabetes ([address]  Fibromyalgia  Gastroparesis  GERD (gastroesophageal reflux disease)  Hypertension  Mitral valve disorders(424.0) MR  
 LIVIA (obstructive sleep apnea)  Paroxysmal atrial fibrillation (HCC)  Pure hypercholesterolemia 9/30/2010 Past Surgical History:  
Procedure Laterality Date  ECHO 2D ADULT  11/2009 LVH, normal LV wall motion and ejection fraction, mild aortic stenosis, moderate aortic regurgitation and mild mitral regurgitation. The ejection fraction was 55-60%.  ECHO 2D ADULT  1/2011 EF 60%, LAE, mild AS, AI, MR, PA low 40s  EVENT MONITOR POST SYMPTOMS  9/2010 Rare PACs, no arrythmia with symptoms  LOOP MONITOR  9-10/2011 NSR  
 STRESS TEST MYOVIEW  1/2011  
 no ischemia, EF 63%  US DUPLEX CAROTID BILATERAL  1/2011  
 mild bilat disease Allergies Allergen Reactions  Latex, Natural Rubber Itching  Metformin Nausea and Vomiting Other reaction(s): nausea HEADACHE  Advil [Ibuprofen] Unknown (comments)  Aspirin Unknown (comments)  Citalopram Other (comments) HEADACHE  Codeine Other (comments)  Iodinated Contrast Media Itching  Meloxicam Unknown (comments)  Penicillin G Unknown (comments)  Shellfish Containing Products Rash  Sulfa (Sulfonamide Antibiotics) Unknown (comments)  Tramadol Nausea and Vomiting and Other (comments) HEADACHE Family History Problem Relation Age of Onset  Heart Disease Father  Dementia Brother  Heart Disease Brother  Diabetes Brother Social History Socioeconomic History  Marital status:  Spouse name: Not on file  Number of children: Not on file  Years of education: Not on file  Highest education level: Not on file Occupational History  Not on file Social Needs  Financial resource strain: Not on file  Food insecurity:  
  Worry: Not on file Inability: Not on file  Transportation needs:  
  Medical: Not on file Non-medical: Not on file Tobacco Use  Smoking status: Former Smoker Last attempt to quit: 1963 Years since quittin.9  Smokeless tobacco: Never Used Substance and Sexual Activity  Alcohol use: Yes  Drug use: No  
 Sexual activity: Not on file Lifestyle  Physical activity:  
  Days per week: Not on file Minutes per session: Not on file  Stress: Not on file Relationships  Social connections:  
  Talks on phone: Not on file Gets together: Not on file Attends Sikhism service: Not on file Active member of club or organization: Not on file Attends meetings of clubs or organizations: Not on file Relationship status: Not on file  Intimate partner violence:  
  Fear of current or ex partner: Not on file Emotionally abused: Not on file Physically abused: Not on file Forced sexual activity: Not on file Other Topics Concern  Not on file Social History Narrative  Not on file Current Outpatient Medications Medication Sig  torsemide (DEMADEX) 5 mg tablet Take  by mouth daily.  prednisoLONE acetate (PRED FORTE) 1 % ophthalmic suspension Administer 1 Drop to right eye four (4) times daily.  ondansetron hcl (ZOFRAN) 4 mg tablet Take 4 mg by mouth every eight (8) hours as needed for Nausea.  sodium bicarbonate 650 mg tablet Take 650 mg by mouth two (2) times a day.  metoprolol succinate (TOPROL-XL) 100 mg tablet Take 200 mg by mouth daily.  besifloxacin (BESIVANCE) 0.6 % drps ophthalmic suspension Administer 1 Drop to right eye four (4) times daily.  brinzolamide (AZOPT) 1 % ophthalmic suspension Administer 1 Drop to right eye three (3) times daily.   
 loteprednol etabonate (LOTEMAX) 0.5 % ophthalmic suspension PLACE 1 DROP IN THE RIGHT EYE 6 TIMES DAILY FOR 2 WEEKS  hydrALAZINE (APRESOLINE) 100 mg tablet Take 1 Tab by mouth three (3) times daily.  polyethylene glycol (MIRALAX) 17 gram packet MIRALAX POWD  
 acetaminophen (TYLENOL) 500 mg tablet Take 500 mg by mouth as needed.  dilTIAZem CD (CARDIZEM CD) 180 mg ER capsule Take 1 Cap by mouth daily. D/C QID dose  albuterol-ipratropium (DUO-NEB) 2.5 mg-0.5 mg/3 ml nebu 3 mL by Nebulization route every six (6) hours as needed for Other (wheeze).  OTHER 1 nebulizer machine1  
 levothyroxine (SYNTHROID) 88 mcg tablet Take 88 mcg by mouth Daily (before breakfast).  warfarin (COUMADIN) 2.5 mg tablet Take 5 mg by mouth. Maxime Smith, Wed, [address] [place] [address], Sat  ALPRAZolam (XANAX) 0.25 mg tablet Take 0.25 mg by mouth as needed for Anxiety.  warfarin (COUMADIN) 5 mg tablet Take 5 mg by mouth two (2) days a week. Mon, Fri  
 mirtazapine (REMERON) 30 mg tablet Take 1 Tab by mouth nightly.  esomeprazole (NEXIUM) 40 mg capsule Take 40 mg by mouth daily. No current facility-administered medications for this visit. Review of Symptoms: 
 
CONST  No weight change. No fever, chills, sweats ENT No visual changes, URI sx, sore throat CV  See HPI  
RESP  No cough, or sputum, wheezing. Also see HPI  
GI  No abdominal pain or change in bowel habits. No heartburn or dysphagia. No melena or rectal bleeding.   No dysuria, urgency, frequency, hematuria MSKEL  No joint pain, swelling. No muscle pain. SKIN  No rash or lesions. NEURO  No headache, syncope, or seizure. No weakness, loss of sensation, or paresthesias. PSYCH  No low mood or depression No anxiety. HE/LYMPH  No easy bruising, abnormal bleeding, or enlarged glands. Physical ExamPhysical Exam:   
Visit Vitals /60 (BP 1 Location: Left arm, BP Patient Position: Sitting) Pulse 61 Resp 14 Ht 5' (1.524 m) Wt 134 lb (60.8 kg) SpO2 100% BMI 26.17 kg/m² Gen: NAD 
 HEENT:  PERRL, throat clear Neck: no adenopathy, no thyromegaly, no JVD Heart:  Regular,Nl S1S2, II/VI murmur, no gallop or rub.  
Lungs:  clear Abdomen:   Soft, non-tender, bowel sounds are active.  
Extremities:  Trace  edema Pulse: symmetric Neuro: A&O times 3, No focal neuro deficits Cardiographics Labs:  
Lab Results Component Value Date/Time  Sodium 144 06/30/2019 06:45 AM  
 Sodium 140 04/08/2019 04:02 AM  
 Sodium 139 04/07/2019 04:31 AM  
 Sodium 137 04/05/2019 11:13 PM  
 Sodium 143 03/26/2019 04:01 AM  
 Potassium 4.2 06/30/2019 06:45 AM  
 Potassium 3.6 04/08/2019 04:02 AM  
 Potassium 3.6 04/07/2019 04:31 AM  
 Potassium 3.6 04/05/2019 11:13 PM  
 Potassium 4.3 03/26/2019 04:01 AM  
 Chloride 106 06/30/2019 06:45 AM  
 Chloride 109 (H) 04/08/2019 04:02 AM  
 Chloride 109 (H) 04/07/2019 04:31 AM  
 Chloride 107 04/05/2019 11:13 PM  
 Chloride 110 (H) 03/26/2019 04:01 AM  
 CO2 27 06/30/2019 06:45 AM  
 CO2 23 04/08/2019 04:02 AM  
 CO2 21 04/07/2019 04:31 AM  
 CO2 21 04/05/2019 11:13 PM  
 CO2 26 03/26/2019 04:01 AM  
 Anion gap 11 06/30/2019 06:45 AM  
 Anion gap 8 04/08/2019 04:02 AM  
 Anion gap 9 04/07/2019 04:31 AM  
 Anion gap 9 04/05/2019 11:13 PM  
 Anion gap 7 03/26/2019 04:01 AM  
 Glucose 105 (H) 06/30/2019 06:45 AM  
 Glucose 100 04/08/2019 04:02 AM  
 Glucose 81 04/07/2019 04:31 AM  
 Glucose 123 (H) 04/05/2019 11:13 PM  
 Glucose 95 03/26/2019 04:01 AM  
 BUN 45 (H) 06/30/2019 06:45 AM  
 BUN 36 (H) 04/08/2019 04:02 AM  
 BUN 35 (H) 04/07/2019 04:31 AM  
 BUN 33 (H) 04/05/2019 11:13 PM  
 BUN 62 (H) 03/26/2019 04:01 AM  
 Creatinine 1.66 (H) 06/30/2019 06:45 AM  
 Creatinine 1.85 (H) 04/08/2019 04:02 AM  
 Creatinine 1.88 (H) 04/07/2019 04:31 AM  
 Creatinine 1.66 (H) 04/05/2019 11:13 PM  
 Creatinine 1.82 (H) 03/26/2019 04:01 AM  
 BUN/Creatinine ratio 27 (H) 06/30/2019 06:45 AM  
 BUN/Creatinine ratio 19 04/08/2019 04:02 AM  
 BUN/Creatinine ratio 19 04/07/2019 04:31 AM  
 BUN/Creatinine ratio 20 04/05/2019 11:13 PM  
 BUN/Creatinine ratio 34 (H) 03/26/2019 04:01 AM  
 GFR est AA 36 (L) 06/30/2019 06:45 AM  
 GFR est AA 32 (L) 04/08/2019 04:02 AM  
 GFR est AA 31 (L) 04/07/2019 04:31 AM  
 GFR est AA 36 (L) 04/05/2019 11:13 PM  
 GFR est AA 32 (L) 03/26/2019 04:01 AM  
 GFR est non-AA 30 (L) 06/30/2019 06:45 AM  
 GFR est non-AA 26 (L) 04/08/2019 04:02 AM  
 GFR est non-AA 26 (L) 04/07/2019 04:31 AM  
 GFR est non-AA 30 (L) 04/05/2019 11:13 PM  
 GFR est non-AA 27 (L) 03/26/2019 04:01 AM  
 Calcium 8.4 (L) 06/30/2019 06:45 AM  
 Calcium 8.1 (L) 04/08/2019 04:02 AM  
 Calcium 8.3 (L) 04/07/2019 04:31 AM  
 Calcium 8.2 (L) 04/05/2019 11:13 PM  
 Calcium 7.5 (L) 03/26/2019 04:01 AM  
 Calcium 7.7 (L) 03/26/2019 04:01 AM  
 Bilirubin, total 0.2 06/30/2019 06:45 AM  
 Bilirubin, total 0.3 04/05/2019 11:13 PM  
 Bilirubin, total 0.1 (L) 03/25/2019 03:23 AM  
 Bilirubin, total 0.1 (L) 03/24/2019 05:00 AM  
 Bilirubin, total 0.2 03/23/2019 04:20 AM  
 AST (SGOT) 44 (H) 06/30/2019 06:45 AM  
 AST (SGOT) 32 04/05/2019 11:13 PM  
 AST (SGOT) 29 03/25/2019 03:23 AM  
 AST (SGOT) 15 03/24/2019 05:00 AM  
 AST (SGOT) 25 03/23/2019 04:20 AM  
 Alk. phosphatase 190 (H) 06/30/2019 06:45 AM  
 Alk. phosphatase 123 (H) 04/05/2019 11:13 PM  
 Alk. phosphatase 77 03/25/2019 03:23 AM  
 Alk. phosphatase 85 03/24/2019 05:00 AM  
 Alk.  phosphatase 104 03/23/2019 04:20 AM  
 Protein, total 7.0 06/30/2019 06:45 AM  
 Protein, total 7.1 04/05/2019 11:13 PM  
 Protein, total 5.7 (L) 03/25/2019 03:23 AM  
 Protein, total 6.1 (L) 03/24/2019 05:00 AM  
 Protein, total 6.9 03/23/2019 04:20 AM  
 Albumin 2.7 (L) 06/30/2019 06:45 AM  
 Albumin 2.7 (L) 04/05/2019 11:13 PM  
 Albumin 1.8 (L) 03/26/2019 04:01 AM  
 Albumin 2.0 (L) 03/25/2019 03:23 AM  
 Albumin 2.0 (L) 03/24/2019 05:00 AM  
 Globulin 4.3 (H) 06/30/2019 06:45 AM  
 Globulin 4.4 (H) 04/05/2019 11:13 PM  
 Globulin 3.7 03/25/2019 03:23 AM  
 Globulin 4.1 (H) 03/24/2019 05:00 AM  
 Globulin 4.8 (H) 03/23/2019 04:20 AM  
 A-G Ratio 0.6 (L) 06/30/2019 06:45 AM  
 A-G Ratio 0.6 (L) 04/05/2019 11:13 PM  
 A-G Ratio 0.5 (L) 03/25/2019 03:23 AM  
 A-G Ratio 0.5 (L) 03/24/2019 05:00 AM  
 A-G Ratio 0.4 (L) 03/23/2019 04:20 AM  
 ALT (SGPT) 55 06/30/2019 06:45 AM  
 ALT (SGPT) 28 04/05/2019 11:13 PM  
 ALT (SGPT) 19 03/25/2019 03:23 AM  
 ALT (SGPT) 15 03/24/2019 05:00 AM  
 ALT (SGPT) 15 03/23/2019 04:20 AM  
 
Lab Results Component Value Date/Time CK 81 06/30/2019 06:45 AM  
 
Lab Results Component Value Date/Time Cholesterol, total 153 03/16/2019 04:00 PM  
 Cholesterol, total 164 03/01/2019 04:55 AM  
 HDL Cholesterol 54 03/16/2019 04:00 PM  
 HDL Cholesterol 64 03/01/2019 04:55 AM  
 LDL, calculated 84.2 03/16/2019 04:00 PM  
 LDL, calculated 84.6 03/01/2019 04:55 AM  
 Triglyceride 74 03/16/2019 04:00 PM  
 Triglyceride 77 03/01/2019 04:55 AM  
 CHOL/HDL Ratio 2.8 03/16/2019 04:00 PM  
 CHOL/HDL Ratio 2.6 03/01/2019 04:55 AM  
 
No results found for this or any previous visit. Assessment:  
  
  
Patient Active Problem List  
 Diagnosis Date Noted  Chronic diastolic congestive heart failure ([address] 04/11/2019  Atrial fibrillation, rapid ([address] 04/08/2019  Shortness of breath 04/06/2019  Atrial fibrillation with RVR ([address] 04/06/2019  Aortic insufficiency 03/19/2019  Mitral stenosis 03/19/2019  Bilateral carotid artery stenosis 03/18/2019  Vaso vagal episode 03/18/2019  Hypertensive urgency 03/16/2019  CKD (chronic kidney disease) 03/16/2019  Acute renal failure superimposed on stage 4 chronic kidney disease ([address] 02/28/2019  Persistent atrial fibrillation 02/26/2019  CAP (community acquired pneumonia) 02/26/2019  Essential hypertension 02/26/2019  Anticoagulant long-term use 02/26/2019  Dementia ([facility] 02/26/2019  Acquired hypothyroidism 02/26/2019  LIVIA on CPAP 05/18/2012  Paroxysmal atrial fibrillation (HCC)  Aortic valve disorder 09/30/2010  Pure hypercholesterolemia 09/30/2010  Mitral valve disease  Benign hypertensive heart disease without heart failure 82 y.o. female is here for routine f/u. 82 y.o. female is here for hospitla f/u/establish local cardiac care. S/p multiple hospitalizations over past several mos--[facility], then [facility]. Hx hypertension/hypertensive urgency, chronic diastolic CHF, PAF/RVR, mod AS with severe AI, mod MS w/ MAC, ARF/CKD, mild dementia, carotid artery disease, dyslipidemia, LIVIA, hypothyroidism, on antihypertensives, anticoag (warfarin)--followed by PCP, Renal.  S/p w/u incl cardiac cath at [facility] by Structural Heart Team for aortic/mitral valvular disease--ultimately opted for conservative medical rx. Seen by Dr. Marcos Valentino previously at [facility], and recently by PCP. Lasix dosing adjusted, now on demadex (more prn). Poor appetite, some constipation. Stable HASKINS, minimal edema, occasional palpitations. Lei Bey Plan:  
 
Doing well with no adverse cardiac symptoms--currently stable Continue home weights, limit Na, continue current meds Lipids and labs followed by PCP. Continue current care and f/u in 6 months.  
 
Lary Chaudhari MD

## 2019-11-11 NOTE — PROGRESS NOTES
PATIENT ID VERIFIED WITH TWO PATIENT IDENTIFIERS. PATIENT MEDICATIONS REVIEWED AND APPROVED BY DR. Yu Locke. MEDICATIONS THAT WERE REMOVED FROM THIS VISIT HAVE BEEN APPROVED BY DR. Yu Locke. REMOVED:  LASSHARAN Chief Complaint Patient presents with  Irregular Heart Beat  
  3 month follow up  CHF  Mitral Valve Stenosis  Carotid Artery Stenosis  Hypertension 1. Have you been to the ER, urgent care clinic since your last visit? Hospitalized since your last visit? no 
 
2. Have you seen or consulted any other health care providers outside of the [address] [place] [address] since your last visit? Include any pap smears or colon screening. Yes PCP 1 month ago for routine f/u and Urology Dr. Sonia Navarrete one month ago follow up kidney failure

## 2019-12-21 PROBLEM — I48.91 ATRIAL FIBRILLATION WITH RAPID VENTRICULAR RESPONSE (HCC): Status: ACTIVE | Noted: 2019-01-01

## 2019-12-21 PROBLEM — I48.92 ATRIAL FLUTTER (HCC): Status: ACTIVE | Noted: 2019-01-01

## 2019-12-21 PROBLEM — I10 ESSENTIAL HYPERTENSION: Chronic | Status: ACTIVE | Noted: 2019-01-01

## 2019-12-21 NOTE — Clinical Note
TRANSFER - OUT REPORT:     Verbal report given to: Janet William RN / recovery RN. Report consisted of patient's Situation, Background, Assessment and   Recommendations(SBAR). Opportunity for questions and clarification was provided. Patient transported with a Registered Nurse. Patient transported to: recovery .

## 2019-12-21 NOTE — Clinical Note
Sheath #1: sheath exchanged for INTRO [facility] CRV [facility] [id number] -- . Hemostasis achieved.  8fr sheath RFV removed/ SEPT advanced over package wire/ aspirated and flushed

## 2019-12-21 NOTE — H&P
Hospitalist Admission Note NAME: Augustine Diego :  1935 MRN:  [id number] Date/Time:  2019 5:27 AM 
 
Patient PCP: To Donnelly MD 
______________________________________________________________________ Given the patient's current clinical presentation, I have a high level of concern for decompensation if discharged from the emergency department. Complex decision making was performed, which includes reviewing the patient's available past medical records, laboratory results, and x-ray films. My assessment of this patient's clinical condition and my plan of care is as follows. Assessment / Plan: 
 
Atrial flutter with RVR Admit patient to stepdown Cardiology consultation Continue Cardizem drip Continue Coumadin 
 
Acute hypoxic respiratory failure most likely secondary to CHF exacerbation and pneumonia Start patient on IV antibiotic Continue IV Lasix Moderate mitral valve stenosis and regurgitation associated with severe IV regurgitation Cardiology consultation Continue home medication Chronic kidney disease stage III-IV Renal function stable Hypothyroidismcontinue home medication Dementia Code Status: Full Surrogate Decision Cayla Meyer DVT Prophylaxis: Coumadin GI Prophylaxis: not indicated Subjective: CHIEF COMPLAINT: SOB HISTORY OF PRESENT ILLNESS:    
82years old female from home with past medical history significant for dementia, atrial fibrillation, heart failure, COPD was transferred from [facility] ED for evaluation of severe shortness of breath associated palpitation, oxygen saturation was dropped to 85% with ambulation, EKG show atrial flutter at rate 1 30-1 40 patient was started on Cardizem drip chest x-ray was done and show bibasilar airspace disease with bilateral effusion. We were asked to admit for work up and evaluation of the above problems. Past Medical History:  
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  
 LIVIA (obstructive sleep apnea)  Paroxysmal atrial fibrillation (HCC)  Pure hypercholesterolemia 2010 Past Surgical History:  
Procedure Laterality Date  ECHO 2D ADULT  2009 LVH, normal LV wall motion and ejection fraction, mild aortic stenosis, moderate aortic regurgitation and mild mitral regurgitation. The ejection fraction was 55-60%.  ECHO 2D ADULT  2011 EF 60%, LAE, mild AS, AI, MR, PA low 40s  EVENT MONITOR POST SYMPTOMS  2010 Rare PACs, no arrythmia with symptoms  HX CHOLECYSTECTOMY  HX HYSTERECTOMY  LOOP MONITOR  9-10/2011 NSR  
 STRESS TEST MYOVIEW  2011  
 no ischemia, EF 63%  US DUPLEX CAROTID BILATERAL  2011  
 mild bilat disease Social History Tobacco Use  Smoking status: Former Smoker Last attempt to quit: 1963 Years since quittin.0  Smokeless tobacco: Never Used Substance Use Topics  Alcohol use: Yes Family History Problem Relation Age of Onset  Heart Disease Father  Dementia Brother  Heart Disease Brother  Diabetes Brother Allergies Allergen Reactions  Latex, Natural Rubber Itching  Metformin Nausea and Vomiting Other reaction(s): nausea HEADACHE  Advil [Ibuprofen] Unknown (comments)  Aspirin Unknown (comments)  Citalopram Other (comments) HEADACHE  Codeine Other (comments)  Iodinated Contrast Media Itching  Meloxicam Unknown (comments)  Penicillin G Unknown (comments)  Shellfish Containing Products Rash  Sulfa (Sulfonamide Antibiotics) Unknown (comments)  Tramadol Nausea and Vomiting and Other (comments) HEADACHE Prior to Admission medications Medication Sig Start Date End Date Taking? Authorizing Provider  
torsemide (DEMADEX) 5 mg tablet Take  by mouth daily. Provider, Historical  
prednisoLONE acetate (PRED FORTE) 1 % ophthalmic suspension Administer 1 Drop to right eye four (4) times daily. Provider, Historical  
ondansetron hcl (ZOFRAN) 4 mg tablet Take 4 mg by mouth every eight (8) hours as needed for Nausea. Provider, Historical  
sodium bicarbonate 650 mg tablet Take 650 mg by mouth two (2) times a day. Provider, Historical  
metoprolol succinate (TOPROL-XL) 100 mg tablet Take 200 mg by mouth daily. Provider, Historical  
besifloxacin (BESIVANCE) 0.6 % drps ophthalmic suspension Administer 1 Drop to right eye four (4) times daily. 6/6/19   Provider, Historical  
brinzolamide (AZOPT) 1 % ophthalmic suspension Administer 1 Drop to right eye three (3) times daily. 6/6/19   Provider, Historical  
loteprednol etabonate (LOTEMAX) 0.5 % ophthalmic suspension PLACE 1 DROP IN THE RIGHT EYE 6 TIMES DAILY FOR 2 WEEKS 6/6/19   Provider, Historical  
hydrALAZINE (APRESOLINE) 100 mg tablet Take 1 Tab by mouth three (3) times daily. 7/2/19   Dayne Askew NP  
polyethylene glycol [facility]) 17 gram packet MIRALAX POWD 12/16/14   Provider, Historical  
acetaminophen (TYLENOL) 500 mg tablet Take 500 mg by mouth as needed. Provider, Historical  
dilTIAZem CD (CARDIZEM CD) 180 mg ER capsule Take 1 Cap by mouth daily. D/C QID dose 4/11/19   Lidia Mosqueda NP  
albuterol-ipratropium (DUO-NEB) 2.5 mg-0.5 mg/3 ml nebu 3 mL by Nebulization route every six (6) hours as needed for Other (wheeze). 4/8/19   Ela Parekh MD  
OTHER 1 nebulizer machine1 4/8/19   Ela Parekh MD  
levothyroxine (SYNTHROID) 88 mcg tablet Take 88 mcg by mouth Daily (before breakfast). 4/1/19   Provider, Historical  
warfarin (COUMADIN) 2.5 mg tablet Take 5 mg by mouth.  Desiree Mosqueda, Wed, Thurs, Sat     Provider, Historical  
 ALPRAZolam (XANAX) 0.25 mg tablet Take 0.25 mg by mouth as needed for Anxiety. Provider, Historical  
warfarin (COUMADIN) 5 mg tablet Take 5 mg by mouth two (2) days a week. Mon, Fri    Provider, Historical  
mirtazapine (REMERON) 30 mg tablet Take 1 Tab by mouth nightly. 12/19/18   Esteban Mckeon MD  
esomeprazole (NEXIUM) 40 mg capsule Take 40 mg by mouth daily. Provider, Historical  
 
 
REVIEW OF SYSTEMS:    
I am not able to complete the review of systems because: The patient is intubated and sedated The patient has altered mental status due to his acute medical problems The patient has baseline aphasia from prior stroke(s) The patient has baseline dementia and is not reliable historian The patient is in acute medical distress and unable to provide information Total of 12 systems reviewed as follows:   
   POSITIVE= underlined text  Negative = text not underlined General:  fever, chills, sweats, generalized weakness, weight loss/gain,  
   loss of appetite Eyes:    blurred vision, eye pain, loss of vision, double vision ENT:    rhinorrhea, pharyngitis Respiratory:   cough, sputum production, SOB, HASKINS, wheezing, pleuritic pain  
Cardiology:   chest pain, palpitations, orthopnea, PND, edema, syncope Gastrointestinal:  abdominal pain , N/V, diarrhea, dysphagia, constipation, bleeding Genitourinary:  frequency, urgency, dysuria, hematuria, incontinence Muskuloskeletal :  arthralgia, myalgia, back pain Hematology:  easy bruising, nose or gum bleeding, lymphadenopathy Dermatological: rash, ulceration, pruritis, color change / jaundice Endocrine:   hot flashes or polydipsia Neurological:  headache, dizziness, confusion, focal weakness, paresthesia, Speech difficulties, memory loss, gait difficulty Psychological: Feelings of anxiety, depression, agitation Objective: VITALS:   
Visit Vitals BP (!) 149/103 (BP 1 Location: Left arm, BP Patient Position:  At rest;Supine) Pulse (!) 103 Temp 97.8 °F (36.6 °C) Resp 25 SpO2 97% PHYSICAL EXAM: 
 
General:    Alert, cooperative, no distress, appears stated age. HEENT: Atraumatic, anicteric sclerae, pink conjunctivae No oral ulcers, mucosa moist, throat clear, dentition fair Neck:  Supple, symmetrical,  thyroid: non tender Lungs:   B/l  rales. Chest wall:  No tenderness  No Accessory muscle use. Heart:   IRRegular  rhythm,  No  murmur   No edema Abdomen:   Soft, non-tender. Not distended. Bowel sounds normal 
Extremities: No cyanosis. No clubbing,   
  Skin turgor normal, Capillary refill normal, Radial dial pulse 2+ Skin:     Not pale. Not Jaundiced  No rashes Psych:  Good insight. Not depressed. Not anxious or agitated. Neurologic: EOMs intact. No facial asymmetry. No aphasia or slurred speech. Symmetrical strength, Sensation grossly intact. Alert and oriented X 4.  
 
_______________________________________________________________________ Care Plan discussed with: 
  Comments Patient y Family  y   
RN y   
Care Manager Consultant:     
_______________________________________________________________________ Expected  Disposition:  
Home with Family y HH/PT/OT/RN   
SNF/LTC   
LUL   
________________________________________________________________________ TOTAL TIME:  65  Minutes Critical Care Provided     Minutes non procedure based Comments  
 y Reviewed previous records  
>50% of visit spent in counseling and coordination of care y Discussion with patient and/or family and questions answered 
  
 
________________________________________________________________________ Signed: Catracho Cervantes MD 
 
Procedures: see electronic medical records for all procedures/Xrays and details which were not copied into this note but were reviewed prior to creation of Plan. LAB DATA REVIEWED:   
Recent Results (from the past 24 hour(s)) EKG, 12 LEAD, INITIAL Collection Time: 12/20/19  9:38 PM  
Result Value Ref Range Ventricular Rate 114 BPM  
 Atrial Rate 308 BPM  
 QRS Duration 92 ms Q-T Interval 354 ms QTC Calculation (Bezet) 487 ms Calculated R Axis 19 degrees Calculated T Axis 170 degrees Diagnosis Atrial flutter with variable AV block Left ventricular hypertrophy with repolarization abnormality Abnormal ECG When compared with ECG of 30-JUN-2019 06:25, Nonspecific T wave abnormality has replaced inverted T waves in Inferior  
leads CBC WITH AUTOMATED DIFF Collection Time: 12/20/19 10:22 PM  
Result Value Ref Range WBC 13.5 (H) 3.6 - 11.0 K/uL  
 RBC 3.63 (L) 3.80 - 5.20 M/uL  
 HGB 10.0 (L) 11.5 - 16.0 g/dL HCT 31.7 (L) 35.0 - 47.0 % MCV 87.3 80.0 - 99.0 FL  
 MCH 27.5 26.0 - 34.0 PG  
 MCHC 31.5 30.0 - 36.5 g/dL  
 RDW 15.1 (H) 11.5 - 14.5 % PLATELET 271 318 - 194 K/uL MPV 11.2 8.9 - 12.9 FL  
 NEUTROPHILS 81 (H) 32 - 75 % LYMPHOCYTES 11 (L) 12 - 49 % MONOCYTES 8 5 - 13 % EOSINOPHILS 0 0 - 7 % BASOPHILS 0 0 - 1 %  
 ABS. NEUTROPHILS 10.9 (H) 1.8 - 8.0 K/UL  
 ABS. LYMPHOCYTES 1.4 0.8 - 3.5 K/UL  
 ABS. MONOCYTES 1.1 (H) 0.0 - 1.0 K/UL  
 ABS. EOSINOPHILS 0.0 0.0 - 0.4 K/UL  
 ABS. BASOPHILS 0.0 0.0 - 0.1 K/UL METABOLIC PANEL, BASIC Collection Time: 12/20/19 10:22 PM  
Result Value Ref Range Sodium 140 136 - 145 mmol/L Potassium 4.5 3.5 - 5.1 mmol/L Chloride 105 97 - 108 mmol/L  
 CO2 23 21 - 32 mmol/L Anion gap 12 5 - 15 mmol/L Glucose 148 (H) 65 - 100 mg/dL BUN 57 (H) 6 - 20 MG/DL Creatinine 1.68 (H) 0.55 - 1.02 MG/DL  
 BUN/Creatinine ratio 34 (H) 12 - 20 GFR est AA 35 (L) >60 ml/min/1.73m2 GFR est non-AA 29 (L) >60 ml/min/1.73m2 Calcium 8.7 8.5 - 10.1 MG/DL  
NT-PRO BNP Collection Time: 12/20/19 10:22 PM  
Result Value Ref Range NT pro-BNP >35,000 (H) 0 - 450 PG/ML  
TROPONIN I Collection Time: 12/20/19 10:22 PM  
Result Value Ref Range Troponin-I, Qt. <0.05 <0.05 ng/mL PROTHROMBIN TIME + INR Collection Time: 12/20/19 10:22 PM  
Result Value Ref Range INR 2.5 (H) 0.9 - 1.1 Prothrombin time 24.6 (H) 9.0 - 11.1 sec METABOLIC PANEL, BASIC Collection Time: 12/21/19  4:29 AM  
Result Value Ref Range Sodium 141 136 - 145 mmol/L Potassium 4.5 3.5 - 5.1 mmol/L Chloride 110 (H) 97 - 108 mmol/L  
 CO2 23 21 - 32 mmol/L Anion gap 8 5 - 15 mmol/L Glucose 151 (H) 65 - 100 mg/dL BUN 60 (H) 6 - 20 MG/DL Creatinine 1.73 (H) 0.55 - 1.02 MG/DL  
 BUN/Creatinine ratio 35 (H) 12 - 20 GFR est AA 34 (L) >60 ml/min/1.73m2 GFR est non-AA 28 (L) >60 ml/min/1.73m2 Calcium 8.3 (L) 8.5 - 10.1 MG/DL  
CBC W/O DIFF Collection Time: 12/21/19  4:29 AM  
Result Value Ref Range WBC 10.5 3.6 - 11.0 K/uL  
 RBC 3.30 (L) 3.80 - 5.20 M/uL HGB 9.3 (L) 11.5 - 16.0 g/dL HCT 29.9 (L) 35.0 - 47.0 % MCV 90.6 80.0 - 99.0 FL  
 MCH 28.2 26.0 - 34.0 PG  
 MCHC 31.1 30.0 - 36.5 g/dL  
 RDW 14.7 (H) 11.5 - 14.5 % PLATELET 449 740 - 065 K/uL MPV 11.0 8.9 - 12.9 FL  
 NRBC 0.0 0  WBC ABSOLUTE NRBC 0.00 0.00 - 0.01 K/uL

## 2019-12-21 NOTE — CONSULTS
[address]  [phone] Date of  Admission: 12/21/2019  3:07 AM  
 
Admission type:Emergency Consult for: Atrial fibrillation with rapid ventricular response ([address] [I48.91] Atrial flutter ([address] [I48.92] Consult by: Dr Diaz Jensen, NP Subjective:  
 
Wellington Barger is a 82 y.o. female admitted for Atrial fibrillation with rapid ventricular response ([address] [I48.91] Atrial flutter ([address] [I48.92]. Hx hypertension/hypertensive urgency, chronic diastolic CHF, PAF/RVR, mod AS with severe AI, mod MS w/ MAC, ARF/CKD, mild dementia, carotid artery disease, dyslipidemia, LIVIA, hypothyroidism, on antihypertensives, anticoag (warfarin)--followed by PCP, Renal.  S/p w/u incl cardiac cath at [facility] by Structural Heart Team for aortic/mitral valvular disease--ultimately opted for conservative medical rx. Normal coronaries. She presented to [facility] with dyspnea. Noted to be in AF RVR,placed on dilt drip. She converted to sinus however is back in atrial flutter at this time. Noted to have bilat effusions on xray. Patient complains of  fatigue. Previous treatment/evaluation includes echocardiogram and cardiac catheterization . Cardiac risk factors: sedentary life style, hypertension, post-menopausal. 
 
Patient Active Problem List  
 Diagnosis Date Noted  Atrial flutter ([address] 12/21/2019  Atrial fibrillation with rapid ventricular response ([address] 12/21/2019  Chronic diastolic congestive heart failure ([address] 04/11/2019  Atrial fibrillation, rapid ([address] 04/08/2019  Shortness of breath 04/06/2019  Atrial fibrillation with RVR ([address] 04/06/2019  Aortic insufficiency 03/19/2019  Mitral stenosis 03/19/2019  Bilateral carotid artery stenosis 03/18/2019  Vaso vagal episode 03/18/2019  Hypertensive urgency 03/16/2019  CKD (chronic kidney disease) 03/16/2019  Acute renal failure superimposed on stage 4 chronic kidney disease ([facility] 2019  Persistent atrial fibrillation 2019  CAP (community acquired pneumonia) 2019  Essential hypertension 2019  Anticoagulant long-term use 2019  Dementia ([facility] 2019  Acquired hypothyroidism 2019  LIVIA on CPAP 2012  Paroxysmal atrial fibrillation (HCC)  Aortic valve disorder 2010  Pure hypercholesterolemia 2010  Mitral valve disease  Benign hypertensive heart disease without heart failure Ish Pierce MD 
Past Medical History:  
Diagnosis Date  Aortic valve disorders AS  Asthma  Benign hypertensive heart disease without heart failure  Diabetes ([facility]  Fibromyalgia  Gastroparesis  GERD (gastroesophageal reflux disease)  Hypertension  Mitral valve disorders(424.0) MR  
 LIVIA (obstructive sleep apnea)  Paroxysmal atrial fibrillation (HCC)  Pure hypercholesterolemia 2010 Social History Socioeconomic History  Marital status:  Spouse name: Not on file  Number of children: Not on file  Years of education: Not on file  Highest education level: Not on file Tobacco Use  Smoking status: Former Smoker Last attempt to quit: 1963 Years since quittin.0  Smokeless tobacco: Never Used Substance and Sexual Activity  Alcohol use: Yes  Drug use: No  
 
Allergies Allergen Reactions  Latex, Natural Rubber Itching  Metformin Nausea and Vomiting Other reaction(s): nausea HEADACHE  Advil [Ibuprofen] Unknown (comments)  Aspirin Unknown (comments)  Citalopram Other (comments) HEADACHE  Codeine Other (comments)  Iodinated Contrast Media Itching  Meloxicam Unknown (comments)  Penicillin G Unknown (comments)  Shellfish Containing Products Rash  Sulfa (Sulfonamide Antibiotics) Unknown (comments)  Tramadol Nausea and Vomiting and Other (comments) HEADACHE Family History Problem Relation Age of Onset  Heart Disease Father  Dementia Brother  Heart Disease Brother  Diabetes Brother Current Facility-Administered Medications Medication Dose Route Frequency  dilTIAZem (CARDIZEM) 100 mg in dextrose 5% (MBP/ADV) 100 mL infusion  0-15 mg/hr IntraVENous TITRATE  sodium chloride (NS) flush 5-40 mL  5-40 mL IntraVENous Q8H  
 sodium chloride (NS) flush 5-40 mL  5-40 mL IntraVENous PRN  
 acetaminophen (TYLENOL) tablet 650 mg  650 mg Oral Q6H PRN  
 ondansetron (ZOFRAN) injection 4 mg  4 mg IntraVENous Q6H PRN  
 [START ON 12/23/2019] levoFLOXacin (LEVAQUIN) 500 mg in D5W IVPB  500 mg IntraVENous Q48H  mirtazapine (REMERON) tablet 30 mg  30 mg Oral QHS  sodium bicarbonate tablet 650 mg  650 mg Oral BID  hydrALAZINE (APRESOLINE) tablet 100 mg  100 mg Oral TID  [START ON 12/22/2019] levothyroxine (SYNTHROID) tablet 88 mcg  88 mcg Oral ACB  [START ON 12/22/2019] pantoprazole (PROTONIX) tablet 40 mg  40 mg Oral ACB  furosemide (LASIX) injection 20 mg  20 mg IntraVENous BID  WARFARIN INFORMATION NOTE (COUMADIN)   Other QPM  
 warfarin (COUMADIN) tablet 2 mg  2 mg Oral ONCE  
 levalbuterol (XOPENEX) nebulizer soln 1.25 mg/3 mL  1.25 mg Nebulization Q4H PRN  
 guaiFENesin-dextromethorphan (ROBITUSSIN DM) 100-10 mg/5 mL syrup 5 mL  5 mL Oral Q6H PRN Current Outpatient Medications Medication Sig  torsemide (DEMADEX) 5 mg tablet Take  by mouth daily.  prednisoLONE acetate (PRED FORTE) 1 % ophthalmic suspension Administer 1 Drop to right eye four (4) times daily.  ondansetron hcl (ZOFRAN) 4 mg tablet Take 4 mg by mouth every eight (8) hours as needed for Nausea.  sodium bicarbonate 650 mg tablet Take 650 mg by mouth two (2) times a day.  metoprolol succinate (TOPROL-XL) 100 mg tablet Take 200 mg by mouth daily.  besifloxacin (BESIVANCE) 0.6 % drps ophthalmic suspension Administer 1 Drop to right eye four (4) times daily.  brinzolamide (AZOPT) 1 % ophthalmic suspension Administer 1 Drop to right eye three (3) times daily.  loteprednol etabonate (LOTEMAX) 0.5 % ophthalmic suspension PLACE 1 DROP IN THE RIGHT EYE 6 TIMES DAILY FOR 2 WEEKS  hydrALAZINE (APRESOLINE) 100 mg tablet Take 1 Tab by mouth three (3) times daily.  polyethylene glycol (MIRALAX) 17 gram packet MIRALAX POWD  
 acetaminophen (TYLENOL) 500 mg tablet Take 500 mg by mouth as needed.  dilTIAZem CD (CARDIZEM CD) 180 mg ER capsule Take 1 Cap by mouth daily. D/C QID dose  albuterol-ipratropium (DUO-NEB) 2.5 mg-0.5 mg/3 ml nebu 3 mL by Nebulization route every six (6) hours as needed for Other (wheeze).  OTHER 1 nebulizer machine1  
 levothyroxine (SYNTHROID) 88 mcg tablet Take 88 mcg by mouth Daily (before breakfast).  warfarin (COUMADIN) 2.5 mg tablet Take 5 mg by mouth. [address], Wed, [address], Sat  ALPRAZolam (XANAX) 0.25 mg tablet Take 0.25 mg by mouth as needed for Anxiety.  warfarin (COUMADIN) 5 mg tablet Take 5 mg by mouth two (2) days a week. Mon, Fri  
 mirtazapine (REMERON) 30 mg tablet Take 1 Tab by mouth nightly.  esomeprazole (NEXIUM) 40 mg capsule Take 40 mg by mouth daily. Review of Symptoms: 
Constitutional: negative Eyes: negative Ears, nose, mouth, throat, and face: negative Respiratory: sob Cardiovascular: negative Gastrointestinal: negative Genitourinary:negative Musculoskeletal:negative Neurological: negative Endocrine: negative Subjective:  
  
Visit Vitals /51 (BP 1 Location: Left arm, BP Patient Position: At rest) Pulse 66 Temp 98.4 °F (36.9 °C) Resp 16 SpO2 98% Physical: 
 
General: WD, WN. Alert, cooperative, no acute distress Heart: irreg irreg, S1 WNL and S2 WNL. No S3 or S4, + LAKEISHA, no carotid bruits Lungs: clear Abdomen: Soft, +BS, NTND Extremities: LE gamaliel +DP/PT, no edema Neurologic: Grossly normal 
 
Data Review:  
Recent Labs  
  12/21/19 
0429 12/20/19 
2222 WBC 10.5 13.5* HGB 9.3* 10.0* HCT 29.9* 31.7*  265 Recent Labs  
  12/21/19 
0429 12/20/19 
2222  140  
K 4.5 4.5  
* 105 CO2 23 23 * 148* BUN 60* 57* CREA 1.73* 1.68* CA 8.3* 8.7 INR  --  2.5* Recent Labs  
  12/21/19 
0816 12/20/19 
2222 TROIQ <0.05 <0.05 Intake/Output Summary (Last 24 hours) at 12/21/2019 1113 Last data filed at 12/21/2019 1248 Gross per 24 hour Intake 150 ml Output  Net 150 ml  
  
 
Cardiographics Telemetry: atrial flutter 70-80 ECG:  
Sinus 63 Echocardiogram: 3/19 · Aortic valve leaflet calcification present. Abnormal left aortic leaflet mobility. Severe aortic valve regurgitation is present. · Mitral annular calcification. Moderate mitral valve stenosis. Moderate mitral valve regurgitation. · Estimated left ventricular ejection fraction is 61 - 65%. · Left atrial cavity size is mildly dilated. · Mild tricuspid valve regurgitation is present. Mild pulmonary hypertension is present. Assessment: 
 
   
  
 Active Problems: 
  Essential hypertension (2/26/2019) Dementia ([address] (2/26/2019) Atrial flutter ([address] (12/21/2019) Atrial fibrillation with rapid ventricular response ([address] (12/21/2019) Plan:  
 
Avinash Agosto is a pleasant 82year old with hx AF/AFL on warfarin with multiple hospitalizations for dyspnea and effusions, with poor rate control. Currently controlled atrial flutter with IV dilt. INR 2.5 yesterday. She is a candidate for AVN ablation and PPM. Thank you for this interesting consultation. Rupali Mensah MD, [facility] Patient seen and examined by me with nurse practitioner.   I personally performed all components of the history, physical, and medical decision making and agree with the assessment and plan with minor modifications as noted. Pt with symptomatic PAF with rvr. Was in sinus and now is in afl with variable block. On oac - therapeutic. Pt with severe AS and mod MR - AF/AFL will recur and rate control will be an issue. Possible secondary to pneumonia. Will consider afl ablation vs avn abl/pacemaker for Monday - is resp status improved. I discussed the risks/benefits/alternatives of the procedure with the patient. Risks include (but are not limited to) bleeding, heart block, infection, cva/mi/tamponade/death. The patient understands and agrees to proceed. Thank you for this interesting consultation.  
 
 
 
Winifred Larkin MD, [facility] Lezama

## 2019-12-21 NOTE — PROGRESS NOTES
Problem: Falls - Risk of 
Goal: *Absence of Falls Description Document Jacinta Lindsey Fall Risk and appropriate interventions in the flowsheet. Outcome: Progressing Towards Goal 
Note: Fall Risk Interventions: 
Mobility Interventions: Communicate number of staff needed for ambulation/transfer, Patient to call before getting OOB, PT Consult for mobility concerns Medication Interventions: Patient to call before getting OOB, Teach patient to arise slowly Elimination Interventions: Call light in reach, Toileting schedule/hourly rounds, Stay With Me (per policy)

## 2019-12-21 NOTE — ED NOTES
Patient placed in hospital bed for comfort. Resting comfortably. NAD. Assessed by Cardiology. Call bell in reach. Will continue to monitor.

## 2019-12-21 NOTE — PROGRESS NOTES
Pharmacy Clarification of the Prior to Admission Medication Regimen Retrospective to the Admission Medication Reconciliation The patient was interviewed regarding clarification of the prior to admission medication regimen. Daughter, Karel Marshall, was present in room and obtained permission from patient to discuss drug regimen with visitor(s) present. Patient's daughter was questioned regarding use of any other inhalers, topical products, over the counter medications, herbal medications, vitamin products or ophthalmic/nasal/otic medication use. Information Obtained From: Patient's daughter, prescription bottles, RX Query Recommendations/Findings: The following amendments were made to the patient's active medication list on file at [facility]:  
 
1) Additions:  
vitC-E-zn- NNT-apdq-mmnhap (ICAPS AREDS2) 250 mg-200 unit -12.5 mg-1 mg cap 2) Removals: APAP Loteman eye drops 3) Changes: 
besifloxacin (BESIVANCE) 0.6 % drps ophthalmic suspension (Old regimen: 1 drop right eye QID /New regimen: 1 drop right eye TID) metoprolol succinate (TOPROL-XL) 100 mg tablet (Old regimen: 200 mg daily /New regimen: 100 mg BID) (this is how it is prescribed on the bottle) 
polyethylene glycol (MIRALAX) 17 gram packet (Old regimen: no sig /New regimen: 17 g daily PRN) prednisoLONE acetate (PRED FORTE) 1 % ophthalmic suspension (Old regimen: 1 drop right eye QID /New regimen: 1 drop right eye BID) (this order is titrating down) 
warfarin (COUMADIN) 5 mg tablet (Old regimen: 5 mg daily /New regimen: 5 mg Wednesday-Monday evenings, and 2.5 mg on Tuesday evening) 4) Pertinent Pharmacy Findings: 
Patient daughter, Karel Marshall, manages the patient's medications ALPRAZolam (XANAX) 0.25 mg tablet, ondansetron hcl (ZOFRAN) 4 mg tablet: Per patient's daughter, these PRN agents have not been taken recently. Identified High Alert Medication Information Current Anticoagulants: 
Name: warfarin (COUMADIN) 5 mg tablet PTA medication list was corrected to the following:  
 
Prior to Admission Medications Prescriptions Last Dose Informant Taking? ALPRAZolam (XANAX) 0.25 mg tablet Not Taking at Unknown time Child No  
Sig: Take 0.25 mg by mouth as needed for Anxiety. albuterol-ipratropium (DUO-NEB) 2.5 mg-0.5 mg/3 ml nebu 12/20/2019 at Unknown time Child Yes Sig: 3 mL by Nebulization route every six (6) hours as needed for Other (wheeze). besifloxacin (BESIVANCE) 0.6 % drps ophthalmic suspension 12/20/2019 at Unknown time Child Yes Sig: Administer 1 Drop to right eye three (3) times daily. brinzolamide (AZOPT) 1 % ophthalmic suspension 12/20/2019 at Unknown time Child Yes Sig: Administer 1 Drop to right eye three (3) times daily. dilTIAZem CD (CARDIZEM CD) 180 mg ER capsule 12/20/2019 at Unknown time Child Yes Sig: Take 1 Cap by mouth daily. D/C QID dose  
esomeprazole (NEXIUM) 40 mg capsule 12/20/2019 at Unknown time Child Yes Sig: Take 40 mg by mouth daily. hydrALAZINE (APRESOLINE) 100 mg tablet 12/20/2019 at Unknown time Child Yes Sig: Take 1 Tab by mouth three (3) times daily. levothyroxine (SYNTHROID) 88 mcg tablet 12/20/2019 at Unknown time Child Yes Sig: Take 88 mcg by mouth Daily (before breakfast). metoprolol succinate (TOPROL-XL) 100 mg tablet 12/20/2019 at Unknown time Child Yes Sig: Take 200 mg by mouth two (2) times a day. mirtazapine (REMERON) 30 mg tablet 12/20/2019 at Unknown time Child Yes Sig: Take 1 Tab by mouth nightly. ondansetron hcl (ZOFRAN) 4 mg tablet Not Taking at Unknown time Child No  
Sig: Take 4 mg by mouth every eight (8) hours as needed for Nausea. polyethylene glycol (MIRALAX) 17 gram packet 12/14/2019 at Unknown time Child Yes Sig: Take 17 g by mouth daily as needed (constipation). prednisoLONE acetate (PRED FORTE) 1 % ophthalmic suspension 12/20/2019 at Unknown time Child Yes Sig: Administer 1 Drop to right eye two (2) times a day. Until 12/25/19. Starting 12/26/19 titrate down to 1 drop right eye daily  
sodium bicarbonate 650 mg tablet 12/20/2019 at Unknown time Child Yes Sig: Take 650 mg by mouth two (2) times a day. torsemide (DEMADEX) 5 mg tablet 12/20/2019 at Unknown time Child Yes Sig: Take 5 mg by mouth daily. vitC-E-zn- FLA-pmfw-lxaafg (ICAPS AREDS2) 250 mg-200 unit -12.5 mg-1 mg cap 12/20/2019 at Unknown time Child Yes Sig: Take 1 Cap by mouth two (2) times a day. warfarin (COUMADIN) 5 mg tablet 12/20/2019 at 1700 Child Yes Sig: Take 5 mg by mouth six (6) days a week. Patient takes 5 mg Wednesday-Monday evenings, and 2.5 mg on Tuesday evening  
warfarin (COUMADIN) 5 mg tablet 12/17/2019 at 1700 Child Yes Sig: Take 2.5 mg by mouth every Tuesday. Patient takes 5 mg Wednesday-Monday evenings, and 2.5 mg on Tuesday evening Facility-Administered Medications: None Thank you, 
Jose L Walker CPhT Medication History Pharmacy Technician

## 2019-12-21 NOTE — ED PROVIDER NOTES
EMERGENCY DEPARTMENT HISTORY AND PHYSICAL EXAM 
 
 
Date: 12/21/2019 Patient Name: Suzanne Soto Patient Age and Sex: 82 y.o. female History of Presenting Illness Chief Complaint Patient presents with  Irregular Heart Beat History Provided By: Patient and daughter, and EMR 
 
HPI: Suzanne Soto Is an 82-year-old female with past medical history of dementia, atrial fibrillation, heart failure, COPD presenting today as a transfer from [facility] for heart failure exacerbation and atrial fibrillation with rapid ventricular response. The patient's daughter reports that yesterday she had some shortness of breath and she was treating her with nebulization treatments. She noted that the patient was sitting up at the side of the bed and seemed to have increased shortness of breath and was very dyspneic with ambulation. Her oxygen saturation dropped to 85% with ambulation. No recent fevers, cough, chills. They were seen at the outside hospital where her heart rate was in the 130s to 140s. She was placed on a diltiazem drip. Also found to have a heart failure exacerbation with elevated BNP, bilateral pleural effusions, and evidence of pulmonary edema. Patient was treated with IV Lasix as well. The patient was transferred on 15 of diltiazem, and 2 L nasal cannula oxygen. There are no other complaints, changes, or physical findings at this time. PCP: Cheryle Saleh MD 
 
Current Facility-Administered Medications on File Prior to Encounter Medication Dose Route Frequency Provider Last Rate Last Dose  [COMPLETED] sodium chloride (NS) flush 5-10 mL  5-10 mL IntraVENous ONCE Piramzadian, Rachell, DO   10 mL at 12/20/19 2316  [COMPLETED] dilTIAZem (CARDIZEM) injection 20 mg  20 mg IntraVENous ONCE Piramzadian, Rachell, DO   20 mg at 12/20/19 2226  
 [COMPLETED] furosemide (LASIX) injection 60 mg  60 mg IntraVENous NOW Piramzadian, Rachell, DO   60 mg at 12/20/19 2314  [DISCONTINUED] furosemide (LASIX) injection 60 mg  60 mg IntraVENous ONCE Rachell Sheehan DO      
 [DISCONTINUED] dilTIAZem (CARDIZEM) 100 mg in 0.9% sodium chloride (MBP/ADV) 100 mL infusion  5 mg/hr IntraVENous TITRATE Rachell Sheehan DO   Stopped at 12/21/19 0134 Current Outpatient Medications on File Prior to Encounter Medication Sig Dispense Refill  torsemide (DEMADEX) 5 mg tablet Take  by mouth daily.  prednisoLONE acetate (PRED FORTE) 1 % ophthalmic suspension Administer 1 Drop to right eye four (4) times daily.  ondansetron hcl (ZOFRAN) 4 mg tablet Take 4 mg by mouth every eight (8) hours as needed for Nausea.  sodium bicarbonate 650 mg tablet Take 650 mg by mouth two (2) times a day.  metoprolol succinate (TOPROL-XL) 100 mg tablet Take 200 mg by mouth daily.  besifloxacin (BESIVANCE) 0.6 % drps ophthalmic suspension Administer 1 Drop to right eye four (4) times daily.  brinzolamide (AZOPT) 1 % ophthalmic suspension Administer 1 Drop to right eye three (3) times daily.  loteprednol etabonate (LOTEMAX) 0.5 % ophthalmic suspension PLACE 1 DROP IN THE RIGHT EYE 6 TIMES DAILY FOR 2 WEEKS  hydrALAZINE (APRESOLINE) 100 mg tablet Take 1 Tab by mouth three (3) times daily. 270 Tab 2  polyethylene glycol (MIRALAX) 17 gram packet MIRALAX POWD    
 acetaminophen (TYLENOL) 500 mg tablet Take 500 mg by mouth as needed.  dilTIAZem CD (CARDIZEM CD) 180 mg ER capsule Take 1 Cap by mouth daily. D/C QID dose 90 Cap 3  
 albuterol-ipratropium (DUO-NEB) 2.5 mg-0.5 mg/3 ml nebu 3 mL by Nebulization route every six (6) hours as needed for Other (wheeze). 30 Nebule 0  
 OTHER 1 nebulizer machine1 1 Act 0  
 levothyroxine (SYNTHROID) 88 mcg tablet Take 88 mcg by mouth Daily (before breakfast).  warfarin (COUMADIN) 2.5 mg tablet Take 5 mg by mouth. Torie Au, Wed, [address], Sat  ALPRAZolam (XANAX) 0.25 mg tablet Take 0.25 mg by mouth as needed for Anxiety.  warfarin (COUMADIN) 5 mg tablet Take 5 mg by mouth two (2) days a week. Mon, Fri    
 mirtazapine (REMERON) 30 mg tablet Take 1 Tab by mouth nightly. 90 Tab 3  
 esomeprazole (NEXIUM) 40 mg capsule Take 40 mg by mouth daily. Past History Past Medical History: 
Past Medical History:  
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  
 LIVIA (obstructive sleep apnea)  Paroxysmal atrial fibrillation (HCC)  Pure hypercholesterolemia 2010 Past Surgical History: 
Past Surgical History:  
Procedure Laterality Date  ECHO 2D ADULT  2009 LVH, normal LV wall motion and ejection fraction, mild aortic stenosis, moderate aortic regurgitation and mild mitral regurgitation. The ejection fraction was 55-60%.  ECHO 2D ADULT  2011 EF 60%, LAE, mild AS, AI, MR, PA low 40s  EVENT MONITOR POST SYMPTOMS  2010 Rare PACs, no arrythmia with symptoms  HX CHOLECYSTECTOMY  HX HYSTERECTOMY  LOOP MONITOR  9-10/2011 NSR  
 STRESS TEST MYOVIEW  2011  
 no ischemia, EF 63%  US DUPLEX CAROTID BILATERAL  2011  
 mild bilat disease Family History: 
Family History Problem Relation Age of Onset  Heart Disease Father  Dementia Brother  Heart Disease Brother  Diabetes Brother Social History: 
Social History Tobacco Use  Smoking status: Former Smoker Last attempt to quit: 1963 Years since quittin.0  Smokeless tobacco: Never Used Substance Use Topics  Alcohol use: Yes  Drug use: No  
 
 
Allergies: Allergies Allergen Reactions  Latex, Natural Rubber Itching  Metformin Nausea and Vomiting Other reaction(s): nausea HEADACHE  Advil [Ibuprofen] Unknown (comments)  Aspirin Unknown (comments)  Citalopram Other (comments) HEADACHE  Codeine Other (comments)  Iodinated Contrast Media Itching  Meloxicam Unknown (comments)  Penicillin G Unknown (comments)  Shellfish Containing Products Rash  Sulfa (Sulfonamide Antibiotics) Unknown (comments)  Tramadol Nausea and Vomiting and Other (comments) HEADACHE Review of Systems Constitutional: No  fever,  No  headache Skin: No  rash, No  jaundice HEENT: No  nasal congestion, No  eye drainage. Resp: No cough,  +  wheezing CV: No chest pain, +  palpitations GI: No vomiting,  No  diarrhea.,  No  constipation : No dysuria,  No  hematuria MSK: No joint pain,  No  trauma Neuro: No numbness, No  tingling Psych: No suicidal, No  paranoid Physical Exam  
 
Patient Vitals for the past 12 hrs: 
 Temp Pulse Resp BP SpO2  
12/21/19 0314 97.8 °F (36.6 °C) (!) 103 25 (!) 149/103 97 % General: alert, No acute distress Eyes: EOMI, normal conjunctiva ENT: moist mucous membranes. Neck: Active, full ROM of neck. Skin: No rashes. no jaundice Lungs: Equal chest expansion. mild respiratory distress. scattered wheezing using supraclavicular accessory muscles and using intercostal accessory muscles, crackles in the lung bases bilaterally Heart: tachycardic with an irregularly irregular rhythm,     no peripheral edema,   2+ radial pulses and DPs bilaterally Abd:  non distended soft, nontender. No rebound tenderness. No guarding Back: Full ROM 
MSK: Full, active ROM in all 4 extremities. Neuro: Person; normal speech;  
Psych: Cooperative with exam; Appropriate mood and affect Diagnostic Study Results Labs - Recent Results (from the past 12 hour(s)) EKG, 12 LEAD, INITIAL Collection Time: 12/20/19  9:38 PM  
Result Value Ref Range Ventricular Rate 114 BPM  
 Atrial Rate 308 BPM  
 QRS Duration 92 ms Q-T Interval 354 ms QTC Calculation (Bezet) 487 ms Calculated R Axis 19 degrees Calculated T Axis 170 degrees Diagnosis Atrial flutter with variable AV block Left ventricular hypertrophy with repolarization abnormality Abnormal ECG When compared with ECG of 30-JUN-2019 06:25, Nonspecific T wave abnormality has replaced inverted T waves in Inferior  
leads CBC WITH AUTOMATED DIFF Collection Time: 12/20/19 10:22 PM  
Result Value Ref Range WBC 13.5 (H) 3.6 - 11.0 K/uL  
 RBC 3.63 (L) 3.80 - 5.20 M/uL  
 HGB 10.0 (L) 11.5 - 16.0 g/dL HCT 31.7 (L) 35.0 - 47.0 % MCV 87.3 80.0 - 99.0 FL  
 MCH 27.5 26.0 - 34.0 PG  
 MCHC 31.5 30.0 - 36.5 g/dL  
 RDW 15.1 (H) 11.5 - 14.5 % PLATELET 006 442 - 977 K/uL MPV 11.2 8.9 - 12.9 FL  
 NEUTROPHILS 81 (H) 32 - 75 % LYMPHOCYTES 11 (L) 12 - 49 % MONOCYTES 8 5 - 13 % EOSINOPHILS 0 0 - 7 % BASOPHILS 0 0 - 1 %  
 ABS. NEUTROPHILS 10.9 (H) 1.8 - 8.0 K/UL  
 ABS. LYMPHOCYTES 1.4 0.8 - 3.5 K/UL  
 ABS. MONOCYTES 1.1 (H) 0.0 - 1.0 K/UL  
 ABS. EOSINOPHILS 0.0 0.0 - 0.4 K/UL  
 ABS. BASOPHILS 0.0 0.0 - 0.1 K/UL METABOLIC PANEL, BASIC Collection Time: 12/20/19 10:22 PM  
Result Value Ref Range Sodium 140 136 - 145 mmol/L Potassium 4.5 3.5 - 5.1 mmol/L Chloride 105 97 - 108 mmol/L  
 CO2 23 21 - 32 mmol/L Anion gap 12 5 - 15 mmol/L Glucose 148 (H) 65 - 100 mg/dL BUN 57 (H) 6 - 20 MG/DL Creatinine 1.68 (H) 0.55 - 1.02 MG/DL  
 BUN/Creatinine ratio 34 (H) 12 - 20 GFR est AA 35 (L) >60 ml/min/1.73m2 GFR est non-AA 29 (L) >60 ml/min/1.73m2 Calcium 8.7 8.5 - 10.1 MG/DL  
NT-PRO BNP Collection Time: 12/20/19 10:22 PM  
Result Value Ref Range NT pro-BNP >35,000 (H) 0 - 450 PG/ML  
TROPONIN I Collection Time: 12/20/19 10:22 PM  
Result Value Ref Range Troponin-I, Qt. <0.05 <0.05 ng/mL PROTHROMBIN TIME + INR Collection Time: 12/20/19 10:22 PM  
Result Value Ref Range INR 2.5 (H) 0.9 - 1.1 Prothrombin time 24.6 (H) 9.0 - 11.1 sec Radiologic Studies - No orders to display CT Results  (Last 48 hours) None CXR Results  (Last 48 hours) 12/20/19 2246  XR CHEST PA LAT Final result Impression:  IMPRESSION: Bibasilar airspace disease with bilateral effusions. Narrative:  History: Shortness of breath. 2 views of the chest demonstrate atherosclerosis of the aorta. Heart size  
appears normal. There are bilateral pleural effusions and bibasilar airspace  
opacification. There are degenerative changes of the spine. Medical Decision Making Differential Diagnosis: Heart failure exacerbation, atrial fibrillation with RVR, pneumonia, pneumothorax I reviewed the vital signs, available nursing notes, past medical history, past surgical history, family history and social history and old medical records. On my interpretation, Laboratory workup is significant for slightly elevated white blood cell count at 13.5, creatinine is 1.68, elevated BNP, troponin negative, INR is therapeutic at 2.5 On my interpretation of the radiology studies chest x-ray shows pulmonary edema with bilateral pleural effusions Management/ED course: Patient presents today as a transfer from [address] [place] [address] with atrial fibrillation with rapid ventricular response requiring a diltiazem drip. Likely from CHF exacerbation. Patient had evidence of pulmonary edema bilateral pleural effusions. Was previously treated with IV Lasix prior to her transfer. The patient is now stable on 2 L of nasal cannula oxygen, 15 mg of diltiazem. We will continue to titrate diltiazem, patient is ultimately admitted to the hospitalist service. ED Course:  
Initial assessment performed. The patients presenting problems have been discussed, and they are in agreement with the care plan formulated and outlined with them.   I have encouraged them to ask questions as they arise throughout their visit. Procedures: 
0 Disposition: Admitted Admission Note: 
Patient is being admitted to the hospital by Service: Hospitalist.  The results of their tests and reasons for their admission have been discussed with them and available family. They convey agreement and understanding for the need to be admitted and for their admission diagnosis. Diagnosis Clinical Impression: 1. Acute on chronic congestive heart failure, unspecified heart failure type ([address] 2. Atrial fibrillation with RVR ([address] 3. Hypoxia Attestations: 
Dorothy Valenzuela MD 
 
 
 
Please note that this dictation was completed with [employer], the computer voice recognition software. Quite often unanticipated grammatical, syntax, homophones, and other interpretive errors are inadvertently transcribed by the computer software. Please disregard these errors. Please excuse any errors that have escaped final proofreading. Thank you.

## 2019-12-22 NOTE — PROGRESS NOTES
Bedside and Verbal shift change report GIVEN TO , RN. Report included the following information Kardex. SIGNIFICANT CHANGES DURING SHIFT:   
1905 Converted back to SR with 1st degree heart rate 60s B/P stable I titrated down cardizem but went back in fib/ Flutter rate controlled 2200   Cardizem at 10 asymptomatic Flipped back from 1st degree to NSR  
[id number] Remains fib /flutter 70-90 asymptomatic ,slept most of the night CONCERNS TO ADDRESS WITH MD:   
 
 
 
 
[facility] NURSING NOTE Admission Date 12/21/2019 Admission Diagnosis Atrial fibrillation with rapid ventricular response ([address] [I48.91] Atrial flutter ([address] [I48.92] Consults IP CONSULT TO CARDIOLOGY Cardiac Monitoring [x] Yes [] No  
  
Purposeful Hourly Rounding [x] Yes   
Judd Score Total Score: 4 Judd score 3 or > [x] Bed Alarm [] Avasys [] 1:1 sitter [] Patient refused (Signed refusal form in chart) Rafiq Score Rafiq Score: 18 Rafiq score 14 or < [] PMT consult [] Wound Care consult  
 []  Specialty bed  [] Nutrition consult Influenza Vaccine Received Flu Vaccine for Current Season (usually Sept-March): Yes Oxygen needs? [] Room air Oxygen @  []1L    []2L    []3L   []4L    []5L   []6L via NC Chronic home O2 use? [] Yes [] No 
Perform O2 challenge test and document in progress note using smartphrase (.Homeoxygen) Last bowel movement Last Bowel Movement Date: 12/20/19 Urinary Catheter LDAs Peripheral IV 12/21/19 Left Antecubital (Active) Site Assessment Clean, dry, & intact 12/21/2019  7:34 PM  
Phlebitis Assessment 0 12/21/2019  7:34 PM  
Infiltration Assessment 0 12/21/2019  3:38 PM  
Dressing Status Clean, dry, & intact 12/21/2019  7:34 PM  
Dressing Type Tape;Transparent 12/21/2019  7:34 PM  
Hub Color/Line Status Pink; Infusing;Flushed;Patent 12/21/2019  7:34 PM  
   
Peripheral IV 12/21/19 Right Antecubital (Active) Site Assessment Clean, dry, & intact 12/21/2019  7:34 PM  
Phlebitis Assessment 0 12/21/2019  7:34 PM  
Infiltration Assessment 0 12/21/2019  7:34 PM  
Dressing Status Clean, dry, & intact 12/21/2019  7:34 PM  
Dressing Type Tape;Transparent 12/21/2019  7:34 PM  
Hub Color/Line Status Pink;Capped;Flushed;Patent 12/21/2019  7:34 PM  
                  
  
Readmission Risk Assessment Tool Score High Risk   
      
 28 Total Score 3 Has Seen PCP in Last 6 Months (Yes=3, No=0)  
 4 IP Visits Last 12 Months (1-3=4, 4=9, >4=11) 5 Pt. Coverage (Medicare=5 , Medicaid, or Self-Pay=4) 16 Charlson Comorbidity Score (Age + Comorbid Conditions) Criteria that do not apply:  
 . Living with Significant Other. Assisted Living. LTAC. SNF. or  
Rehab Patient Length of Stay (>5 days = 3) Expected Length of Stay - - - Actual Length of Stay 0

## 2019-12-22 NOTE — PROGRESS NOTES
Hospitalist Progress Note NAME: Emmett Marinelli :  1935 MRN:  [id number] Assessment / Plan: 
Atrial flutter with RVR Moderate mitral valve stenosis and regurgitation associated with severe IV regurgitation 
-pt remains on diltiazem gtt, wean as tolerated. Rate controlled 
-pharm to help with coumadin dosing 
-cardiology input is appreciated, for avf ablation tomorrow Acute hypoxic respiratory failure most likely secondary to CHF exacerbation and pneumonia 
-CXR showed Bibasilar airspace disease with bilateral effusions 
-cont' empiric abx 
-hold lasix due to rising cr 
-add xopenex prn 
-O2 suppl prn Chronic kidney disease stage III-IV 
-cr rising, will hold further lasix 
-avoid other nephrotoxic agents 
-cont' PO bicarb Hypothyroidism 
-continue home medication Dementia 
-supportive care. Mentation at baseline currently 
-family at bedside Code Status: Full Surrogate Decision Maker:Yana Muniz  
DVT Prophylaxis: Coumadin GI Prophylaxis: not indicated 
   
 
Subjective: Chief Complaint / Reason for Physician Visit Pt seen, in bed and NAD. Daughter at bedside, updated her on plan of care. Questions/concerns answered. Discussed with RN events overnight. Review of Systems: 
Symptom Y/N Comments  Symptom Y/N Comments Fever/Chills    Chest Pain Poor Appetite    Edema Cough    Abdominal Pain Sputum    Joint Pain SOB/HASKINS    Pruritis/Rash Nausea/vomit    Tolerating PT/OT Diarrhea    Tolerating Diet Constipation    Other Could NOT obtain due to: dementia Objective: VITALS:  
Last 24hrs VS reviewed since prior progress note. Most recent are: 
Patient Vitals for the past 24 hrs: 
 Temp Pulse Resp BP SpO2  
19 1048 98.2 °F (36.8 °C) 85 18 134/41 96 % 19 0715 98.3 °F (36.8 °C) 80 16 159/49 95 % 19 0600  72  144/59 95 % 19 0400 97.6 °F (36.4 °C) 77 17 141/58 98 % 12/22/19 0200    (!) 131/39   
12/22/19 0052    143/43   
12/21/19 2254  74   95 % 12/21/19 2252  72   95 % 12/21/19 2234 97.6 °F (36.4 °C) 78 18 146/80 98 % 12/21/19 2053  76  161/43 94 % 12/21/19 2043  69   95 % 12/21/19 2041  69   95 % 12/21/19 2035  69   95 % 12/21/19 2019  68   95 % 12/21/19 2011  69   95 % 12/21/19 2000  69     
12/21/19 1921 97.8 °F (36.6 °C) 72 18 129/41 96 % 12/21/19 1536 98.9 °F (37.2 °C) 68 16 136/42 100 % 12/21/19 1400 97.7 °F (36.5 °C) 63 14 123/40 98 % 12/21/19 1300  65 18 145/40 98 % 12/21/19 1200  71 18 146/51 97 % Intake/Output Summary (Last 24 hours) at 12/22/2019 1107 Last data filed at 12/22/2019 8699 Gross per 24 hour Intake 200 ml Output 2200 ml Net -2000 ml PHYSICAL EXAM: 
General: Female in bed, cooperative, no acute distress   
EENT:  EOMI. Anicteric sclerae. MMM Resp:  CTA bilaterally, no wheezing or rales. No accessory muscle use CV:  irregular  rhythm,  No edema GI:  Soft, Non distended, Non tender.  +Bowel sounds Neurologic:  Awake, conversant, moving all exts Psych:   Fair insight. Not anxious nor agitated Skin:  No rashes. No jaundice Reviewed most current lab test results and cultures  YES Reviewed most current radiology test results   YES Review and summation of old records today    NO Reviewed patient's current orders and MAR    YES 
PMH/SH reviewed - no change compared to H&P 
________________________________________________________________________ Care Plan discussed with: 
  Comments Patient x Family  x   
RN x Care Manager Consultant Multidiciplinary team rounds were held today with , nursing, pharmacist and clinical coordinator. Patient's plan of care was discussed; medications were reviewed and discharge planning was addressed. ________________________________________________________________________ Total NON critical care TIME:  35   Minutes Total CRITICAL CARE TIME Spent:   Minutes non procedure based Comments >50% of visit spent in counseling and coordination of care    
________________________________________________________________________ Jamilah Gan MD  
 
Procedures: see electronic medical records for all procedures/Xrays and details which were not copied into this note but were reviewed prior to creation of Plan. LABS: 
I reviewed today's most current labs and imaging studies. Pertinent labs include: 
Recent Labs  
  12/22/19 
0512 12/21/19 
0429 12/20/19 
2222 WBC 6.7 10.5 13.5* HGB 8.0* 9.3* 10.0* HCT 26.2* 29.9* 31.7*  240 265 Recent Labs  
  12/22/19 
1297 12/21/19 
0429 12/20/19 
2222  141 140  
K 4.0 4.5 4.5  
* 110* 105 CO2 23 23 23 GLU 86 151* 148* BUN 61* 60* 57* CREA 1.90* 1.73* 1.68* CA 7.9* 8.3* 8.7 INR  --   --  2.5* Signed: Jamilah Gan MD

## 2019-12-22 NOTE — PROGRESS NOTES
7:58 AM Received bedside verbal report from [facility] 
 
9:46 AM order receicved from attending to bump rate  to 12.5 mg/hr.

## 2019-12-22 NOTE — PROGRESS NOTES
Cardiology Progress Note [address] [facility], [place], [address] [facility]  [phone] 
 
12/22/2019 9:27 AM 
 
Admit Date: 12/21/2019 Admit Diagnosis: Atrial fibrillation with rapid ventricular response ([address] [I48.91]; Atrial flutter ([address] [I48.92] Subjective:  
 
Tyrone Kaiser   denies chest pain. Visit Vitals /49 (BP 1 Location: Left arm, BP Patient Position: At rest;Supine) Pulse 80 Temp 98.3 °F (36.8 °C) Resp 16 Wt 138 lb (62.6 kg) SpO2 95% BMI 26.95 kg/m² Current Facility-Administered Medications Medication Dose Route Frequency  dilTIAZem (CARDIZEM) 100 mg in dextrose 5% (MBP/ADV) 100 mL infusion  0-15 mg/hr IntraVENous TITRATE  sodium chloride (NS) flush 5-40 mL  5-40 mL IntraVENous Q8H  
 sodium chloride (NS) flush 5-40 mL  5-40 mL IntraVENous PRN  
 acetaminophen (TYLENOL) tablet 650 mg  650 mg Oral Q6H PRN  
 ondansetron (ZOFRAN) injection 4 mg  4 mg IntraVENous Q6H PRN  
 [START ON 12/23/2019] levoFLOXacin (LEVAQUIN) 500 mg in D5W IVPB  500 mg IntraVENous Q48H  mirtazapine (REMERON) tablet 30 mg  30 mg Oral QHS  sodium bicarbonate tablet 650 mg  650 mg Oral BID  hydrALAZINE (APRESOLINE) tablet 100 mg  100 mg Oral TID  levothyroxine (SYNTHROID) tablet 88 mcg  88 mcg Oral ACB  pantoprazole (PROTONIX) tablet 40 mg  40 mg Oral ACB  furosemide (LASIX) injection 20 mg  20 mg IntraVENous BID  WARFARIN INFORMATION NOTE (COUMADIN)   Other QPM  
 levalbuterol (XOPENEX) nebulizer soln 1.25 mg/3 mL  1.25 mg Nebulization Q4H PRN  
 guaiFENesin-dextromethorphan (ROBITUSSIN DM) 100-10 mg/5 mL syrup 5 mL  5 mL Oral Q6H PRN Objective:  
  
Visit Vitals /49 (BP 1 Location: Left arm, BP Patient Position: At rest;Supine) Pulse 80 Temp 98.3 °F (36.8 °C) Resp 16 Wt 138 lb (62.6 kg) SpO2 95% BMI 26.95 kg/m² Physical Exam: Abdomen: soft, non-tender Extremities: extremities normal 
Heart: regular rate and rhythm Lungs: clear to auscultation bilaterally Pulses: 2+ and symmetric Data Review:  
Labs:   
Recent Labs  
  12/22/19 
0512 12/21/19 
0429 12/20/19 
2222 WBC 6.7 10.5 13.5* HGB 8.0* 9.3* 10.0* HCT 26.2* 29.9* 31.7*  240 265 Recent Labs  
  12/22/19 
8726 12/21/19 
0429 12/20/19 
2222  141 140  
K 4.0 4.5 4.5  
* 110* 105 CO2 23 23 23 GLU 86 151* 148* BUN 61* 60* 57* CREA 1.90* 1.73* 1.68* CA 7.9* 8.3* 8.7 INR  --   --  2.5* Recent Labs  
  12/21/19 
0816 12/20/19 2222 TROIQ <0.05 <0.05 Intake/Output Summary (Last 24 hours) at 12/22/2019 5121 Last data filed at 12/22/2019 7897 Gross per 24 hour Intake 200 ml Output 2200 ml Net -2000 ml Telemetry: afl with 4:1 block Assessment:  
 
Active Problems: 
  Essential hypertension (2/26/2019) Dementia ([facility] (2/26/2019) Atrial flutter ([facility] (12/21/2019) Atrial fibrillation with rapid ventricular response ([facility] (12/21/2019) Plan:  
 
Alphonso Valdivia is in afl with 4:1 block. Will proceed with afl ablation - hope to avoid ppm/av node. Npo p mn. I discussed the risks/benefits/alternatives of the procedure with the patient. Risks include (but are not limited to) bleeding, heart block, infection, cva/mi/tamponade/death. The patient understands and agrees to proceed. Shlomo Cordova MD, [facility] 
 
12/22/2019

## 2019-12-22 NOTE — PROGRESS NOTES
Problem: Pressure Injury - Risk of 
Goal: *Prevention of pressure injury Description Document Rafiq Scale and appropriate interventions in the flowsheet. Outcome: Progressing Towards Goal 
Note: Pressure Injury Interventions: 
Sensory Interventions: Assess changes in LOC Moisture Interventions: Apply protective barrier, creams and emollients Activity Interventions: Increase time out of bed Mobility Interventions: HOB 30 degrees or less Nutrition Interventions: Document food/fluid/supplement intake Friction and Shear Interventions: Apply protective barrier, creams and emollients, HOB 30 degrees or less

## 2019-12-22 NOTE — PROGRESS NOTES
Problem: Falls - Risk of 
Goal: *Absence of Falls Description Document Bishop Delgado Fall Risk and appropriate interventions in the flowsheet. Outcome: Progressing Towards Goal 
Note: Fall Risk Interventions: 
Mobility Interventions: Bed/chair exit alarm, Patient to call before getting OOB, PT Consult for mobility concerns Mentation Interventions: Bed/chair exit alarm, Evaluate medications/consider consulting pharmacy, Increase mobility, More frequent rounding Medication Interventions: Bed/chair exit alarm, Patient to call before getting OOB, Teach patient to arise slowly Elimination Interventions: Call light in reach, Stay With Me (per policy), Toileting schedule/hourly rounds Problem: Arrhythmia Pathway (Adult) Goal: *Absence of arrhythmia Outcome: Progressing Towards Goal

## 2019-12-22 NOTE — PROGRESS NOTES
Received report from [address] [place] [address] Ave. Patient resting, family in room. Assisted her to [facility]. No complaints of pain right lung more diminished then left and has faint crackles. No LE edema. Right eye red and she had infection at home after cataract surgery and has been on eye drops. Family said she has a broken vessle in eye which is why it is red. patient needs orders for eye drops

## 2019-12-23 NOTE — PROGRESS NOTES
Pharmacy Daily Dosing of Warfarin Indication: Afib Goal INR: 2-3 PTA Warfarin Dose:  Warfarin 5mg Wed thru Monday and 2.5mg on Tuesday. Concurrent anticoagulants: none Concurrent antiplatelet: none Major Interacting Medications Drugs that may increase INR: levofloxacin Drugs that may decrease INR: none Conditions that may increase/decrease INR (CHF, C. diff, cirrhosis, thyroid disorder, hypoalbuminemia): Age Labs: 
Recent Labs  
  12/23/19 
0506 12/22/19 
1635 12/22/19 
3792 12/21/19 
0429 12/20/19 
2222 INR 2.4* 2.5*  --   --  2.5* HGB 8.5*  --  8.0* 9.3* 10.0*  
  --  214 240 265 Impression/Plan:  
Will order warfarin 3 mg PO x 1 dose. Daily INR 
CBC w/o diff daily Pharmacy will follow daily and adjust the dose as appropriate. Thanks YOSELIN SchwabD 
 
 
[email or web address]. pdf

## 2019-12-23 NOTE — PROGRESS NOTES
Bedside shift change report GIVEN TO Stefan Mayberry RN. Report included the following information SBAR. SIGNIFICANT CHANGES DURING SHIFT:  Patient resting most of afternoon, family present. Tolerating diltiazem drip and converted to SR at about 4pm. No complaints. Had bowel movement but did not see it to check for color changes. HGB dropped from yesterday. She is for an ablation and pacemaker possibly tomorrow. Son will be here at 6 to sign consent. SCDs initiated as was incentive spirometer. CONCERNS TO ADDRESS WITH MD:  Plan procedure tomorrow. Continue to monitor cardizem and rate and rhythm. [facility] NURSING NOTE Admission Date 12/21/2019 Admission Diagnosis Atrial fibrillation with rapid ventricular response ([address] [I48.91] Atrial flutter ([address] [I48.92] Consults IP CONSULT TO CARDIOLOGY Cardiac Monitoring [] Yes [] No  
  
Purposeful Hourly Rounding [] Yes   
Judd Score Total Score: 3 Judd score 3 or > [] Bed Alarm [] Avasys [] 1:1 sitter [] Patient refused (Signed refusal form in chart) Rafiq Score Rafiq Score: 17 Rafiq score 14 or < [] PMT consult [] Wound Care consult  
 []  Specialty bed  [] Nutrition consult Influenza Vaccine Received Flu Vaccine for Current Season (usually Sept-March): Yes Oxygen needs? [] Room air Oxygen @  []1L    []2L    []3L   []4L    []5L   []6L via NC Chronic home O2 use? [] Yes [] No 
Perform O2 challenge test and document in progress note using smartphrase (.Homeoxygen) Last bowel movement Last Bowel Movement Date: 12/22/19 Urinary Catheter LDAs Peripheral IV 12/21/19 Left Antecubital (Active) Site Assessment Clean, dry, & intact 12/22/2019  3:04 PM  
Phlebitis Assessment 0 12/22/2019  3:04 PM  
Infiltration Assessment 0 12/22/2019  3:04 PM  
Dressing Status Clean, dry, & intact 12/22/2019 10:48 AM  
Dressing Type Transparent;Tape 12/22/2019 10:48 AM  
 Hub Color/Line Status Pink; Infusing 12/22/2019 10:48 AM  
   
Peripheral IV 12/21/19 Right Antecubital (Active) Site Assessment Dry 12/22/2019  3:04 PM  
Infiltration Assessment 0 12/22/2019  3:04 PM  
Dressing Status Clean, dry, & intact 12/22/2019 10:48 AM  
Dressing Type Transparent;Tape 12/22/2019 10:48 AM  
Hub Color/Line Status Pink;Flushed 12/22/2019 10:48 AM  
                  
  
Readmission Risk Assessment Tool Score High Risk   
      
 28 Total Score 3 Has Seen PCP in Last 6 Months (Yes=3, No=0)  
 4 IP Visits Last 12 Months (1-3=4, 4=9, >4=11) 5 Pt. Coverage (Medicare=5 , Medicaid, or Self-Pay=4) 16 Charlson Comorbidity Score (Age + Comorbid Conditions) Criteria that do not apply:  
 . Living with Significant Other. Assisted Living. LTAC. SNF. or  
Rehab Patient Length of Stay (>5 days = 3) Expected Length of Stay - - - Actual Length of Stay 1

## 2019-12-23 NOTE — DISCHARGE INSTRUCTIONS
[address]  [phone]        NEW PACEMAKER IMPLANT DISCHARGE INSTRUCTIONS    Patient ID:  Layne Good  [id number]  25 y.o.  1935    Admit Date: 12/21/2019    Discharge Date: 12/23/2019     Admitting Physician: Cathryn Shipman MD     Discharge Physician: Cathryn Shipman MD    Admission Diagnoses:   Atrial fibrillation with rapid ventricular response [facility]) [I48.91]  Atrial flutter [facility]) [I48.92]    Discharge Diagnoses: Active Problems:    Essential hypertension (2/26/2019)      Dementia ([facility] (2/26/2019)      Atrial flutter ([facility] (12/21/2019)      Atrial fibrillation with rapid ventricular response ([facility] (12/21/2019)        Discharge Condition: Good    Cardiology Procedures this Admission:  Pacemaker insertion. Disposition: home    Reference discharge instructions provided by nursing for diet and activity. Follow-up with device clinic in three weeks. Call [phone] to make an appointment. Signed:  CASA Hickman  12/23/2019  10:14 AM      DISCHARGE INSTRUCTIONS FOR PATIENTS WITH PACEMAKERS    1. Remember to call for an appointment for 3 weeks [phone] to check healing and implant programming. 2. Medic Alert Bracelets are available from your pharmacist to wear at all times if you choose to wear one. 3. Carry your ID card for pacemaker with you at all times. This card will be given to you in the hospital or mailed to you. 4. The pacemaker will bulge slightly under your skin. The bulge will decrease in size over the next few weeks. Please notify the doctor's office if you notice any of the following around your site:   A.  A bruise that does not go away. B.  Soreness or yellow, green, or brown drainage from the site. C. Any swelling from the site. D. If you have a fever of 100 degrees or higher that lasts for a few days. INCISION CARE       1.  Leave the dressing over your site until your follow up visit.   2.  You may not shower until after follow up visit. 3.  For comfort, wear loose fitting clothing. 1. 4.  Ice pack to affected shoulder for first 24 hours, wear your sling for 2 days. 2. 5.  Report any signs of infection, fever, pain, swelling, redness, oozing, or heat at site especially if these symptoms increase after the first 3 to 4 days. ACTIVITY PRECAUTIONS     1. Avoid rough contact with the implant site. 2. No driving for 14 days. 3. Avoid lifting your arm over your head, carrying anything on the affected side, or lifting over 10 pounds for 30 days. For the first 2 days only bend your arm at the elbow. 4. Any extreme activity such as golf, weight lifting or exercise biking should be restricted for 60 days. 5. Do not carry objects by holding them against your implant site. 6.  No shooting rifles or any type of gun with the affected shoulder permanently. SPECIAL PRECAUTIONS     1. You should avoid all strong magnetic fields, such as arc welding, large transformers, large motors. 2.  You may or may not (depending on your device) have an MRI which uses a strong magnet to take pictures. 3.  Treatments or surgery that requires diathermy or electrocautery should be discussed with your doctor before scheduled. 4. Avoid radio frequency transmitters, including radar. 5. Advise dentist or other medical personnel you see that you have a pacemaker. 6.  Cell phones and microwave oven use is okay. 7.  If you plan to move or take a trip to a new area, the doctor's office will give you a name of a doctor to contact for any problems. ANTIBIOTIC THERAPY    During the first 8 weeks after your pacemaker insertion, you may need antibiotics before any dental work or certain tests or operations. Let the dentist or doctor who is caring for you know that you have had an implanted device. 10:14 AM    S/P AV NODE ABLATION DISCHARGE INSTRUCTIONS    It is normal to feel tired the first couple days.   Take it easy and follow the physicians instructions. CHECK THE CATHETER INSERTION SITE DAILY:  You may shower 24 hours after the procedure, remove the bandage during showering. Wash with soap and water and pat dry. Gentle cleaning of the site with soap and water is sufficient, cover with a dry clean dressing or bandage. Do not apply creams or powders to the area. Do not sit in a bathtub or pool of water for 7 days or until wound has completely healed. Temporary bruising and discomfort is normal and may last a few weeks. You may have a  formation of a small lump at the site which may last up to 6 weeks. CALL THE PHYSICIAN:  If the site becomes red, swollen or feels warm to the touch  If there is bleeding or drainage or if there is unusual pain at the groin or down the leg. If there is any bleeding, lie down, apply pressure or have someone apply pressure with a clean cloth until the bleeding stops. If the bleeding continues, call 911 to be transported to the hospital.  DO NOT DRIVE YOURSELF, [place] [address]. Activity:      For the first 24-48 hours or as instructed by the physician:  No lifting, pushing or pulling over 5 pounds and no straining the insertion site. Do not life grocery bags or the garbage can, do not run the vacuum  or  for 7 days. Start with short walks as in the hospital and gradually increase as tolerated each day. It is recommended to walk 30 minutes 5-7 days per week. Follow your physicians instructions on activity. Avoid walking outside in extremes of heat or cold. Walk inside when it is cold and windy or hot and humid. Things to keep in mind:  No driving for at least 5 days, or as designated by your physician. Limit the number of times you go up and down the stairs  Take rests and pace yourself with activity. Be careful and do not strain with bowel movements. Medications:     Take all medications as prescribed  Call your physician if you have any questions  Keep an updated list of your medications with you at all times and give a list to your physician and pharmacist    Signs and Symptoms:  Be cautious of symptoms of angina or recurrent symptoms such as chest discomfort, unusual shortness of breath or fatigue, palpitations. After Care: Follow up with your physician as instructed. Follow a heart healthy diet with proper portion control, daily stress management, daily exercise, blood pressure and cholesterol control , and smoking cessation.

## 2019-12-23 NOTE — ANESTHESIA POSTPROCEDURE EVALUATION
Procedure(s): 
Ep Cardioversion Ablation Av Node Insert Ppm Single Ventricular. MAC, total IV anesthesia Anesthesia Post Evaluation Patient location during evaluation: PACU Note status: Adequate. Level of consciousness: responsive to verbal stimuli and sleepy but conscious Pain management: satisfactory to patient Airway patency: patent Anesthetic complications: no 
Cardiovascular status: acceptable Respiratory status: acceptable Hydration status: acceptable Comments: +Post-Anesthesia Evaluation and Assessment Patient: Lucas Choudhury MRN: [id number]  SSN: [id number] YOB: 1935  Age: 82 y.o. Sex: female Cardiovascular Function/Vital Signs /45 (BP 1 Location: Left arm, BP Patient Position: At rest)   Pulse 75   Temp 36.6 °C (97.9 °F)   Resp 18   Wt 66.4 kg (146 lb 6.2 oz)   SpO2 94%   BMI 28.59 kg/m² Patient is status post Procedure(s): 
Ep Cardioversion Ablation Av Node Insert Ppm Single Ventricular. Nausea/Vomiting: Controlled. Postoperative hydration reviewed and adequate. Pain: 
Pain Scale 1: Numeric (0 - 10) (12/23/19 1030) Pain Intensity 1: 0 (12/23/19 1030) Managed. Neurological Status: At baseline. Mental Status and Level of Consciousness: Arousable. Pulmonary Status:  
O2 Device: Room air (12/23/19 0945) Adequate oxygenation and airway patent. Complications related to anesthesia: None Post-anesthesia assessment completed. No concerns. Signed By: Dipti Interiano MD  
 12/23/2019 Post anesthesia nausea and vomiting:  controlled Vitals Value Taken Time /45 12/23/2019 10:30 AM  
Temp 36.6 °C (97.9 °F) 12/23/2019 10:30 AM  
Pulse 75 12/23/2019 10:35 AM  
Resp 14 12/23/2019 10:35 AM  
SpO2 94 % 12/23/2019 10:35 AM  
Vitals shown include unvalidated device data.

## 2019-12-23 NOTE — PROGRESS NOTES
Hospitalist Progress Note NAME: Jody Padilla :  1935 MRN:  [id number] Assessment / Plan: 
Atrial flutter with RVR Moderate mitral valve stenosis and regurgitation associated with severe IV regurgitation 
-pt remains on diltiazem gtt, wean as tolerated. Rate controlled 
-pharm to help with coumadin dosing 
-cardiology input is appreciated, for avf ablation today Acute hypoxic respiratory failure most likely secondary to CHF exacerbation and pneumonia 
-CXR showed Bibasilar airspace disease with bilateral effusions 
-cont' empiric abx 
-hold lasix due to rising cr 
-cont' xopenex prn 
-O2 suppl prn Chronic kidney disease stage III-IV 
-lasix held due to cr rising 
-avoid other nephrotoxic agents 
-cont' PO bicarb Hypothyroidism 
-continue home medication Dementia 
-supportive care. Mentation at baseline currently 
-family at bedside Code Status: Full Surrogate Decision Maker:Yana Muniz  
DVT Prophylaxis: Coumadin GI Prophylaxis: not indicated 
   
 
Subjective: Chief Complaint / Reason for Physician Visit 
NAD. No new complaint. Discussed with RN events overnight. Review of Systems: 
Symptom Y/N Comments  Symptom Y/N Comments Fever/Chills    Chest Pain Poor Appetite    Edema Cough    Abdominal Pain Sputum    Joint Pain SOB/HASKINS    Pruritis/Rash Nausea/vomit    Tolerating PT/OT Diarrhea    Tolerating Diet Constipation    Other Could NOT obtain due to: dementia Objective: VITALS:  
Last 24hrs VS reviewed since prior progress note. Most recent are: 
Patient Vitals for the past 24 hrs: 
 Temp Pulse Resp BP SpO2  
19 1010  75  164/40 94 % 19 1005  75  (!) 159/38 95 % 19 1000  75  136/42 94 % 19 0955  75  (!) 162/33 94 % 19 0950  75  (!) 159/36 95 % 19 0945  75  (!) 130/35 94 % 19 0944 97.7 °F (36.5 °C) 74 16 (!) 129/39 95 % 12/23/19 0940 97.7 °F (36.5 °C) 75 18 (!) 129/39 95 % 12/23/19 0700 97.5 °F (36.4 °C) 93 18 151/40 95 % 12/23/19 0300 98 °F (36.7 °C) 87 18 138/44 96 % 12/22/19 2312 98.1 °F (36.7 °C) 92 18 (!) 137/38 96 % 12/22/19 1929 98.6 °F (37 °C) 86 18 143/41 97 % 12/22/19 1742    132/90   
12/22/19 1449 98.7 °F (37.1 °C) (!) 107 18 148/72 93 % 12/22/19 1048 98.2 °F (36.8 °C) 85 18 134/41 96 % Intake/Output Summary (Last 24 hours) at 12/23/2019 1025 Last data filed at 12/23/2019 0930 Gross per 24 hour Intake 550 ml Output 1021 ml Net -471 ml PHYSICAL EXAM: 
General: Female in bed, cooperative, no acute distress   
EENT:  EOMI. Anicteric sclerae. MMM Resp:  CTA bilaterally, no wheezing or rales. No accessory muscle use CV:  irregular  rhythm,  No edema GI:  Soft, Non distended, Non tender.  +Bowel sounds Neurologic:  Awake, conversant, moving all exts Psych:   Fair insight. Not anxious nor agitated Skin:  No rashes. No jaundice Reviewed most current lab test results and cultures  YES Reviewed most current radiology test results   YES Review and summation of old records today    NO Reviewed patient's current orders and MAR    YES 
PMH/SH reviewed - no change compared to H&P 
________________________________________________________________________ Care Plan discussed with: 
  Comments Patient x Family  x   
RN x Care Manager Consultant Multidiciplinary team rounds were held today with , nursing, pharmacist and clinical coordinator. Patient's plan of care was discussed; medications were reviewed and discharge planning was addressed. ________________________________________________________________________ Total NON critical care TIME:  35   Minutes Total CRITICAL CARE TIME Spent:   Minutes non procedure based Comments >50% of visit spent in counseling and coordination of care ________________________________________________________________________ Suzy Engel MD  
 
Procedures: see electronic medical records for all procedures/Xrays and details which were not copied into this note but were reviewed prior to creation of Plan. LABS: 
I reviewed today's most current labs and imaging studies. Pertinent labs include: 
Recent Labs  
  12/23/19 
0506 12/22/19 
0512 12/21/19 
0429 WBC 8.7 6.7 10.5 HGB 8.5* 8.0* 9.3* HCT 27.1* 26.2* 29.9*  
 214 240 Recent Labs  
  12/23/19 
0506 12/22/19 
1635 12/22/19 
9912 12/21/19 
0429 12/20/19 
2222   --  141 141 140  
K 3.9  --  4.0 4.5 4.5  
*  --  110* 110* 105 CO2 25  --  23 23 23 GLU 91  --  86 151* 148* BUN 57*  --  61* 60* 57* CREA 1.73*  --  1.90* 1.73* 1.68* CA 8.5  --  7.9* 8.3* 8.7 INR 2.4* 2.5*  --   --  2.5* Signed: Suzy Engel MD

## 2019-12-23 NOTE — PROGRESS NOTES
Cardiology Progress Note [address] [facility], [place], [address] [facility]  [phone] 
 
12/23/2019 9:28 AM 
 
Admit Date: 12/21/2019 Admit Diagnosis: Atrial fibrillation with rapid ventricular response ([address] [I48.91]; Atrial flutter ([address] [I48.92] Subjective:  
 
Zoey Ochoa   denies chest pain. Visit Vitals /40 (BP 1 Location: Right arm, BP Patient Position: At rest;Supine) Pulse 93 Temp 97.5 °F (36.4 °C) Resp 18 Wt 146 lb 6.2 oz (66.4 kg) SpO2 95% BMI 28.59 kg/m² Current Facility-Administered Medications Medication Dose Route Frequency  heparinized saline 2 units/mL infusion    CONTINUOUS  
 lidocaine (XYLOCAINE) 10 mg/mL (1 %) injection    PRN  
 bacitracin injection    PRN  
 dilTIAZem (CARDIZEM) 100 mg in dextrose 5% (MBP/ADV) 100 mL infusion  0-15 mg/hr IntraVENous TITRATE  sodium chloride (NS) flush 5-40 mL  5-40 mL IntraVENous Q8H  
 sodium chloride (NS) flush 5-40 mL  5-40 mL IntraVENous PRN  
 acetaminophen (TYLENOL) tablet 650 mg  650 mg Oral Q6H PRN  
 ondansetron (ZOFRAN) injection 4 mg  4 mg IntraVENous Q6H PRN  
 levoFLOXacin (LEVAQUIN) 500 mg in D5W IVPB  500 mg IntraVENous Q48H  mirtazapine (REMERON) tablet 30 mg  30 mg Oral QHS  sodium bicarbonate tablet 650 mg  650 mg Oral BID  hydrALAZINE (APRESOLINE) tablet 100 mg  100 mg Oral TID  levothyroxine (SYNTHROID) tablet 88 mcg  88 mcg Oral ACB  pantoprazole (PROTONIX) tablet 40 mg  40 mg Oral ACB  [Held by provider] furosemide (LASIX) injection 20 mg  20 mg IntraVENous BID  WARFARIN INFORMATION NOTE (COUMADIN)   Other QPM  
 levalbuterol (XOPENEX) nebulizer soln 1.25 mg/3 mL  1.25 mg Nebulization Q4H PRN  
 guaiFENesin-dextromethorphan (ROBITUSSIN DM) 100-10 mg/5 mL syrup 5 mL  5 mL Oral Q6H PRN Facility-Administered Medications Ordered in Other Encounters Medication Dose Route Frequency  midazolam (VERSED) injection   IntraVENous PRN  
  propofol (DIPRIVAN) 10 mg/mL injection   IntraVENous PRN  propofol (DIPRIVAN) 10 mg/mL injection   IntraVENous CONTINUOUS  
 0.9% sodium chloride infusion   IntraVENous CONTINUOUS  
 fentaNYL citrate (PF) injection   IntraVENous PRN  
 vancomycin (VANCOCIN) 1 g in 0.9% sodium chloride (MBP/ADV) 250 mL adv   IntraVENous CONTINUOUS Objective:  
  
Visit Vitals /40 (BP 1 Location: Right arm, BP Patient Position: At rest;Supine) Pulse 93 Temp 97.5 °F (36.4 °C) Resp 18 Wt 146 lb 6.2 oz (66.4 kg) SpO2 95% BMI 28.59 kg/m² Physical Exam: Abdomen: soft, non-tender Extremities: extremities normal 
Heart: regular rate and rhythm Lungs: clear to auscultation bilaterally Pulses: 2+ and symmetric Data Review:  
Labs:   
Recent Labs  
  12/23/19 
0506 12/22/19 
0512 12/21/19 
0429 WBC 8.7 6.7 10.5 HGB 8.5* 8.0* 9.3* HCT 27.1* 26.2* 29.9*  
 214 240 Recent Labs  
  12/23/19 
0506 12/22/19 
1635 12/22/19 
0072 12/21/19 
0429 12/20/19 
2222   --  141 141 140  
K 3.9  --  4.0 4.5 4.5  
*  --  110* 110* 105 CO2 25  --  23 23 23 GLU 91  --  86 151* 148* BUN 57*  --  61* 60* 57* CREA 1.73*  --  1.90* 1.73* 1.68* CA 8.5  --  7.9* 8.3* 8.7 INR 2.4* 2.5*  --   --  2.5* Recent Labs  
  12/21/19 
0816 12/20/19 
2222 TROIQ <0.05 <0.05 Intake/Output Summary (Last 24 hours) at 12/23/2019 9568 Last data filed at 12/22/2019 2350 Gross per 24 hour Intake 150 ml Output 1001 ml Net -851 ml Telemetry: v paced Assessment:  
 
Active Problems: 
  Essential hypertension (2/26/2019) Dementia ([facility] (2/26/2019) Atrial flutter ([facility] (12/21/2019) Atrial fibrillation with rapid ventricular response ([facility] (12/21/2019) Plan:  
 
Avinash Agosto is sp av node abl  (la afl and failed cardioversion times 3), vvi ppm. cxr pending.  If cxr wnl, then ok for dc this afternoon from cardiology standpoint/ 
 
 Aminta Ayuob MD, [facility] 
 
12/23/2019

## 2019-12-23 NOTE — PROGRESS NOTES
0700: Bedside shift change report given to YOLIS Skaggs RN (oncoming nurse) by Shanel Schaefer RN (offgoing nurse). Report included the following information SBAR and Kardex. 0745: EP lab at bedside, son at bedside, Son is POA and signed consents, there were no additional questions asked. 0815: TRANSFER - OUT REPORT: 
 
Verbal report given to [place] ROSSY Escoto(name) on Natasha Colvin  being transferred to [facility] (unit) for ordered procedure Report consisted of patients Situation, Background, Assessment and  
Recommendations(SBAR). Information from the following report(s) SBAR and Kardex was reviewed with the receiving nurse. Lines:  
Peripheral IV 12/21/19 Right Antecubital (Active) Site Assessment Clean, dry, & intact 12/23/2019  7:30 AM  
Phlebitis Assessment 0 12/23/2019  7:30 AM  
Infiltration Assessment 0 12/23/2019  7:30 AM  
Dressing Status Clean, dry, & intact 12/23/2019  7:30 AM  
Dressing Type Transparent;Tape 12/23/2019  7:30 AM  
Hub Color/Line Status Pink; Infusing 12/23/2019  7:30 AM  
   
Peripheral IV 12/23/19 Left Antecubital (Active) Site Assessment Clean, dry, & intact 12/23/2019  7:30 AM  
Phlebitis Assessment 0 12/23/2019  7:30 AM  
Infiltration Assessment 0 12/23/2019  7:30 AM  
Dressing Status Clean, dry, & intact 12/23/2019  7:30 AM  
Dressing Type Transparent;Tape 12/23/2019  7:30 AM  
Hub Color/Line Status Blue 12/23/2019  7:30 AM  
  
 
Opportunity for questions and clarification was provided. Patient transported with: 
 Registered Nurse

## 2019-12-23 NOTE — PROGRESS NOTES
Transitional Care Team: Initial HUG Note Date of Assessment: 12/23/19 Time of Assessment:  11:55 AM 
 
Sima Menchaca is a 82 y.o. female inpatient at  [facility]. Assessment & Plan Pt sleeping S/P AV ablation procedure. Hx of dementia, family at bedside. States she was at her baseline mentation before sedation for the procedure. MD has reported to them that she is doing well and had mentioned possibility of discharge later today. Family has requested pt stay overnight to be sure she remains stable. Plan for discharge in AM to home, possible benefit for [facility] services. Primary Diagnosis:heart failure/ A-Fib Advance Directive:  AMD with designated proxy document is in her medical record. Current Code Status:  full Referral to Hospice/ Palliative Care Appropriate: no. 
 
Awareness of Medical Conditions: (Trajectory of illness and pts expectations). Pt is not aware, family have been updated by provider team 
 
Discharge Needs: (to include safety issues) home health Barriers Identified: dementia Patient is willing to go to SNF/Inpatient Rehab if recommended: yes if needed Medication Review:  await. AVS 
 
Can patient afford medications:  Current meds. Patient is Compliant with Medication regimen:yes Who manages medications at home: family Best Patient Contact Number:[phone] HUG (Healthy Understanding of Goals) program introduced to patient/family. The Transitional Care Team bridges the gaps in care and education surrounding discharge from the acute care facility. The objective is to empower the patient and family in taking a proactive role in preventing readmission within the first thirty days after discharge. The team is also involved in the efforts to reduce readmission to the acute care setting after stabilization and discharge from the acute care environment either to skilled nursing facilities or community. [facility] RN/NP will return with [facility] Calendar/ follow up appointments/ Ambulatory Nurse Navigator name and contact information when the patient is ready for discharge. Future Appointments Date Time Provider Fernando Laura 5/11/2020  9:40 AM Karuna Salinas MD [address] Non-MG follow up appointment(s): none yet [facility]: call information given to  Lives outside service area Patient education focused on readmission zones as described as: The Red Zone: High risk for readmission, days 1-21 The Yellow Zone: Moderate  risk for readmission, days 22-29 The Green Zone: Lower risk for readmission, days 30 and after The [facility] Team will attempt to follow the patient from a distance while inpatient as well as be available for further transition/disposition needs. The [facility] team will continue to offer support during the 30- 90 day discharge from acute care setting. Will notify Ambulatory {Blank Single Select Template:[id number][de-identified] \"SURINDER   RN. Past Medical History:  
Diagnosis Date  Aortic valve disorders AS  Asthma  Benign hypertensive heart disease without heart failure  Diabetes ([address]  Fibromyalgia  Gastroparesis  GERD (gastroesophageal reflux disease)  Hypertension  Mitral valve disorders(424.0) MR  
 LIVIA (obstructive sleep apnea)  Paroxysmal atrial fibrillation (HCC)  Pure hypercholesterolemia 9/30/2010

## 2019-12-23 NOTE — ANESTHESIA PREPROCEDURE EVALUATION
Relevant Problems No relevant active problems Anesthetic History No history of anesthetic complications Review of Systems / Medical History Patient summary reviewed, nursing notes reviewed and pertinent labs reviewed Pulmonary Sleep apnea: CPAP Asthma Comments: Former smoker - [address] Neuro/Psych Cardiovascular Hypertension Valvular problems/murmurs: mitral insufficiency and aortic insufficiency Dysrhythmias : atrial flutter and atrial fibrillation Hyperlipidemia Comments: TTE (3/22/19): 
·Aortic valve leaflet calcification present. Abnormal left aortic leaflet mobility. Severe aortic valve regurgitation is present. ·Mitral annular calcification. Moderate mitral valve stenosis. Moderate mitral valve regurgitation. ·Estimated left ventricular ejection fraction is 61 - 65%. ·Left atrial cavity size is mildly dilated. ·Mild tricuspid valve regurgitation is present. Mild pulmonary hypertension is present. GI/Hepatic/Renal 
  
GERD Comments: Gastroparesis Endo/Other Diabetes: well controlled, type 2 Hypothyroidism Comments: Diet-controlled DMII Other Findings Physical Exam 
 
Airway Mallampati: II 
TM Distance: > 6 cm Neck ROM: normal range of motion Mouth opening: Normal 
 
 Cardiovascular Regular rate and rhythm,  S1 and S2 normal,  no murmur, click, rub, or gallop Dental 
 
Dentition: Poor dentition and Implants Comments: Multiple missing teeth, lower implants Pulmonary Breath sounds clear to auscultation Abdominal 
GI exam deferred Other Findings Anesthetic Plan ASA: 3 Anesthesia type: MAC and total IV anesthesia Induction: Intravenous Anesthetic plan and risks discussed with: Patient

## 2019-12-23 NOTE — PROGRESS NOTES
Bedside and Verbal shift change report given to Aleena Nguyễn RN (oncoming nurse) by ROSSY Candelario (offgoing nurse). Report included the following information SBAR and Kardex. 1955: pt converted to [address] 2005: pt in 1st degree AV block Bedside and Verbal shift change report GIVEN TO Chacorta Dexter RN. Report included the following information SBAR and Kardex. SIGNIFICANT CHANGES DURING SHIFT:  Pt in and out of aflutter. CONCERNS TO ADDRESS WITH MD:   
 
 
 
 
[facility] NURSING NOTE Admission Date 12/21/2019 Admission Diagnosis Atrial fibrillation with rapid ventricular response ([address] [I48.91] Atrial flutter ([address] [I48.92] Consults IP CONSULT TO CARDIOLOGY Cardiac Monitoring [x] Yes [] No  
  
Purposeful Hourly Rounding [x] Yes   
Judd Score Total Score: 3 Judd score 3 or > [] Bed Alarm [] Avasys [] 1:1 sitter [] Patient refused (Signed refusal form in chart) Rafiq Score Rafiq Score: 17 Rafiq score 14 or < [] PMT consult [] Wound Care consult  
 []  Specialty bed  [] Nutrition consult Influenza Vaccine Received Flu Vaccine for Current Season (usually Sept-March): Yes Oxygen needs? [x] Room air Oxygen @  []1L    []2L    []3L   []4L    []5L   []6L via NC Chronic home O2 use? [] Yes [] No 
Perform O2 challenge test and document in progress note using smartphrase (.Homeoxygen) Last bowel movement Last Bowel Movement Date: 12/22/19 Urinary Catheter LDAs Peripheral IV 12/21/19 Right Antecubital (Active) Site Assessment Clean, dry, & intact 12/23/2019  3:45 AM  
Phlebitis Assessment 0 12/23/2019  3:45 AM  
Infiltration Assessment 0 12/23/2019  3:45 AM  
Dressing Status Clean, dry, & intact 12/23/2019  3:45 AM  
Dressing Type Tape;Transparent 12/23/2019  3:45 AM  
Hub Color/Line Status Pink; Infusing 12/23/2019  3:45 AM  
   
Peripheral IV 12/23/19 Left Antecubital (Active) Site Assessment Clean, dry, & intact 12/23/2019  6:58 AM  
 Phlebitis Assessment 0 12/23/2019  6:58 AM  
Infiltration Assessment 0 12/23/2019  6:58 AM  
Dressing Status Clean, dry, & intact 12/23/2019  6:58 AM  
Dressing Type Tape;Transparent 12/23/2019  6:58 AM  
Hub Color/Line Status Blue;Flushed 12/23/2019  6:58 AM  
                  
  
Readmission Risk Assessment Tool Score High Risk   
      
 28 Total Score 3 Has Seen PCP in Last 6 Months (Yes=3, No=0)  
 4 IP Visits Last 12 Months (1-3=4, 4=9, >4=11) 5 Pt. Coverage (Medicare=5 , Medicaid, or Self-Pay=4) 16 Charlson Comorbidity Score (Age + Comorbid Conditions) Criteria that do not apply:  
 . Living with Significant Other. Assisted Living. LTAC. SNF. or  
Rehab Patient Length of Stay (>5 days = 3) Expected Length of Stay - - - Actual Length of Stay 2

## 2019-12-23 NOTE — PROGRESS NOTES
Problem: Falls - Risk of 
Goal: *Absence of Falls Description Document Pemberton Mina Fall Risk and appropriate interventions in the flowsheet. Outcome: Progressing Towards Goal 
Note: Fall Risk Interventions: 
Mobility Interventions: Bed/chair exit alarm, Communicate number of staff needed for ambulation/transfer, Patient to call before getting OOB Mentation Interventions: Adequate sleep, hydration, pain control, Bed/chair exit alarm, Evaluate medications/consider consulting pharmacy Medication Interventions: Assess postural VS orthostatic hypotension, Bed/chair exit alarm, Patient to call before getting OOB Elimination Interventions: Bed/chair exit alarm, Call light in reach, Patient to call for help with toileting needs Problem: Patient Education: Go to Patient Education Activity Goal: Patient/Family Education Outcome: Progressing Towards Goal 
  
Problem: Pressure Injury - Risk of 
Goal: *Prevention of pressure injury Description Document Rafiq Scale and appropriate interventions in the flowsheet. Outcome: Progressing Towards Goal 
Note: Pressure Injury Interventions: 
Sensory Interventions: Assess changes in LOC, Float heels, Pressure redistribution bed/mattress (bed type) Moisture Interventions: Apply protective barrier, creams and emollients, Maintain skin hydration (lotion/cream) Activity Interventions: Assess need for specialty bed, Increase time out of bed Mobility Interventions: HOB 30 degrees or less, Float heels, Pressure redistribution bed/mattress (bed type) Nutrition Interventions: Document food/fluid/supplement intake, Offer support with meals,snacks and hydration Friction and Shear Interventions: Apply protective barrier, creams and emollients, Foam dressings/transparent film/skin sealants

## 2019-12-23 NOTE — PROGRESS NOTES
Reason for Admission:   afib with RVR 
            
RRAT Score:     28 high risk Resources/supports as identified by patient/family:   Lives with daughter Amy Courtney, other daughter KEENAN LEE is also a support Top Challenges facing patient (as identified by patient/family and CM): Finances/Medication cost?      No challenges reported Transportation? Pt's daughters provide transportation Support system or lack thereof?  family Living arrangements? Lives with daughter Amy Courtney, 1 story house, outside ramp Self-care/ADLs/Cognition? Supervision assist from daughter for bathing, assistance from daughter for dressing, meals and medications and transportation Current Advanced Directive/Advance Care Plan: On file 3/2019 Plan for utilizing home health:    Has used home health in the past 
              
Transition of Care Plan: 1.  Patient will need 2nd IM letter at discharge 2. Patient prefers to discharge home with family assistance and follow up appointments. Patient's daughter Amy Courtney acknowledges that more assistance is needed at home Patient is a 82year old female admitted 12/21 for afib with RVR. Patient alert and oriented, resting in bed with daughters Amy Courtney and KEENAN LEE present in room. Demographic information verified and correct. Insurance information verified and correct. Patient lives with her daughter Amy Courtney, no home oxygen, has a rollator, shower seat and hospital bed at home and uses a cane for ambulation. Patient has used home health in the past.  Patient uses [facility] in [place]. Patient's daughter states she provides supervision for bathing, helps her mother dress and provides meals, manages her medications and provides transportation. Patient's daughter can transport at discharge. Care Management Interventions PCP Verified by CM: Sohan Yan MD) Mode of Transport at Discharge: Other (see comment)(pt's daughter Marleni Melgar can transport at dc) Transition of Care Consult (CM Consult): Discharge Planning Discharge Durable Medical Equipment: No 
Physical Therapy Consult: No 
Occupational Therapy Consult: No 
Speech Therapy Consult: No 
Current Support Network: Relative's Home(lives with daughter Marleni Melgar, 1 story house, outside ramp) Confirm Follow Up Transport: Family Discharge Location Discharge Placement: Home George Metcalf RN, BSN [facility] [phone]

## 2019-12-24 NOTE — PROGRESS NOTES
0700: Bedside and Verbal shift change report given to day shift nurse Rupali (oncoming nurse) by Nicole Faye RN (offgoing nurse). Report included the following information: SBAR, Kardex, Intake/Output, MAR, Procedural information, and Recent Results.

## 2019-12-24 NOTE — PROGRESS NOTES
Problem: Falls - Risk of 
Goal: *Absence of Falls Description Document Isaias Avani Fall Risk and appropriate interventions in the flowsheet. Outcome: Progressing Towards Goal 
Note: Fall Risk Interventions: 
Mobility Interventions: Assess mobility with egress test 
 
Mentation Interventions: Adequate sleep, hydration, pain control, More frequent rounding, Room close to nurse's station Medication Interventions: Teach patient to arise slowly, Patient to call before getting OOB Elimination Interventions: Call light in reach, Toilet paper/wipes in reach, Toileting schedule/hourly rounds Problem: Pressure Injury - Risk of 
Goal: *Prevention of pressure injury Description Document Rafiq Scale and appropriate interventions in the flowsheet. Outcome: Progressing Towards Goal 
Note: Pressure Injury Interventions: 
Sensory Interventions: Assess changes in LOC Moisture Interventions: Absorbent underpads, Offer toileting Q_hr Activity Interventions: Increase time out of bed Mobility Interventions: HOB 30 degrees or less, PT/OT evaluation Nutrition Interventions: Document food/fluid/supplement intake Friction and Shear Interventions: HOB 30 degrees or less, Minimize layers

## 2019-12-24 NOTE — PROGRESS NOTES
Problem: Falls - Risk of 
Goal: *Absence of Falls Description Document Trae Lozoya Fall Risk and appropriate interventions in the flowsheet. Outcome: Progressing Towards Goal 
Note: Fall Risk Interventions: 
Mobility Interventions: Bed/chair exit alarm Mentation Interventions: Adequate sleep, hydration, pain control, More frequent rounding, Room close to nurse's station Medication Interventions: Bed/chair exit alarm Elimination Interventions: Bed/chair exit alarm, Call light in reach Problem: Patient Education: Go to Patient Education Activity Goal: Patient/Family Education Outcome: Progressing Towards Goal 
  
Problem: Pressure Injury - Risk of 
Goal: *Prevention of pressure injury Description Document Rafiq Scale and appropriate interventions in the flowsheet. Outcome: Progressing Towards Goal 
Note: Pressure Injury Interventions: 
Sensory Interventions: Assess changes in LOC Moisture Interventions: Absorbent underpads Activity Interventions: Increase time out of bed Mobility Interventions: PT/OT evaluation Nutrition Interventions: Offer support with meals,snacks and hydration Friction and Shear Interventions: Lift sheet

## 2019-12-24 NOTE — PROGRESS NOTES
SURINDER 
Home with family support Hospital follow up visits. 2nd IM signed. Copy to patient and on chart. CM reviewed discharge planning with [facility]. This CM confirmed reference to need for additional help in the home. Ms. Lorne Primrose informed CM patient does not qualify for Medicaid. Patient has Medicare Part A&B and secondary insurance. There are 9 siblings that assist with help. Candace Chino works PR[employer] and family assists with care when needed. CM did offer caregiver list for family to make arrangements for additional care if needed and it was declined. [facility], [address] Hospital Bed, [address] Preferred Rx - [facility]  [phone] Daughter will provide transportation home at time of discharge. CM will continue to follow for discharge planning. 1205pm 
Follow up appointments complete. Nursing informed. Po David RNCM Ext [phone]

## 2019-12-24 NOTE — PROGRESS NOTES
Cardiology Progress Note [address]  [phone] 
 
12/24/2019 8:19 AM 
 
Admit Date: 12/21/2019 Admit Diagnosis: Atrial fibrillation with rapid ventricular response ([address] [I48.91]; Atrial flutter ([address] [I48.92] Subjective:  
 
Isela Brown   denies chest pain. Visit Vitals /41 (BP 1 Location: Right arm, BP Patient Position: At rest) Pulse 76 Temp 98.2 °F (36.8 °C) Resp 17 Wt 138 lb (62.6 kg) SpO2 98% BMI 26.95 kg/m² Current Facility-Administered Medications Medication Dose Route Frequency  dilTIAZem (CARDIZEM) 100 mg in dextrose 5% (MBP/ADV) 100 mL infusion  0-15 mg/hr IntraVENous TITRATE  sodium chloride (NS) flush 5-40 mL  5-40 mL IntraVENous Q8H  
 sodium chloride (NS) flush 5-40 mL  5-40 mL IntraVENous PRN  
 acetaminophen (TYLENOL) tablet 650 mg  650 mg Oral Q6H PRN  
 ondansetron (ZOFRAN) injection 4 mg  4 mg IntraVENous Q6H PRN  
 levoFLOXacin (LEVAQUIN) 500 mg in D5W IVPB  500 mg IntraVENous Q48H  mirtazapine (REMERON) tablet 30 mg  30 mg Oral QHS  sodium bicarbonate tablet 650 mg  650 mg Oral BID  hydrALAZINE (APRESOLINE) tablet 100 mg  100 mg Oral TID  levothyroxine (SYNTHROID) tablet 88 mcg  88 mcg Oral ACB  pantoprazole (PROTONIX) tablet 40 mg  40 mg Oral ACB  [Held by provider] furosemide (LASIX) injection 20 mg  20 mg IntraVENous BID  levalbuterol (XOPENEX) nebulizer soln 1.25 mg/3 mL  1.25 mg Nebulization Q4H PRN  
 guaiFENesin-dextromethorphan (ROBITUSSIN DM) 100-10 mg/5 mL syrup 5 mL  5 mL Oral Q6H PRN Objective:  
  
Visit Vitals /41 (BP 1 Location: Right arm, BP Patient Position: At rest) Pulse 76 Temp 98.2 °F (36.8 °C) Resp 17 Wt 138 lb (62.6 kg) SpO2 98% BMI 26.95 kg/m² Physical Exam: Abdomen: soft, non-tender Extremities: extremities normal 
Heart: regular rate and rhythm Lungs: clear to auscultation bilaterally Pulses: 2+ and symmetric Data Review:  
Labs:   
Recent Labs  
  12/24/19 
0231 12/23/19 
0506 12/22/19 
0720 WBC 9.5 8.7 6.7 HGB 7.8* 8.5* 8.0*  
HCT 24.8* 27.1* 26.2*  
 235 214 Recent Labs  
  12/24/19 
0231 12/23/19 
0506 12/22/19 
1635 12/22/19 
3608  141  --  141  
K 4.1 3.9  --  4.0  
* 109*  --  110* CO2 23 25  --  23 * 91  --  86  
BUN 59* 57*  --  61* CREA 1.97* 1.73*  --  1.90* CA 8.2* 8.5  --  7.9* INR 2.7* 2.4* 2.5*  -- No results for input(s): TROIQ, CPK, CKMB in the last 72 hours. Intake/Output Summary (Last 24 hours) at 12/24/2019 8095 Last data filed at 12/23/2019 1205 Gross per 24 hour Intake 400 ml Output 370 ml Net 30 ml Telemetry: v paced Assessment:  
 
Active Problems: 
  Essential hypertension (2/26/2019) Dementia ([facility] (2/26/2019) Atrial flutter ([facility] (12/21/2019) Atrial fibrillation with rapid ventricular response ([facility] (12/21/2019) Plan:  
 
Nikkie Lewis is sp av node/ppm. Small hematoma  - pressure dressing applied. Will hold oac for 5 days. Can resume as outpt. Cr has bumped. Sparkle  for dc this afternoon - if no hematoma, after pressure dressing removed. F/u in 2 weeks as outpt Madhav Bolton MD, [facility] 
 
12/24/2019

## 2019-12-24 NOTE — DISCHARGE SUMMARY
Hospitalist Discharge Summary Patient ID: 
Taran Kearney [id number] 
16 y.o. 
1935 PCP on record: Leonard Rodriguez MD 
 
Admit date: 12/21/2019 Discharge date and time: 12/24/2019 DISCHARGE DIAGNOSIS: 
 
Atrial flutter with RVR 
 
 
CONSULTATIONS: 
IP CONSULT TO CARDIOLOGY Excerpted HPI from H&P of Amanda Luna MD: 
82years old female from home with past medical history significant for dementia, atrial fibrillation, heart failure, COPD was transferred from [address] [place] [address] ED for evaluation of severe shortness of breath associated palpitation, oxygen saturation was dropped to 85% with ambulation, EKG show atrial flutter at rate 1 30-1 40 patient was started on Cardizem drip chest x-ray was done and show bibasilar airspace disease with bilateral effusion. ______________________________________________________________________ DISCHARGE SUMMARY/HOSPITAL COURSE:  for full details see H&P, daily progress notes, labs, consult notes. Atrial flutter with RVR Moderate mitral valve stenosis and regurgitation associated with severe IV regurgitation 
sp av node/ppm rate controlled now DC home today follow up with cardiology OP in 2 weeks Acute hypoxic respiratory failure most likely secondary to CHF exacerbation and pneumonia CXR showed Bibasilar airspace disease with bilateral effusions Resume home diuretics \"[facility] ED for evaluation of severe shortness of breath associated palpitation, oxygen saturation was dropped to 85% with ambulation' was documented. Chronic kidney disease stage III-IV Hypothyroidism 
continue home medication Dementia 
supportive care. Mentation at baseline currently 
family at bedside 
 
 
  
 
_______________________________________________________________________ Patient seen and examined by me on discharge day.  
Pertinent Findings: 
General:          Female in bed, cooperative, no acute distress   
 EENT:              EOMI. Anicteric sclerae. MMM Resp:               CTA bilaterally, no wheezing or rales. No accessory muscle use CV:                  irregular  rhythm,  No edema GI:                   Soft, Non distended, Non tender.  +Bowel sounds Neurologic:      Awake, conversant, moving all exts Psych:             Fair insight. Not anxious nor agitated Skin:                No rashes. No jaundice 
_______________________________________________________________________ DISCHARGE MEDICATIONS:  
Current Discharge Medication List  
  
 
 
My Recommended Diet, Activity, Wound Care, and follow-up labs are listed in the patient's Discharge Insturctions which I have personally completed and reviewed. ______________________________________________________________________ Risk of deterioration: Moderate Condition at Discharge:  Stable 
______________________________________________________________________ Disposition Home with family, no needs 
 
______________________________________________________________________ Care Plan discussed with:  
Patient, Family, RN, Care Manager, Consultant Comment:  
______________________________________________________________________ Code Status: Full Code 
___

## 2019-12-26 NOTE — PROGRESS NOTES
Hospital Discharge Follow-Up Date/Time:  2019 9:05 AM 
 
Patient was admitted to [facility] on 19 and discharged on 19 for Atrial flutter with RVR. The physician discharge summary was available at the time of outreach. Patient was contacted within 1 business days of discharge. Top Challenges reviewed with the provider Patient is having right leg pain. May benefit from [facility] PT Advance Care Planning:  
Does patient have an Advance Directive:  reviewed and current Method of communication with provider :fax Inpatient RRAT score: 28 Was this a readmission? no  
Patient stated reason for the readmission: n/a Care Transition Nurse (CTN) contacted the family by telephone to perform post hospital discharge assessment. Verified name and  with family as identifiers. Provided introduction to self, and explanation of the CTN role. Family received hospital discharge instructions. CTN reviewed discharge instructions and red flags with family who verbalized understanding. Family given an opportunity to ask questions and does not have any further questions or concerns at this time. The family agrees to contact the PCP office for questions related to their healthcare. CTN provided contact information for future reference. Disease Specific:   CHF Patients top risk factors for readmission:  functional cognitive ability, lack of knowledge about disease, medical condition Home Health orders at discharge: none [address]: n/a Date of initial visit: n/a Durable Medical Equipment ordered at discharge: none [address]: n/a Durable Medical Equipment received: n/a Medication(s):  
New Medications at Discharge: none Changed Medications at Discharge: none Discontinued Medications at Discharge: none Medication reconciliation was performed with family, who verbalizes understanding of administration of home medications. There were no barriers to obtaining medications identified at this time. Referral to Pharm D needed: no  
 
Current Outpatient Medications Medication Sig  [START ON 12/29/2019] warfarin (COUMADIN) 5 mg tablet Take 1 Tab by mouth every Tuesday. Patient takes 5 mg Wednesday-Monday evenings, and 2.5 mg on Tuesday evening  vitC-E-zn- YES-cvgl-wytgop (ICAPS AREDS2) 250 mg-200 unit -12.5 mg-1 mg cap Take 1 Cap by mouth two (2) times a day.  torsemide (DEMADEX) 5 mg tablet Take 5 mg by mouth daily.  prednisoLONE acetate (PRED FORTE) 1 % ophthalmic suspension Administer 1 Drop to right eye two (2) times a day. Until 12/25/19. Starting 12/26/19 titrate down to 1 drop right eye daily  ondansetron hcl (ZOFRAN) 4 mg tablet Take 4 mg by mouth every eight (8) hours as needed for Nausea.  sodium bicarbonate 650 mg tablet Take 650 mg by mouth two (2) times a day.  metoprolol succinate (TOPROL-XL) 100 mg tablet Take 200 mg by mouth two (2) times a day.  besifloxacin (BESIVANCE) 0.6 % drps ophthalmic suspension Administer 1 Drop to right eye three (3) times daily.  brinzolamide (AZOPT) 1 % ophthalmic suspension Administer 1 Drop to right eye three (3) times daily.  hydrALAZINE (APRESOLINE) 100 mg tablet Take 1 Tab by mouth three (3) times daily.  polyethylene glycol (MIRALAX) 17 gram packet Take 17 g by mouth daily as needed (constipation).  dilTIAZem CD (CARDIZEM CD) 180 mg ER capsule Take 1 Cap by mouth daily. D/C QID dose  albuterol-ipratropium (DUO-NEB) 2.5 mg-0.5 mg/3 ml nebu 3 mL by Nebulization route every six (6) hours as needed for Other (wheeze).  levothyroxine (SYNTHROID) 88 mcg tablet Take 88 mcg by mouth Daily (before breakfast).  ALPRAZolam (XANAX) 0.25 mg tablet Take 0.25 mg by mouth as needed for Anxiety.  warfarin (COUMADIN) 5 mg tablet Take 5 mg by mouth six (6) days a week. Patient takes 5 mg Wednesday-Monday evenings, and 2.5 mg on Tuesday evening  mirtazapine (REMERON) 30 mg tablet Take 1 Tab by mouth nightly.  esomeprazole (NEXIUM) 40 mg capsule Take 40 mg by mouth daily. No current facility-administered medications for this visit. There are no discontinued medications. [facility] follow up appointment(s):  
Future Appointments Date Time Provider Fernando Lópezi 5/11/2020  9:40 AM Romana Macintosh, MD [address] [facility] follow up appointment(s): PCP 12/26/19 @ 1300 [facility]:  out of service area Goals  Reduce risk of CHF exacerbations and complications. 12/26/19 Daughter reports patient has PCP appointment 12/26/19 @ 1300 and she is waiting on call for cardio appointment. Denies chest pain, SOB, fever. Endorses pain in right leg not hot swelling decreased since ice pack applied. Will discuss with PCP at appointment today. Daughter understands low Na diet, importance of weighing daily and weight criteria. Daughter will monitor S&S of CHF and report cardio appointment at next outreach.  ROMEL

## 2019-12-26 NOTE — TELEPHONE ENCOUNTER
Dr. Arias Wood pacemaker procedure 12/21/19. Pt scheduled to see Dr. Shilo Adhikari 1/16/20. Pt has swelling in left leg. Please call.     Thanks

## 2019-12-27 NOTE — TELEPHONE ENCOUNTER
Pt calling regarding medication dosage. metoprolol succinate (TOPROL-XL) 100 mg tablet [[id number]]     Please call.     Thanks

## 2020-01-01 ENCOUNTER — PATIENT OUTREACH (OUTPATIENT)
Dept: INTERNAL MEDICINE CLINIC | Age: 85
End: 2020-01-01

## 2020-01-01 ENCOUNTER — APPOINTMENT (OUTPATIENT)
Dept: ULTRASOUND IMAGING | Age: 85
DRG: 291 | End: 2020-01-01
Attending: INTERNAL MEDICINE
Payer: MEDICARE

## 2020-01-01 ENCOUNTER — PATIENT OUTREACH (OUTPATIENT)
Dept: FAMILY MEDICINE CLINIC | Age: 85
End: 2020-01-01

## 2020-01-01 ENCOUNTER — APPOINTMENT (OUTPATIENT)
Dept: GENERAL RADIOLOGY | Age: 85
DRG: 291 | End: 2020-01-01
Attending: FAMILY MEDICINE
Payer: MEDICARE

## 2020-01-01 ENCOUNTER — HOSPICE ADMISSION (OUTPATIENT)
Dept: HOSPICE | Facility: HOSPICE | Age: 85
End: 2020-01-01

## 2020-01-01 ENCOUNTER — APPOINTMENT (OUTPATIENT)
Dept: GENERAL RADIOLOGY | Age: 85
DRG: 291 | End: 2020-01-01
Attending: INTERNAL MEDICINE
Payer: MEDICARE

## 2020-01-01 ENCOUNTER — PATIENT OUTREACH (OUTPATIENT)
Dept: CASE MANAGEMENT | Age: 85
End: 2020-01-01

## 2020-01-01 ENCOUNTER — APPOINTMENT (OUTPATIENT)
Dept: NON INVASIVE DIAGNOSTICS | Age: 85
DRG: 291 | End: 2020-01-01
Attending: INTERNAL MEDICINE
Payer: MEDICARE

## 2020-01-01 ENCOUNTER — CLINICAL SUPPORT (OUTPATIENT)
Dept: CARDIOLOGY CLINIC | Age: 85
End: 2020-01-01

## 2020-01-01 ENCOUNTER — HOSPITAL ENCOUNTER (OUTPATIENT)
Dept: ULTRASOUND IMAGING | Age: 85
Discharge: HOME | DRG: 291 | End: 2020-02-06
Attending: HOSPITALIST
Payer: MEDICARE

## 2020-01-01 ENCOUNTER — HOSPITAL ENCOUNTER (INPATIENT)
Age: 85
LOS: 2 days | Discharge: HOSPICE | DRG: 291 | End: 2020-02-21
Attending: INTERNAL MEDICINE | Admitting: INTERNAL MEDICINE
Payer: MEDICARE

## 2020-01-01 ENCOUNTER — OFFICE VISIT (OUTPATIENT)
Dept: CARDIOLOGY CLINIC | Age: 85
End: 2020-01-01

## 2020-01-01 ENCOUNTER — HOSPITAL ENCOUNTER (INPATIENT)
Age: 85
LOS: 15 days | Discharge: HOME HEALTH | DRG: 291 | End: 2020-02-12
Attending: INTERNAL MEDICINE | Admitting: INTERNAL MEDICINE
Payer: MEDICARE

## 2020-01-01 VITALS
DIASTOLIC BLOOD PRESSURE: 49 MMHG | OXYGEN SATURATION: 100 % | TEMPERATURE: 97.1 F | HEART RATE: 70 BPM | RESPIRATION RATE: 16 BRPM | SYSTOLIC BLOOD PRESSURE: 115 MMHG | BODY MASS INDEX: 24.5 KG/M2 | WEIGHT: 124.78 LBS | HEIGHT: 60 IN

## 2020-01-01 VITALS
BODY MASS INDEX: 28.78 KG/M2 | SYSTOLIC BLOOD PRESSURE: 132 MMHG | HEART RATE: 83 BPM | WEIGHT: 146.6 LBS | RESPIRATION RATE: 18 BRPM | DIASTOLIC BLOOD PRESSURE: 38 MMHG | OXYGEN SATURATION: 93 % | HEIGHT: 60 IN | TEMPERATURE: 99.1 F

## 2020-01-01 VITALS
RESPIRATION RATE: 18 BRPM | HEIGHT: 60 IN | OXYGEN SATURATION: 98 % | HEART RATE: 82 BPM | SYSTOLIC BLOOD PRESSURE: 150 MMHG | BODY MASS INDEX: 26.11 KG/M2 | DIASTOLIC BLOOD PRESSURE: 50 MMHG | WEIGHT: 133 LBS

## 2020-01-01 DIAGNOSIS — R53.83 LETHARGY: ICD-10-CM

## 2020-01-01 DIAGNOSIS — G93.41 METABOLIC ENCEPHALOPATHY: ICD-10-CM

## 2020-01-01 DIAGNOSIS — I10 ESSENTIAL HYPERTENSION: ICD-10-CM

## 2020-01-01 DIAGNOSIS — F41.9 ANXIETY: ICD-10-CM

## 2020-01-01 DIAGNOSIS — Z78.9 FULL CODE STATUS: ICD-10-CM

## 2020-01-01 DIAGNOSIS — Z71.89 GOALS OF CARE, COUNSELING/DISCUSSION: ICD-10-CM

## 2020-01-01 DIAGNOSIS — I48.19 PERSISTENT ATRIAL FIBRILLATION (HCC): ICD-10-CM

## 2020-01-01 DIAGNOSIS — Z45.018 PACEMAKER REPROGRAMMING/CHECK: Primary | ICD-10-CM

## 2020-01-01 DIAGNOSIS — Z95.0 CARDIAC PACEMAKER IN SITU: Primary | ICD-10-CM

## 2020-01-01 DIAGNOSIS — I05.0 MITRAL VALVE STENOSIS, UNSPECIFIED ETIOLOGY: ICD-10-CM

## 2020-01-01 LAB
ALBUMIN SERPL-MCNC: 3.2 G/DL (ref 3.5–5)
ANION GAP SERPL CALC-SCNC: 11 MMOL/L (ref 5–15)
ANION GAP SERPL CALC-SCNC: 3 MMOL/L (ref 5–15)
ANION GAP SERPL CALC-SCNC: 3 MMOL/L (ref 5–15)
ANION GAP SERPL CALC-SCNC: 4 MMOL/L (ref 5–15)
ANION GAP SERPL CALC-SCNC: 5 MMOL/L (ref 5–15)
ANION GAP SERPL CALC-SCNC: 6 MMOL/L (ref 5–15)
ANION GAP SERPL CALC-SCNC: 7 MMOL/L (ref 5–15)
ANION GAP SERPL CALC-SCNC: 7 MMOL/L (ref 5–15)
ANION GAP SERPL CALC-SCNC: 9 MMOL/L (ref 5–15)
ARTERIAL PATENCY WRIST A: ABNORMAL
ARTERIAL PATENCY WRIST A: YES
ATRIAL RATE: 88 BPM
AV PEAK GRADIENT: 61.58 MMHG
AV VELOCITY RATIO: 0.27
AV VTI RATIO: 0.2
B PERT DNA SPEC QL NAA+PROBE: NOT DETECTED
BASE EXCESS BLD CALC-SCNC: 0 MMOL/L
BASE EXCESS BLD CALC-SCNC: 3 MMOL/L
BASE EXCESS BLD CALC-SCNC: 3 MMOL/L
BASE EXCESS BLD CALC-SCNC: 4 MMOL/L
BASE EXCESS BLD CALC-SCNC: 9 MMOL/L
BDY SITE: ABNORMAL
BORDETELLA PARAPERTUSSIS PCR, BORPAR: NOT DETECTED
BUN SERPL-MCNC: 62 MG/DL (ref 6–20)
BUN SERPL-MCNC: 64 MG/DL (ref 6–20)
BUN SERPL-MCNC: 64 MG/DL (ref 6–20)
BUN SERPL-MCNC: 65 MG/DL (ref 6–20)
BUN SERPL-MCNC: 67 MG/DL (ref 6–20)
BUN SERPL-MCNC: 70 MG/DL (ref 6–20)
BUN SERPL-MCNC: 75 MG/DL (ref 6–20)
BUN SERPL-MCNC: 75 MG/DL (ref 6–20)
BUN SERPL-MCNC: 81 MG/DL (ref 6–20)
BUN SERPL-MCNC: 87 MG/DL (ref 6–20)
BUN SERPL-MCNC: 89 MG/DL (ref 6–20)
BUN SERPL-MCNC: 89 MG/DL (ref 6–20)
BUN SERPL-MCNC: 91 MG/DL (ref 6–20)
BUN SERPL-MCNC: 94 MG/DL (ref 6–20)
BUN/CREAT SERPL: 24 (ref 12–20)
BUN/CREAT SERPL: 25 (ref 12–20)
BUN/CREAT SERPL: 26 (ref 12–20)
BUN/CREAT SERPL: 26 (ref 12–20)
BUN/CREAT SERPL: 27 (ref 12–20)
BUN/CREAT SERPL: 31 (ref 12–20)
BUN/CREAT SERPL: 31 (ref 12–20)
BUN/CREAT SERPL: 33 (ref 12–20)
BUN/CREAT SERPL: 35 (ref 12–20)
BUN/CREAT SERPL: 35 (ref 12–20)
BUN/CREAT SERPL: 38 (ref 12–20)
BUN/CREAT SERPL: 40 (ref 12–20)
C PNEUM DNA SPEC QL NAA+PROBE: NOT DETECTED
CA-I BLD-SCNC: 1.15 MMOL/L (ref 1.12–1.32)
CA-I BLD-SCNC: 1.15 MMOL/L (ref 1.12–1.32)
CA-I BLD-SCNC: 1.16 MMOL/L (ref 1.12–1.32)
CA-I BLD-SCNC: 1.17 MMOL/L (ref 1.12–1.32)
CA-I BLD-SCNC: 1.18 MMOL/L (ref 1.12–1.32)
CALCIUM SERPL-MCNC: 7.6 MG/DL (ref 8.5–10.1)
CALCIUM SERPL-MCNC: 7.8 MG/DL (ref 8.5–10.1)
CALCIUM SERPL-MCNC: 7.9 MG/DL (ref 8.5–10.1)
CALCIUM SERPL-MCNC: 8 MG/DL (ref 8.5–10.1)
CALCIUM SERPL-MCNC: 8.1 MG/DL (ref 8.5–10.1)
CALCIUM SERPL-MCNC: 8.1 MG/DL (ref 8.5–10.1)
CALCIUM SERPL-MCNC: 8.3 MG/DL (ref 8.5–10.1)
CALCIUM SERPL-MCNC: 8.4 MG/DL (ref 8.5–10.1)
CALCIUM SERPL-MCNC: 8.4 MG/DL (ref 8.5–10.1)
CALCIUM SERPL-MCNC: 8.5 MG/DL (ref 8.5–10.1)
CALCIUM SERPL-MCNC: 8.7 MG/DL (ref 8.5–10.1)
CALCULATED P AXIS, ECG09: 94 DEGREES
CALCULATED R AXIS, ECG10: 38 DEGREES
CALCULATED T AXIS, ECG11: -76 DEGREES
CHLORIDE SERPL-SCNC: 100 MMOL/L (ref 97–108)
CHLORIDE SERPL-SCNC: 101 MMOL/L (ref 97–108)
CHLORIDE SERPL-SCNC: 106 MMOL/L (ref 97–108)
CHLORIDE SERPL-SCNC: 108 MMOL/L (ref 97–108)
CHLORIDE SERPL-SCNC: 111 MMOL/L (ref 97–108)
CHLORIDE SERPL-SCNC: 112 MMOL/L (ref 97–108)
CHLORIDE SERPL-SCNC: 112 MMOL/L (ref 97–108)
CHLORIDE SERPL-SCNC: 114 MMOL/L (ref 97–108)
CHLORIDE SERPL-SCNC: 98 MMOL/L (ref 97–108)
CHLORIDE UR-SCNC: 24 MMOL/L
CO2 SERPL-SCNC: 25 MMOL/L (ref 21–32)
CO2 SERPL-SCNC: 25 MMOL/L (ref 21–32)
CO2 SERPL-SCNC: 28 MMOL/L (ref 21–32)
CO2 SERPL-SCNC: 29 MMOL/L (ref 21–32)
CO2 SERPL-SCNC: 30 MMOL/L (ref 21–32)
CO2 SERPL-SCNC: 31 MMOL/L (ref 21–32)
CO2 SERPL-SCNC: 31 MMOL/L (ref 21–32)
CO2 SERPL-SCNC: 32 MMOL/L (ref 21–32)
CO2 SERPL-SCNC: 33 MMOL/L (ref 21–32)
CO2 SERPL-SCNC: 34 MMOL/L (ref 21–32)
CO2 SERPL-SCNC: 35 MMOL/L (ref 21–32)
CO2 SERPL-SCNC: 37 MMOL/L (ref 21–32)
CREAT SERPL-MCNC: 1.82 MG/DL (ref 0.55–1.02)
CREAT SERPL-MCNC: 1.89 MG/DL (ref 0.55–1.02)
CREAT SERPL-MCNC: 2.05 MG/DL (ref 0.55–1.02)
CREAT SERPL-MCNC: 2.15 MG/DL (ref 0.55–1.02)
CREAT SERPL-MCNC: 2.35 MG/DL (ref 0.55–1.02)
CREAT SERPL-MCNC: 2.41 MG/DL (ref 0.55–1.02)
CREAT SERPL-MCNC: 2.58 MG/DL (ref 0.55–1.02)
CREAT SERPL-MCNC: 2.6 MG/DL (ref 0.55–1.02)
CREAT SERPL-MCNC: 2.86 MG/DL (ref 0.55–1.02)
CREAT SERPL-MCNC: 3.04 MG/DL (ref 0.55–1.02)
CREAT SERPL-MCNC: 3.16 MG/DL (ref 0.55–1.02)
CREAT SERPL-MCNC: 3.16 MG/DL (ref 0.55–1.02)
CREAT SERPL-MCNC: 3.26 MG/DL (ref 0.55–1.02)
CREAT SERPL-MCNC: 3.44 MG/DL (ref 0.55–1.02)
CREAT UR-MCNC: 151 MG/DL
DIAGNOSIS, 93000: NORMAL
ECHO AV AREA PEAK VELOCITY: 0.6 CM2
ECHO AV AREA VTI: 0.5 CM2
ECHO AV AREA/BSA PEAK VELOCITY: 0.4 CM2/M2
ECHO AV AREA/BSA VTI: 0.3 CM2/M2
ECHO AV MEAN GRADIENT: 15.8 MMHG
ECHO AV MEAN VELOCITY: 1.85 M/S
ECHO AV PEAK GRADIENT: 31.6 MMHG
ECHO AV PEAK VELOCITY: 281.18 CM/S
ECHO AV REGURGITANT PHT: 266 CM
ECHO AV VTI: 60.02 CM
ECHO EST RA PRESSURE: 10 MMHG
ECHO LA AREA 4C: 23.3 CM2
ECHO LA MAJOR AXIS: 3.85 CM
ECHO LA VOL 2C: 74.87 ML (ref 22–52)
ECHO LA VOL 4C: 67.53 ML (ref 22–52)
ECHO LA VOL BP: 91.73 ML (ref 22–52)
ECHO LA VOL/BSA BIPLANE: 58.82 ML/M2 (ref 16–28)
ECHO LA VOLUME INDEX A2C: 48.01 ML/M2 (ref 16–28)
ECHO LA VOLUME INDEX A4C: 43.31 ML/M2 (ref 16–28)
ECHO LV E' LATERAL VELOCITY: 7.67 CM/S
ECHO LV EDV A4C: 72.1 ML
ECHO LV EDV INDEX A4C: 46.2 ML/M2
ECHO LV EDV TEICHHOLZ: 62.52 ML
ECHO LV EJECTION FRACTION A4C: 52 %
ECHO LV ESV A4C: 34.5 ML
ECHO LV ESV INDEX A4C: 22.1 ML/M2
ECHO LV ESV TEICHHOLZ: 19.97 ML
ECHO LV INTERNAL DIMENSION DIASTOLIC: 4.64 CM (ref 3.9–5.3)
ECHO LV INTERNAL DIMENSION SYSTOLIC: 2.88 CM
ECHO LV IVSD: 1.57 CM (ref 0.6–0.9)
ECHO LV MASS 2D: 381 G (ref 67–162)
ECHO LV MASS INDEX 2D: 244.3 G/M2 (ref 43–95)
ECHO LV POSTERIOR WALL DIASTOLIC: 1.62 CM (ref 0.6–0.9)
ECHO LVOT CARDIAC OUTPUT: 2.3 L/MIN
ECHO LVOT DIAM: 1.68 CM
ECHO LVOT PEAK GRADIENT: 2.3 MMHG
ECHO LVOT PEAK VELOCITY: 75.21 CM/S
ECHO LVOT SV: 32.2 ML
ECHO LVOT VTI: 14.55 CM
ECHO MV A VELOCITY: 70.62 CM/S
ECHO MV AREA PHT: 3.5 CM2
ECHO MV AREA VTI: 0.7 CM2
ECHO MV E DECELERATION TIME (DT): 262.3 MS
ECHO MV E VELOCITY: 133.55 CM/S
ECHO MV E/A RATIO: 1.89
ECHO MV E/E' LATERAL: 17.41
ECHO MV MAX VELOCITY: 160.18 CM/S
ECHO MV MEAN GRADIENT: 4.2 MMHG
ECHO MV MEAN INFLOW VELOCITY: 0.96 M/S
ECHO MV PEAK GRADIENT: 10.3 MMHG
ECHO MV PRESSURE HALF TIME (PHT): 63.7 MS
ECHO MV REGURGITANT PEAK GRADIENT: 99.5 MMHG
ECHO MV REGURGITANT PEAK VELOCITY: 498.85 CM/S
ECHO MV VTI: 43.27 CM
ECHO PULMONARY ARTERY SYSTOLIC PRESSURE (PASP): 72.3 MMHG
ECHO RA AREA 4C: 15.38 CM2
ECHO RIGHT VENTRICULAR SYSTOLIC PRESSURE (RVSP): 64.6 MMHG
ECHO TV REGURGITANT MAX VELOCITY: 369.4 CM/S
ECHO TV REGURGITANT PEAK GRADIENT: 54.6 MMHG
ERYTHROCYTE [DISTWIDTH] IN BLOOD BY AUTOMATED COUNT: 15.5 % (ref 11.5–14.5)
ERYTHROCYTE [DISTWIDTH] IN BLOOD BY AUTOMATED COUNT: 15.9 % (ref 11.5–14.5)
ERYTHROCYTE [DISTWIDTH] IN BLOOD BY AUTOMATED COUNT: 16 % (ref 11.5–14.5)
ERYTHROCYTE [DISTWIDTH] IN BLOOD BY AUTOMATED COUNT: 16.2 % (ref 11.5–14.5)
ERYTHROCYTE [DISTWIDTH] IN BLOOD BY AUTOMATED COUNT: 16.3 % (ref 11.5–14.5)
ERYTHROCYTE [DISTWIDTH] IN BLOOD BY AUTOMATED COUNT: 16.3 % (ref 11.5–14.5)
ERYTHROCYTE [DISTWIDTH] IN BLOOD BY AUTOMATED COUNT: 16.5 % (ref 11.5–14.5)
ERYTHROCYTE [DISTWIDTH] IN BLOOD BY AUTOMATED COUNT: 16.5 % (ref 11.5–14.5)
ERYTHROCYTE [DISTWIDTH] IN BLOOD BY AUTOMATED COUNT: 16.8 % (ref 11.5–14.5)
ERYTHROCYTE [DISTWIDTH] IN BLOOD BY AUTOMATED COUNT: 17.2 % (ref 11.5–14.5)
FLUAV H1 2009 PAND RNA SPEC QL NAA+PROBE: NOT DETECTED
FLUAV H1 RNA SPEC QL NAA+PROBE: NOT DETECTED
FLUAV H3 RNA SPEC QL NAA+PROBE: NOT DETECTED
FLUAV SUBTYP SPEC NAA+PROBE: NOT DETECTED
FLUBV RNA SPEC QL NAA+PROBE: NOT DETECTED
GAS FLOW.O2 O2 DELIVERY SYS: ABNORMAL L/MIN
GAS FLOW.O2 SETTING OXYMISER: 4 L/M
GAS FLOW.O2 SETTING OXYMISER: 4 L/M
GAS FLOW.O2 SETTING OXYMISER: 5 L/M
GLUCOSE BLD STRIP.AUTO-MCNC: 141 MG/DL (ref 65–100)
GLUCOSE BLD STRIP.AUTO-MCNC: 99 MG/DL (ref 65–100)
GLUCOSE SERPL-MCNC: 100 MG/DL (ref 65–100)
GLUCOSE SERPL-MCNC: 106 MG/DL (ref 65–100)
GLUCOSE SERPL-MCNC: 107 MG/DL (ref 65–100)
GLUCOSE SERPL-MCNC: 110 MG/DL (ref 65–100)
GLUCOSE SERPL-MCNC: 112 MG/DL (ref 65–100)
GLUCOSE SERPL-MCNC: 116 MG/DL (ref 65–100)
GLUCOSE SERPL-MCNC: 75 MG/DL (ref 65–100)
GLUCOSE SERPL-MCNC: 80 MG/DL (ref 65–100)
GLUCOSE SERPL-MCNC: 81 MG/DL (ref 65–100)
GLUCOSE SERPL-MCNC: 82 MG/DL (ref 65–100)
GLUCOSE SERPL-MCNC: 83 MG/DL (ref 65–100)
GLUCOSE SERPL-MCNC: 85 MG/DL (ref 65–100)
GLUCOSE SERPL-MCNC: 95 MG/DL (ref 65–100)
GLUCOSE SERPL-MCNC: 97 MG/DL (ref 65–100)
HADV DNA SPEC QL NAA+PROBE: NOT DETECTED
HCO3 BLD-SCNC: 26.9 MMOL/L (ref 22–26)
HCO3 BLD-SCNC: 27.3 MMOL/L (ref 22–26)
HCO3 BLD-SCNC: 28.2 MMOL/L (ref 22–26)
HCO3 BLD-SCNC: 28.5 MMOL/L (ref 22–26)
HCO3 BLD-SCNC: 33.6 MMOL/L (ref 22–26)
HCOV 229E RNA SPEC QL NAA+PROBE: NOT DETECTED
HCOV HKU1 RNA SPEC QL NAA+PROBE: NOT DETECTED
HCOV NL63 RNA SPEC QL NAA+PROBE: NOT DETECTED
HCOV OC43 RNA SPEC QL NAA+PROBE: NOT DETECTED
HCT VFR BLD AUTO: 24.2 % (ref 35–47)
HCT VFR BLD AUTO: 25.3 % (ref 35–47)
HCT VFR BLD AUTO: 25.4 % (ref 35–47)
HCT VFR BLD AUTO: 25.7 % (ref 35–47)
HCT VFR BLD AUTO: 26.2 % (ref 35–47)
HCT VFR BLD AUTO: 26.3 % (ref 35–47)
HCT VFR BLD AUTO: 26.4 % (ref 35–47)
HCT VFR BLD AUTO: 26.8 % (ref 35–47)
HCT VFR BLD AUTO: 28.4 % (ref 35–47)
HCT VFR BLD AUTO: 28.5 % (ref 35–47)
HCT VFR BLD AUTO: 30.4 % (ref 35–47)
HGB BLD-MCNC: 7.2 G/DL (ref 11.5–16)
HGB BLD-MCNC: 7.4 G/DL (ref 11.5–16)
HGB BLD-MCNC: 7.9 G/DL (ref 11.5–16)
HGB BLD-MCNC: 8 G/DL (ref 11.5–16)
HGB BLD-MCNC: 8.1 G/DL (ref 11.5–16)
HGB BLD-MCNC: 8.5 G/DL (ref 11.5–16)
HGB BLD-MCNC: 8.5 G/DL (ref 11.5–16)
HGB BLD-MCNC: 9.1 G/DL (ref 11.5–16)
HMPV RNA SPEC QL NAA+PROBE: NOT DETECTED
HPIV1 RNA SPEC QL NAA+PROBE: NOT DETECTED
HPIV2 RNA SPEC QL NAA+PROBE: NOT DETECTED
HPIV3 RNA SPEC QL NAA+PROBE: NOT DETECTED
HPIV4 RNA SPEC QL NAA+PROBE: NOT DETECTED
INR PPP: 1.7 (ref 0.9–1.1)
INR PPP: 1.8 (ref 0.9–1.1)
INR PPP: 1.9 (ref 0.9–1.1)
INR PPP: 1.9 (ref 0.9–1.1)
INR PPP: 2 (ref 0.9–1.1)
INR PPP: 2.1 (ref 0.9–1.1)
INR PPP: 2.1 (ref 0.9–1.1)
INR PPP: 2.3 (ref 0.9–1.1)
INR PPP: 2.5 (ref 0.9–1.1)
INR PPP: 2.6 (ref 0.9–1.1)
INR PPP: 3.2 (ref 0.9–1.1)
INR PPP: 4.1 (ref 0.9–1.1)
INR PPP: 5.2 (ref 0.9–1.1)
INR PPP: 6.4 (ref 0.9–1.1)
LVFS 2D: 37.88 %
LVOT MG: 1.09 MMHG
LVOT MV: 0.48 CM/S
LVSV (MOD SINGLE 4C): 23.68 ML
LVSV (TEICH): 42.55 ML
M PNEUMO DNA SPEC QL NAA+PROBE: NOT DETECTED
MAGNESIUM SERPL-MCNC: 2 MG/DL (ref 1.6–2.4)
MCH RBC QN AUTO: 27.8 PG (ref 26–34)
MCH RBC QN AUTO: 28.1 PG (ref 26–34)
MCH RBC QN AUTO: 28.2 PG (ref 26–34)
MCH RBC QN AUTO: 28.4 PG (ref 26–34)
MCH RBC QN AUTO: 28.5 PG (ref 26–34)
MCH RBC QN AUTO: 28.6 PG (ref 26–34)
MCH RBC QN AUTO: 28.6 PG (ref 26–34)
MCH RBC QN AUTO: 28.8 PG (ref 26–34)
MCH RBC QN AUTO: 28.9 PG (ref 26–34)
MCH RBC QN AUTO: 29.8 PG (ref 26–34)
MCHC RBC AUTO-ENTMCNC: 29.2 G/DL (ref 30–36.5)
MCHC RBC AUTO-ENTMCNC: 29.8 G/DL (ref 30–36.5)
MCHC RBC AUTO-ENTMCNC: 29.8 G/DL (ref 30–36.5)
MCHC RBC AUTO-ENTMCNC: 29.9 G/DL (ref 30–36.5)
MCHC RBC AUTO-ENTMCNC: 30.3 G/DL (ref 30–36.5)
MCHC RBC AUTO-ENTMCNC: 30.4 G/DL (ref 30–36.5)
MCHC RBC AUTO-ENTMCNC: 30.5 G/DL (ref 30–36.5)
MCHC RBC AUTO-ENTMCNC: 31.9 G/DL (ref 30–36.5)
MCV RBC AUTO: 91.9 FL (ref 80–99)
MCV RBC AUTO: 93.4 FL (ref 80–99)
MCV RBC AUTO: 93.4 FL (ref 80–99)
MCV RBC AUTO: 94 FL (ref 80–99)
MCV RBC AUTO: 94.9 FL (ref 80–99)
MCV RBC AUTO: 95 FL (ref 80–99)
MCV RBC AUTO: 95.6 FL (ref 80–99)
MCV RBC AUTO: 96 FL (ref 80–99)
MCV RBC AUTO: 96.2 FL (ref 80–99)
MCV RBC AUTO: 96.6 FL (ref 80–99)
MV DEC SLOPE: 5.09
NRBC # BLD: 0 K/UL (ref 0–0.01)
NRBC BLD-RTO: 0 PER 100 WBC
O2/TOTAL GAS SETTING VFR VENT: 0.32 %
O2/TOTAL GAS SETTING VFR VENT: 0.4 %
O2/TOTAL GAS SETTING VFR VENT: 50 %
PCO2 BLD: 35.6 MMHG (ref 35–45)
PCO2 BLD: 46.3 MMHG (ref 35–45)
PCO2 BLD: 51.1 MMHG (ref 35–45)
PCO2 BLD: 52.2 MMHG (ref 35–45)
PCO2 BLD: 56.4 MMHG (ref 35–45)
PEEP RESPIRATORY: 6 CMH2O
PEEP RESPIRATORY: 6 CMH2O
PH BLD: 7.29 [PH] (ref 7.35–7.45)
PH BLD: 7.36 [PH] (ref 7.35–7.45)
PH BLD: 7.39 [PH] (ref 7.35–7.45)
PH BLD: 7.42 [PH] (ref 7.35–7.45)
PH BLD: 7.49 [PH] (ref 7.35–7.45)
PHOSPHATE SERPL-MCNC: 3.6 MG/DL (ref 2.6–4.7)
PIP ISTAT,IPIP: 12
PIP ISTAT,IPIP: 12
PISA AR MAX VEL: 392.38 CM/S
PLATELET # BLD AUTO: 188 K/UL (ref 150–400)
PLATELET # BLD AUTO: 217 K/UL (ref 150–400)
PLATELET # BLD AUTO: 234 K/UL (ref 150–400)
PLATELET # BLD AUTO: 241 K/UL (ref 150–400)
PLATELET # BLD AUTO: 251 K/UL (ref 150–400)
PLATELET # BLD AUTO: 256 K/UL (ref 150–400)
PLATELET # BLD AUTO: 283 K/UL (ref 150–400)
PLATELET # BLD AUTO: 289 K/UL (ref 150–400)
PLATELET # BLD AUTO: 298 K/UL (ref 150–400)
PLATELET # BLD AUTO: 308 K/UL (ref 150–400)
PMV BLD AUTO: 11 FL (ref 8.9–12.9)
PMV BLD AUTO: 11.4 FL (ref 8.9–12.9)
PMV BLD AUTO: 11.6 FL (ref 8.9–12.9)
PMV BLD AUTO: 11.7 FL (ref 8.9–12.9)
PMV BLD AUTO: 11.8 FL (ref 8.9–12.9)
PMV BLD AUTO: 11.8 FL (ref 8.9–12.9)
PMV BLD AUTO: 11.9 FL (ref 8.9–12.9)
PMV BLD AUTO: 11.9 FL (ref 8.9–12.9)
PMV BLD AUTO: 12.6 FL (ref 8.9–12.9)
PMV BLD AUTO: 12.7 FL (ref 8.9–12.9)
PO2 BLD: 50 MMHG (ref 80–100)
PO2 BLD: 55 MMHG (ref 80–100)
PO2 BLD: 60 MMHG (ref 80–100)
PO2 BLD: 74 MMHG (ref 80–100)
PO2 BLD: 81 MMHG (ref 80–100)
POTASSIUM SERPL-SCNC: 3.5 MMOL/L (ref 3.5–5.1)
POTASSIUM SERPL-SCNC: 3.6 MMOL/L (ref 3.5–5.1)
POTASSIUM SERPL-SCNC: 3.7 MMOL/L (ref 3.5–5.1)
POTASSIUM SERPL-SCNC: 3.8 MMOL/L (ref 3.5–5.1)
POTASSIUM SERPL-SCNC: 4 MMOL/L (ref 3.5–5.1)
POTASSIUM SERPL-SCNC: 4.2 MMOL/L (ref 3.5–5.1)
POTASSIUM SERPL-SCNC: 4.3 MMOL/L (ref 3.5–5.1)
POTASSIUM SERPL-SCNC: 4.4 MMOL/L (ref 3.5–5.1)
POTASSIUM SERPL-SCNC: 4.5 MMOL/L (ref 3.5–5.1)
POTASSIUM SERPL-SCNC: 4.5 MMOL/L (ref 3.5–5.1)
POTASSIUM SERPL-SCNC: 4.7 MMOL/L (ref 3.5–5.1)
POTASSIUM SERPL-SCNC: 4.7 MMOL/L (ref 3.5–5.1)
PROCALCITONIN SERPL-MCNC: 1.18 NG/ML
PROTHROMBIN TIME: 17 SEC (ref 9–11.1)
PROTHROMBIN TIME: 17.7 SEC (ref 9–11.1)
PROTHROMBIN TIME: 19 SEC (ref 9–11.1)
PROTHROMBIN TIME: 19.2 SEC (ref 9–11.1)
PROTHROMBIN TIME: 19.3 SEC (ref 9–11.1)
PROTHROMBIN TIME: 19.8 SEC (ref 9–11.1)
PROTHROMBIN TIME: 20.2 SEC (ref 9–11.1)
PROTHROMBIN TIME: 20.3 SEC (ref 9–11.1)
PROTHROMBIN TIME: 20.9 SEC (ref 9–11.1)
PROTHROMBIN TIME: 22.8 SEC (ref 9–11.1)
PROTHROMBIN TIME: 24 SEC (ref 9–11.1)
PROTHROMBIN TIME: 25.4 SEC (ref 9–11.1)
PROTHROMBIN TIME: 31.1 SEC (ref 9–11.1)
PROTHROMBIN TIME: 39 SEC (ref 9–11.1)
PROTHROMBIN TIME: 48.7 SEC (ref 9–11.1)
PROTHROMBIN TIME: 59.4 SEC (ref 9–11.1)
PULMONARY ARTERY END DIASTOLIC PRESSURE: 18.8 MMHG
PULMONARY ARTERY MEAN PRESURE: 34 MMHG
PV END DIASTOLIC VELOCITY: 1.5 MMHG
Q-T INTERVAL, ECG07: 392 MS
QRS DURATION, ECG06: 78 MS
QTC CALCULATION (BEZET), ECG08: 449 MS
RBC # BLD AUTO: 2.59 M/UL (ref 3.8–5.2)
RBC # BLD AUTO: 2.62 M/UL (ref 3.8–5.2)
RBC # BLD AUTO: 2.72 M/UL (ref 3.8–5.2)
RBC # BLD AUTO: 2.77 M/UL (ref 3.8–5.2)
RBC # BLD AUTO: 2.81 M/UL (ref 3.8–5.2)
RBC # BLD AUTO: 2.82 M/UL (ref 3.8–5.2)
RBC # BLD AUTO: 2.85 M/UL (ref 3.8–5.2)
RBC # BLD AUTO: 2.97 M/UL (ref 3.8–5.2)
RBC # BLD AUTO: 2.97 M/UL (ref 3.8–5.2)
RBC # BLD AUTO: 3.16 M/UL (ref 3.8–5.2)
RSV RNA SPEC QL NAA+PROBE: NOT DETECTED
RV+EV RNA SPEC QL NAA+PROBE: NOT DETECTED
SAO2 % BLD: 79 % (ref 92–97)
SAO2 % BLD: 89 % (ref 92–97)
SAO2 % BLD: 91 % (ref 92–97)
SAO2 % BLD: 95 % (ref 92–97)
SAO2 % BLD: 96 % (ref 92–97)
SERVICE CMNT-IMP: ABNORMAL
SERVICE CMNT-IMP: NORMAL
SODIUM SERPL-SCNC: 135 MMOL/L (ref 136–145)
SODIUM SERPL-SCNC: 136 MMOL/L (ref 136–145)
SODIUM SERPL-SCNC: 137 MMOL/L (ref 136–145)
SODIUM SERPL-SCNC: 138 MMOL/L (ref 136–145)
SODIUM SERPL-SCNC: 141 MMOL/L (ref 136–145)
SODIUM SERPL-SCNC: 144 MMOL/L (ref 136–145)
SODIUM SERPL-SCNC: 145 MMOL/L (ref 136–145)
SODIUM SERPL-SCNC: 146 MMOL/L (ref 136–145)
SODIUM SERPL-SCNC: 146 MMOL/L (ref 136–145)
SODIUM SERPL-SCNC: 148 MMOL/L (ref 136–145)
SODIUM UR-SCNC: 23 MMOL/L
SPECIMEN TYPE: ABNORMAL
TOTAL RESP. RATE, ITRR: 16
TOTAL RESP. RATE, ITRR: 18
TOTAL RESP. RATE, ITRR: 20
TOTAL RESP. RATE, ITRR: 20
TROPONIN I SERPL-MCNC: 0.05 NG/ML
TROPONIN I SERPL-MCNC: 0.06 NG/ML
TROPONIN I SERPL-MCNC: 2.49 NG/ML
TSH SERPL DL<=0.05 MIU/L-ACNC: 34.1 UIU/ML (ref 0.36–3.74)
UUN UR-MCNC: 559 MG/DL
VENTRICULAR RATE, ECG03: 79 BPM
WBC # BLD AUTO: 10.4 K/UL (ref 3.6–11)
WBC # BLD AUTO: 6.1 K/UL (ref 3.6–11)
WBC # BLD AUTO: 6.2 K/UL (ref 3.6–11)
WBC # BLD AUTO: 6.3 K/UL (ref 3.6–11)
WBC # BLD AUTO: 6.5 K/UL (ref 3.6–11)
WBC # BLD AUTO: 7.3 K/UL (ref 3.6–11)
WBC # BLD AUTO: 7.4 K/UL (ref 3.6–11)
WBC # BLD AUTO: 7.6 K/UL (ref 3.6–11)
WBC # BLD AUTO: 7.6 K/UL (ref 3.6–11)
WBC # BLD AUTO: 9.5 K/UL (ref 3.6–11)

## 2020-01-01 PROCEDURE — 82962 GLUCOSE BLOOD TEST: CPT

## 2020-01-01 PROCEDURE — 77010033678 HC OXYGEN DAILY

## 2020-01-01 PROCEDURE — 97535 SELF CARE MNGMENT TRAINING: CPT | Performed by: OCCUPATIONAL THERAPIST

## 2020-01-01 PROCEDURE — 74011250637 HC RX REV CODE- 250/637: Performed by: INTERNAL MEDICINE

## 2020-01-01 PROCEDURE — 97530 THERAPEUTIC ACTIVITIES: CPT | Performed by: OCCUPATIONAL THERAPIST

## 2020-01-01 PROCEDURE — 97162 PT EVAL MOD COMPLEX 30 MIN: CPT

## 2020-01-01 PROCEDURE — 74011000258 HC RX REV CODE- 258: Performed by: INTERNAL MEDICINE

## 2020-01-01 PROCEDURE — 65270000015 HC RM PRIVATE ONCOLOGY

## 2020-01-01 PROCEDURE — 85027 COMPLETE CBC AUTOMATED: CPT

## 2020-01-01 PROCEDURE — 74011250637 HC RX REV CODE- 250/637: Performed by: NURSE PRACTITIONER

## 2020-01-01 PROCEDURE — 36415 COLL VENOUS BLD VENIPUNCTURE: CPT

## 2020-01-01 PROCEDURE — 94760 N-INVAS EAR/PLS OXIMETRY 1: CPT

## 2020-01-01 PROCEDURE — 80048 BASIC METABOLIC PNL TOTAL CA: CPT

## 2020-01-01 PROCEDURE — 82803 BLOOD GASES ANY COMBINATION: CPT

## 2020-01-01 PROCEDURE — 74011000250 HC RX REV CODE- 250: Performed by: INTERNAL MEDICINE

## 2020-01-01 PROCEDURE — 65660000000 HC RM CCU STEPDOWN

## 2020-01-01 PROCEDURE — 77030038269 HC DRN EXT URIN PURWCK BARD -A

## 2020-01-01 PROCEDURE — 97116 GAIT TRAINING THERAPY: CPT

## 2020-01-01 PROCEDURE — 71045 X-RAY EXAM CHEST 1 VIEW: CPT

## 2020-01-01 PROCEDURE — 93970 EXTREMITY STUDY: CPT

## 2020-01-01 PROCEDURE — 84540 ASSAY OF URINE/UREA-N: CPT

## 2020-01-01 PROCEDURE — 85610 PROTHROMBIN TIME: CPT

## 2020-01-01 PROCEDURE — 74011250636 HC RX REV CODE- 250/636: Performed by: INTERNAL MEDICINE

## 2020-01-01 PROCEDURE — 97530 THERAPEUTIC ACTIVITIES: CPT

## 2020-01-01 PROCEDURE — 84484 ASSAY OF TROPONIN QUANT: CPT

## 2020-01-01 PROCEDURE — 94640 AIRWAY INHALATION TREATMENT: CPT

## 2020-01-01 PROCEDURE — 97166 OT EVAL MOD COMPLEX 45 MIN: CPT | Performed by: OCCUPATIONAL THERAPIST

## 2020-01-01 PROCEDURE — 74011000250 HC RX REV CODE- 250: Performed by: FAMILY MEDICINE

## 2020-01-01 PROCEDURE — 36600 WITHDRAWAL OF ARTERIAL BLOOD: CPT

## 2020-01-01 PROCEDURE — 74011250637 HC RX REV CODE- 250/637: Performed by: FAMILY MEDICINE

## 2020-01-01 PROCEDURE — 74011250636 HC RX REV CODE- 250/636: Performed by: HOSPITALIST

## 2020-01-01 PROCEDURE — 51798 US URINE CAPACITY MEASURE: CPT

## 2020-01-01 PROCEDURE — 83735 ASSAY OF MAGNESIUM: CPT

## 2020-01-01 PROCEDURE — 65660000001 HC RM ICU INTERMED STEPDOWN

## 2020-01-01 PROCEDURE — 77030041247 HC PROTECTOR HEEL HEELMEDIX MDII -B

## 2020-01-01 PROCEDURE — 77030040361 HC SLV COMPR DVT MDII -B

## 2020-01-01 PROCEDURE — 93005 ELECTROCARDIOGRAM TRACING: CPT

## 2020-01-01 PROCEDURE — 80069 RENAL FUNCTION PANEL: CPT

## 2020-01-01 PROCEDURE — 92610 EVALUATE SWALLOWING FUNCTION: CPT

## 2020-01-01 PROCEDURE — 74011000250 HC RX REV CODE- 250: Performed by: NURSE PRACTITIONER

## 2020-01-01 PROCEDURE — 82436 ASSAY OF URINE CHLORIDE: CPT

## 2020-01-01 PROCEDURE — 85018 HEMOGLOBIN: CPT

## 2020-01-01 PROCEDURE — 97110 THERAPEUTIC EXERCISES: CPT

## 2020-01-01 PROCEDURE — 0100U RESPIRATORY PANEL,PCR,NASOPHARYNGEAL: CPT

## 2020-01-01 PROCEDURE — 84300 ASSAY OF URINE SODIUM: CPT

## 2020-01-01 PROCEDURE — 5A09357 ASSISTANCE WITH RESPIRATORY VENTILATION, LESS THAN 24 CONSECUTIVE HOURS, CONTINUOUS POSITIVE AIRWAY PRESSURE: ICD-10-PCS | Performed by: INTERNAL MEDICINE

## 2020-01-01 PROCEDURE — 77030029684 HC NEB SM VOL KT MONA -A

## 2020-01-01 PROCEDURE — 82570 ASSAY OF URINE CREATININE: CPT

## 2020-01-01 PROCEDURE — 74011250636 HC RX REV CODE- 250/636: Performed by: NURSE PRACTITIONER

## 2020-01-01 PROCEDURE — 76770 US EXAM ABDO BACK WALL COMP: CPT

## 2020-01-01 PROCEDURE — P9047 ALBUMIN (HUMAN), 25%, 50ML: HCPCS | Performed by: HOSPITALIST

## 2020-01-01 PROCEDURE — 84443 ASSAY THYROID STIM HORMONE: CPT

## 2020-01-01 PROCEDURE — 74011636637 HC RX REV CODE- 636/637: Performed by: INTERNAL MEDICINE

## 2020-01-01 PROCEDURE — 94660 CPAP INITIATION&MGMT: CPT

## 2020-01-01 PROCEDURE — P9047 ALBUMIN (HUMAN), 25%, 50ML: HCPCS | Performed by: INTERNAL MEDICINE

## 2020-01-01 PROCEDURE — 93306 TTE W/DOPPLER COMPLETE: CPT

## 2020-01-01 PROCEDURE — 99218 HC RM OBSERVATION: CPT

## 2020-01-01 PROCEDURE — 77030040392 HC DRSG OPTIFOAM MDII -A

## 2020-01-01 PROCEDURE — 84145 PROCALCITONIN (PCT): CPT

## 2020-01-01 PROCEDURE — 77010033711 HC HIGH FLOW OXYGEN

## 2020-01-01 RX ORDER — SCOLOPAMINE TRANSDERMAL SYSTEM 1 MG/1
1 PATCH, EXTENDED RELEASE TRANSDERMAL
Status: DISCONTINUED | OUTPATIENT
Start: 2020-01-01 | End: 2020-01-01 | Stop reason: HOSPADM

## 2020-01-01 RX ORDER — HEPARIN SODIUM 5000 [USP'U]/ML
5000 INJECTION, SOLUTION INTRAVENOUS; SUBCUTANEOUS EVERY 8 HOURS
Status: DISCONTINUED | OUTPATIENT
Start: 2020-01-01 | End: 2020-01-01

## 2020-01-01 RX ORDER — BUMETANIDE 0.25 MG/ML
1 INJECTION INTRAMUSCULAR; INTRAVENOUS 2 TIMES DAILY
Status: DISCONTINUED | OUTPATIENT
Start: 2020-01-01 | End: 2020-01-01

## 2020-01-01 RX ORDER — DILTIAZEM HYDROCHLORIDE 180 MG/1
180 CAPSULE, COATED, EXTENDED RELEASE ORAL DAILY
Status: DISCONTINUED | OUTPATIENT
Start: 2020-01-01 | End: 2020-01-01

## 2020-01-01 RX ORDER — WARFARIN SODIUM 5 MG/1
5 TABLET ORAL ONCE
Status: DISPENSED | OUTPATIENT
Start: 2020-01-01 | End: 2020-01-01

## 2020-01-01 RX ORDER — LORAZEPAM 2 MG/ML
1 INJECTION INTRAMUSCULAR
Status: DISCONTINUED | OUTPATIENT
Start: 2020-01-01 | End: 2020-01-01 | Stop reason: HOSPADM

## 2020-01-01 RX ORDER — SODIUM CHLORIDE 9 MG/ML
75 INJECTION, SOLUTION INTRAVENOUS CONTINUOUS
Status: DISCONTINUED | OUTPATIENT
Start: 2020-01-01 | End: 2020-01-01

## 2020-01-01 RX ORDER — DEXTROSE 50 % IN WATER (D50W) INTRAVENOUS SYRINGE
25 ONCE
Status: COMPLETED | OUTPATIENT
Start: 2020-01-01 | End: 2020-01-01

## 2020-01-01 RX ORDER — ALBUMIN HUMAN 250 G/1000ML
12.5 SOLUTION INTRAVENOUS ONCE
Status: COMPLETED | OUTPATIENT
Start: 2020-01-01 | End: 2020-01-01

## 2020-01-01 RX ORDER — WARFARIN SODIUM 5 MG/1
5 TABLET ORAL ONCE
Status: COMPLETED | OUTPATIENT
Start: 2020-01-01 | End: 2020-01-01

## 2020-01-01 RX ORDER — DOCUSATE SODIUM 100 MG/1
100 CAPSULE, LIQUID FILLED ORAL 2 TIMES DAILY
Status: DISCONTINUED | OUTPATIENT
Start: 2020-01-01 | End: 2020-01-01 | Stop reason: HOSPADM

## 2020-01-01 RX ORDER — WARFARIN 3 MG/1
3 TABLET ORAL DAILY
Qty: 30 TAB | Refills: 2 | Status: SHIPPED | OUTPATIENT
Start: 2020-01-01 | End: 2020-01-01

## 2020-01-01 RX ORDER — LEVOTHYROXINE SODIUM 88 UG/1
88 TABLET ORAL
Status: DISCONTINUED | OUTPATIENT
Start: 2020-01-01 | End: 2020-01-01 | Stop reason: HOSPADM

## 2020-01-01 RX ORDER — METOPROLOL TARTRATE 5 MG/5ML
5 INJECTION INTRAVENOUS EVERY 6 HOURS
Status: DISCONTINUED | OUTPATIENT
Start: 2020-01-01 | End: 2020-01-01

## 2020-01-01 RX ORDER — ALBUMIN HUMAN 250 G/1000ML
25 SOLUTION INTRAVENOUS 2 TIMES DAILY
Status: COMPLETED | OUTPATIENT
Start: 2020-01-01 | End: 2020-01-01

## 2020-01-01 RX ORDER — MIRTAZAPINE 15 MG/1
45 TABLET, FILM COATED ORAL
Status: DISCONTINUED | OUTPATIENT
Start: 2020-01-01 | End: 2020-01-01 | Stop reason: HOSPADM

## 2020-01-01 RX ORDER — HYDRALAZINE HYDROCHLORIDE 50 MG/1
50 TABLET, FILM COATED ORAL 3 TIMES DAILY
Status: DISCONTINUED | OUTPATIENT
Start: 2020-01-01 | End: 2020-01-01

## 2020-01-01 RX ORDER — METOPROLOL SUCCINATE 50 MG/1
100 TABLET, EXTENDED RELEASE ORAL DAILY
Status: DISCONTINUED | OUTPATIENT
Start: 2020-01-01 | End: 2020-01-01 | Stop reason: HOSPADM

## 2020-01-01 RX ORDER — ONDANSETRON 2 MG/ML
4 INJECTION INTRAMUSCULAR; INTRAVENOUS
Status: DISCONTINUED | OUTPATIENT
Start: 2020-01-01 | End: 2020-01-01 | Stop reason: HOSPADM

## 2020-01-01 RX ORDER — ALPRAZOLAM 0.25 MG/1
0.25 TABLET ORAL AS NEEDED
Status: DISCONTINUED | OUTPATIENT
Start: 2020-01-01 | End: 2020-01-01 | Stop reason: SDUPTHER

## 2020-01-01 RX ORDER — HALOPERIDOL 2 MG/ML
2 SOLUTION ORAL
Status: DISCONTINUED | OUTPATIENT
Start: 2020-01-01 | End: 2020-01-01 | Stop reason: SDUPTHER

## 2020-01-01 RX ORDER — SODIUM CHLORIDE 0.9 % (FLUSH) 0.9 %
5-40 SYRINGE (ML) INJECTION EVERY 8 HOURS
Status: DISCONTINUED | OUTPATIENT
Start: 2020-01-01 | End: 2020-01-01 | Stop reason: HOSPADM

## 2020-01-01 RX ORDER — WARFARIN SODIUM 5 MG/1
2.5 TABLET ORAL
COMMUNITY
End: 2020-01-01

## 2020-01-01 RX ORDER — HYDRALAZINE HYDROCHLORIDE 20 MG/ML
10 INJECTION INTRAMUSCULAR; INTRAVENOUS ONCE
Status: COMPLETED | OUTPATIENT
Start: 2020-01-01 | End: 2020-01-01

## 2020-01-01 RX ORDER — BUMETANIDE 0.25 MG/ML
1 INJECTION INTRAMUSCULAR; INTRAVENOUS
Status: DISCONTINUED | OUTPATIENT
Start: 2020-01-01 | End: 2020-01-01 | Stop reason: HOSPADM

## 2020-01-01 RX ORDER — BUMETANIDE 0.25 MG/ML
2 INJECTION INTRAMUSCULAR; INTRAVENOUS 2 TIMES DAILY
Status: DISCONTINUED | OUTPATIENT
Start: 2020-01-01 | End: 2020-01-01

## 2020-01-01 RX ORDER — IPRATROPIUM BROMIDE AND ALBUTEROL SULFATE 2.5; .5 MG/3ML; MG/3ML
3 SOLUTION RESPIRATORY (INHALATION)
Status: DISCONTINUED | OUTPATIENT
Start: 2020-01-01 | End: 2020-01-01 | Stop reason: HOSPADM

## 2020-01-01 RX ORDER — SODIUM BICARBONATE 650 MG/1
650 TABLET ORAL 3 TIMES DAILY
Status: DISCONTINUED | OUTPATIENT
Start: 2020-01-01 | End: 2020-01-01

## 2020-01-01 RX ORDER — BUMETANIDE 1 MG/1
1 TABLET ORAL 2 TIMES DAILY
Status: DISCONTINUED | OUTPATIENT
Start: 2020-01-01 | End: 2020-01-01 | Stop reason: HOSPADM

## 2020-01-01 RX ORDER — ONDANSETRON 4 MG/1
4 TABLET, ORALLY DISINTEGRATING ORAL
Status: DISCONTINUED | OUTPATIENT
Start: 2020-01-01 | End: 2020-01-01 | Stop reason: HOSPADM

## 2020-01-01 RX ORDER — BUMETANIDE 0.25 MG/ML
2 INJECTION INTRAMUSCULAR; INTRAVENOUS ONCE
Status: COMPLETED | OUTPATIENT
Start: 2020-01-01 | End: 2020-01-01

## 2020-01-01 RX ORDER — HALOPERIDOL 5 MG/ML
2 INJECTION INTRAMUSCULAR
Status: DISCONTINUED | OUTPATIENT
Start: 2020-01-01 | End: 2020-01-01 | Stop reason: HOSPADM

## 2020-01-01 RX ORDER — BUMETANIDE 1 MG/1
1 TABLET ORAL
Qty: 60 TAB | Refills: 2 | Status: SHIPPED
Start: 2020-01-01

## 2020-01-01 RX ORDER — DIPHENHYDRAMINE HYDROCHLORIDE 50 MG/ML
12.5 INJECTION, SOLUTION INTRAMUSCULAR; INTRAVENOUS
Status: COMPLETED | OUTPATIENT
Start: 2020-01-01 | End: 2020-01-01

## 2020-01-01 RX ORDER — POTASSIUM CHLORIDE 20 MEQ/1
40 TABLET, EXTENDED RELEASE ORAL 2 TIMES DAILY
Status: DISCONTINUED | OUTPATIENT
Start: 2020-01-01 | End: 2020-01-01

## 2020-01-01 RX ORDER — DILTIAZEM HYDROCHLORIDE 180 MG/1
180 CAPSULE, COATED, EXTENDED RELEASE ORAL DAILY
COMMUNITY

## 2020-01-01 RX ORDER — MELATONIN 1 MG/ML
1-10 LIQUID (ML) ORAL
COMMUNITY
End: 2020-01-01

## 2020-01-01 RX ORDER — SODIUM CHLORIDE 0.9 % (FLUSH) 0.9 %
5-40 SYRINGE (ML) INJECTION AS NEEDED
Status: DISCONTINUED | OUTPATIENT
Start: 2020-01-01 | End: 2020-01-01 | Stop reason: HOSPADM

## 2020-01-01 RX ORDER — DORZOLAMIDE HCL 20 MG/ML
1 SOLUTION/ DROPS OPHTHALMIC 3 TIMES DAILY
Status: DISCONTINUED | OUTPATIENT
Start: 2020-01-01 | End: 2020-01-01 | Stop reason: HOSPADM

## 2020-01-01 RX ORDER — LORAZEPAM 2 MG/ML
0.5 INJECTION INTRAMUSCULAR
Status: DISCONTINUED | OUTPATIENT
Start: 2020-01-01 | End: 2020-01-01 | Stop reason: HOSPADM

## 2020-01-01 RX ORDER — METOPROLOL TARTRATE 50 MG/1
100 TABLET ORAL DAILY
Status: DISCONTINUED | OUTPATIENT
Start: 2020-01-01 | End: 2020-01-01

## 2020-01-01 RX ORDER — FUROSEMIDE 10 MG/ML
40 INJECTION INTRAMUSCULAR; INTRAVENOUS ONCE
Status: COMPLETED | OUTPATIENT
Start: 2020-01-01 | End: 2020-01-01

## 2020-01-01 RX ORDER — DILTIAZEM HYDROCHLORIDE 180 MG/1
180 CAPSULE, COATED, EXTENDED RELEASE ORAL DAILY
Status: DISCONTINUED | OUTPATIENT
Start: 2020-01-01 | End: 2020-01-01 | Stop reason: HOSPADM

## 2020-01-01 RX ORDER — ALBUTEROL SULFATE 0.83 MG/ML
2.5 SOLUTION RESPIRATORY (INHALATION) ONCE
Status: COMPLETED | OUTPATIENT
Start: 2020-01-01 | End: 2020-01-01

## 2020-01-01 RX ORDER — WARFARIN 3 MG/1
3 TABLET ORAL DAILY
Qty: 30 TAB | Refills: 2 | Status: SHIPPED | OUTPATIENT
Start: 2020-01-01 | End: 2020-01-01 | Stop reason: SDUPTHER

## 2020-01-01 RX ORDER — DIPHENHYDRAMINE HYDROCHLORIDE 50 MG/ML
12.5 INJECTION, SOLUTION INTRAMUSCULAR; INTRAVENOUS ONCE
Status: COMPLETED | OUTPATIENT
Start: 2020-01-01 | End: 2020-01-01

## 2020-01-01 RX ORDER — WARFARIN 2.5 MG/1
2.5 TABLET ORAL ONCE
Status: COMPLETED | OUTPATIENT
Start: 2020-01-01 | End: 2020-01-01

## 2020-01-01 RX ORDER — LORAZEPAM 2 MG/ML
1 CONCENTRATE ORAL
Status: DISCONTINUED | OUTPATIENT
Start: 2020-01-01 | End: 2020-01-01 | Stop reason: HOSPADM

## 2020-01-01 RX ORDER — LORAZEPAM 2 MG/ML
0.25 INJECTION INTRAMUSCULAR
Status: DISCONTINUED | OUTPATIENT
Start: 2020-01-01 | End: 2020-01-01

## 2020-01-01 RX ORDER — FUROSEMIDE 10 MG/ML
60 INJECTION INTRAMUSCULAR; INTRAVENOUS 2 TIMES DAILY
Status: DISCONTINUED | OUTPATIENT
Start: 2020-01-01 | End: 2020-01-01

## 2020-01-01 RX ORDER — POTASSIUM CHLORIDE 750 MG/1
20 TABLET, FILM COATED, EXTENDED RELEASE ORAL
Status: COMPLETED | OUTPATIENT
Start: 2020-01-01 | End: 2020-01-01

## 2020-01-01 RX ORDER — HYDRALAZINE HYDROCHLORIDE 20 MG/ML
10 INJECTION INTRAMUSCULAR; INTRAVENOUS
Status: DISCONTINUED | OUTPATIENT
Start: 2020-01-01 | End: 2020-01-01 | Stop reason: HOSPADM

## 2020-01-01 RX ORDER — POLYETHYLENE GLYCOL 3350 17 G/17G
17 POWDER, FOR SOLUTION ORAL
Status: DISCONTINUED | OUTPATIENT
Start: 2020-01-01 | End: 2020-01-01 | Stop reason: HOSPADM

## 2020-01-01 RX ORDER — HYDRALAZINE HYDROCHLORIDE 50 MG/1
100 TABLET, FILM COATED ORAL 3 TIMES DAILY
Status: DISCONTINUED | OUTPATIENT
Start: 2020-01-01 | End: 2020-01-01

## 2020-01-01 RX ORDER — BUMETANIDE 0.25 MG/ML
2 INJECTION INTRAMUSCULAR; INTRAVENOUS DAILY
Status: DISCONTINUED | OUTPATIENT
Start: 2020-01-01 | End: 2020-01-01

## 2020-01-01 RX ORDER — FUROSEMIDE 10 MG/ML
40 INJECTION INTRAMUSCULAR; INTRAVENOUS 2 TIMES DAILY
Status: DISCONTINUED | OUTPATIENT
Start: 2020-01-01 | End: 2020-01-01

## 2020-01-01 RX ORDER — PREDNISOLONE ACETATE 10 MG/ML
1 SUSPENSION/ DROPS OPHTHALMIC DAILY
Status: DISCONTINUED | OUTPATIENT
Start: 2020-01-01 | End: 2020-01-01 | Stop reason: HOSPADM

## 2020-01-01 RX ORDER — IPRATROPIUM BROMIDE AND ALBUTEROL SULFATE 2.5; .5 MG/3ML; MG/3ML
3 SOLUTION RESPIRATORY (INHALATION)
Status: DISCONTINUED | OUTPATIENT
Start: 2020-01-01 | End: 2020-01-01

## 2020-01-01 RX ORDER — HALOPERIDOL 2 MG/ML
2 SOLUTION ORAL
Status: DISCONTINUED | OUTPATIENT
Start: 2020-01-01 | End: 2020-01-01 | Stop reason: HOSPADM

## 2020-01-01 RX ORDER — WARFARIN SODIUM 5 MG/1
5 TABLET ORAL
Status: ON HOLD | COMMUNITY
End: 2020-01-01 | Stop reason: SDUPTHER

## 2020-01-01 RX ORDER — BUMETANIDE 0.25 MG/ML
4 INJECTION INTRAMUSCULAR; INTRAVENOUS 2 TIMES DAILY
Status: DISCONTINUED | OUTPATIENT
Start: 2020-01-01 | End: 2020-01-01

## 2020-01-01 RX ORDER — GLYCOPYRROLATE 0.2 MG/ML
0.2 INJECTION INTRAMUSCULAR; INTRAVENOUS
Status: DISCONTINUED | OUTPATIENT
Start: 2020-01-01 | End: 2020-01-01 | Stop reason: HOSPADM

## 2020-01-01 RX ORDER — WARFARIN 2 MG/1
4 TABLET ORAL ONCE
Status: DISCONTINUED | OUTPATIENT
Start: 2020-01-01 | End: 2020-01-01 | Stop reason: HOSPADM

## 2020-01-01 RX ORDER — PANTOPRAZOLE SODIUM 40 MG/1
40 TABLET, DELAYED RELEASE ORAL
Status: DISCONTINUED | OUTPATIENT
Start: 2020-01-01 | End: 2020-01-01 | Stop reason: HOSPADM

## 2020-01-01 RX ORDER — BUMETANIDE 1 MG/1
1 TABLET ORAL 2 TIMES DAILY
Qty: 60 TAB | Refills: 2 | Status: ON HOLD | OUTPATIENT
Start: 2020-01-01 | End: 2020-01-01 | Stop reason: SDUPTHER

## 2020-01-01 RX ORDER — METOPROLOL SUCCINATE 50 MG/1
100 TABLET, EXTENDED RELEASE ORAL DAILY
Status: DISCONTINUED | OUTPATIENT
Start: 2020-01-01 | End: 2020-01-01

## 2020-01-01 RX ORDER — FENTANYL CITRATE 50 UG/ML
25 INJECTION, SOLUTION INTRAMUSCULAR; INTRAVENOUS
Status: DISCONTINUED | OUTPATIENT
Start: 2020-01-01 | End: 2020-01-01 | Stop reason: HOSPADM

## 2020-01-01 RX ORDER — DIPHENHYDRAMINE HCL 25 MG
25 CAPSULE ORAL
Status: DISCONTINUED | OUTPATIENT
Start: 2020-01-01 | End: 2020-01-01 | Stop reason: HOSPADM

## 2020-01-01 RX ORDER — LANOLIN ALCOHOL/MO/W.PET/CERES
3 CREAM (GRAM) TOPICAL
Status: DISCONTINUED | OUTPATIENT
Start: 2020-01-01 | End: 2020-01-01 | Stop reason: HOSPADM

## 2020-01-01 RX ORDER — HYOSCYAMINE SULFATE 0.12 MG/1
0.12 TABLET SUBLINGUAL
Status: DISCONTINUED | OUTPATIENT
Start: 2020-01-01 | End: 2020-01-01 | Stop reason: HOSPADM

## 2020-01-01 RX ADMIN — DOCUSATE SODIUM 100 MG: 100 CAPSULE, LIQUID FILLED ORAL at 08:06

## 2020-01-01 RX ADMIN — BUMETANIDE 4 MG: 0.25 INJECTION INTRAMUSCULAR; INTRAVENOUS at 18:22

## 2020-01-01 RX ADMIN — Medication 10 ML: at 06:25

## 2020-01-01 RX ADMIN — HYDRALAZINE HYDROCHLORIDE 100 MG: 50 TABLET, FILM COATED ORAL at 17:25

## 2020-01-01 RX ADMIN — Medication 10 ML: at 14:00

## 2020-01-01 RX ADMIN — DIPHENHYDRAMINE HYDROCHLORIDE 12.5 MG: 50 INJECTION, SOLUTION INTRAMUSCULAR; INTRAVENOUS at 23:55

## 2020-01-01 RX ADMIN — SODIUM BICARBONATE 650 MG: 650 TABLET ORAL at 20:33

## 2020-01-01 RX ADMIN — LEVOTHYROXINE SODIUM 88 MCG: 88 TABLET ORAL at 06:08

## 2020-01-01 RX ADMIN — POTASSIUM CHLORIDE 40 MEQ: 20 TABLET, EXTENDED RELEASE ORAL at 08:30

## 2020-01-01 RX ADMIN — HYDRALAZINE HYDROCHLORIDE 100 MG: 50 TABLET, FILM COATED ORAL at 08:06

## 2020-01-01 RX ADMIN — HYDRALAZINE HYDROCHLORIDE 100 MG: 50 TABLET, FILM COATED ORAL at 20:33

## 2020-01-01 RX ADMIN — Medication 10 ML: at 23:55

## 2020-01-01 RX ADMIN — POTASSIUM CHLORIDE 40 MEQ: 20 TABLET, EXTENDED RELEASE ORAL at 18:40

## 2020-01-01 RX ADMIN — DOCUSATE SODIUM 100 MG: 100 CAPSULE, LIQUID FILLED ORAL at 18:40

## 2020-01-01 RX ADMIN — LEVOTHYROXINE SODIUM 88 MCG: 88 TABLET ORAL at 06:17

## 2020-01-01 RX ADMIN — BUMETANIDE 4 MG: 0.25 INJECTION INTRAMUSCULAR; INTRAVENOUS at 08:51

## 2020-01-01 RX ADMIN — Medication 10 ML: at 06:00

## 2020-01-01 RX ADMIN — DOCUSATE SODIUM 100 MG: 100 CAPSULE, LIQUID FILLED ORAL at 18:41

## 2020-01-01 RX ADMIN — DOCUSATE SODIUM 100 MG: 100 CAPSULE, LIQUID FILLED ORAL at 08:35

## 2020-01-01 RX ADMIN — IPRATROPIUM BROMIDE AND ALBUTEROL SULFATE 3 ML: .5; 3 SOLUTION RESPIRATORY (INHALATION) at 07:29

## 2020-01-01 RX ADMIN — SODIUM BICARBONATE 650 MG: 650 TABLET ORAL at 21:46

## 2020-01-01 RX ADMIN — PANTOPRAZOLE SODIUM 40 MG: 40 TABLET, DELAYED RELEASE ORAL at 08:23

## 2020-01-01 RX ADMIN — WARFARIN SODIUM 5 MG: 5 TABLET ORAL at 18:15

## 2020-01-01 RX ADMIN — POTASSIUM CHLORIDE 40 MEQ: 20 TABLET, EXTENDED RELEASE ORAL at 18:27

## 2020-01-01 RX ADMIN — LEVOTHYROXINE SODIUM 88 MCG: 88 TABLET ORAL at 05:25

## 2020-01-01 RX ADMIN — HYDRALAZINE HYDROCHLORIDE 100 MG: 50 TABLET, FILM COATED ORAL at 15:55

## 2020-01-01 RX ADMIN — BUMETANIDE 2 MG: 0.25 INJECTION INTRAMUSCULAR; INTRAVENOUS at 08:23

## 2020-01-01 RX ADMIN — DOCUSATE SODIUM 100 MG: 100 CAPSULE, LIQUID FILLED ORAL at 08:57

## 2020-01-01 RX ADMIN — MIRTAZAPINE 45 MG: 15 TABLET, FILM COATED ORAL at 21:15

## 2020-01-01 RX ADMIN — Medication 10 ML: at 06:08

## 2020-01-01 RX ADMIN — POTASSIUM CHLORIDE 20 MEQ: 750 TABLET, FILM COATED, EXTENDED RELEASE ORAL at 10:04

## 2020-01-01 RX ADMIN — PREDNISOLONE ACETATE 1 DROP: 10 SUSPENSION/ DROPS OPHTHALMIC at 08:22

## 2020-01-01 RX ADMIN — DILTIAZEM HYDROCHLORIDE 180 MG: 180 CAPSULE, COATED, EXTENDED RELEASE ORAL at 08:33

## 2020-01-01 RX ADMIN — DIPHENHYDRAMINE HYDROCHLORIDE 25 MG: 25 CAPSULE ORAL at 23:05

## 2020-01-01 RX ADMIN — GLYCOPYRROLATE 0.2 MG: 0.2 INJECTION, SOLUTION INTRAMUSCULAR; INTRAVENOUS at 20:49

## 2020-01-01 RX ADMIN — LEVOTHYROXINE SODIUM 88 MCG: 88 TABLET ORAL at 06:56

## 2020-01-01 RX ADMIN — Medication 10 ML: at 21:45

## 2020-01-01 RX ADMIN — Medication 10 ML: at 06:09

## 2020-01-01 RX ADMIN — DORZOLAMIDE HYDROCHLORIDE 1 DROP: 20 SOLUTION/ DROPS OPHTHALMIC at 20:24

## 2020-01-01 RX ADMIN — SODIUM BICARBONATE 650 MG: 650 TABLET ORAL at 21:16

## 2020-01-01 RX ADMIN — MIRTAZAPINE 45 MG: 15 TABLET, FILM COATED ORAL at 22:10

## 2020-01-01 RX ADMIN — HYDRALAZINE HYDROCHLORIDE 10 MG: 20 INJECTION INTRAMUSCULAR; INTRAVENOUS at 20:35

## 2020-01-01 RX ADMIN — Medication 10 ML: at 21:50

## 2020-01-01 RX ADMIN — SODIUM BICARBONATE 650 MG: 650 TABLET ORAL at 08:27

## 2020-01-01 RX ADMIN — MIRTAZAPINE 45 MG: 15 TABLET, FILM COATED ORAL at 21:44

## 2020-01-01 RX ADMIN — FUROSEMIDE 60 MG: 10 INJECTION, SOLUTION INTRAMUSCULAR; INTRAVENOUS at 08:06

## 2020-01-01 RX ADMIN — HYDRALAZINE HYDROCHLORIDE 100 MG: 50 TABLET, FILM COATED ORAL at 08:27

## 2020-01-01 RX ADMIN — ALBUMIN (HUMAN) 25 G: 0.25 INJECTION, SOLUTION INTRAVENOUS at 18:42

## 2020-01-01 RX ADMIN — ALBUTEROL SULFATE 2.5 MG: 2.5 SOLUTION RESPIRATORY (INHALATION) at 08:24

## 2020-01-01 RX ADMIN — Medication 5 ML: at 06:02

## 2020-01-01 RX ADMIN — HYDRALAZINE HYDROCHLORIDE 100 MG: 50 TABLET, FILM COATED ORAL at 08:03

## 2020-01-01 RX ADMIN — DOCUSATE SODIUM 100 MG: 100 CAPSULE, LIQUID FILLED ORAL at 08:23

## 2020-01-01 RX ADMIN — Medication 10 ML: at 06:49

## 2020-01-01 RX ADMIN — ALPRAZOLAM 0.25 MG: 0.25 TABLET ORAL at 05:49

## 2020-01-01 RX ADMIN — FUROSEMIDE 60 MG: 10 INJECTION, SOLUTION INTRAMUSCULAR; INTRAVENOUS at 17:18

## 2020-01-01 RX ADMIN — Medication 10 ML: at 22:10

## 2020-01-01 RX ADMIN — LORAZEPAM 0.26 MG: 2 INJECTION INTRAMUSCULAR; INTRAVENOUS at 13:48

## 2020-01-01 RX ADMIN — MIRTAZAPINE 45 MG: 15 TABLET, FILM COATED ORAL at 22:27

## 2020-01-01 RX ADMIN — WARFARIN SODIUM 5 MG: 5 TABLET ORAL at 17:42

## 2020-01-01 RX ADMIN — DOCUSATE SODIUM 100 MG: 100 CAPSULE, LIQUID FILLED ORAL at 08:27

## 2020-01-01 RX ADMIN — DOCUSATE SODIUM 100 MG: 100 CAPSULE, LIQUID FILLED ORAL at 17:28

## 2020-01-01 RX ADMIN — MIRTAZAPINE 45 MG: 15 TABLET, FILM COATED ORAL at 22:12

## 2020-01-01 RX ADMIN — POTASSIUM CHLORIDE 40 MEQ: 20 TABLET, EXTENDED RELEASE ORAL at 08:06

## 2020-01-01 RX ADMIN — Medication 10 ML: at 06:18

## 2020-01-01 RX ADMIN — BUMETANIDE 2 MG: 0.25 INJECTION, SOLUTION INTRAMUSCULAR; INTRAVENOUS at 12:14

## 2020-01-01 RX ADMIN — METOPROLOL SUCCINATE 100 MG: 50 TABLET, EXTENDED RELEASE ORAL at 08:23

## 2020-01-01 RX ADMIN — PANTOPRAZOLE SODIUM 40 MG: 40 TABLET, DELAYED RELEASE ORAL at 08:27

## 2020-01-01 RX ADMIN — LEVOTHYROXINE SODIUM 88 MCG: 88 TABLET ORAL at 06:38

## 2020-01-01 RX ADMIN — WARFARIN SODIUM 3 MG: 2 TABLET ORAL at 17:10

## 2020-01-01 RX ADMIN — Medication 10 ML: at 22:11

## 2020-01-01 RX ADMIN — POTASSIUM CHLORIDE 40 MEQ: 20 TABLET, EXTENDED RELEASE ORAL at 17:17

## 2020-01-01 RX ADMIN — FUROSEMIDE 60 MG: 10 INJECTION, SOLUTION INTRAMUSCULAR; INTRAVENOUS at 09:28

## 2020-01-01 RX ADMIN — DIPHENHYDRAMINE HYDROCHLORIDE 12.5 MG: 50 INJECTION, SOLUTION INTRAMUSCULAR; INTRAVENOUS at 22:10

## 2020-01-01 RX ADMIN — SODIUM BICARBONATE 650 MG: 650 TABLET ORAL at 08:03

## 2020-01-01 RX ADMIN — LEVOTHYROXINE SODIUM 88 MCG: 88 TABLET ORAL at 06:25

## 2020-01-01 RX ADMIN — PANTOPRAZOLE SODIUM 40 MG: 40 TABLET, DELAYED RELEASE ORAL at 08:35

## 2020-01-01 RX ADMIN — Medication 10 ML: at 05:17

## 2020-01-01 RX ADMIN — LEVOTHYROXINE SODIUM 88 MCG: 88 TABLET ORAL at 05:53

## 2020-01-01 RX ADMIN — PREDNISOLONE ACETATE 1 DROP: 10 SUSPENSION/ DROPS OPHTHALMIC at 09:13

## 2020-01-01 RX ADMIN — PREDNISOLONE ACETATE 1 DROP: 10 SUSPENSION/ DROPS OPHTHALMIC at 11:18

## 2020-01-01 RX ADMIN — Medication 10 ML: at 21:32

## 2020-01-01 RX ADMIN — Medication 10 ML: at 17:28

## 2020-01-01 RX ADMIN — Medication 10 ML: at 21:18

## 2020-01-01 RX ADMIN — FUROSEMIDE 40 MG: 10 INJECTION, SOLUTION INTRAMUSCULAR; INTRAVENOUS at 20:35

## 2020-01-01 RX ADMIN — WARFARIN SODIUM 3 MG: 2 TABLET ORAL at 17:16

## 2020-01-01 RX ADMIN — SODIUM BICARBONATE 650 MG: 650 TABLET ORAL at 08:30

## 2020-01-01 RX ADMIN — FENTANYL CITRATE 25 MCG: 50 INJECTION, SOLUTION INTRAMUSCULAR; INTRAVENOUS at 16:27

## 2020-01-01 RX ADMIN — METOPROLOL SUCCINATE 100 MG: 50 TABLET, EXTENDED RELEASE ORAL at 08:35

## 2020-01-01 RX ADMIN — METOPROLOL SUCCINATE 100 MG: 50 TABLET, EXTENDED RELEASE ORAL at 08:27

## 2020-01-01 RX ADMIN — Medication 10 ML: at 22:12

## 2020-01-01 RX ADMIN — DEXTROSE MONOHYDRATE 25 G: 25 INJECTION, SOLUTION INTRAVENOUS at 12:20

## 2020-01-01 RX ADMIN — DILTIAZEM HYDROCHLORIDE 180 MG: 180 CAPSULE, COATED, EXTENDED RELEASE ORAL at 08:06

## 2020-01-01 RX ADMIN — Medication 10 ML: at 08:35

## 2020-01-01 RX ADMIN — METOPROLOL SUCCINATE 100 MG: 50 TABLET, EXTENDED RELEASE ORAL at 08:42

## 2020-01-01 RX ADMIN — DILTIAZEM HYDROCHLORIDE 180 MG: 180 CAPSULE, COATED, EXTENDED RELEASE ORAL at 08:04

## 2020-01-01 RX ADMIN — BUMETANIDE 2 MG: 0.25 INJECTION INTRAMUSCULAR; INTRAVENOUS at 17:28

## 2020-01-01 RX ADMIN — GLYCOPYRROLATE 0.2 MG: 0.2 INJECTION, SOLUTION INTRAMUSCULAR; INTRAVENOUS at 00:43

## 2020-01-01 RX ADMIN — LEVOTHYROXINE SODIUM 88 MCG: 88 TABLET ORAL at 06:07

## 2020-01-01 RX ADMIN — HYDRALAZINE HYDROCHLORIDE 100 MG: 50 TABLET, FILM COATED ORAL at 23:00

## 2020-01-01 RX ADMIN — Medication 10 ML: at 06:45

## 2020-01-01 RX ADMIN — DORZOLAMIDE HYDROCHLORIDE 1 DROP: 20 SOLUTION/ DROPS OPHTHALMIC at 21:45

## 2020-01-01 RX ADMIN — LORAZEPAM 1 MG: 2 SOLUTION, CONCENTRATE ORAL at 01:39

## 2020-01-01 RX ADMIN — DILTIAZEM HYDROCHLORIDE 180 MG: 180 CAPSULE, COATED, EXTENDED RELEASE ORAL at 10:17

## 2020-01-01 RX ADMIN — SODIUM BICARBONATE 650 MG: 650 TABLET ORAL at 08:06

## 2020-01-01 RX ADMIN — DOCUSATE SODIUM 100 MG: 100 CAPSULE, LIQUID FILLED ORAL at 08:30

## 2020-01-01 RX ADMIN — PANTOPRAZOLE SODIUM 40 MG: 40 TABLET, DELAYED RELEASE ORAL at 08:03

## 2020-01-01 RX ADMIN — METOPROLOL SUCCINATE 100 MG: 50 TABLET, EXTENDED RELEASE ORAL at 08:30

## 2020-01-01 RX ADMIN — SODIUM BICARBONATE 650 MG: 650 TABLET ORAL at 17:17

## 2020-01-01 RX ADMIN — HYDRALAZINE HYDROCHLORIDE 100 MG: 50 TABLET, FILM COATED ORAL at 17:42

## 2020-01-01 RX ADMIN — DILTIAZEM HYDROCHLORIDE 180 MG: 180 CAPSULE, COATED, EXTENDED RELEASE ORAL at 08:35

## 2020-01-01 RX ADMIN — DILTIAZEM HYDROCHLORIDE 180 MG: 180 CAPSULE, COATED, EXTENDED RELEASE ORAL at 08:42

## 2020-01-01 RX ADMIN — DOCUSATE SODIUM 100 MG: 100 CAPSULE, LIQUID FILLED ORAL at 17:16

## 2020-01-01 RX ADMIN — SODIUM BICARBONATE 650 MG: 650 TABLET ORAL at 08:33

## 2020-01-01 RX ADMIN — DOCUSATE SODIUM 100 MG: 100 CAPSULE, LIQUID FILLED ORAL at 20:35

## 2020-01-01 RX ADMIN — METOPROLOL SUCCINATE 100 MG: 50 TABLET, EXTENDED RELEASE ORAL at 09:13

## 2020-01-01 RX ADMIN — PANTOPRAZOLE SODIUM 40 MG: 40 TABLET, DELAYED RELEASE ORAL at 08:57

## 2020-01-01 RX ADMIN — LORAZEPAM 1 MG: 2 SOLUTION, CONCENTRATE ORAL at 01:24

## 2020-01-01 RX ADMIN — IPRATROPIUM BROMIDE AND ALBUTEROL SULFATE 3 ML: .5; 3 SOLUTION RESPIRATORY (INHALATION) at 02:03

## 2020-01-01 RX ADMIN — FUROSEMIDE 40 MG: 10 INJECTION, SOLUTION INTRAMUSCULAR; INTRAVENOUS at 08:27

## 2020-01-01 RX ADMIN — DOCUSATE SODIUM 100 MG: 100 CAPSULE, LIQUID FILLED ORAL at 17:10

## 2020-01-01 RX ADMIN — PANTOPRAZOLE SODIUM 40 MG: 40 TABLET, DELAYED RELEASE ORAL at 08:42

## 2020-01-01 RX ADMIN — SODIUM BICARBONATE 650 MG: 650 TABLET ORAL at 17:36

## 2020-01-01 RX ADMIN — DORZOLAMIDE HYDROCHLORIDE 1 DROP: 20 SOLUTION/ DROPS OPHTHALMIC at 18:54

## 2020-01-01 RX ADMIN — BUMETANIDE 4 MG: 0.25 INJECTION INTRAMUSCULAR; INTRAVENOUS at 17:16

## 2020-01-01 RX ADMIN — HYDRALAZINE HYDROCHLORIDE 100 MG: 50 TABLET, FILM COATED ORAL at 08:32

## 2020-01-01 RX ADMIN — CHLOROTHIAZIDE SODIUM 500 MG: 500 INJECTION, POWDER, LYOPHILIZED, FOR SOLUTION INTRAVENOUS at 13:11

## 2020-01-01 RX ADMIN — Medication 10 ML: at 22:27

## 2020-01-01 RX ADMIN — MIRTAZAPINE 45 MG: 15 TABLET, FILM COATED ORAL at 21:46

## 2020-01-01 RX ADMIN — SODIUM BICARBONATE 650 MG: 650 TABLET ORAL at 22:11

## 2020-01-01 RX ADMIN — FUROSEMIDE 60 MG: 10 INJECTION, SOLUTION INTRAMUSCULAR; INTRAVENOUS at 18:02

## 2020-01-01 RX ADMIN — DOCUSATE SODIUM 100 MG: 100 CAPSULE, LIQUID FILLED ORAL at 17:36

## 2020-01-01 RX ADMIN — BUMETANIDE 2 MG: 0.25 INJECTION INTRAMUSCULAR; INTRAVENOUS at 17:10

## 2020-01-01 RX ADMIN — Medication 10 ML: at 17:25

## 2020-01-01 RX ADMIN — LEVOTHYROXINE SODIUM 88 MCG: 88 TABLET ORAL at 06:45

## 2020-01-01 RX ADMIN — CALCIUM GLUCONATE 1 G: 98 INJECTION, SOLUTION INTRAVENOUS at 13:22

## 2020-01-01 RX ADMIN — PANTOPRAZOLE SODIUM 40 MG: 40 TABLET, DELAYED RELEASE ORAL at 08:06

## 2020-01-01 RX ADMIN — SODIUM BICARBONATE 650 MG: 650 TABLET ORAL at 22:10

## 2020-01-01 RX ADMIN — LEVOTHYROXINE SODIUM 88 MCG: 88 TABLET ORAL at 06:02

## 2020-01-01 RX ADMIN — FUROSEMIDE 60 MG: 10 INJECTION, SOLUTION INTRAMUSCULAR; INTRAVENOUS at 08:30

## 2020-01-01 RX ADMIN — FUROSEMIDE 40 MG: 10 INJECTION, SOLUTION INTRAMUSCULAR; INTRAVENOUS at 02:45

## 2020-01-01 RX ADMIN — FUROSEMIDE 60 MG: 10 INJECTION, SOLUTION INTRAMUSCULAR; INTRAVENOUS at 18:38

## 2020-01-01 RX ADMIN — METOPROLOL SUCCINATE 100 MG: 50 TABLET, EXTENDED RELEASE ORAL at 10:15

## 2020-01-01 RX ADMIN — PANTOPRAZOLE SODIUM 40 MG: 40 TABLET, DELAYED RELEASE ORAL at 09:29

## 2020-01-01 RX ADMIN — WARFARIN SODIUM 5 MG: 5 TABLET ORAL at 18:41

## 2020-01-01 RX ADMIN — Medication 10 ML: at 21:15

## 2020-01-01 RX ADMIN — DORZOLAMIDE HYDROCHLORIDE 1 DROP: 20 SOLUTION/ DROPS OPHTHALMIC at 17:12

## 2020-01-01 RX ADMIN — Medication 10 ML: at 21:16

## 2020-01-01 RX ADMIN — DOCUSATE SODIUM 100 MG: 100 CAPSULE, LIQUID FILLED ORAL at 08:03

## 2020-01-01 RX ADMIN — PANTOPRAZOLE SODIUM 40 MG: 40 TABLET, DELAYED RELEASE ORAL at 08:30

## 2020-01-01 RX ADMIN — ALBUMIN (HUMAN) 25 G: 0.25 INJECTION, SOLUTION INTRAVENOUS at 13:54

## 2020-01-01 RX ADMIN — Medication 10 ML: at 05:26

## 2020-01-01 RX ADMIN — Medication 10 ML: at 22:19

## 2020-01-01 RX ADMIN — SODIUM BICARBONATE 650 MG: 650 TABLET ORAL at 23:00

## 2020-01-01 RX ADMIN — HYDRALAZINE HYDROCHLORIDE 100 MG: 50 TABLET, FILM COATED ORAL at 09:28

## 2020-01-01 RX ADMIN — HYDRALAZINE HYDROCHLORIDE 100 MG: 50 TABLET, FILM COATED ORAL at 15:46

## 2020-01-01 RX ADMIN — FUROSEMIDE 40 MG: 10 INJECTION, SOLUTION INTRAMUSCULAR; INTRAVENOUS at 18:27

## 2020-01-01 RX ADMIN — DORZOLAMIDE HYDROCHLORIDE 1 DROP: 20 SOLUTION/ DROPS OPHTHALMIC at 09:13

## 2020-01-01 RX ADMIN — ALBUMIN (HUMAN) 12.5 G: 0.25 INJECTION, SOLUTION INTRAVENOUS at 06:15

## 2020-01-01 RX ADMIN — DORZOLAMIDE HYDROCHLORIDE 1 DROP: 20 SOLUTION/ DROPS OPHTHALMIC at 17:16

## 2020-01-01 RX ADMIN — DILTIAZEM HYDROCHLORIDE 180 MG: 180 CAPSULE, COATED, EXTENDED RELEASE ORAL at 08:27

## 2020-01-01 RX ADMIN — SODIUM BICARBONATE 650 MG: 650 TABLET ORAL at 15:46

## 2020-01-01 RX ADMIN — Medication 10 ML: at 17:13

## 2020-01-01 RX ADMIN — DORZOLAMIDE HYDROCHLORIDE 1 DROP: 20 SOLUTION/ DROPS OPHTHALMIC at 08:22

## 2020-01-01 RX ADMIN — METOPROLOL SUCCINATE 100 MG: 50 TABLET, EXTENDED RELEASE ORAL at 08:33

## 2020-01-01 RX ADMIN — PREDNISOLONE ACETATE 1 DROP: 10 SUSPENSION/ DROPS OPHTHALMIC at 11:26

## 2020-01-01 RX ADMIN — Medication 10 ML: at 23:00

## 2020-01-01 RX ADMIN — PANTOPRAZOLE SODIUM 40 MG: 40 TABLET, DELAYED RELEASE ORAL at 08:32

## 2020-01-01 RX ADMIN — Medication 10 ML: at 23:14

## 2020-01-01 RX ADMIN — DORZOLAMIDE HYDROCHLORIDE 1 DROP: 20 SOLUTION/ DROPS OPHTHALMIC at 17:29

## 2020-01-01 RX ADMIN — SODIUM BICARBONATE 650 MG: 650 TABLET ORAL at 09:29

## 2020-01-01 RX ADMIN — ACETAMINOPHEN 650 MG: 160 SUSPENSION ORAL at 00:57

## 2020-01-01 RX ADMIN — Medication 10 ML: at 17:32

## 2020-01-01 RX ADMIN — IPRATROPIUM BROMIDE AND ALBUTEROL SULFATE 3 ML: .5; 3 SOLUTION RESPIRATORY (INHALATION) at 20:40

## 2020-01-01 RX ADMIN — MIRTAZAPINE 45 MG: 15 TABLET, FILM COATED ORAL at 20:33

## 2020-01-01 RX ADMIN — Medication 10 ML: at 15:03

## 2020-01-01 RX ADMIN — PANTOPRAZOLE SODIUM 40 MG: 40 TABLET, DELAYED RELEASE ORAL at 09:13

## 2020-01-01 RX ADMIN — DIPHENHYDRAMINE HYDROCHLORIDE 25 MG: 25 CAPSULE ORAL at 00:22

## 2020-01-01 RX ADMIN — HYDRALAZINE HYDROCHLORIDE 100 MG: 50 TABLET, FILM COATED ORAL at 17:17

## 2020-01-01 RX ADMIN — HYDRALAZINE HYDROCHLORIDE 100 MG: 50 TABLET, FILM COATED ORAL at 21:16

## 2020-01-01 RX ADMIN — DOCUSATE SODIUM 100 MG: 100 CAPSULE, LIQUID FILLED ORAL at 17:42

## 2020-01-01 RX ADMIN — DILTIAZEM HYDROCHLORIDE 180 MG: 180 CAPSULE, COATED, EXTENDED RELEASE ORAL at 09:29

## 2020-01-01 RX ADMIN — HUMAN INSULIN 10 UNITS: 100 INJECTION, SOLUTION SUBCUTANEOUS at 12:28

## 2020-01-01 RX ADMIN — DORZOLAMIDE HYDROCHLORIDE 1 DROP: 20 SOLUTION/ DROPS OPHTHALMIC at 08:43

## 2020-01-01 RX ADMIN — DOCUSATE SODIUM 100 MG: 100 CAPSULE, LIQUID FILLED ORAL at 17:32

## 2020-01-01 RX ADMIN — DOCUSATE SODIUM 100 MG: 100 CAPSULE, LIQUID FILLED ORAL at 17:25

## 2020-01-01 RX ADMIN — GLYCOPYRROLATE 0.2 MG: 0.2 INJECTION, SOLUTION INTRAMUSCULAR; INTRAVENOUS at 06:37

## 2020-01-01 RX ADMIN — Medication 10 ML: at 06:59

## 2020-01-01 RX ADMIN — HYDRALAZINE HYDROCHLORIDE 100 MG: 50 TABLET, FILM COATED ORAL at 22:12

## 2020-01-01 RX ADMIN — MIRTAZAPINE 45 MG: 15 TABLET, FILM COATED ORAL at 21:56

## 2020-01-01 RX ADMIN — HYDRALAZINE HYDROCHLORIDE 50 MG: 50 TABLET, FILM COATED ORAL at 18:41

## 2020-01-01 RX ADMIN — DILTIAZEM HYDROCHLORIDE 180 MG: 180 CAPSULE, COATED, EXTENDED RELEASE ORAL at 09:13

## 2020-01-01 RX ADMIN — MIRTAZAPINE 45 MG: 15 TABLET, FILM COATED ORAL at 22:11

## 2020-01-01 RX ADMIN — HYDRALAZINE HYDROCHLORIDE 100 MG: 50 TABLET, FILM COATED ORAL at 21:46

## 2020-01-01 RX ADMIN — BUMETANIDE 1 MG: 0.25 INJECTION, SOLUTION INTRAMUSCULAR; INTRAVENOUS at 18:39

## 2020-01-01 RX ADMIN — DILTIAZEM HYDROCHLORIDE 180 MG: 180 CAPSULE, COATED, EXTENDED RELEASE ORAL at 18:02

## 2020-01-01 RX ADMIN — LORAZEPAM 0.5 MG: 2 INJECTION INTRAMUSCULAR; INTRAVENOUS at 22:38

## 2020-01-01 RX ADMIN — BUMETANIDE 4 MG: 0.25 INJECTION INTRAMUSCULAR; INTRAVENOUS at 18:14

## 2020-01-01 RX ADMIN — SODIUM BICARBONATE 650 MG: 650 TABLET ORAL at 16:28

## 2020-01-01 RX ADMIN — WARFARIN SODIUM 5 MG: 5 TABLET ORAL at 17:32

## 2020-01-01 RX ADMIN — DOCUSATE SODIUM 100 MG: 100 CAPSULE, LIQUID FILLED ORAL at 08:33

## 2020-01-01 RX ADMIN — Medication 10 ML: at 14:08

## 2020-01-01 RX ADMIN — DOCUSATE SODIUM 100 MG: 100 CAPSULE, LIQUID FILLED ORAL at 09:28

## 2020-01-01 RX ADMIN — WARFARIN SODIUM 2.5 MG: 2.5 TABLET ORAL at 17:28

## 2020-01-01 RX ADMIN — HYDRALAZINE HYDROCHLORIDE 100 MG: 50 TABLET, FILM COATED ORAL at 16:28

## 2020-01-01 RX ADMIN — Medication 10 ML: at 18:15

## 2020-01-01 RX ADMIN — MIRTAZAPINE 45 MG: 15 TABLET, FILM COATED ORAL at 23:00

## 2020-01-01 RX ADMIN — LEVOTHYROXINE SODIUM 88 MCG: 88 TABLET ORAL at 06:05

## 2020-01-01 RX ADMIN — HYDRALAZINE HYDROCHLORIDE 100 MG: 50 TABLET, FILM COATED ORAL at 17:36

## 2020-01-01 RX ADMIN — DORZOLAMIDE HYDROCHLORIDE 1 DROP: 20 SOLUTION/ DROPS OPHTHALMIC at 08:36

## 2020-01-01 RX ADMIN — DILTIAZEM HYDROCHLORIDE 180 MG: 180 CAPSULE, COATED, EXTENDED RELEASE ORAL at 08:30

## 2020-01-01 RX ADMIN — DILTIAZEM HYDROCHLORIDE 180 MG: 180 CAPSULE, COATED, EXTENDED RELEASE ORAL at 08:57

## 2020-01-01 RX ADMIN — DORZOLAMIDE HYDROCHLORIDE 1 DROP: 20 SOLUTION/ DROPS OPHTHALMIC at 21:14

## 2020-01-01 RX ADMIN — BUMETANIDE 4 MG: 0.25 INJECTION INTRAMUSCULAR; INTRAVENOUS at 08:42

## 2020-01-01 RX ADMIN — IPRATROPIUM BROMIDE AND ALBUTEROL SULFATE 3 ML: .5; 3 SOLUTION RESPIRATORY (INHALATION) at 14:09

## 2020-01-01 RX ADMIN — Medication 10 ML: at 17:42

## 2020-01-01 RX ADMIN — DORZOLAMIDE HYDROCHLORIDE 1 DROP: 20 SOLUTION/ DROPS OPHTHALMIC at 17:33

## 2020-01-01 RX ADMIN — WARFARIN SODIUM 5 MG: 5 TABLET ORAL at 17:25

## 2020-01-01 RX ADMIN — BUMETANIDE 4 MG: 0.25 INJECTION INTRAMUSCULAR; INTRAVENOUS at 08:35

## 2020-01-01 RX ADMIN — LORAZEPAM 1 MG: 2 INJECTION INTRAMUSCULAR; INTRAVENOUS at 21:56

## 2020-01-01 RX ADMIN — LEVOTHYROXINE SODIUM 88 MCG: 88 TABLET ORAL at 05:49

## 2020-01-01 RX ADMIN — DORZOLAMIDE HYDROCHLORIDE 1 DROP: 20 SOLUTION/ DROPS OPHTHALMIC at 22:28

## 2020-01-01 RX ADMIN — DILTIAZEM HYDROCHLORIDE 180 MG: 180 CAPSULE, COATED, EXTENDED RELEASE ORAL at 08:23

## 2020-01-01 RX ADMIN — METOPROLOL SUCCINATE 100 MG: 50 TABLET, EXTENDED RELEASE ORAL at 08:57

## 2020-01-01 RX ADMIN — MIRTAZAPINE 45 MG: 15 TABLET, FILM COATED ORAL at 21:12

## 2020-01-01 RX ADMIN — SODIUM BICARBONATE 650 MG: 650 TABLET ORAL at 22:12

## 2020-01-01 RX ADMIN — PREDNISOLONE ACETATE 1 DROP: 10 SUSPENSION/ DROPS OPHTHALMIC at 08:43

## 2020-01-01 RX ADMIN — METOPROLOL SUCCINATE 100 MG: 50 TABLET, EXTENDED RELEASE ORAL at 08:03

## 2020-01-01 RX ADMIN — LEVOTHYROXINE SODIUM 88 MCG: 88 TABLET ORAL at 06:14

## 2020-01-01 RX ADMIN — SODIUM BICARBONATE 650 MG: 650 TABLET ORAL at 08:35

## 2020-01-01 RX ADMIN — DORZOLAMIDE HYDROCHLORIDE 1 DROP: 20 SOLUTION/ DROPS OPHTHALMIC at 22:11

## 2020-01-01 RX ADMIN — DIPHENHYDRAMINE HYDROCHLORIDE 25 MG: 25 CAPSULE ORAL at 22:24

## 2020-01-01 RX ADMIN — METOPROLOL SUCCINATE 100 MG: 50 TABLET, EXTENDED RELEASE ORAL at 09:29

## 2020-01-01 RX ADMIN — Medication 10 ML: at 05:50

## 2020-01-01 RX ADMIN — LORAZEPAM 1 MG: 2 INJECTION INTRAMUSCULAR; INTRAVENOUS at 20:51

## 2020-01-01 RX ADMIN — BUMETANIDE 4 MG: 0.25 INJECTION INTRAMUSCULAR; INTRAVENOUS at 17:32

## 2020-01-01 RX ADMIN — BUMETANIDE 2 MG: 0.25 INJECTION INTRAMUSCULAR; INTRAVENOUS at 04:33

## 2020-01-01 RX ADMIN — IPRATROPIUM BROMIDE AND ALBUTEROL SULFATE 3 ML: .5; 3 SOLUTION RESPIRATORY (INHALATION) at 02:23

## 2020-01-01 RX ADMIN — HYDRALAZINE HYDROCHLORIDE 100 MG: 50 TABLET, FILM COATED ORAL at 22:11

## 2020-01-01 RX ADMIN — DOCUSATE SODIUM 100 MG: 100 CAPSULE, LIQUID FILLED ORAL at 17:18

## 2020-01-01 RX ADMIN — Medication 10 ML: at 21:53

## 2020-01-01 RX ADMIN — DOCUSATE SODIUM 100 MG: 100 CAPSULE, LIQUID FILLED ORAL at 18:16

## 2020-01-01 RX ADMIN — SODIUM BICARBONATE 650 MG: 650 TABLET ORAL at 17:41

## 2020-01-01 RX ADMIN — BUMETANIDE 4 MG: 0.25 INJECTION INTRAMUSCULAR; INTRAVENOUS at 10:13

## 2020-01-01 RX ADMIN — POTASSIUM CHLORIDE 40 MEQ: 20 TABLET, EXTENDED RELEASE ORAL at 18:01

## 2020-01-01 RX ADMIN — Medication 10 ML: at 06:14

## 2020-01-01 RX ADMIN — Medication 10 ML: at 13:23

## 2020-01-01 RX ADMIN — IPRATROPIUM BROMIDE AND ALBUTEROL SULFATE 3 ML: .5; 3 SOLUTION RESPIRATORY (INHALATION) at 20:39

## 2020-01-01 RX ADMIN — HYDRALAZINE HYDROCHLORIDE 50 MG: 50 TABLET, FILM COATED ORAL at 21:50

## 2020-01-01 RX ADMIN — HYDRALAZINE HYDROCHLORIDE 100 MG: 50 TABLET, FILM COATED ORAL at 08:35

## 2020-01-01 RX ADMIN — LEVOTHYROXINE SODIUM 88 MCG: 88 TABLET ORAL at 05:15

## 2020-01-01 RX ADMIN — DOCUSATE SODIUM 100 MG: 100 CAPSULE, LIQUID FILLED ORAL at 10:14

## 2020-01-01 RX ADMIN — MIRTAZAPINE 45 MG: 15 TABLET, FILM COATED ORAL at 21:32

## 2020-01-01 RX ADMIN — METOPROLOL SUCCINATE 100 MG: 50 TABLET, EXTENDED RELEASE ORAL at 08:06

## 2020-01-01 RX ADMIN — SODIUM BICARBONATE 650 MG: 650 TABLET ORAL at 17:25

## 2020-01-01 RX ADMIN — DOCUSATE SODIUM 100 MG: 100 CAPSULE, LIQUID FILLED ORAL at 09:12

## 2020-01-01 RX ADMIN — Medication 10 ML: at 22:28

## 2020-01-01 RX ADMIN — Medication 10 ML: at 14:18

## 2020-01-01 RX ADMIN — BUMETANIDE 4 MG: 0.25 INJECTION INTRAMUSCULAR; INTRAVENOUS at 10:06

## 2020-01-01 RX ADMIN — Medication 10 ML: at 13:44

## 2020-01-01 RX ADMIN — IPRATROPIUM BROMIDE AND ALBUTEROL SULFATE 3 ML: .5; 3 SOLUTION RESPIRATORY (INHALATION) at 13:22

## 2020-01-01 RX ADMIN — PANTOPRAZOLE SODIUM 40 MG: 40 TABLET, DELAYED RELEASE ORAL at 10:14

## 2020-01-01 RX ADMIN — MIRTAZAPINE 45 MG: 15 TABLET, FILM COATED ORAL at 21:49

## 2020-01-01 RX ADMIN — DORZOLAMIDE HYDROCHLORIDE 1 DROP: 20 SOLUTION/ DROPS OPHTHALMIC at 22:43

## 2020-01-01 RX ADMIN — WARFARIN SODIUM 3 MG: 2 TABLET ORAL at 17:36

## 2020-01-01 RX ADMIN — DOCUSATE SODIUM 100 MG: 100 CAPSULE, LIQUID FILLED ORAL at 18:27

## 2020-01-01 RX ADMIN — SODIUM BICARBONATE 650 MG: 650 TABLET ORAL at 15:55

## 2020-01-01 RX ADMIN — HYDRALAZINE HYDROCHLORIDE 100 MG: 50 TABLET, FILM COATED ORAL at 22:10

## 2020-01-01 RX ADMIN — PREDNISOLONE ACETATE 1 DROP: 10 SUSPENSION/ DROPS OPHTHALMIC at 08:36

## 2020-01-01 RX ADMIN — DOCUSATE SODIUM 100 MG: 100 CAPSULE, LIQUID FILLED ORAL at 08:42

## 2020-01-01 RX ADMIN — BUMETANIDE 1 MG: 1 TABLET ORAL at 09:13

## 2020-01-01 RX ADMIN — HYDRALAZINE HYDROCHLORIDE 100 MG: 50 TABLET, FILM COATED ORAL at 08:30

## 2020-01-01 RX ADMIN — DORZOLAMIDE HYDROCHLORIDE 1 DROP: 20 SOLUTION/ DROPS OPHTHALMIC at 08:56

## 2020-01-01 RX ADMIN — LEVOTHYROXINE SODIUM 88 MCG: 88 TABLET ORAL at 06:59

## 2020-01-01 RX ADMIN — DORZOLAMIDE HYDROCHLORIDE 1 DROP: 20 SOLUTION/ DROPS OPHTHALMIC at 11:25

## 2020-01-01 RX ADMIN — MELATONIN 3 MG: at 00:30

## 2020-01-01 RX ADMIN — Medication 10 ML: at 06:05

## 2020-01-07 NOTE — PROGRESS NOTES
Goals      Reduce risk of CHF exacerbations and complications. 12/26/19 Daughter reports patient has PCP appointment 12/26/19 @ 1300 and she is waiting on call for cardio appointment. Denies chest pain, SOB, fever. Endorses pain in right leg not hot swelling decreased since ice pack applied. Will discuss with PCP at appointment today. Daughter understands low Na diet, importance of weighing daily and weight criteria. Daughter will monitor S&S of CHF and report cardio appointment at next outreach. ROMEL    1/7/2020 Per Leila with PCP office patient attended appointment on 12/26/19 and follow up on 1/6/2020. Cardiology is currently scheduled for 1/16/2020. Daughter reports weight is 136. Denies chest pain, SOB, fever. Patient is currently taking prednisone and dicyclomine for PNA. Daughter will report completion of ABX at next outreach.  ROMEL

## 2020-01-16 NOTE — LETTER
1/16/20 Patient: Haider Hill YOB: 1935 Date of Visit: 1/16/2020 Niko Cordova MD 
[phone] [facility] [address] VIA Facsimile: [phone] Dear Niko Cordova MD, Thank you for referring Ms. Ta Mckeon to [address] [place] [address] for evaluation. My notes for this consultation are attached. If you have questions, please do not hesitate to call me. I look forward to following your patient along with you. Sincerely, Elissa Bowman MD

## 2020-01-16 NOTE — PROGRESS NOTES
1. Have you been to the ER, urgent care clinic since your last visit? Hospitalized since your last visit? ED [facility] 1-21-19, a fib. S/P ablation 2. Have you seen or consulted any other health care providers outside of the [address] [place] [address] since your last visit? Include any pap smears or colon screening.  No

## 2020-01-16 NOTE — PROGRESS NOTES
Subjective:  
  
Natasha Clovin is a 82 y.o. female is here for EP consult. The patient denies chest pain, orthopnea, PND, LE edema, palpitations, syncope, presyncope or fatigue. Pt reports dyspnea. Had xray with PCP yesterday. Family add that it was not normal.  Family states pt is unable to sleep. Going to see psych on Monday. Patient Active Problem List  
 Diagnosis Date Noted  Atrial flutter ([address] 12/21/2019  Atrial fibrillation with rapid ventricular response ([address] 12/21/2019  Chronic diastolic congestive heart failure ([address] 04/11/2019  Atrial fibrillation, rapid ([address] 04/08/2019  Shortness of breath 04/06/2019  Atrial fibrillation with RVR ([address] 04/06/2019  Aortic insufficiency 03/19/2019  Mitral stenosis 03/19/2019  Bilateral carotid artery stenosis 03/18/2019  Vaso vagal episode 03/18/2019  Hypertensive urgency 03/16/2019  CKD (chronic kidney disease) 03/16/2019  Acute renal failure superimposed on stage 4 chronic kidney disease ([address] 02/28/2019  Persistent atrial fibrillation 02/26/2019  CAP (community acquired pneumonia) 02/26/2019  Essential hypertension 02/26/2019  Anticoagulant long-term use 02/26/2019  Dementia ([address] 02/26/2019  Acquired hypothyroidism 02/26/2019  LIVIA on CPAP 05/18/2012  Paroxysmal atrial fibrillation (HCC)  Aortic valve disorder 09/30/2010  Pure hypercholesterolemia 09/30/2010  Mitral valve disease  Benign hypertensive heart disease without heart failure Kunal Mishra MD 
Past Medical History:  
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  
 LIVIA (obstructive sleep apnea)  Paroxysmal atrial fibrillation (HCC)  Pure hypercholesterolemia 9/30/2010 Past Surgical History:  
Procedure Laterality Date  ECHO 2D ADULT  11/2009 LVH, normal LV wall motion and ejection fraction, mild aortic stenosis, moderate aortic regurgitation and mild mitral regurgitation. The ejection fraction was 55-60%.  ECHO 2D ADULT  1/2011 EF 60%, LAE, mild AS, AI, MR, PA low 40s  EVENT MONITOR POST SYMPTOMS  9/2010 Rare PACs, no arrythmia with symptoms  HX CHOLECYSTECTOMY  HX HYSTERECTOMY  LOOP MONITOR  9-10/2011 NSR  
 [place] CARDIOVERSION ELECTIVE ARRHYTHMIA EXTERNAL N/A 12/23/2019 Ep Cardioversion performed by Ronn Shetty MD at [facility]  [place] ICAR CATHETER ABLATION ATRIOVENTR NODE FUNCTION N/A 12/23/2019 Ablation Av Node performed by Ronn Shetty MD at [facility]  [place] INS NEW/RPLC PRM PACEMAKER W/TRANSV ELTRD VENTR N/A 12/23/2019 Insert Ppm Single Ventricular performed by Ronn Shetty MD at [facility]  STRESS TEST MYOVIEW  1/2011  
 no ischemia, EF 63%  US DUPLEX CAROTID BILATERAL  1/2011  
 mild bilat disease Allergies Allergen Reactions  Latex, Natural Rubber Itching  Metformin Nausea and Vomiting Other reaction(s): nausea HEADACHE  Advil [Ibuprofen] Unknown (comments)  Aspirin Unknown (comments)  Citalopram Other (comments) HEADACHE  Codeine Other (comments)  Iodinated Contrast Media Itching  Meloxicam Unknown (comments)  Penicillin G Unknown (comments)  Shellfish Containing Products Rash  Sulfa (Sulfonamide Antibiotics) Unknown (comments)  Tramadol Nausea and Vomiting and Other (comments) HEADACHE Family History Problem Relation Age of Onset  Heart Disease Father  Dementia Brother  Heart Disease Brother  Diabetes Brother   
 negative for cardiac disease Social History Socioeconomic History  Marital status:  Spouse name: Not on file  Number of children: Not on file  Years of education: Not on file  Highest education level: Not on file Tobacco Use  Smoking status: Former Smoker Last attempt to quit: 1963 Years since quittin.0  Smokeless tobacco: Never Used Substance and Sexual Activity  Alcohol use: Not Currently  Drug use: No  
 
Current Outpatient Medications Medication Sig  warfarin (COUMADIN) 5 mg tablet Take 1 Tab by mouth every Tuesday. Patient takes 5 mg Wednesday-Monday evenings, and 2.5 mg on Tuesday evening  torsemide (DEMADEX) 5 mg tablet Take 5 mg by mouth daily.  prednisoLONE acetate (PRED FORTE) 1 % ophthalmic suspension Administer 1 Drop to right eye daily. Until 19. Starting 19 titrate down to 1 drop right eye daily  ondansetron hcl (ZOFRAN) 4 mg tablet Take 4 mg by mouth every eight (8) hours as needed for Nausea.  sodium bicarbonate 650 mg tablet Take 650 mg by mouth three (3) times daily.  metoprolol succinate (TOPROL-XL) 100 mg tablet Take 100 mg by mouth daily.  besifloxacin (BESIVANCE) 0.6 % drps ophthalmic suspension Administer 1 Drop to right eye daily.  brinzolamide (AZOPT) 1 % ophthalmic suspension Administer 1 Drop to right eye three (3) times daily.  hydrALAZINE (APRESOLINE) 100 mg tablet Take 1 Tab by mouth three (3) times daily.  polyethylene glycol (MIRALAX) 17 gram packet Take 17 g by mouth daily as needed (constipation).  dilTIAZem CD (CARDIZEM CD) 180 mg ER capsule Take 1 Cap by mouth daily. D/C QID dose  albuterol-ipratropium (DUO-NEB) 2.5 mg-0.5 mg/3 ml nebu 3 mL by Nebulization route every six (6) hours as needed for Other (wheeze).  levothyroxine (SYNTHROID) 88 mcg tablet Take 88 mcg by mouth Daily (before breakfast).  ALPRAZolam (XANAX) 0.25 mg tablet Take 0.25 mg by mouth as needed for Anxiety.  warfarin (COUMADIN) 5 mg tablet Take 5 mg by mouth six (6) days a week. Patient takes 5 mg Wednesday-Monday evenings, and 2.5 mg on Tuesday evening  mirtazapine (REMERON) 30 mg tablet Take 1 Tab by mouth nightly.  esomeprazole (NEXIUM) 40 mg capsule Take 40 mg by mouth daily. No current facility-administered medications for this visit. Vitals:  
 01/16/20 3766 BP: 150/50 Pulse: 82 Resp: 18 SpO2: 98% Weight: 133 lb (60.3 kg) Height: 5' (1.524 m) I have reviewed the nurses notes, vitals, problem list, allergy list, medical history, family, social history and medications. Review of Symptoms: 
 
General: Pt denies excessive weight gain or loss. Pt is able to conduct ADL's HEENT: Denies blurred vision, headaches, epistaxis and difficulty swallowing. Respiratory:  reports shortness of breath, H[place] Cardiovascular: Denies precordial pain, palpitations, edema or PND Gastrointestinal: Denies poor appetite, indigestion, abdominal pain or blood in stool Urinary: Denies dysuria, pyuria Musculoskeletal: Denies pain or swelling from muscles or joints Neurologic: Denies tremor, paresthesias, or sensory motor disturbance Skin: Denies rash, itching or texture change. Psych: Denies depression Physical Exam:   
 
General: Well developed, in no acute distress. HEENT: Eyes - PERRL, no jvd Heart:  Normal S1/S2 negative S3 or S4. Regular, no murmur, gallop or rub. Respiratory: Clear bilaterally x 4, no wheezing or rales Extremities:  No edema, normal cap refill, no cyanosis. Musculoskeletal: No clubbing Neuro: A&Ox3, speech clear, gait stable. Skin: Skin color is normal. No rashes or lesions. Non diaphoretic Vascular: 2+ pulses symmetric in all extremities Cardiographics 
 
ekg - junctional rhythm, lvh Results for orders placed or performed during the hospital encounter of 12/21/19 EKG, 12 LEAD, INITIAL Result Value Ref Range Ventricular Rate 67 BPM  
 Atrial Rate 67 BPM  
 P-R Interval 278 ms QRS Duration 90 ms Q-T Interval 418 ms QTC Calculation (Bezet) 441 ms Calculated P Axis 62 degrees Calculated R Axis 41 degrees Calculated T Axis -43 degrees Diagnosis Sinus rhythm with 1st degree AV block Left ventricular hypertrophy with repolarization abnormality When compared with ECG of 21-DEC-2019 07:11, No significant change was found Confirmed by Thad Kang ([id number]) on 12/22/2019 3:43:08 PM 
  
 
 
 
Lab Results Component Value Date/Time WBC 9.5 12/24/2019 02:31 AM  
 HGB 7.8 (L) 12/24/2019 02:31 AM  
 HCT 24.8 (L) 12/24/2019 02:31 AM  
 PLATELET 838 93/57/3136 02:31 AM  
 MCV 88.3 12/24/2019 02:31 AM  
  
Lab Results Component Value Date/Time Sodium 142 12/24/2019 02:31 AM  
 Potassium 4.1 12/24/2019 02:31 AM  
 Chloride 111 (H) 12/24/2019 02:31 AM  
 CO2 23 12/24/2019 02:31 AM  
 Anion gap 8 12/24/2019 02:31 AM  
 Glucose 117 (H) 12/24/2019 02:31 AM  
 BUN 59 (H) 12/24/2019 02:31 AM  
 Creatinine 1.97 (H) 12/24/2019 02:31 AM  
 BUN/Creatinine ratio 30 (H) 12/24/2019 02:31 AM  
 GFR est AA 29 (L) 12/24/2019 02:31 AM  
 GFR est non-AA 24 (L) 12/24/2019 02:31 AM  
 Calcium 8.2 (L) 12/24/2019 02:31 AM  
 Bilirubin, total 0.2 06/30/2019 06:45 AM  
 AST (SGOT) 44 (H) 06/30/2019 06:45 AM  
 Alk. phosphatase 190 (H) 06/30/2019 06:45 AM  
 Protein, total 7.0 06/30/2019 06:45 AM  
 Albumin 2.7 (L) 06/30/2019 06:45 AM  
 Globulin 4.3 (H) 06/30/2019 06:45 AM  
 A-G Ratio 0.6 (L) 06/30/2019 06:45 AM  
 ALT (SGPT) 55 06/30/2019 06:45 AM  
  
Lab Results Component Value Date/Time TSH 6.14 (H) 03/16/2019 04:00 PM  
 
 
 Assessment: ICD-10-CM ICD-9-CM 1. Pacemaker reprogramming/check Z45.018 V53.31 AMB POC EKG ROUTINE W/ 12 LEADS, INTER & REP 2. Persistent atrial fibrillation I48.19 427.31   
3. Mitral valve stenosis, unspecified etiology I05.0 394.0 4. Essential hypertension I10 401.9 5. Anxiety F41.9 300.00 Orders Placed This Encounter  AMB POC EKG ROUTINE W/ 12 LEADS, INTER & REP Order Specific Question:   Reason for Exam: Answer:   routine Plan: Ms John Trejo is here for follow up s/p single chamber pm and AVN  Ablation. Sheis 75% V paced, sinus with 1st degree AV block. She is on warfarin. Xray yesterday with bilat pleural effusion. Will have her double her demadex (cr 1.97 in dec) for 3 days. Family will try benadryl or melatonin for sleep until she sees psych. Will see her back in 3mo. Continue medical management for anxiety MS and AF. Thank you for allowing me to participate in Isela Brown 's care. Sheron Cruz NP Patient seen and examined. All pertinent data reviewed. I have reviewed detailed note as outlined by Sheron Cruz NP. Case discussed with Nursing/medical assistant staff and Sheron Cruz NP. Plans as outlined. Has sob - will increase demadex for 3 days. Cont med rx for htn. She is v paced 75% sp av node. Cont oac for AF. F/u in 3 months with a device check.  
 
Teagan Hernandez MD, Uriel Lal

## 2020-01-28 PROBLEM — I50.9 CHF (CONGESTIVE HEART FAILURE) (HCC): Status: ACTIVE | Noted: 2020-01-01

## 2020-01-28 NOTE — PROGRESS NOTES
1800- TRANSFER - IN REPORT: 
 
Verbal report received from Jonathan RN(name) on Lu Busby  being received from [facility] ED(unit) for routine progression of care Report consisted of patients Situation, Background, Assessment and  
Recommendations(SBAR). Information from the following report(s) SBAR, Kardex, Intake/Output, MAR and Recent Results was reviewed with the receiving nurse. Opportunity for questions and clarification was provided. Assessment completed upon patients arrival to unit and care assumed. Pt arrived via EMS for a direct admission from [facility] . Pt is in no apparent distress. Pt on BiPAP at arrival, but currently is not using BiPAP. Pt saturations >90% on 2L NC.  
1816- /35. Spoke with daughter about pt medications. Daughter states that pt usually takes metoprolol once in the morning and hydralazine 3 times a day. 65- Dr. Lisha Lacy in room to see pt. Orders to be received.

## 2020-01-29 NOTE — PROGRESS NOTES
Transitional Care Team: Initial G Note Date of Assessment: 01/29/20 Time of Assessment:  2:35 PM 
 
Zoey Ochoa is a 82 y.o. female inpatient at  [facility]. Assessment & Plan Pt sleeping soundly, Dtr at bedside states she has carried HF diagnosis for some time. They do follow reduced sodium diet somewhat, do not weigh daily. MD is switching her diuretic in hope of better fluid volume amangment Anticipate hopeful return home with possible home health visits, but may need short term rehab. Primary Diagnosis: heart failure Advance Directive:  AMD with HCP. See ACP note from me. Current Code Status:  full Referral to Hospice/ Palliative Care Appropriate: no. 
Awareness of Medical Conditions: (Trajectory of illness and pts expectations). Anticipate return to baseline with good quality of life Discharge Needs: (to include safety issues) home health versus short term rehab Barriers Identified: none Patient is willing to go to SNF/Inpatient Rehab if recommended: yes Medication Review:  Await AVS. 
 
Can patient afford medications:  yes. Patient is Compliant with Medication regimen:yes Who manages medications at home: family Best Patient Contact Number: [phone] [facility] (Healthy Understanding of Goals) program introduced to patient/family. The Transitional Care Team bridges the gaps in care and education surrounding discharge from the acute care facility. The objective is to empower the patient and family in taking a proactive role in preventing readmission within the first thirty days after discharge. The team is also involved in the efforts to reduce readmission to the acute care setting after stabilization and discharge from the acute care environment either to skilled nursing facilities or community.   
 
[facility] RN/NP will return with [facility] Calendar/ follow up appointments/ Ambulatory Nurse Navigator name and contact information when the patient is ready for discharge. Future Appointments Date Time Provider Fernando Laura 4/23/2020  9:30 AM PACEMAKER, Ana Lick MAAME SCHED  
4/23/2020  9:45 AM [facility]  
5/11/2020  9:40 AM Deandra García MD [address] [place] [address] Street  
1/20/2021  1:00 PM Bernabe Bland MD [facility] follow up appointment(s): none yet [facility]: call information given to lives outside service area Patient education focused on readmission zones as described as: The Red Zone: High risk for readmission, days 1-21 The Yellow Zone: Moderate  risk for readmission, days 22-29 The Green Zone: Lower risk for readmission, days 30 and after [facility] Team will attempt to follow the patient from a distance while inpatient as well as be available for further transition/disposition needs. The [facility] team will continue to offer support during the 30- 90 day discharge from acute care setting. Will notify Ambulatory {Blank Single Select Template:[id number][de-identified] \"SURINDER   RN. Past Medical History:  
Diagnosis Date  Aortic valve disorders AS  Asthma  Benign hypertensive heart disease without heart failure  Diabetes ([facility]  Fibromyalgia  Gastroparesis  GERD (gastroesophageal reflux disease)  Hypertension  Mitral valve disorders(424.0) MR  
 LIVIA (obstructive sleep apnea)  Paroxysmal atrial fibrillation (HCC)  Pure hypercholesterolemia 9/30/2010

## 2020-01-29 NOTE — PROGRESS NOTES
Patient with noted hemoptysis episode. 
-not new (patient has experienced throughout the day) New request provider made aware. Additionally patient's family requesting patient be placed on Bi-pap tonight. 
-provider aware Patient currently resting with both eyes closed in no apparent distress. -RR and effort remain mildly elevated (breathing treatment was administered)  
-family at the bedside

## 2020-01-29 NOTE — PROGRESS NOTES
Reason for Admission:  Congestive Heart Failure RUR Score:  32% Resources/supports as identified by patient/family: Pt has a large supportive family. Top Challenges facing patient (as identified by patient/family and CM): Finances/Medication cost?    Pt has no concerns relating to finances or obtaining medications. Pt gets prescriptions filled at [facility] in [facility]. Transportation? Pt relies on her children to transport her to appts. Support system or lack thereof? Pt has supportive family which includes several children. Living arrangements? Pt resides in a one level home with a ramp to entry. Pt resides in [place], [place] with her son Ton Hermosillo and Otilio Oquendo. Self-care/ADLs/Cognition? Pt requires assistance completing all ADLs/IADLs. Pt has DME to assist with managing care needs at home. This includes a nebulizer, hospital bed, cane, rollator, w/c, shower chair, and bedside commode. Pt family reports a hx of depression but that their mother is usually oriented x4. Current Advanced Directive/Advance Care Plan:  Pt is a full code. ACP completed in March 2019 lists her daughter Charley Jimenez as decision maker. Her other children questioned rather this information was valid but concluded that they would like to discuss it as a family. CM informed them that ACPs are completed by the patient when they are alert and oriented. Plan for utilizing home health:  Pt has a wound and has recently stopped ambulating she would benefit from [place] or SNF placement. Transition of Care Plan:   CM met with pt children Tabitha and Lei  to discuss d/c planning. Pt remained asleep during assessment. CM verified pt demographic, insurance, and PCP information.  Pt resides in a home with two of her children. Her children that live with her provide majority of her care, but she has several other children that are willing to assist when needed. Pt was reportedly previously walking with a rollator before her condition declined. She now relies on a w/c to assist with her mobility. Pt could potentially benefit from [facility] or SNF placement at discharge. CM will continue to follow patient for discharge planning needs and arrange for services as deemed necessary. Care Management Interventions PCP Verified by CM: Yes Mode of Transport at Discharge: Other (see comment) Transition of Care Consult (CM Consult): Discharge Planning Current Support Network: T[place], Family Lives [place] Confirm Follow Up Transport:  Darcyjeffrey Sweetteresa, Care Manager [phone]

## 2020-01-29 NOTE — CONSULTS
Palliative Medicine Consult Mamadou: [phone]) Patient Name: Nikkie Lewis YOB: 1935 Date of Initial Consult: 1/29/2020 Reason for Consult: care decisions Requesting Provider: Casper Koyanagi MD  
Primary Care Physician: Katie Child MD 
 
 SUMMARY:  
Nikkie Lewis is a 82 y.o. Female with a past history of heart failure with preserved EF 55- 60 %, atrial  fibrillation on anticoagulation s/p pacemaker ,COPD not on oxygen ,  HTN, CKD stage 1V , recent d/c from [facility] on 12/24/19 for atrial fibrillation /RVR s/p av node ablation and PPM ,   who was transferred from [facility]  on 1/28/2020, with a diagnosis of sob in a setting of acute hypoxic heart failure due to acute on chronic exacerbation of diastolic heart failure . Current medical issues leading to Palliative Medicine involvement include: care decisions in a setting of acute heart failure,  no AMD. 
 
She lives in her home , her daughter Latoya Oseguera and son Luis Iniguez lives with her, at [place] ,  she is sedentary mostly in  requires help with adls, has mild dementia, , other wise able to manage her finances. She does not uses home oxygen . PALLIATIVE DIAGNOSES:  
 
1. Altered mental status/metabolic encepahlopathy 2. Acute on chronic heart failure 3. Renal failure 4. Sob  
5. Generalized weakness 6. dementia 7. Goals of care 8. Full code review . PLAN:  
1. Prior to visit , I did chart review, including notes from recent hospitalization , MAR , Echo . 2. Met with the patient who is alert awske , oriented x 2 , very weak , does not have full insight into her medical issues , she allowed me to talk to her family outside the room . 3. Family meeting : 
· Met with patient daughter Latoya Oseguera, in person and son Juan M Alvarado over the phone Demargaret Cooley works in mental health field ) · Introduce palliative care .  
· Updated on her current acute medical issues , sob related to heart failure , reviewed echo results. ·  Altered mental status :Her son is very concerned for change in mental status, she had been a sharp person , they attributed slowing of mentation to recent use of trazadone given by psychiatrist , leading to somnolence, recently she was given Radha Eagles , one day prior to admission and she was found to be confused , family wants to make sure she should \" never \" be placed on these medication again . I reassure I will coordinate with the medical team and will add these medication to list of allergies/intolerance . We talked about renal dysfunction /slowing of kidney related to heart failure is contributing to change in her mental alertness. · Their goal is to receive treatment for her heart failure and other medical issues , get better and come home. 4. Full code review : They want to give her the chance of resuscitation based on her living will . 5. Advance directives in place refer to ACP NOTE . 6. We will follow peripherally . 7. Initial consult note routed to primary continuity provider and/or primary health care team members 8. Communicated plan of care with: Palliative [facility] Team 
 
 GOALS OF CARE / TREATMENT PREFERENCES:  
 
GOALS OF CARE: 
Patient/Health Care Proxy Stated Goals: Prolong life TREATMENT PREFERENCES:  
Code Status: Full Code Advance Care Planning: 
[] The [facility] Interdisciplinary Team has updated the ACP Navigator with [place] and Patient Capacity Primary Decision Maker: Lillian,Stacy - Child - [phone] Secondary Decision Maker: Art Orta - Daughter - [phone] Advance Care Planning 12/21/2019 Patient's Healthcare Decision Maker is: - Confirm Advance Directive Yes, on file Patient Would Like to Complete Advance Directive - Does the patient have other document types - Medical Interventions: (on going discussion .) Other Instructions: Other: As far as possible, the palliative care team has discussed with patient / health care proxy about goals of care / treatment preferences for patient. HISTORY:  
 
History obtained from: patient , mainly from daughter CHIEF COMPLAINT: generalized weakness HPI/SUBJECTIVE: The patient is:  
[] Verbal and participatory Patient is poor historian , per daughter patient was noted to have sob , she checked her pulse ox it was found to be 88%, she called her primary care physician who advised to bring her to ER . Currently patient is feeling weak , denies any sob , nausea or vomiting . Clinical Pain Assessment (nonverbal scale for severity on nonverbal patients):  
Clinical Pain Assessment Severity: 0 Duration: for how long has pt been experiencing pain (e.g., 2 days, 1 month, years) Frequency: how often pain is an issue (e.g., several times per day, once every few days, constant) FUNCTIONAL ASSESSMENT:  
 
Palliative Performance Scale (PPS): PSYCHOSOCIAL/SPIRITUAL SCREENING:  
 
Palliative IDT has assessed this patient for cultural preferences / practices and a referral made as appropriate to needs (Cultural Services, Patient Advocacy, Ethics, etc.) Any spiritual / Druze concerns: 
[] Yes /  [x] No 
 
Caregiver Burnout: 
[] Yes /  [x] No /  [] No Caregiver Present Anticipatory grief assessment:  
[x] Normal  / [] Maladaptive ESAS Anxiety: Anxiety: 0 
 
ESAS Depression: Depression: 0 REVIEW OF SYSTEMS:  
 
Positive and pertinent negative findings in ROS are noted above in HPI. The following systems were [x] reviewed / [] unable to be reviewed as noted in HPI Other findings are noted below. Systems: constitutional, ears/nose/mouth/throat, respiratory, gastrointestinal, genitourinary, musculoskeletal, integumentary, neurologic, psychiatric, endocrine. Positive findings noted below. Modified ESAS Completed by: provider Fatigue: 7 Drowsiness: 0 Depression: 0 Pain: 0 Anxiety: 0 Nausea: 2 Anorexia: 8 Dyspnea: 2 Constipation: (for past 3 days .) PHYSICAL EXAM:  
 
From RN flowsheet: 
Wt Readings from Last 3 Encounters:  
01/29/20 131 lb (59.4 kg) 01/28/20 131 lb (59.4 kg) 01/24/20 137 lb 2 oz (62.2 kg) Blood pressure (!) 156/28, pulse 77, temperature 97.6 °F (36.4 °C), resp. rate 18, height 5' (1.524 m), weight 131 lb (59.4 kg), SpO2 90 %. Pain Scale 1: Numeric (0 - 10) Pain Intensity 1: 0 Last bowel movement, if known:  
 
Constitutional: alert, oriented to person and place , not to time and dates, frail . Eyes: pupils equal, anicteric ENMT: no nasal discharge, moist mucous membranes Cardiovascular: regular rhythm, edema ++ Respiratory: breathing not labored, symmetric Gastrointestinal: soft non-tender, +bowel sounds Musculoskeletal: generalized wasting. Skin: warm, dry Neurologic: following commands, moving all extremities Psychiatric: full affect, no hallucinations Other: 
 
 
 HISTORY:  
 
Active Problems: 
  CHF (congestive heart failure) ([facility] [address] (1/28/2020) Past Medical History:  
Diagnosis Date  Aortic valve disorders AS  Asthma  Benign hypertensive heart disease without heart failure  Diabetes ([facility] [address]  Fibromyalgia  Gastroparesis  GERD (gastroesophageal reflux disease)  Hypertension  Mitral valve disorders(424.0) MR  
 LIVIA (obstructive sleep apnea)  Paroxysmal atrial fibrillation (HCC)  Pure hypercholesterolemia 9/30/2010 Past Surgical History:  
Procedure Laterality Date  ECHO 2D ADULT  11/2009 LVH, normal LV wall motion and ejection fraction, mild aortic stenosis, moderate aortic regurgitation and mild mitral regurgitation. The ejection fraction was 55-60%.  ECHO 2D ADULT  1/2011 EF 60%, LAE, mild AS, AI, MR, PA low 40s  EVENT MONITOR POST SYMPTOMS  9/2010 Rare PACs, no arrythmia with symptoms  HX CHOLECYSTECTOMY  HX HYSTERECTOMY  LOOP MONITOR  9-10/2011 NSR  
 SD CARDIOVERSION ELECTIVE ARRHYTHMIA EXTERNAL N/A 2019 Ep Cardioversion performed by Veronica Loya MD at [facility]  SD ICAR CATHETER ABLATION ATRIOVENTR NODE FUNCTION N/A 2019 Ablation Av Node performed by Veroncia Loya MD at [facility]  SD INS NEW/RPLC PRM PACEMAKER W/TRANSV ELTRD VENTR N/A 2019 Insert Ppm Single Ventricular performed by Veronica Loya MD at [facility]  STRESS TEST MYOVIEW  2011  
 no ischemia, EF 63%  US DUPLEX CAROTID BILATERAL  2011  
 mild bilat disease Family History Problem Relation Age of Onset  Heart Disease Father  Dementia Brother  Heart Disease Brother  Diabetes Brother History reviewed, no pertinent family history. Social History Tobacco Use  Smoking status: Former Smoker Last attempt to quit: 1963 Years since quittin.1  Smokeless tobacco: Never Used Substance Use Topics  Alcohol use: Not Currently Allergies Allergen Reactions  Latex, Natural Rubber Itching  Metformin Nausea and Vomiting Other reaction(s): nausea HEADACHE  Advil [Ibuprofen] Unknown (comments)  Aspirin Unknown (comments)  Citalopram Other (comments) HEADACHE  Codeine Other (comments)  Iodinated Contrast Media Itching  Meloxicam Unknown (comments)  Penicillin G Unknown (comments)  Shellfish Containing Products Rash  Sulfa (Sulfonamide Antibiotics) Unknown (comments)  Tramadol Nausea and Vomiting and Other (comments) HEADACHE Current Facility-Administered Medications Medication Dose Route Frequency  potassium chloride (K-DUR, KLOR-CON) SR tablet 40 mEq  40 mEq Oral BID  albuterol-ipratropium (DUO-NEB) 2.5 MG-0.5 MG/3 ML  3 mL Nebulization Q6H PRN  
 ALPRAZolam (XANAX) tablet 0.25 mg  0.25 mg Oral PRN  
  dilTIAZem CD (CARDIZEM CD) capsule 180 mg  180 mg Oral DAILY  pantoprazole (PROTONIX) tablet 40 mg  40 mg Oral ACB  hydrALAZINE (APRESOLINE) tablet 100 mg  100 mg Oral TID  levothyroxine (SYNTHROID) tablet 88 mcg  88 mcg Oral ACB  metoprolol succinate (TOPROL-XL) XL tablet 100 mg  100 mg Oral DAILY  mirtazapine (REMERON) tablet 45 mg  45 mg Oral QHS  polyethylene glycol (MIRALAX) packet 17 g  17 g Oral DAILY PRN  
 sodium bicarbonate tablet 650 mg  650 mg Oral TID  sodium chloride (NS) flush 5-40 mL  5-40 mL IntraVENous Q8H  
 sodium chloride (NS) flush 5-40 mL  5-40 mL IntraVENous PRN  
 acetaminophen (TYLENOL) solution 650 mg  650 mg Oral Q4H PRN  
 docusate sodium (COLACE) capsule 100 mg  100 mg Oral BID  furosemide (LASIX) injection 40 mg  40 mg IntraVENous BID  
 
 
 
 LAB AND IMAGING FINDINGS:  
 
Lab Results Component Value Date/Time WBC 10.4 01/29/2020 04:24 AM  
 HGB 9.1 (L) 01/29/2020 04:24 AM  
 PLATELET 722 72/74/6502 04:24 AM  
 
Lab Results Component Value Date/Time Sodium 146 (H) 01/29/2020 04:24 AM  
 Potassium 3.5 01/29/2020 04:24 AM  
 Chloride 112 (H) 01/29/2020 04:24 AM  
 CO2 25 01/29/2020 04:24 AM  
 BUN 64 (H) 01/29/2020 04:24 AM  
 Creatinine 1.82 (H) 01/29/2020 04:24 AM  
 Calcium 8.7 01/29/2020 04:24 AM  
 Magnesium 1.7 04/07/2019 04:31 AM  
 Phosphorus 3.2 04/07/2019 04:31 AM  
  
Lab Results Component Value Date/Time AST (SGOT) 29 01/28/2020 09:28 AM  
 Alk. phosphatase 147 (H) 01/28/2020 09:28 AM  
 Protein, total 7.0 01/28/2020 09:28 AM  
 Albumin 2.8 (L) 01/28/2020 09:28 AM  
 Globulin 4.2 (H) 01/28/2020 09:28 AM  
 
Lab Results Component Value Date/Time INR 5.2 (HH) 01/29/2020 04:18 AM  
 Prothrombin time 48.7 (H) 01/29/2020 04:18 AM  
  
Lab Results Component Value Date/Time  Iron 33 (L) 03/26/2019 04:01 AM  
 TIBC 174 (L) 03/26/2019 04:01 AM  
 Iron % saturation 19 (L) 03/26/2019 04:01 AM  
  
No results found for: PH, PCO2, PO2 
 No components found for: Loc Point Lab Results Component Value Date/Time CK 81 06/30/2019 06:45 AM  
 CK - MB 2.6 02/26/2019 01:08 AM  
  
 
 
   
 
Total time:  
Counseling / coordination time, spent as noted above:  
> 50% counseling / coordination?:  
 
Prolonged service was provided for  []30 min   []75 min in face to face time in the presence of the patient, spent as noted above. Time Start:  
Time End:  
Note: this can only be billed with 47264 (initial) or 60882 (follow up). If multiple start / stop times, list each separately.

## 2020-01-29 NOTE — PROGRESS NOTES
New Request provider made aware of the patient's elevated INR 5.2 an increase from the most recent (4.8).

## 2020-01-29 NOTE — PROGRESS NOTES
Patient currently sitting up in bed in no apparent distress. 
-Note: RR is elevated and labored mildly with expiratory wheezes noted Family at the bedside.

## 2020-01-29 NOTE — PROGRESS NOTES
Hospitalist Progress Note NAME: Margarita Singer :  1935 MRN:  [id number]  
 
45-MUES-QYM female with past medical history of diastolic congestive heart failure presented to the hospital with shortness of breath and was noted to have acute CHF exacerbation along with supratherapeutic INR from warfarin. Assessment / Plan: 
Acute hypoxic respiratory failure Acute on chronic exacerbation of diastolic heart failure Severe aortic stenosis on echo · History of heart failure preserved ejection fraction around 60% on the most recent echo · Presented with fluid overloaded and patchy infiltrates on x-ray with pleural effusion · Chronically on torsemide 5 mg daily, will hold · Continue Lasix 40 mg IV twice daily, continue metoprolol. · Strict I's and O's, daily weights. Echocardiogram pending · Cardiology is following VQ mismatch low probability for PE 
  
Supratherapeutic INR 
-INR is 5.2. Holding warfarin. Recheck INR in a.m. 
-Has small amount of hemoptysis and will monitor for now 
  
Mild elevated Troponin due to CKD 
-Troponin peaked at 0.06  
  
  
A. fib · Status post pacemaker insertion 2019 · Chronically on warfarin, will hold due to  supratherapeutic INR · Rate controlled with diltiazem and metoprolol will continue 
  
History of diabetes mellitus type 2 
· Off medications · Most recent hemoglobin A1c 5.3 2019 
  
COPD · Not in exacerbation · Continue home inhalers 
  
Hypertension · Continue home medication hydralazine 3 times daily, metoprolol, diltiazem 
  
Left lower extremity edema 
-Ultrasound Doppler showed no evidence of DVT 
  
CKD stage IV 
-Creatinine is close to baseline of around 1.8-2 
-Monitor creatinine daily while on diuresis 
  
  
  
Code Status: Full code Surrogate Decision Maker: Her sons Adriane Snyder 
  
DVT Prophylaxis:  SCDs due to high INR 
GI Prophylaxis: not indicated 
  
Baseline: Active, lives with her daughter Body mass index is 25.58 kg/m². Subjective: Chief Complaint / Reason for Physician Visit A poor historian. Reports shortness of breath with cough No chest pain. No fever. Review of Systems: 
Symptom Y/N Comments  Symptom Y/N Comments Fever/Chills    Chest Pain Poor Appetite    Edema Cough    Abdominal Pain Sputum    Joint Pain SOB/HASKINS    Pruritis/Rash Nausea/vomit    Tolerating PT/OT Diarrhea    Tolerating Diet Constipation    Other Could NOT obtain due to:   
 
Objective: VITALS:  
Last 24hrs VS reviewed since prior progress note. Most recent are: 
Patient Vitals for the past 24 hrs: 
 Temp Pulse Resp BP SpO2  
01/29/20 1454 97.5 °F (36.4 °C) 77 18 (!) 157/33 91 % 01/29/20 1305    (!) 156/28   
01/29/20 1130 97.6 °F (36.4 °C) 77 18 (!) 156/28 90 % 01/29/20 1015  71   93 % 01/29/20 0830     98 % 01/29/20 0730 97.4 °F (36.3 °C) 72 18 141/47 100 % 01/29/20 0000 96.7 °F (35.9 °C) 70  140/48 97 % 01/28/20 2230  71  (!) 147/39 100 % 01/28/20 2039     94 % 01/28/20 1930 97.6 °F (36.4 °C) 71 18 165/42 93 % 01/28/20 1830    (!) 191/37   
01/28/20 1804 97.6 °F (36.4 °C) 73 18 (!) 161/35 93 % Intake/Output Summary (Last 24 hours) at 1/29/2020 1510 Last data filed at 1/29/2020 6777 Gross per 24 hour Intake 360 ml Output 850 ml Net -490 ml PHYSICAL EXAM: 
General: cooperative, no acute distress   
EENT:  EOMI. Anicteric sclerae. MMM Resp:  Bilateral air entry present, crackles are present, no wheezing CV:  Regular  rhythm,  No edema GI:  Soft, Non distended, Non tender.  +Bowel sounds Neurologic:  Alert and awake Psych:   Not anxious nor agitated Skin:  No rashes. No jaundice Reviewed most current lab test results and cultures  YES Reviewed most current radiology test results   YES Review and summation of old records today    NO Reviewed patient's current orders and MAR    YES 
 PMH/SH reviewed - no change compared to H&P Current Facility-Administered Medications:  
  potassium chloride (K-DUR, KLOR-CON) SR tablet 40 mEq, 40 mEq, Oral, BID, Delmi VALDES NP 
  albuterol-ipratropium (DUO-NEB) 2.5 MG-0.5 MG/3 ML, 3 mL, Nebulization, Q6H PRN, Rob Urban MD, 3 mL at 01/28/20 2039   ALPRAZolam (XANAX) tablet 0.25 mg, 0.25 mg, Oral, PRN, Rob Urban MD 
  dilTIAZem CD (CARDIZEM CD) capsule 180 mg, 180 mg, Oral, DAILY, Michael Campbell MD, 180 mg at 01/29/20 0827 
  pantoprazole (PROTONIX) tablet 40 mg, 40 mg, Oral, ACB, Rob Urban MD, 40 mg at 01/29/20 9369   hydrALAZINE (APRESOLINE) tablet 100 mg, 100 mg, Oral, TID, Rob Urban MD, 100 mg at 01/29/20 3488   levothyroxine (SYNTHROID) tablet 88 mcg, 88 mcg, Oral, ACB, Rob Urban MD, 88 mcg at 01/29/20 2190   metoprolol succinate (TOPROL-XL) XL tablet 100 mg, 100 mg, Oral, DAILY, Michael Campbell MD, 100 mg at 01/29/20 0827 
  mirtazapine (REMERON) tablet 45 mg, 45 mg, Oral, QHS, Rob Urban MD, 45 mg at 01/28/20 2300   polyethylene glycol (MIRALAX) packet 17 g, 17 g, Oral, DAILY PRN, Rob Urban MD 
  sodium bicarbonate tablet 650 mg, 650 mg, Oral, TID, Michael Campbell MD, 650 mg at 01/29/20 0827 
  sodium chloride (NS) flush 5-40 mL, 5-40 mL, IntraVENous, Q8H, Michael Campbell MD, 10 mL at 01/29/20 1408   sodium chloride (NS) flush 5-40 mL, 5-40 mL, IntraVENous, PRN, Rob Urban MD 
  acetaminophen (TYLENOL) solution 650 mg, 650 mg, Oral, Q4H PRN, Rbo Urban MD 
  docusate sodium (COLACE) capsule 100 mg, 100 mg, Oral, BID, Rob Urban MD, 100 mg at 01/29/20 5792   furosemide (LASIX) injection 40 mg, 40 mg, IntraVENous, BID, Rob Urban MD, 40 mg at 01/29/20 0827 
________________________________________________________________________ Care Plan discussed with: 
  Comments Patient y Family  y   
RN y   
Care Manager Consultant Multidiciplinary team rounds were held today with , nursing, pharmacist and clinical coordinator. Patient's plan of care was discussed; medications were reviewed and discharge planning was addressed. ________________________________________________________________________ Total NON critical care TIME:  35   Minutes Total CRITICAL CARE TIME Spent:   Minutes non procedure based Comments >50% of visit spent in counseling and coordination of care    
________________________________________________________________________ Jeison Blackwell MD  
 
Procedures: see electronic medical records for all procedures/Xrays and details which were not copied into this note but were reviewed prior to creation of Plan. LABS: 
I reviewed today's most current labs and imaging studies. Pertinent labs include: 
Recent Labs  
  01/29/20 0424 01/28/20 
7349 WBC 10.4 9.9 HGB 9.1* 9.3* HCT 30.4* 30.6*  202 Recent Labs  
  01/29/20 0424 01/29/20 
0418 01/28/20 
0994 *  --  147* K 3.5  --  3.7 *  --  108 CO2 25  --  28  
GLU 97  --  133* BUN 64*  --  64* CREA 1.82*  --  2.04* CA 8.7  --  8.6 ALB  --   --  2.8* TBILI  --   --  0.7 SGOT  --   --  29 ALT  --   --  32 INR  --  5.2* 4.8* Signed: Jeison Blackwell MD

## 2020-01-29 NOTE — CONSULTS
[address] [facility] Date of  Admission: 1/28/2020  5:52 PM  
 
Admission type:Urgent Consult for: CHF Consult by: Dr. Romeo Gardner Subjective:  
 
Nikkie Lewis is an 82 y.o. female  with a PMH significant for single chamber PM and AV aiyana ablation, DM, DHF, HTN, PAF, gastroporesis, insomnia, dementia, major depressive disorder, anxiety, LIVIA,  admitted for CHF (congestive heart failure) ([address] [I50.9]. Per ED provider notes, the patient present with c/o SOB and reported oxygen saturation at home of 70. Of note, patient is prescribed CPAP for home use and has reportedly not used it in some time. Upon assessment the patient denies an CP, Chest discomfort/tightness, syncope, near syncope, palpitations, dizziness, lightheadedness, or continued SOB. She is on 4L/NC and Sat's 95%. Patient last seen by Dr. Irish Medel on 1/16/2020 for PM follow-up appointment. Previous treatment/evaluation includes AV aiyana ablation and PM, Cardica catheterization 3/26/2019 (no sig. CAD, no sig. MR, Mod. MS),  Last echo 3/22/2019 EF then was 61-65%. Repeat echo performed today 1/29/2020. Cardiac risk factors: .family history, diabetes mellitus, sedentary life style, hypertension, stress, post-menopausal 
 
 
Patient Active Problem List  
 Diagnosis Date Noted  CHF (congestive heart failure) ([address] 01/28/2020  Atrial flutter ([address] 12/21/2019  Atrial fibrillation with rapid ventricular response ([address] 12/21/2019  Chronic diastolic congestive heart failure ([address] 04/11/2019  Atrial fibrillation, rapid ([address] 04/08/2019  Shortness of breath 04/06/2019  Atrial fibrillation with RVR ([address] 04/06/2019  Aortic insufficiency 03/19/2019  Mitral stenosis 03/19/2019  Bilateral carotid artery stenosis 03/18/2019  Vaso vagal episode 03/18/2019  Hypertensive urgency 03/16/2019  CKD (chronic kidney disease) 03/16/2019  Acute renal failure superimposed on stage 4 chronic kidney disease ([facility] 2019  Persistent atrial fibrillation 2019  CAP (community acquired pneumonia) 2019  Essential hypertension 2019  Anticoagulant long-term use 2019  Dementia ([facility] 2019  Acquired hypothyroidism 2019  LIVIA on CPAP 2012  Paroxysmal atrial fibrillation (HCC)  Aortic valve disorder 2010  Pure hypercholesterolemia 2010  Mitral valve disease  Benign hypertensive heart disease without heart failure To Donnelly MD 
Past Medical History:  
Diagnosis Date  Aortic valve disorders AS  Asthma  Benign hypertensive heart disease without heart failure  Diabetes ([facility]  Fibromyalgia  Gastroparesis  GERD (gastroesophageal reflux disease)  Hypertension  Mitral valve disorders(424.0) MR  
 LIVIA (obstructive sleep apnea)  Paroxysmal atrial fibrillation (HCC)  Pure hypercholesterolemia 2010 Social History Socioeconomic History  Marital status:  Spouse name: Not on file  Number of children: Not on file  Years of education: Not on file  Highest education level: Not on file Tobacco Use  Smoking status: Former Smoker Last attempt to quit: 1963 Years since quittin.1  Smokeless tobacco: Never Used Substance and Sexual Activity  Alcohol use: Not Currently  Drug use: No  
 
Allergies Allergen Reactions  Latex, Natural Rubber Itching  Metformin Nausea and Vomiting Other reaction(s): nausea HEADACHE  Advil [Ibuprofen] Unknown (comments)  Aspirin Unknown (comments)  Citalopram Other (comments) HEADACHE  Codeine Other (comments)  Iodinated Contrast Media Itching  Meloxicam Unknown (comments)  Penicillin G Unknown (comments)  Shellfish Containing Products Rash  Sulfa (Sulfonamide Antibiotics) Unknown (comments)  Tramadol Nausea and Vomiting and Other (comments) HEADACHE Family History Problem Relation Age of Onset  Heart Disease Father  Dementia Brother  Heart Disease Brother  Diabetes Brother Current Facility-Administered Medications Medication Dose Route Frequency  albuterol-ipratropium (DUO-NEB) 2.5 MG-0.5 MG/3 ML  3 mL Nebulization Q6H PRN  
 ALPRAZolam (XANAX) tablet 0.25 mg  0.25 mg Oral PRN  
 dilTIAZem CD (CARDIZEM CD) capsule 180 mg  180 mg Oral DAILY  pantoprazole (PROTONIX) tablet 40 mg  40 mg Oral ACB  hydrALAZINE (APRESOLINE) tablet 100 mg  100 mg Oral TID  levothyroxine (SYNTHROID) tablet 88 mcg  88 mcg Oral ACB  metoprolol succinate (TOPROL-XL) XL tablet 100 mg  100 mg Oral DAILY  mirtazapine (REMERON) tablet 45 mg  45 mg Oral QHS  polyethylene glycol (MIRALAX) packet 17 g  17 g Oral DAILY PRN  
 sodium bicarbonate tablet 650 mg  650 mg Oral TID  sodium chloride (NS) flush 5-40 mL  5-40 mL IntraVENous Q8H  
 sodium chloride (NS) flush 5-40 mL  5-40 mL IntraVENous PRN  
 acetaminophen (TYLENOL) solution 650 mg  650 mg Oral Q4H PRN  
 docusate sodium (COLACE) capsule 100 mg  100 mg Oral BID  furosemide (LASIX) injection 40 mg  40 mg IntraVENous BID Review of Symptoms:  
11 systems reviewed, negative other than as stated in the HPI Objective:  
  
Visit Vitals /47 (BP 1 Location: Left arm, BP Patient Position: At rest) Pulse 72 Temp 97.4 °F (36.3 °C) Resp 18 Wt 60 kg (132 lb 3.2 oz) SpO2 98% BMI 25.82 kg/m² Physical:  
General: thin, frail, elderly AA female, in NAD Heart: RRR, no m/S3/JVD, no carotid bruits Lungs: diminished throughout, on 2L/NC Abdomen: Soft, +BS, NTND, flat Extremities: LE gamaliel +DP/PT, left ankle edema +1. Neurologic: flat affect, closing eyes mid sentence, oriented to place, person, not situation and time. Data Review:  
Recent Labs  
  01/29/20 0424 01/28/20 
6248 WBC 10.4 9.9 HGB 9.1* 9.3* HCT 30.4* 30.6*  202 Recent Labs  
  01/29/20 0424 01/29/20 0418 01/28/20 
5040 *  --  147* K 3.5  --  3.7 *  --  108 CO2 25  --  28  
GLU 97  --  133* BUN 64*  --  64* CREA 1.82*  --  2.04* CA 8.7  --  8.6 ALB  --   --  2.8* TBILI  --   --  0.7 SGOT  --   --  29 ALT  --   --  32 INR  --  5.2* 4.8* Recent Labs  
  01/29/20 0424 01/28/20 
3615 TROIQ 0.06* 0.06* Intake/Output Summary (Last 24 hours) at 1/29/2020 1159 Last data filed at 1/29/2020 1125 Gross per 24 hour Intake  Output 850 ml Net -850 ml  
  
 
Cardiographics Telemetry: Vpaced ECG: Vpaced Echocardiogram: performed, in process CXRAY:Extensive bilateral patchy airspace consolidation. Small bilateral 
pleural effusions. Assessment:  
  
 Active Problems: 
  CHF (congestive heart failure) ([facility] (1/28/2020) Plan:  
 
Acute hypoxic respiratory failure Acute on chronic exacerbation of diastolic heart failure(proBNP more than 35,000) · History of heart failure preserved ejection fraction around 60% on the most recent echo · Presented with fluid overloaded and patchy infiltrates on x-ray with pleural effusion · Continue Lasix 40 mg IV twice daily · Strict I/O's (-600) · Daily weights · Obtain new echo-in process · VQ mismatch low probability for PE 
· Palliative care consult for care decision  
  
Supratherapeutic INR (5.2) coumadin on hold. · No visible active bleeding · Chronically on warfarin for A. Fib · Follow INR daily 
  
Elevated Troponin (trending back down, peaked 0.06). · In the setting of Cr elevation and CKD stage III 
· No CP or ischemic changes on ECG 
  
A. fib · Status post pacemaker insertion December 2019 · Chronically on warfarin, will hold due to  supratherapeutic INR · Rate controlled with diltiazem 180 mg PO daily · Metoprolol 100 mg PO daily 
  
Hypertension · Continue home medication hydralazine 100 mg PO TID · Metoprolol 100 mg PO daily ·  Diltiazem 180 mg PO daily 
  
 
Maci Emmanuel NP Patient seen and examined by me with nurse practitioner. I personally performed all components of the history, physical, and medical decision making and agree with the assessment and plan as noted.  
 
Uriah Ellis MD

## 2020-01-29 NOTE — ACP (ADVANCE CARE PLANNING)
Advance Care Planning Note NAME: Sesar German :  1935 MRN:  [id number] Date/Time:  2020 7:23 PM 
 
Active Diagnoses: 
Hospital Problems  Date Reviewed: 2020 Codes Class Noted POA  
 CHF (congestive heart failure) ([address] ICD-10-CM: I50.9 ICD-9-CM: 428.0  2020 Unknown These active diagnoses are of sufficient risk that focused discussion on advance care planning is indicated in order to allow the patient to thoughtfully consider personal goals of care, and if situations arise that prevent the ability to personally give input, to ensure appropriate representation of their personal desires for different levels and aggressiveness of care. Discussion:  
Code status addressed and wants to be a Full Code. Patient wants central line and vasopressors if needed. Patient would also want a feeding tube, if needed, for nutritional support. Patient  would like to assign Her sons Prieto Gomez  as the surrogate decision maker. Persons present and participating in discussion: Ike Waggoner MD, Time Spent:  
Total time spent face-to-face in education and discussion:   18 minutes.   
 
 
 
Ike Mason MD  
Hospitalist

## 2020-01-29 NOTE — PROGRESS NOTES
Spiritual Care Assessment/Progress Note [address] 
 
 
NAME: Rocío Horton      MRN: [id number] AGE: 82 y.o. SEX: female Faith Affiliation: Moravian  
Language: English  
 
1/29/2020     Total Time (in minutes): 12 Spiritual Assessment begun in [facility] PROGRESSIVE CARE through conversation with: 
  
    [x]Patient        [x] Family    [] Friend(s) Reason for Consult: Palliative Care, Initial/Spiritual Assessment Spiritual beliefs: (Please include comment if needed) [x] Identifies with a lisa tradition:     
   [x] Supported by a lisa community:        
   [] Claims no spiritual orientation:       
   [] Seeking spiritual identity:            
   [] Adheres to an individual form of spirituality:       
   [] Not able to assess:                   
 
    
Identified resources for coping:  
   [x] Prayer                           
   [] Music                  [] Guided Imagery [x] Family/friends                 [] Pet visits [] Devotional reading                         [] Unknown 
   [] Other:                                         
 
 
Interventions offered during this visit: (See comments for more details) Patient Interventions: Affirmation of lisa, Affirmation of emotions/emotional suffering, Iconic (affirming the presence of God/Higher Power), Prayer (assurance of), Initial/Spiritual assessment, patient floor Family/Friend(s): Affirmation of lisa, Affirmation of emotions/emotional suffering, Iconic (affirming the presence of God/Higher Power), Prayer (assurance of) Plan of Care:     
 
 [] Support spiritual and/or cultural needs  
 [] Support AMD and/or advance care planning process    
 [] Support grieving process 
 [] Coordinate Rites and/or Rituals  
 [] Coordination with community clergy 
 [x] No spiritual needs identified at this time 
 [] Detailed Plan of Care below (See Comments)  [] Make referral to Music Therapy [] Make referral to Pet Therapy    
[] Make referral to Addiction services 
[] Make referral to [facility] 
[] Make referral to Spiritual Care Partner 
[] No future visits requested       
[x] Follow up visits as needed Comments:   Initial visit on PCU for spiritual assessment of new palliative consult patient. Four of patient's daughters and a nephew were present. Patient appeared to be resting, but opened her eyes and spoke when I called her name. She shared she is doing okay today. She expressed no concerns at this time. She then closed her eyes again. Addressed family members to inquire of any needs or concerns. None were expressed. One daughter acknowledged that patient is a member of a [employer]. [facility] of presence and assurance of prayer. Advised of  availability. YAMILET Butcher, [facility], Staff  [facility]  Paging Service  [phone])

## 2020-01-29 NOTE — H&P
Hospitalist Admission Note NAME: Tyrone Kaiser :  1935 MRN:  [id number] Date/Time:  2020 7:11 PM 
 
Patient PCP: Siobhan Hayes MD 
______________________________________________________________________ Given the patient's current clinical presentation, I have a high level of concern for decompensation if discharged from the emergency department. Complex decision making was performed, which includes reviewing the patient's available past medical records, laboratory results, and x-ray films. My assessment of this patient's clinical condition and my plan of care is as follows. Assessment / Plan: 
 
Acute hypoxic respiratory failure Acute on chronic exacerbation of diastolic heart failure · History of heart failure preserved ejection fraction around 60% on the most recent echo · Presented with fluid overloaded and patchy infiltrates on x-ray with pleural effusion · Was given Lasix 40 mg at [address] 
· Chronically on torsemide 5 mg daily, will hold · We will start Lasix 40 mg twice daily · HIREN's · Daily weights · TSH · Respiratory film array · Obtain new echo · proBNP more than 35,000 
· VQ mismatch low probability for PE 
· palliative care consult for care decision Supratherapeutic INR 
· No active bleeding · Chronically on warfarin for A. fib · Hold warfarin · Presented with INR 4.5 · Follow INR daily Elevated Troponin · In the setting of Cr elevation and CKD stage III 
· No CP or ischemic changes on ECG 
· F/u troponin q 6 hr and if cont to rise then consider treatment for NSTEMI  
 
 
A. fib · Status post pacemaker insertion 2019 · Chronically on warfarin, will hold due to  supratherapeutic INR · Rate controlled with diltiazem and metoprolol will continue History of diabetes mellitus type 2 
· Off medications · Most recent hemoglobin A1c 5.3 2019 COPD · Not in exacerbation · Her symptoms are explained by her heart failure rather than COPD exacerbation · Continue home inhalers Hypertension · Uncontrolled · Continue home medication hydralazine 3 times daily, metoprolol, diltiazem Left lower extremity edema · Obtain ultrasound to rule out DVT 
 
CKD stage IV · Creatinine elevated more than baseline · Does not meet criteria for BOGDAN · Likely cardiorenal syndrome · Continue diuresis · Follow BMP Code Status: Full code Surrogate Decision Maker: Her sons Niraj Bunch DVT Prophylaxis: Given her supratherapeutic INR, SCDs after obtaining ultrasound lower extremity GI Prophylaxis: not indicated Baseline: Active, lives with her daughter Subjective: CHIEF COMPLAINT: Shortness of breath HISTORY OF PRESENT ILLNESS:    
 
The patient is an 82years old female with past medical history A. fib status post pacemaker December 2020 chronically on warfarin, history of diabetes mellitus she has been off medication for a long time, heart failure preserved ejection fraction presented emergency department at [facility] due to shortness of breath started earlier today. Her daughter reported that she woke up having shortness of breath and she measured her oxygen around 3 PM which was around 88% as she said. Patient had a history of COPD not on oxygen. Her daughter reported that she gave her a breathing treatment but her oxygen did not improve for which they called her primary care physician and he recommended to send her to the emergency department. Patient chronically on torsemide 5 mg daily for which she has been taking on a regular basis. She denied any chest pain, recent travel, sick contact, runny nose or sore throat. Patient does not smoke, drink alcohol or use illicit drugs. At [facility] she was found to have supratherapeutic INR without active bleeding.   Her x-ray revealed bilateral opacities with pleural effusion for which VQ mismatch was done and revealed low probability for PE. Patient was given Lasix and transferred to [facility] for further evaluation. Upon arrival here to the floor, she was hemodynamically stable and her family was at bedside. Nurse reported that her blood pressure was elevated for which we resumed her medication and we give him 1 dose of hydralazine 10 mg IV x1. We were asked to admit for work up and evaluation of the above problems. Past Medical History:  
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  
 LIVIA (obstructive sleep apnea)  Paroxysmal atrial fibrillation (HCC)  Pure hypercholesterolemia 9/30/2010 Past Surgical History:  
Procedure Laterality Date  ECHO 2D ADULT  11/2009 LVH, normal LV wall motion and ejection fraction, mild aortic stenosis, moderate aortic regurgitation and mild mitral regurgitation. The ejection fraction was 55-60%.  ECHO 2D ADULT  1/2011 EF 60%, LAE, mild AS, AI, MR, PA low 40s  EVENT MONITOR POST SYMPTOMS  9/2010 Rare PACs, no arrythmia with symptoms  HX CHOLECYSTECTOMY  HX HYSTERECTOMY  LOOP MONITOR  9-10/2011 NSR  
 DE CARDIOVERSION ELECTIVE ARRHYTHMIA EXTERNAL N/A 12/23/2019 Ep Cardioversion performed by Willam Grover MD at [facility]  DE ICAR CATHETER ABLATION ATRIOVENTR NODE FUNCTION N/A 12/23/2019 Ablation Av Node performed by Willam Grover MD at [facility]  DE INS NEW/RPLC PRM PACEMAKER W/TRANSV ELTRD VENTR N/A 12/23/2019 Insert Ppm Single Ventricular performed by Willam Grover MD at [facility]  STRESS TEST MYOVIEW  1/2011  
 no ischemia, EF 63%  US DUPLEX CAROTID BILATERAL  2011  
 mild bilat disease Social History Tobacco Use  Smoking status: Former Smoker Last attempt to quit: 1963 Years since quittin.1  Smokeless tobacco: Never Used Substance Use Topics  Alcohol use: Not Currently Family History Problem Relation Age of Onset  Heart Disease Father  Dementia Brother  Heart Disease Brother  Diabetes Brother Allergies Allergen Reactions  Latex, Natural Rubber Itching  Metformin Nausea and Vomiting Other reaction(s): nausea HEADACHE  Advil [Ibuprofen] Unknown (comments)  Aspirin Unknown (comments)  Citalopram Other (comments) HEADACHE  Codeine Other (comments)  Iodinated Contrast Media Itching  Meloxicam Unknown (comments)  Penicillin G Unknown (comments)  Shellfish Containing Products Rash  Sulfa (Sulfonamide Antibiotics) Unknown (comments)  Tramadol Nausea and Vomiting and Other (comments) HEADACHE Prior to Admission medications Medication Sig Start Date End Date Taking? Authorizing Provider  
zolpidem (AMBIEN) 5 mg tablet Take 1 Tab by mouth nightly as needed for Sleep. Max Daily Amount: 5 mg. 20   Leo Orlando MD  
mirtazapine (REMERON) 45 mg tablet Take 1 Tab by mouth nightly. 20   Leo Orlando MD  
warfarin (COUMADIN) 5 mg tablet Take 1 Tab by mouth every Tuesday. Patient takes 5 mg Wednesday-Monday evenings, and 2.5 mg on Tuesday evening 19   Kelly Marc MD  
torsemide [facility]) 5 mg tablet Take 5 mg by mouth daily. Provider, Historical  
prednisoLONE acetate (PRED FORTE) 1 % ophthalmic suspension Administer 1 Drop to right eye daily. Until 19. Starting 19 titrate down to 1 drop right eye daily    Provider, Historical  
ondansetron hcl (ZOFRAN) 4 mg tablet Take 4 mg by mouth every eight (8) hours as needed for Nausea.     Provider, Historical  
 sodium bicarbonate 650 mg tablet Take 650 mg by mouth three (3) times daily. Provider, Historical  
metoprolol succinate (TOPROL-XL) 100 mg tablet Take 100 mg by mouth daily. Provider, Historical  
besifloxacin (BESIVANCE) 0.6 % drps ophthalmic suspension Administer 1 Drop to right eye daily. 6/6/19   Provider, Historical  
brinzolamide (AZOPT) 1 % ophthalmic suspension Administer 1 Drop to right eye three (3) times daily. 6/6/19   Provider, Historical  
hydrALAZINE (APRESOLINE) 100 mg tablet Take 1 Tab by mouth three (3) times daily. 7/2/19   Chelo Joy, MABEL  
polyethylene glycol [facility]) 17 gram packet Take 17 g by mouth daily as needed (constipation). 12/16/14   Provider, Historical  
dilTIAZem CD (CARDIZEM CD) 180 mg ER capsule Take 1 Cap by mouth daily. D/C QID dose 4/11/19   Idalia Olea NP  
albuterol-ipratropium (DUO-NEB) 2.5 mg-0.5 mg/3 ml nebu 3 mL by Nebulization route every six (6) hours as needed for Other (wheeze). 4/8/19   Sebastian Aponte MD  
levothyroxine (SYNTHROID) 88 mcg tablet Take 88 mcg by mouth Daily (before breakfast). 4/1/19   Provider, Historical  
ALPRAZolam (XANAX) 0.25 mg tablet Take 0.25 mg by mouth as needed for Anxiety. Provider, Historical  
warfarin (COUMADIN) 5 mg tablet Take 5 mg by mouth six (6) days a week. Patient takes 5 mg Wednesday-Monday evenings, and 2.5 mg on Tuesday evening    Provider, Historical  
esomeprazole (NEXIUM) 40 mg capsule Take 40 mg by mouth daily. Provider, Historical  
 
 
REVIEW OF SYSTEMS:    
I am not able to complete the review of systems because: The patient is intubated and sedated The patient has altered mental status due to his acute medical problems The patient has baseline aphasia from prior stroke(s) The patient has baseline dementia and is not reliable historian The patient is in acute medical distress and unable to provide information Total of 12 systems reviewed as follows: POSITIVE= underlined text  Negative = text not underlined General:  fever, chills, sweats, generalized weakness, weight loss/gain,  
   loss of appetite Eyes:    blurred vision, eye pain, loss of vision, double vision ENT:    rhinorrhea, pharyngitis Respiratory:   cough, sputum production, SOB, HASKINS, wheezing, pleuritic pain  
Cardiology:   chest pain, palpitations, orthopnea, PND, edema, syncope Gastrointestinal:  abdominal pain , N/V, diarrhea, dysphagia, constipation, bleeding Genitourinary:  frequency, urgency, dysuria, hematuria, incontinence Muskuloskeletal :  arthralgia, myalgia, back pain Hematology:  easy bruising, nose or gum bleeding, lymphadenopathy Dermatological: rash, ulceration, pruritis, color change / jaundice Endocrine:   hot flashes or polydipsia Neurological:  headache, dizziness, confusion, focal weakness, paresthesia, Speech difficulties, memory loss, gait difficulty Psychological: Feelings of anxiety, depression, agitation Objective: VITALS:   
Visit Vitals BP (!) 191/37 Pulse 73 Temp 97.6 °F (36.4 °C) Resp 18 Wt 60.8 kg (134 lb) SpO2 93% BMI 26.17 kg/m² PHYSICAL EXAM: 
 
General:    Alert, cooperative, no distress, appears stated age. HEENT: Atraumatic, anicteric sclerae, pink conjunctivae No oral ulcers, mucosa moist, throat clear, dentition fair Neck:  Supple, symmetrical,  thyroid: non tender Lungs:   Clear to auscultation bilaterally. No Wheezing or Rhonchi. No rales. Chest wall:  No tenderness  No Accessory muscle use. Heart:   Regular  rhythm,  No  murmur   asymmetric pitting edema more in the left lower extremity up to mid shin with tenderness on the left calf Abdomen:   Soft, non-tender. Not distended. Bowel sounds normal 
Extremities: No cyanosis. No clubbing,   
  Skin turgor normal, Capillary refill normal, Radial dial pulse 2+ Skin:     Not pale. Not Jaundiced  No rashes Psych:  Good insight. Not depressed. Not anxious or agitated. Neurologic: EOMs intact. No facial asymmetry. No aphasia or slurred speech. Symmetrical strength, Sensation grossly intact. Alert and oriented X 4.  
 
_______________________________________________________________________ Care Plan discussed with: 
  Comments Patient x Family  x   
RN x Care Manager Consultant:     
_______________________________________________________________________ Expected  Disposition:  
Home with Family x HH/PT/OT/RN   
SNF/LTC   
LUL   
________________________________________________________________________ TOTAL TIME: 50 Minutes Critical Care Provided     Minutes non procedure based Comments  
 x Reviewed previous records  
>50% of visit spent in counseling and coordination of care x Discussion with patient and/or family and questions answered 
  
 
________________________________________________________________________ Signed: Gerardo Menchaca MD 
 
Procedures: see electronic medical records for all procedures/Xrays and details which were not copied into this note but were reviewed prior to creation of Plan. LAB DATA REVIEWED:   
Recent Results (from the past 24 hour(s)) POC EG7 Collection Time: 01/28/20  9:25 AM  
Result Value Ref Range Calcium, ionized (POC) 1.16 1.12 - 1.32 mmol/L  
 FIO2 (POC) 21 % pH (POC) 7.471 (H) 7.35 - 7.45    
 pCO2 (POC) 35.9 35.0 - 45.0 MMHG  
 pO2 (POC) 45 (LL) 80 - 100 MMHG  
 HCO3 (POC) 26.1 (H) 22 - 26 MMOL/L Base excess (POC) 2 mmol/L  
 sO2 (POC) 84 (L) 92 - 97 % Site RIGHT RADIAL Device: ROOM AIR Allens test (POC) YES Specimen type (POC) ARTERIAL Total resp. rate 24 CBC WITH AUTOMATED DIFF Collection Time: 01/28/20  9:28 AM  
Result Value Ref Range WBC 9.9 3.6 - 11.0 K/uL  
 RBC 3.25 (L) 3.80 - 5.20 M/uL HGB 9.3 (L) 11.5 - 16.0 g/dL HCT 30.6 (L) 35.0 - 47.0 %  MCV 94.2 80.0 - 99.0 FL  
 MCH 28.6 26.0 - 34.0 PG  
 MCHC 30.4 30.0 - 36.5 g/dL  
 RDW 17.2 (H) 11.5 - 14.5 % PLATELET 078 335 - 150 K/uL MPV 11.8 8.9 - 12.9 FL  
 NRBC 0.0 0  WBC ABSOLUTE NRBC 0.00 0.00 - 0.01 K/uL NEUTROPHILS 83 (H) 32 - 75 % LYMPHOCYTES 13 12 - 49 % MONOCYTES 4 (L) 5 - 13 % EOSINOPHILS 0 0 - 7 % BASOPHILS 0 0 - 1 % IMMATURE GRANULOCYTES 0 0.0 - 0.5 % ABS. NEUTROPHILS 8.1 (H) 1.8 - 8.0 K/UL  
 ABS. LYMPHOCYTES 1.3 0.8 - 3.5 K/UL  
 ABS. MONOCYTES 0.4 0.0 - 1.0 K/UL  
 ABS. EOSINOPHILS 0.0 0.0 - 0.4 K/UL  
 ABS. BASOPHILS 0.0 0.0 - 0.1 K/UL  
 ABS. IMM. GRANS. 0.0 0.00 - 0.04 K/UL  
 DF AUTOMATED METABOLIC PANEL, COMPREHENSIVE Collection Time: 01/28/20  9:28 AM  
Result Value Ref Range Sodium 147 (H) 136 - 145 mmol/L Potassium 3.7 3.5 - 5.1 mmol/L Chloride 108 97 - 108 mmol/L  
 CO2 28 21 - 32 mmol/L Anion gap 11 5 - 15 mmol/L Glucose 133 (H) 65 - 100 mg/dL BUN 64 (H) 6 - 20 MG/DL Creatinine 2.04 (H) 0.55 - 1.02 MG/DL  
 BUN/Creatinine ratio 31 (H) 12 - 20 GFR est AA 28 (L) >60 ml/min/1.73m2 GFR est non-AA 23 (L) >60 ml/min/1.73m2 Calcium 8.6 8.5 - 10.1 MG/DL Bilirubin, total 0.7 0.2 - 1.0 MG/DL  
 ALT (SGPT) 32 12 - 78 U/L  
 AST (SGOT) 29 15 - 37 U/L Alk. phosphatase 147 (H) 45 - 117 U/L Protein, total 7.0 6.4 - 8.2 g/dL Albumin 2.8 (L) 3.5 - 5.0 g/dL Globulin 4.2 (H) 2.0 - 4.0 g/dL A-G Ratio 0.7 (L) 1.1 - 2.2    
TROPONIN I Collection Time: 01/28/20  9:28 AM  
Result Value Ref Range Troponin-I, Qt. 0.06 (H) <0.05 ng/mL NT-PRO BNP Collection Time: 01/28/20  9:28 AM  
Result Value Ref Range NT pro-BNP >35,000 (H) 0 - 438 PG/ML  
SALICYLATE Collection Time: 01/28/20  9:28 AM  
Result Value Ref Range Salicylate level <0.8 (L) 2.8 - 20.0 MG/DL  
D DIMER Collection Time: 01/28/20  9:28 AM  
Result Value Ref Range D-dimer 0.81 (H) 0.00 - 0.65 mg/L FEU PROTHROMBIN TIME + INR  
 Collection Time: 01/28/20  9:28 AM  
Result Value Ref Range INR 4.8 (HH) 0.9 - 1.1 Prothrombin time 45.9 (H) 9.0 - 11.1 sec TSH 3RD GENERATION Collection Time: 01/28/20  9:28 AM  
Result Value Ref Range TSH 39.91 (H) 0.36 - 3.74 uIU/mL EKG, 12 LEAD, INITIAL Collection Time: 01/28/20 11:56 AM  
Result Value Ref Range Ventricular Rate 70 BPM  
 Atrial Rate 312 BPM  
 QRS Duration 180 ms  
 Q-T Interval 538 ms QTC Calculation (Bezet) 581 ms Calculated R Axis -58 degrees Calculated T Axis 121 degrees Diagnosis Ventricular-paced rhythm Abnormal ECG When compared with ECG of 24-JAN-2020 18:09, 
MANUAL COMPARISON REQUIRED, DATA IS UNCONFIRMED Confirmed by Wendy Amato MD, --- ([id number]) on 1/28/2020 3:10:49 PM

## 2020-01-29 NOTE — WOUND CARE
Wound Care consult: Chart reviewed and patient assessed for her sacrum wound that was present on admission. Patient is essentially bed bound right now with respiratory failure. She does move and talk in very short sentences with her breathing. Assessment: The sacrum has a macerated area of skin on the skin associated with moisture from incontinence. Pt. Is using a PureWick currently so she is fairly dry everywhere except for the sacral coccyx. Treatment: Just cleansed the skin and dried and applied the Z-Guard Cream to the skin to heal and protect it.   
Jessie Laguna RN, BSN, [employer]

## 2020-01-29 NOTE — PROGRESS NOTES
Patient declined midnight stroll; however patient in no condition to execute an ambulation trial regardless of the time.

## 2020-01-29 NOTE — PROGRESS NOTES
TRANSFER - OUT REPORT: 
 
Verbal report given to Nighat Pham RN on Zoey Ochoa for routine progression of care Report consisted of patients Situation, Background, Assessment and  
Recommendations(SBAR). Information from the following report(s) SBAR, Kardex and Recent Results was reviewed with the receiving nurse. Lines:  
Peripheral IV 01/28/20 Right Antecubital (Active) Site Assessment Dry; Intact 1/28/2020  7:30 PM  
Phlebitis Assessment 0 1/28/2020  7:30 PM  
Infiltration Assessment 0 1/28/2020  7:30 PM  
Dressing Status Old drainage 1/28/2020  7:30 PM  
Dressing Type Tape;Transparent 1/28/2020  7:30 PM  
Hub Color/Line Status Pink 1/28/2020  7:30 PM  
Alcohol Cap Used No 1/28/2020 10:35 AM  
  
 
Opportunity for questions and clarification was provided.

## 2020-01-29 NOTE — PROGRESS NOTES
0700- Bedside and Verbal shift change report given to LAURA Rivero (oncoming nurse) by Beatris Lara RN (offgoing nurse). Report included the following information SBAR, Kardex, Intake/Output, MAR and Recent Results. Pt resting in bed. No needs at this time. Family at bedside. 0900- Spoke with daughter extensively about pt admission. Wife expressed concern for pt GI status. Pt recently had test done on stool for blood and test came back positive. Pt had an appointment with GI but daughter is unsure of who with and what the plan was for her. Will update MD. Plan is have pt follow up outpatient due to respiratory status and INR status. 1000- Pt had episode of hemoptysis, small amount of tissue dark in color. Dr. Travis Godoy updated. Plan to continue to monitor and keep watching hemoglobin. Will d/c troponin if pt next level is trending down. 1145- Cardiology in to see pt. Agrees with troponin d/c after next result comes back. 1255- Troponin back, willd/c order. 56- Daughter out in hallway, concerned for pt since she is not eating. Informed daughter that pt is stressed with being in a new place and to not force food, but encourage. 1514- Pt convert to a-flutter, rate controlled. Pacer pacing periodically. BP stable. Liza Hollingsworth NP informed. No other orders at this time. Will continue to monitor. 65- Dr. Benita Wood in room. No other orders at this time. 1850- Pt refused to use bathroom, stating not being able too. Pt screaming, \"leave me alone\" daughter is worried pt dementia is getting worse. Daughter verbally expressed concern for pt not wanting to eat as well. Bladder scanned pt, holding 187cc of urine.

## 2020-01-30 NOTE — PROGRESS NOTES
The patient is not agreeable to medication administration at this time. 
-to be discussed with Tele-MD

## 2020-01-30 NOTE — PROGRESS NOTES
Benadryl given as ordered to aid the patient relaxing and decreasing the patient's agitation. 
-tolerated moderately well

## 2020-01-30 NOTE — PROGRESS NOTES
TRANSFER - OUT REPORT: 
 
Verbal report given to Mariia Sloan RN on [facility] for routine progression of care Report consisted of patients Situation, Background, Assessment and  
Recommendations(SBAR). Information from the following report(s) SBAR, Kardex, [facility] and Recent Results was reviewed with the receiving nurse. Lines:  
Peripheral IV 01/29/20 Left Antecubital (Active) Site Assessment Clean, dry, & intact 1/29/2020  8:00 PM  
Phlebitis Assessment 0 1/29/2020  8:00 PM  
Infiltration Assessment 0 1/29/2020  8:00 PM  
Dressing Status Clean, dry, & intact 1/29/2020  8:00 PM  
Dressing Type Tape;Transparent 1/29/2020  8:00 PM  
Hub Color/Line Status Pink;Capped 1/29/2020  8:00 PM  
  
 
Opportunity for questions and clarification was provided. Accepting nurse made aware the patient's daughter concern with the patient not eating. 
-to be addressed with provider

## 2020-01-30 NOTE — PROGRESS NOTES
Vitals:  
 01/30/20 1130 01/30/20 1138 01/30/20 1151 01/30/20 1152 BP: 163/42 160/40 (!) 150/39 BP 1 Location: Right arm Right arm Right arm BP Patient Position: At rest Sitting Comment: EOB Sitting;Post activity Pulse: 80 79 83 Resp:      
Temp:      
SpO2: 100% 5L high flow 96% 97% 99% 4 L high flow Weight:      
Height:

## 2020-01-30 NOTE — PROGRESS NOTES
Patient resting in no apparent distress with both eyes closed. Patient's daughter reports the patient has a tendency to hold her urine and will deny the need to void. The previous nurse endorsed the patient has began to refuse treatments. 
-this was discussed with the patient's daughter who reported the patient's wish to continue to fight because the patient wants to live

## 2020-01-30 NOTE — PROGRESS NOTES
Initial Nutrition Assessment: 
 
INTERVENTIONS/RECOMMENDATIONS:  
· Diet change to Cardiac, pureed to replicate what pt receives at home. · RD will order Ensure (Vanilla) BID to trial  
 
ASSESSMENT:  
1/31: Chart reviewed, pt seen from PCU IDR RN referral, med noted for respiratory failure, supratherapeutic INR, CHF PMH: DM II, COPD, HTN, CKD IV, gastroparesis, dementia, and acquired hypothyroidism. Visited pt at bedside, family member present. Tray seen with ~50% oatmeal consumed. Family reports noticing slow wt loss, per wt hx, ~5% wt loss since 12/23/2019. Family member reported pts poor intake and poor appetite. Family member referred to pt as \"difficult and likes to challenge\" per chart refusing meals/meds. Family member offers boost at home, and serves pt pureed foods. Family member agreeable to trial of Ensure (vanilla flavor) and pureed foods. BG well controlled, will monitor and adjust supplements as needed. Will adjust texture as necessary. Pt seemed confused, requested water when water was available to her on her tray. Diet Order: Cardiac, Mechanical soft 
% Eaten:   
Patient Vitals for the past 72 hrs: 
 % Diet Eaten 01/29/20 1305 10 % 01/29/20 0830 5 %  
  
%Supplement Intake:   
Pertinent Medications: [x]Reviewed []Other: colace, lasix, synthroid, toprol-xl, remeron, KCl, sodium bicarbonate Pertinent Labs: [x]Reviewed []Other Food Allergies: [x]None []Other Last BM: 1/29   [x]Active     []Hyperactive  []Hypoactive       [] Absent BS Skin:    [x] Intact   [] Incision  [] Breakdown  [x] Other: 2+pitting LLE Anthropometrics:  
Height: 5' (152.4 cm) Weight: 59.4 kg (131 lb) IBW (%IBW):   ( ) UBW (%UBW):   (  %) Last Weight Metrics: 
Weight Loss Metrics 1/30/2020 1/28/2020 1/28/2020 1/28/2020 1/24/2020 1/16/2020 12/23/2019 Today's Wt 131 lb - 131 lb - 137 lb 2 oz 133 lb 138 lb BMI - 25.58 kg/m2 - 25.58 kg/m2 26.78 kg/m2 25.97 kg/m2 26.95 kg/m2 BMI: Body mass index is 25.58 kg/m². This BMI is indicative of: 
 []Underweight    []Normal    [x]Overweight    [] Obesity   [] Extreme Obesity (BMI>40) Estimated Nutrition Needs (Based on):  
1200 Kcals/day(BMR (975) x AF 1.2) , 50 g(-60 g (0.8-1.0 g/kg BW)) Protein Carbohydrate: At Least 130 g/day  Fluids: 1200 mL/day (1ml/kcal) NUTRITION DIAGNOSES:  
Problem:  Inadequate protein-energy intake Etiology: related to dementia Signs/Symptoms: as evidenced by poor po intake/refusing foods NUTRITION INTERVENTIONS: 
Meals/Snacks: General/healthful diet, Modify diet/texture/consistency/nutrients   Supplements: Commercial supplement GOAL:  
consume >50% meals 240 mL ONS next 2-4 days LEARNING NEEDS (Diet, Food/Nutrient-Drug Interaction):  
 [x] None Identified 
 [] Identified and Education Provided/Documented 
 [] Identified and Pt declined/was not appropriate Cultureal, Worship, OR Ethnic Dietary Needs:  
 [x] None Identified 
 [] Identified and Addressed 
 [x] Interdisciplinary Care Plan Reviewed/Documented  
 [x] Discharge Planning:   Continue heart healthy modified diet MONITORING /EVALUATION:  
Food/Nutrient Intake Outcomes: Total energy intake Physical Signs/Symptoms Outcomes: Weight/weight change, Glucose profile NUTRITION RISK:  
 [x] Patient At Nutritional Risk        [] Patient Not At Nutritional Risk PT SEEN FOR:  
 []  MD Consult: []Calorie Count []Diabetic Diet Education []Diet Education []Electrolyte Management []General Nutrition Management and Supplements []Management of Tube Feeding []TPN Recommendations []  RN Referral:  []MST score >=2 
   []Enteral/Parenteral Nutrition PTA []Pregnant: Gestational DM or Multigestation 
   []Pressure Ulcer/Wound Care needs 
     
[]  Low BMI [x]  RN PCU referral IDR Ryder Golden Pager [phone] Weekend Pager [phone]

## 2020-01-30 NOTE — PROGRESS NOTES
RT at the bedside along with this RN and Jaquan Going the tech to aid with ABG blood collection. 
-tolerated well by the patient

## 2020-01-30 NOTE — PROGRESS NOTES
New request made aware of the patient's current status and a request made for an ABG as well something to aid in calming the patient should the Bi-pap necessary. -patient's daughter made aware

## 2020-01-30 NOTE — PROGRESS NOTES
Patient's daughter asks for assistance with the pt reporting the need to void. Patient is confused asking to be left alone and now is reporting that she does not have to void. At the behest of her daughter the patient was placed on the bedpan which caused the patient to wave her hands aggressively while requesting to be left alone. Patient did void in the bedpan as well in the bed. 
-bed changed and pure-wick not placed at this time due to the patient's agitation

## 2020-01-30 NOTE — PROGRESS NOTES
Patient has demonstrated less agitation and has began to relax. 
-patient currently resting with both eyes closed

## 2020-01-30 NOTE — PROGRESS NOTES
Hospitalist Progress Note NAME: Sesar German :  1935 MRN:  [id number]  
 
[id number] female with past medical history of diastolic congestive heart failure presented to the hospital with shortness of breath and was noted to have acute CHF exacerbation along with supratherapeutic INR from warfarin. Assessment / Plan: 
Acute hypoxic respiratory failure Acute on chronic exacerbation of diastolic heart failure Severe aortic stenosis on echo · History of heart failure preserved ejection fraction around 60% on the most recent echo · Presented with fluid overloaded and patchy infiltrates on x-ray with pleural effusion · Chronically on torsemide 5 mg daily, will hold · Increase Lasix to 60 mg IV twice daily. Continue metoprolol. · Strict I's and O's, daily weights. Echocardiogram pending · Cardiology is following VQ mismatch low probability for PE 
  
Supratherapeutic INR 
-INR is 6.4 but no bleeding. Holding warfarin. Recheck INR in a.m. 
-Hemoptysis is better 
-We will recheck INR in a.m. 
  
Mild elevated Troponin due to CKD 
-Troponin peaked at 0.06  
  
  
A. fib · Status post pacemaker insertion 2019 · Chronically on warfarin, will hold due to  supratherapeutic INR · Rate controlled with diltiazem and metoprolol will continue · Consider changing to IV if is not able to tolerate pills. Currently rate appears controlled 
  
History of diabetes mellitus type 2 
· Off medications · Most recent hemoglobin A1c 5.3 2019 
  
COPD · Not in exacerbation · Continue home inhalers 
  
Hypertension · Continue home medication hydralazine 3 times daily, metoprolol, diltiazem · Consider adding IV metoprolol if blood pressure is up, currently blood pressure is controlled 
  
Left lower extremity edema 
-Ultrasound Doppler showed no evidence of DVT 
  
CKD stage IV 
-Creatinine is close to baseline of around 1.8-2 
-Monitor creatinine daily while on diuresis Incidental Baker's cyst 
-Outpatient follow-up   
  
  
Code Status: Full code Surrogate Decision Maker: Her sons Say Snyder 
  
DVT Prophylaxis:  SCDs due to high INR 
GI Prophylaxis: not indicated 
  
Baseline: Active, lives with her daughter Body mass index is 25.58 kg/m². Subjective: Chief Complaint / Reason for Physician Visit Having intermittent episodes of confusion Per nursing she is refusing medications No fever or chills Review of Systems: 
Symptom Y/N Comments  Symptom Y/N Comments Fever/Chills    Chest Pain Poor Appetite    Edema Cough    Abdominal Pain Sputum    Joint Pain SOB/HASKINS    Pruritis/Rash Nausea/vomit    Tolerating PT/OT Diarrhea    Tolerating Diet Constipation    Other Could NOT obtain due to:   
 
Objective: VITALS:  
Last 24hrs VS reviewed since prior progress note. Most recent are: 
Patient Vitals for the past 24 hrs: 
 Temp Pulse Resp BP SpO2  
01/30/20 1502  70   97 % 01/30/20 1500 97.5 °F (36.4 °C) 70 18 (!) 138/35 99 % 01/30/20 1152     99 % 01/30/20 1151  83  (!) 150/39 97 % 01/30/20 1138  79  160/40 96 % 01/30/20 1130  80  163/42 100 % 01/30/20 1050 97.7 °F (36.5 °C) 71 18 (!) 140/36 100 % 01/30/20 0700 97.4 °F (36.3 °C) 73 18 141/49 99 % 01/30/20 0500  70  125/40 100 % 01/30/20 0400 96.7 °F (35.9 °C) 70 20 (!) 123/28 100 % 01/30/20 0300  72  (!) 162/31 100 % 01/30/20 0200  70  (!) 124/39 100 % 01/30/20 0100  73  (!) 147/32 100 % 01/30/20 0000 97.1 °F (36.2 °C) 76 19 169/88 98 % 01/29/20 2300  73  125/51 100 % 01/29/20 2200  78  (!) 174/34 97 % 01/29/20 2100  71  (!) 165/33 98 % 01/29/20 2000 97.9 °F (36.6 °C) 72 18 (!) 158/37 96 % 01/29/20 1900  77  (!) 162/36 95 % Intake/Output Summary (Last 24 hours) at 1/30/2020 1637 Last data filed at 1/30/2020 1313 Gross per 24 hour Intake  Output 600 ml Net -600 ml PHYSICAL EXAM: 
General: no acute distress   
EENT:  EOMI. Anicteric sclerae. MMM Resp:  Bilateral air entry present, crackles are present, no wheezing CV:  Regular  rhythm,  No edema GI:  Soft, Non distended, Non tender.  +Bowel sounds Neurologic:  Alert and awake Psych:   Not anxious nor agitated Skin:  No rashes. No jaundice Reviewed most current lab test results and cultures  YES Reviewed most current radiology test results   YES Review and summation of old records today    NO Reviewed patient's current orders and MAR    YES 
PMH/SH reviewed - no change compared to H&P Current Facility-Administered Medications:  
  furosemide (LASIX) injection 60 mg, 60 mg, IntraVENous, BID, Raul Cavazos MD, 60 mg at 01/30/20 5659   dilTIAZem CD (CARDIZEM CD) capsule 180 mg, 180 mg, Oral, DAILY, Kayla Eaton NP 
[facility]  [START ON 1/31/2020] metoprolol succinate (TOPROL-XL) XL tablet 100 mg, 100 mg, Oral, DAILY, Keisha Blake L, NP 
  potassium chloride (K-DUR, KLOR-CON) SR tablet 40 mEq, 40 mEq, Oral, BID, Keisha Blake L, NP, 40 mEq at 01/29/20 1827   hydrALAZINE (APRESOLINE) 20 mg/mL injection 10 mg, 10 mg, IntraVENous, Q6H PRN, Ismael Cantu MD 
  albuterol-ipratropium (DUO-NEB) 2.5 MG-0.5 MG/3 ML, 3 mL, Nebulization, Q6H PRN, Opal Mckeon MD, 3 mL at 01/28/20 2039   ALPRAZolam (XANAX) tablet 0.25 mg, 0.25 mg, Oral, PRN, Opal Mckeon MD 
  pantoprazole (PROTONIX) tablet 40 mg, 40 mg, Oral, ACB, Opal Mckeon MD, 40 mg at 01/29/20 1084   hydrALAZINE (APRESOLINE) tablet 100 mg, 100 mg, Oral, TID, Opal Mckeon MD, 100 mg at 01/30/20 1546   levothyroxine (SYNTHROID) tablet 88 mcg, 88 mcg, Oral, ACB, Opal Mckeon MD, 88 mcg at 01/29/20 1054   mirtazapine (REMERON) tablet 45 mg, 45 mg, Oral, QHS, Michael Campbell MD, 45 mg at 01/28/20 2300   polyethylene glycol (MIRALAX) packet 17 g, 17 g, Oral, DAILY PRN, Opal Mckeon MD 
   sodium bicarbonate tablet 650 mg, 650 mg, Oral, TID, Michael Campbell MD, 650 mg at 01/30/20 1546   sodium chloride (NS) flush 5-40 mL, 5-40 mL, IntraVENous, Q8H, Michael Campbell MD, 10 mL at 01/29/20 2355   sodium chloride (NS) flush 5-40 mL, 5-40 mL, IntraVENous, PRN, Lindsey Gay MD 
  acetaminophen (TYLENOL) solution 650 mg, 650 mg, Oral, Q4H PRN, Lindsey Gay MD 
  docusate sodium (COLACE) capsule 100 mg, 100 mg, Oral, BID, Michael Campbell MD, 100 mg at 01/29/20 1827 
________________________________________________________________________ Care Plan discussed with: 
  Comments Patient y Family  y   
RN y   
Care Manager Consultant Multidiciplinary team rounds were held today with , nursing, pharmacist and clinical coordinator. Patient's plan of care was discussed; medications were reviewed and discharge planning was addressed. ________________________________________________________________________ Total NON critical care TIME:  35   Minutes Total CRITICAL CARE TIME Spent:   Minutes non procedure based Comments >50% of visit spent in counseling and coordination of care    
________________________________________________________________________ Brenda Bernal MD  
 
Procedures: see electronic medical records for all procedures/Xrays and details which were not copied into this note but were reviewed prior to creation of Plan. LABS: 
I reviewed today's most current labs and imaging studies. Pertinent labs include: 
Recent Labs  
  01/30/20 
0538 01/29/20 
0424 01/28/20 
6418 WBC 7.6 10.4 9.9 HGB 8.5* 9.1* 9.3* HCT 28.5* 30.4* 30.6*  188 202 Recent Labs  
  01/30/20 
3214 01/29/20 
0424 01/29/20 
0418 01/28/20 
6833 * 146*  --  147* K 3.6 3.5  --  3.7 * 112*  --  108 CO2 29 25  --  28  
GLU 75 97  --  133* BUN 62* 64*  --  64* CREA 1.89* 1.82*  --  2.04* CA 8.3* 8.7  --  8.6 ALB  --   --   --  2.8* TBILI  --   --   --  0.7 SGOT  --   --   --  29 ALT  --   --   --  32 INR 6.4*  --  5.2* 4.8* Signed: Antonio Quintero MD

## 2020-01-30 NOTE — PROGRESS NOTES
Patient awakened in the process of blood collection and was not happy voicing she wanted to be left alone. Patient declined AM medication administration.

## 2020-01-30 NOTE — PROGRESS NOTES
Problem: Mobility Impaired (Adult and Pediatric) Goal: *Acute Goals and Plan of Care (Insert Text) Description FUNCTIONAL STATUS PRIOR TO ADMISSION: patient ambulates household distances with rollator and supervision. She requires supervision for ADLS. Her daughter assists as needed. HOME SUPPORT PRIOR TO ADMISSION: The patient lived with her daughter who provides 24/7 assist. 
 
Physical Therapy Goals Initiated 2020 1. Patient will move from supine to sit and sit to supine , scoot up and down and roll side to side in bed with supervision/set-up within 7 day(s). 2.  Patient will transfer from bed to chair and chair to bed with supervision/set-up using the least restrictive device within 7 day(s). 3.  Patient will perform sit to stand with supervision/set-up within 7 day(s). 4.  Patient will ambulate with supervision/set-up for 50 feet with the least restrictive device within 7 day(s). Outcome: Not Met PHYSICAL THERAPY EVALUATION Patient: Carey Bravo (21 y.o. female) Date: 2020 Primary Diagnosis: CHF (congestive heart failure) ([facility] [address] [I50.9] Precautions:     
 
 
ASSESSMENT Based on the objective data described below, the patient presents with decreased strength, decreased functional mobility, impaired balance, confusion, and unsteady gait following admission for CHF. Patient is functioning slightly below her baseline. Her daughter reports she needs max encouragement for all mobility. Patient currently requires CGA-min A with RW to ambulate in the room. VSS on 6L O2 high flow, able to wean to 4L O2. RN present and informed. Current Level of Function Impacting Discharge (mobility/balance): CGA-min A with RW and encouragement Functional Outcome Measure: The patient scored 50/100 on the Barthel outcome measure which is indicative of moderate functional impairment.    
 
Other factors to consider for discharge: lack of motivation, confusion, daughter can provide 24/7 assist 
  
Patient will benefit from skilled therapy intervention to address the above noted impairments. PLAN : 
Recommendations and Planned Interventions: bed mobility training, transfer training, gait training, therapeutic exercises, patient and family training/education, and therapeutic activities Frequency/Duration: Patient will be followed by physical therapy:  3 times a week to address goals. Recommendation for discharge: (in order for the patient to meet his/her long term goals) Physical therapy at least 2 days/week in the home AND ensure assist and/or supervision for safety with functional mobility and ADLs This discharge recommendation: 
Has not yet been discussed the attending provider and/or case management IF patient discharges home will need the following DME: patient owns DME required for discharge SUBJECTIVE:  
Patient stated What? No, not right now. Who is that?  OBJECTIVE DATA SUMMARY:  
HISTORY:   
Past Medical History:  
Diagnosis Date Aortic valve disorders AS Asthma Benign hypertensive heart disease without heart failure Diabetes ([facility] [address] Fibromyalgia Gastroparesis GERD (gastroesophageal reflux disease) Hypertension Mitral valve disorders(424.0) MR  
 LIVIA (obstructive sleep apnea) Paroxysmal atrial fibrillation (HCC) Pure hypercholesterolemia 9/30/2010 Past Surgical History:  
Procedure Laterality Date ECHO 2D ADULT  11/2009 LVH, normal LV wall motion and ejection fraction, mild aortic stenosis, moderate aortic regurgitation and mild mitral regurgitation. The ejection fraction was 55-60%. ECHO 2D ADULT  1/2011 EF 60%, LAE, mild AS, AI, MR, PA low 40s EVENT MONITOR POST SYMPTOMS  9/2010 Rare PACs, no arrythmia with symptoms HX CHOLECYSTECTOMY HX HYSTERECTOMY    
 LOOP MONITOR  9-10/2011 NSR  
 GA CARDIOVERSION ELECTIVE ARRHYTHMIA EXTERNAL N/A 12/23/2019 Ep Cardioversion performed by Denise Lucas MD at [address] NODE FUNCTION N/A 12/23/2019 Ablation Av Node performed by Denise Lucas MD at [facility]  
 DC INS NEW/RPLC PRM PACEMAKER W/TRANSV ELTRD VENTR N/A 12/23/2019 Insert Ppm Single Ventricular performed by Denise Lucas MD at [facility] C[facility]ATH [facility]  
 STRESS TEST MYOVIEW  1/2011  
 no ischemia, EF 63% US DUPLEX CAROTID BILATERAL  1/2011  
 mild bilat disease Personal factors and/or comorbidities impacting plan of care: confusion, daughter able to provide 24/7 assist 
 
Home Situation Home Environment: Private residence # Steps to Enter: 4 Wheelchair Ramp: Yes One/Two Story Residence: One story Living Alone: No 
Support Systems: Child(raul), Family member(s) Patient Expects to be Discharged to[de-identified] Unknown Current DME Used/Available at Home: Wheelchair, Walker, rollator, Hospital bed, Commode, bedside, Shower chair(sleeps in recliner) Tub or Shower Type: Shower EXAMINATION/PRESENTATION/DECISION MAKING:  
Critical Behavior: 
Neurologic State: Confused, Alert Orientation Level: Oriented to person, Disoriented to place, Disoriented to time, Disoriented to situation Cognition: Decreased command following, Impaired decision making Hearing: 
  
Skin:   
Edema:  
Range Of Motion: 
AROM: Generally decreased, functional 
  
  
  
PROM: Within functional limits Strength:   
Strength: Generally decreased, functional 
  
  
  
  
  
  
Tone & Sensation:  
Tone: Normal 
  
  
  
  
  
  
  
  
   
Coordination: 
Coordination: Within functional limits Vision:  
  
Functional Mobility: 
Bed Mobility: 
Rolling: Minimum assistance Supine to Sit: Minimum assistance Scooting: Contact guard assistance; Additional time Transfers: 
Sit to Stand: Contact guard assistance Stand to Sit: Contact guard assistance; Additional time;Assist x1 
     
 Bed to Chair: Contact guard assistance;Minimum assistance;Assist x1;Additional time Balance:  
Sitting: Impaired; Without support Sitting - Static: Good (unsupported) Sitting - Dynamic: Fair (occasional) Standing: Impaired; With support Standing - Static: Constant support; Fair 
Standing - Dynamic : Fair Ambulation/Gait Training: 
Distance (ft): 30 Feet (ft) Assistive Device: Gait belt;Walker, rolling Ambulation - Level of Assistance: Minimal assistance;Contact guard assistance; Additional time;Assist x1 Gait Description (WDL): Exceptions to [facility] Gait Abnormalities: Decreased step clearance; Path deviations;Trunk sway increased; Shuffling gait Base of Support: Narrowed Speed/Rivka: Pace decreased (<100 feet/min); Shuffled Step Length: Right shortened;Left shortened Functional Measure: 
Barthel Index: 
 
Bathin Bladder: 5 Bowels: 10 
Groomin Dressin Feeding: 10 Mobility: 0 Stairs: 0 Toilet Use: 5 Transfer (Bed to Chair and Back): 10 Total: 50/100 The Barthel ADL Index: Guidelines 1. The index should be used as a record of what a patient does, not as a record of what a patient could do. 2. The main aim is to establish degree of independence from any help, physical or verbal, however minor and for whatever reason. 3. The need for supervision renders the patient not independent. 4. A patient's performance should be established using the best available evidence. Asking the patient, friends/relatives and nurses are the usual sources, but direct observation and common sense are also important. However direct testing is not needed. 5. Usually the patient's performance over the preceding 24-48 hours is important, but occasionally longer periods will be relevant. 6. Middle categories imply that the patient supplies over 50 per cent of the effort. 7. Use of aids to be independent is allowed. Richy Berger., Barthel, DALLI (5324). Functional evaluation: the Barthel Index. [address] [facility] (14)2. HONORIO Murray Raynold Cord., [place] Josh.Linda, [address] (1999). Measuring the change indisability after inpatient rehabilitation; comparison of the responsiveness of the Barthel Index and Functional Humble Measure. Journal of Neurology, Neurosurgery, and Psychiatry, 66(4), 158-276. LUCAS Davidson, CHARLENE Sorto, & Nino Soria M.A. (2004.) Assessment of post-stroke quality of life in cost-effectiveness studies: The usefulness of the Barthel Index and the EuroQoL-5D. [facility], 13, 766-21 Physical Therapy Evaluation Charge Determination History Examination Presentation Decision-Making MEDIUM  Complexity : 1-2 comorbidities / personal factors will impact the outcome/ POC  MEDIUM Complexity : 3 Standardized tests and measures addressing body structure, function, activity limitation and / or participation in recreation  MEDIUM Complexity : Evolving with changing characteristics  Other outcome measures Barthel   MEDIUM Based on the above components, the patient evaluation is determined to be of the following complexity level: MEDIUM Activity Tolerance:  
Fair and requires rest breaks, VSS on 4L O2 Please refer to the flowsheet for vital signs taken during this treatment. After treatment patient left in no apparent distress:  
Sitting in chair, Call bell within reach, and Caregiver / family present COMMUNICATION/EDUCATION:  
The patients plan of care was discussed with: Occupational Therapist, Registered Nurse, and . Fall prevention education was provided and the patient/caregiver indicated understanding., Patient/family have participated as able in goal setting and plan of care. , and Patient/family agree to work toward stated goals and plan of care.  
 
Thank you for this referral. 
Thomas Allen, PT, DPT 
 Time Calculation: 33 mins

## 2020-01-30 NOTE — PROGRESS NOTES
[address] [place] [address], [address] [facility]  [phone] Cardiology Progress Note 1/30/2020 1100 AM 
 
Admit Date: 1/28/2020 Admit Diagnosis:  
CHF (congestive heart failure) ([facility] [I50.9] Subjective:  
 
Jody Padilla is minimally-interactive, laying with eyes closed, responds \"no\" to all questions asked. Does not appear to be in any acute distress or pain. Refusing all medications this morning or to participate in therapy. Her youngest daughter was present during patient interview and states she is \"stubborn\" and \"wants to live\". Daughter states the patient is holding her urine and only voided once overnight despite getting administered lasix. The patient does state she wants to live, but difficult to assess her level of clear understanding given her dementia. Difficult situation. Would recommend ethics involvement. Palliative already on board. Visit Vitals BP (!) 150/39 (BP 1 Location: Right arm, BP Patient Position: Sitting;Post activity) Pulse 83 Temp 97.7 °F (36.5 °C) Resp 18 Ht 5' (1.524 m) Wt 59.4 kg (131 lb) SpO2 99% BMI 25.58 kg/m² Current Facility-Administered Medications Medication Dose Route Frequency  furosemide (LASIX) injection 60 mg  60 mg IntraVENous BID  potassium chloride (K-DUR, KLOR-CON) SR tablet 40 mEq  40 mEq Oral BID  hydrALAZINE (APRESOLINE) 20 mg/mL injection 10 mg  10 mg IntraVENous Q6H PRN  
 albuterol-ipratropium (DUO-NEB) 2.5 MG-0.5 MG/3 ML  3 mL Nebulization Q6H PRN  
 ALPRAZolam (XANAX) tablet 0.25 mg  0.25 mg Oral PRN  
 dilTIAZem CD (CARDIZEM CD) capsule 180 mg  180 mg Oral DAILY  pantoprazole (PROTONIX) tablet 40 mg  40 mg Oral ACB  hydrALAZINE (APRESOLINE) tablet 100 mg  100 mg Oral TID  levothyroxine (SYNTHROID) tablet 88 mcg  88 mcg Oral ACB  metoprolol succinate (TOPROL-XL) XL tablet 100 mg  100 mg Oral DAILY  mirtazapine (REMERON) tablet 45 mg  45 mg Oral QHS  polyethylene glycol (MIRALAX) packet 17 g  17 g Oral DAILY PRN  
 sodium bicarbonate tablet 650 mg  650 mg Oral TID  sodium chloride (NS) flush 5-40 mL  5-40 mL IntraVENous Q8H  
 sodium chloride (NS) flush 5-40 mL  5-40 mL IntraVENous PRN  
 acetaminophen (TYLENOL) solution 650 mg  650 mg Oral Q4H PRN  
 docusate sodium (COLACE) capsule 100 mg  100 mg Oral BID Objective:  
  
Physical Exam: 
General: thin, frail, elderly AA female, in NAD Heart: RRR, no m/S3/JVD, no carotid bruits Lungs: diminished throughout, on 6L/NC today from 2L/NC yesterday Abdomen: Soft, +BS, NTND, flat Extremities: LE gamaliel +DP/PT, left ankle edema +1. Neurologic: flat affect, closing eyes mid sentence, oriented to place and person. Hard to assess orientation to situation and time. Data Review:  
Recent Labs  
  01/30/20 
0538 01/29/20 
0424 01/28/20 
2337 WBC 7.6 10.4 9.9 HGB 8.5* 9.1* 9.3* HCT 28.5* 30.4* 30.6*  188 202 Recent Labs  
  01/30/20 
6431 01/29/20 
0424 01/29/20 
0418 01/28/20 
4499 * 146*  --  147* K 3.6 3.5  --  3.7 * 112*  --  108 CO2 29 25  --  28  
GLU 75 97  --  133* BUN 62* 64*  --  64* CREA 1.89* 1.82*  --  2.04* CA 8.3* 8.7  --  8.6 ALB  --   --   --  2.8* TBILI  --   --   --  0.7 SGOT  --   --   --  29 ALT  --   --   --  32 INR 6.4*  --  5.2* 4.8* Recent Labs  
  01/29/20 
1128 01/29/20 
0424 01/28/20 
3185 TROIQ 0.05* 0.06* 0.06* Intake/Output Summary (Last 24 hours) at 1/30/2020 1314 Last data filed at 1/29/2020 2200 Gross per 24 hour Intake  Output 500 ml Net -500 ml Cardiographics 
  Telemetry: Vpaced ECG: Vpaced Echocardiogram: · Normal cavity size and systolic function (ejection fraction normal). Moderate concentric hypertrophy. Estimated left ventricular ejection fraction is 50 - 55%. Left ventricular diastolic dysfunction. · Dilated left atrium. · Aortic valve leaflet calcification present. Aortic valve mean gradient is 16.7 mmHg. Aortic valve area is 0.5 cm2. Mild aortic valve stenosis is present. Moderate aortic valve regurgitation is present. · Moderate mitral valve regurgitation is present. · Severe pulmonary hypertension. · Moderate tricuspid valve regurgitation is present. · There is a moderate left pleural effusion. CXRAY:Extensive bilateral patchy airspace consolidation. Small bilateral 
pleural effusions. Assessment:  
 
Active Problems: 
  CHF (congestive heart failure) ([address] (1/28/2020) Plan:  
Acute on chronic exacerbation of diastolic heart failure(proBNP more than 35,000) Increasing oxygen requirements · History of heart failure preserved ejection fraction around 60% on the most recent echo · Presented with fluid overloaded and patchy infiltrates on x-ray with pleural effusion · Continue Lasix 60 mg IV twice daily · Strict I/O's (-390) · Daily weights · Echo result noted · VQ mismatch low probability for PE 
· Palliative care consult for care decision  
  
Supratherapeutic INR (6.4) coumadin on hold. INR even higher despite holding coumadin. · No visible active bleeding · Chronically on warfarin for A. Fib · Follow INR daily 
  
Elevated Troponin (trending back down, peaked 0.06). · In the setting of Cr elevation and CKD stage III 
· No CP or ischemic changes on ECG 
  
A. fib · Status post pacemaker insertion December 2019 · Chronically on warfarin, will hold due to  supratherapeutic INR · Rate controlled with diltiazem 180 mg PO daily · Metoprolol 100 mg PO daily 
  
Hypertension · Continue home medication hydralazine 100 mg PO TID · Metoprolol 100 mg PO daily · Diltiazem 180 mg PO daily Will consult pharmacy to transition route of as many medications as possible to IV form. Breezy Vallejo, MABEL 
MSN, RN, AG-ACNP-BC Patient seen and examined by me with nurse practitioner.   I personally performed all components of the history, physical, and medical decision making and agree with the assessment and plan as noted.  
 
Andrews Jane MD

## 2020-01-30 NOTE — PROGRESS NOTES
Problem: Self Care Deficits Care Plan (Adult) Goal: *Acute Goals and Plan of Care (Insert Text) Description FUNCTIONAL STATUS PRIOR TO ADMISSION: Lives with daughter whom is a CNA and is with pt 24/7, per daughter pt tends to state she cannot do things and performs better by being told what she is going to do, was not on O2, ambulated with one assist CGA with RW, gets fatigued and gets assist with ADLS as needed, can perform UB ADLs on her own and LB ADLS assist needed at times, wears depends, uses wheelchair outside of the [facility] HOME SUPPORT PRIOR TO ADMISSION: The patient lived with daughter to provide assist. 
 
Occupational Therapy Goals: 
Initiated 1/30/2020 1. Patient will perform grooming standing with supervision/set-up within 7 days. 2. Patient will perform lower body dressing with supervision/set-up within 7 days. 3. Patient will perform toileting with supervision/set-up within 7 days. 4. Patient will transfer from toilet with supervision/set-up using the least restrictive device and appropriate durable medical equipment within 7 days. Outcome: Progressing Towards Goal 
 OCCUPATIONAL THERAPY EVALUATION Patient: Natasha Colvin (82 y.o. female) Date: 1/30/2020 Primary Diagnosis: CHF (congestive heart failure) ([facility] [I50.9] Precautions: fall ASSESSMENT Based on the objective data described below, the patient presents with confusion and does need encouragement to participate. She performs better by being told what she will do and not asking her if she wants to do something. Family reports that pts automatic response to everything is no and that she does not want to perform tasks. Daughter is very supportive and provides care for pt at baseline. O2 sat on high flow 5L NC was % and pt was weaned to 4L NC high flow with O2 sat at 98% at end of session.   Nursing approved to leave O2 on 4L at end of session and pt was stable at end of session. Performed ADLS and dynamic standing tasks today with min to CGA overall. Recommend return to home with family assist at discharge. Current Level of Function Impacting Discharge (ADLs/self-care): CGA/min assist ADL transfers and standing tasks with RW Feeding: Supervision Oral Facial Hygiene/Grooming: Minimum assistance(balance standing) Bathing: Minimum assistance Upper Body Dressing: Setup Lower Body Dressing: Minimum assistance Toileting: Minimum assistance Functional Outcome Measure: The patient scored 50/100 on the barthel outcome measure which is indicative of moderate decline in ADLS and mobility. Other factors to consider for discharge: pt is close to baseline but was not on home O2 prior to admit Patient will benefit from skilled therapy intervention to address the above noted impairments. PLAN : 
Recommendations and Planned Interventions: self care training, functional mobility training, therapeutic exercise, balance training, therapeutic activities, endurance activities, patient education, home safety training, and family training/education Frequency/Duration: Patient will be followed by occupational therapy 3 times a week to address goals. Recommendation for discharge: (in order for the patient to meet his/her long term goals) No skilled occupational therapy/ follow up rehabilitation needs identified at this time. Recommend return to home with family assist 24/7 as prior to admit This discharge recommendation: 
Has been made in collaboration with the attending provider and/or case management IF patient discharges home will need the following DME: may need O2 SUBJECTIVE:  
Patient stated I don't want to.  OBJECTIVE DATA SUMMARY:  
HISTORY:  
Past Medical History:  
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  
 LIVIA (obstructive sleep apnea) Paroxysmal atrial fibrillation (HCC) Pure hypercholesterolemia 9/30/2010 Past Surgical History:  
Procedure Laterality Date ECHO 2D ADULT  11/2009 LVH, normal LV wall motion and ejection fraction, mild aortic stenosis, moderate aortic regurgitation and mild mitral regurgitation. The ejection fraction was 55-60%. ECHO 2D ADULT  1/2011 EF 60%, LAE, mild AS, AI, MR, PA low 40s EVENT MONITOR POST SYMPTOMS  9/2010 Rare PACs, no arrythmia with symptoms HX CHOLECYSTECTOMY HX HYSTERECTOMY    
 LOOP MONITOR  9-10/2011 NSR  
 TX CARDIOVERSION ELECTIVE ARRHYTHMIA EXTERNAL N/A 12/23/2019 Ep Cardioversion performed by Katelyn Bedoya MD at [address] NODE FUNCTION N/A 12/23/2019 Ablation Av Node performed by Katelyn Bedoya MD at [facility]  
 TX INS NEW/RPLC PRM PACEMAKER W/TRANSV ELTRD VENTR N/A 12/23/2019 Insert Ppm Single Ventricular performed by Katelyn Bedoya MD at [facility]  
 STRESS TEST MYOVIEW  1/2011  
 no ischemia, EF 63% US DUPLEX CAROTID BILATERAL  1/2011  
 mild bilat disease Expanded or extensive additional review of patient history:  
 
Home Situation Home Environment: Private residence # Steps to Enter: 4 Wheelchair Ramp: Yes One/Two Story Residence: One story Living Alone: No 
Support Systems: Child(raul), Family member(s) Patient Expects to be Discharged to[de-identified] Unknown Current DME Used/Available at Home: Wheelchair, Walker, rollator, Hospital bed, Commode, bedside, Shower chair(sleeps in recliner) Tub or Shower Type: Shower Hand dominance: Right EXAMINATION OF PERFORMANCE DEFICITS: 
Cognitive/Behavioral Status: 
Neurologic State: Confused; Alert Orientation Level: Oriented to person;Disoriented to place; Disoriented to time;Disoriented to situation Cognition: Poor safety awareness;Decreased attention/concentration;Decreased command following Perception: Appears intact Perseveration: Perseverates during conversation Safety/Judgement: Fall prevention Vision/Perceptual:   
    
    
    
  
    
Acuity: (grossly intact) Range of Motion: 
 
AROM: Generally decreased, functional 
PROM: Within functional limits Strength: 
 
Strength: Generally decreased, functional 
  
  
  
  
 
Coordination: 
Coordination: Within functional limits Fine Motor Skills-Upper: Left Intact; Right Intact Gross Motor Skills-Upper: Left Intact; Right Intact Tone & Sensation: 
 
Tone: Normal 
  
  
  
  
  
  
  
 
Balance: 
Sitting: Impaired; Without support Sitting - Static: Good (unsupported) Sitting - Dynamic: Fair (occasional) Standing: Impaired; With support Standing - Static: Constant support; Fair 
Standing - Dynamic : Fair Functional Mobility and Transfers for ADLs: 
Bed Mobility: 
Rolling: Minimum assistance Supine to Sit: Minimum assistance Scooting: Contact guard assistance; Additional time Transfers: 
Sit to Stand: Contact guard assistance Stand to Sit: Contact guard assistance; Additional time;Assist x1 Bed to Chair: Contact guard assistance;Minimum assistance;Assist x1;Additional time Bathroom Mobility: Contact guard assistance(with RW) Toilet Transfer : Contact guard assistance ADL Assessment: 
Feeding: Supervision Oral Facial Hygiene/Grooming: Minimum assistance(balance standing) Bathing: Minimum assistance Upper Body Dressing: Setup Lower Body Dressing: Minimum assistance Toileting: Minimum assistance ADL Intervention and task modifications: 
  
Donned slipper socks over knee high socks seated EOB with crossed leg technique min assist.  Standing to adjust gown CGA. Mobilized around room with RW to obtain cup of water in standing with CGA min assist for dynamic balance. Cognitive Retraining Safety/Judgement: Fall prevention Functional Measure: 
Barthel Index: 
 
Bathin Bladder: 5 Bowels: 10 
Groomin Dressin Feeding: 10 Mobility: 0 Stairs: 0 Toilet Use: 5 Transfer (Bed to Chair and Back): 10 Total: 50/100 The Barthel ADL Index: Guidelines 1. The index should be used as a record of what a patient does, not as a record of what a patient could do. 2. The main aim is to establish degree of independence from any help, physical or verbal, however minor and for whatever reason. 3. The need for supervision renders the patient not independent. 4. A patient's performance should be established using the best available evidence. Asking the patient, friends/relatives and nurses are the usual sources, but direct observation and common sense are also important. However direct testing is not needed. 5. Usually the patient's performance over the preceding 24-48 hours is important, but occasionally longer periods will be relevant. 6. Middle categories imply that the patient supplies over 50 per cent of the effort. 7. Use of aids to be independent is allowed. Dunia Whitehead., Barthel, D.W. ([id number]). Functional evaluation: the Barthel Index. [address] [facility] (14)2. Juan Francisco Zacarias carl HONORIO Chávez, Vera Blanton., [place] Chantelle., [facility], [address] [place] [address] (). Measuring the change indisability after inpatient rehabilitation; comparison of the responsiveness of the Barthel Index and Functional [place] Measure. Journal of Neurology, Neurosurgery, and Psychiatry, 66(4), 425-350. Donna Carreno, N.J.A, CHARLENE Sorto, & Hermes Varma MRashardA. (2004.) Assessment of post-stroke quality of life in cost-effectiveness studies: The usefulness of the Barthel Index and the EuroQoL-5D.  [facility], 13, 399-76  
, 
 
 Occupational Therapy Evaluation Charge Determination History Examination Decision-Making MEDIUM Complexity : Expanded review of history including physical, cognitive and psychosocial  history  MEDIUM Complexity : 3-5 performance deficits relating to physical, cognitive , or psychosocial skils that result in activity limitations and / or participation restrictions MEDIUM Complexity : Patient may present with comorbidities that affect occupational performnce. Miniml to moderate modification of tasks or assistance (eg, physical or verbal ) with assesment(s) is necessary to enable patient to complete evaluation Based on the above components, the patient evaluation is determined to be of the following complexity level: MEDIUM Pain Ratin/10 Activity Tolerance:  
Fair Please refer to the flowsheet for vital signs taken during this treatment. After treatment patient left in no apparent distress:   
Sitting in chair, Call bell within reach, and Caregiver / family present COMMUNICATION/EDUCATION:  
The patients plan of care was discussed with: Physical Therapist, Registered Nurse, and pt and her daughter. Home safety education was provided and the patient/caregiver indicated understanding., Patient/family have participated as able in goal setting and plan of care. , and Patient/family agree to work toward stated goals and plan of care. This patients plan of care is appropriate for delegation to [facility]. Thank you for this referral. 
Bessy Toney, OTR/L Time Calculation: 33 mins

## 2020-01-30 NOTE — PROGRESS NOTES
0700- Bedside and Verbal shift change report given to LAURA Gunn (oncoming nurse) by Mary Soria, ROSSY (offgoing nurse). Report included the following information SBAR, Kardex, Intake/Output, MAR and Recent Results. Pt resting in bed. Daughter at bedside. Pt stating \"no\" while RN getting temperature. RN able to calm pt down and pt resumed sleeping. 12- Reported from RN about critical result- INR 6.4 
0735- Informed  about INR results and dropped HGB. No orders received. 0930- PT/OT orders placed. 0945- Pt refused to take all medications. Paged sent out to Dr. Ramone Harden. RN asked pt if she knew what these medications do. Pt replied no, RN educated her on medications. Pt states she still wants to live, but does not want to take medications. Pt refusing to eat breakfast this AM.  
1120- Pt had episode of incontinence of urine. Pt pulled purwick out prior to urinating. Pt cleaned up and lying in bed comfortably. 1130- PT in to see pt. 
1315- Daughter out in hallway to inform nursing that pt needs to pee. RN arrived in room with pt stating not needing to urinate. Pt daughter convinced pt to urinate, pt placed on bedside commode and voided 400cc of urine. Pt up in chair, daughter at bedside. 1420- Pt had BM, noticeable red streaks in stool. Will notify MD. 
1- MD notified, watch stool and labs for now. 65- SPoke with Haim, NP pt is now taking pills. Haim stated that medications will be switched back to PO by her.

## 2020-01-31 NOTE — PROGRESS NOTES
O2 sat at rest on room air 93% O2 sat with activity room air 79% O2 sat rest on 2 L 92% O2 sat with ambulation 2L NC 91% O2 sat at rest on 1L NC 95%

## 2020-01-31 NOTE — PROGRESS NOTES
TRANSFER - OUT REPORT: 
 
Verbal report given to Sara Nguyen RN on [facility] for routine progression of care. Report consisted of patients Situation, Background, Assessment and  
Recommendations(SBAR). Information from the following report(s) SBAR, MAR and Recent Results was reviewed with the receiving nurse. Lines:  
Peripheral IV 01/29/20 Left Antecubital (Active) Site Assessment Clean, dry, & intact 1/30/2020  7:30 PM  
Phlebitis Assessment 0 1/30/2020  7:30 PM  
Infiltration Assessment 0 1/30/2020  7:30 PM  
Dressing Status Clean, dry, & intact 1/30/2020  7:30 PM  
Dressing Type Tape;Transparent 1/30/2020  7:30 PM  
Hub Color/Line Status Pink 1/30/2020  7:30 PM  
  
 
Opportunity for questions and clarification was provided.

## 2020-01-31 NOTE — PROGRESS NOTES
Patient noted to have converted into A. Flutter as reported by the previous nurse provider aware. 
-will continue to monitor the patient 
-the patient is asymptomatic and in no apparent distress 
-daughter at the bedside

## 2020-01-31 NOTE — PROGRESS NOTES
The charge and rapid response nuses consulted regarding the change in the patient's cardiac rhythm (sinus tach.) 
-the change in rhythm may possible be due to the fact the patient refused her AM (1/30/20) dose of Metoprolol. Patient remains asymptomatic and the patient will continue to be monitored.

## 2020-01-31 NOTE — PROGRESS NOTES
Patient's daughter reported the patient has soiled herself. 
-Murray to the bedside to address the patient's needs

## 2020-01-31 NOTE — PROGRESS NOTES
0700 Bedside shift change report given to Naila, [address] [facility] (oncoming nurse) by Mary Soria RN (offgoing nurse). Report included the following information SBAR, Kardex, Intake/Output, MAR and Recent Results. 0930 PT/OT at bedside working with pt.  
 
1045 IDR's performed with care team. Plan to keep pt on PCU for another day to monitor. 1300 Pt resting comfortably in recliner at this time. VSS. Family at bedside. Will continue to monitor. 65 Pt assisted back into bed. Tolerated activity well with 2-person assist. No complaints of pain or discomfort at this time. Family remains at bedside. VSS. Will continue to monitor. [address] with Benjamin Cruz CM. Per Benjamin Cruz, pt will need repeat oxygen challenge on Sunday if plan is for d/c on Monday. CM will need oxygen challenge within 24 hours of discharge to be valid for home O2.  
 
1900 Bedside shift change report given to Roddy Dela Cruz RN (oncoming nurse) by ROSSY Yoo (offgoing nurse). Report included the following information SBAR, Kardex, Intake/Output, MAR, Recent Results and Cardiac Rhythm Sinus Arrythmia.

## 2020-01-31 NOTE — PROGRESS NOTES
Hospitalist Progress Note NAME: Avinash Agosto :  1935 MRN:  [id number]  
 
38-XOIR-YSP female with past medical history of diastolic congestive heart failure presented to the hospital with shortness of breath and was noted to have acute CHF exacerbation along with supratherapeutic INR from warfarin. Assessment / Plan: 
Acute hypoxic respiratory failure Acute on chronic exacerbation of diastolic heart failure Severe pulmonary HTN History of heart failure preserved ejection fraction around 60% on the most recent echo Presented with fluid overloaded and patchy infiltrates on x-ray with pleural effusion Chronically on torsemide 5 mg daily, will hold Cont Lasix to 60 mg IV twice daily. Continue metoprolol. Strict I's and O's, daily weights. Echocardiogram shows EF of 55% Cardiology is following VQ mismatch low probability for PE 
  
Supratherapeutic INR 
-INR is coming down and 4.1 now. Holding warfarin. Recheck INR in a.m. 
-Hemoptysis is better 
-We will recheck INR in a.m. 
  
Mild elevated Troponin due to CKD 
-Troponin peaked at 0.06  
  
  
A. fib Status post pacemaker insertion 2019 Chronically on warfarin, will hold due to  supratherapeutic INR Rate controlled with diltiazem and metoprolol will continue 
 
  
History of diabetes mellitus type 2 
· Off medications · Most recent hemoglobin A1c 5.3 2019 
  
COPD · Not in exacerbation · Continue home inhalers 
  
Hypertension · Continue home medication hydralazine 3 times daily, metoprolol, diltiazem · Consider adding IV metoprolol if blood pressure is up, currently blood pressure is controlled 
  
Left lower extremity edema 
-Ultrasound Doppler showed no evidence of DVT 
  
CKD stage IV 
-Creatinine is close to baseline of around 1.8-2 
-Monitor creatinine daily while on diuresis Incidental Baker's cyst 
-Outpatient follow-up Dispo: Discharge Monday to Home with [facility]. 
  
  
  
Code Status: Full code Surrogate Decision Maker: Her sons Nina Snyder 
  
DVT Prophylaxis:  SCDs due to high INR 
GI Prophylaxis: not indicated 
  
Baseline: Active, lives with her daughter Body mass index is 25.24 kg/m². Subjective: Chief Complaint / Reason for Physician Visit Sitting up in chair this am.  Reports sob is better. Mild cough with phlegm. No further dark stool or hemoptysis Wt is down by 3 pounds Review of Systems: 
Symptom Y/N Comments  Symptom Y/N Comments Fever/Chills    Chest Pain Poor Appetite    Edema Cough    Abdominal Pain Sputum    Joint Pain SOB/HASKINS    Pruritis/Rash Nausea/vomit    Tolerating PT/OT Diarrhea    Tolerating Diet Constipation    Other Could NOT obtain due to:   
 
Objective: VITALS:  
Last 24hrs VS reviewed since prior progress note. Most recent are: 
Patient Vitals for the past 24 hrs: 
 Temp Pulse Resp BP SpO2  
01/31/20 1116 98.4 °F (36.9 °C) 81 20 (!) 145/38 94 % 01/31/20 0927     (!) 79 % 01/31/20 0921  78  138/49 93 % 01/31/20 0737 98.1 °F (36.7 °C) 72 20 (!) 181/36 92 % 01/31/20 0330 97.4 °F (36.3 °C)  18 143/56   
01/30/20 2330 98.2 °F (36.8 °C) 89 17 116/62 98 % 01/30/20 2249 98.9 °F (37.2 °C) 97 18 118/63 95 % 01/30/20 2247 97.6 °F (36.4 °C) 70 18 (!) 131/35 98 % 01/30/20 2055  70     
01/30/20 1930 97.6 °F (36.4 °C) 71 19 136/42 98 % 01/30/20 1502  70   97 % 01/30/20 1500 97.5 °F (36.4 °C) 70 18 (!) 138/35 99 % Intake/Output Summary (Last 24 hours) at 1/31/2020 1317 Last data filed at 1/31/2020 1002 Gross per 24 hour Intake 100 ml Output 325 ml Net -225 ml PHYSICAL EXAM: 
General: no acute distress   
EENT:  EOMI. Anicteric sclerae. MMM Resp:  Bilateral air entry present, crackles are present, no wheezing CV:  Regular  rhythm,  No edema GI:  Soft, Non distended, Non tender.  +Bowel sounds Neurologic:  Alert and awake Psych:   Not anxious nor agitated Skin:  No rashes. No jaundice Reviewed most current lab test results and cultures  YES Reviewed most current radiology test results   YES Review and summation of old records today    NO Reviewed patient's current orders and MAR    YES 
PMH/SH reviewed - no change compared to H&P Current Facility-Administered Medications:  
  furosemide (LASIX) injection 60 mg, 60 mg, IntraVENous, BID, Desean Shen MD, 60 mg at 01/31/20 0830 
  dilTIAZem CD (CARDIZEM CD) capsule 180 mg, 180 mg, Oral, DAILY, Matt Bosworth L, NP, 180 mg at 01/31/20 0830 
  metoprolol succinate (TOPROL-XL) XL tablet 100 mg, 100 mg, Oral, DAILY, Matt Bosworth L, NP, 100 mg at 01/31/20 0830   potassium chloride (K-DUR, KLOR-CON) SR tablet 40 mEq, 40 mEq, Oral, BID, Matt Bosworth L, NP, 40 mEq at 01/31/20 0830   hydrALAZINE (APRESOLINE) 20 mg/mL injection 10 mg, 10 mg, IntraVENous, Q6H PRN, Ismael Monroe MD 
  albuterol-ipratropium (DUO-NEB) 2.5 MG-0.5 MG/3 ML, 3 mL, Nebulization, Q6H PRN, Nick Dow MD, 3 mL at 01/28/20 2039   ALPRAZolam (XANAX) tablet 0.25 mg, 0.25 mg, Oral, PRN, Nick Dow MD 
  pantoprazole (PROTONIX) tablet 40 mg, 40 mg, Oral, ACB, Michael Campbell MD, 40 mg at 01/31/20 0830   hydrALAZINE (APRESOLINE) tablet 100 mg, 100 mg, Oral, TID, Nick Dow MD, 100 mg at 01/31/20 0830   levothyroxine (SYNTHROID) tablet 88 mcg, 88 mcg, Oral, ACB, Michael Campbell MD, 88 mcg at 01/31/20 2252   mirtazapine (REMERON) tablet 45 mg, 45 mg, Oral, QHS, Michael Campbell MD, 45 mg at 01/30/20 2210   polyethylene glycol (MIRALAX) packet 17 g, 17 g, Oral, DAILY PRN, Nick Dow MD 
  sodium bicarbonate tablet 650 mg, 650 mg, Oral, TID, Michael Campbell MD, 650 mg at 01/31/20 0830 
  sodium chloride (NS) flush 5-40 mL, 5-40 mL, IntraVENous, Q8H, Michael Campbell MD, 10 mL at 01/31/20 6259   sodium chloride (NS) flush 5-40 mL, 5-40 mL, IntraVENous, PRN, Luigi Salcido MD 
  acetaminophen (TYLENOL) solution 650 mg, 650 mg, Oral, Q4H PRN, Luigi Salcido MD 
  docusate sodium (COLACE) capsule 100 mg, 100 mg, Oral, BID, Luigi Salcido MD, 100 mg at 01/31/20 0830 
________________________________________________________________________ Care Plan discussed with: 
  Comments Patient y Family  y   
RN y   
Care Manager Consultant Multidiciplinary team rounds were held today with , nursing, pharmacist and clinical coordinator. Patient's plan of care was discussed; medications were reviewed and discharge planning was addressed. ________________________________________________________________________ Total NON critical care TIME:  35   Minutes Total CRITICAL CARE TIME Spent:   Minutes non procedure based Comments >50% of visit spent in counseling and coordination of care    
________________________________________________________________________ Sal Dotson MD  
 
Procedures: see electronic medical records for all procedures/Xrays and details which were not copied into this note but were reviewed prior to creation of Plan. LABS: 
I reviewed today's most current labs and imaging studies. Pertinent labs include: 
Recent Labs  
  01/31/20 
0512 01/30/20 
0538 01/29/20 
0424 WBC 7.6 7.6 10.4 HGB 8.5* 8.5* 9.1*  
HCT 28.4* 28.5* 30.4*  217 188 Recent Labs  
  01/31/20 
0622 01/30/20 
2928 01/29/20 
0424 01/29/20 
1317  148* 146*  --   
K 3.7 3.6 3.5  --   
 114* 112*  --   
CO2 25 29 25  --   
GLU 95 75 97  --   
BUN 64* 62* 64*  --   
CREA 2.05* 1.89* 1.82*  --   
CA 8.3* 8.3* 8.7  --   
INR 4.1* 6.4*  --  5.2* Signed: Sal Dotson MD

## 2020-01-31 NOTE — PROGRESS NOTES
Patient resting no apparent distress with RR and effort WDP. -Patient oriented to person and place 
-Patient denies pain and is watching TV 
-family are at the bedside

## 2020-01-31 NOTE — PROGRESS NOTES
Problem: Mobility Impaired (Adult and Pediatric) Goal: *Acute Goals and Plan of Care (Insert Text) Description FUNCTIONAL STATUS PRIOR TO ADMISSION: patient ambulates household distances with rollator and supervision. She requires supervision for ADLS. Her daughter assists as needed. HOME SUPPORT PRIOR TO ADMISSION: The patient lived with her daughter who provides 24/7 assist. 
 
Physical Therapy Goals Initiated 1/30/2020 1. Patient will move from supine to sit and sit to supine , scoot up and down and roll side to side in bed with supervision/set-up within 7 day(s). 2.  Patient will transfer from bed to chair and chair to bed with supervision/set-up using the least restrictive device within 7 day(s). 3.  Patient will perform sit to stand with supervision/set-up within 7 day(s). 4.  Patient will ambulate with supervision/set-up for 50 feet with the least restrictive device within 7 day(s). Outcome: Progressing Towards Goal 
PHYSICAL THERAPY TREATMENT Patient: Kai Churchill (89 y.o. female) Date: 1/31/2020 Diagnosis: [facility] [I50.[facility]] <principal problem not specified> Precautions:   
Chart, physical therapy assessment, plan of care and goals were reviewed. ASSESSMENT Patient continues with skilled PT services and is progressing towards goals. Pt was received in supine on 1L and cleared by nursing to mobilize. Placed on RA. She was able to ambulate to the bathroom with RW, oxygen saturation dropped to 79% on RA. She was placed on 2L. She ambulated in the room and oxygen saturation was able to maintain 91% with activity. She was left sitting up in the chair at the end of the session. O2 sat at rest on room air 93% O2 sat with activity room air 79% O2 sat rest on 2 L 92% O2 sat with ambulation 2L NC 91% O2 sat at rest on 1L NC 95% Current Level of Function Impacting Discharge (mobility/balance): CGA 
 
 Other factors to consider for discharge: oxygen needs PLAN : 
Patient continues to benefit from skilled intervention to address the above impairments. Continue treatment per established plan of care. to address goals. Recommendation for discharge: (in order for the patient to meet his/her long term goals) Physical therapy at least 2 days/week in the home AND ensure assist and/or supervision for safety with mobility This discharge recommendation: 
Has not yet been discussed the attending provider and/or case management IF patient discharges home will need the following DME: portable oxygen SUBJECTIVE:  
Patient stated come back later.  OBJECTIVE DATA SUMMARY:  
Critical Behavior: 
Neurologic State: Alert Orientation Level: Oriented to person, Disoriented to situation, Disoriented to time Cognition: Decreased attention/concentration, Decreased command following Safety/Judgement: Fall prevention, Decreased insight into deficits Functional Mobility Training: 
Bed Mobility: 
Rolling: Independent Supine to Sit: Independent Scooting: Supervision Transfers: 
Sit to Stand: Contact guard assistance Stand to Sit: Contact guard assistance Bed to Chair: Contact guard assistance;Minimum assistance(with RW management and occasional LOB) Balance: 
Sitting - Static: Good (unsupported) Sitting - Dynamic: Good (unsupported) Standing: Impaired Standing - Static: Fair Standing - Dynamic : Fair Ambulation/Gait Training: 
Distance (ft): 30 Feet (ft) Assistive Device: Gait belt;Walker, rolling Ambulation - Level of Assistance: Minimal assistance Gait Abnormalities: Decreased step clearance Base of Support: Narrowed Speed/Rivka: Pace decreased (<100 feet/min); Shuffled Step Length: Left shortened;Right shortened Activity Tolerance:  
Fair and desaturates with exertion and requires oxygen Please refer to the flowsheet for vital signs taken during this treatment. After treatment patient left in no apparent distress:  
Sitting in chair and Call bell within reach COMMUNICATION/COLLABORATION:  
The patients plan of care was discussed with: Occupational Therapist and Registered Nurse Destinee Yeboah PT, DPT Time Calculation: 24 mins

## 2020-01-31 NOTE — PROGRESS NOTES
1/31/2020 11:20 AM 
 
Admit Date: 1/28/2020 Admit Diagnosis[facility] Subjective:  
 
Appears to be back to baseline. No events or complaints per daughter. Visit Vitals BP (!) 145/38 (BP 1 Location: Right arm) Pulse 81 Temp 98.4 °F (36.9 °C) Resp 20 Ht 5' (1.524 m) Wt 129 lb 3.8 oz (58.6 kg) SpO2 94% BMI 25.24 kg/m² Current Facility-Administered Medications Medication Dose Route Frequency  furosemide (LASIX) injection 60 mg  60 mg IntraVENous BID  dilTIAZem CD (CARDIZEM CD) capsule 180 mg  180 mg Oral DAILY  metoprolol succinate (TOPROL-XL) XL tablet 100 mg  100 mg Oral DAILY  potassium chloride (K-DUR, KLOR-CON) SR tablet 40 mEq  40 mEq Oral BID  hydrALAZINE (APRESOLINE) 20 mg/mL injection 10 mg  10 mg IntraVENous Q6H PRN  
 albuterol-ipratropium (DUO-NEB) 2.5 MG-0.5 MG/3 ML  3 mL Nebulization Q6H PRN  
 ALPRAZolam (XANAX) tablet 0.25 mg  0.25 mg Oral PRN  pantoprazole (PROTONIX) tablet 40 mg  40 mg Oral ACB  hydrALAZINE (APRESOLINE) tablet 100 mg  100 mg Oral TID  levothyroxine (SYNTHROID) tablet 88 mcg  88 mcg Oral ACB  mirtazapine (REMERON) tablet 45 mg  45 mg Oral QHS  polyethylene glycol (MIRALAX) packet 17 g  17 g Oral DAILY PRN  
 sodium bicarbonate tablet 650 mg  650 mg Oral TID  sodium chloride (NS) flush 5-40 mL  5-40 mL IntraVENous Q8H  
 sodium chloride (NS) flush 5-40 mL  5-40 mL IntraVENous PRN  
 acetaminophen (TYLENOL) solution 650 mg  650 mg Oral Q4H PRN  
 docusate sodium (COLACE) capsule 100 mg  100 mg Oral BID Objective:  
  
Visit Vitals BP (!) 145/38 (BP 1 Location: Right arm) Pulse 81 Temp 98.4 °F (36.9 °C) Resp 20 Ht 5' (1.524 m) Wt 129 lb 3.8 oz (58.6 kg) SpO2 94% BMI 25.24 kg/m² Physical Exam: Abdomen: soft, non-tender. Bowel sounds normal.  
Extremities: no cyanosis or edema Heart: regular rate and rhythm, S1, S2 normal, no click, rub or gallop. 2/6 SM over aortic area. Lungs: clear to auscultation bilaterally Data Review:  
Labs:   
Recent Results (from the past 24 hour(s)) CBC W/O DIFF Collection Time: 01/31/20  5:12 AM  
Result Value Ref Range WBC 7.6 3.6 - 11.0 K/uL  
 RBC 2.97 (L) 3.80 - 5.20 M/uL HGB 8.5 (L) 11.5 - 16.0 g/dL HCT 28.4 (L) 35.0 - 47.0 % MCV 95.6 80.0 - 99.0 FL  
 MCH 28.6 26.0 - 34.0 PG  
 MCHC 29.9 (L) 30.0 - 36.5 g/dL  
 RDW 16.8 (H) 11.5 - 14.5 % PLATELET 788 230 - 766 K/uL MPV 12.6 8.9 - 12.9 FL  
 NRBC 0.0 0  WBC ABSOLUTE NRBC 0.00 0.00 - 0.01 K/uL METABOLIC PANEL, BASIC Collection Time: 01/31/20  6:22 AM  
Result Value Ref Range Sodium 144 136 - 145 mmol/L Potassium 3.7 3.5 - 5.1 mmol/L Chloride 108 97 - 108 mmol/L  
 CO2 25 21 - 32 mmol/L Anion gap 11 5 - 15 mmol/L Glucose 95 65 - 100 mg/dL BUN 64 (H) 6 - 20 MG/DL Creatinine 2.05 (H) 0.55 - 1.02 MG/DL  
 BUN/Creatinine ratio 31 (H) 12 - 20 GFR est AA 28 (L) >60 ml/min/1.73m2 GFR est non-AA 23 (L) >60 ml/min/1.73m2 Calcium 8.3 (L) 8.5 - 10.1 MG/DL PROTHROMBIN TIME + INR Collection Time: 01/31/20  6:22 AM  
Result Value Ref Range INR 4.1 (H) 0.9 - 1.1 Prothrombin time 39.0 (H) 9.0 - 11.1 sec PROCALCITONIN Collection Time: 01/31/20  6:22 AM  
Result Value Ref Range Procalcitonin 1.18 ng/mL GLUCOSE, POC Collection Time: 01/31/20  7:49 AM  
Result Value Ref Range Glucose (POC) 99 65 - 100 mg/dL Performed by Psychiatric Telemetry: normal sinus rhythm, A flutter, Paced Assessment:  
 
Active Problems: 
  CHF (congestive heart failure) ([facility] (1/28/2020) Plan:  
 
Stable from cardiac standpoint. Goal INR 2-3. Continue current meds. F/u with Dr. Denise Pablo once dc. Please call if can be of any further assistance.

## 2020-01-31 NOTE — PROGRESS NOTES
SURINDER PLAN During rounds MD indicated that if Pt is stable through the weekend may look to DC home on Monday, 2/3/2020 
 
-Plan home with [facility]. Referral sent to [facility] today via CC. 
-Pt may need home oxygen. Tentative referral sent to [facility] for Home Oxygen. Will need new Oxygen Challenge documented by RN on Sunday, 2/2/2020 since it needs to be done 24 hours prior to DC.   
-Second IM letter will be needed prior to DC.  
-Family will transport Pt home in car. CHF Bundle letter given today. 4:22 PM  
CM met with Pt and family in room and discussed tentative DC plan. CM explained that Pt needs HH,  They indicated that Pt has used [facility] in the past and she indicated she would like to use them again. Choice list was provided. FOC on bedside chart. Referral sent to [facility] via CC to see if they can accept. CM sent referral to [facility] to see if they can help us with Home Oxygen. We can not send the final order until 24 hours prior to DC. RN will need to do oxygen challenge on Sunday, 2/3/2020 and we will need to see what else [facility] needs on Monday Morning to provide Home Oxygen. CM will continue to monitor discharge plan. MARQUISE Dhaliwal Ext [phone]

## 2020-01-31 NOTE — PROGRESS NOTES
Patient's ECG rhythm was unable to be deciphered on the monitor and after investigating it was noted that the LL had become dislodged. 
-lead replaced 
-patient tolerated well

## 2020-01-31 NOTE — PROGRESS NOTES
Patient has began to all out and is mildly restless. Patient's daughter asks if it will be ok to give the patient another dose of Benadryl this evening to minimize the patient's restlessness. 
-will confer with the provider (via New Request)

## 2020-01-31 NOTE — PROGRESS NOTES
Problem: Self Care Deficits Care Plan (Adult) Goal: *Acute Goals and Plan of Care (Insert Text) Description FUNCTIONAL STATUS PRIOR TO ADMISSION: Lives with daughter whom is a CNA and is with pt 24/7, per daughter pt tends to state she cannot do things and performs better by being told what she is going to do, was not on O2, ambulated with one assist CGA with RW, gets fatigued and gets assist with ADLS as needed, can perform UB ADLs on her own and LB ADLS assist needed at times, wears depends, uses wheelchair outside of the [facility] HOME SUPPORT PRIOR TO ADMISSION: The patient lived with daughter to provide assist. 
 
Occupational Therapy Goals: 
Initiated 1/30/2020 1. Patient will perform grooming standing with supervision/set-up within 7 days. 2. Patient will perform lower body dressing with supervision/set-up within 7 days. 3. Patient will perform toileting with supervision/set-up within 7 days. 4. Patient will transfer from toilet with supervision/set-up using the least restrictive device and appropriate durable medical equipment within 7 days. Outcome: Progressing Towards Goal 
 OCCUPATIONAL THERAPY TREATMENT Patient: Edelmira Melara (61 y.o. female) Date: 1/31/2020 Diagnosis: [facility].9[facility] <principal problem not specified> Precautions:   
Chart, occupational therapy assessment, plan of care, and goals were reviewed. ASSESSMENT Patient continues with skilled OT services and is progressing towards goals. Pt was more confused today. At baseline pt needs cues for tasks. Unable to wean pt to room air today and pt needs 2L with activity and 1L NC at rest.  Assist needed for mobility around objects with RW and CGA for dynamic standing balance. Hand over hand assist needed for washing of hands due to confusion and decreased thoroughness. Unable to wean pt to room air today. Pt is very close to ADL baseline. O2 sat at rest on room air 93% O2 sat with activity room air 79% O2 sat rest on 2 L 92% O2 sat with ambulation 2L NC 91% O2 sat at rest on 1L NC 95% Current Level of Function Impacting Discharge (ADLs): Sit to Stand: Contact guard assistance Functional Transfers Bathroom Mobility: Minimum assistance;Contact guard assistance(with rolling walker, assist for RW around objects) Toilet Transfer : Minimum assistance Bed to Chair: Contact guard assistance;Minimum assistance(with RW management and occasional LOB) Feeding Utensil Management: Independent Grooming Washing Hands: Minimum assistance(for sequencing and attention to task, CGA balance) Toileting Bladder Hygiene: Contact guard assistance(standing) Bowel Hygiene: Contact guard assistance(standing) Clothing Management: Minimum assistance(hospital gown in standing) Other factors to consider for discharge: recommend continuing assist from family as prior PLAN : 
Patient continues to benefit from skilled intervention to address the above impairments. Continue treatment per established plan of care. to address goals. Recommend with staff: OOB to bathroom, encourage pt to eat and drink Recommend next OT session: ADLs, standing tolerance Recommendation for discharge: (in order for the patient to meet his/her long term goals) No skilled occupational therapy/ follow up rehabilitation needs identified at this time. Recommend return to home with 24/7 assist as prior This discharge recommendation: 
Has been made in collaboration with the attending provider and/or case management IF patient discharges home will need the following DME: may need home O2 SUBJECTIVE:  
Patient stated What am I doing?  OBJECTIVE DATA SUMMARY:  
Cognitive/Behavioral Status: 
Neurologic State: Alert Orientation Level: Oriented to person;Disoriented to situation;Disoriented to time Cognition: Decreased attention/concentration;Decreased command following Perception: Appears intact Perseveration: Perseverates during conversation(confusion and needs reassurance) Safety/Judgement: Fall prevention;Decreased insight into deficits Functional Mobility and Transfers for ADLs: 
Bed Mobility: 
Rolling: Independent Supine to Sit: Independent Scooting: Supervision Transfers: 
Sit to Stand: Contact guard assistance Functional Transfers Bathroom Mobility: Minimum assistance;Contact guard assistance(with rolling walker, assist for RW around objects) Toilet Transfer : Minimum assistance Bed to Chair: Contact guard assistance;Minimum assistance(with RW management and occasional LOB) Balance: 
Sitting - Static: Good (unsupported) Sitting - Dynamic: Good (unsupported) Standing: Impaired Standing - Static: Fair Standing - Dynamic : Fair ADL Intervention: 
Feeding Utensil Management: Independent Grooming Washing Hands: Minimum assistance(for sequencing and attention to task, CGA balance) Toileting Bladder Hygiene: Contact guard assistance(standing) Bowel Hygiene: Contact guard assistance(standing) Clothing Management: Minimum assistance(hospital gown in standing) Cognitive Retraining Orientation Retraining: Reorienting Problem Solving: Identifying the task; Identifying the problem;General alternative solution Safety/Judgement: Fall prevention;Decreased insight into deficits Pain: 
0/10 Activity Tolerance:  
Fair Please refer to the flowsheet for vital signs taken during this treatment. After treatment patient left in no apparent distress:  
Sitting in chair, Call bell within reach and Caregiver / family present COMMUNICATION/COLLABORATION:  
The patients plan of care was discussed with: Physical Therapist, Registered Nurse and  and her daughter Christine Matt, OTR/L Time Calculation: 23 mins

## 2020-02-01 NOTE — PROGRESS NOTES
Problem: Falls - Risk of 
Goal: *Absence of Falls Description Document Alex Middleton Fall Risk and appropriate interventions in the flowsheet. Outcome: Progressing Towards Goal 
Note: Fall Risk Interventions: 
Mobility Interventions: Bed/chair exit alarm, Communicate number of staff needed for ambulation/transfer, OT consult for ADLs, PT Consult for mobility concerns Mentation Interventions: Adequate sleep, hydration, pain control, Door open when patient unattended, Toileting rounds, Update white board Medication Interventions: Assess postural VS orthostatic hypotension, Evaluate medications/consider consulting pharmacy, Patient to call before getting OOB Elimination Interventions: Bed/chair exit alarm, Call light in reach, Patient to call for help with toileting needs Problem: Patient Education: Go to Patient Education Activity Goal: Patient/Family Education Outcome: Progressing Towards Goal

## 2020-02-01 NOTE — PROGRESS NOTES
TRANSFER - IN REPORT: 
 
Verbal report received from Nallely(name) on Alphonso Valdivia  being received from PCU(unit) for routine progression of care Report consisted of patients Situation, Background, Assessment and  
Recommendations(SBAR). Information from the following report(s) SBAR, Kardex and Cardiac Rhythm (NSR/PACED/FLUTTER) was reviewed with the receiving nurse. Opportunity for questions and clarification was provided. Assessment completed upon patients arrival to unit and care assumed.

## 2020-02-01 NOTE — PROGRESS NOTES
CM spoke with the patient's daughter and she wanted to ensure that the patient's home O2 and HH is being sent to the correct address. The patient will be going to [address] [place] [address], [address] [place] [address]. CM confirmed in [employer] and [facility] referral that the address was correct. CM will continue to assist with dispo planning. Ej Irvin 169, Donna 56

## 2020-02-01 NOTE — PROGRESS NOTES
0700 Bedside shift change report given to ROSSY Yoo (oncoming nurse) by Tima Snyder RN (offgoing nurse). Report included the following information SBAR, Kardex, Intake/Output, MAR, Recent Results and Cardiac Rhythm NSR.  
 
0800 Administered AM meds. Pt tolerated well. Pt assisted into recliner with one-person assist. Pt resting quietly in recliner at this time. Family at bedside. Chair alarm on and in place for safety. [address] [facility] [address] Adrianne Whitmore on unit. Ok to transfer pt to telemetry unit. Transfer order placed. [address] report to [address] [place] [address] on [facility]. Will transfer pt shortly. 1400 TRANSFER - OUT REPORT: 
 
Verbal report given to [address] [place] [address], [facility] (name) on [facility]  being transferred to [facility] (unit) for routine progression of care Report consisted of patients Situation, Background, Assessment and  
Recommendations(SBAR). Information from the following report(s) SBAR, Kardex, Intake/Output, MAR and Recent Results was reviewed with the receiving nurse. Lines:  
Peripheral IV 01/29/20 Left Antecubital (Active) Site Assessment Clean, dry, & intact 2/1/2020 11:02 AM  
Phlebitis Assessment 0 2/1/2020 11:02 AM  
Infiltration Assessment 0 2/1/2020 11:02 AM  
Dressing Status Clean, dry, & intact 2/1/2020 11:02 AM  
Dressing Type Tape;Transparent 2/1/2020 11:02 AM  
Hub Color/Line Status Pink;Flushed 2/1/2020 11:02 AM  
Alcohol Cap Used No 2/1/2020 11:02 AM  
  
 
Opportunity for questions and clarification was provided. Patient transported with: 
 Monitor O2 @ 2 liters Registered Nurse Tech

## 2020-02-01 NOTE — PROGRESS NOTES
Bedside and Verbal shift change report GIVEN TO Virgil Moore RN. Report included the following information SBAR and Kardex. 1360 Radha Gagnon NURSING NOTE Admission Date 1/28/2020 Admission Diagnosis CHF (congestive heart failure) ([facility] [address] [I50.9] Consults IP CONSULT TO PALLIATIVE CARE - PROVIDER 
IP CONSULT TO CARDIOLOGY Cardiac Monitoring [x] Yes [] No  
  
Purposeful Hourly Rounding [x] Yes   
Judd Score Total Score: 4 Judd score 3 or > [x] Bed Alarm [] Avasys [] 1:1 sitter [] Patient refused (Signed refusal form in chart) Rafiq Score Rafiq Score: 15 Rafiq score 14 or < [] PMT consult [x] Wound Care consult  
 []  Specialty bed  [] Nutrition consult Influenza Vaccine Oxygen needs? [] Room air Oxygen @  [x]1L    []2L    []3L   []4L    []5L   []6L via NC Chronic home O2 use? [] Yes [x] No 
Perform O2 challenge test and document in progress note using smartphrase (.Homeoxygen) Last bowel movement Last Bowel Movement Date: 02/01/20 Urinary Catheter LDAs Peripheral IV 01/29/20 Left Antecubital (Active) Site Assessment Clean, dry, & intact 2/1/2020  2:10 PM  
Phlebitis Assessment 0 2/1/2020  2:10 PM  
Infiltration Assessment 0 2/1/2020  2:10 PM  
Dressing Status Clean, dry, & intact 2/1/2020  2:10 PM  
Dressing Type Transparent 2/1/2020  2:10 PM  
Hub Color/Line Status Pink 2/1/2020  2:10 PM  
Alcohol Cap Used No 2/1/2020 11:02 AM  
                  
  
Readmission Risk Assessment Tool Score High Risk 35 Total Score 3 Has Seen PCP in Last 6 Months (Yes=3, No=0)  
 9 IP Visits Last 12 Months (1-3=4, 4=9, >4=11) 5 Pt. Coverage (Medicare=5 , Medicaid, or Self-Pay=4) 16 Charlson Comorbidity Score (Age + Comorbid Conditions) Criteria that do not apply:  
 . Living with Significant Other. Assisted Living. LTAC. SNF. or  
Rehab Patient Length of Stay (>5 days = 3) Expected Length of Stay 4d 2h  
 Actual Length of Stay 4

## 2020-02-01 NOTE — PROGRESS NOTES
Hospitalist Progress Note NAME: Isela Brown :  1935 MRN:  [id number]  
 
50-OQUH-MONSE female with past medical history of diastolic congestive heart failure presented to the hospital with shortness of breath and was noted to have acute CHF exacerbation along with supratherapeutic INR from warfarin. Assessment / Plan: 
Acute hypoxic respiratory failure Acute on chronic exacerbation of diastolic heart failure Severe pulmonary HTN History of heart failure preserved ejection fraction around 60% on the most recent echo Presented with fluid overloaded and patchy infiltrates on x-ray with pleural effusion Chronically on torsemide 5 mg daily, weight is down by 4 pounds Cont Lasix to 60 mg IV twice daily. Continue metoprolol. Strict I's and O's, daily weights. Echocardiogram shows EF of 55% Cardiology is following VQ mismatch low probability for PE 
  
Supratherapeutic INR 
-INR is coming down and 3.1 now. Holding warfarin. Recheck INR in a.m. 
-Hemoptysis is resolved 
-We will recheck INR in a.m. 
-consider resuming in am 
  
Mild elevated Troponin due to CKD 
-Troponin peaked at 0.06  
  
  
A. fib Status post pacemaker insertion 2019 Chronically on warfarin, will hold due to  supratherapeutic INR Rate controlled with diltiazem and metoprolol will continue 
 
  
History of diabetes mellitus type 2 Off medications Most recent hemoglobin A1c 5.3 2019 
  
COPD Not in exacerbation Continue home inhalers 
  
Hypertension Continue home medication hydralazine 3 times daily, metoprolol, diltiazem Consider adding IV metoprolol if blood pressure is up, currently blood pressure is controlled 
  
Left lower extremity edema 
-Ultrasound Doppler showed no evidence of DVT 
  
CKD stage IV 
-Creatinine is close to baseline of around 1.8-2 
-Monitor creatinine daily while on diuresis Incidental Baker's cyst 
-Outpatient follow-up Dispo: Discharge Monday to Home with [facility].   
  
  
Code Status: Full code Surrogate Decision Maker: Her sons Jackie Snyder 
  
DVT Prophylaxis:  SCDs due to high INR 
GI Prophylaxis: not indicated 
  
Baseline: Active, lives with her daughter Body mass index is 25.02 kg/m². Subjective: Chief Complaint / Reason for Physician Visit Sob is better. No bleeding episodes She is feeling better. Review of Systems: 
Symptom Y/N Comments  Symptom Y/N Comments Fever/Chills    Chest Pain Poor Appetite    Edema Cough    Abdominal Pain Sputum    Joint Pain SOB/HASKINS    Pruritis/Rash Nausea/vomit    Tolerating PT/OT Diarrhea    Tolerating Diet Constipation    Other Could NOT obtain due to:   
 
Objective: VITALS:  
Last 24hrs VS reviewed since prior progress note. Most recent are: 
Patient Vitals for the past 24 hrs: 
 Temp Pulse Resp BP SpO2  
02/01/20 1102 98.2 °F (36.8 °C) 85 16 (!) 120/30 95 % 02/01/20 0720 98 °F (36.7 °C) 88 16 159/45 94 % 02/01/20 0511 98.6 °F (37 °C) 93 18 170/59 94 % 01/31/20 2354 98.2 °F (36.8 °C) 84 18 150/49 92 % 01/31/20 2200    (!) 136/34   
01/31/20 2000 98.6 °F (37 °C) 86 18 147/40 95 % 01/31/20 1717    (!) 149/34   
01/31/20 1501 98.3 °F (36.8 °C) 70 18 (!) 143/36 93 % 01/31/20 1500  70   93 % Intake/Output Summary (Last 24 hours) at 2/1/2020 1340 Last data filed at 2/1/2020 5635 Gross per 24 hour Intake 100 ml Output 300 ml Net -200 ml PHYSICAL EXAM: 
General: no acute distress   
EENT:  EOMI. Anicteric sclerae. MMM Resp:  Bilateral air entry present, crackles are present, no wheezing CV:  Regular  rhythm,  No edema GI:  Soft, Non distended, Non tender.  +Bowel sounds Neurologic:  Alert and awake Psych:   Not anxious nor agitated Skin:  No rashes. No jaundice Reviewed most current lab test results and cultures  YES Reviewed most current radiology test results   YES 
 Review and summation of old records today    NO Reviewed patient's current orders and MAR    YES 
PMH/SH reviewed - no change compared to H&P Current Facility-Administered Medications:  
  furosemide (LASIX) injection 60 mg, 60 mg, IntraVENous, BID, Elisa Cespedes MD, 60 mg at 02/01/20 4805   dilTIAZem CD (CARDIZEM CD) capsule 180 mg, 180 mg, Oral, DAILY, Charyl Stormy L, NP, 180 mg at 02/01/20 3479   metoprolol succinate (TOPROL-XL) XL tablet 100 mg, 100 mg, Oral, DAILY, Charyl Stormy L, NP, 100 mg at 02/01/20 0964   potassium chloride (K-DUR, KLOR-CON) SR tablet 40 mEq, 40 mEq, Oral, BID, Charyl Stormy L, NP, 40 mEq at 02/01/20 2879   hydrALAZINE (APRESOLINE) 20 mg/mL injection 10 mg, 10 mg, IntraVENous, Q6H PRN, Ismael Bustillos MD 
  albuterol-ipratropium (DUO-NEB) 2.5 MG-0.5 MG/3 ML, 3 mL, Nebulization, Q6H PRN, Ellis Canavan, MD, 3 mL at 01/28/20 2039   ALPRAZolam (XANAX) tablet 0.25 mg, 0.25 mg, Oral, PRN, Ellis Canavan, MD 
  pantoprazole (PROTONIX) tablet 40 mg, 40 mg, Oral, ACB, Ellis Canavan, MD, 40 mg at 02/01/20 9095   hydrALAZINE (APRESOLINE) tablet 100 mg, 100 mg, Oral, TID, Ellis Canavan, MD, 100 mg at 02/01/20 1400   levothyroxine (SYNTHROID) tablet 88 mcg, 88 mcg, Oral, ACB, Ellis Canavan, MD, 88 mcg at 02/01/20 0602   mirtazapine (REMERON) tablet 45 mg, 45 mg, Oral, QHS, Ellis Canavan, MD, 45 mg at 01/31/20 2115   polyethylene glycol (MIRALAX) packet 17 g, 17 g, Oral, DAILY PRN, Ellis Canavan, MD 
  sodium bicarbonate tablet 650 mg, 650 mg, Oral, TID, Ellis Canavan, MD, 650 mg at 02/01/20 8066   sodium chloride (NS) flush 5-40 mL, 5-40 mL, IntraVENous, Q8H, Michael Campbell MD, 10 mL at 02/01/20 1400 
  sodium chloride (NS) flush 5-40 mL, 5-40 mL, IntraVENous, PRN, Ellis Canavan, MD 
  acetaminophen (TYLENOL) solution 650 mg, 650 mg, Oral, Q4H PRN, Ellis Canavan, MD 
  docusate sodium (COLACE) capsule 100 mg, 100 mg, Oral, BID, Adrian Tori Samuels MD, 100 mg at 02/01/20 7605 
________________________________________________________________________ Care Plan discussed with: 
  Comments Patient y Family  y   
RN y   
Care Manager Consultant Multidiciplinary team rounds were held today with , nursing, pharmacist and clinical coordinator. Patient's plan of care was discussed; medications were reviewed and discharge planning was addressed. ________________________________________________________________________ Total NON critical care TIME:  35   Minutes Total CRITICAL CARE TIME Spent:   Minutes non procedure based Comments >50% of visit spent in counseling and coordination of care    
________________________________________________________________________ Jesi Cuba MD  
 
Procedures: see electronic medical records for all procedures/Xrays and details which were not copied into this note but were reviewed prior to creation of Plan. LABS: 
I reviewed today's most current labs and imaging studies. Pertinent labs include: 
Recent Labs  
  01/31/20 
0512 01/30/20 
2406 WBC 7.6 7.6 HGB 8.5* 8.5* HCT 28.4* 28.5*  217 Recent Labs 02/01/20 
5979 01/31/20 
7509 01/30/20 
0602 NA  --  144 148* K  --  3.7 3.6 CL  --  108 114* CO2  --  25 29 GLU  --  95 75 BUN  --  64* 62* CREA  --  2.05* 1.89* CA  --  8.3* 8.3* INR 3.2* 4.1* 6.4* Signed: Jesi Cuba MD

## 2020-02-01 NOTE — ROUTINE PROCESS
..Report received from  [facility]. SBAR was discussed.  
 
Anjelica Schreiber RN

## 2020-02-01 NOTE — PROGRESS NOTES
Problem: Falls - Risk of 
Goal: *Absence of Falls Description Document [place] Fall Risk and appropriate interventions in the flowsheet. Outcome: Progressing Towards Goal 
Note: Fall Risk Interventions: 
Mobility Interventions: Assess mobility with egress test, Communicate number of staff needed for ambulation/transfer, OT consult for ADLs, Patient to call before getting OOB, PT Consult for mobility concerns, Strengthening exercises (ROM-active/passive), Utilize walker, cane, or other assistive device, Utilize gait belt for transfers/ambulation Mentation Interventions: Adequate sleep, hydration, pain control, Evaluate medications/consider consulting pharmacy, Increase mobility, Update white board, Toileting rounds Medication Interventions: Assess postural VS orthostatic hypotension, Evaluate medications/consider consulting pharmacy, Patient to call before getting OOB, Teach patient to arise slowly Elimination Interventions: Bed/chair exit alarm, Call light in reach, Patient to call for help with toileting needs, Toileting schedule/hourly rounds Problem: Patient Education: Go to Patient Education Activity Goal: Patient/Family Education Outcome: Progressing Towards Goal 
  
Problem: Pressure Injury - Risk of 
Goal: *Prevention of pressure injury Description Document Rafiq Scale and appropriate interventions in the flowsheet. Outcome: Progressing Towards Goal 
Note: Pressure Injury Interventions: 
Sensory Interventions: Assess changes in LOC, Float heels, Keep linens dry and wrinkle-free, Maintain/enhance activity level, Minimize linen layers, Turn and reposition approx. every two hours (pillows and wedges if needed) Moisture Interventions: Absorbent underpads, Check for incontinence Q2 hours and as needed, Maintain skin hydration (lotion/cream), Offer toileting Q_hr, Moisture barrier Activity Interventions: Increase time out of bed, Pressure redistribution bed/mattress(bed type), PT/OT evaluation Mobility Interventions: Assess need for specialty bed, Pressure redistribution bed/mattress (bed type), PT/OT evaluation, Turn and reposition approx. every two hours(pillow and wedges) Nutrition Interventions: Document food/fluid/supplement intake, Discuss nutritional consult with provider Friction and Shear Interventions: Apply protective barrier, creams and emollients, Lift sheet, Minimize layers Problem: Patient Education: Go to Patient Education Activity Goal: Patient/Family Education Outcome: Progressing Towards Goal 
  
Problem: Breathing Pattern - Ineffective Goal: *Absence of hypoxia Outcome: Progressing Towards Goal 
Goal: *Use of effective breathing techniques Outcome: Progressing Towards Goal 
Goal: *PALLIATIVE CARE:  Alleviation of Dyspnea Outcome: Progressing Towards Goal 
  
Problem: Patient Education: Go to Patient Education Activity Goal: Patient/Family Education Outcome: Progressing Towards Goal 
  
Problem: Patient Education: Go to Patient Education Activity Goal: Patient/Family Education Outcome: Progressing Towards Goal

## 2020-02-02 NOTE — PROGRESS NOTES
Hospitalist Progress Note NAME: Zoey Ochoa :  1935 MRN:  [id number]  
 
74-ZNLH-DKI female with past medical history of diastolic congestive heart failure presented to the hospital with shortness of breath and was noted to have acute CHF exacerbation along with supratherapeutic INR from warfarin. Assessment / Plan: 
Acute hypoxic respiratory failure Acute on chronic exacerbation of diastolic heart failure Severe pulmonary HTN History of heart failure preserved ejection fraction around 60% on the most recent echo Presented with fluid overloaded and patchy infiltrates on x-ray with pleural effusion Chronically on torsemide 5 mg daily Hold lasix due to elevated creatinine. Continue metoprolol. Strict I's and O's, daily weights. Echocardiogram shows EF of 55% Cardiology is following VQ mismatch low probability for PE 
  
Supratherapeutic INR 
-INR is coming down and 2.1 now.   
-restart warfarin. Consult pharmacy. Check inr in am  
 
Mild elevated Troponin due to CKD 
-Troponin peaked at 0.06  
  
  
A. fib Status post pacemaker insertion 2019 Resume warfarin Rate controlled with diltiazem and metoprolol will continue 
 
  
History of diabetes mellitus type 2 Off medications Most recent hemoglobin A1c 5.3 2019 
  
COPD Not in exacerbation Continue home inhalers 
  
Hypertension Continue home medication hydralazine 3 times daily, metoprolol, diltiazem Consider adding IV metoprolol if blood pressure is up, currently blood pressure is controlled 
  
Left lower extremity edema 
-Ultrasound Doppler showed no evidence of DVT 
  
CKD stage IV with worsening of creatinine 
-Creatinine increased to 2.4, will hold lasix. Check bmp in am  
 
 
Incidental Baker's cyst 
-Outpatient follow-up Dispo: Discharge Monday to Home with [facility] if cr is better. 
  
  
  
Code Status: Full code Surrogate Decision Maker: Her sons Chacorta Gustavo 
  
 DVT Prophylaxis:  SCDs due to high INR 
GI Prophylaxis: not indicated 
  
Baseline: Active, lives with her daughter Body mass index is 25.27 kg/m². Subjective: Chief Complaint / Reason for Physician Visit Sob is better Neg balance of 700 ml Cr is up to 2.4 today INR is down to 2.1 Review of Systems: 
Symptom Y/N Comments  Symptom Y/N Comments Fever/Chills    Chest Pain Poor Appetite    Edema Cough    Abdominal Pain Sputum    Joint Pain SOB/HASKINS    Pruritis/Rash Nausea/vomit    Tolerating PT/OT Diarrhea    Tolerating Diet Constipation    Other Could NOT obtain due to:   
 
Objective: VITALS:  
Last 24hrs VS reviewed since prior progress note. Most recent are: 
Patient Vitals for the past 24 hrs: 
 Temp Pulse Resp BP SpO2  
02/02/20 1509 97.7 °F (36.5 °C) 73 18 (!) 110/37 100 % 02/02/20 1155 97 °F (36.1 °C) 72 18 125/42 100 % 02/02/20 0757 96.7 °F (35.9 °C) 74 18 137/50 100 % 02/02/20 0344 98.3 °F (36.8 °C) 70 18 (!) 132/36 99 % 02/01/20 2333 98.2 °F (36.8 °C) 85 18 138/41 97 % 02/01/20 1821 98.1 °F (36.7 °C) 82 18 (!) 131/32 100 % Intake/Output Summary (Last 24 hours) at 2/2/2020 1718 Last data filed at 2/2/2020 3184 Gross per 24 hour Intake 240 ml Output 800 ml Net -560 ml PHYSICAL EXAM: 
General: no acute distress   
EENT:  EOMI. Anicteric sclerae. MMM Resp:  Bilateral air entry present, crackles are present, no wheezing CV:  Regular  rhythm,  No edema GI:  Soft, Non distended, Non tender.  +Bowel sounds Neurologic:  Alert and awake Psych:   Not anxious nor agitated Skin:  No rashes. No jaundice Reviewed most current lab test results and cultures  YES Reviewed most current radiology test results   YES Review and summation of old records today    NO Reviewed patient's current orders and MAR    YES 
PMH/SH reviewed - no change compared to H&P Current Facility-Administered Medications:   warfarin (COUMADIN) tablet 5 mg, 5 mg, Oral, ONCE, Sami Shen MD 
  WARFARIN INFORMATION NOTE (COUMADIN), , Other, QPM, Sami Shen MD 
  diphenhydrAMINE (BENADRYL) capsule 25 mg, 25 mg, Oral, Q8H PRN, Sami Shen MD 
  melatonin tablet 3 mg, 3 mg, Oral, QHS PRN, Elder Keller MD, 3 mg at 02/02/20 0030   dilTIAZem CD (CARDIZEM CD) capsule 180 mg, 180 mg, Oral, DAILY, Yuri Livings L, NP, 180 mg at 02/02/20 4525 
  metoprolol succinate (TOPROL-XL) XL tablet 100 mg, 100 mg, Oral, DAILY, Yuri Livings L, NP, 100 mg at 02/02/20 6130   hydrALAZINE (APRESOLINE) 20 mg/mL injection 10 mg, 10 mg, IntraVENous, Q6H PRN, Ismael Hdz MD 
  albuterol-ipratropium (DUO-NEB) 2.5 MG-0.5 MG/3 ML, 3 mL, Nebulization, Q6H PRN, Maddy Sanchez MD, 3 mL at 01/28/20 2039   ALPRAZolam (XANAX) tablet 0.25 mg, 0.25 mg, Oral, PRN, Maddy Sanchez MD 
  pantoprazole (PROTONIX) tablet 40 mg, 40 mg, Oral, ACB, Maddy Sanchez MD, 40 mg at 02/02/20 2633   hydrALAZINE (APRESOLINE) tablet 100 mg, 100 mg, Oral, TID, Maddy Sanchez MD, 100 mg at 02/02/20 7317   levothyroxine (SYNTHROID) tablet 88 mcg, 88 mcg, Oral, ACB, Michael Campbell MD, 88 mcg at 02/02/20 4295   mirtazapine (REMERON) tablet 45 mg, 45 mg, Oral, QHS, Maddy Sanchez MD, 45 mg at 02/01/20 2033   polyethylene glycol (MIRALAX) packet 17 g, 17 g, Oral, DAILY PRN, Maddy Sanchez MD 
  sodium bicarbonate tablet 650 mg, 650 mg, Oral, TID, Maddy Sanchez MD, 650 mg at 02/02/20 0929 
  sodium chloride (NS) flush 5-40 mL, 5-40 mL, IntraVENous, Q8H, Michael Campbell MD, 10 mL at 02/02/20 0600 
  sodium chloride (NS) flush 5-40 mL, 5-40 mL, IntraVENous, PRN, Maddy Sanchez MD 
  acetaminophen (TYLENOL) solution 650 mg, 650 mg, Oral, Q4H PRN, Maddy Sanchez MD 
  docusate sodium (COLACE) capsule 100 mg, 100 mg, Oral, BID, Maddy Sanchez MD, 100 mg at 02/02/20 1327 ________________________________________________________________________ Care Plan discussed with: 
  Comments Patient y Family  y   
RN y   
Care Manager Consultant Multidiciplinary team rounds were held today with , nursing, pharmacist and clinical coordinator. Patient's plan of care was discussed; medications were reviewed and discharge planning was addressed. ________________________________________________________________________ Total NON critical care TIME:  35   Minutes Total CRITICAL CARE TIME Spent:   Minutes non procedure based Comments >50% of visit spent in counseling and coordination of care    
________________________________________________________________________ Chance Gregory MD  
 
Procedures: see electronic medical records for all procedures/Xrays and details which were not copied into this note but were reviewed prior to creation of Plan. LABS: 
I reviewed today's most current labs and imaging studies. Pertinent labs include: 
Recent Labs 02/02/20 
1255 01/31/20 
7176 WBC 7.4 7.6 HGB 8.1* 8.5* HCT 25.4* 28.4*  
 256 Recent Labs 02/02/20 
2927 02/01/20 
4694 01/31/20 
5190 *  --  144  
K 4.5  --  3.7 *  --  108 CO2 31  --  25 *  --  95 BUN 65*  --  64* CREA 2.41*  --  2.05* CA 8.4*  --  8.3* INR 2.1* 3.2* 4.1* Signed: Chance Gregory MD

## 2020-02-02 NOTE — PROGRESS NOTES
Bedside and Verbal shift change report GIVEN TO ROSSY monahan. Report included the following information SBAR, Kardex, ED Summary, Procedure Summary, Intake/Output, MAR and Recent Results. uable to educate pt d/t confusion. Uneventful shift.

## 2020-02-03 NOTE — PROGRESS NOTES
Hospitalist Progress Note NAME: Wellington Barger :  1935 MRN:  [id number]  
 
[id number] female with past medical history of diastolic congestive heart failure presented to the hospital with shortness of breath and was noted to have acute CHF exacerbation along with supratherapeutic INR from warfarin. During hospitalization, she developed acute kidney injury on Lasix was stopped. Assessment / Plan: 
Acute hypoxic respiratory failure Acute on chronic exacerbation of diastolic heart failure Severe pulmonary HTN History of heart failure preserved ejection fraction around 60% on the most recent echo Presented with fluid overloaded and patchy infiltrates on x-ray with pleural effusion Chronically on torsemide 5 mg daily Continue to hold Lasix due to elevated creatinine. Continue metoprolol. Chest x-ray this morning showed slight worsening of edema but creatinine is going up so will hold Lasix for 1 more day Strict I's and O's, daily weights. Echocardiogram shows EF of 55% Cardiology is following VQ mismatch low probability for PE Consult case management for home O2 
  
Supratherapeutic INR improved -INR peaked at 6.4 and improved 
-Coumadin restarted. INR is 1.7. Recheck in a.m. Pharmacy managing. Mild elevated Troponin due to CKD 
-Troponin peaked at 0.06  
  
  
A. fib Status post pacemaker insertion 2019 Continue warfarin Rate controlled with diltiazem and metoprolol will continue 
 
  
History of diabetes mellitus type 2 Off medications Most recent hemoglobin A1c 5.3 2019 
  
COPD Not in exacerbation Continue home inhalers 
  
Hypertension Continue home medication hydralazine 3 times daily, metoprolol, diltiazem Consider adding IV metoprolol if blood pressure is up, currently blood pressure is controlled 
  
Left lower extremity edema 
-Ultrasound Doppler showed no evidence of DVT 
  
CKD stage IV with worsening of creatinine -Creatinine is up to 2.5 today. Continue to hold Lasix. Recheck BMP in a.m. Poor oral intake 
-Consult speech therapy to evaluate for dysphagia 
-Dietary following Incidental Baker's cyst 
-Outpatient follow-up Updated daughter at bedside today Dispo: Discharge in the next 1 to 2 days if creatinine is better and shortness of breath is resolved 
 
 
  
  
  
Code Status: Full code Surrogate Decision Maker: Her sons Binu Snyder 
  
DVT Prophylaxis:  SCDs due to high INR 
GI Prophylaxis: not indicated 
  
Baseline: Active, lives with her daughter Body mass index is 25.14 kg/m². Subjective: Chief Complaint / Reason for Physician Visit Had episodes of shortness of breath and diaphoresis this morning. Reports that shortness of breath is slightly better. Mild cough. Very poor oral intake Review of Systems: 
Symptom Y/N Comments  Symptom Y/N Comments Fever/Chills    Chest Pain Poor Appetite    Edema Cough    Abdominal Pain Sputum    Joint Pain SOB/HASKINS    Pruritis/Rash Nausea/vomit    Tolerating PT/OT Diarrhea    Tolerating Diet Constipation    Other Could NOT obtain due to:   
 
Objective: VITALS:  
Last 24hrs VS reviewed since prior progress note. Most recent are: 
Patient Vitals for the past 24 hrs: 
 Temp Pulse Resp BP SpO2  
02/03/20 1121 97.6 °F (36.4 °C) 77 17 (!) 131/36 100 % 02/03/20 0824     98 % 02/03/20 0731 98.6 °F (37 °C) 71 (!) 32 170/52 92 % 02/03/20 0725  70   95 % 02/03/20 0715 97.7 °F (36.5 °C) 98  169/63 100 % 02/03/20 0253 98.8 °F (37.1 °C) 71 18 136/41 100 % 02/03/20 0014 97.1 °F (36.2 °C) 88 18 135/54 100 % 02/02/20 2010 97.9 °F (36.6 °C) 88 18 139/51 100 % 02/02/20 1509 97.7 °F (36.5 °C) 73 18 (!) 110/37 100 % Intake/Output Summary (Last 24 hours) at 2/3/2020 1244 Last data filed at 2/3/2020 0975 Gross per 24 hour Intake  Output 100 ml Net -100 ml PHYSICAL EXAM: 
General: no acute distress   
EENT:  EOMI. Anicteric sclerae. MMM Resp:  Bilateral air entry present, crackles are present, no wheezing CV:  Regular  rhythm,  No edema GI:  Soft, Non distended, Non tender.  +Bowel sounds Neurologic:  Alert and awake Psych:   Not anxious nor agitated Skin:  No rashes. No jaundice Reviewed most current lab test results and cultures  YES Reviewed most current radiology test results   YES Review and summation of old records today    NO Reviewed patient's current orders and MAR    YES 
PMH/SH reviewed - no change compared to H&P Current Facility-Administered Medications:  
  WARFARIN INFORMATION NOTE (COUMADIN), , Other, QPM, Katy Shen MD 
  diphenhydrAMINE (BENADRYL) capsule 25 mg, 25 mg, Oral, Q8H PRN, Desean Brush MD, 25 mg at 02/02/20 2224 
  melatonin tablet 3 mg, 3 mg, Oral, QHS PRN, Rock Parish MD, 3 mg at 02/02/20 0030   dilTIAZem CD (CARDIZEM CD) capsule 180 mg, 180 mg, Oral, DAILY, Lizbeth VALDES NP, 180 mg at 02/03/20 2154   metoprolol succinate (TOPROL-XL) XL tablet 100 mg, 100 mg, Oral, DAILY, Lizbeth VALDES NP, 100 mg at 02/03/20 9871   hydrALAZINE (APRESOLINE) 20 mg/mL injection 10 mg, 10 mg, IntraVENous, Q6H PRN, Ismael Paniagua MD 
  albuterol-ipratropium (DUO-NEB) 2.5 MG-0.5 MG/3 ML, 3 mL, Nebulization, Q6H PRN, Pooja Patel MD, 3 mL at 01/28/20 2039   ALPRAZolam (XANAX) tablet 0.25 mg, 0.25 mg, Oral, PRN, Pooja Patel MD 
  pantoprazole (PROTONIX) tablet 40 mg, 40 mg, Oral, ACB, Pooja Patel MD, 40 mg at 02/03/20 1506   hydrALAZINE (APRESOLINE) tablet 100 mg, 100 mg, Oral, TID, Pooja Patel MD, 100 mg at 02/03/20 2312   levothyroxine (SYNTHROID) tablet 88 mcg, 88 mcg, Oral, ACB, Michael Campbell MD, 88 mcg at 02/03/20 0335   mirtazapine (REMERON) tablet 45 mg, 45 mg, Oral, QHS, Pooja Patel MD, 45 mg at 02/02/20 3690   polyethylene glycol (MIRALAX) packet 17 g, 17 g, Oral, DAILY PRN, Angelito Branham MD 
  sodium bicarbonate tablet 650 mg, 650 mg, Oral, TID, Angelito Branham MD, 650 mg at 02/03/20 9909   sodium chloride (NS) flush 5-40 mL, 5-40 mL, IntraVENous, Q8H, Michael Campbell MD, 10 mL at 02/03/20 0609 
  sodium chloride (NS) flush 5-40 mL, 5-40 mL, IntraVENous, PRN, Angelito Branham MD 
  acetaminophen (TYLENOL) solution 650 mg, 650 mg, Oral, Q4H PRN, Angelito Branham MD 
  docusate sodium (COLACE) capsule 100 mg, 100 mg, Oral, BID, Angelito Branham MD, 100 mg at 02/03/20 4026 
________________________________________________________________________ Care Plan discussed with: 
  Comments Patient y Family  y   
RN y   
Care Manager Consultant Multidiciplinary team rounds were held today with , nursing, pharmacist and clinical coordinator. Patient's plan of care was discussed; medications were reviewed and discharge planning was addressed. ________________________________________________________________________ Total NON critical care TIME:  35   Minutes Total CRITICAL CARE TIME Spent:   Minutes non procedure based Comments >50% of visit spent in counseling and coordination of care    
________________________________________________________________________ Alee Loya MD  
 
Procedures: see electronic medical records for all procedures/Xrays and details which were not copied into this note but were reviewed prior to creation of Plan. LABS: 
I reviewed today's most current labs and imaging studies. Pertinent labs include: 
Recent Labs 02/03/20 
3234 02/02/20 
7909 WBC 6.5 7.4 HGB 8.0* 8.1* HCT 26.4* 25.4*  
 234 Recent Labs 02/03/20 
3034 02/02/20 
3643 02/01/20 
2802  146*  --   
K 4.4 4.5  --   
* 112*  --   
CO2 30 31  --   
GLU 81 116*  --   
BUN 67* 65*  --   
CREA 2.58* 2.41*  --   
CA 8.5 8.4*  --   
 INR 1.7* 2.1* 3.2* Signed: Chance Gregory MD

## 2020-02-03 NOTE — PROGRESS NOTES
SURINDER: 
1. Home Health 2. OP f/u appts 3. O2 challenge 24 hrs prior to d/c Chart review completed. Pt is currently open with [facility]. Previous CM was unaware pt was already open with an agency therefore a referral was sent to [facility]. CM canceled referral with [facility]. CM verified with [address] (ph. [phone]) that pt was open to services. [facility] order sent via fax (fx. [phone]). CM requested an O2 challenge be completed today in preparation for potential d/c tomorrow. CM will continue to follow. Eugenio Haq, MSW Care Manager [phone] 
 
 
UPDATE 3:23 PM 
CM spoke with pt's daughter, Criss Westbrook (ph. [phone]), over the phone to discuss d/c planning. Per Criss Westbrook, pt and family would like to switch home health agencies from [facility] to [facility]. CM cancelled [facility] on behalf of pt. CM confirmed with [facility] that they can begin services once pt attends her PCP appt on 2/6/20. CM informed daughter that pt must attend this appt in order to receive [facility] through [facility]. Daughter verbalized understanding. Daughter also stated that the family will provide transport home at d/c. Daughter was made aware of hospital preferred discharge pickup time of 9:00 am. Additionally, O2 challenge completed and sent to [facility] for processing. CM requested a portable tank be delivered this evening.

## 2020-02-03 NOTE — PROGRESS NOTES
Problem: Falls - Risk of 
Goal: *Absence of Falls Description Document Christ Li Fall Risk and appropriate interventions in the flowsheet. Outcome: Progressing Towards Goal 
Note: Fall Risk Interventions: 
Mobility Interventions: Bed/chair exit alarm Mentation Interventions: Adequate sleep, hydration, pain control Medication Interventions: Bed/chair exit alarm Elimination Interventions: Bed/chair exit alarm, Call light in reach History of Falls Interventions: Bed/chair exit alarm, Consult care management for discharge planning, Door open when patient unattended, Evaluate medications/consider consulting pharmacy, Investigate reason for fall, Room close to nurse's station, Utilize gait belt for transfer/ambulation Problem: Pressure Injury - Risk of 
Goal: *Prevention of pressure injury Description Document Rafiq Scale and appropriate interventions in the flowsheet. Outcome: Progressing Towards Goal 
Note: Pressure Injury Interventions: 
Sensory Interventions: Assess changes in LOC Moisture Interventions: Absorbent underpads Activity Interventions: Chair cushion Mobility Interventions: Chair cushion Nutrition Interventions: Document food/fluid/supplement intake Friction and Shear Interventions: Apply protective barrier, creams and emollients

## 2020-02-03 NOTE — PROGRESS NOTES
SPEECH PATHOLOGY BEDSIDE SWALLOW EVALUATION/DISCHARGE Patient: Nikkie Lewis (23 y.o. female) Date: 2/3/2020 Primary Diagnosis: CHF (congestive heart failure) ([address] [I50.9] Precautions:    
 
ASSESSMENT : 
Based on the objective data described below, the patient presents with moderate oral dysphagia and functional pharyngeal swallow. Oral phase characterized by intermittent pushing spoon out anteriorly with acceptance, slow bolus prep with timely posterior propulsion and complete oral clearance. Pharyngeal swallow is suspected to be [place]. Patient drank 8oz Ensure and 4oz applesauce free of s/s aspiration. Frequent belching noted. Skilled acute therapy provided by a speech-language pathologist is not indicated at this time. PLAN : 
Recommendations: 
Continue baseline/home diet of puree/ thin liquids. Straw ok Encourage PO without forcing Encourage supplements Crush meds Discharge Recommendations: None SUBJECTIVE:  
Patient in chair with daughter bedside. OBJECTIVE:  
 
Past Medical History:  
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  
 LIVIA (obstructive sleep apnea)  Paroxysmal atrial fibrillation (HCC)  Pure hypercholesterolemia 9/30/2010 Past Surgical History:  
Procedure Laterality Date  ECHO 2D ADULT  11/2009 LVH, normal LV wall motion and ejection fraction, mild aortic stenosis, moderate aortic regurgitation and mild mitral regurgitation. The ejection fraction was 55-60%.  ECHO 2D ADULT  1/2011 EF 60%, LAE, mild AS, AI, MR, PA low 40s  EVENT MONITOR POST SYMPTOMS  9/2010 Rare PACs, no arrythmia with symptoms  HX CHOLECYSTECTOMY  HX HYSTERECTOMY  LOOP MONITOR  9-10/2011  NSR  
  [place] CARDIOVERSION ELECTIVE ARRHYTHMIA EXTERNAL N/A 12/23/2019 Ep Cardioversion performed by Christian Martinez MD at [facility]  [place] ICAR CATHETER ABLATION ATRIOVENTR NODE FUNCTION N/A 12/23/2019 Ablation Av Node performed by Christian Martinez MD at [facility]  [place] INS NEW/RPLC PRM PACEMAKER W/TRANSV ELTRD VENTR N/A 12/23/2019 Insert Ppm Single Ventricular performed by Christian Martinez MD at [facility]  STRESS TEST MYOVIEW  1/2011  
 no ischemia, EF 63%  US DUPLEX CAROTID BILATERAL  1/2011  
 mild bilat disease Prior Level of Function/Home Situation:  
Home Situation Home Environment: Private residence # Steps to Enter: 4 Wheelchair Ramp: Yes One/Two Story Residence: One story Living Alone: No 
Support Systems: Child(raul), Family member(s) Patient Expects to be Discharged to[de-identified] Unknown Current DME Used/Available at Home: Wheelchair, Walker, rollator, Hospital bed, Commode, bedside, Shower chair(sleeps in recliner) Tub or Shower Type: Shower Diet prior to admission: puree/thin at home Current Diet:  Puree/thin   
Cognitive and Communication Status: 
Neurologic State: Alert, Confused Orientation Level: Oriented to person, Oriented to place Cognition: Follows commands Perception: Appears intact Perseveration: Perseverates during conversation(confusion and needs reassurance) Safety/Judgement: Fall prevention, Decreased insight into deficits Oral Assessment: P.O. Trials: 
Patient Position: (up in chair) Vocal quality prior to P.O.: No impairment Consistency Presented: Thin liquid;Puree How Presented: Straw;SLP-fed/presented;Spoon Bolus Acceptance: No impairment Bolus Formation/Control: Impaired Type of Impairment: Delayed Propulsion: No impairment Oral Residue: None Initiation of Swallow: No impairment Laryngeal Elevation: Functional 
Aspiration Signs/Symptoms: None Pharyngeal Phase Characteristics: No impairment, issues, or problems Oral Phase Severity: Moderate Pharyngeal Phase Severity : No impairment Pain: 
Pain Scale 1: Numeric (0 - 10) Pain Intensity 1: 0 After treatment:  
Patient left in no apparent distress sitting up in chair, Call bell within reach, Nursing notified and Caregiver / family present COMMUNICATION/EDUCATION:  
 
 
The patient's plan of care including recommendations, planned interventions, and recommended diet changes were discussed with: Registered Nurse. Thank you for this referral. 
Ani Swenson, SLP Time Calculation: 19 mins

## 2020-02-03 NOTE — PROGRESS NOTES
Nutrition Assessment: 
 
INTERVENTIONS/RECOMMENDATIONS:  
Continue current diet. Continue Ensure [employer] ASSESSMENT:  
2/3: Chart reviewed, med noted for respiratory failure, supratherapeutic INR, CHF PMH: DM II, COPD, HTN, CKD IV, gastroparesis, dementia, and acquired hypothyroidism. Visited pt at bedside, spoke with family member. Evidence of 2 unopened Ensure Enlive on tray. No po intake recorded in flowsheet, per RN and family member very poor po intake. ~4# wt loss since admission, noted that pt is being diuresed, per I/O's net -2L. Family member declined other ONS options offered, desires to continue current supplements. Family member spoke with physician about appetite stimulant. Diet Order: Cardiac, Puree(2g Na) 
% Eaten:   
Patient Vitals for the past 72 hrs: 
 % Diet Eaten 02/01/20 0720 25 % Pertinent Medications: [x] Reviewed []Other: colace, synthroid, lasix, remeron, protonix, Pertinent Labs: [x]Reviewed  []Other: 
Food Allergies: []None []Other:    
Last BM: 2/1   [x]Active     []Hyperactive  []Hypoactive       [] Absent  BS Skin:    [x] Intact   [] Incision  [] Breakdown   []Edema   []Other: Anthropometrics: Height: 5' (152.4 cm) Weight: 58.4 kg (128 lb 12 oz) IBW (%IBW):   ( ) UBW (%UBW):   (  %) BMI: Body mass index is 25.14 kg/m². This BMI is indicative of: 
[]Underweight   []Normal   [x]Overweight   [] Obesity   [] Extreme Obesity (BMI>40) Last Weight Metrics: 
Weight Loss Metrics 2/3/2020 1/28/2020 1/28/2020 1/28/2020 1/24/2020 1/16/2020 12/23/2019 Today's Wt 128 lb 12 oz - 131 lb - 137 lb 2 oz 133 lb 138 lb BMI - 25.14 kg/m2 - 25.58 kg/m2 26.78 kg/m2 25.97 kg/m2 26.95 kg/m2 Estimated Nutrition Needs (Based on): 1200 Kcals/day(BMR (975) x AF 1.2) , 50 g(-60 g (0.8-1.0 g/kg BW)) Protein Carbohydrate: At Least 130 g/day  Fluids: 1200 mL/day NUTRITION DIAGNOSES:  
Problem:  Inadequate protein-energy intake Etiology: related to dementia Signs/Symptoms: as evidenced by poor po intake/refusing foods Previous Nutrition Dx:  [] Resolved  [] Unresolved           [x] Progressing NUTRITION INTERVENTIONS: 
Meals/Snacks: General/healthful diet, Modify diet/texture/consistency/nutrients   Supplements: Commercial supplement GOAL:  
consume ~50% meals 240 mL ONS next 2-4 days NUTRITION MONITORING AND EVALUATION Food/Nutrient Intake Outcomes: Total energy intake Physical Signs/Symptoms Outcomes: Weight/weight change, Glucose profile Previous Goal Met: 
 [] Met              [x] Progressing Towards Goal              [] Not Progressing Towards Goal  
Previous Recommendations: 
 [x] Implemented          [] Not Implemented          [] Not Applicable LEARNING NEEDS (Diet, Food/Nutrient-Drug Interaction):  
 [x] None Identified 
 [] Identified and Education Provided/Documented 
 [] Identified and Pt declined/was not appropriate Cultural, Sabianism, OR Ethnic Dietary Needs:  
 [x] None Identified 
 [] Identified and Addressed 
 
 [x] Interdisciplinary Care Plan Reviewed/Documented  
 [x] Discharge Planning: 
 [] Participated in Interdisciplinary Rounds NUTRITION RISK:  
 [x] Patient At Nutritional Risk       [] Patient Not At Nutritional Risk Josh Lawrence Pager [phone] Weekend Pager [phone]

## 2020-02-03 NOTE — PROGRESS NOTES
Bedside shift change report GIVEN TO El Diehl RN. Report included the following information SBAR and Kardex. SIGNIFICANT CHANGES DURING SHIFT:   
 
 
CONCERNS TO ADDRESS WITH MD:   
 
 
 
 
[facility] NURSING NOTE Admission Date 1/28/2020 Admission Diagnosis CHF (congestive heart failure) ([facility] [address] [I50.9] Consults IP CONSULT TO PALLIATIVE CARE - PROVIDER 
IP CONSULT TO CARDIOLOGY Cardiac Monitoring [x] Yes [] No  
  
Purposeful Hourly Rounding [x] Yes   
Judd Score Total Score: 4 Judd score 3 or > [x] Bed Alarm [] Avasys [] 1:1 sitter [] Patient refused (Signed refusal form in chart) Rafiq Score Rafiq Score: 15 Rafiq score 14 or < [] PMT consult [] Wound Care consult  
 []  Specialty bed  [] Nutrition consult Influenza Vaccine Received Flu Vaccine for Current Season (usually Sept-March): Yes Oxygen needs? [] Room air Oxygen @  [x]1L    []2L    []3L   []4L    []5L   []6L via NC Chronic home O2 use? [] Yes [x] No 
Perform O2 challenge test and document in progress note using smartphrase (.Homeoxygen) Last bowel movement Last Bowel Movement Date: 02/01/20 Urinary Catheter LDAs Peripheral IV 01/29/20 Left Antecubital (Active) Site Assessment Clean, dry, & intact 2/3/2020  3:12 AM  
Phlebitis Assessment 0 2/3/2020  3:12 AM  
Infiltration Assessment 0 2/3/2020  3:12 AM  
Dressing Status Clean, dry, & intact 2/3/2020  3:12 AM  
Dressing Type Transparent;Tape 2/3/2020  3:12 AM  
Hub Color/Line Status Pink;Flushed 2/3/2020  3:12 AM  
Alcohol Cap Used No 2/1/2020 11:02 AM  
                  
  
Readmission Risk Assessment Tool Score High Risk 39 Total Score 3 Has Seen PCP in Last 6 Months (Yes=3, No=0) 3 Patient Length of Stay (>5 days = 3) 9 IP Visits Last 12 Months (1-3=4, 4=9, >4=11) 5 Pt. Coverage (Medicare=5 , Medicaid, or Self-Pay=4) 16 Charlson Comorbidity Score (Age + Comorbid Conditions) Criteria that do not apply:  
 . Living with Significant Other. Assisted Living. LTAC. SNF. or  
Rehab Expected Length of Stay 4d 2h Actual Length of Stay 6

## 2020-02-03 NOTE — PROGRESS NOTES
Pharmacy Daily Dosing of Warfarin Indication: A. Fib Goal INR: 2 - 3 PTA Warfarin Dose: 2.5 mg Tuesday and 5 mg the rest of the week Concurrent anticoagulants: none Concurrent antiplatelet: none Major Interacting Medications Drugs that may increase INR: none Drugs that may decrease INR: none Conditions that may increase/decrease INR (CHF, C. diff, cirrhosis, thyroid disorder, hypoalbuminemia): on Levothyroxine Labs: 
Recent Labs 02/03/20 
8109 02/02/20 
9503  02/01/20 
9883 INR 1.7* 2.1*  --  3.2* HGB 8.0* 8.1*   < >  --   
 234  --   --   
 < > = values in this interval not displayed. Impression/Plan:  
Will order warfarin 5 mg PO x 1 dose. Daily INR 
CBC w/o diff every other day Pharmacy will follow daily and adjust the dose as appropriate. Thanks Jaron Dial PHARMD 
 
 
[email or web address]/[facility]/Pharmacy/[email or web address]/Warfarin%[email or web address]. pdf

## 2020-02-03 NOTE — PROGRESS NOTES
Problem: Pressure Injury - Risk of 
Goal: *Prevention of pressure injury Description Document Rafiq Scale and appropriate interventions in the flowsheet. Outcome: Progressing Towards Goal 
Note: Pressure Injury Interventions: 
Sensory Interventions: Assess changes in LOC, Avoid rigorous massage over bony prominences, Chair cushion, Check visual cues for pain, Discuss PT/OT consult with provider, Float heels, Keep linens dry and wrinkle-free, Minimize linen layers Moisture Interventions: Absorbent underpads, Apply protective barrier, creams and emollients, Check for incontinence Q2 hours and as needed, Moisture barrier, Minimize layers Activity Interventions: Chair cushion, Increase time out of bed, PT/OT evaluation Mobility Interventions: Chair cushion, Float heels, HOB 30 degrees or less, Pressure redistribution bed/mattress (bed type), Turn and reposition approx. every two hours(pillow and wedges) Nutrition Interventions: Document food/fluid/supplement intake, Offer support with meals,snacks and hydration, Discuss nutritional consult with provider Friction and Shear Interventions: Apply protective barrier, creams and emollients, Feet elevated on foot rest, Foam dressings/transparent film/skin sealants, HOB 30 degrees or less, Lift team/patient mobility team, Transfer aides:transfer board/Debbi lift/ceiling lift

## 2020-02-03 NOTE — PROGRESS NOTES
Problem: Mobility Impaired (Adult and Pediatric) Goal: *Acute Goals and Plan of Care (Insert Text) Description FUNCTIONAL STATUS PRIOR TO ADMISSION: patient ambulates household distances with rollator and supervision. She requires supervision for ADLS. Her daughter assists as needed. HOME SUPPORT PRIOR TO ADMISSION: The patient lived with her daughter who provides 24/7 assist. 
 
Physical Therapy Goals Initiated 1/30/2020 1. Patient will move from supine to sit and sit to supine , scoot up and down and roll side to side in bed with supervision/set-up within 7 day(s). 2.  Patient will transfer from bed to chair and chair to bed with supervision/set-up using the least restrictive device within 7 day(s). 3.  Patient will perform sit to stand with supervision/set-up within 7 day(s). 4.  Patient will ambulate with supervision/set-up for 50 feet with the least restrictive device within 7 day(s). Outcome: Not Met PHYSICAL THERAPY TREATMENT Patient: Jody Padilla (40 y.o. female) Date: 2/3/2020 Diagnosis: [facility] [I50.9] <principal problem not specified> Precautions: Fall, Bed Alarm Chart, physical therapy assessment, plan of care and goals were reviewed. ASSESSMENT Patient continues with skilled PT services and is not progressing towards goals. She was up in chair when PT arrived with family in room. She was very tired from walking in [place] with nursing for O2 challenge earlier. She agreed to exercises and repositioning in chair as she had slide down a bit. Tolerated limited chair exercises with decreased attention to them requiring repeated cues to maintain performance. Stood with min a from chair and sat back down and scooted back in chair. Placed pillows under arms to improve posture when the session ended.   
 
Current Level of Function Impacting Discharge (mobility/balance): min a 
 
 Other factors to consider for discharge: excellent family support with plan to return home under their care PLAN : 
Patient continues to benefit from skilled intervention to address the above impairments. Continue treatment per established plan of care. to address goals. Recommendation for discharge: (in order for the patient to meet his/her long term goals) Physical therapy at least 2 days/week in the home AND ensure assist and/or supervision for safety with ADLs and mobility. This discharge recommendation: 
Has not yet been discussed the attending provider and/or case management IF patient discharges home will need the following DME: patient owns DME required for discharge SUBJECTIVE:  
Patient stated I'm too tired to walk.  OBJECTIVE DATA SUMMARY:  
Critical Behavior: 
Neurologic State: Drowsy, Confused Orientation Level: Disoriented to place, Disoriented to time, Disoriented to situation, Oriented to person Cognition: Decreased attention/concentration, Decreased command following Safety/Judgement: Decreased insight into deficits, Fall prevention Functional Mobility Training: 
Bed Mobility: 
Rolling: (up in chair when PT arrived) Transfers: 
Sit to Stand: Minimum assistance Stand to Sit: Minimum assistance Balance: 
Sitting: Impaired; With support Sitting - Static: Fair (occasional); Supported sitting Sitting - Dynamic: Fair (occasional); Poor (constant support) Standing: Impaired Standing - Static: Poor;Constant support Standing - Dynamic : Poor;Constant support Ambulation/Gait Training: 
  
  
  
  
Gait Description (WDL): (deferred per patient request due to walking earlier/fatigued) Right Side Weight Bearing: Full Left Side Weight Bearing: Full Therapeutic Exercises: Ankle pumps, laq, quad sets. 10 reps each side. Pain Rating: 
None reported. Activity Tolerance: Fair, desaturates with exertion and requires oxygen, and requires frequent rest breaks Please refer to the flowsheet for vital signs taken during this treatment. After treatment patient left in no apparent distress:  
Sitting in chair, Call bell within reach, and Caregiver / family present COMMUNICATION/COLLABORATION:  
The patients plan of care was discussed with: Occupational Therapist, Registered Nurse, and Rehabilitation Attendant Andrew Renae PT Time Calculation: 18 mins

## 2020-02-03 NOTE — PROGRESS NOTES
[address] [facility] Cardiopulmonary Care Interdisciplinary Rounds were held today to discuss patient's plan of care and outcomes. The following members were present: NP/Physician, Pharmacy, Nursing and Case Management. Expected Length of Stay:  4d 2h 
 
PLAN OF CARE:  
Continue current treatment plan, O2 challenge

## 2020-02-03 NOTE — CARDIO/PULMONARY
Cardiac Rehab Note: chart review Pt is an 83 yo admitting diagnosis: acute on chronic exacerbation of diastolic heart failure. CHF BUNDLE   
 
EF 55%  on 1/29/2020 per echo. Smoking history assessed. Patient is a former smoker. Smoking Cessation Program link has not been added to the AVS. Current inpatient diet: DIET PUREED 
DIET NUTRITIONAL SUPPLEMENTS \"Living with Heart Failure\" book with daily weight calendar and s/s of heart failure magnet provided to family of  Avinash Agosto on 2/3/2020. Pt soundly sleeping during session. Educated using teach back method. Instruction given on s/s of CHF, checking weight every am and calling MD if weight is up 2-3 lbs in a day or 5 lbs in a week (or as directed by the physician), fluid/Na restrictions, s/s of worsening CHF and when to call MD. Reviewed activity as tolerated with frequent rest periods as needed, taking medications as prescribed, and the importance of follow up visits with physician. Pts daughter at her bedside. She is the primary caregiver and does not provide salt to her mother in her diet or processed food items. Pt gets weighed and vital signs assessed daily. Pts daughter provides her daily medications. Pt does minimal walking in the home. Sammy Snyder daughter verbalized understanding with questions answered.   Marsha Bernal RN

## 2020-02-03 NOTE — PROGRESS NOTES
Home Oxygen Test 
Date of test: 02/03/2020 Time of test: 1445 Sa02 98 % on room air AT REST. Sa02 88 % on room air DURING AMBULATION. Sa02 88 % on 1 Liters DURING AMBULATION. Sa02 96 % on 2 Liters DURING AMBULATION. Sa02 98 % on 2 Liters AT REST/AFTER AMBULATION.

## 2020-02-04 NOTE — PROGRESS NOTES
Problem: Falls - Risk of 
Goal: *Absence of Falls Description Document Panda Lambert Fall Risk and appropriate interventions in the flowsheet. Outcome: Progressing Towards Goal 
Note: Fall Risk Interventions: 
Mobility Interventions: Bed/chair exit alarm, Patient to call before getting OOB Mentation Interventions: Bed/chair exit alarm Medication Interventions: Bed/chair exit alarm, Patient to call before getting OOB, Teach patient to arise slowly Elimination Interventions: Call light in reach, Bed/chair exit alarm History of Falls Interventions: Bed/chair exit alarm Problem: Patient Education: Go to Patient Education Activity Goal: Patient/Family Education Outcome: Progressing Towards Goal 
  
Problem: Pressure Injury - Risk of 
Goal: *Prevention of pressure injury Description Document Rafiq Scale and appropriate interventions in the flowsheet. Outcome: Progressing Towards Goal 
Note: Pressure Injury Interventions: 
Sensory Interventions: Assess changes in LOC Moisture Interventions: Absorbent underpads Activity Interventions: Chair cushion, Increase time out of bed Mobility Interventions: Float heels, PT/OT evaluation Nutrition Interventions: Document food/fluid/supplement intake, Offer support with meals,snacks and hydration Friction and Shear Interventions: Feet elevated on foot rest 
 
  
 
 
 
  
Problem: Breathing Pattern - Ineffective Goal: *Absence of hypoxia Outcome: Progressing Towards Goal 
Goal: *Use of effective breathing techniques Outcome: Progressing Towards Goal

## 2020-02-04 NOTE — PROGRESS NOTES
Pharmacy Daily Dosing of Warfarin Indication: A. Fib Goal INR: 2 - 3 PTA Warfarin Dose: 5 mg Mon/Wed/Thur/Sat and 2.5 mg on Tue/Fri/Sun  
 
Concurrent anticoagulants: none Concurrent antiplatelet: none Major Interacting Medications Drugs that may increase INR: none Drugs that may decrease INR: none Conditions that may increase/decrease INR (CHF, C. diff, cirrhosis, thyroid disorder, hypoalbuminemia): on Levothyroxine Labs: 
Recent Labs 02/04/20 
1324 02/03/20 
0238 02/02/20 
4231 INR 1.9* 1.7* 2.1* HGB  --  8.0* 8.1* PLT  --  251 234 Impression/Plan:  
Will order warfarin 3 mg PO x 1 dose. Daily INR 
CBC w/o diff every other day Pharmacy will follow daily and adjust the dose as appropriate. Thanks Renny Cantor, PHARMD 
 
 
[email or web address]/Pharmacy/[email or web address]/Warfarin%20Dosing%20Protocol. pdf

## 2020-02-04 NOTE — INTERDISCIPLINARY ROUNDS
[facility] INTERDISCIPLINARY ROUNDS Cardiopulmonary Care Interdisciplinary Rounds were held today to discuss patient's plan of care and outcomes. The following members were present: NP/Physician, Pharmacy, Nursing and Case Management. Expected Length of Stay:  4d 2h 
 
PLAN OF CARE:  
Continue current treatment plan

## 2020-02-04 NOTE — PROGRESS NOTES
Problem: Falls - Risk of 
Goal: *Absence of Falls Description Document Uriah Mariejen Fall Risk and appropriate interventions in the flowsheet. Outcome: Progressing Towards Goal 
Note: Fall Risk Interventions: 
Mobility Interventions: Bed/chair exit alarm, Patient to call before getting OOB Mentation Interventions: Bed/chair exit alarm, Eyeglasses and hearing aids Medication Interventions: Bed/chair exit alarm, Patient to call before getting OOB, Teach patient to arise slowly Elimination Interventions: Bed/chair exit alarm, Call light in reach, Patient to call for help with toileting needs History of Falls Interventions: Bed/chair exit alarm, Investigate reason for fall, Room close to nurse's station Problem: Patient Education: Go to Patient Education Activity Goal: Patient/Family Education Outcome: Progressing Towards Goal 
  
Problem: Pressure Injury - Risk of 
Goal: *Prevention of pressure injury Description Document Rafiq Scale and appropriate interventions in the flowsheet. Outcome: Progressing Towards Goal 
Note: Pressure Injury Interventions: 
Sensory Interventions: Assess need for specialty bed, Check visual cues for pain, Discuss PT/OT consult with provider, Keep linens dry and wrinkle-free Moisture Interventions: Absorbent underpads, Assess need for specialty bed, Check for incontinence Q2 hours and as needed Activity Interventions: Chair cushion, Increase time out of bed, Pressure redistribution bed/mattress(bed type) Mobility Interventions: Chair cushion, HOB 30 degrees or less, PT/OT evaluation Nutrition Interventions: Document food/fluid/supplement intake Friction and Shear Interventions: Apply protective barrier, creams and emollients, Lift sheet, Lift team/patient mobility team 
 
  
 
 
 
  
Problem: Patient Education: Go to Patient Education Activity Goal: Patient/Family Education Outcome: Progressing Towards Goal 
  
Problem: Breathing Pattern - Ineffective Goal: *Absence of hypoxia Outcome: Progressing Towards Goal 
Goal: *Use of effective breathing techniques Outcome: Progressing Towards Goal 
Goal: *PALLIATIVE CARE:  Alleviation of Dyspnea Outcome: Progressing Towards Goal 
  
Problem: Patient Education: Go to Patient Education Activity Goal: Patient/Family Education Outcome: Progressing Towards Goal 
  
Problem: Patient Education: Go to Patient Education Activity Goal: Patient/Family Education Outcome: Progressing Towards Goal 
  
Problem: Heart Failure: Day 4 Goal: Off Pathway (Use only if patient is Off Pathway) Outcome: Progressing Towards Goal 
Goal: Activity/Safety Outcome: Progressing Towards Goal 
Goal: Diagnostic Test/Procedures Outcome: Progressing Towards Goal 
Goal: Nutrition/Diet Outcome: Progressing Towards Goal 
Goal: Discharge Planning Outcome: Progressing Towards Goal 
Goal: Medications Outcome: Progressing Towards Goal 
Goal: Respiratory Outcome: Progressing Towards Goal 
Goal: Treatments/Interventions/Procedures Outcome: Progressing Towards Goal 
Goal: Psychosocial 
Outcome: Progressing Towards Goal 
Goal: *Oxygen saturation within defined limits Outcome: Progressing Towards Goal 
Goal: *Hemodynamically stable Outcome: Progressing Towards Goal 
Goal: *Optimal pain control at patient's stated goal 
Outcome: Progressing Towards Goal 
Goal: *Anxiety reduced or absent Outcome: Progressing Towards Goal 
Goal: *Demonstrates progressive activity Outcome: Progressing Towards Goal 
  
Problem: Heart Failure: Day 5 Goal: Off Pathway (Use only if patient is Off Pathway) Outcome: Progressing Towards Goal 
Goal: Activity/Safety Outcome: Progressing Towards Goal 
Goal: Diagnostic Test/Procedures Outcome: Progressing Towards Goal 
Goal: Nutrition/Diet Outcome: Progressing Towards Goal 
Goal: Discharge Planning Outcome: Progressing Towards Goal 
Goal: Medications Outcome: Progressing Towards Goal 
Goal: Respiratory Outcome: Progressing Towards Goal 
Goal: Treatments/Interventions/Procedures Outcome: Progressing Towards Goal 
Goal: Psychosocial 
Outcome: Progressing Towards Goal 
  
Problem: Heart Failure: Discharge Outcomes Goal: *Demonstrates ability to perform prescribed activity without shortness of breath or discomfort Outcome: Progressing Towards Goal 
Goal: *Left ventricular function assessment completed prior to or during stay, or planned for post-discharge Outcome: Progressing Towards Goal 
Goal: *ACEI prescribed if LVEF less than 40% and no contraindications or ARB prescribed Outcome: Progressing Towards Goal 
Goal: *Verbalizes understanding and describes prescribed diet Outcome: Progressing Towards Goal 
Goal: *Verbalizes understanding/describes prescribed medications Outcome: Progressing Towards Goal 
Goal: *Describes available resources and support systems Description 
(eg: Home Health, Palliative Care, Advanced Medical Directive) Outcome: Progressing Towards Goal 
Goal: *Describes smoking cessation resources Outcome: Progressing Towards Goal 
Goal: *Understands and describes signs and symptoms to report to providers(Stroke Metric) Outcome: Progressing Towards Goal 
Goal: *Describes/verbalizes understanding of follow-up/return appt Description 
(eg: to physicians, diabetes treatment coordinator, and other resources Outcome: Progressing Towards Goal 
Goal: *Describes importance of continuing daily weights and changes to report to physician Outcome: Progressing Towards Goal

## 2020-02-04 NOTE — PROGRESS NOTES
Hospitalist Progress Note NAME: Rocío Horton :  1935 MRN:  [id number]  
 
89-ACSD-ORB female with past medical history of diastolic congestive heart failure presented to the hospital with shortness of breath and was noted to have acute CHF exacerbation along with supratherapeutic INR from warfarin. During hospitalization, she developed acute kidney injury on Lasix was stopped. Assessment / Plan: 
  
Acute hypoxic respiratory failure Acute on chronic exacerbation of diastolic heart failure Severe pulmonary HTN History of heart failure preserved ejection fraction around 60% on the most recent echo Presented with fluid overloaded and patchy infiltrates on x-ray with pleural effusion Chronically on torsemide 5 mg daily: consult Continue to hold Lasix due to elevated creatinine. Continue metoprolol. Chest x-ray this morning showed slight worsening of edema but creatinine is going up so will hold Lasix for 1 more day Strict I's and O's, daily weights. Echocardiogram shows EF of 55% Cardiology is following VQ mismatch low probability for PE Consult case management for home O2 
  
Supratherapeutic INR improved -INR peaked at 6.4 and improved 
-Coumadin restarted. INR is 1.7. Recheck in a.m. Pharmacy managing. Mild elevated Troponin due to CKD 
-Troponin peaked at 0.06  
  
  
A. fib Status post pacemaker insertion 2019 Continue warfarin Rate controlled with diltiazem and metoprolol will continue 
 
  
History of diabetes mellitus type 2 Off medications Most recent hemoglobin A1c 5.3 2019 
  
COPD Not in exacerbation Continue home inhalers 
  
Hypertension Continue home medication hydralazine 3 times daily, metoprolol, diltiazem Consider adding IV metoprolol if blood pressure is up, currently blood pressure is controlled 
  
Left lower extremity edema 
-Ultrasound Doppler showed no evidence of DVT 
  
CKD stage IV with worsening of creatinine -Creatinine is up to 2.5 today. Continue to hold Lasix. Recheck BMP in a.m. Poor oral intake 
-Consult speech therapy to evaluate for dysphagia 
-Dietary following Incidental Baker's cyst 
-Outpatient follow-up Updated daughter at bedside today 2.4.2020 discharge tomorrow when creatinine stable, will check BMP tomorrow am  
 
 
  
  
  
Code Status: Full code Surrogate Decision Maker: Her sons Murphy Snyder 
  
DVT Prophylaxis:  SCDs due to high INR 
GI Prophylaxis: not indicated 
  
Baseline: Active, lives with her daughter Body mass index is 25.32 kg/m². Subjective: Chief Complaint / Reason for Physician Visit 
 no discomfort Review of Systems: 
Symptom Y/N Comments  Symptom Y/N Comments Fever/Chills    Chest Pain Poor Appetite    Edema Cough    Abdominal Pain Sputum    Joint Pain SOB/HASKINS    Pruritis/Rash Nausea/vomit    Tolerating PT/OT Diarrhea    Tolerating Diet Constipation    Other Could NOT obtain due to:   
 
Objective: VITALS:  
Last 24hrs VS reviewed since prior progress note. Most recent are: 
Patient Vitals for the past 24 hrs: 
 Temp Pulse Resp BP SpO2  
02/04/20 0740 97.8 °F (36.6 °C) 77 18 (!) 149/34 100 % 02/04/20 0350 98.2 °F (36.8 °C) 78 17 (!) 139/35 100 % 02/04/20 0036 98.2 °F (36.8 °C) 82 17 (!) 118/33 96 % 02/03/20 2209    112/52   
02/03/20 2011 97.8 °F (36.6 °C) 88 17 (!) 122/39 100 % 02/03/20 1557    134/45   
02/03/20 1121 97.6 °F (36.4 °C) 77 17 (!) 131/36 100 % Intake/Output Summary (Last 24 hours) at 2/4/2020 1119 Last data filed at 2/4/2020 0830 Gross per 24 hour Intake 150 ml Output  Net 150 ml PHYSICAL EXAM: 
General: no acute distress   
EENT:  EOMI. Anicteric sclerae. MMM Resp:  Bilateral air entry present, crackles are present, no wheezing CV:  Regular  rhythm,  No edema GI:  Soft, Non distended, Non tender.  +Bowel sounds Neurologic:  Alert and awake Psych:   Not anxious nor agitated Skin:  No rashes. No jaundice Reviewed most current lab test results and cultures  YES Reviewed most current radiology test results   YES Review and summation of old records today    NO Reviewed patient's current orders and MAR    YES 
PMH/SH reviewed - no change compared to H&P Current Facility-Administered Medications:  
  WARFARIN INFORMATION NOTE (COUMADIN), , Other, QPM, Xavier Shen MD 
  diphenhydrAMINE (BENADRYL) capsule 25 mg, 25 mg, Oral, Q8H PRN, Desean Ddoge MD, 25 mg at 02/02/20 2224 
  melatonin tablet 3 mg, 3 mg, Oral, QHS PRN, Varinder Santos MD, 3 mg at 02/02/20 0030   dilTIAZem CD (CARDIZEM CD) capsule 180 mg, 180 mg, Oral, DAILY, [place] Blend L, NP, 180 mg at 02/04/20 0835 
  metoprolol succinate (TOPROL-XL) XL tablet 100 mg, 100 mg, Oral, DAILY, [place] Blend L, NP, 100 mg at 02/04/20 8007   hydrALAZINE (APRESOLINE) 20 mg/mL injection 10 mg, 10 mg, IntraVENous, Q6H PRN, Ismael Jeong MD 
  albuterol-ipratropium (DUO-NEB) 2.5 MG-0.5 MG/3 ML, 3 mL, Nebulization, Q6H PRN, Lindsey Gay MD, 3 mL at 01/28/20 2039   ALPRAZolam (XANAX) tablet 0.25 mg, 0.25 mg, Oral, PRN, Lindsey Gay MD 
  pantoprazole (PROTONIX) tablet 40 mg, 40 mg, Oral, ACB, Lindsey Gya MD, 40 mg at 02/04/20 8815   hydrALAZINE (APRESOLINE) tablet 100 mg, 100 mg, Oral, TID, Lindsey Gay MD, 100 mg at 02/04/20 6216   levothyroxine (SYNTHROID) tablet 88 mcg, 88 mcg, Oral, ACB, Lindsey Gay MD, 88 mcg at 02/04/20 7518   mirtazapine (REMERON) tablet 45 mg, 45 mg, Oral, QHS, Lindsey Gay MD, 45 mg at 02/03/20 2211   polyethylene glycol (MIRALAX) packet 17 g, 17 g, Oral, DAILY PRN, Lindsey Gay MD 
  sodium bicarbonate tablet 650 mg, 650 mg, Oral, TID, Lindsey Gay MD, 650 mg at 02/04/20 8686   sodium chloride (NS) flush 5-40 mL, 5-40 mL, IntraVENous, Q8H, Jeremie Mendoza MD, 10 mL at 02/04/20 8134   sodium chloride (NS) flush 5-40 mL, 5-40 mL, IntraVENous, PRN, Jeremie Mendoza MD 
  acetaminophen (TYLENOL) solution 650 mg, 650 mg, Oral, Q4H PRN, Jeremie Mendoza MD 
  docusate sodium (COLACE) capsule 100 mg, 100 mg, Oral, BID, Jeremie Mendoza MD, 100 mg at 02/04/20 0835 
________________________________________________________________________ Care Plan discussed with: 
  Comments Patient y Family  y   
RN y   
Care Manager Consultant Multidiciplinary team rounds were held today with , nursing, pharmacist and clinical coordinator. Patient's plan of care was discussed; medications were reviewed and discharge planning was addressed. ________________________________________________________________________ Total NON critical care TIME:  35   Minutes Total CRITICAL CARE TIME Spent:   Minutes non procedure based Comments >50% of visit spent in counseling and coordination of care    
________________________________________________________________________ Daxa Quiles MD  
 
Procedures: see electronic medical records for all procedures/Xrays and details which were not copied into this note but were reviewed prior to creation of Plan. LABS: 
I reviewed today's most current labs and imaging studies. Pertinent labs include: 
Recent Labs 02/03/20 
3704 02/02/20 
8540 WBC 6.5 7.4 HGB 8.0* 8.1* HCT 26.4* 25.4*  
 234 Recent Labs 02/03/20 
9681 02/02/20 
5914  146*  
K 4.4 4.5  
* 112* CO2 30 31 GLU 81 116* BUN 67* 65* CREA 2.58* 2.41* CA 8.5 8.4* INR 1.7* 2.1* Signed: Daxa Quiles MD

## 2020-02-04 NOTE — PROGRESS NOTES
Bedside shift change report GIVEN TO Alejandra Culver RN. Report included the following information SBAR and Kardex. SIGNIFICANT CHANGES DURING SHIFT:  None. CONCERNS TO ADDRESS WITH MD:  None. 0900 Radha Gagnon NURSING NOTE Admission Date 1/28/2020 Admission Diagnosis CHF (congestive heart failure) ([facility] [I50.9] Consults IP CONSULT TO PALLIATIVE CARE - PROVIDER 
IP CONSULT TO CARDIOLOGY Cardiac Monitoring [x] Yes [] No  
  
Purposeful Hourly Rounding [x] Yes   
Judd Score Total Score: 4 Judd score 3 or > [x] Bed Alarm [] Avasys [] 1:1 sitter [] Patient refused (Signed refusal form in chart) Rafiq Score Rafiq Score: 16 Rafiq score 14 or < [x] PMT consult [x] Wound Care consult  
 []  Specialty bed  [] Nutrition consult Influenza Vaccine Received Flu Vaccine for Current Season (usually Sept-March): Yes Oxygen needs? [] Room air Oxygen @  []1L    []2L    []3L   [x]4L    []5L   []6L via NC Chronic home O2 use? [x] Yes [] No 
Perform O2 challenge test and document in progress note using smartph3CLogice (.Homeoxygen) Last bowel movement Last Bowel Movement Date: 02/01/20 Urinary Catheter LDAs Peripheral IV 02/04/20 Anterior;Proximal;Right Forearm (Active) Site Assessment Clean, dry, & intact 2/4/2020  4:11 AM  
Phlebitis Assessment 0 2/4/2020  4:11 AM  
Infiltration Assessment 0 2/4/2020  4:11 AM  
Dressing Status Clean, dry, & intact 2/4/2020  4:11 AM  
Dressing Type Transparent;Tape 2/4/2020  4:11 AM  
Hub Color/Line Status Blue;Flushed 2/4/2020  4:11 AM  
      
External Female Catheter 02/03/20 (Active) Site Assessment Clean, dry, & intact 2/4/2020  4:11 AM  
Repositioned Yes 2/4/2020  4:11 AM  
Perineal Care Yes 2/4/2020  4:11 AM  
Wick Changed Yes 2/4/2020  4:11 AM  
Suction Canister/Tubing Changed No 2/4/2020  4:11 AM  
            
  
Readmission Risk Assessment Tool Score High Risk 39 Total Score 3 Has Seen PCP in Last 6 Months (Yes=3, No=0) 3 Patient Length of Stay (>5 days = 3) 9 IP Visits Last 12 Months (1-3=4, 4=9, >4=11) 5 Pt. Coverage (Medicare=5 , Medicaid, or Self-Pay=4) 16 Charlson Comorbidity Score (Age + Comorbid Conditions) Criteria that do not apply:  
 . Living with Significant Other. Assisted Living. LTAC. SNF. or  
Rehab Expected Length of Stay 4d 2h Actual Length of Stay 7

## 2020-02-05 NOTE — PROGRESS NOTES
Problem: Self Care Deficits Care Plan (Adult) Goal: *Acute Goals and Plan of Care (Insert Text) Description FUNCTIONAL STATUS PRIOR TO ADMISSION: Lives with daughter whom is a CNA and is with pt 24/7, per daughter pt tends to state she cannot do things and performs better by being told what she is going to do, was not on O2, ambulated with one assist CGA with RW, gets fatigued and gets assist with ADLS as needed, can perform UB ADLs on her own and LB ADLS assist needed at times, wears depends, uses wheelchair outside of the [facility] HOME SUPPORT PRIOR TO ADMISSION: The patient lived with daughter to provide assist. 
 
Occupational Therapy Goals: 
Initiated 1/30/2020 1. Patient will perform grooming standing with supervision/set-up within 7 days. 2. Patient will perform lower body dressing with supervision/set-up within 7 days. 3. Patient will perform toileting with supervision/set-up within 7 days. 4. Patient will transfer from toilet with supervision/set-up using the least restrictive device and appropriate durable medical equipment within 7 days. Outcome: Progressing Towards Goal 
 
OCCUPATIONAL THERAPY TREATMENT Patient: Layne Good (12 y.o. female) Date: 2/5/2020 Diagnosis: [facility]congest[facility] [I50.9] <principal problem not specified> Precautions: Fall, Bed Alarm Chart, occupational therapy assessment, plan of care, and goals were reviewed. ASSESSMENT Patient continues to be resistant to participation, saying no when asked or told to do any self care or mobility related activities. Her daughter was present and assisted with providing patient with strong encouragement to participate in OT. Overall she was min A for standing grooming and was supervision/setup for bladder hygiene, requiring cueing for attention, sequencing and problem solving.  In regards to functional mobility she was min A for transfers and ambulation via hand hold assist. Patient pushing posteriorly at times when in standing or ambulating 2/2 being resistant to activity. At this point the patient is slowly benefiting from acute OT and she will need HH therapies with continued assistance/supervision of family after discharge. PLAN : 
Patient continues to benefit from skilled intervention to address the above impairments. Continue treatment per established plan of care. to address goals. Recommendation for discharge: (in order for the patient to meet his/her long term goals) Occupational therapy at least 2 days/week in the home AND ensure assist and/or supervision for safety with ADLs and functional mobility OBJECTIVE DATA SUMMARY:  
Cognitive/Behavioral Status: 
Neurologic State: Alert;Confused Orientation Level: Oriented to person;Disoriented to place; Disoriented to situation;Disoriented to time Cognition: Decreased attention/concentration;Decreased command following; Impaired decision making;Poor safety awareness;Memory loss Safety/Judgement: Decreased awareness of environment;Decreased awareness of need for safety; Lack of insight into deficits Functional Mobility and Transfers for ADLs: 
Bed Mobility: 
Scooting: Supervision Patient presented up in [facility] Transfers: 
  
Functional Transfers Bathroom Mobility: Minimum assistance(ambulating hand hold) Toilet Transfer : Minimum assistance(from [facility]) Cues: Tactile cues provided;Verbal cues provided;Visual cues provided Balance: 
Sitting: Intact Standing: Impaired Standing - Static: Fair Standing - Dynamic : Fair ADL Intervention: 
Grooming Washing Hands: Minimum assistance Brushing Teeth: Minimum assistance Cues: Tactile cues provided;Verbal cues provided;Visual cues provided(for sequencing and attention. ) Toileting Toileting Assistance: [employer]. ) Cognitive Retraining Problem Solving: Identifying the problem;General alternative solution Attention to Task: Distractibility; Single task Following Commands: Follows one step commands/directions Safety/Judgement: Decreased awareness of environment;Decreased awareness of need for safety; Lack of insight into deficits Cues: Tactile cues provided;Verbal cues provided;Visual cues provided Activity Tolerance:  
Fair Please refer to the flowsheet for vital signs taken during this treatment. After treatment patient left in no apparent distress:  
Sitting in chair, Call bell within reach, Bed / chair alarm activated, and Caregiver / family present COMMUNICATION/COLLABORATION:  
The patients plan of care was discussed with: Physical Therapist and Registered Nurse Ean Martínez, OTR/L Time Calculation: 27 mins

## 2020-02-05 NOTE — PROGRESS NOTES
PCP SURINDER appt scheduled with Dr. Guzman Hernandez on 2/12/2020 at 2:40pm. Appt added to AVS. Nomi Ace CM Specialist

## 2020-02-05 NOTE — PROGRESS NOTES
Bedside shift change report GIVEN TO Pato Paz RN. Report included the following information SBAR and Kardex. SIGNIFICANT CHANGES DURING SHIFT:   
 
Called by telemetry for abnormal cardiac rhythm changes. Patient was asymptomatic and vital signs were stable. 12 lead EKG obtained and transmitted. Paged telehospitalist and received orders for troponin to be drawn. CONCERNS TO ADDRESS WITH MD:  None. [facility] NURSING NOTE Admission Date 1/28/2020 Admission Diagnosis CHF (congestive heart failure) ([address] [I50.9] Consults IP CONSULT TO PALLIATIVE CARE - PROVIDER 
IP CONSULT TO CARDIOLOGY Cardiac Monitoring [x] Yes [] No  
  
Purposeful Hourly Rounding [x] Yes   
Judd Score Total Score: 4 Judd score 3 or > [x] Bed Alarm [] Avasys [] 1:1 sitter [] Patient refused (Signed refusal form in chart) Rafiq Score Rafiq Score: 16 Rafiq score 14 or < [x] PMT consult [x] Wound Care consult  
 []  Specialty bed  [] Nutrition consult Influenza Vaccine Received Flu Vaccine for Current Season (usually Sept-March): Yes Oxygen needs? [] Room air Oxygen @  []1L    [x]2L    []3L   []4L    []5L   []6L via NC Chronic home O2 use? [x] Yes [] No 
Perform O2 challenge test and document in progress note using [employer] (.Home[employer]) Last bowel movement Last Bowel Movement Date: 02/04/20 Urinary Catheter LDAs Peripheral IV 02/04/20 Anterior;Proximal;Right Forearm (Active) Site Assessment Clean, dry, & intact 2/4/2020  7:52 PM  
Phlebitis Assessment 0 2/4/2020  7:52 PM  
Infiltration Assessment 0 2/4/2020  7:52 PM  
Dressing Status Clean, dry, & intact 2/4/2020  7:52 PM  
Dressing Type Transparent;Tape 2/4/2020  7:52 PM  
Hub Color/Line Status Flushed;Blue 2/4/2020  7:52 PM  
      
External Female Catheter 02/03/20 (Active) Site Assessment Other (Comment) 2/4/2020  7:52 PM  
Repositioned Yes 2/4/2020  4:11 AM  
Perineal Care Yes 2/4/2020  4:11 AM  
 [place] Changed Yes 2/4/2020  4:11 AM  
Suction Canister/Tubing Changed No 2/4/2020  4:11 AM  
            
  
Readmission Risk Assessment Tool Score High Risk 39 Total Score 3 Has Seen PCP in Last 6 Months (Yes=3, No=0) 3 Patient Length of Stay (>5 days = 3) 9 IP Visits Last 12 Months (1-3=4, 4=9, >4=11) 5 Pt. Coverage (Medicare=5 , Medicaid, or Self-Pay=4) 16 Charlson Comorbidity Score (Age + Comorbid Conditions) Criteria that do not apply:  
 . Living with Significant Other. Assisted Living. LTAC. SNF. or  
Rehab Expected Length of Stay 4d 2h Actual Length of Stay 7

## 2020-02-05 NOTE — PROGRESS NOTES
Problem: Falls - Risk of 
Goal: *Absence of Falls Description Document Durenda Dennis Fall Risk and appropriate interventions in the flowsheet. Outcome: Progressing Towards Goal 
Note: Fall Risk Interventions: 
Mobility Interventions: Bed/chair exit alarm, Patient to call before getting OOB Mentation Interventions: Adequate sleep, hydration, pain control, Bed/chair exit alarm Medication Interventions: Bed/chair exit alarm, Patient to call before getting OOB, Teach patient to arise slowly Elimination Interventions: Bed/chair exit alarm, Call light in reach History of Falls Interventions: Bed/chair exit alarm Problem: Pressure Injury - Risk of 
Goal: *Prevention of pressure injury Description Document Rafiq Scale and appropriate interventions in the flowsheet. Outcome: Progressing Towards Goal 
Note: Pressure Injury Interventions: 
Sensory Interventions: Assess changes in LOC Moisture Interventions: Absorbent underpads Activity Interventions: Increase time out of bed, PT/OT evaluation Mobility Interventions: Float heels, PT/OT evaluation Nutrition Interventions: Document food/fluid/supplement intake, Offer support with meals,snacks and hydration Friction and Shear Interventions: Feet elevated on foot rest 
 
  
 
 
 
  
Problem: Breathing Pattern - Ineffective Goal: *Absence of hypoxia Outcome: Progressing Towards Goal 
Goal: *Use of effective breathing techniques Outcome: Progressing Towards Goal

## 2020-02-05 NOTE — PROGRESS NOTES
Hospitalist Progress Note NAME: Carey  :  1935 MRN:  [id number]  
 
[id number] female with past medical history of diastolic congestive heart failure presented to the hospital with shortness of breath and was noted to have acute CHF exacerbation along with supratherapeutic INR from warfarin. During hospitalization, she developed acute kidney injury on Lasix was stopped. Assessment / Plan: 
  
Acute hypoxic respiratory failure Acute on chronic exacerbation of diastolic heart failure Severe pulmonary HTN History of heart failure preserved ejection fraction around 60% on the most recent echo Presented with fluid overloaded and patchy infiltrates on x-ray with pleural effusion Chronically on torsemide 5 mg daily: consult Continue to hold Lasix due to elevated creatinine. Continue metoprolol. Chest x-ray this morning showed slight worsening of edema but creatinine is going up so will hold Lasix for 1 more day Strict I's and O's, daily weights. Echocardiogram shows EF of 55% Cardiology is following VQ mismatch low probability for PE Consult case management for home O2 
  
Supratherapeutic INR improved -INR peaked at 6.4 and improved 
-Coumadin restarted. INR is 1.7. Recheck in a.m. Pharmacy managing. Mild elevated Troponin due to CKD 
-Troponin peaked at 0.06  
  
  
A. fib Status post pacemaker insertion 2019 Continue warfarin Rate controlled with diltiazem and metoprolol will continue 
 
  
History of diabetes mellitus type 2 Off medications Most recent hemoglobin A1c 5.3 2019 
  
COPD Not in exacerbation Continue home inhalers 
  
Hypertension Continue home medication hydralazine 3 times daily, metoprolol, diltiazem Consider adding IV metoprolol if blood pressure is up, currently blood pressure is controlled 
  
Left lower extremity edema 
-Ultrasound Doppler showed no evidence of DVT 
  
CKD stage IV with worsening of creatinine nephrology following ALB given, will check BMP tomorrow, consider us if not improved Poor oral intake 
-Consult speech therapy to evaluate for dysphagia 
-Dietary following Incidental Baker's cyst 
-Outpatient follow-up Updated daughter at bedside today 2.4.2020 discharge tomorrow when creatinine stable, will check BMP tomorrow am  
 
 
  
  
  
Code Status: Full code Surrogate Decision Maker: Her sons Gama Snyder 
  
DVT Prophylaxis:  SCDs due to high INR 
GI Prophylaxis: not indicated 
  
Baseline: Active, lives with her daughter Body mass index is 25.35 kg/m². Subjective: Chief Complaint / Reason for Physician Visit Resting, no chest pain or shortness of breath Review of Systems: 
Symptom Y/N Comments  Symptom Y/N Comments Fever/Chills    Chest Pain Poor Appetite    Edema Cough    Abdominal Pain Sputum    Joint Pain SOB/HASKINS    Pruritis/Rash Nausea/vomit    Tolerating PT/OT Diarrhea    Tolerating Diet Constipation    Other Could NOT obtain due to:   
 
Objective: VITALS:  
Last 24hrs VS reviewed since prior progress note. Most recent are: 
Patient Vitals for the past 24 hrs: 
 Temp Pulse Resp BP SpO2  
02/05/20 1218 97.4 °F (36.3 °C) 72 20 117/76 100 % 02/05/20 0828 98.3 °F (36.8 °C) 82 20 140/40 100 % 02/05/20 0319 98.1 °F (36.7 °C) 81 20 (!) 121/37 100 % 02/04/20 2322 98.1 °F (36.7 °C) 76 20 (!) 114/30 100 % 02/04/20 2145    111/42   
02/04/20 2042 98.4 °F (36.9 °C) 84 20 (!) 113/38 100 % 02/04/20 1518 98.3 °F (36.8 °C) 81 20 126/42 100 % Intake/Output Summary (Last 24 hours) at 2/5/2020 1245 Last data filed at 2/5/2020 1078 Gross per 24 hour Intake 680 ml Output 200 ml Net 480 ml PHYSICAL EXAM: 
General: no acute distress   
EENT:  EOMI. Anicteric sclerae. MMM Resp:  Bilateral air entry present, crackles are present, no wheezing CV:  Regular  rhythm,  No edema GI:  Soft, Non distended, Non tender.  +Bowel sounds Neurologic:  Alert and awake Psych:   Not anxious nor agitated Skin:  No rashes. No jaundice Reviewed most current lab test results and cultures  YES Reviewed most current radiology test results   YES Review and summation of old records today    NO Reviewed patient's current orders and MAR    YES 
PMH/SH reviewed - no change compared to H&P Current Facility-Administered Medications:  
  hydrALAZINE (APRESOLINE) tablet 50 mg, 50 mg, Oral, TID, Eugene Escoto MD 
  albumin human 25% (BUMINATE) solution 25 g, 25 g, IntraVENous, BID, Eugene Escoto MD 
  WARFARIN INFORMATION NOTE (COUMADIN), , Other, QPM, Myrtle Shen MD 
  diphenhydrAMINE (BENADRYL) capsule 25 mg, 25 mg, Oral, Q8H PRN, Delmy Hughes MD, 25 mg at 02/04/20 2305   melatonin tablet 3 mg, 3 mg, Oral, QHS PRN, Juanito Stevens MD, 3 mg at 02/02/20 0030   dilTIAZem CD (CARDIZEM CD) capsule 180 mg, 180 mg, Oral, DAILY, Jose Pottier L, NP, 180 mg at 02/05/20 8956 
  metoprolol succinate (TOPROL-XL) XL tablet 100 mg, 100 mg, Oral, DAILY, [place] Pottier L, NP, 100 mg at 02/05/20 3402   hydrALAZINE (APRESOLINE) 20 mg/mL injection 10 mg, 10 mg, IntraVENous, Q6H PRN, Ismael Rogers MD 
  albuterol-ipratropium (DUO-NEB) 2.5 MG-0.5 MG/3 ML, 3 mL, Nebulization, Q6H PRN, Lico Marquez MD, 3 mL at 01/28/20 2039   ALPRAZolam (XANAX) tablet 0.25 mg, 0.25 mg, Oral, PRN, Lico Marquez MD 
  pantoprazole (PROTONIX) tablet 40 mg, 40 mg, Oral, ACB, Lico Marquez MD, 40 mg at 02/05/20 8776   levothyroxine (SYNTHROID) tablet 88 mcg, 88 mcg, Oral, ACB, Michael Campbell MD, 88 mcg at 02/05/20 9257   mirtazapine (REMERON) tablet 45 mg, 45 mg, Oral, QHS, Lico Marquez MD, 45 mg at 02/04/20 2146   polyethylene glycol (MIRALAX) packet 17 g, 17 g, Oral, DAILY PRN, Lico Marquez MD 
  sodium chloride (NS) flush 5-40 mL, 5-40 mL, IntraVENous, Q8H, Sissy Cunha MD, 10 mL at 02/05/20 5847   sodium chloride (NS) flush 5-40 mL, 5-40 mL, IntraVENous, PRN, Sissy Cunha MD 
  acetaminophen (TYLENOL) solution 650 mg, 650 mg, Oral, Q4H PRN, Sissy Cunha MD 
  docusate sodium (COLACE) capsule 100 mg, 100 mg, Oral, BID, Sissy Cunha MD, 100 mg at 02/05/20 7577 
________________________________________________________________________ Care Plan discussed with: 
  Comments Patient y Family  y   
RN y   
Care Manager Consultant Multidiciplinary team rounds were held today with , nursing, pharmacist and clinical coordinator. Patient's plan of care was discussed; medications were reviewed and discharge planning was addressed. ________________________________________________________________________ Total NON critical care TIME:  35   Minutes Total CRITICAL CARE TIME Spent:   Minutes non procedure based Comments >50% of visit spent in counseling and coordination of care    
________________________________________________________________________ Amita Goodwin MD  
 
Procedures: see electronic medical records for all procedures/Xrays and details which were not copied into this note but were reviewed prior to creation of Plan. LABS: 
I reviewed today's most current labs and imaging studies. Pertinent labs include: 
Recent Labs 02/05/20 
0310 02/03/20 
9497 WBC 6.2 6.5 HGB 7.4* 8.0*  
HCT 25.3* 26.4*  
 251 Recent Labs 02/05/20 
0310 02/04/20 
1324 02/03/20 
7917   --  145  
K 4.5  --  4.4   --  111* CO2 31  --  30  
*  --  81  
BUN 70*  --  67* CREA 2.86*  --  2.58* CA 7.6*  --  8.5 INR 2.0* 1.9* 1.7* Signed: Amita Goodwin MD

## 2020-02-05 NOTE — PROGRESS NOTES
Bedside shift change report GIVEN TO Amina Kirkpatrick RN. Report included the following information SBAR. SIGNIFICANT CHANGES DURING SHIFT:   
 
0900 Pt weaned down to 2L NC 
 
 
CONCERNS TO ADDRESS WITH MD:   
 
 
 
 
[facility] NURSING NOTE Admission Date 1/28/2020 Admission Diagnosis CHF (congestive heart failure) ([facility] [I50.9] Consults IP CONSULT TO PALLIATIVE CARE - PROVIDER 
IP CONSULT TO CARDIOLOGY Cardiac Monitoring [x] Yes [] No  
  
Purposeful Hourly Rounding [x] Yes   
Judd Score Total Score: 4 Judd score 3 or > [x] Bed Alarm [] Avasys [] 1:1 sitter [] Patient refused (Signed refusal form in chart) Rafiq Score Rafiq Score: 16 Rafiq score 14 or < [] PMT consult [] Wound Care consult  
 []  Specialty bed  [] Nutrition consult Influenza Vaccine Received Flu Vaccine for Current Season (usually Sept-March): Yes Oxygen needs? [] Room air Oxygen @  []1L    [x]2L    []3L   []4L    []5L   []6L via NC Chronic home O2 use? [x] Yes [] No 
Perform O2 challenge test and document in progress note using [employer] (.[employer]) Last bowel movement Last Bowel Movement Date: 02/04/20 Urinary Catheter LDAs Peripheral IV 02/04/20 Anterior;Proximal;Right Forearm (Active) Site Assessment Clean, dry, & intact 2/4/2020  2:06 PM  
Phlebitis Assessment 0 2/4/2020  2:06 PM  
Infiltration Assessment 0 2/4/2020  2:06 PM  
Dressing Status Clean, dry, & intact 2/4/2020  2:06 PM  
Dressing Type Transparent 2/4/2020  2:06 PM  
Hub Color/Line Status Blue;Flushed 2/4/2020  2:06 PM  
      
External Female Catheter 02/03/20 (Active) Site Assessment Clean, dry, & intact 2/4/2020  4:11 AM  
Repositioned Yes 2/4/2020  4:11 AM  
Perineal Care Yes 2/4/2020  4:11 AM  
Wick Changed Yes 2/4/2020  4:11 AM  
Suction Canister/Tubing Changed No 2/4/2020  4:11 AM  
            
  
Readmission Risk Assessment Tool Score High Risk 39 Total Score 3 Has Seen PCP in Last 6 Months (Yes=3, No=0) 3 Patient Length of Stay (>5 days = 3) 9 IP Visits Last 12 Months (1-3=4, 4=9, >4=11) 5 Pt. Coverage (Medicare=5 , Medicaid, or Self-Pay=4) 16 Charlson Comorbidity Score (Age + Comorbid Conditions) Criteria that do not apply:  
 . Living with Significant Other. Assisted Living. LTAC. SNF. or  
Rehab Expected Length of Stay 4d 2h Actual Length of Stay 7

## 2020-02-05 NOTE — PROGRESS NOTES
Paged was received regarding EKG changes , EKG was reviewed sow ST changes anterolateral leads , case was discussed with nurse patient asymptomatic , we ll check Troponin.

## 2020-02-05 NOTE — CONSULTS
Nephrology Consult Note Alcides Han  
 
[email or web address]              Phone - [phone] Patient: Ken Jolly YOB: 1935 Date- 2/5/2020 MRN: [id number] REASON FOR CONSULTATION: BOGDAN 
CONSULTING PHYSICIAN: DR. ELLIS   
ADMIT DATE:1/28/2020 PATIENT PCP:Haynie, Glennon Felty, MD  
 
ASSESSMENT & PLAN:  
 
BOGDAN LIkely due to dehydration 
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 1.9 DM 2-  Diet CONTROLLED H/o hypertension Resp. Failure, chf 
H/o pulmonary htn 
afib on coumadin PLAN- Hold diuretics Give iv albumin Encourage po intake Check bladder scan Check urine lytes May need ivf if cr. Continue to get worse Hopefully her cr. Will improve. She is not a dialysis candidate. Check bmp in am 
If cr. Worse - check renal usg in am 
 
  
 
 
 
Active Problems: 
  CHF (congestive heart failure) ([address] (1/28/2020) [x] High complexity decision making was performed 
[x] Patient is at high-risk of decompensation with multiple organ involvement Subjective: HPI: Ken Jolly is a 82 y.o.  female. She has bogdan with cr. 2.8 She is not taking much po. She is admitted with sob, diastolic heart failure. She has been receiving diuretics since admission She has afib on coumadin. Her INR was 6.1 on 1/30/2020 She has poor po intake at home and here in hospital 
She has aortic regurgitation. Her cr. Level is 1.97 on admission She has h/o dm and htn. She was on antidm meds in past 
She denies any h/o renal stone No h/o NSAIDS use She denies any sob at present Daughter is bedside. History per daughter. She is followed by DR. Hickey /nephrologist as out pt No recent antibiotics use No hypotension since admission No h/o NSAIDS use recently No recent iv contrast exposure Patient denies any history of hematuria, proteinuria. No history of nephrolithiasis in the past.  
no history of pyelonephritis. no history of hepatitis or jaundice. No h/o  herbal supplements use Review of Systems:  
Can't access due to patient's current condition Past Medical History:  
Diagnosis Date  Aortic valve disorders AS  Asthma  Benign hypertensive heart disease without heart failure  CKD (chronic kidney disease)  Diabetes ([address]  Fibromyalgia  Gastroparesis  GERD (gastroesophageal reflux disease)  Hypertension  Mitral valve disorders(424.0) MR  
 LIVIA (obstructive sleep apnea)  Paroxysmal atrial fibrillation (HCC)  Pure hypercholesterolemia 9/30/2010 Past Surgical History:  
Procedure Laterality Date  ECHO 2D ADULT  11/2009 LVH, normal LV wall motion and ejection fraction, mild aortic stenosis, moderate aortic regurgitation and mild mitral regurgitation. The ejection fraction was 55-60%.  ECHO 2D ADULT  1/2011 EF 60%, LAE, mild AS, AI, MR, PA low 40s  EVENT MONITOR POST SYMPTOMS  9/2010 Rare PACs, no arrythmia with symptoms  HX CHOLECYSTECTOMY  HX HYSTERECTOMY  LOOP MONITOR  9-10/2011 NSR  
 [place] CARDIOVERSION ELECTIVE ARRHYTHMIA EXTERNAL N/A 12/23/2019 Ep Cardioversion performed by Reji Weatehrs MD at [address] CARDIAC CATH LAB  [place] ICAR CATHETER ABLATION ATRIOVENTR NODE FUNCTION N/A 12/23/2019 Ablation Av Node performed by Reji Weathers MD at [address] CARDIAC CATH LAB  [place] INS NEW/RPLC PRM PACEMAKER W/TRANSV ELTRD VENTR N/A 12/23/2019 Insert Ppm Single Ventricular performed by Reji Weathers MD at [address] CARDIAC CATH LAB  STRESS TEST MYOVIEW  1/2011  
 no ischemia, EF 63%  US DUPLEX CAROTID BILATERAL  1/2011  
 mild bilat disease Prior to Admission medications Medication Sig Start Date End Date Taking? Authorizing Provider  
dilTIAZem CD (CARDIZEM CD) 180 mg ER capsule Take 180 mg by mouth daily. Yes Provider, Historical  
warfarin (COUMADIN) 5 mg tablet Take 5 mg by mouth four (4) days a week. Take 5 mg on Monday, Wednesday, Thursday, and Saturday. Take 2.5 mg on Tuesday, Friday, and Sunday   Yes Provider, Historical  
warfarin (COUMADIN) 5 mg tablet Take 2.5 mg by mouth three (3) days a week. Take 2.5 mg on Tuesday, Friday, and Sunday. Take 5 mg on Monday, Wednesday, Thursday, and Saturday   Yes Provider, Historical  
vit C/E/Zn/coppr/lutein/zeaxan (PRESERVISION AREDS-2 PO) Take 1 Cap by mouth two (2) times a day. Yes Provider, Historical  
melatonin 1 mg/mL liqd Take 1-10 mg by mouth nightly as needed (Insomnia). Yes Provider, Historical  
mirtazapine (REMERON) 45 mg tablet Take 1 Tab by mouth nightly. 1/20/20  Yes Teressa Chambers MD  
torsemide (DEMADEX) 5 mg tablet Take 5 mg by mouth daily. Yes Provider, Historical  
prednisoLONE acetate (PRED FORTE) 1 % ophthalmic suspension Administer 1 Drop to right eye daily. Until 12/25/19. Starting 12/26/19 titrate down to 1 drop right eye daily   Yes Provider, Historical  
ondansetron hcl (ZOFRAN) 4 mg tablet Take 4 mg by mouth every eight (8) hours as needed for Nausea. Yes Provider, Historical  
sodium bicarbonate 650 mg tablet Take 650 mg by mouth three (3) times daily. Yes Provider, Historical  
metoprolol succinate (TOPROL-XL) 100 mg tablet Take 100 mg by mouth daily. Yes Provider, Historical  
besifloxacin (BESIVANCE) 0.6 % drps ophthalmic suspension Administer 1 Drop to right eye daily. 6/6/19  Yes Provider, Historical  
brinzolamide (AZOPT) 1 % ophthalmic suspension Administer 1 Drop to right eye three (3) times daily. 6/6/19  Yes Provider, Historical  
hydrALAZINE (APRESOLINE) 100 mg tablet Take 1 Tab by mouth three (3) times daily. 7/2/19  Yes Kenya Sam NP  
polyethylene glycol (MIRALAX) 17 gram packet Take 17 g by mouth daily as needed (constipation). 12/16/14  Yes Provider, Historical  
albuterol-ipratropium (DUO-NEB) 2.5 mg-0.5 mg/3 ml nebu 3 mL by Nebulization route every six (6) hours as needed for Other (wheeze). 19  Yes Alfonse Simmonds, MD  
levothyroxine (SYNTHROID) 88 mcg tablet Take 88 mcg by mouth Daily (before breakfast). 19  Yes Provider, Historical  
ALPRAZolam (XANAX) 0.25 mg tablet Take 0.125 mg by mouth daily as needed for Anxiety. Yes Provider, Historical  
esomeprazole (NEXIUM) 40 mg capsule Take 40 mg by mouth daily. Yes Provider, Historical  
 
Allergies Allergen Reactions  Latex, Natural Rubber Itching  Metformin Nausea and Vomiting Other reaction(s): nausea HEADACHE  Advil [Ibuprofen] Unknown (comments)  Ambien [Zolpidem] Unknown (comments) Altered mental status and per Dr. Alcides Sotelo, the patient should NOT be on this medication  Aspirin Unknown (comments)  Citalopram Other (comments) HEADACHE  Codeine Other (comments)  Iodinated Contrast Media Itching  Meloxicam Unknown (comments)  Penicillin G Unknown (comments)  Shellfish Containing Products Rash  Sulfa (Sulfonamide Antibiotics) Unknown (comments)  Tramadol Nausea and Vomiting and Other (comments) HEADACHE  Trazodone Unknown (comments) Altered mental status and per Dr. Alcides Sotelo, the patient should NOT be on this medication Social History Tobacco Use  Smoking status: Former Smoker Last attempt to quit: 1963 Years since quittin.1  Smokeless tobacco: Never Used Substance Use Topics  Alcohol use: Not Currently Family History Problem Relation Age of Onset  Heart Disease Father  Dementia Brother  Heart Disease Brother  Diabetes Brother Objective:   
 
Patient Vitals for the past 24 hrs: 
 Temp Pulse Resp BP SpO2  
20 1218 97.4 °F (36.3 °C) 72 20 117/76 100 % 20 0828 98.3 °F (36.8 °C) 82 20 140/40 100 % 20 0319 98.1 °F (36.7 °C) 81 20 (!) 121/37 100 % 20 2322 98.1 °F (36.7 °C) 76 20 (!) 114/30 100 % 20 2145    111/42   
20 2042 98.4 °F (36.9 °C) 84 20 (!) 113/38 100 % 02/04/20 1518 98.3 °F (36.8 °C) 81 20 126/42 100 % No intake/output data recorded. Physical Exam: 
General:Alert, No distress, Eyes:No scleral icterus, No conjunctival pallor Neck:Supple,no mass palpable,no thyromegaly Lungs:Clears to auscultation Bilaterally, normal respiratory effort CVS:RRR, S1 S2 normal,  No rub, Abdomen:Soft, Non tender, No hepatosplenomegaly Extremities: + LE edema Skin:No rash or lesions, Warm and DRY Psych: Can't access due to patient's current condition \ 
:  No velasquez Musculoskeletal : no redness, no joint tenderness NEURO: limited exam, Non focal    
 
CODE STATUS:  full Care Plan discussed with:  Daughter , nurse Chart reviewed. TOTAL TIME: 76 Minutes  
y Reviewed previous records  
y Discussion with patient and/or family and questions answered  
y >50% of visit spent in counseling and coordination of care ECG[de-identified] Rev:no Xray/CT/US/MRI REV:yes Lab Data Personally Reviewed: (see below) Recent Labs 02/05/20 
0310 02/04/20 
1324 02/03/20 
3865 WBC 6.2  --  6.5 HGB 7.4*  --  8.0*  
  --  251 INR 2.0* 1.9* 1.7*   --  145  
K 4.5  --  4.4 *  --  81  
BUN 70*  --  67* CREA 2.86*  --  2.58* CA 7.6*  --  8.5 Lab Results Component Value Date/Time Color YELLOW/STRAW 01/24/2020 05:02 PM  
 Appearance CLEAR 01/24/2020 05:02 PM  
 Specific gravity 1.025 01/24/2020 05:02 PM  
 Specific gravity 1.016 03/16/2019 10:58 AM  
 pH (UA) 5.5 01/24/2020 05:02 PM  
 Protein >300 (A) 01/24/2020 05:02 PM  
 Glucose NEGATIVE  01/24/2020 05:02 PM  
 Ketone NEGATIVE  01/24/2020 05:02 PM  
 Bilirubin NEGATIVE  01/24/2020 05:02 PM  
 Urobilinogen 0.2 01/24/2020 05:02 PM  
 Nitrites NEGATIVE  01/24/2020 05:02 PM  
 Leukocyte Esterase NEGATIVE  01/24/2020 05:02 PM  
 Epithelial cells FEW 01/24/2020 05:02 PM  
 Bacteria NEGATIVE  01/24/2020 05:02 PM  
 WBC 5-10 01/24/2020 05:02 PM  
 RBC 5-10 01/24/2020 05:02 PM  
 
 
Lab Results Component Value Date/Time Iron 33 (L) 03/26/2019 04:01 AM  
 TIBC 174 (L) 03/26/2019 04:01 AM  
 Iron % saturation 19 (L) 03/26/2019 04:01 AM  
 
Lab Results Component Value Date/Time Culture result: No growth (<1,000 CFU/ML) 01/24/2020 05:02 PM  
 Culture result: NO GROWTH 6 DAYS 04/06/2019 05:29 AM  
 Culture result: NO GROWTH 6 DAYS 04/06/2019 05:26 AM  
 
Prior to Admission Medications Prescriptions Last Dose Informant Patient Reported? Taking? ALPRAZolam (XANAX) 0.25 mg tablet 1/1/2020 at Unknown time Child Yes Yes Sig: Take 0.125 mg by mouth daily as needed for Anxiety. albuterol-ipratropium (DUO-NEB) 2.5 mg-0.5 mg/3 ml nebu 1/1/2020 at Unknown time Child No Yes Sig: 3 mL by Nebulization route every six (6) hours as needed for Other (wheeze). besifloxacin (BESIVANCE) 0.6 % drps ophthalmic suspension 1/25/2020 at Unknown time Child Yes Yes Sig: Administer 1 Drop to right eye daily. brinzolamide (AZOPT) 1 % ophthalmic suspension 1/25/2020 at Unknown time Child Yes Yes Sig: Administer 1 Drop to right eye three (3) times daily. dilTIAZem CD (CARDIZEM CD) 180 mg ER capsule   Yes Yes Sig: Take 180 mg by mouth daily. esomeprazole (NEXIUM) 40 mg capsule 1/25/2020 at Unknown time Child Yes Yes Sig: Take 40 mg by mouth daily. hydrALAZINE (APRESOLINE) 100 mg tablet 1/25/2020 at Unknown time Child No Yes Sig: Take 1 Tab by mouth three (3) times daily. levothyroxine (SYNTHROID) 88 mcg tablet 1/25/2020 at Unknown time Child Yes Yes Sig: Take 88 mcg by mouth Daily (before breakfast). melatonin 1 mg/mL liqd   Yes Yes Sig: Take 1-10 mg by mouth nightly as needed (Insomnia). metoprolol succinate (TOPROL-XL) 100 mg tablet 1/25/2020 at Unknown time Child Yes Yes Sig: Take 100 mg by mouth daily. mirtazapine (REMERON) 45 mg tablet 1/25/2020 at Unknown time  No Yes Sig: Take 1 Tab by mouth nightly. ondansetron hcl (ZOFRAN) 4 mg tablet 1/1/2020 at Unknown time Child Yes Yes Sig: Take 4 mg by mouth every eight (8) hours as needed for Nausea. polyethylene glycol (MIRALAX) 17 gram packet 1/1/2020 at Unknown time Child Yes Yes Sig: Take 17 g by mouth daily as needed (constipation). prednisoLONE acetate (PRED FORTE) 1 % ophthalmic suspension 1/25/2020 at Unknown time Child Yes Yes Sig: Administer 1 Drop to right eye daily. Until 12/25/19. Starting 12/26/19 titrate down to 1 drop right eye daily  
sodium bicarbonate 650 mg tablet 1/25/2020 at Unknown time Child Yes Yes Sig: Take 650 mg by mouth three (3) times daily. torsemide (DEMADEX) 5 mg tablet 1/25/2020 at Unknown time Child Yes Yes Sig: Take 5 mg by mouth daily. vit C/E/Zn/coppr/lutein/zeaxan (PRESERVISION AREDS-2 PO)   Yes Yes Sig: Take 1 Cap by mouth two (2) times a day. warfarin (COUMADIN) 5 mg tablet   Yes Yes Sig: Take 5 mg by mouth four (4) days a week. Take 5 mg on Monday, Wednesday, Thursday, and Saturday. Take 2.5 mg on Tuesday, Friday, and Sunday  
warfarin (COUMADIN) 5 mg tablet   Yes Yes Sig: Take 2.5 mg by mouth three (3) days a week. Take 2.5 mg on Tuesday, Friday, and Sunday. Take 5 mg on Monday, Wednesday, Thursday, and Saturday Facility-Administered Medications: None Imaging: 
 
Medications list Personally Reviewed   [x]      Yes     []               No   
Thank you for allowing us to participate in the care this patient. We will follow patient with you. Signed By: Claudell Medina, MD 
[address] [place] [address] [facility] [address] [place], [address] Phone - [phone] Fax - [phone] [address], [address] [facility] Phone - [phone] Fax - [phone]    
www. [facility]

## 2020-02-05 NOTE — PROGRESS NOTES
Pharmacy Daily Dosing of Warfarin Indication: A. Fib Goal INR: 2 - 3 PTA Warfarin Dose: 5 mg Mon/Wed/Thur/Sat and 2.5 mg on Tue/Fri/Sun  
 
Concurrent anticoagulants: none Concurrent antiplatelet: none Major Interacting Medications Drugs that may increase INR: none Drugs that may decrease INR: none Conditions that may increase/decrease INR (CHF, C. diff, cirrhosis, thyroid disorder, hypoalbuminemia): on Levothyroxine Labs: 
Recent Labs 02/05/20 
0310 02/04/20 
1324 02/03/20 
5529 INR 2.0* 1.9* 1.7* HGB 7.4*  --  8.0*  
  --  251 Impression/Plan:  
Will order warfarin 5 mg PO x 1 dose. Daily INR 
CBC w/o diff every other day Pharmacy will follow daily and adjust the dose as appropriate. Thanks Naomi Nguyen, PHARMD 
 
 
[email or web address]/Pharmacy/[email or web address]/Warfarin%[email or web address]. pdf

## 2020-02-05 NOTE — PROGRESS NOTES
Problem: Mobility Impaired (Adult and Pediatric) Goal: *Acute Goals and Plan of Care (Insert Text) Description FUNCTIONAL STATUS PRIOR TO ADMISSION: patient ambulates household distances with rollator and supervision. She requires supervision for ADLS. Her daughter assists as needed. HOME SUPPORT PRIOR TO ADMISSION: The patient lived with her daughter who provides 24/7 assist. 
 
Physical Therapy Goals Initiated 1/30/2020 1. Patient will move from supine to sit and sit to supine , scoot up and down and roll side to side in bed with supervision/set-up within 7 day(s). 2.  Patient will transfer from bed to chair and chair to bed with supervision/set-up using the least restrictive device within 7 day(s). 3.  Patient will perform sit to stand with supervision/set-up within 7 day(s). 4.  Patient will ambulate with supervision/set-up for 50 feet with the least restrictive device within 7 day(s). Outcome: Progressing Towards Goal 
PHYSICAL THERAPY TREATMENT Patient: Suzanne Soto (23 y.o. female) Date: 2/5/2020 Diagnosis: CHF (congestive heart failure) [facility] [I50.9] <principal problem not specified> Precautions: Fall, Bed Alarm Chart, physical therapy assessment, plan of care and goals were reviewed. ASSESSMENT Patient continues with skilled PT services and is progressing towards goals. She was able to tolerate increased standing and gait activities this session. Gait progressed to 50 feet with bilateral handhold assistance(cg-min). Her step lengths are small and speed was slow with increased unsteadiness with turning. Currently a high fall risk. Patient is likely near her prior functional baseline and requires much encouragement to fully participate in PT session. Further PT was interrupted by patient starting to have a BM -  she was placed on [facility] with daughter supervising and nurse tech notified.   
 
Current Level of Function Impacting Discharge (mobility/balance): cg to min a 
 
 Other factors to consider for discharge: good home support, patient near baseline. PLAN : 
Patient continues to benefit from skilled intervention to address the above impairments. Continue treatment per established plan of care. to address goals. Recommendation for discharge: (in order for the patient to meet his/her long term goals) Physical therapy at least 2 days/week in the home AND ensure assist and/or supervision for safety with ADLs and mobility This discharge recommendation: 
Has not yet been discussed the attending provider and/or case management IF patient discharges home will need the following DME: patient owns DME required for discharge SUBJECTIVE:  
Patient stated I don't want to walk.  OBJECTIVE DATA SUMMARY:  
Critical Behavior: 
Neurologic State: Alert, Confused Orientation Level: Oriented to person, Disoriented to place, Disoriented to situation, Disoriented to time Cognition: Decreased attention/concentration, Decreased command following, Impaired decision making, Poor safety awareness, Memory loss Safety/Judgement: Decreased awareness of environment, Decreased awareness of need for safety, Lack of insight into deficits Functional Mobility Training: 
Bed Mobility: 
Rolling: (sitting in chair when PT arrived) Scooting: Supervision Transfers: 
Sit to Stand: Contact guard assistance Stand to Sit: Contact guard assistance Bed to Chair: Contact guard assistance(bilateral hand hold assistance) Balance: 
Sitting: Intact Standing: Impaired Standing - Static: Fair Standing - Dynamic : Fair Ambulation/Gait Training: 
Distance (ft): 50 Feet (ft) Gait Abnormalities: Decreased step clearance; Path deviations; Step to gait Base of Support: Narrowed Speed/Rivka: Slow Step Length: Right shortened;Left shortened Pain Rating: 
None reported Activity Tolerance: Fair, SpO2 stable on RA and requires rest breaks Please refer to the flowsheet for vital signs taken during this treatment. After treatment patient left in no apparent distress:  
Sitting in chair, Call bell within reach and Caregiver / family present COMMUNICATION/COLLABORATION:  
The patients plan of care was discussed with: Occupational Therapist and Registered Nurse Kathryn Swanson, PT Time Calculation: 15 mins

## 2020-02-05 NOTE — PROGRESS NOTES
SURINDER: 
1. Home Health - [facility] 
2. OP f/u appts 3. Home O2 
 
CM discussed plan of care with attending MD. Creatinine is worse today - Nephrology consulted. Plan remains for pt to return home with [facility] through [facility]. Portable O2 tank was delivered to pt's bedside on 2/3/20. Will need repeat walking challenge to send to [address] if pt does not d/c today. CM will continue to follow. Demi May MSW Care Manager [phone]

## 2020-02-05 NOTE — PROGRESS NOTES
[address] [place] [address]  TGY:[id number]   :1935 D/w patient and daughter She will accept blood product and albumin Marie Alvarado, [address] [facility] [employer] [facility] [address] [place], [address] [facility] Phone - [phone] Fax - [phone] [address] [place], [place] [address], [address] [facility] Phone - [phone] Fax - [phone]    
www. [facility]

## 2020-02-05 NOTE — PROGRESS NOTES
Problem: Falls - Risk of 
Goal: *Absence of Falls Description Document Galen Bryan Fall Risk and appropriate interventions in the flowsheet. Outcome: Progressing Towards Goal 
Note: Fall Risk Interventions: 
Mobility Interventions: Bed/chair exit alarm Mentation Interventions: Bed/chair exit alarm Medication Interventions: Bed/chair exit alarm Elimination Interventions: Call light in reach History of Falls Interventions: Bed/chair exit alarm Problem: Pressure Injury - Risk of 
Goal: *Prevention of pressure injury Description Document Rafiq Scale and appropriate interventions in the flowsheet. Outcome: Progressing Towards Goal 
Note: Pressure Injury Interventions: 
Sensory Interventions: Assess changes in LOC Moisture Interventions: Absorbent underpads Activity Interventions: Increase time out of bed Mobility Interventions: PT/OT evaluation, Chair cushion Nutrition Interventions: Document food/fluid/supplement intake Friction and Shear Interventions: Feet elevated on foot rest

## 2020-02-06 NOTE — PROGRESS NOTES
Physical Therapy Chart reviewed for updates and noted patient with RRT called over night, with continued respiratory decline and currently on BiPAP. Also noted kidney function decline and concerns for possible intubation. Secondary to medical decline with hold therapy this date. Per chart, family is planning a meeting to discuss goals for patients care. Will hold therapy at this time and continue to follow.   
Paul Sr, PT, DPT

## 2020-02-06 NOTE — PROGRESS NOTES
RAPID RESPONSE TEAM 
 
Called to room 2215 by charge ROSSY Armenta over concern of patient's increased work of breathing. Patient in bed, alert, with labored respirations. O2 sats 88% on 4 lpm nasal canula, RR 28. Lungs with expiratory wheezing, crackles hear in upper and lower fields. STAT chest Xray ordered per protocol. Primary RN Viktoriya Bella has paged tele-hospitalist and obtained orders for lasix. RT provided patient with duo-neb treatment, O2 sats improved to 93%. ADDENDUM: 
0400: Responded to overhead Rapid Response, patient continues to be short of breath, RR28, O2 sats 84% on 5 lpm nasal canula. No diuresis with prior Lasix administration, bladder scan revealed 100 ml in bladder. Lungs with expiratory wheezing and crackles, unchanged from prior assessment. STAT ABGs ordered. ABG:   
Lab Results Component Value Date/Time PHI 7.287 (L) 02/06/2020 04:04 AM  
 PCO2I 56.4 (H) 02/06/2020 04:04 AM  
 PO2I 50 (L) 02/06/2020 04:04 AM  
 HCO3I 26.9 (H) 02/06/2020 04:04 AM  
 
Dr. Lavinia Pollard notified, MD reported to bedside, orders received for Bumex 2 mg IV, Bipap, and transfer to [address] [place] [address]. Patient transferred, [facility], [address] [facility]. Please call with any needs or concerns. Carole Jaeger Rapid Response ROSSY Blackburn Cera

## 2020-02-06 NOTE — PROGRESS NOTES
Bedside shift change report GIVEN TO Hamida Morgan RN. Report included the following information SBAR. SIGNIFICANT CHANGES DURING SHIFT:   
 
0430 report received from Robb Dunham, [address] [facility]. Respiratory placed pt on bipap. Pt resting quietly in bed. New IV placed, bumex given. Waiting for pharmacy to send up albumin. Labs hemolyzed. Have already stuck pt 5 times this AM and pt is already anxious from BIPAP. Will pass to dayshift to attempt later or ask PICC team for assistance. Pt's daughter requesting pt be in restraints. Discussed with RRT nurse, Rosemary Fajardo. Rosemary Fajardo at bedside to discuss that we can not put a pt on BIPAP in restraints but we can put socks on pt's hands. Socks placed on pt's hands and prn xanax given to help pt relax. CONCERNS TO ADDRESS WITH MD:   
 
 
 
 
 
[facility] NURSING NOTE Admission Date 1/28/2020 Admission Diagnosis CHF (congestive heart failure) ([facility] [address] [I50.9] Consults IP CONSULT TO PALLIATIVE CARE - PROVIDER 
IP CONSULT TO NEPHROLOGY 
IP CONSULT TO PALLIATIVE CARE - PROVIDER 
IP CONSULT TO CARDIOLOGY 
IP CONSULT TO PULMONOLOGY Cardiac Monitoring [x] Yes [] No  
  
Purposeful Hourly Rounding [x] Yes   
Judd Score Total Score: 4 Judd score 3 or > [x] Bed Alarm [] Avasys [] 1:1 sitter [] Patient refused (Signed refusal form in chart) Rafiq Score Rafiq Score: 16 Rafiq score 14 or < [] PMT consult [] Wound Care consult  
 []  Specialty bed  [] Nutrition consult Influenza Vaccine Received Flu Vaccine for Current Season (usually Sept-March): Yes Oxygen needs? [] Room air Oxygen @  []1L    [x]2L    []3L   []4L    []5L   []6L via NC BIPAP Chronic home O2 use? [] Yes [x] No 
Perform O2 challenge test and document in progress note using [employer]) Last bowel movement Last Bowel Movement Date: 02/04/20 Urinary Catheter LDAs Peripheral IV 02/04/20 Anterior;Proximal;Right Forearm (Active) Site Assessment Clean, dry, & intact 2/5/2020  8:17 PM  
Phlebitis Assessment 0 2/5/2020  8:17 PM  
Infiltration Assessment 0 2/5/2020  8:17 PM  
Dressing Status Clean, dry, & intact 2/5/2020  8:17 PM  
Dressing Type Tape;Transparent 2/5/2020  8:17 PM  
Hub Color/Line Status Blue;Flushed 2/5/2020  8:17 PM  
      
External Female Catheter 02/03/20 (Active) Site Assessment Clean, dry, & intact 2/6/2020  3:00 AM  
Repositioned Yes 2/6/2020  3:00 AM  
Perineal Care Yes 2/6/2020  3:00 AM  
Wick Changed Yes 2/6/2020  3:00 AM  
Suction Canister/Tubing Changed No 2/6/2020  3:00 AM  
            
  
Readmission Risk Assessment Tool Score High Risk 39 Total Score 3 Has Seen PCP in Last 6 Months (Yes=3, No=0) 3 Patient Length of Stay (>5 days = 3) 9 IP Visits Last 12 Months (1-3=4, 4=9, >4=11) 5 Pt. Coverage (Medicare=5 , Medicaid, or Self-Pay=4) 16 Charlson Comorbidity Score (Age + Comorbid Conditions) Criteria that do not apply:  
 . Living with Significant Other. Assisted Living. LTAC. SNF. or  
Rehab Expected Length of Stay 4d 2h Actual Length of Stay 9

## 2020-02-06 NOTE — PROGRESS NOTES
**Consult Information** 
Member Facility: [facility] Facility MRN: [id number] Consult ID: [id number] Facility Time Zone: ET 
Date and Time of Request: 02/06/2020 02:26:05 AM 
Requesting Clinician: Donn Galdamez Patient Name: Julio C Langford Date of Birth: [id number] Gender: Female **Clinical Note** Clinical Note: Pt short of breath with recent CXR concerning for pulm edema. - however, Cr rising as well , suspect intravascular volume depletion. - try albumin with lasix. - would likely benefit from thoracentesis in am. 
 
**Attestation** Interaction Mode: Phone Only Phone Duration (mins): 2 Time of Call : 02/06/2020 02:32 AM 
Interaction Attestation: Interprofessional internet consultation was delivered through telephonic and/or electronic communication upon the request of the patients treating physician, while the requesting and the rendering provider were not in the same physical location. Written report was provided to the requesting provider. Evaluation Duration (mins): 4 **Physician Signature** This document was electronically signed by: Polo Clinton MD  02/06/2020 02:32 AM

## 2020-02-06 NOTE — PROGRESS NOTES
Noted pts respiratory decline and is now on BiPAP. Also noted declining kidney function. Per notes pt may be nearing intubation and family will have a meeting today for goals of care Will defer at this time but continue to follow.

## 2020-02-06 NOTE — PROGRESS NOTES
SURINDER: 
1. Home Health 2. OP f/u appts 3. O2 challenge 24 hrs prior to d/c 
 
10:00am- CM met with pt, pt's daughter and pt's son at bedside. CM introduced self and role. No new needs at this time. CM will continue to follow patient for discharge planning needs and arrange for services as deemed necessary. Albina Alvarenga [address] 
[phone]

## 2020-02-06 NOTE — PROGRESS NOTES
I was asked by Nephrologist, Dr Joon Arizmendi to revisit goals of care . Brief summary of visit , full note will follow. Patient took a turn towards worse, in respiratory failure/distress, on Bipap, lethargic, Renal failure not a good candidate for dialysis. Family is gathered around patient . Met with daughter Terese [facility], she understand , patient is worse based on her conversation with nephrologist,  near intubation . I offered family meeting to revisit code sat tus , goals of care , they are not ready for any palliative care conversation today. Family is getting together this afternoon to discuss amongst themselves regarding her care . Current goal is full code status , and full restorative care . Case discussed with Dr Katelin Kelly and Dr Logan Rand.

## 2020-02-06 NOTE — PROGRESS NOTES
Responded to Rapid Response, Came to evaluated the patient. Pt O2 sat worsening, hypoxic on 4 L. See my assessment and plan below for details Visit Vitals BP (!) 149/35 (BP 1 Location: Left arm, BP Patient Position: Supine) Pulse 70 Temp 98.8 °F (37.1 °C) Resp 22 Ht 5' (1.524 m) Wt 58.9 kg (129 lb 12.8 oz) SpO2 (!) 88% BMI 25.35 kg/m² PHYSICAL EXAM: 
General: WD, WN. Alert,  Respiratory distress EENT:  Anicteric sclerae, Resp:  Rales CV:  Regular  rhythm,  +++ edema GI:  Soft, Non distended, Non tender.  +Bowel sounds, Neurologic:  Alert and oriented X 3. Psych:   Good insight. Not anxious nor agitated. Skin:  no rashes or ulcers. No jaundice. Assessment/Plan: 
Acute Respiratory Failure with Hypoxia and hypercapnea Worsening, due to acute on chronic diastolic heart failure with moderate AR, MR and Severe Pulmonary HTN BOGDAN with CKD3 Given lasix 40mg IV without diuresis Place on Bumex 2mg IV X1 then daily BiPAP 14/6, repeat ABGs in few hours Transfer to PCU Pulmonary consult Prognosis seems grim with severe pulmonary HTN along with CKD. Will reconsult palliative care CXR with Bilateral interstitial and airspace opacities and pleural effusions I have spent critical care time involved in lab review, consultations with specialist, family decision-making, and documentation. During this entire length of time I was immediately available to the patient. The reason for providing this level of medical care for this critically ill patient was due to a critical illness that impaired one or more vital organ systems such that there was a high probability of imminent or life threatening deterioration in the patients condition. This care involved high complexity decision making to assess, manipulate, and support vital system functions, to treat this degreee vital organ system failure and to prevent further life threatening deterioration of the patients condition. Total critical care time spent: 35 mins 
 
________________________________________________________________________ Glory Borders, MD

## 2020-02-06 NOTE — PROGRESS NOTES
[address] [place] [address] Drive  KIB:[id number]   :[id number] Patient didn't respond much to high dose of bumex She made only 100 cc urine Clinical condition d/w daughers - they don't want patient to suffer. They are waiting for patient's son to come and see her before they make final decision. D/w DR. Nicolas. We will follow labs from this pm. 
 If family wants her to be full and code and be aggressive in her care. We may have to do dialysis tonight. Tu Aguilar, [address] [facility] [employer] [facility] [address] [place], [address] [place] Phone - [phone] Fax - [phone] [facility] [address] [facility] Phone - [phone] Fax - [phone]    
[email or web address]

## 2020-02-06 NOTE — PROGRESS NOTES
The purpose for the visit was to do a follow-up on Ms. Snyder. Ms. Don Barbosa was being visited by her daughter and son who welcomed the visit. Though Ms. Snyder was unable to share since she was having a breathing treatment, her son did wanted to share. He spoke of his mother's words that taught him how to be a man. He reflected on his childhood and how going to [employer] was a part of their parent's influence. The  provided a space for reflection and story telling. As well the  created a space for honest emotions about his mother's condition. [address] [facility] [place].  paging service [phone])

## 2020-02-06 NOTE — PROGRESS NOTES
RAPID RESPONSE TEAM-Follow up Rounding on patient due to rapid response for respiratory distress. Patient is lethargic, moribund, on Bipap, surrounded by family. D/W Arben Hairston RN. She reports only about 100mL UOP despite diuresis. Per nephrologist note, poor dialysis candidate. No RRT interventions are indicated. Please call back if needed. ROSSY Meza Vitals w/ MEWS Score (last day) Date/Time MEWS Score Pulse Resp Temp BP Level of Consciousness SpO2  
 02/06/20 1133              100 % 02/06/20 1100  1  82  20  97.4 °F (36.3 °C)  169/60  Alert  99 % 02/06/20 0838    73          98 % 02/06/20 0837    71          99 % 02/06/20 0836    71          98 % 02/06/20 0834    73          99 % 02/06/20 0830          128/40      
 02/06/20 0825    74          100 % 02/06/20 0824    77          100 % 02/06/20 0823    77          100 % 02/06/20 0821    77          99 % 02/06/20 0819    77          100 % 02/06/20 0817    77          99 % 02/06/20 0816    77          99 % 02/06/20 0815    77          99 % 02/06/20 0814    77          99 % 02/06/20 0812    77          97 % 02/06/20 0811    77          97 % 02/06/20 0810    77          97 % 02/06/20 0809    78          98 % 02/06/20 0808    78          98 % 02/06/20 0807    81          97 % 02/06/20 0805    78          97 % 02/06/20 0804    78          97 % 02/06/20 0803    79          96 % 02/06/20 0802    79          96 % 02/06/20 0801    75          96 % 02/06/20 0800    75      135/44    96 % 02/06/20 0758    74          96 % 02/06/20 0757    72          96 % 02/06/20 0756    72          96 % 02/06/20 0754    73          97 % 02/06/20 0753    75          96 % 02/06/20 0747    74          98 % 02/06/20 0746    74          97 % 02/06/20 0745    75          98 % 02/06/20 0743    77          98 % 02/06/20 0741    92          98 % 02/06/20 0739    76          98 % 02/06/20 0738    77          98 % 02/06/20 0737    77          97 % 02/06/20 0736    77          97 % 02/06/20 0734    (!) 116          96 % 02/06/20 0733    78          97 % 02/06/20 0732    78          97 % 02/06/20 0730  1  76  20  97.6 °F (36.4 °C)  150/44  Alert  98 % 02/06/20 0729    78          97 % 02/06/20 0728    77          97 % 02/06/20 0727    78          97 % 02/06/20 0725    78          97 % 02/06/20 0724          152/46      
 02/06/20 0723    78          97 % 02/06/20 0721    78          96 % 02/06/20 0719    78          96 % 02/06/20 0714    77          96 % 02/06/20 0713    73          95 % 02/06/20 0712    74          96 % 02/06/20 0710    73          96 % 02/06/20 0708    73          96 % 02/06/20 0707    77          96 % 02/06/20 0706    76          97 % 02/06/20 0704    72          95 % 02/06/20 0702    75          96 % 02/06/20 0700    76      134/48    96 % 02/06/20 0658    86          97 % 02/06/20 0656    74          99 % 02/06/20 0654    73          97 % 02/06/20 0652    73          97 % 02/06/20 0650    74          96 % 02/06/20 0649    74          97 % 02/06/20 0648    74          97 % 02/06/20 0647    73          98 % 02/06/20 0636    73          97 % 02/06/20 0635    73          97 % 02/06/20 0634          (!) 116/38      
 02/06/20 0633    73          97 % 02/06/20 0631    74          97 % 02/06/20 0629    74          96 % 02/06/20 0627    75          96 % 02/06/20 0625    76          97 % 02/06/20 0623    75          96 % 02/06/20 0622    76          96 % 02/06/20 0621    76          96 % 02/06/20 0619    77          95 % 02/06/20 0618    78          95 % 02/06/20 0617    78          95 % 02/06/20 0616    74          96 % 02/06/20 0601    81          96 % 02/06/20 0559    82          94 % 02/06/20 0558    83          94 % 02/06/20 0557    85          92 % 02/06/20 0556    81          92 % 02/06/20 0555    82          92 % 02/06/20 0554    83          91 % 02/06/20 0552    75          (!) 82 % 02/06/20 0551    85          91 % 02/06/20 0550    87          93 % 02/06/20 0548    81          92 % 02/06/20 0547    80          91 % 02/06/20 0546    82          90 % 02/06/20 0545    78          91 % 02/06/20 0543    76          90 % 02/06/20 0542    76          (!) 89 % 02/06/20 0541    76          90 % 02/06/20 0540    75          90 % 02/06/20 0539    71          90 % 02/06/20 0538    70          90 % 02/06/20 0536    70          91 % 02/06/20 0535    70          93 % 02/06/20 0534    70          92 % 02/06/20 0532    70          92 % 02/06/20 0530    70          93 % 02/06/20 0529    70          93 % 02/06/20 0528    71          92 % 02/06/20 0527    70          93 % 02/06/20 0525    70          93 % 02/06/20 0523    70          93 % 02/06/20 0522    71          93 % 02/06/20 0521    70          93 % 02/06/20 0519    71          94 % 02/06/20 0518    72          94 % 02/06/20 0517    71          95 % 02/06/20 0516    72          95 % 02/06/20 0514    73          94 % 02/06/20 0513    71          95 % 02/06/20 0512    73          94 % 02/06/20 0510    73          95 % 02/06/20 0509    73          94 % 02/06/20 0508    73          95 % 02/06/20 0507    74          95 % 02/06/20 0505    73          96 % 02/06/20 0504    73          96 % 02/06/20 0503    74          96 % 02/06/20 0501    72          95 % 02/06/20 0500    72          95 % 02/06/20 0459    74          94 % 02/06/20 0458    73          93 % 02/06/20 0456    73          95 % 02/06/20 0455    73          95 % 02/06/20 0454    73          95 % 02/06/20 0453    74          94 % 02/06/20 0452    73          94 % 02/06/20 0451    73          95 % 02/06/20 0449    74          94 % 02/06/20 0447    74          94 % 02/06/20 0435              92 % 02/06/20 0434    74  28    151/43    90 % 02/06/20 0401    70  28      Alert  (!) 84 % 02/06/20 0329    70  22  98.8 °F (37.1 °C)  (!) 149/35    (!) 88 % 02/06/20 0245    78      (!) 147/91      
 02/06/20 0223              (!) 86 % 02/05/20 2311  2  76  22  98.2 °F (36.8 °C)  (!) 159/31  Alert  92 % 02/05/20 2146    75  18    (!) 131/31  Alert  96 % 02/05/20 2007  1  72  20  97.8 °F (36.6 °C)  124/44  Alert  100 % 02/05/20 1547  1  70  18  97.8 °F (36.6 °C)  127/50  Alert  100 % 02/05/20 1218  1  72  20  97.4 °F (36.3 °C)  117/76  Alert  100 % 02/05/20 0828  1  82  20  98.3 °F (36.8 °C)  140/40  Alert  100 % 02/05/20 0319  1  81  20  98.1 °F (36.7 °C)  (!) 121/37  Alert  100 %

## 2020-02-06 NOTE — CONSULTS
Palliative Medicine Consult Mamadou: [phone]) Patient Name: Morales Garcia YOB: 1935 Date of Initial Consult: 1/29/2020 Reason for Consult: care decisions Requesting Provider: Kell Carl MD  
Primary Care Physician: Chung Reyna MD 
 
 SUMMARY:  
Morales Garcia is a 82 y.o. Female with a past history of heart failure with preserved EF 55- 60 %, atrial  fibrillation on anticoagulation s/p pacemaker ,COPD not on oxygen , severe pulmonary  HTN ,  CKD stage 1V , recent d/c from [facility] on 12/24/19 for atrial fibrillation /RVR s/p av node ablation and PPM ,   who was transferred from [facility]  on 1/28/2020, with a diagnosis of sob in a setting of acute hypoxic heart failure due to acute on chronic exacerbation of diastolic heart failure . Current medical issues leading to Palliative Medicine involvement include: care decisions in a setting of acute heart failure, no AMD. 
 
She lives in her home , her daughter Hemanth Meyer and son Milan Cartagena lives with her, at [employer] ,  she is sedentary mostly in  requires help with adls, has mild dementia, , other wise able to manage her finances. She does not uses home oxygen . Interim hx : 
 
2/6/2020: Acute Respiratory Failure with Hypoxia and hypercapnia worsening,  due to acute on chronic diastolic heart failure with moderate AR, MR and Severe Pulmonary HTN and worsening renal failure. PALLIATIVE DIAGNOSES:  
 
1. Lethargy 2. Agitation 3. Hypercarbia 4. Respiratory failure (hypoxic ,hypercarbic ). 5. Worsening BOGDAN . 6. Acute on chronic heart failure 7. Generalized weakness 8. dementia 9. Goals of care 10. Full code review . PLAN:  
1. Prior to visit , I did chart review, including notes from recent hospitalization , MAR , Echo , documentation from nephrology , labs. I was asked by Nephrologist, Dr Vita Flores to revisit goals of care (see initial visit note from 1/29/2020). · Patient took a turn towards worse, in respiratory failure/distress, on Bipap, lethargic, Renal failure not a good candidate for dialysis. · Patient is on BIPAP , lethargic, agitated because she wants to urinate. · Family is gathered around patient . ·  Met with daughter Kelvin Hermosillo / primary MPOA and Joe Funes /secondary MPOA, they  understand , patient is worse  Due to fluid overload , based on her conversation with nephrologist,  near intubation , she is not a candidate for dialysis. 
  
· I offered family meeting to revisit code status,, goals of care , they are not ready for any palliative care conversation today. ·  her son is arriving this afternoon. Family is getting together this afternoon to discuss amongst themselves regarding her care . · Current goal is full code status , and full restorative care. · Advance directives in place refer to ACP NOTE . · Palliative team is available to follow tomorrow if family is ready . · Case discussed with Dr Abigail Vaca and Dr Jerri Cerda. 2. Initial consult note routed to primary continuity provider and/or primary health care team members 3. Communicated plan of care ith: Palliative IDT, [facility] Team 
 
 GOALS OF CARE / TREATMENT PREFERENCES:  
 
GOALS OF CARE: 
Patient/Health Care Proxy Stated Goals: Prolong life TREATMENT PREFERENCES:  
Code Status: Full Code Advance Care Planning: 
[x] [facility] Interdisciplinary Team has updated the ACP Navigator with Leydinhasifn and Patient Capacity Primary Decision Maker: Hannah Snyder - Child - [phone] Secondary Decision Maker: Sana eRich - Daughter - [phone] Advance Care Planning 2/1/2020 Patient's Healthcare Decision Maker is: - Confirm Advance Directive Yes, on file Patient Would Like to Complete Advance Directive - Does the patient have other document types - Medical Interventions: Full interventions Other Instructions: Other: As far as possible, the palliative care team has discussed with patient / health care proxy about goals of care / treatment preferences for patient. HISTORY:  
 
History obtained from: patient , mainly from daughter CHIEF COMPLAINT: generalized weakness HPI/SUBJECTIVE: The patient is:  
[] Verbal and participatory Patient is poor historian , per daughter patient was noted to have sob , she checked her pulse ox it was found to be 88%, she called her primary care physician who advised to bring her to ER . Currently patient is feeling weak , denies any sob , nausea or vomiting . Clinical Pain Assessment (nonverbal scale for severity on nonverbal patients):  
Clinical Pain Assessment Severity: 0 Duration: for how long has pt been experiencing pain (e.g., 2 days, 1 month, years) Frequency: how often pain is an issue (e.g., several times per day, once every few days, constant) FUNCTIONAL ASSESSMENT:  
 
Palliative Performance Scale (PPS): PPS: 20 
 
 
 PSYCHOSOCIAL/SPIRITUAL SCREENING:  
 
Palliative IDT has assessed this patient for cultural preferences / practices and a referral made as appropriate to needs (Cultural Services, Patient Advocacy, Ethics, etc.) Any spiritual / Taoism concerns: 
[] Yes /  [x] No 
 
Caregiver Burnout: 
[] Yes /  [x] No /  [] No Caregiver Present Anticipatory grief assessment:  
[x] Normal  / [] Maladaptive ESAS Anxiety: Anxiety: 0 
 
ESAS Depression: Depression: 0 REVIEW OF SYSTEMS:  
 
Positive and pertinent negative findings in ROS are noted above in HPI. The following systems were [] reviewed / [x] unable to be reviewed as noted in HPI Other findings are noted below. Systems: constitutional, ears/nose/mouth/throat, respiratory, gastrointestinal, genitourinary, musculoskeletal, integumentary, neurologic, psychiatric, endocrine. Positive findings noted below. Modified ESAS Completed by: provider Fatigue: 7 Drowsiness: 0 Depression: 0 Pain: 0 Anxiety: 0 Nausea: 0 Anorexia: 8 Dyspnea: 7 Constipation: (for past 3 days .) Stool Occurrence(s): 1 PHYSICAL EXAM:  
 
From RN flowsheet: 
Wt Readings from Last 3 Encounters:  
02/05/20 129 lb 12.8 oz (58.9 kg) 01/28/20 131 lb (59.4 kg) 01/24/20 137 lb 2 oz (62.2 kg) Blood pressure 159/59, pulse 78, temperature 97.4 °F (36.3 °C), resp. rate 19, height 5' (1.524 m), weight 129 lb 12.8 oz (58.9 kg), SpO2 96 %. Pain Scale 1: Numeric (0 - 10) Pain Intensity 1: 0 Last bowel movement, if known:  
 
Constitutional: Lethargic agitated on Bipap. Eyes: pupils equal, anicteric ENMT: no nasal discharge, moist mucous membranes Cardiovascular: regular rhythm, edema ++ Respiratory: breathing mild labored. Gastrointestinal: soft non-tender, +bowel sounds Musculoskeletal: generalized wasting. Skin: warm, dry Neurologic: lethargic , not following commands . Psychiatric: agitated. Other: 
 
 
 HISTORY:  
 
Active Problems: 
  CHF (congestive heart failure) ([facility] [place] [address] (1/28/2020) Past Medical History:  
Diagnosis Date  Aortic valve disorders AS  Asthma  Benign hypertensive heart disease without heart failure  CKD (chronic kidney disease)  Diabetes ([facility] [place] [address]  Fibromyalgia  Gastroparesis  GERD (gastroesophageal reflux disease)  Hypertension  Mitral valve disorders(424.0) MR  
 LIVIA (obstructive sleep apnea)  Paroxysmal atrial fibrillation (HCC)  Pure hypercholesterolemia 9/30/2010 Past Surgical History:  
Procedure Laterality Date  ECHO 2D ADULT  11/2009 LVH, normal LV wall motion and ejection fraction, mild aortic stenosis, moderate aortic regurgitation and mild mitral regurgitation. The ejection fraction was 55-60%.  ECHO 2D ADULT  1/2011 EF 60%, LAE, mild AS, AI, MR, PA low 40s  EVENT MONITOR POST SYMPTOMS  9/2010 Rare PACs, no arrythmia with symptoms  HX CHOLECYSTECTOMY  HX HYSTERECTOMY  LOOP MONITOR  9-10/2011 NSR  
 IA CARDIOVERSION ELECTIVE ARRHYTHMIA EXTERNAL N/A 2019 Ep Cardioversion performed by Christian Martinez MD at [facility]  IA ICAR CATHETER ABLATION ATRIOVENTR NODE FUNCTION N/A 2019 Ablation Av Node performed by Christian Martinez MD at [facility]  IA INS NEW/RPLC PRM PACEMAKER W/TRANSV ELTRD VENTR N/A 2019 Insert Ppm Single Ventricular performed by Christian Martinez MD at [facility]  STRESS TEST MYOVIEW  2011  
 no ischemia, EF 63%  US DUPLEX CAROTID BILATERAL  2011  
 mild bilat disease Family History Problem Relation Age of Onset  Heart Disease Father  Dementia Brother  Heart Disease Brother  Diabetes Brother History reviewed, no pertinent family history. Social History Tobacco Use  Smoking status: Former Smoker Last attempt to quit: 1963 Years since quittin.1  Smokeless tobacco: Never Used Substance Use Topics  Alcohol use: Not Currently Allergies Allergen Reactions  Latex, Natural Rubber Itching  Metformin Nausea and Vomiting Other reaction(s): nausea HEADACHE  Advil [Ibuprofen] Unknown (comments)  Ambien [Zolpidem] Unknown (comments) Altered mental status and per Dr. Yahaira Brewster, the patient should NOT be on this medication  Aspirin Unknown (comments)  Citalopram Other (comments) HEADACHE  Codeine Other (comments)  Iodinated Contrast Media Itching  Meloxicam Unknown (comments)  Penicillin G Unknown (comments)  Shellfish Containing Products Rash  Sulfa (Sulfonamide Antibiotics) Unknown (comments)  Tramadol Nausea and Vomiting and Other (comments) HEADACHE  Trazodone Unknown (comments) Altered mental status and per Dr. Yahaira Brewster, the patient should NOT be on this medication Current Facility-Administered Medications Medication Dose Route Frequency  bumetanide (BUMEX) injection 4 mg  4 mg IntraVENous BID  warfarin (COUMADIN) tablet 5 mg  5 mg Oral ONCE  
 LORazepam (ATIVAN) injection 0.26 mg  0.26 mg IntraVENous BID PRN  prednisoLONE acetate (PRED FORTE) 1 % ophthalmic suspension 1 Drop  1 Drop Right Eye DAILY  besifloxacin (BESIVANCE) 0.6 % ophthalmic suspension 1 Drop (Patient Supplied)  1 Drop Right Eye DAILY  dorzolamide (TRUSOPT) 2 % ophthalmic solution 1 Drop  1 Drop Right Eye TID  WARFARIN INFORMATION NOTE (COUMADIN)   Other QPM  
 diphenhydrAMINE (BENADRYL) capsule 25 mg  25 mg Oral Q8H PRN  
 melatonin tablet 3 mg  3 mg Oral QHS PRN  
 dilTIAZem CD (CARDIZEM CD) capsule 180 mg  180 mg Oral DAILY  metoprolol succinate (TOPROL-XL) XL tablet 100 mg  100 mg Oral DAILY  hydrALAZINE (APRESOLINE) 20 mg/mL injection 10 mg  10 mg IntraVENous Q6H PRN  
 albuterol-ipratropium (DUO-NEB) 2.5 MG-0.5 MG/3 ML  3 mL Nebulization Q6H PRN  
 ALPRAZolam (XANAX) tablet 0.25 mg  0.25 mg Oral PRN  pantoprazole (PROTONIX) tablet 40 mg  40 mg Oral ACB  levothyroxine (SYNTHROID) tablet 88 mcg  88 mcg Oral ACB  mirtazapine (REMERON) tablet 45 mg  45 mg Oral QHS  polyethylene glycol (MIRALAX) packet 17 g  17 g Oral DAILY PRN  
 sodium chloride (NS) flush 5-40 mL  5-40 mL IntraVENous Q8H  
 sodium chloride (NS) flush 5-40 mL  5-40 mL IntraVENous PRN  
 acetaminophen (TYLENOL) solution 650 mg  650 mg Oral Q4H PRN  
 docusate sodium (COLACE) capsule 100 mg  100 mg Oral BID  
 
 
 
 LAB AND IMAGING FINDINGS:  
 
Lab Results Component Value Date/Time WBC 6.2 02/05/2020 03:10 AM  
 HGB 7.9 (L) 02/06/2020 07:12 PM  
 PLATELET 519 56/34/6523 03:10 AM  
 
Lab Results Component Value Date/Time  Sodium 136 02/06/2020 03:54 PM  
 Sodium 135 (L) 02/06/2020 03:54 PM  
 Potassium 4.7 02/06/2020 03:54 PM  
 Potassium 4.7 02/06/2020 03:54 PM  
 Chloride 100 02/06/2020 03:54 PM  
 Chloride 100 02/06/2020 03:54 PM  
 CO2 29 02/06/2020 03:54 PM  
 CO2 28 02/06/2020 03:54 PM  
 BUN 75 (H) 02/06/2020 03:54 PM  
 BUN 75 (H) 02/06/2020 03:54 PM  
 Creatinine 3.16 (H) 02/06/2020 03:54 PM  
 Creatinine 3.16 (H) 02/06/2020 03:54 PM  
 Calcium 8.4 (L) 02/06/2020 03:54 PM  
 Calcium 8.3 (L) 02/06/2020 03:54 PM  
 Magnesium 1.7 04/07/2019 04:31 AM  
 Phosphorus 3.6 02/06/2020 03:54 PM  
  
Lab Results Component Value Date/Time AST (SGOT) 29 01/28/2020 09:28 AM  
 Alk. phosphatase 147 (H) 01/28/2020 09:28 AM  
 Protein, total 7.0 01/28/2020 09:28 AM  
 Albumin 3.2 (L) 02/06/2020 03:54 PM  
 Globulin 4.2 (H) 01/28/2020 09:28 AM  
 
Lab Results Component Value Date/Time INR 1.9 (H) 02/06/2020 03:54 PM  
 Prothrombin time 19.0 (H) 02/06/2020 03:54 PM  
  
Lab Results Component Value Date/Time Iron 33 (L) 03/26/2019 04:01 AM  
 TIBC 174 (L) 03/26/2019 04:01 AM  
 Iron % saturation 19 (L) 03/26/2019 04:01 AM  
  
No results found for: PH, PCO2, PO2 No components found for: Loc Point Lab Results Component Value Date/Time CK 81 06/30/2019 06:45 AM  
 CK - MB 2.6 02/26/2019 01:08 AM  
  
 
 
   
 
Total time:  
Counseling / coordination time, spent as noted above:  
> 50% counseling / coordination?:  
 
Prolonged service was provided for  []30 min   []75 min in face to face time in the presence of the patient, spent as noted above. Time Start:  
Time End:  
Note: this can only be billed with 61553 (initial) or 71344 (follow up). If multiple start / stop times, list each separately.

## 2020-02-06 NOTE — PROGRESS NOTES
Hospitalist Progress Note NAME: Tyrone Kaiser :  1935 MRN:  [id number]  
 
[id number] female with past medical history of diastolic congestive heart failure presented to the hospital with shortness of breath and was noted to have acute CHF exacerbation along with supratherapeutic INR from warfarin. During hospitalization, she developed acute kidney injury on Lasix was stopped. Assessment / Plan: 
Acute hypoxic respiratory failure Acute on chronic exacerbation of diastolic heart failure Severe pulmonary HTN 
-rapid response early AM, fluminant pulm edema on CXR 
-seen by pulm and nephrology 
-not a dialysis candidate 
-extensive discussion with daughter in person and later with son over the phone. Daughter states she realizes that the patient is dying but wants to wait for family arrives to discuss goals of care and code status 
-patient put on bipap 
-palliative consulted. Patient deferred discussion with palliative team 
-IV bumex increased but patient not putting out much urine with worsening renal function 
-History of heart failure preserved ejection fraction around 60% on the most recent echo 
-Presented with fluid overloaded and patchy infiltrates on x-ray with pleural effusion 
-Chronically on torsemide 5 mg daily: consult  
-Strict I's and O's, daily weights. Echocardiogram shows EF of 55% 
-Cardiology is following 
-VQ mismatch low probability for PE 
-patient is critical and hospice appropriate Supratherapeutic INR improved -INR peaked at 6.4 and improved 
-Coumadin restarted. INR is 1.7. Recheck in a.m. Pharmacy managing. Mild elevated Troponin due to CKD 
-Troponin peaked at 0.06  
  
A. fib Status post pacemaker insertion 2019 Continue warfarin Rate controlled with diltiazem and metoprolol will continue 
  
History of diabetes mellitus type 2 Off medications Most recent hemoglobin A1c 5.3 2019 
  
COPD Not in exacerbation Continue home inhalers 
  
Hypertension Continue home medication hydralazine 3 times daily, metoprolol, diltiazem Consider adding IV metoprolol if blood pressure is up, currently blood pressure is controlled 
  
Left lower extremity edema 
-Ultrasound Doppler showed no evidence of DVT 
  
CKD stage IV with worsening of creatinine 
 nephrology following ALB given, will check BMP tomorrow, consider us if not improved Poor oral intake 
-Consult speech therapy to evaluate for dysphagia 
-Dietary following Incidental Baker's cyst 
-Outpatient follow-up Updated daughter at bedside today 2.4.2020 discharge tomorrow when creatinine stable, will check BMP tomorrow am  
  
Code Status: Full code Surrogate Decision Maker: Her sons Li Earing Lillian 
  
DVT Prophylaxis:  SCDs due to high INR 
GI Prophylaxis: not indicated 
  
Baseline: Active, lives with her daughter Body mass index is 25.35 kg/m². Subjective: Chief Complaint / Reason for Physician Visit Patient on BiPAP, somewhat agitated. Cannot give any history Review of Systems: 
Symptom Y/N Comments  Symptom Y/N Comments Fever/Chills    Chest Pain Poor Appetite    Edema Cough    Abdominal Pain Sputum    Joint Pain SOB/HASKINS    Pruritis/Rash Nausea/vomit    Tolerating PT/OT Diarrhea    Tolerating Diet Constipation    Other Could NOT obtain due to: AMS Objective: VITALS:  
Last 24hrs VS reviewed since prior progress note. Most recent are: 
Patient Vitals for the past 24 hrs: 
 Temp Pulse Resp BP SpO2  
02/06/20 0838  73   98 % 02/06/20 0837  71   99 % 02/06/20 0836  71   98 % 02/06/20 0834  73   99 % 02/06/20 0830    128/40   
02/06/20 0825  74   100 % 02/06/20 0824  77   100 % 02/06/20 0823  77   100 % 02/06/20 0821  77   99 % 02/06/20 0819  77   100 % 02/06/20 0817  77   99 % 02/06/20 0816  77   99 % 02/06/20 0815  77   99 % 02/06/20 0814  77   99 % 02/06/20 0812  77   97 % 02/06/20 0811  77   97 % 02/06/20 0810  77   97 % 02/06/20 0809  78   98 % 02/06/20 0808  78   98 % 02/06/20 0807  81   97 % 02/06/20 0805  78   97 % 02/06/20 0804  78   97 % 02/06/20 0803  79   96 % 02/06/20 0802  79   96 % 02/06/20 0801  75   96 % 02/06/20 0800  75  135/44 96 % 02/06/20 0758  74   96 % 02/06/20 0757  72   96 % 02/06/20 0756  72   96 % 02/06/20 0754  73   97 % 02/06/20 0753  75   96 % 02/06/20 0747  74   98 % 02/06/20 0746  74   97 % 02/06/20 0745  75   98 % 02/06/20 0743  77   98 % 02/06/20 0741  92   98 % 02/06/20 0739  76   98 % 02/06/20 0738  77   98 % 02/06/20 0737  77   97 % 02/06/20 0736  77   97 % 02/06/20 0734  (!) 116   96 % 02/06/20 0733  78   97 % 02/06/20 0732  78   97 % 02/06/20 0730 97.6 °F (36.4 °C) 76 20 150/44 98 % 02/06/20 0729  78   97 % 02/06/20 0728  77   97 % 02/06/20 0727  78   97 % 02/06/20 0725  78   97 % 02/06/20 0724    152/46   
02/06/20 0723  78   97 % 02/06/20 0721  78   96 % 02/06/20 0719  78   96 % 02/06/20 0714  77   96 % 02/06/20 0713  73   95 % 02/06/20 0712  74   96 % 02/06/20 0710  73   96 % 02/06/20 0708  73   96 % 02/06/20 0707  77   96 % 02/06/20 0706  76   97 % 02/06/20 0704  72   95 % 02/06/20 0702  75   96 % 02/06/20 0700  76  134/48 96 % 02/06/20 0658  86   97 % 02/06/20 0656  74   99 % 02/06/20 0654  73   97 % 02/06/20 0652  73   97 % 02/06/20 0650  74   96 % 02/06/20 0649  74   97 % 02/06/20 0648  74   97 % 02/06/20 0647  73   98 % 02/06/20 0636  73   97 % 02/06/20 0635  73   97 % 02/06/20 0634    (!) 116/38   
02/06/20 0633  73   97 % 02/06/20 0631  74   97 % 02/06/20 0629  74   96 % 02/06/20 0627  75   96 % 02/06/20 0625  76   97 % 02/06/20 0623  75   96 % 02/06/20 0622  76   96 % 02/06/20 0621  76   96 % 02/06/20 0619  77   95 % 02/06/20 0618  78   95 % 02/06/20 0617  78   95 % 02/06/20 0616  74   96 % 02/06/20 0601  81   96 % 02/06/20 0559  82   94 % 02/06/20 0558  83   94 % 02/06/20 0557  85   92 % 02/06/20 0556  81   92 % 02/06/20 0555  82   92 % 02/06/20 0554  83   91 % 02/06/20 0552  75   (!) 82 % 02/06/20 0551  85   91 % 02/06/20 0550  87   93 % 02/06/20 0548  81   92 % 02/06/20 0547  80   91 % 02/06/20 0546  82   90 % 02/06/20 0545  78   91 % 02/06/20 0543  76   90 % 02/06/20 0542  76   (!) 89 % 02/06/20 0541  76   90 % 02/06/20 0540  75   90 % 02/06/20 0539  71   90 % 02/06/20 0538  70   90 % 02/06/20 0536  70   91 % 02/06/20 0535  70   93 % 02/06/20 0534  70   92 % 02/06/20 0532  70   92 % 02/06/20 0530  70   93 % 02/06/20 0529  70   93 % 02/06/20 0528  71   92 % 02/06/20 0527  70   93 % 02/06/20 0525  70   93 % 02/06/20 0523  70   93 % 02/06/20 0522  71   93 % 02/06/20 0521  70   93 % 02/06/20 0519  71   94 % 02/06/20 0518  72   94 % 02/06/20 0517  71   95 % 02/06/20 0516  72   95 % 02/06/20 0514  73   94 % 02/06/20 0513  71   95 % 02/06/20 0512  73   94 % 02/06/20 0510  73   95 % 02/06/20 0509  73   94 % 02/06/20 0508  73   95 % 02/06/20 0507  74   95 % 02/06/20 0505  73   96 % 02/06/20 0504  73   96 % 02/06/20 0503  74   96 % 02/06/20 0501  72   95 % 02/06/20 0500  72   95 % 02/06/20 0459  74   94 % 02/06/20 0458  73   93 % 02/06/20 0456  73   95 % 02/06/20 0455  73   95 % 02/06/20 0454  73   95 % 02/06/20 0453  74   94 % 02/06/20 0452  73   94 % 02/06/20 0451  73   95 % 02/06/20 0449  74   94 % 02/06/20 0447  74   94 % 02/06/20 0435     92 % 02/06/20 0434  74 28 151/43 90 % 02/06/20 0401  70 28  (!) 84 % 02/06/20 0329 98.8 °F (37.1 °C) 70 22 (!) 149/35 (!) 88 % 02/06/20 0245  78  (!) 147/91   
02/06/20 0223     (!) 86 % 02/05/20 2311 98.2 °F (36.8 °C) 76 22 (!) 159/31 92 % 02/05/20 2146  75 18 (!) 131/31 96 % 02/05/20 2007 97.8 °F (36.6 °C) 72 20 124/44 100 % 02/05/20 1547 97.8 °F (36.6 °C) 70 18 127/50 100 % 02/05/20 1218 97.4 °F (36.3 °C) 72 20 117/76 100 % Intake/Output Summary (Last 24 hours) at 2/6/2020 1007 Last data filed at 2/6/2020 1650 Gross per 24 hour Intake 60 ml Output 200 ml Net -140 ml PHYSICAL EXAM: 
General: Ill-appearing adult AA female, moderate distress ENT:  Patrick Bloom. Anicteric sclerae. MMM Resp:  Coarse BS, crackles are present, no wheezing CV:  Regular  rhythm,  No edema GI:  Soft, Non distended, Non tender.  +Bowel sounds Neurologic:  Alert and awake Psych:   deferred Skin:  No rashes. No jaundice Reviewed most current lab test results and cultures  YES Reviewed most current radiology test results   YES Review and summation of old records today    NO Reviewed patient's current orders and MAR    YES 
PMH/SH reviewed - no change compared to H&P 
 
________________________________________________________________________ Care Plan discussed with: 
  Comments Patient y Family  y children RN y   
Care Manager Consultant Multidiciplinary team rounds were held today with , nursing, pharmacist and clinical coordinator. Patient's plan of care was discussed; medications were reviewed and discharge planning was addressed. ________________________________________________________________________ Total NON critical care TIME:  40   Minutes Total CRITICAL CARE TIME Spent:   Minutes non procedure based Comments >50% of visit spent in counseling and coordination of care x   
________________________________________________________________________ Dia Willard DO  
 
Procedures: see electronic medical records for all procedures/Xrays and details which were not copied into this note but were reviewed prior to creation of Plan. LABS: 
I reviewed today's most current labs and imaging studies. Pertinent labs include: 
Recent Labs 02/05/20 0310 WBC 6.2 HGB 7.4* HCT 25.3*  
 Recent Labs 02/06/20 
0501 02/05/20 0310 02/04/20 
1324 NA  --  141  --   
K  --  4.5  --   
CL  --  106  --   
CO2  --  31  --   
GLU  --  107*  --   
BUN  --  70*  --   
CREA  --  2.86*  --   
CA  --  7.6*  --   
INR 1.8* 2.0* 1.9* Signed: Dia Willard DO

## 2020-02-06 NOTE — PROGRESS NOTES
Nutrition Assessment: 
 
INTERVENTIONS/RECOMMENDATIONS:  
· Continue diet · Continue supplements · More aggressive nutrition interventions pending [facility] 
 
ASSESSMENT:  
Chart reviewed. Pt had rapid response this morning and placed on BiPAP, will defer visit to a late time. Noted that palliative has been consulted. Flowsheet documents fair appetite and 25-50% meals consumed on 2/4. Diet Order: Puree(2g Na) 
% Eaten:   
Patient Vitals for the past 72 hrs: 
 % Diet Eaten 02/04/20 1800 25 % 02/04/20 1406 50 % 02/04/20 0851 25 % 02/04/20 0830 0 % Pertinent Medications: [x]Reviewed: bumex, colace, remeron, PPI, warfarin Pertinent Labs: [x]Reviewed:  
Food Allergies: [x]NKFA  []Other Last BM:  2/4 Edema:      []RUE   []LUE   [x]RLE 2+  [x]LLE 2+ Pressure Ulcer:      [x] Stage I (sacral wound)  [] Stage II   [] Stage III   [] Stage IV Anthropometrics: Height: 5' (152.4 cm) Weight: 58.9 kg (129 lb 12.8 oz) IBW (%IBW):   ( ) UBW (%UBW):   (  %) BMI: Body mass index is 25.35 kg/m². This BMI is indicative of: 
[]Underweight   []Normal   [x]Overweight   [] Obesity   [] Extreme Obesity (BMI>40) Last Weight Metrics: 
Weight Loss Metrics 2/5/2020 1/28/2020 1/28/2020 1/28/2020 1/24/2020 1/16/2020 12/23/2019 Today's Wt 129 lb 12.8 oz - 131 lb - 137 lb 2 oz 133 lb 138 lb BMI - 25.35 kg/m2 - 25.58 kg/m2 26.78 kg/m2 25.97 kg/m2 26.95 kg/m2 Estimated Nutrition Needs (Based on): 1200 Kcals/day(BMR (975) x AF 1.2) , 50 g(-60 g (0.8-1.0 g/kg BW)) Protein Carbohydrate: At Least 130 g/day  Fluids: 1200 mL/day or per primary team 
 
NUTRITION DIAGNOSES:  
Problem:  Inadequate protein-energy intake Etiology: related to dementia Signs/Symptoms: as evidenced by poor po intake/refusing foods Previous Nutrition Dx:  [] Resolved  [] Unresolved           [x] Progressing NUTRITION INTERVENTIONS: 
Meals/Snacks: General/healthful diet, Modify diet/texture/consistency/nutrients   Supplements: Commercial supplement GOAL:  
consume ~50% meals 240 mL ONS next 2-4 days NUTRITION MONITORING AND EVALUATION Food/Nutrient Intake Outcomes: Total energy intake Physical Signs/Symptoms Outcomes: Weight/weight change, Glucose profile Previous Goal Met: 
 [] Met              [x] Progressing Towards Goal              [] Not Progressing Towards Goal  
Previous Recommendations: 
 [x] Implemented          [] Not Implemented          [] Not Applicable LEARNING NEEDS (Diet, Food/Nutrient-Drug Interaction):  
 [x] None Identified 
 [] Identified and Education Provided/Documented 
 [] Identified and Pt declined/was not appropriate Cultural, Oriental orthodox, OR Ethnic Dietary Needs:  
 [x] None Identified 
 [] Identified and Addressed 
 
 [x] Interdisciplinary Care Plan Reviewed/Documented  
 [x] Discharge Planning: TBD [] Participated in Interdisciplinary Rounds NUTRITION RISK:  
 [x] High     to        [x] Moderate           []  Low  []  Minimal/Uncompromised Daria Glass RDN Pager [phone] Weekend Pager [phone]

## 2020-02-06 NOTE — PROGRESS NOTES
Pharmacy Daily Dosing of Warfarin Indication: A. Fib Goal INR: 2 - 3 PTA Warfarin Dose: 5 mg Mon/Wed/Thur/Sat and 2.5 mg on Tue/Fri/Sun  
 
Concurrent anticoagulants: none Concurrent antiplatelet: none Major Interacting Medications ? Drugs that may increase INR: none ? Drugs that may decrease INR: none Conditions that may increase/decrease INR (CHF, C. diff, cirrhosis, thyroid disorder, hypoalbuminemia): CHF, on Levothyroxine Labs: 
Recent Labs 02/06/20 
0501 02/05/20 
0310 02/04/20 
1324 INR 1.8* 2.0* 1.9* HGB  --  7.4*  --   
PLT  --  241  -- Impression/Plan: · Will order warfarin 5 mg PO x 1 dose again today (Pt's home dose average = 3.92 mg/day, pt still in reloading phase) · Daily INR · CBC w/o diff every other day Pharmacy will follow daily and adjust the dose as appropriate. Thanks Adrien Covarrubias, [facility]

## 2020-02-06 NOTE — PROGRESS NOTES
Nephrology Progress Note [place] [address] [facility] 
[email or web address]. [email or web address]                  Phone - [phone] Patient: Katelyn Kim Date- 2/6/2020 Admit Date: 1/28/2020 YOB: 1935 CC: Follow up for [facility] Subjective: Interval History:  
-no labs done today Resp. Status worse Now on BIPAP She received lasix and albumin last night ROS:-Can't access due to patient's current condition Assessment & Plan: BOGDAN LIkely due to dehydration vs CARDIORENAL Syndrome- FEUREA 16% AND FENA 0.26% ( WITH DIURETICS USE ) 
 
resp distress - pulmonary edema, ple effusion 
 
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 1.9 DM 2-  Diet CONTROLLED H/o hypertension Resp. Failure, chf 
H/o pulmonary htn 
afib on coumadin 
  
  
PLAN- 
bumex 4 mg bid Check bmp now She is not a good dialysis candidate. She remained full code. Consult for palliative care noted. Check bmp in am 
Check renal usg Stop hydralazine D/w daughter at bedside. Son is POA as per daughter. 
Mauri Browning Physical exam:  
GEN: mod. distress NECK- no mass RESP: coarse b/l, + wheezing,increased  accessory muscle use CVS: S1,S2  RRR 
ABDO: soft , non tender, No mass NEURO: Can't access due to patient's current condition EXT: + Edema Care Plan discussed with: nurse, DR. Nicolas, Daughter Objective:  
Visit Vitals /40 Pulse 73 Temp 97.6 °F (36.4 °C) Resp 20 Ht 5' (1.524 m) Wt 58.9 kg (129 lb 12.8 oz) SpO2 98% BMI 25.35 kg/m² Last 3 Recorded Weights in this Encounter 02/03/20 1996 02/04/20 6104 02/05/20 0122 Weight: 58.4 kg (128 lb 12 oz) 58.8 kg (129 lb 10.1 oz) 58.9 kg (129 lb 12.8 oz) 02/04 1901 - 02/06 0700 In: 260 [P.O.:260] Out: 400 [Urine:400] Intake/Output Summary (Last 24 hours) at 2/6/2020 1005 Last data filed at 2/6/2020 9978 Gross per 24 hour Intake 60 ml Output 200 ml Net -140 ml  
  
 Chart reviewed. Pertinent Notes reviewed. Medication list  reviewed Current Facility-Administered Medications Medication  bumetanide (BUMEX) injection 4 mg  prednisoLONE acetate (PRED FORTE) 1 % ophthalmic suspension 1 Drop  besifloxacin (BESIVANCE) 0.6 % ophthalmic suspension 1 Drop (Patient Supplied)  dorzolamide (TRUSOPT) 2 % ophthalmic solution 1 Drop  WARFARIN INFORMATION NOTE (COUMADIN)  diphenhydrAMINE (BENADRYL) capsule 25 mg  
 melatonin tablet 3 mg  dilTIAZem CD (CARDIZEM CD) capsule 180 mg  
 metoprolol succinate (TOPROL-XL) XL tablet 100 mg  hydrALAZINE (APRESOLINE) 20 mg/mL injection 10 mg  
 albuterol-ipratropium (DUO-NEB) 2.5 MG-0.5 MG/3 ML  
 ALPRAZolam (XANAX) tablet 0.25 mg  
 pantoprazole (PROTONIX) tablet 40 mg  
 levothyroxine (SYNTHROID) tablet 88 mcg  mirtazapine (REMERON) tablet 45 mg  
 polyethylene glycol (MIRALAX) packet 17 g  
 sodium chloride (NS) flush 5-40 mL  sodium chloride (NS) flush 5-40 mL  acetaminophen (TYLENOL) solution 650 mg  
 docusate sodium (COLACE) capsule 100 mg Data Review : 
Recent Labs 02/05/20 0310   
K 4.5  
 CO2 31 BUN 70* CREA 2.86* * CA 7.6* Recent Labs 02/05/20 0310 WBC 6.2 HGB 7.4* HCT 25.3*  
 No results for input(s): FE, TIBC, PSAT, FERR in the last 72 hours. Recent Labs 02/05/20 0310 02/04/20 2052 TROIQ 0.06* 0.06* Lab Results Component Value Date/Time  Color YELLOW/STRAW 01/24/2020 05:02 PM  
 Appearance CLEAR 01/24/2020 05:02 PM  
 Specific gravity 1.025 01/24/2020 05:02 PM  
 Specific gravity 1.016 03/16/2019 10:58 AM  
 pH (UA) 5.5 01/24/2020 05:02 PM  
 Protein >300 (A) 01/24/2020 05:02 PM  
 Glucose NEGATIVE  01/24/2020 05:02 PM  
 Ketone NEGATIVE  01/24/2020 05:02 PM  
 Bilirubin NEGATIVE  01/24/2020 05:02 PM  
 Urobilinogen 0.2 01/24/2020 05:02 PM  
 Nitrites NEGATIVE  01/24/2020 05:02 PM  
 Leukocyte Esterase NEGATIVE  01/24/2020 05:02 PM  
 Epithelial cells FEW 01/24/2020 05:02 PM  
 Bacteria NEGATIVE  01/24/2020 05:02 PM  
 WBC 5-10 01/24/2020 05:02 PM  
 RBC 5-10 01/24/2020 05:02 PM  
 
Lab Results Component Value Date/Time Culture result: No growth (<1,000 CFU/ML) 01/24/2020 05:02 PM  
 Culture result: NO GROWTH 6 DAYS 04/06/2019 05:29 AM  
 Culture result: NO GROWTH 6 DAYS 04/06/2019 05:26 AM  
 
No results found for: SDES Lab Results Component Value Date/Time Sodium,urine random 23 02/05/2020 04:01 PM  
 Creatinine, urine 151.00 02/05/2020 04:01 PM  
 
Results from Hospital Encounter encounter on 01/28/20 XR CHEST PORT Narrative EXAM:  CR chest portable INDICATION: Shortness of breath COMPARISON: 2/3/2020 at 0849 hours. TECHNIQUE: Portable AP semiupright chest view at 0227 hours FINDINGS: The left chest ICD and wire are stable. Cardiac monitoring wires 
overlie the thorax. There is stable cardiac silhouette enlargement. Bilateral 
interstitial and airspace opacities and pleural effusions are stable. There is 
no pneumothorax. The bones and upper abdomen are stable. Impression IMPRESSION: Stable bilateral interstitial and airspace opacities and pleural 
effusions. Ana Teresa MD 
[facility] 
 www. [facility] / [employer] [address] [place], [address] [place] Phone - [phone] Fax - [phone]

## 2020-02-07 NOTE — PROGRESS NOTES
Nephrology Progress Note [facility] 
www. [email or web address]                  Phone - [phone] Patient: Haider Hill Date- 2/7/2020 Admit Date: 1/28/2020 YOB: 1935 CC: Follow up for ACUTE KIDNEY INJURY Subjective: Interval History:  
-no labs done today Resp. Status worse Now on BIPAP She received lasix and albumin last night ROS:-Can't access due to patient's current condition Assessment & Plan:  
 
ACUTE KIDNEY INJURY LIkely due to dehydration + CARDIORENAL Syndrome- FEUREA 16% AND FENA 0.26% ( WITH DIURETICS USE ) 
 
resp distress - pulmonary edema, ple effusion 
 
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 1.9 DM 2-  Diet CONTROLLED H/o hypertension Resp. Failure, chf 
H/o pulmonary htn 
afib on coumadin 
  
  
PLAN- Continue bumex No DIALYSIS AT PRESENT PER SON AND DAUGHTER She is not a good dialysis candidate. She remained full code. Check bmp in am 
Avoid hypotension 
 POOR PROGNOSIS Physical exam:  
GEN: mild  distress NECK- no mass RESP: decreased air movement, no wheezing CVS: S1,S2  RRR 
ABDO: soft ,non tender NEURO: Can't access due to patient's current condition EXT: + Edema Care Plan discussed with: nurse, Daughter, son - Ontariodat Ortega via phone Objective:  
Visit Vitals /54 (BP 1 Location: Right arm, BP Patient Position: At rest) Pulse 86 Temp 97.1 °F (36.2 °C) Resp 20 Ht 5' (1.524 m) Wt 63.5 kg (140 lb) SpO2 99% BMI 27.34 kg/m² Last 3 Recorded Weights in this Encounter 02/04/20 8040 02/05/20 9632 02/07/20 1967 Weight: 58.8 kg (129 lb 10.1 oz) 58.9 kg (129 lb 12.8 oz) 63.5 kg (140 lb) 02/05 1901 - 02/07 0700 In: 310 [P.O.:310] Out: 980 [Urine:980] Intake/Output Summary (Last 24 hours) at 2/7/2020 4670 Last data filed at 2/7/2020 4209 Gross per 24 hour Intake 250 ml Output 980 ml Net -730 ml  
  
 Chart reviewed. Pertinent Notes reviewed. Medication list  reviewed Current Facility-Administered Medications Medication  bumetanide (BUMEX) injection 4 mg  LORazepam (ATIVAN) injection 0.5 mg  
 prednisoLONE acetate (PRED FORTE) 1 % ophthalmic suspension 1 Drop  besifloxacin (BESIVANCE) 0.6 % ophthalmic suspension 1 Drop (Patient Supplied)  dorzolamide (TRUSOPT) 2 % ophthalmic solution 1 Drop  WARFARIN INFORMATION NOTE (COUMADIN)  diphenhydrAMINE (BENADRYL) capsule 25 mg  
 melatonin tablet 3 mg  dilTIAZem CD (CARDIZEM CD) capsule 180 mg  
 metoprolol succinate (TOPROL-XL) XL tablet 100 mg  hydrALAZINE (APRESOLINE) 20 mg/mL injection 10 mg  
 albuterol-ipratropium (DUO-NEB) 2.5 MG-0.5 MG/3 ML  
 pantoprazole (PROTONIX) tablet 40 mg  
 levothyroxine (SYNTHROID) tablet 88 mcg  mirtazapine (REMERON) tablet 45 mg  
 polyethylene glycol (MIRALAX) packet 17 g  
 sodium chloride (NS) flush 5-40 mL  sodium chloride (NS) flush 5-40 mL  acetaminophen (TYLENOL) solution 650 mg  
 docusate sodium (COLACE) capsule 100 mg Data Review : 
Recent Labs 02/07/20 
7963 02/06/20 
1554 02/05/20 0310  135*  136 141  
K 4.3 4.7  4.7 4.5  
 100  100 106 CO2 29 28  29 31 BUN 81* 75*  75* 70* CREA 3.26* 3.16*  3.16* 2.86*  110*  112* 107* CA 8.3* 8.3*  8.4* 7.6* PHOS  --  3.6  --   
 
Recent Labs 02/07/20 
7872 02/06/20 
1912 02/05/20 0310 WBC 9.5  --  6.2 HGB 8.0* 7.9* 7.4* HCT 26.2* 25.7* 25.3*  
  --  241 No results for input(s): FE, TIBC, PSAT, FERR in the last 72 hours. Recent Labs 02/05/20 
0310 02/04/20 2052 TROIQ 0.06* 0.06* Lab Results Component Value Date/Time  Color YELLOW/STRAW 01/24/2020 05:02 PM  
 Appearance CLEAR 01/24/2020 05:02 PM  
 Specific gravity 1.025 01/24/2020 05:02 PM  
 Specific gravity 1.016 03/16/2019 10:58 AM  
 pH (UA) 5.5 01/24/2020 05:02 PM  
 Protein >300 (A) 01/24/2020 05:02 PM  
 Glucose NEGATIVE  01/24/2020 05:02 PM  
 Ketone NEGATIVE  01/24/2020 05:02 PM  
 Bilirubin NEGATIVE  01/24/2020 05:02 PM  
 Urobilinogen 0.2 01/24/2020 05:02 PM  
 Nitrites NEGATIVE  01/24/2020 05:02 PM  
 Leukocyte Esterase NEGATIVE  01/24/2020 05:02 PM  
 Epithelial cells FEW 01/24/2020 05:02 PM  
 Bacteria NEGATIVE  01/24/2020 05:02 PM  
 WBC 5-10 01/24/2020 05:02 PM  
 RBC 5-10 01/24/2020 05:02 PM  
 
Lab Results Component Value Date/Time Culture result: No growth (<1,000 CFU/ML) 01/24/2020 05:02 PM  
 Culture result: NO GROWTH 6 DAYS 04/06/2019 05:29 AM  
 Culture result: NO GROWTH 6 DAYS 04/06/2019 05:26 AM  
 
No results found for: SDES Lab Results Component Value Date/Time Sodium,urine random 23 02/05/2020 04:01 PM  
 Creatinine, urine 151.00 02/05/2020 04:01 PM  
 
Results from Hospital Encounter encounter on 01/28/20 XR CHEST PORT Narrative EXAM:  CR chest portable INDICATION: Shortness of breath COMPARISON: 2/3/2020 at 0849 hours. TECHNIQUE: Portable AP semiupright chest view at 0227 hours FINDINGS: The left chest ICD and wire are stable. Cardiac monitoring wires 
overlie the thorax. There is stable cardiac silhouette enlargement. Bilateral 
interstitial and airspace opacities and pleural effusions are stable. There is 
no pneumothorax. The bones and upper abdomen are stable. Impression IMPRESSION: Stable bilateral interstitial and airspace opacities and pleural 
effusions. Tanya Burgess MD 
[facility] 
 [email or web address] [place] / [employer] [address], [address] [place], [address] Phone - [phone] Fax - [phone]

## 2020-02-07 NOTE — PROGRESS NOTES
Bedside and Verbal shift change report GIVEN TO , RN. Report included the following information Kardex. SIGNIFICANT CHANGES DURING SHIFT:   
Sitter at bedside ,at all times for safety ,so pt will not pull off Bipap CONCERNS TO ADDRESS WITH MD:   
 
 
 
 
[address] NURSING NOTE Admission Date 1/28/2020 Admission Diagnosis CHF (congestive heart failure) ([facility] [place] [address] [I50.9] Consults IP CONSULT TO PALLIATIVE CARE - PROVIDER 
IP CONSULT TO NEPHROLOGY 
IP CONSULT TO PALLIATIVE CARE - PROVIDER 
IP CONSULT TO CARDIOLOGY 
IP CONSULT TO PULMONOLOGY Cardiac Monitoring [] Yes [] No  
  
Purposeful Hourly Rounding [] Yes   
Judd Score Total Score: 3 Judd score 3 or > [] Bed Alarm [] Avasys [] 1:1 sitter [] Patient refused (Signed refusal form in chart) Rafiq Score Rafiq Score: 16 Rafiq score 14 or < [] PMT consult [] Wound Care consult  
 []  Specialty bed  [] Nutrition consult Influenza Vaccine Received Flu Vaccine for Current Season (usually Sept-March): Yes Oxygen needs? [] Room air Oxygen @  []1L    []2L    []3L   []4L    []5L   []6L via NC Chronic home O2 use? [] Yes [] No 
Perform O2 challenge test and document in progress note using [employer] (.Homeoxygen) Last bowel movement Last Bowel Movement Date: 02/04/20 Urinary Catheter External Female Catheter 02/03/20-Urine Output (mL): 200 ml LDAs Peripheral IV 02/06/20 Right Arm (Active) Site Assessment Clean, dry, & intact 2/6/2020  7:26 PM  
Phlebitis Assessment 0 2/6/2020  4:34 AM  
Infiltration Assessment 0 2/6/2020  4:34 AM  
Dressing Status Clean, dry, & intact 2/6/2020  4:34 AM  
Dressing Type Transparent 2/6/2020  4:34 AM  
Hub Color/Line Status Yellow 2/6/2020  4:34 AM  
      
External Female Catheter 02/03/20 (Active) Site Assessment Clean, dry, & intact 2/6/2020  7:26 PM  
Repositioned Yes 2/6/2020  4:34 AM  
Perineal Care Yes 2/6/2020  4:34 AM  
 Wick Changed Yes 2/6/2020  4:34 AM  
Suction Canister/Tubing Changed No 2/6/2020  4:34 AM  
Urine Output (mL) 200 ml 2/6/2020  3:50 PM  
            
  
Readmission Risk Assessment Tool Score High Risk 39 Total Score 3 Has Seen PCP in Last 6 Months (Yes=3, No=0) 3 Patient Length of Stay (>5 days = 3) 9 IP Visits Last 12 Months (1-3=4, 4=9, >4=11) 5 Pt. Coverage (Medicare=5 , Medicaid, or Self-Pay=4) 16 Charlson Comorbidity Score (Age + Comorbid Conditions) Criteria that do not apply:  
 . Living with Significant Other. Assisted Living. LTAC. SNF. or  
Rehab Expected Length of Stay 4d 2h Actual Length of Stay 9  
  
 
 
 No difficulties

## 2020-02-07 NOTE — PROGRESS NOTES
Dr. Royer Crooks came in to see family as did Dr. Vidal Marcano. Dr. Royer Crooks wanted to try patient on nasal 02. She is on 4 lpm and is comfortable with sats of 100%. She is not moving and has not gotten out of bed. Will attempt to feed her.

## 2020-02-07 NOTE — PROGRESS NOTES
Received report from Heather Galo in the room as is family. Patient resting quietyly. Tolerating BIPAP

## 2020-02-07 NOTE — PROGRESS NOTES
Problem: Falls - Risk of 
Goal: *Absence of Falls Description Document Cynthia Alanis Fall Risk and appropriate interventions in the flowsheet. Outcome: Progressing Towards Goal 
Note: Fall Risk Interventions: 
Mobility Interventions: Bed/chair exit alarm, OT consult for ADLs, PT Consult for mobility concerns, Patient to call before getting OOB Mentation Interventions: Adequate sleep, hydration, pain control, Bed/chair exit alarm, Door open when patient unattended Medication Interventions: Bed/chair exit alarm, Patient to call before getting OOB Elimination Interventions: Call light in reach, Patient to call for help with toileting needs History of Falls Interventions: Bed/chair exit alarm, Door open when patient unattended Oriented - self; Oriented - place; Oriented - time

## 2020-02-07 NOTE — PROGRESS NOTES
[facility], Critical Care, and Sleep Medicine Initial Patient Consult Name: Edelmira Melara MRN: [id number] : 1935 Hospital: [facility] Date: 2020 IMPRESSION:  
· Acute respiratory failure · Pulmonary edema · BOGDAN  
  
RECOMMENDATIONS:  
· Wean off BIPAP · Try to wean O2 
· Continue diuretics · Renal failure worse · Poor overall prognosis · Palliative care · Not a good candidate for heroics · Not a good candidate for RRT 
· Still a full code · Will follow prn during the weekend Subjective: More comfortable this am 
Still on bipap Current Facility-Administered Medications Medication Dose Route Frequency  bumetanide (BUMEX) injection 4 mg  4 mg IntraVENous BID  prednisoLONE acetate (PRED FORTE) 1 % ophthalmic suspension 1 Drop  1 Drop Right Eye DAILY  besifloxacin (BESIVANCE) 0.6 % ophthalmic suspension 1 Drop (Patient Supplied)  1 Drop Right Eye DAILY  dorzolamide (TRUSOPT) 2 % ophthalmic solution 1 Drop  1 Drop Right Eye TID  WARFARIN INFORMATION NOTE (COUMADIN)   Other QPM  
 dilTIAZem CD (CARDIZEM CD) capsule 180 mg  180 mg Oral DAILY  metoprolol succinate (TOPROL-XL) XL tablet 100 mg  100 mg Oral DAILY  pantoprazole (PROTONIX) tablet 40 mg  40 mg Oral ACB  levothyroxine (SYNTHROID) tablet 88 mcg  88 mcg Oral ACB  mirtazapine (REMERON) tablet 45 mg  45 mg Oral QHS  sodium chloride (NS) flush 5-40 mL  5-40 mL IntraVENous Q8H  
 docusate sodium (COLACE) capsule 100 mg  100 mg Oral BID Review of Systems: 
Review of systems not obtained due to patient factors. Objective:  
Vital Signs:   
Visit Vitals /54 (BP 1 Location: Right arm, BP Patient Position: At rest) Pulse 86 Temp 97.1 °F (36.2 °C) Resp 20 Ht 5' (1.524 m) Wt 63.5 kg (140 lb) SpO2 99% BMI 27.34 kg/m² O2 Device: BIPAP  
O2 Flow Rate (L/min): 4 l/min Temp (24hrs), Av.6 °F (36.4 °C), Min:97.1 °F (36.2 °C), Max:98.1 °F (36.7 °C) Intake/Output:  
Last shift:      No intake/output data recorded. Last 3 shifts:  1901 -  0700 In: 310 [P.O.:310] Out: 980 [Urine:980] Intake/Output Summary (Last 24 hours) at 2020 7720 Last data filed at 2020 5112 Gross per 24 hour Intake 250 ml Output 980 ml Net -730 ml Physical Exam:  
General:  Alert, cooperative,in distress, appears stated age. Head:  Normocephalic, without obvious abnormality, atraumatic. Eyes:  Conjunctivae/corneas clear. PERRL, EOMs intact. Nose: bipap mask in place Throat: bipap mask in place. Neck: Supple, symmetrical, trachea midline, no adenopathy, thyroid: no enlargment/tenderness/nodules, no carotid bruit and no JVD. Back:   Symmetric, no curvature. ROM normal.  
Lungs:   Rales bilaterally. Chest wall:  No tenderness or deformity. Heart:  Regular rate and rhythm, S1, S2 normal, no murmur, click, rub or gallop. Abdomen:   Soft, non-tender. Bowel sounds normal. No masses,  No organomegaly. Extremities: Extremities normal, atraumatic, no cyanosis or edema. Pulses: 2+ and symmetric all extremities. Skin: Skin color, texture, turgor normal. No rashes or lesions Lymph nodes: Cervical, supraclavicular, and axillary nodes normal.  
Neurologic: Grossly nonfocal  
 
Data review:  
 
Recent Results (from the past 24 hour(s)) PROTHROMBIN TIME + INR Collection Time: 20  3:54 PM  
Result Value Ref Range INR 1.9 (H) 0.9 - 1.1 Prothrombin time 19.0 (H) 9.0 - 11.1 sec METABOLIC PANEL, BASIC Collection Time: 20  3:54 PM  
Result Value Ref Range Sodium 136 136 - 145 mmol/L Potassium 4.7 3.5 - 5.1 mmol/L Chloride 100 97 - 108 mmol/L  
 CO2 29 21 - 32 mmol/L Anion gap 7 5 - 15 mmol/L Glucose 112 (H) 65 - 100 mg/dL BUN 75 (H) 6 - 20 MG/DL  Creatinine 3.16 (H) 0.55 - 1.02 MG/DL  
 BUN/Creatinine ratio 24 (H) 12 - 20 GFR est AA 17 (L) >60 ml/min/1.73m2 GFR est non-AA 14 (L) >60 ml/min/1.73m2 Calcium 8.4 (L) 8.5 - 10.1 MG/DL RENAL FUNCTION PANEL Collection Time: 02/06/20  3:54 PM  
Result Value Ref Range Sodium 135 (L) 136 - 145 mmol/L Potassium 4.7 3.5 - 5.1 mmol/L Chloride 100 97 - 108 mmol/L  
 CO2 28 21 - 32 mmol/L Anion gap 7 5 - 15 mmol/L Glucose 110 (H) 65 - 100 mg/dL BUN 75 (H) 6 - 20 MG/DL Creatinine 3.16 (H) 0.55 - 1.02 MG/DL  
 BUN/Creatinine ratio 24 (H) 12 - 20 GFR est AA 17 (L) >60 ml/min/1.73m2 GFR est non-AA 14 (L) >60 ml/min/1.73m2 Calcium 8.3 (L) 8.5 - 10.1 MG/DL Phosphorus 3.6 2.6 - 4.7 MG/DL Albumin 3.2 (L) 3.5 - 5.0 g/dL HGB & HCT Collection Time: 02/06/20  7:12 PM  
Result Value Ref Range HGB 7.9 (L) 11.5 - 16.0 g/dL HCT 25.7 (L) 35.0 - 47.0 % PROTHROMBIN TIME + INR Collection Time: 02/07/20  3:56 AM  
Result Value Ref Range INR 2.1 (H) 0.9 - 1.1 Prothrombin time 20.3 (H) 9.0 - 11.1 sec CBC W/O DIFF Collection Time: 02/07/20  3:56 AM  
Result Value Ref Range WBC 9.5 3.6 - 11.0 K/uL  
 RBC 2.85 (L) 3.80 - 5.20 M/uL HGB 8.0 (L) 11.5 - 16.0 g/dL HCT 26.2 (L) 35.0 - 47.0 % MCV 91.9 80.0 - 99.0 FL  
 MCH 28.1 26.0 - 34.0 PG  
 MCHC 30.5 30.0 - 36.5 g/dL  
 RDW 16.0 (H) 11.5 - 14.5 % PLATELET 543 211 - 260 K/uL MPV 11.9 8.9 - 12.9 FL  
 NRBC 0.0 0  WBC ABSOLUTE NRBC 0.00 0.00 - 0.01 K/uL METABOLIC PANEL, BASIC Collection Time: 02/07/20  6:56 AM  
Result Value Ref Range Sodium 136 136 - 145 mmol/L Potassium 4.3 3.5 - 5.1 mmol/L Chloride 101 97 - 108 mmol/L  
 CO2 29 21 - 32 mmol/L Anion gap 6 5 - 15 mmol/L Glucose 100 65 - 100 mg/dL BUN 81 (H) 6 - 20 MG/DL Creatinine 3.26 (H) 0.55 - 1.02 MG/DL  
 BUN/Creatinine ratio 25 (H) 12 - 20 GFR est AA 16 (L) >60 ml/min/1.73m2 GFR est non-AA 14 (L) >60 ml/min/1.73m2 Calcium 8.3 (L) 8.5 - 10.1 MG/DL Imaging: 
I have personally reviewed the patients radiographs and have reviewed the reports: CXR: severe pulmonary edema Elie Ding MD

## 2020-02-07 NOTE — PROGRESS NOTES
Problem: Falls - Risk of 
Goal: *Absence of Falls Description Document Rekha Henderson Fall Risk and appropriate interventions in the flowsheet. Outcome: Progressing Towards Goal 
Note: Fall Risk Interventions: 
Mobility Interventions: Bed/chair exit alarm Mentation Interventions: Bed/chair exit alarm, Reorient patient, Family/sitter at bedside Medication Interventions: Bed/chair exit alarm Elimination Interventions: Call light in reach History of Falls Interventions: Bed/chair exit alarm Problem: Pressure Injury - Risk of 
Goal: *Prevention of pressure injury Description Document Rafiq Scale and appropriate interventions in the flowsheet. Outcome: Progressing Towards Goal 
Note: Pressure Injury Interventions: 
Sensory Interventions: Chair cushion, Check visual cues for pain, Keep linens dry and wrinkle-free, Minimize linen layers, Maintain/enhance activity level, Turn and reposition approx. every two hours (pillows and wedges if needed), Pad between skin to skin, Monitor skin under medical devices Moisture Interventions: Assess need for specialty bed, Apply protective barrier, creams and emollients, Absorbent underpads, Internal/External urinary devices, Limit adult briefs, Maintain skin hydration (lotion/cream), Minimize layers, Moisture barrier Activity Interventions: Chair cushion, Increase time out of bed, Pressure redistribution bed/mattress(bed type), PT/OT evaluation Mobility Interventions: Chair cushion, Float heels, HOB 30 degrees or less, Pressure redistribution bed/mattress (bed type), PT/OT evaluation Nutrition Interventions: Document food/fluid/supplement intake Friction and Shear Interventions: Feet elevated on foot rest, Apply protective barrier, creams and emollients, Foam dressings/transparent film/skin sealants, HOB 30 degrees or less, Lift sheet, Lift team/patient mobility team

## 2020-02-07 NOTE — PROGRESS NOTES
ADULT PROTOCOL: JET AEROSOL ASSESSMENT Patient  Kai Churchill     82 y.o.   female     2/6/2020  8:35 PM 
 
Breath Sounds Pre Procedure: Right Breath Sounds: Crackles, Scattered wheezing Left Breath Sounds: Crackles, Scattered wheezing Breath Sounds Post Procedure: Right Breath Sounds: Crackles Left Breath Sounds: Crackles Breathing pattern: Pre procedure Breathing Pattern: Regular Post procedure Breathing Pattern: Regular Heart Rate: Pre procedure Pulse: 70 Post procedure Pulse: 74 Resp Rate: Pre procedure Respirations: 20 
         Post procedure Respirations: 18 
 
 
Oxygen: O2 Device: BIPAP Changed: NO SpO2: Pre procedure SpO2: (!) 86 %   with oxygen Post procedure SpO2: 91 %  with oxygen Nebulizer Therapy: Current medications Aerosolized Medications: DuoNeb Changed: NO PRN RESP Smoking History: FORMER SMOKER Problem List:  
Patient Active Problem List  
Diagnosis Code  Mitral valve disease I05.9  Benign hypertensive heart disease without heart failure I11.9  Aortic valve disorder I35.9  Pure hypercholesterolemia E78.00  Paroxysmal atrial fibrillation (HCC) I48.0  LIVIA on CPAP G47.33, Z99.89  Persistent atrial fibrillation I48.19  
 CAP (community acquired pneumonia) J18.9  Essential hypertension I10  
 Anticoagulant long-term use Z79.01  
 Dementia (HCC) F03.90  
 Acquired hypothyroidism E03.9  Acute renal failure superimposed on stage 4 chronic kidney disease (HCC) N17.9, N18.4  Hypertensive urgency I16.0  CKD (chronic kidney disease) N18.9  Bilateral carotid artery stenosis I65.23  Vaso vagal episode R55  Aortic insufficiency I35.1  Mitral stenosis I05.0  Shortness of breath R06.02  
 Atrial fibrillation with RVR (HCC) I48.91  
 Atrial fibrillation, rapid (HCC) I48.91  
  Chronic diastolic congestive heart failure (HCC) I50.32  Atrial flutter ([facility]) I48.92  
 Atrial fibrillation with rapid ventricular response ([facility]) I48.91  
 CHF (congestive heart failure) ([facility]) I50.9 Respiratory Therapist: Ale Diaz RT

## 2020-02-07 NOTE — PROGRESS NOTES
[facility]alliative [facility] [place]: [phone] ([phone]) [facility]: [phone] ([phone]) LCSW asked to follow up with family for emotional support and to see if they have made a decision regarding Code status. Dr Shira Hendrickson met with patient and family yesterday. Today patient is awake, alert, notes she is feeling \"fine\", no complaints. She does have a sitter at bedside. Daughter Loni Fierro is in the room with her. Loni Fierro has lived with patient since 2014, notes she and her  moved from [place] to take care of her mother. \"She raised 9 of us, it is our turn to take care of her\". Bailey Solano asked to meet outside the room, reiterated today that she and her siblings have discussed that they do not want dialysis for patient. They feel \"it will be too much for her\". Loni Fierro is the primary mPOA. Regarding Code status, they have not made a decision yet. She indicated that she did not want to talk about this anymore, let her know that was fine. Family does not want to think about patient being at end of life. Hannah very complimentary of the care that patient is getting at the hospital. \"We couldn't have asked for better staff and better care\". Hannah and patient have strong lisa, Bailey Solano notes, \"God has placed us where we belong\". Support provided to Loni Fierro for her commitment to her mother. She noted that she is hoping to go home this weekend as she has not had much rest. Most of her other siblings live out of town. There are two others in town, they are involved, but Loni Fierro bears most of the burden of caregiving. Full Code status.

## 2020-02-07 NOTE — PROGRESS NOTES
RAPID RESPONSE TEAM- Follow Up Rounded on patient due to recent rapid response. Discussed with primary RN, Valencia Underwood. VSS. Patient remains on Bipap. Lungs with expiratory wheezing. BLE edema. Family at bedside. Patient Vitals for the past 12 hrs: 
 Temp Pulse Resp BP SpO2  
02/06/20 2000  79  164/52 97 % 02/06/20 1928 97.4 °F (36.3 °C) 78 19 159/59 96 % 02/06/20 1926    159/59   
02/06/20 1908     97 % 02/06/20 1901 97.6 °F (36.4 °C) 79 19 153/53 97 % 02/06/20 1702  80  165/48 93 % 02/06/20 1630 97.8 °F (36.6 °C) 80 20 157/53 94 % 02/06/20 1530    174/47   
02/06/20 1527  81   92 % 02/06/20 1526  82   92 % 02/06/20 1524  77   91 % 02/06/20 1522  77   92 % 02/06/20 1520  76   90 % 02/06/20 1518  75   92 % 02/06/20 1516  76   95 % 02/06/20 1515  78   94 % 02/06/20 1514  77   94 % 02/06/20 1513  76   94 % 02/06/20 1500 97.4 °F (36.3 °C) 79 20 157/54 94 % 02/06/20 1430  83  167/51 93 % 02/06/20 1400  77  167/57 95 % 02/06/20 1330  86  169/49 94 % 02/06/20 1230  80  171/56 95 % 02/06/20 1200  74  158/53 97 % 02/06/20 1133     100 % 02/06/20 1130 98.1 °F (36.7 °C) 82 20 155/46 95 % 02/06/20 1100 97.4 °F (36.3 °C) 73 20 169/60 98 % 02/06/20 1030    165/47   
02/06/20 1000    172/56   
02/06/20 0930 97.8 °F (36.6 °C) 75 20 139/45 97 % 02/06/20 0901    161/48  ABG:   
Lab Results Component Value Date/Time PHI 7.355 02/06/2020 07:21 AM  
 PCO2I 51.1 (H) 02/06/2020 07:21 AM  
 PO2I 60 (L) 02/06/2020 07:21 AM  
 HCO3I 28.5 (H) 02/06/2020 07:21 AM  
 FIO2I 50 02/06/2020 07:21 AM  
 
Creatinine Date Value Ref Range Status 02/06/2020 3.16 (H) 0.55 - 1.02 MG/DL Final  
02/06/2020 3.16 (H) 0.55 - 1.02 MG/DL Final  
 
 
No RRT interventions indicated at this time. Please call with any questions or concerns. Janey Norris Rapid Response ROSSY Rhodes

## 2020-02-07 NOTE — PROGRESS NOTES
Bedside and Verbal shift change report GIVEN TO , RN. Report included the following information Kardex. SIGNIFICANT CHANGES DURING SHIFT:   
 
1900 Sitter at bedside to prevent pt from pulling off Bi-pap 
0314 Pulled ativan to give for restlessness ,and pts daughter insisting I give it \"because she hasnt slept all night \" Actually when I came to administer she was asleep ,sitter remains at bedside . ,Wasted ativan witnessed by Thomas. Dr Fred Oswald just now ordered a BMP for 0430 ,Pt was already stuck for am labs and is a very difficult stick ,and she just finally fell asleep . I will attempt to draw her blood when she awakens CONCERNS TO ADDRESS WITH MD:   
 
 
 
 
[facility] NURSING NOTE Admission Date 1/28/2020 Admission Diagnosis CHF (congestive heart failure) ([facility] [address] [I50.9] Consults IP CONSULT TO PALLIATIVE CARE - PROVIDER 
IP CONSULT TO NEPHROLOGY 
IP CONSULT TO PALLIATIVE CARE - PROVIDER 
IP CONSULT TO CARDIOLOGY 
IP CONSULT TO PULMONOLOGY Cardiac Monitoring [x] Yes [] No  
  
Purposeful Hourly Rounding [x] Yes   
Judd Score Total Score: 3 Judd score 3 or > [x] Bed Alarm [] Avasys [] 1:1 sitter [] Patient refused (Signed refusal form in chart) Rafiq Score Rafiq Score: 15 Rafiq score 14 or < [] PMT consult [] Wound Care consult  
 []  Specialty bed  [] Nutrition consult Influenza Vaccine Received Flu Vaccine for Current Season (usually Sept-March): Yes Oxygen needs? [] Room air Oxygen @  []1L    []2L    []3L   []4L    []5L   []6L via NC   Bipap @ 40 % Chronic home O2 use? [] Yes [x] No 
Perform O2 challenge test and document in progress note using smartphrase (.Homeoxygen) Last bowel movement Last Bowel Movement Date: 02/04/20 Urinary Catheter External Female Catheter 02/03/20-Urine Output (mL): 200 ml LDAs Peripheral IV 02/06/20 Right Arm (Active) Site Assessment Clean, dry, & intact 2/6/2020  7:26 PM  
 Phlebitis Assessment 0 2/6/2020  4:34 AM  
Infiltration Assessment 0 2/6/2020  4:34 AM  
Dressing Status Clean, dry, & intact 2/6/2020  4:34 AM  
Dressing Type Transparent 2/6/2020  4:34 AM  
Hub Color/Line Status Yellow 2/6/2020  4:34 AM  
      
External Female Catheter 02/03/20 (Active) Site Assessment Clean, dry, & intact 2/6/2020  7:26 PM  
Repositioned Yes 2/6/2020  4:34 AM  
Perineal Care Yes 2/6/2020  4:34 AM  
Wick Changed Yes 2/6/2020  4:34 AM  
Suction Canister/Tubing Changed No 2/6/2020  4:34 AM  
Urine Output (mL) 200 ml 2/6/2020  3:50 PM  
            
  
Readmission Risk Assessment Tool Score High Risk 39 Total Score 3 Has Seen PCP in Last 6 Months (Yes=3, No=0) 3 Patient Length of Stay (>5 days = 3) 9 IP Visits Last 12 Months (1-3=4, 4=9, >4=11) 5 Pt. Coverage (Medicare=5 , Medicaid, or Self-Pay=4) 16 Charlson Comorbidity Score (Age + Comorbid Conditions) Criteria that do not apply:  
 . Living with Significant Other. Assisted Living. LTAC. SNF. or  
Rehab Expected Length of Stay 4d 2h Actual Length of Stay 9

## 2020-02-07 NOTE — PROGRESS NOTES
Problem: Falls - Risk of 
Goal: *Absence of Falls Description Document Kemal Velazquez Fall Risk and appropriate interventions in the flowsheet. Outcome: Progressing Towards Goal 
Note: Fall Risk Interventions: 
Mobility Interventions: Bed/chair exit alarm Mentation Interventions: Bed/chair exit alarm, Reorient patient, Family/sitter at bedside Medication Interventions: Bed/chair exit alarm Elimination Interventions: Call light in reach History of Falls Interventions: Bed/chair exit alarm Problem: Patient Education: Go to Patient Education Activity Goal: Patient/Family Education Outcome: Progressing Towards Goal 
  
Problem: Pressure Injury - Risk of 
Goal: *Prevention of pressure injury Description Document Rafiq Scale and appropriate interventions in the flowsheet. Outcome: Progressing Towards Goal 
Note: Pressure Injury Interventions: 
Sensory Interventions: Assess need for specialty bed, Check visual cues for pain, Discuss PT/OT consult with provider, Keep linens dry and wrinkle-free Moisture Interventions: Absorbent underpads, Apply protective barrier, creams and emollients, Assess need for specialty bed, Check for incontinence Q2 hours and as needed Activity Interventions: Chair cushion, Increase time out of bed, PT/OT evaluation Mobility Interventions: Chair cushion, HOB 30 degrees or less, Pressure redistribution bed/mattress (bed type), PT/OT evaluation Nutrition Interventions: Document food/fluid/supplement intake, Discuss nutritional consult with provider, Offer support with meals,snacks and hydration Friction and Shear Interventions: Apply protective barrier, creams and emollients, Feet elevated on foot rest, HOB 30 degrees or less, Lift team/patient mobility team, Minimize layers Problem: Patient Education: Go to Patient Education Activity Goal: Patient/Family Education Outcome: Progressing Towards Goal 
  
Problem: Breathing Pattern - Ineffective Goal: *Absence of hypoxia Outcome: Progressing Towards Goal 
Goal: *Use of effective breathing techniques Outcome: Progressing Towards Goal 
Goal: *PALLIATIVE CARE:  Alleviation of Dyspnea Outcome: Progressing Towards Goal 
  
Problem: Patient Education: Go to Patient Education Activity Goal: Patient/Family Education Outcome: Progressing Towards Goal 
  
Problem: Patient Education: Go to Patient Education Activity Goal: Patient/Family Education Outcome: Progressing Towards Goal 
  
Problem: Heart Failure: Day 4 Goal: Off Pathway (Use only if patient is Off Pathway) Outcome: Progressing Towards Goal 
Goal: Activity/Safety Outcome: Progressing Towards Goal 
Goal: Diagnostic Test/Procedures Outcome: Progressing Towards Goal 
Goal: Nutrition/Diet Outcome: Progressing Towards Goal 
Goal: Discharge Planning Outcome: Progressing Towards Goal 
Goal: Medications Outcome: Progressing Towards Goal 
Goal: Respiratory Outcome: Progressing Towards Goal 
Goal: Treatments/Interventions/Procedures Outcome: Progressing Towards Goal 
Goal: Psychosocial 
Outcome: Progressing Towards Goal 
Goal: *Oxygen saturation within defined limits Outcome: Progressing Towards Goal 
Goal: *Hemodynamically stable Outcome: Progressing Towards Goal 
Goal: *Optimal pain control at patient's stated goal 
Outcome: Progressing Towards Goal 
Goal: *Anxiety reduced or absent Outcome: Progressing Towards Goal 
Goal: *Demonstrates progressive activity Outcome: Progressing Towards Goal 
  
Problem: Heart Failure: Day 5 Goal: Off Pathway (Use only if patient is Off Pathway) Outcome: Progressing Towards Goal 
Goal: Activity/Safety Outcome: Progressing Towards Goal 
Goal: Diagnostic Test/Procedures Outcome: Progressing Towards Goal 
Goal: Nutrition/Diet Outcome: Progressing Towards Goal 
Goal: Discharge Planning Outcome: Progressing Towards Goal 
Goal: Medications Outcome: Progressing Towards Goal 
Goal: Respiratory Outcome: Progressing Towards Goal 
Goal: Treatments/Interventions/Procedures Outcome: Progressing Towards Goal 
Goal: Psychosocial 
Outcome: Progressing Towards Goal 
  
Problem: Heart Failure: Discharge Outcomes Goal: *Demonstrates ability to perform prescribed activity without shortness of breath or discomfort Outcome: Progressing Towards Goal 
Goal: *Left ventricular function assessment completed prior to or during stay, or planned for post-discharge Outcome: Progressing Towards Goal 
Goal: *ACEI prescribed if LVEF less than 40% and no contraindications or ARB prescribed Outcome: Progressing Towards Goal 
Goal: *Verbalizes understanding and describes prescribed diet Outcome: Progressing Towards Goal 
Goal: *Verbalizes understanding/describes prescribed medications Outcome: Progressing Towards Goal 
Goal: *Describes available resources and support systems Description 
(eg: Home Health, Palliative Care, Advanced Medical Directive) Outcome: Progressing Towards Goal 
Goal: *Describes smoking cessation resources Outcome: Progressing Towards Goal 
Goal: *Understands and describes signs and symptoms to report to providers(Stroke Metric) Outcome: Progressing Towards Goal 
Goal: *Describes/verbalizes understanding of follow-up/return appt Description 
(eg: to physicians, diabetes treatment coordinator, and other resources Outcome: Progressing Towards Goal 
Goal: *Describes importance of continuing daily weights and changes to report to physician Outcome: Progressing Towards Goal

## 2020-02-07 NOTE — PROGRESS NOTES
Pharmacy Daily Dosing of Warfarin Indication: A. Fib Goal INR: 2 - 3 PTA Warfarin Dose: 5 mg Mon/Wed/Thur/Sat and 2.5 mg on Tue/Fri/Sun  
 
Concurrent anticoagulants: none Concurrent antiplatelet: none Major Interacting Medications Drugs that may increase INR: none Drugs that may decrease INR: none Conditions that may increase/decrease INR (CHF, C. diff, cirrhosis, thyroid disorder, hypoalbuminemia): CHF, on Levothyroxine Labs: 
Recent Labs 02/07/20 
4301  02/06/20 
1554 02/06/20 
0501 02/05/20 
0310 INR 2.1*  --  1.9* 1.8* 2.0* HGB 8.0*   < >  --   --  7.4*   --   --   --  241 ALB  --   --  3.2*  --   --   
 < > = values in this interval not displayed. Impression/Plan:  
Will order warfarin 5 mg PO x 1 dose again today Daily INR 
CBC w/o diff every other day Pharmacy will follow daily and adjust the dose as appropriate. Thanks Tammy Dyer PHARMD 
 
 
[email or web address]/[email or web address]. pdf

## 2020-02-07 NOTE — PROGRESS NOTES
Bedside shift change report GIVEN TO Marlee Candelaria RN. Report included the following information SBAR. SIGNIFICANT CHANGES DURING SHIFT:   
0800 Sitter ordered for bed side due to pt pulling off bipap mask. Pt PO meds held, per MD orders, pt on continuous bipap. Pt normally takes meds crushed in apple sauce. [address] Attempted to wean pt from bipap to nasal cannula with respiratory. Pt unable to tolerate NC, pt had exertion at rest, pt returned to bipap 1800 Pt scheduled PO meds held secondary to continuous bipap per MD orders. CONCERNS TO ADDRESS WITH MD:   
 
 
 
 
[address] NURSING NOTE Admission Date 1/28/2020 Admission Diagnosis CHF (congestive heart failure) ([facility] [address] [I50.9] Consults IP CONSULT TO PALLIATIVE CARE - PROVIDER 
IP CONSULT TO NEPHROLOGY 
IP CONSULT TO PALLIATIVE CARE - PROVIDER 
IP CONSULT TO CARDIOLOGY 
IP CONSULT TO PULMONOLOGY Cardiac Monitoring [x] Yes [] No  
  
Purposeful Hourly Rounding [x] Yes   
Judd Score Total Score: 3 Judd score 3 or > [x] Bed Alarm [] Avasys [x] 1:1 sitter [] Patient refused (Signed refusal form in chart) Rafiq Score Rafiq Score: 15 Rafiq score 14 or < [] PMT consult [] Wound Care consult  
 []  Specialty bed  [] Nutrition consult Influenza Vaccine Received Flu Vaccine for Current Season (usually Sept-March): Yes Oxygen needs? [] Room air Oxygen @  []1L    []2L    []3L   []4L    []5L   []6L via NC Chronic home O2 use? [x] Yes [] No 
Perform O2 challenge test and document in progress note using smartphrase (.Homeoxygen) Last bowel movement Last Bowel Movement Date: 02/04/20 Urinary Catheter External Female Catheter 02/03/20-Urine Output (mL): 200 ml LDAs Peripheral IV 02/06/20 Right Arm (Active) Site Assessment Clean, dry, & intact 2/6/2020  7:26 PM  
Phlebitis Assessment 0 2/6/2020  4:34 AM  
Infiltration Assessment 0 2/6/2020  4:34 AM  
 Dressing Status Clean, dry, & intact 2/6/2020  4:34 AM  
Dressing Type Transparent 2/6/2020  4:34 AM  
Hub Color/Line Status Yellow 2/6/2020  4:34 AM  
      
External Female Catheter 02/03/20 (Active) Site Assessment Clean, dry, & intact 2/6/2020  7:26 PM  
Repositioned Yes 2/6/2020  4:34 AM  
Perineal Care Yes 2/6/2020  4:34 AM  
Wick Changed Yes 2/6/2020  4:34 AM  
Suction Canister/Tubing Changed No 2/6/2020  4:34 AM  
Urine Output (mL) 200 ml 2/6/2020  3:50 PM  
            
  
Readmission Risk Assessment Tool Score High Risk 39 Total Score 3 Has Seen PCP in Last 6 Months (Yes=3, No=0) 3 Patient Length of Stay (>5 days = 3) 9 IP Visits Last 12 Months (1-3=4, 4=9, >4=11) 5 Pt. Coverage (Medicare=5 , Medicaid, or Self-Pay=4) 16 Charlson Comorbidity Score (Age + Comorbid Conditions) Criteria that do not apply:  
 . Living with Significant Other. Assisted Living. LTAC. SNF. or  
Rehab Expected Length of Stay 4d 2h Actual Length of Stay 9

## 2020-02-07 NOTE — PROGRESS NOTES
Pt minimally responsive. Poor UOP, Appears comfortable on 4LNC. Family has spoken with palliative care. Pt's advanced medical directive does call for all interventions- but if pt becomes in distress may need to re- discuss level of interventions family would want

## 2020-02-07 NOTE — PROGRESS NOTES
Hospitalist Progress Note NAME: Jaciel Schwartz :  1935 MRN:  [id number]  
 
[id number] female with past medical history of diastolic congestive heart failure presented to the hospital with shortness of breath and was noted to have acute CHF exacerbation along with supratherapeutic INR from warfarin. During hospitalization, she developed acute kidney injury on Lasix was stopped. Assessment / Plan: 
Acute hypoxic respiratory failure Acute on chronic exacerbation of diastolic heart failure Severe pulmonary HTN 
-s/p rapid response early AM , fulminant pulmonary edema on CXR, required initiation of BiPAP, which has since been weaned 
-Ongoing discussions with patient's family by all physicians, including palliative care. At this point, still pursuing full code status per family.  
-Palliative care consulted. Patient deferred discussion with palliative team 
-IV bumex increased but, again, patient not making much urine, worsening renal function 
-History of heart failure preserved ejection fraction around 60% on the most recent echo 
-Strict I's and O's, daily weights. Weight is unfortunately increasing. -VQ mismatch low probability for PE 
-Patient is hospice appropriate Acute kidney injury 
-Due to cardiorenal syndrome and dehydration on admission 
-Currently with poor urine output despite high-dose Bumex; creatinine is climbing and she is becoming progressively fluid overloaded 
-Prognosis appears poor 
-Family do not want to proceed with dialysis at this time Supratherapeutic INR improved -INR peaked at 6.4 and improved 
-Coumadin restarted. INR is therapeutic at 2.1. Pharmacy managing. Mild elevated Troponin due to CKD 
-Troponin peaked at 0.06  
  
A. fib Status post pacemaker insertion 2019 Continue warfarin Rate controlled with diltiazem and metoprolol will continue 
  
History of diabetes mellitus type 2 Off medications Most recent hemoglobin A1c 5.3 March 2019 
  
COPD Not in exacerbation Continue home inhalers 
  
Hypertension Continue home medication hydralazine 3 times daily, metoprolol, diltiazem Consider adding IV metoprolol if blood pressure is up, currently blood pressure is controlled 
  
Left lower extremity edema 
-Ultrasound Doppler showed no evidence of DVT 
  
CKD stage IV with worsening of creatinine Poor oral intake 
-Consulted speech therapy to evaluate for dysphagia -- on pureed diet 
-Dietary following Incidental Baker's cyst 
-Outpatient follow-up 
 
  
Code Status: Full code Surrogate Decision Maker: Her sons Nina Snyder 
  
DVT Prophylaxis:  warfarin GI Prophylaxis: not indicated 
  
Baseline: Active, lives with her daughter Body mass index is 27.34 kg/m². Subjective: Chief Complaint / Reason for Physician Visit: follow up respiratory failure, renal failure Weaned off BiPAP. Patient does not recall being on BiPAP or her other physician visits today. She denies complaints and says that she feels \"fine. \" Poor urine output noted. Review of Systems: 
Symptom Y/N Comments  Symptom Y/N Comments Fever/Chills n   Chest Pain n   
Poor Appetite    Edema y Cough n   Abdominal Pain n   
Sputum    Joint Pain SOB/HASKINS n   Pruritis/Rash Nausea/vomit n   Tolerating PT/OT Diarrhea    Tolerating Diet y Constipation    Other Could NOT obtain due to:   
 
Objective: VITALS:  
Last 24hrs VS reviewed since prior progress note. Most recent are: 
Patient Vitals for the past 24 hrs: 
 Temp Pulse Resp BP SpO2  
02/07/20 1553 99.2 °F (37.3 °C) 82 18 108/42 98 % 02/07/20 1411    114/56   
02/07/20 1127 97.8 °F (36.6 °C) 87 20 115/48 100 % 02/07/20 1010  70  (!) 128/34   
02/07/20 0936     100 % 02/07/20 0813     99 % 02/07/20 0745 97.1 °F (36.2 °C) 86 20 145/54 99 % 02/07/20 0600  85  144/56 98 % 02/07/20 0400 97.6 °F (36.4 °C) 84 21 136/53 98 % 02/07/20 0332     99 % 02/07/20 0200 97.3 °F (36.3 °C) 86 19 152/48 98 % 02/07/20 0000 97.6 °F (36.4 °C) 80 20 151/57 97 % 02/06/20 2256     97 % 02/06/20 2213 97.6 °F (36.4 °C) 80 20 150/51 97 % 02/06/20 2000  79  164/52 97 % 02/06/20 1928 97.4 °F (36.3 °C) 78 19 159/59 96 % 02/06/20 1926    159/59   
02/06/20 1908     97 % 02/06/20 1901 97.6 °F (36.4 °C) 79 19 153/53 97 % 02/06/20 1702  80  165/48 93 % 02/06/20 1630 97.8 °F (36.6 °C) 80 20 157/53 94 % Intake/Output Summary (Last 24 hours) at 2/7/2020 1612 Last data filed at 2/7/2020 1245 Gross per 24 hour Intake 785 ml Output 780 ml Net 5 ml PHYSICAL EXAM: 
General: Ill-appearing adult AA female, moderate distress ENT:  Karen Boy. Anicteric sclerae. MMM Resp:  Coarse BS, crackles are present, no wheezing CV:  Regular  rhythm,  2+ edema GI:  Soft, Non distended, Non tender. +Bowel sounds Neurologic:  Alert and awake, poor short term memory Psych:   deferred Skin:  No rashes. No jaundice Reviewed most current lab test results and cultures  YES Reviewed most current radiology test results   YES Review and summation of old records today    NO Reviewed patient's current orders and MAR    YES 
PMH/SH reviewed - no change compared to H&P 
 
________________________________________________________________________ Care Plan discussed with: 
  Comments Patient y Family  y children RN y   
Care Manager Consultant Multidiciplinary team rounds were held today with , nursing, pharmacist and clinical coordinator. Patient's plan of care was discussed; medications were reviewed and discharge planning was addressed. ________________________________________________________________________ Total NON critical care TIME:  30   Minutes Total CRITICAL CARE TIME Spent:   Minutes non procedure based Comments >50% of visit spent in counseling and coordination of care x   
________________________________________________________________________ Grisel Yang MD  
 
Procedures: see electronic medical records for all procedures/Xrays and details which were not copied into this note but were reviewed prior to creation of Plan. LABS: 
I reviewed today's most current labs and imaging studies. Pertinent labs include: 
Recent Labs 02/07/20 
3523 02/06/20 
1912 02/05/20 
0310 WBC 9.5  --  6.2 HGB 8.0* 7.9* 7.4* HCT 26.2* 25.7* 25.3*  
  --  241 Recent Labs 02/07/20 
4499 02/07/20 
1959 02/06/20 
1554 02/06/20 
0501 02/05/20 
0310   --  135*  136  --  141  
K 4.3  --  4.7  4.7  --  4.5  
  --  100  100  --  106 CO2 29  --  28  29  --  31  
  --  110*  112*  --  107* BUN 81*  --  75*  75*  --  70* CREA 3.26*  --  3.16*  3.16*  --  2.86* CA 8.3*  --  8.3*  8.4*  --  7.6* PHOS  --   --  3.6  --   --   
ALB  --   --  3.2*  --   --   
INR  --  2.1* 1.9* 1.8* 2.0* Signed: Grisel Yang MD

## 2020-02-08 NOTE — PROGRESS NOTES
0700: Bedside shift change report given to YOLIS Skaggs RN (oncoming nurse) by Monika Dale RN (offgoing nurse). Report included the following information SBAR and Kardex. 1900:  
Bedside shift change report GIVEN TO Monika Dale RN. Report included the following information SBAR and Kardex. SIGNIFICANT CHANGES DURING SHIFT:  Pt had a good day, weaned from 4L to 1L O2. Worked with PT. Family seems amenable to talking about the future. CONCERNS TO ADDRESS WITH MD:  D/c planning? [facility] NURSING NOTE Admission Date 1/28/2020 Admission Diagnosis CHF (congestive heart failure) ([facility] [address] [I50.9] Consults IP CONSULT TO PALLIATIVE CARE - PROVIDER 
IP CONSULT TO NEPHROLOGY 
IP CONSULT TO PALLIATIVE CARE - PROVIDER 
IP CONSULT TO CARDIOLOGY 
IP CONSULT TO PULMONOLOGY Cardiac Monitoring [x] Yes [] No  
  
Purposeful Hourly Rounding [x] Yes   
Judd Score Total Score: 4 Judd score 3 or > [x] Bed Alarm [] Avasys [x] 1:1 sitter [] Patient refused (Signed refusal form in chart) Rafiq Score Rafiq Score: 16 Rafiq score 14 or < [] PMT consult [] Wound Care consult  
 []  Specialty bed  [] Nutrition consult Influenza Vaccine Received Flu Vaccine for Current Season (usually Sept-March): Yes Oxygen needs? [] Room air Oxygen @  [x]1L    []2L    []3L   []4L    []5L   []6L via NC Chronic home O2 use? [] Yes [x] No 
Perform O2 challenge test and document in progress note using smartphrase (.Homeoxygen) Last bowel movement Last Bowel Movement Date: 02/04/20 Urinary Catheter External Female Catheter 02/03/20-Urine Output (mL): 225 ml LDAs Peripheral IV 02/06/20 Right Arm (Active) Site Assessment Clean, dry, & intact 2/8/2020  3:08 PM  
Phlebitis Assessment 0 2/8/2020  3:08 PM  
Infiltration Assessment 0 2/8/2020  3:08 PM  
Dressing Status Clean, dry, & intact 2/8/2020  3:08 PM  
Dressing Type Transparent;Tape 2/8/2020  3:08 PM  
 Hub Color/Line Status Yellow 2/8/2020  3:08 PM  
      
External Female Catheter 02/03/20 (Active) Site Assessment Clean, dry, & intact 2/8/2020  3:08 PM  
Repositioned Yes 2/8/2020  3:08 PM  
Perineal Care Yes 2/8/2020  3:08 PM  
Wick Changed Yes 2/8/2020  3:20 PM  
Suction Canister/Tubing Changed No 2/8/2020  3:08 PM  
Urine Output (mL) 225 ml 2/8/2020  3:20 PM  
            
  
Readmission Risk Assessment Tool Score High Risk 39 Total Score 3 Has Seen PCP in Last 6 Months (Yes=3, No=0) 3 Patient Length of Stay (>5 days = 3) 9 IP Visits Last 12 Months (1-3=4, 4=9, >4=11) 5 Pt. Coverage (Medicare=5 , Medicaid, or Self-Pay=4) 16 Charlson Comorbidity Score (Age + Comorbid Conditions) Criteria that do not apply:  
 . Living with Significant Other. Assisted Living. LTAC. SNF. or  
Rehab Expected Length of Stay 4d 2h Actual Length of Stay 11

## 2020-02-08 NOTE — PROGRESS NOTES
Hospitalist Progress Note NAME: Kai Churchill :  1935 MRN:  [id number]  
 
[id number] female with past medical history of diastolic congestive heart failure presented to the hospital with shortness of breath and was noted to have acute CHF exacerbation along with supratherapeutic INR from warfarin. During hospitalization, she developed acute kidney injury on Lasix was stopped. Assessment / Plan: 
Acute hypoxic respiratory failure Acute on chronic exacerbation of diastolic heart failure Severe pulmonary HTN 
-s/p rapid response early AM , fulminant pulmonary edema on CXR, required initiation of BiPAP, which has since been weaned 
-Ongoing discussions with patient's family by all physicians, including palliative care. At this point, still pursuing full code status per family. I spoke with patient's daughter, Sebas Morrow, who is a CNA. She would like for her mother's code status to be changed to DNR as she would not want to see her mother go through CPR and she understands the poor prognosis -- however, she is not mPOA. She is discussing with her family and trying to make them understand this perspective. 
-Palliative care consulted. Patient deferred discussion with palliative team 
-IV bumex increased but, again, patient not making much urine, worsening renal function 
-History of heart failure preserved ejection fraction around 60% on the most recent echo 
-Strict I's and O's, daily weights. Weight is unfortunately generally increasing. -VQ mismatch low probability for PE 
-Patient is hospice appropriate Acute kidney injury on CKD4 
-Due to cardiorenal syndrome and dehydration on admission 
-Currently with poor urine output despite high-dose Bumex; creatinine is climbing and she is becoming progressively fluid overloaded 
-Prognosis appears poor 
-Family do not want to proceed with dialysis at this time Supratherapeutic INR improved -INR peaked at 6.4 and improved -Coumadin restarted. INR is therapeutic. Pharmacy managing. Mild elevated Troponin due to CKD 
-Troponin peaked at 0.06  
  
A. fib Status post pacemaker insertion December 2019 Continue warfarin Rate controlled with diltiazem and metoprolol will continue 
  
History of diabetes mellitus type 2 Off medications Most recent hemoglobin A1c 5.3 March 2019 
  
COPD Not in exacerbation Continue home inhalers 
  
Hypertension Continue home medication hydralazine 3 times daily, metoprolol, diltiazem Consider adding IV metoprolol if blood pressure is up, currently blood pressure is controlled 
  
Left lower extremity edema Ultrasound Doppler showed no evidence of DVT 
  
Poor oral intake 
-Consulted speech therapy to evaluate for dysphagia -- on pureed diet 
-Dietary following Incidental Baker's cyst 
-Outpatient follow-up 
 
  
Code Status: Full code Surrogate Decision Maker: Her sons Christine Snyder 
  
DVT Prophylaxis:  warfarin GI Prophylaxis: not indicated 
  
Baseline: Active, lives with her daughter Body mass index is 26.76 kg/m². Prognosis poor. Subjective: Chief Complaint / Reason for Physician Visit: follow up respiratory failure, renal failure No complaints elicited. Urine output poor and creatinine increasing. Patient denies pain, shortness of breath, nausea, vomiting. Review of Systems: 
Symptom Y/N Comments  Symptom Y/N Comments Fever/Chills n   Chest Pain n   
Poor Appetite    Edema y Cough n   Abdominal Pain n   
Sputum    Joint Pain SOB/HASKINS n   Pruritis/Rash Nausea/vomit n   Tolerating PT/OT Diarrhea    Tolerating Diet y Constipation    Other Could NOT obtain due to:   
 
Objective: VITALS:  
Last 24hrs VS reviewed since prior progress note. Most recent are: 
Patient Vitals for the past 24 hrs: 
 Temp Pulse Resp BP SpO2  
02/08/20 1106     96 % 02/08/20 1049 97.4 °F (36.3 °C) 71 18 115/71 97 % 02/08/20 0736 97.8 °F (36.6 °C) 71 18 120/52 100 % 02/08/20 0400  70  114/50 100 % 02/08/20 0338 97.8 °F (36.6 °C) 70 18 122/66 100 % 02/08/20 0000 98.9 °F (37.2 °C) 85 19 101/40 100 % 02/07/20 2106 97.8 °F (36.6 °C) 85 18 114/55 99 % 02/07/20 2000 99 °F (37.2 °C) 86 19 116/51 100 % 02/07/20 1812  84  134/42   
02/07/20 1553 99.2 °F (37.3 °C) 82 18 108/42 98 % 02/07/20 1411    114/56  Intake/Output Summary (Last 24 hours) at 2/8/2020 1329 Last data filed at 2/8/2020 1012 Gross per 24 hour Intake 440 ml Output 770 ml Net -330 ml PHYSICAL EXAM: 
General: Ill-appearing adult AA female, in no acute distress ENT:  Shilpi Geiger. Anicteric sclerae. MMM Resp:  Limited auscultation but relatively clear laterally CV:  Regular  rhythm,  2+ edema GI:  Soft, Non distended, Non tender. +Bowel sounds Neurologic:  Sleepy but arousable, poor short term memory Psych:   No anxiety or agitation noted Skin:  No rashes. No jaundice Reviewed most current lab test results and cultures  YES Reviewed most current radiology test results   YES Review and summation of old records today    NO Reviewed patient's current orders and MAR    YES 
PMH/ reviewed - no change compared to H&P 
 
________________________________________________________________________ Care Plan discussed with: 
  Comments Patient x Family  x Daughter RN x Care Manager Consultant Multidiciplinary team rounds were held today with , nursing, pharmacist and clinical coordinator. Patient's plan of care was discussed; medications were reviewed and discharge planning was addressed. ________________________________________________________________________ Total NON critical care TIME:  30   Minutes Total CRITICAL CARE TIME Spent:   Minutes non procedure based Comments >50% of visit spent in counseling and coordination of care x ________________________________________________________________________ Meryle Ade, MD  
 
Procedures: see electronic medical records for all procedures/Xrays and details which were not copied into this note but were reviewed prior to creation of Plan. LABS: 
I reviewed today's most current labs and imaging studies. Pertinent labs include: 
Recent Labs 02/07/20 
0356 02/06/20 
1912 WBC 9.5  --   
HGB 8.0* 7.9*  
HCT 26.2* 25.7*  
  --   
 
Recent Labs 02/08/20 
2682 02/07/20 
2195 02/07/20 0356 02/06/20 
1554  136  --  135*  136  
K 4.2 4.3  --  4.7  4.7  101  --  100  100 CO2 32 29  --  28  29 GLU 80 100  --  110*  112* BUN 89* 81*  --  75*  75* CREA 3.44* 3.26*  --  3.16*  3.16* CA 7.8* 8.3*  --  8.3*  8.4* PHOS  --   --   --  3.6 ALB  --   --   --  3.2* INR 2.0*  --  2.1* 1.9* Signed: Meryle Ade, MD

## 2020-02-08 NOTE — PROGRESS NOTES
Problem: Self Care Deficits Care Plan (Adult) Goal: *Acute Goals and Plan of Care (Insert Text) Description FUNCTIONAL STATUS PRIOR TO ADMISSION: Lives with daughter whom is a CNA and is with pt 24/7, per daughter pt tends to state she cannot do things and performs better by being told what she is going to do, was not on O2, ambulated with one assist CGA with RW, gets fatigued and gets assist with ADLS as needed, can perform UB ADLs on her own and LB ADLS assist needed at times, wears depends, uses wheelchair outside of the [facility] HOME SUPPORT PRIOR TO ADMISSION: The patient lived with daughter to provide assist. 
 
Occupational Therapy Goals: 
Initiated 1/30/2020 Reviewed 2/8/2020 All goals remain appropriate and to continue for next 7 days. 1. Patient will perform grooming standing with supervision/set-up within 7 days. 2. Patient will perform lower body dressing with supervision/set-up within 7 days. 3. Patient will perform toileting with supervision/set-up within 7 days. 4. Patient will transfer from toilet with supervision/set-up using the least restrictive device and appropriate durable medical equipment within 7 days. Outcome: Progressing Towards Goal 
 
 
OCCUPATIONAL THERAPY TREATMENT/WEEKLY RE-EVALUATION Patient: Ken Jolly (50 y.o. female) Date: 2/8/2020 Diagnosis: [facility]estive [facility] [I50.9] <principal problem not specified> Precautions: Bed Alarm, Fall Chart, occupational therapy assessment, plan of care, and goals were reviewed. ASSESSMENT Based on the objective data described below, pt presents with decreased I with basic self care tasks due to decreased ROM to distal LE, decreased endurance, and decreased dynamic standing balance. Pt agreeable to all treatment, however, perseverates on \"what next\". Pt had several defers in last week of therapy due to decline in medical status and placed on BIPAP. Pt presently with resume OT orders and on 1 L O2. Attempted to wean pt, however sats decreased to 86% on room air. Returned to 1 L O2 and VSS throughout session. Pt declined attempt at toileting this date despite repeated cues, however, only needs min A from low bed for transfers. Pt will continue to benefit from skilled OT to maximize functional return. Current Level of Function Impacting Discharge (ADLs): max A for LE ADLs, min A for transfers. Other factors to consider for discharge: pt lives with daughter who is CNA per chart. PLAN : 
Goals have been updated based on progression since last assessment. Patient continues to benefit from skilled intervention to address the above impairments. Continue to follow patient 3 times a week to address goals. Recommend with staff: OOB to chair for meals, BSC for toileting. Recommend next OT session: standing ADLs, LE ADLs, toileting. Recommendation for discharge: (in order for the patient to meet his/her long term goals) Occupational therapy at least 2 days/week in the home AND ensure assist and/or supervision for safety with ADLS/IADLs. This discharge recommendation: 
Has not yet been discussed the attending provider and/or case management Equipment recommendations for successful discharge (if) home: bedside commode, shower chair, and walker: rolling SUBJECTIVE:  
Patient stated What next.  OBJECTIVE DATA SUMMARY:  
Cognitive/Behavioral Status: 
Neurologic State: Alert Orientation Level: Oriented to person Cognition: Decreased command following;Poor safety awareness Perception: Appears intact Perseveration: Perseverates during conversation Safety/Judgement: Fall prevention Functional Mobility and Transfers for ADLs: 
Bed Mobility: 
Rolling: Contact guard assistance Supine to Sit: Minimum assistance(pulling on hand like bedrail ) Scooting: Contact guard assistance; Additional time Transfers: Sit to Stand: Contact guard assistance;Minimum assistance Bed to Chair: Contact guard assistance;Minimum assistance;Assist x1 Balance: 
Sitting: Intact; Without support Standing: Impaired; With support Standing - Static: Constant support; Fair 
Standing - Dynamic : Fair ADL Intervention: 
  
 
Grooming Grooming Assistance: Minimum assistance Position Performed: Seated edge of bed Brushing/Combing Hair: Minimum assistance; Compensatory technique training(to reach back of head) Therapeutic Activity: 
Pt completed functional mobility in room with CGA X 2 and RW, increased cues for proper RW management on turns. Pt completed the above activities in preparation for bathroom mobility and toilet transfers. Cognitive Retraining Safety/Judgement: Fall prevention Pain: 
No c.o pain during session. Activity Tolerance:  
Fair Please refer to the flowsheet for vital signs taken during this treatment. After treatment patient left in no apparent distress:  
Sitting in chair, Heels elevated for pressure relief, Call bell within reach, Bed / chair alarm activated, and Caregiver / family present COMMUNICATION/COLLABORATION:  
The patients plan of care was discussed with: Physical Therapist and Registered Nurse ERICA Jimenez/LUCIO Time Calculation: 17 mins

## 2020-02-08 NOTE — ROUTINE PROCESS
Bedside and Verbal shift change report GIVEN TO , RN. Report included the following information Kardex. SIGNIFICANT CHANGES DURING SHIFT:   
 
Sitter at bedside ,due to pt confusion and her removing oxygen . Even bipap is off as per [place] will stay Voided  400 ml  Through pure wick Post void bladder tabares 10 ml Weighed  On  Lift 137 lbs CONCERNS TO ADDRESS WITH MD:   
 
 
 
 
[facility] NURSING NOTE Admission Date 1/28/2020 Admission Diagnosis CHF (congestive heart failure) ([address] [I50.9] Consults IP CONSULT TO PALLIATIVE CARE - PROVIDER 
IP CONSULT TO NEPHROLOGY 
IP CONSULT TO PALLIATIVE CARE - PROVIDER 
IP CONSULT TO CARDIOLOGY 
IP CONSULT TO PULMONOLOGY Cardiac Monitoring [x] Yes [] No  
  
Purposeful Hourly Rounding [x] Yes   
Judd Score Total Score: 4 Judd score 3 or > [x] Bed Alarm [] Avasys [] 1:1 sitter [] Patient refused (Signed refusal form in chart) Rafiq Score Rafiq Score: 16 Rafiq score 14 or < [] PMT consult [] Wound Care consult  
 []  Specialty bed  [] Nutrition consult Influenza Vaccine Received Flu Vaccine for Current Season (usually Sept-March): Yes Oxygen needs? [] Room air Oxygen @  []1L    []2L    []3L   [x]4L    []5L   []6L via NC Chronic home O2 use? [] Yes [x] No 
Perform O2 challenge test and document in progress note using smartphrase (.Homeoxygen) Last bowel movement Last Bowel Movement Date: 02/04/20 Urinary Catheter External Female Catheter 02/03/20-Urine Output (mL): 0 ml LDAs Peripheral IV 02/06/20 Right Arm (Active) Site Assessment Clean, dry, & intact 2/7/2020  8:00 PM  
Phlebitis Assessment 0 2/7/2020  8:00 PM  
Infiltration Assessment 0 2/7/2020  8:00 PM  
Dressing Status Clean, dry, & intact 2/7/2020  8:00 PM  
Dressing Type Tape;Transparent 2/7/2020  8:00 PM  
Hub Color/Line Status Yellow;Capped;Flushed;Patent 2/7/2020  8:00 PM  
      
 External Female Catheter 02/03/20 (Active) Site Assessment Clean, dry, & intact 2/7/2020  8:00 PM  
Repositioned Yes 2/7/2020  8:00 PM  
Perineal Care Yes 2/7/2020  8:00 PM  
Wick Changed Yes 2/7/2020  8:00 PM  
Suction Canister/Tubing Changed No 2/6/2020  4:34 AM  
Urine Output (mL) 0 ml 2/7/2020  6:23 PM  
            
  
Readmission Risk Assessment Tool Score High Risk 39 Total Score 3 Has Seen PCP in Last 6 Months (Yes=3, No=0) 3 Patient Length of Stay (>5 days = 3) 9 IP Visits Last 12 Months (1-3=4, 4=9, >4=11) 5 Pt. Coverage (Medicare=5 , Medicaid, or Self-Pay=4) 16 Charlson Comorbidity Score (Age + Comorbid Conditions) Criteria that do not apply:  
 . Living with Significant Other. Assisted Living. LTAC. SNF. or  
Rehab Expected Length of Stay 4d 2h Actual Length of Stay 10

## 2020-02-08 NOTE — PROGRESS NOTES
I was paged by RN notifying me that family was interested in modifying patient's code status. I spoke with Hannah, patient's mPOA, regarding these concerns. Per our discussion, patient's code status will change to a partial code. Specifically, the family do not want chest compressions performed in the event of cardiac arrest. I inquired regarding ACLS medications, intubation, and defibrillation, and Maximo Jody was not sure -- she hadn't been aware of these components of a resuscitation, so she had not yet discussed it with her family. The family's main concern is that they do not want the patient to experience unnecessary pain at the end of life. As of right now, Maximo Vera advised that intubation, defibrillation, and ACLS medications would be acceptable, but she will discuss further with her family and update me on Monday.

## 2020-02-08 NOTE — PROGRESS NOTES
Pharmacy Daily Dosing of Warfarin Indication: A. Fib Goal INR: 2 - 3 PTA Warfarin Dose: 5 mg Mon/Wed/Thur/Sat and 2.5 mg on Tue/Fri/Sun  
 
Concurrent anticoagulants: none Concurrent antiplatelet: none Major Interacting Medications Drugs that may increase INR: none Drugs that may decrease INR: none Conditions that may increase/decrease INR (CHF, C. diff, cirrhosis, thyroid disorder, hypoalbuminemia): CHF, on Levothyroxine Labs: 
Recent Labs 02/08/20 
1550 02/07/20 
0356  02/06/20 
1554 INR 2.0* 2.1*  --  1.9* HGB  --  8.0*   < >  --   
PLT  --  289  --   --   
ALB  --   --   --  3.2*  
 < > = values in this interval not displayed. Impression/Plan:  
Will order warfarin 5 mg PO x 1 dose again today Daily INR 
CBC w/o diff every other day Pharmacy will follow daily and adjust the dose as appropriate. Thanks Haleigh Castro PHARMD 
 
 
[email or web address]/[email or web address]/Warfarin%[email or web address]. pdf

## 2020-02-08 NOTE — PROGRESS NOTES
Problem: Mobility Impaired (Adult and Pediatric) Goal: *Acute Goals and Plan of Care (Insert Text) Description FUNCTIONAL STATUS PRIOR TO ADMISSION: patient ambulates household distances with rollator and supervision. She requires supervision for ADLS. Her daughter assists as needed. HOME SUPPORT PRIOR TO ADMISSION: The patient lived with her daughter who provides 24/7 assist. 
 
Physical Therapy Goals Initiated 1/30/2020 re-evaluated on 2/8/2020 and goals remain appropriate. 1.  Patient will move from supine to sit and sit to supine , scoot up and down and roll side to side in bed with supervision/set-up within 7 day(s). 2.  Patient will transfer from bed to chair and chair to bed with supervision/set-up using the least restrictive device within 7 day(s). 3.  Patient will perform sit to stand with supervision/set-up within 7 day(s). 4.  Patient will ambulate with supervision/set-up for 50 feet with the least restrictive device within 7 day(s). Outcome: Progressing Towards Goal 
 PHYSICAL THERAPY TREATMENT: WEEKLY REASSESSMENT Patient: Zelalem Holm (54 y.o. female) Date: 2/8/2020 Primary Diagnosis: CHF (congestive heart failure) ([facility] [address] [I50.9] Precautions:   Bed Alarm, Fall ASSESSMENT Patient continues with skilled PT services and is progressing towards goals. Patient overall requires CGA-min A and RW. She continues to be confused although follows all directions appropriately. Attempted to wean patient to RA although O2 sats dropped to 84% and required 1L O2 for mobility. Continues to be unsteady, although improving overall. Patient's progression toward goals since last assessment: patient had RRT called this past week and with medical decline. Goals remain appropriate. Current Level of Function Impacting Discharge (mobility/balance): CGA-min A and RW Functional Outcome Measure:   The patient scored 50/100 on the Barthel outcome measure which is indicative of moderate functional impairment. Other factors to consider for discharge: confusion, dementia, fall risk PLAN : 
Goals have been updated based on progression since last assessment. Patient continues to benefit from skilled intervention to address the above impairments. Recommendations and Planned Interventions: bed mobility training, transfer training, gait training, therapeutic exercises, patient and family training/education and therapeutic activities Frequency/Duration: Patient will be followed by physical therapy:  3 times a week to address goals. Recommendation for discharge: (in order for the patient to meet his/her long term goals) Physical therapy at least 2 days/week in the home AND ensure assist and/or supervision for safety with functional mobility and ADLS This discharge recommendation: 
Has not yet been discussed the attending provider and/or case management IF patient discharges home will need the following DME: patient owns DME required for discharge SUBJECTIVE:  
Patient stated Is it time to get up? Should I get up now?  OBJECTIVE DATA SUMMARY:  
HISTORY:   
Past Medical History:  
Diagnosis Date  Aortic valve disorders AS  Asthma  Benign hypertensive heart disease without heart failure  CKD (chronic kidney disease)  Diabetes ([address]  Fibromyalgia  Gastroparesis  GERD (gastroesophageal reflux disease)  Hypertension  Mitral valve disorders(424.0) MR  
 LIVIA (obstructive sleep apnea)  Paroxysmal atrial fibrillation (HCC)  Pure hypercholesterolemia 9/30/2010 Past Surgical History:  
Procedure Laterality Date  ECHO 2D ADULT  11/2009 LVH, normal LV wall motion and ejection fraction, mild aortic stenosis, moderate aortic regurgitation and mild mitral regurgitation. The ejection fraction was 55-60%.  ECHO 2D ADULT  1/2011 EF 60%, LAE, mild AS, AI, MR, PA low 40s  EVENT MONITOR POST SYMPTOMS  9/2010 Rare PACs, no arrythmia with symptoms  HX CHOLECYSTECTOMY  HX HYSTERECTOMY  LOOP MONITOR  9-10/2011 NSR  
 MD CARDIOVERSION ELECTIVE ARRHYTHMIA EXTERNAL N/A 12/23/2019 Ep Cardioversion performed by Cuate Dewitt MD at [facility]  MD ICAR CATHETER ABLATION ATRIOVENTR NODE FUNCTION N/A 12/23/2019 Ablation Av Node performed by Cuate Dewitt MD at [facility]  MD INS NEW/RPLC PRM PACEMAKER W/TRANSV ELTRD VENTR N/A 12/23/2019 Insert Ppm Single Ventricular performed by Cuate Dewitt MD at [facility]  STRESS TEST MYOVIEW  1/2011  
 no ischemia, EF 63%  US DUPLEX CAROTID BILATERAL  1/2011  
 mild bilat disease Personal factors and/or comorbidities impacting plan of care: confusion, fall risk, dementia, SOB Home Situation Home Environment: Private residence # Steps to Enter: 4 Wheelchair Ramp: Yes One/Two Story Residence: One story Living Alone: No 
Support Systems: Child(raul), Family member(s) Patient Expects to be Discharged to[de-identified] Unknown Current DME Used/Available at Home: Wheelchair, Walker, rollator, Hospital bed, Commode, bedside, Shower chair(sleeps in recliner) Tub or Shower Type: Shower EXAMINATION/PRESENTATION/DECISION MAKING:  
Critical Behavior: 
Neurologic State: Confused Orientation Level: Oriented to person Cognition: Follows commands Safety/Judgement: Decreased awareness of environment, Decreased awareness of need for safety, Lack of insight into deficits Hearing: Auditory Auditory Impairment: None Range Of Motion: 
AROM: Generally decreased, functional 
  
  
  
PROM: Within functional limits Strength:   
Strength: Generally decreased, functional 
  
  
  
  
  
  
Tone & Sensation:  
Tone: Normal 
  
  
  
  
  
  
  
  
   
Coordination: 
Coordination: Within functional limits Vision: Functional Mobility: 
Bed Mobility: 
Rolling: Contact guard assistance Supine to Sit: Minimum assistance(pulling on hand like bedrail ) Scooting: Contact guard assistance; Additional time Transfers: 
Sit to Stand: Contact guard assistance;Minimum assistance Stand to Sit: Contact guard assistance Bed to Chair: Contact guard assistance;Minimum assistance;Assist x1 Balance:  
Sitting: Intact; Without support Standing: Impaired; With support Standing - Static: Constant support; Fair 
Standing - Dynamic : Fair Ambulation/Gait Training: 
Distance (ft): 20 Feet (ft) Assistive Device: Gait belt;Walker, rolling Ambulation - Level of Assistance: Contact guard assistance;Minimal assistance;Assist x1;Additional time Gait Abnormalities: Decreased step clearance;Shuffling gait;Trunk sway increased Base of Support: Widened Speed/Rivka: Pace decreased (<100 feet/min) Step Length: Right shortened;Left shortened Functional Measure: 
Barthel Index: 
 
Bathin Bladder: 5 Bowels: 10 
Groomin Dressin Feeding: 10 Mobility: 0 Stairs: 0 Toilet Use: 5 Transfer (Bed to Chair and Back): 10 Total: 50/100 The Barthel ADL Index: Guidelines 1. The index should be used as a record of what a patient does, not as a record of what a patient could do. 2. The main aim is to establish degree of independence from any help, physical or verbal, however minor and for whatever reason. 3. The need for supervision renders the patient not independent. 4. A patient's performance should be established using the best available evidence. Asking the patient, friends/relatives and nurses are the usual sources, but direct observation and common sense are also important. However direct testing is not needed. 5. Usually the patient's performance over the preceding 24-48 hours is important, but occasionally longer periods will be relevant. 6. Middle categories imply that the patient supplies over 50 per cent of the effort. 7. Use of aids to be independent is allowed. Po Benítez., Barthel, DRUDY. (6437). Functional evaluation: the Barthel Index. [address] [facility] (14)2. Kaitlin SchwartzHONORIO Valdez Dawn Devdemetricebrianda., [place] Oseguera.[place], [address] [facility] (1999). Measuring the change indisability after inpatient rehabilitation; comparison of the responsiveness of the Barthel Index and Functional [place] Measure. Journal of Neurology, Neurosurgery, and Psychiatry, 66(4), 938-119. LUCAS Prabhakar, CHARLENE Sorto, & Prakash Scales M.A. (2004.) Assessment of post-stroke quality of life in cost-effectiveness studies: The usefulness of the Barthel Index and the EuroQoL-5D. [facility], 67, 069-76 Activity Tolerance:  
Fair and desaturates with exertion and requires oxygen Please refer to the flowsheet for vital signs taken during this treatment. After treatment patient left in no apparent distress:  
Sitting in chair, Call bell within reach, Bed / chair alarm activated and Caregiver / family present COMMUNICATION/EDUCATION:  
The patients plan of care was discussed with: Occupational Therapist, Registered Nurse and . Fall prevention education was provided and the patient/caregiver indicated understanding., Patient/family have participated as able in goal setting and plan of care. and Patient/family agree to work toward stated goals and plan of care. Thank you for this referral. 
Quentin Vo, PT, DPT Time Calculation: 17 mins

## 2020-02-08 NOTE — PROGRESS NOTES
Mary Jane KellerRashard Duran 89 YOB: 1935 Assessment & Plan: 1.  BOGDAN/CKD4 
- UO IS POOR 
- SHE LOOKS QUITE ILL ([facility]) 
- FAMILY AT THE BEDSIDE THEY CONFIRM NO DIALYSIS. PALLIATIVE CARE ON THE CASE. I THIS POINT HOSPICE WOULD BE VERY APPROPRIATE. Subjective:  
CC: CKD4 => BOGDAN 
HPI: Patient is somnolent and not the best historian. UO is very poor. ROS: Limited. Discussed with family Current Facility-Administered Medications Medication Dose Route Frequency  bumetanide (BUMEX) injection 4 mg  4 mg IntraVENous BID  LORazepam (ATIVAN) injection 0.5 mg  0.5 mg IntraVENous BID PRN  prednisoLONE acetate (PRED FORTE) 1 % ophthalmic suspension 1 Drop  1 Drop Right Eye DAILY  besifloxacin (BESIVANCE) 0.6 % ophthalmic suspension 1 Drop (Patient Supplied)  1 Drop Right Eye DAILY  dorzolamide (TRUSOPT) 2 % ophthalmic solution 1 Drop  1 Drop Right Eye TID  WARFARIN INFORMATION NOTE (COUMADIN)   Other QPM  
 diphenhydrAMINE (BENADRYL) capsule 25 mg  25 mg Oral Q8H PRN  
 melatonin tablet 3 mg  3 mg Oral QHS PRN  
 dilTIAZem CD (CARDIZEM CD) capsule 180 mg  180 mg Oral DAILY  metoprolol succinate (TOPROL-XL) XL tablet 100 mg  100 mg Oral DAILY  hydrALAZINE (APRESOLINE) 20 mg/mL injection 10 mg  10 mg IntraVENous Q6H PRN  
 albuterol-ipratropium (DUO-NEB) 2.5 MG-0.5 MG/3 ML  3 mL Nebulization Q6H PRN  pantoprazole (PROTONIX) tablet 40 mg  40 mg Oral ACB  levothyroxine (SYNTHROID) tablet 88 mcg  88 mcg Oral ACB  mirtazapine (REMERON) tablet 45 mg  45 mg Oral QHS  polyethylene glycol (MIRALAX) packet 17 g  17 g Oral DAILY PRN  
 sodium chloride (NS) flush 5-40 mL  5-40 mL IntraVENous Q8H  
 sodium chloride (NS) flush 5-40 mL  5-40 mL IntraVENous PRN  
 acetaminophen (TYLENOL) solution 650 mg  650 mg Oral Q4H PRN  
 docusate sodium (COLACE) capsule 100 mg  100 mg Oral BID  
 Objective:  
 
Vitals: 
Blood pressure 120/52, pulse 71, temperature 97.8 °F (36.6 °C), resp. rate 18, height 5' (1.524 m), weight 62.1 kg (137 lb), SpO2 100 %. Temp (24hrs), Av.3 °F (36.8 °C), Min:97.8 °F (36.6 °C), Max:99.2 °F (37.3 °C) Intake and Output: 
No intake/output data recorded. 1901 -  07 In: 985 [P.O.:985] Out: 1250 [KIQQ:8118] Physical Exam:              
 Patient is intubated:  n 
 
Physical Examination:  
GENERAL ASSESSMENT: chronically ill appearing HEENT:Nontraumatic CHEST: CTA HEART: S1S2 ABDOMEN: Soft,NT, 
:Emmanuel: n [facility] ECG/rhythm: 
 
Data Review No results for input(s): TNIPOC in the last 72 hours. No lab exists for component: ITNL No results for input(s): CPK, CKMB, TROIQ in the last 72 hours. Recent Labs 20 
3473 20 
2905 20 
0356 20 
19120 
1554  136  --   --  135*  136  
K 4.2 4.3  --   --  4.7  4.7  101  --   --  100  100 CO2 32 29  --   --  28  29 BUN 89* 81*  --   --  75*  75* CREA 3.44* 3.26*  --   --  3.16*  3.16* GLU 80 100  --   --  110*  112* PHOS  --   --   --   --  3.6 CA 7.8* 8.3*  --   --  8.3*  8.4* ALB  --   --   --   --  3.2* WBC  --   --  9.5  --   --   
HGB  --   --  8.0* 7.9*  --   
HCT  --   --  26.2* 25.7*  --   
PLT  --   --  289  --   --   
  
Recent Labs 20 
2466 20 
0356 20 
1554 INR 2.0* 2.1* 1.9* PTP 20.2* 20.3* 19.0* Needs: urine analysis, urine sodium, protein and creatinine Lab Results Component Value Date/Time Sodium,urine random 23 2020 04:01 PM  
 Creatinine, urine 151.00 2020 04:01 PM  
 
 
 
Discussed with:  Pt family and nursing  
 
: Magan Ramires MD 
2020 [facility]: 
[email or web address] [place] office: 
[address], [address], [address] [facility] Phone: [phone] Fax :     [phone] [place] office: 
[address] [facility], [address] [facility] Phone - [phone] Fax - [phone]

## 2020-02-08 NOTE — PROGRESS NOTES
Problem: Falls - Risk of 
Goal: *Absence of Falls Description Document Clide Failing Fall Risk and appropriate interventions in the flowsheet. Outcome: Progressing Towards Goal 
Note: Fall Risk Interventions: 
Mobility Interventions: Bed/chair exit alarm Mentation Interventions: Adequate sleep, hydration, pain control, Bed/chair exit alarm Medication Interventions: Patient to call before getting OOB Elimination Interventions: Call light in reach, Bed/chair exit alarm History of Falls Interventions: Room close to nurse's station Problem: Patient Education: Go to Patient Education Activity Goal: Patient/Family Education Outcome: Progressing Towards Goal 
  
Problem: Pressure Injury - Risk of 
Goal: *Prevention of pressure injury Description Document Rafiq Scale and appropriate interventions in the flowsheet. Outcome: Progressing Towards Goal 
Note: Pressure Injury Interventions: 
Sensory Interventions: Assess changes in LOC Moisture Interventions: Absorbent underpads Activity Interventions: Increase time out of bed Mobility Interventions: Chair cushion Nutrition Interventions: Document food/fluid/supplement intake Friction and Shear Interventions: Minimize layers, Foam dressings/transparent film/skin sealants Problem: Patient Education: Go to Patient Education Activity Goal: Patient/Family Education Outcome: Progressing Towards Goal 
  
Problem: Breathing Pattern - Ineffective Goal: *Absence of hypoxia Outcome: Progressing Towards Goal 
Goal: *Use of effective breathing techniques Outcome: Progressing Towards Goal 
  
Problem: Patient Education: Go to Patient Education Activity Goal: Patient/Family Education Outcome: Progressing Towards Goal 
  
Problem: Patient Education: Go to Patient Education Activity Goal: Patient/Family Education Outcome: Progressing Towards Goal 
  
Problem: Heart Failure: Day 4 Goal: Activity/Safety Outcome: Progressing Towards Goal 
Goal: Nutrition/Diet Outcome: Progressing Towards Goal 
Goal: Medications Outcome: Progressing Towards Goal 
Goal: Respiratory Outcome: Progressing Towards Goal 
Goal: Psychosocial 
Outcome: Progressing Towards Goal 
Goal: *Oxygen saturation within defined limits Outcome: Progressing Towards Goal 
Goal: *Hemodynamically stable Outcome: Progressing Towards Goal 
Goal: *Optimal pain control at patient's stated goal 
Outcome: Progressing Towards Goal 
Goal: *Anxiety reduced or absent Outcome: Progressing Towards Goal

## 2020-02-08 NOTE — PROGRESS NOTES
Bedside shift change report GIVEN TO ROSSY sultana. Report included the following information SBAR. SIGNIFICANT CHANGES DURING SHIFT:  patient pleasant, responsive and oriented but sleeping a lot. She did get  Up to the [facility] to void 70 concentrated urine. She had a bladder scan before that of 221. Family in room. CONCERNS TO ADDRESS WITH MD:  Continue intake and output. Monitor urine flow. Turn q 2 hours assess skin breakdown buttocks [facility] NURSING NOTE Admission Date 1/28/2020 Admission Diagnosis CHF (congestive heart failure) ([facility] [place] [address] [I50.9] Consults IP CONSULT TO PALLIATIVE CARE - PROVIDER 
IP CONSULT TO NEPHROLOGY 
IP CONSULT TO PALLIATIVE CARE - PROVIDER 
IP CONSULT TO CARDIOLOGY 
IP CONSULT TO PULMONOLOGY Cardiac Monitoring [] Yes [] No  
  
Purposeful Hourly Rounding [] Yes   
Judd Score Total Score: 3 Judd score 3 or > [] Bed Alarm [] Avasys [] 1:1 sitter [] Patient refused (Signed refusal form in chart) Rafiq Score Rafiq Score: 11 Rafiq score 14 or < [] PMT consult [] Wound Care consult  
 []  Specialty bed  [] Nutrition consult Influenza Vaccine Received Flu Vaccine for Current Season (usually Sept-March): Yes Oxygen needs? [] Room air Oxygen @  []1L    []2L    []3L   []4L    []5L   []6L via NC Chronic home O2 use? [] Yes [] No 
Perform O2 challenge test and document in progress note using [employer]) Last bowel movement Last Bowel Movement Date: 02/04/20 Urinary Catheter External Female Catheter 02/03/20-Urine Output (mL): 0 ml LDAs Peripheral IV 02/06/20 Right Arm (Active) Site Assessment Clean, dry, & intact 2/7/2020  6:23 PM  
Phlebitis Assessment 0 2/7/2020  6:23 PM  
Infiltration Assessment 0 2/7/2020  6:23 PM  
Dressing Status Clean, dry, & intact 2/7/2020  6:23 PM  
Dressing Type Tape;Transparent 2/7/2020  3:18 AM  
Hub Color/Line Status Yellow;Capped;Flushed;Patent 2/7/2020  3:18 AM  
      
 External Female Catheter 02/03/20 (Active) Site Assessment Clean, dry, & intact 2/7/2020  3:18 AM  
Repositioned Yes 2/7/2020  3:18 AM  
Perineal Care Yes 2/7/2020  3:18 AM  
Wick Changed Yes 2/7/2020  3:18 AM  
Suction Canister/Tubing Changed No 2/6/2020  4:34 AM  
Urine Output (mL) 0 ml 2/7/2020  6:23 PM  
            
  
Readmission Risk Assessment Tool Score High Risk 39 Total Score 3 Has Seen PCP in Last 6 Months (Yes=3, No=0) 3 Patient Length of Stay (>5 days = 3) 9 IP Visits Last 12 Months (1-3=4, 4=9, >4=11) 5 Pt. Coverage (Medicare=5 , Medicaid, or Self-Pay=4) 16 Charlson Comorbidity Score (Age + Comorbid Conditions) Criteria that do not apply:  
 . Living with Significant Other. Assisted Living. LTAC. SNF. or  
Rehab Expected Length of Stay 4d 2h Actual Length of Stay 10

## 2020-02-09 NOTE — PROGRESS NOTES
Hospitalist Progress Note NAME: Aden Rudolph :  1935 MRN:  [id number]  
 
89-[id number]-BKL female with past medical history of diastolic congestive heart failure presented to the hospital with shortness of breath and was noted to have acute CHF exacerbation along with supratherapeutic INR from warfarin. During hospitalization, she developed acute kidney injury on Lasix was stopped. Assessment / Plan: 
Acute hypoxic respiratory failure Acute on chronic exacerbation of diastolic heart failure Severe pulmonary HTN 
-s/p rapid response early AM , fulminant pulmonary edema on CXR, required initiation of BiPAP, which has since been weaned 
-Ongoing discussions with patient's family by all physicians, including palliative care. At this point, changed to partial code (no CPR) and this will be revisited tomorrow. 
-Palliative care consulted. Patient deferred discussion with palliative team 
-IV bumex increased -- urine output starting to improve and less supplemental oxygen needed. 
-History of heart failure preserved ejection fraction around 60% on the most recent echo 
-Strict I's and O's, daily weights. -VQ mismatch low probability for PE Acute kidney injury on CKD4 
-Due to cardiorenal syndrome and dehydration on admission 
-Urine output improving as is creatinine -- perhaps we are starting to see some renal recovery. 
-Prognosis appears poor 
-Family do not want to proceed with dialysis at this time Supratherapeutic INR improved -INR peaked at 6.4 and improved 
-Coumadin restarted. INR is therapeutic. Pharmacy managing. Mild elevated Troponin due to CKD 
-Troponin peaked at 0.06  
  
A. fib Status post pacemaker insertion 2019 Continue warfarin Rate controlled with diltiazem and metoprolol will continue 
  
History of diabetes mellitus type 2 Off medications Most recent hemoglobin A1c 5.3 2019 
  
COPD Not in exacerbation Continue home inhalers 
  
 Hypertension Continue home medication hydralazine 3 times daily, metoprolol, diltiazem -- control fluctuates but is fair at this time 
  
Left lower extremity edema Ultrasound Doppler showed no evidence of DVT 
  
Poor oral intake Consulted speech therapy to evaluate for dysphagia -- on pureed diet Dietary following Incidental Baker's cyst 
Outpatient follow-up 
 
  
Code Status: Full code Surrogate Decision Maker: Her sons Gama Snyder 
  
DVT Prophylaxis:  warfarin GI Prophylaxis: not indicated 
  
Baseline: Active, lives with her daughter Body mass index is 26.37 kg/m². Prognosis poor but may be improving if renal recovery continues. Subjective: Chief Complaint / Reason for Physician Visit: follow up respiratory failure, renal failure No complaints elicited. Urine output noted to improve overnight and creatinine better. Patient denies pain, shortness of breath, nausea, vomiting. Review of Systems: 
Symptom Y/N Comments  Symptom Y/N Comments Fever/Chills n   Chest Pain n   
Poor Appetite    Edema y Cough n   Abdominal Pain n   
Sputum    Joint Pain SOB/HASKINS n   Pruritis/Rash Nausea/vomit n   Tolerating PT/OT Diarrhea    Tolerating Diet y Constipation    Other Could NOT obtain due to:   
 
Objective: VITALS:  
Last 24hrs VS reviewed since prior progress note. Most recent are: 
Patient Vitals for the past 24 hrs: 
 Temp Pulse Resp BP SpO2  
02/09/20 1052 97.4 °F (36.3 °C) 70 18 125/45 99 % 02/09/20 0715 97.7 °F (36.5 °C) 79 16 166/44 96 % 02/09/20 0400 98 °F (36.7 °C) 71 17 (!) 163/39 95 % 02/08/20 2321 98.4 °F (36.9 °C) 70 17 141/40 95 % 02/08/20 1926 98.7 °F (37.1 °C) 74 17 127/54 94 % 02/08/20 1508 98 °F (36.7 °C) 74 18 133/45 93 % Intake/Output Summary (Last 24 hours) at 2/9/2020 1128 Last data filed at 2/9/2020 1533 Gross per 24 hour Intake 630 ml Output 1675 ml Net -1045 ml PHYSICAL EXAM: 
 General: Elderly AA female, deconditioned, in no acute distress ENT:  Shaina Olguin. Anicteric sclerae. MMM Resp:  Limited auscultation but relatively clear laterally CV:  Regular  rhythm,  1+ edema GI:  Soft, Non distended, Non tender. +Bowel sounds Neurologic:  Sleepy but arousable, poor short term memory Psych:   No anxiety or agitation noted Skin:  No rashes. No jaundice Reviewed most current lab test results and cultures  YES Reviewed most current radiology test results   YES Review and summation of old records today    NO Reviewed patient's current orders and MAR    YES 
PMH/SH reviewed - no change compared to H&P 
 
________________________________________________________________________ Care Plan discussed with: 
  Comments Patient x Family  x Daughter RN x Care Manager Consultant Multidiciplinary team rounds were held today with , nursing, pharmacist and clinical coordinator. Patient's plan of care was discussed; medications were reviewed and discharge planning was addressed. ________________________________________________________________________ Total NON critical care TIME:  30   Minutes Total CRITICAL CARE TIME Spent:   Minutes non procedure based Comments >50% of visit spent in counseling and coordination of care x   
________________________________________________________________________ Cristine Auguste MD  
 
Procedures: see electronic medical records for all procedures/Xrays and details which were not copied into this note but were reviewed prior to creation of Plan. LABS: 
I reviewed today's most current labs and imaging studies. Pertinent labs include: 
Recent Labs 02/09/20 
5840 02/07/20 
0424 02/06/20 
1912 WBC 6.1 9.5  --   
HGB 7.2* 8.0* 7.9*  
HCT 24.2* 26.2* 25.7*  
 289  --   
 
Recent Labs 02/09/20 
4974 02/08/20 
1465 02/07/20 
3582 02/07/20 
0356 02/06/20 
1554  136 136  --  135*  136  
K 3.5 4.2 4.3  --  4.7  4.7  100 101  --  100  100 CO2 33* 32 29  --  28  29 GLU 82 80 100  --  110*  112* BUN 94* 89* 81*  --  75*  75* CREA 3.04* 3.44* 3.26*  --  3.16*  3.16* CA 8.1* 7.8* 8.3*  --  8.3*  8.4* PHOS  --   --   --   --  3.6 ALB  --   --   --   --  3.2* INR 2.3* 2.0*  --  2.1* 1.9* Signed: Roscoe Jaquez MD

## 2020-02-09 NOTE — PROGRESS NOTES
Pharmacy Daily Dosing of Warfarin Indication: A. Fib Goal INR: 2 - 3 PTA Warfarin Dose: 5 mg Mon/Wed/Thur/Sat and 2.5 mg on Tue/Fri/Sun  
 
Concurrent anticoagulants: none Concurrent antiplatelet: none Major Interacting Medications Drugs that may increase INR: none Drugs that may decrease INR: none Conditions that may increase/decrease INR (CHF, C. diff, cirrhosis, thyroid disorder, hypoalbuminemia): CHF, on Levothyroxine Labs: 
Recent Labs 02/09/20 
3371 02/08/20 
0909 02/07/20 
0356  02/06/20 
1554 INR 2.3* 2.0* 2.1*  --  1.9* HGB 7.2*  --  8.0*   < >  --   
  --  289  --   --   
ALB  --   --   --   --  3.2*  
 < > = values in this interval not displayed. Impression/Plan:  
Will order warfarin 3 mg PO x 1 Daily INR 
CBC w/o diff every other day Pharmacy will follow daily and adjust the dose as appropriate. Thanks YOSELIN SchwabD 
 
 
[email or web address]. pdf

## 2020-02-09 NOTE — PROGRESS NOTES
Bedside and Verbal shift change report GIVEN TO , RN. Report included the following information Kardex. SIGNIFICANT CHANGES DURING SHIFT:   
 
 
CONCERNS TO ADDRESS WITH MD:   
 
 
 
 
[address] NURSING NOTE Admission Date 1/28/2020 Admission Diagnosis CHF (congestive heart failure) ([facility] [address] [I50.9] Consults IP CONSULT TO PALLIATIVE CARE - PROVIDER 
IP CONSULT TO NEPHROLOGY 
IP CONSULT TO PALLIATIVE CARE - PROVIDER 
IP CONSULT TO CARDIOLOGY 
IP CONSULT TO PULMONOLOGY Cardiac Monitoring [x] Yes [] No  
  
Purposeful Hourly Rounding [x] Yes   
Judd Score Total Score: 4 Judd score 3 or > [x] Bed Alarm [] Avasys [] 1:1 sitter [] Patient refused (Signed refusal form in chart) Rafiq Score Rafiq Score: 16 Rafiq score 14 or < [] PMT consult [] Wound Care consult  
 []  Specialty bed  [] Nutrition consult Influenza Vaccine Received Flu Vaccine for Current Season (usually Sept-March): Yes Oxygen needs? [] Room air Oxygen @  [x]1L    []2L    []3L   []4L    []5L   []6L via NC Chronic home O2 use? [] Yes [x] No   4l Perform O2 challenge test and document in progress note using smartphrase (.Homeoxygen) Last bowel movement Last Bowel Movement Date: 02/04/20 Urinary Catheter External Female Catheter 02/03/20-Urine Output (mL): 800 ml LDAs Peripheral IV 02/06/20 Right Arm (Active) Site Assessment Clean, dry, & intact 2/9/2020  4:32 AM  
Phlebitis Assessment 0 2/9/2020  4:32 AM  
Infiltration Assessment 0 2/9/2020  4:32 AM  
Dressing Status Clean, dry, & intact 2/9/2020  4:32 AM  
Dressing Type Tape;Transparent 2/9/2020  4:32 AM  
Hub Color/Line Status Yellow; Flushed;Patent 2/9/2020  4:32 AM  
      
External Female Catheter 02/03/20 (Active) Site Assessment Clean, dry, & intact 2/9/2020  4:32 AM  
Repositioned Yes 2/9/2020  4:32 AM  
Perineal Care Yes 2/9/2020  4:32 AM  
Wick Changed Yes 2/9/2020  4:32 AM  
 Suction Canister/Tubing Changed No 2/9/2020  4:32 AM  
Urine Output (mL) 800 ml 2/9/2020  6:58 AM  
            
  
Readmission Risk Assessment Tool Score High Risk 39 Total Score 3 Has Seen PCP in Last 6 Months (Yes=3, No=0) 3 Patient Length of Stay (>5 days = 3) 9 IP Visits Last 12 Months (1-3=4, 4=9, >4=11) 5 Pt. Coverage (Medicare=5 , Medicaid, or Self-Pay=4) 16 Charlson Comorbidity Score (Age + Comorbid Conditions) Criteria that do not apply:  
 . Living with Significant Other. Assisted Living. LTAC. SNF. or  
Rehab Expected Length of Stay 4d 2h Actual Length of Stay 12

## 2020-02-09 NOTE — PROGRESS NOTES
Problem: Falls - Risk of 
Goal: *Absence of Falls Description Document Jeremiah Madison Fall Risk and appropriate interventions in the flowsheet. Outcome: Progressing Towards Goal 
Note: Fall Risk Interventions: 
Mobility Interventions: Bed/chair exit alarm Mentation Interventions: Adequate sleep, hydration, pain control, Bed/chair exit alarm Medication Interventions: Bed/chair exit alarm Elimination Interventions: Call light in reach, Bed/chair exit alarm History of Falls Interventions: Bed/chair exit alarm Problem: Patient Education: Go to Patient Education Activity Goal: Patient/Family Education Outcome: Progressing Towards Goal 
  
Problem: Pressure Injury - Risk of 
Goal: *Prevention of pressure injury Description Document Rafiq Scale and appropriate interventions in the flowsheet. Outcome: Progressing Towards Goal 
Note: Pressure Injury Interventions: 
Sensory Interventions: Assess changes in LOC Moisture Interventions: Absorbent underpads Activity Interventions: Increase time out of bed Mobility Interventions: Chair cushion Nutrition Interventions: Document food/fluid/supplement intake Friction and Shear Interventions: Minimize layers Problem: Patient Education: Go to Patient Education Activity Goal: Patient/Family Education Outcome: Progressing Towards Goal 
  
Problem: Breathing Pattern - Ineffective Goal: *Absence of hypoxia Outcome: Progressing Towards Goal 
Goal: *Use of effective breathing techniques Outcome: Progressing Towards Goal 
  
Problem: Patient Education: Go to Patient Education Activity Goal: Patient/Family Education Outcome: Progressing Towards Goal 
  
Problem: Patient Education: Go to Patient Education Activity Goal: Patient/Family Education Outcome: Progressing Towards Goal 
  
Problem: Heart Failure: Day 4 Goal: Activity/Safety Outcome: Progressing Towards Goal 
Goal: Nutrition/Diet Outcome: Progressing Towards Goal 
 Goal: Medications Outcome: Progressing Towards Goal 
Goal: Respiratory Outcome: Progressing Towards Goal 
Goal: Psychosocial 
Outcome: Progressing Towards Goal 
Goal: *Oxygen saturation within defined limits Outcome: Progressing Towards Goal 
Goal: *Hemodynamically stable Outcome: Progressing Towards Goal 
Goal: *Optimal pain control at patient's stated goal 
Outcome: Progressing Towards Goal 
Goal: *Anxiety reduced or absent Outcome: Progressing Towards Goal

## 2020-02-09 NOTE — PROGRESS NOTES
0700: Bedside shift change report given to YOLIS Skaggs RN (oncoming nurse) by Justin Gonzalez RN (offgoing nurse). Report included the following information SBAR and Kardex. 0745: RN called MD at let her know that the sitter is no longer necessary, MD is ok with this, family states they will be at bedside all day. 1900:  
Bedside shift change report GIVEN TO Gema Aviles RN. Report included the following information SBAR and Kardex. SIGNIFICANT CHANGES DURING SHIFT:  Pt had a good day, pt turned often, pt napped often, pt family at bedside all day, RN d/c'ed sitter order with confirmation from MD. 
 
 
[address] [facility] MD:  D/c plannning? [facility] NURSING NOTE Admission Date 1/28/2020 Admission Diagnosis CHF (congestive heart failure) ([facility] [address] [I50.9] Consults IP CONSULT TO PALLIATIVE CARE - PROVIDER 
IP CONSULT TO NEPHROLOGY 
IP CONSULT TO PALLIATIVE CARE - PROVIDER 
IP CONSULT TO CARDIOLOGY 
IP CONSULT TO PULMONOLOGY Cardiac Monitoring [x] Yes [] No  
  
Purposeful Hourly Rounding [x] Yes   
Judd Score Total Score: 4 Judd score 3 or > [x] Bed Alarm [] Avasys [] 1:1 sitter [] Patient refused (Signed refusal form in chart) Rafiq Score Rafiq Score: 16 Rafiq score 14 or < [] PMT consult [] Wound Care consult  
 []  Specialty bed  [] Nutrition consult Influenza Vaccine Received Flu Vaccine for Current Season (usually Sept-March): Yes Oxygen needs? [] Room air Oxygen @  [x]1L    []2L    []3L   []4L    []5L   []6L via NC Chronic home O2 use? [] Yes [x] No 
Perform O2 challenge test and document in progress note using smartphrase (.Homeoxygen) Last bowel movement Last Bowel Movement Date: 02/08/20 Urinary Catheter External Female Catheter 02/03/20-Urine Output (mL): 800 ml LDAs Peripheral IV 02/06/20 Right Arm (Active) Site Assessment Clean, dry, & intact 2/9/2020  3:05 PM  
Phlebitis Assessment 0 2/9/2020  3:05 PM  
 Infiltration Assessment 0 2/9/2020  3:05 PM  
Dressing Status Clean, dry, & intact 2/9/2020  3:05 PM  
Dressing Type Transparent;Tape 2/9/2020  3:05 PM  
Hub Color/Line Status Yellow 2/9/2020  3:05 PM  
      
External Female Catheter 02/03/20 (Active) Site Assessment Clean, dry, & intact 2/9/2020 11:06 AM  
Repositioned Yes 2/9/2020 11:06 AM  
Perineal Care Yes 2/9/2020 11:06 AM  
Wick Changed No 2/9/2020 11:06 AM  
Suction Canister/Tubing Changed No 2/9/2020 11:06 AM  
Urine Output (mL) 800 ml 2/9/2020  6:58 AM  
            
  
Readmission Risk Assessment Tool Score High Risk 39 Total Score 3 Has Seen PCP in Last 6 Months (Yes=3, No=0) 3 Patient Length of Stay (>5 days = 3) 9 IP Visits Last 12 Months (1-3=4, 4=9, >4=11) 5 Pt. Coverage (Medicare=5 , Medicaid, or Self-Pay=4) 16 Charlson Comorbidity Score (Age + Comorbid Conditions) Criteria that do not apply:  
 . Living with Significant Other. Assisted Living. LTAC. SNF. or  
Rehab Expected Length of Stay 4d 2h Actual Length of Stay 12

## 2020-02-09 NOTE — PROGRESS NOTES
Problem: Falls - Risk of 
Goal: *Absence of Falls Description Document Gordon Edouard Fall Risk and appropriate interventions in the flowsheet. Outcome: Progressing Towards Goal 
Note: Fall Risk Interventions: 
Mobility Interventions: Bed/chair exit alarm, OT consult for ADLs, PT Consult for mobility concerns, Patient to call before getting OOB Mentation Interventions: Adequate sleep, hydration, pain control Medication Interventions: Bed/chair exit alarm, Patient to call before getting OOB Elimination Interventions: Call light in reach, Bed/chair exit alarm, Toileting schedule/hourly rounds, Patient to call for help with toileting needs History of Falls Interventions: Bed/chair exit alarm Problem: Patient Education: Go to Patient Education Activity Goal: Patient/Family Education Outcome: Progressing Towards Goal 
  
Problem: Pressure Injury - Risk of 
Goal: *Prevention of pressure injury Description Document Rafiq Scale and appropriate interventions in the flowsheet. Outcome: Progressing Towards Goal 
Note: Pressure Injury Interventions: 
Sensory Interventions: Assess need for specialty bed, Avoid rigorous massage over bony prominences, Chair cushion, Check visual cues for pain, Discuss PT/OT consult with provider, Float heels, Keep linens dry and wrinkle-free, Maintain/enhance activity level, Minimize linen layers, Pad between skin to skin Moisture Interventions: Apply protective barrier, creams and emollients, Assess need for specialty bed, Check for incontinence Q2 hours and as needed, Maintain skin hydration (lotion/cream) Activity Interventions: Chair cushion, Increase time out of bed, Pressure redistribution bed/mattress(bed type), PT/OT evaluation Mobility Interventions: Chair cushion Nutrition Interventions: Document food/fluid/supplement intake Friction and Shear Interventions: Apply protective barrier, creams and emollients, Feet elevated on foot rest, Foam dressings/transparent film/skin sealants, HOB 30 degrees or less, Lift sheet, Minimize layers, Lift team/patient mobility team 
 
  
 
 
 
  
Problem: Breathing Pattern - Ineffective Goal: *Absence of hypoxia Outcome: Progressing Towards Goal 
Goal: *Use of effective breathing techniques Outcome: Progressing Towards Goal 
Goal: *PALLIATIVE CARE:  Alleviation of Dyspnea Outcome: Progressing Towards Goal

## 2020-02-10 NOTE — PROGRESS NOTES
[address] 
[facility] Pulmonary, Critical Care, and Sleep Medicine Initial Patient Consult Name: Aden Rudolph MRN: [id number] : 1935 Hospital: [place] [address] Date: 2/10/2020 IMPRESSION:  
· Acute respiratory failure- sat 1000 % on 1 liter · Pulmonary edemaand bilateral pleural effusions · BOGDAN · Valvular heart dz - impressive murmur- mild as, moderate ar and moderate mr on echo · afib · Anticoagulation - comadin · Copd by hx - inactive problem currently · Severe pulm htn - 74 pasp RECOMMENDATIONS:  
· Wean off BIPAP · Try to wean O2- close · Continue diuretics as per renal  
· Renal failure worse · Poor overall prognosis · Palliative care · Not a good candidate for heroics · Not a good candidate for RRT 
· Noted to be a limted code · Check cxr in am  
  
 
Subjective:  
Sitting  In chair . Limited exam but she says she feels  Fine Current Facility-Administered Medications Medication Dose Route Frequency  bumetanide (BUMEX) injection 2 mg  2 mg IntraVENous BID  warfarin (COUMADIN) tablet 2.5 mg  2.5 mg Oral ONCE  prednisoLONE acetate (PRED FORTE) 1 % ophthalmic suspension 1 Drop  1 Drop Right Eye DAILY  besifloxacin (BESIVANCE) 0.6 % ophthalmic suspension 1 Drop (Patient Supplied)  1 Drop Right Eye DAILY  dorzolamide (TRUSOPT) 2 % ophthalmic solution 1 Drop  1 Drop Right Eye TID  WARFARIN INFORMATION NOTE (COUMADIN)   Other QPM  
 dilTIAZem CD (CARDIZEM CD) capsule 180 mg  180 mg Oral DAILY  metoprolol succinate (TOPROL-XL) XL tablet 100 mg  100 mg Oral DAILY  pantoprazole (PROTONIX) tablet 40 mg  40 mg Oral ACB  levothyroxine (SYNTHROID) tablet 88 mcg  88 mcg Oral ACB  mirtazapine (REMERON) tablet 45 mg  45 mg Oral QHS  sodium chloride (NS) flush 5-40 mL  5-40 mL IntraVENous Q8H  
 docusate sodium (COLACE) capsule 100 mg  100 mg Oral BID Review of Systems: Review of systems not obtained due to patient factors. She has no complaints or concerns Objective:  
Vital Signs:   
Visit Vitals /41 Pulse 77 Temp 97.6 °F (36.4 °C) Resp 19 Ht 5' (1.524 m) Wt 58.7 kg (129 lb 6.6 oz) SpO2 99% BMI 25.27 kg/m² O2 Device: Room air O2 Flow Rate (L/min): 1 l/min Temp (24hrs), Av.9 °F (36.6 °C), Min:97 °F (36.1 °C), Max:99.2 °F (37.3 °C) Intake/Output:  
Last shift:      02/10 0701 - 02/10 1900 In: 120 [P.O.:120] Out: 0 Last 3 shifts: 1901 - 02/10 0700 In: 274 [P.O.:870] Out: 2800 [Urine:2800] Intake/Output Summary (Last 24 hours) at 2/10/2020 1619 Last data filed at 2/10/2020 1206 Gross per 24 hour Intake 360 ml Output 850 ml Net -490 ml Physical Exam:  
General:  Alert, cooperative,in distress, appears stated age. Sitting in chair  So limited exam   
Head:  Normocephalic, without obvious abnormality, atraumatic. Eyes:  Conjunctivae/corneas clear. PERRL, EOMs intact. Nose: nc Throat: Moist   
Neck: Supple, symmetrical, trachea midline, no adenopathy, .  
Back:   No examined Lungs:   Rales bilaterally./coarse ant.lat Chest wall:  No tenderness or deformity. Heart:  Regular rate and rhythm, S1, S2 normal, blowing heart murmur . Abdomen:   Soft, non-tender. Bowel sounds normal.   
Extremities: Extremities normal, atraumatic, no cyanosis or edema. Pulses: Not examined Skin: Skin color, texture, turgor normal. No rashes or lesions Lymph nodes: Cervical, supraclavicular,  nodes normal.  
Neurologic: Grossly nonfocal  
 
Data review:  
 
Recent Results (from the past 24 hour(s)) PROTHROMBIN TIME + INR Collection Time: 02/10/20  4:27 AM  
Result Value Ref Range INR 2.6 (H) 0.9 - 1.1 Prothrombin time 25.4 (H) 9.0 - 11.1 sec METABOLIC PANEL, BASIC Collection Time: 02/10/20  4:27 AM  
Result Value Ref Range Sodium 141 136 - 145 mmol/L  Potassium 3.5 3.5 - 5.1 mmol/L  
 Chloride 101 97 - 108 mmol/L  
 CO2 35 (H) 21 - 32 mmol/L Anion gap 5 5 - 15 mmol/L Glucose 106 (H) 65 - 100 mg/dL BUN 91 (H) 6 - 20 MG/DL Creatinine 2.60 (H) 0.55 - 1.02 MG/DL  
 BUN/Creatinine ratio 35 (H) 12 - 20 GFR est AA 21 (L) >60 ml/min/1.73m2 GFR est non-AA 18 (L) >60 ml/min/1.73m2 Calcium 8.1 (L) 8.5 - 10.1 MG/DL MAGNESIUM Collection Time: 02/10/20  4:27 AM  
Result Value Ref Range Magnesium 2.0 1.6 - 2.4 mg/dL Imaging: 
I have personally reviewed the patients radiographs and have reviewed the reports: CXR: severe pulmonary edema on 26 with bilateral  effusions Fili Turner MD

## 2020-02-10 NOTE — PROGRESS NOTES
Problem: Mobility Impaired (Adult and Pediatric) Goal: *Acute Goals and Plan of Care (Insert Text) Description FUNCTIONAL STATUS PRIOR TO ADMISSION: patient ambulates household distances with rollator and supervision. She requires supervision for ADLS. Her daughter assists as needed. HOME SUPPORT PRIOR TO ADMISSION: The patient lived with her daughter who provides 24/7 assist. 
 
Physical Therapy Goals Initiated 1/30/2020 re-evaluated on 2/8/2020 and goals remain appropriate. 1.  Patient will move from supine to sit and sit to supine , scoot up and down and roll side to side in bed with supervision/set-up within 7 day(s). 2.  Patient will transfer from bed to chair and chair to bed with supervision/set-up using the least restrictive device within 7 day(s). 3.  Patient will perform sit to stand with supervision/set-up within 7 day(s). 4.  Patient will ambulate with supervision/set-up for 50 feet with the least restrictive device within 7 day(s). Outcome: Not Met PHYSICAL THERAPY TREATMENT Patient: Lyn Webster (25 y.o. female) Date: 2/10/2020 Diagnosis: [facility] [I50.9] <principal problem not specified> Precautions: Bed Alarm, Fall Chart, physical therapy assessment, plan of care and goals were reviewed. ASSESSMENT Patient continues with skilled PT services and demo minimal progress towards goals. Pt cleared for mobilization by RN who notes pt with ongoing confusion. Daughter present for session and encouraged pt participation. Pt received in bed, drowsy, NCO2 in place but on 0 L; SpO2 95%. Pt required encouragement and increased time to initiate mobility. Attempted activity on RA, however pt demo O2 desat to 84% after transition to EOB. NCO2 reapplied at 1 L with recovery to 97%. Upon initial stand, pt demo onset of urinary urgency and incontinence.  Returned to sit for clean up and gown change, then amb short distance to chair with RW and assist.  Pt required frequent cues to attend to task; noted poor walker mgmt possibly impacted by use of rollator at home. SpO2 % after activity on 1L O2. Pt remains below functional baseline. Will benefit from mobility progression in acute setting as tolerated. Family able to provide 24/7 assist at home. Recommend follow up [facility] in addition. Current Level of Function Impacting Discharge (mobility/balance): bed mob min A, transfer CGA, short distance amb with RW min A Other factors to consider for discharge: 24/7 family assist at home PLAN : 
Patient continues to benefit from skilled intervention to address the above impairments. Continue treatment per established plan of care. to address goals. Recommendation for discharge: (in order for the patient to meet his/her long term goals) Physical therapy at least 2 days/week in the home AND ensure assist and/or supervision for safety with mobility This discharge recommendation: 
Has not yet been discussed the attending provider and/or case management IF patient discharges home will need the following DME: patient owns DME required for discharge SUBJECTIVE:  
Patient stated Chaparrita Sample now?  pt asked repeatedly during amb OBJECTIVE DATA SUMMARY:  
Critical Behavior: 
Neurologic State: Alert, Confused Orientation Level: Disoriented to place Cognition: Follows commands Safety/Judgement: Fall prevention Functional Mobility Training: 
Bed Mobility: 
  
Supine to Sit: Minimum assistance(assist at trunk) Scooting: Minimum assistance; Additional time(for scoot to EOB) Transfers: 
Sit to Stand: Contact guard assistance Stand to Sit: Contact guard assistance Balance: 
Sitting: Intact Sitting - Static: Good (unsupported) Standing: Impaired; With support Standing - Static: Constant support; Arsen Tapia Standing - Dynamic : Fair;Constant support Ambulation/Gait Training: 
Distance (ft): 15 Feet (ft) Assistive Device: Walker, rolling Ambulation - Level of Assistance: Minimal assistance; Additional time Gait Abnormalities: Decreased step clearance;Shuffling gait; Path deviations Speed/Rivka: Slow;Shuffled Step Length: Left shortened;Right shortened Pain Rating: 
No c/o pain Activity Tolerance:  
Poor and desaturates with exertion and requires oxygen Please refer to the flowsheet for vital signs taken during this treatment. After treatment patient left in no apparent distress:  
Sitting in chair, Call bell within reach, Bed / chair alarm activated, and Caregiver / family present COMMUNICATION/COLLABORATION:  
The patients plan of care was discussed with: Registered Nurse Agnes Lunsford, PT Time Calculation: 32 mins

## 2020-02-10 NOTE — PROGRESS NOTES
Bedside and Verbal shift change report GIVEN TO ROSSY puentes. Report included the following information SBAR, Kardex, ED Summary, Procedure Summary, Intake/Output, MAR and Recent Results. Uneventful shift. Unable to educate pt d/t confusion

## 2020-02-10 NOTE — PROGRESS NOTES
Pharmacy Daily Dosing of Warfarin Indication: A. Fib Goal INR: 2 - 3 PTA Warfarin Dose: 5 mg Mon/Wed/Thur/Sat and 2.5 mg on Tue/Fri/Sun  
 
Concurrent anticoagulants: none Concurrent antiplatelet: none Major Interacting Medications Drugs that may increase INR: none Drugs that may decrease INR: none Conditions that may increase/decrease INR (CHF, C. diff, cirrhosis, thyroid disorder, hypoalbuminemia): CHF, on Levothyroxine Labs: 
Recent Labs  
  02/10/20 
0427 02/09/20 
0533 02/08/20 
9344 INR 2.6* 2.3* 2.0* HGB  --  7.2*  --   
PLT  --  283  -- Impression/Plan:  
Will order warfarin 2.5 mg PO x 1 Daily INR 
CBC w/o diff every other day Pharmacy will follow daily and adjust the dose as appropriate. Thanks Marty Coto, PHARMD 
 
 
[email or web address]. pdf

## 2020-02-10 NOTE — PROGRESS NOTES
Hospitalist Progress Note NAME: Rocío Horton :  1935 MRN:  [id number]  
 
[id number] female with past medical history of diastolic congestive heart failure presented to the hospital with shortness of breath and was noted to have acute CHF exacerbation along with supratherapeutic INR from warfarin. During hospitalization, she developed acute kidney injury on Lasix was stopped. Assessment / Plan: 
Acute hypoxic respiratory failure Acute on chronic exacerbation of diastolic heart failure Severe pulmonary HTN 
-s/p rapid response early AM , fulminant pulmonary edema on CXR, required initiation of BiPAP, which has since been weaned 
-Ongoing discussions with patient's family by all physicians, including palliative care. At this point, changed to partial code (no CPR) and this will be revisited as needed. 
-Palliative care consulted. Patient deferred discussion with palliative team 
-IV bumex had been increased -- urine output continues to improve and less supplemental oxygen needed. 
-History of heart failure preserved ejection fraction around 60% on the most recent echo 
-Strict I's and O's, daily weights. -VQ mismatch low probability for PE Acute kidney injury on CKD4 
-Due to cardiorenal syndrome and dehydration on admission 
-Urine output again improving as is creatinine -- perhaps we are starting to see some renal recovery. Bumex decreased per nephrology. 
-Prognosis now improving as she is experiencing some renal recovery. 
-Family do not want to proceed with dialysis at this time Supratherapeutic INR improved -INR peaked at 6.4 and improved 
-Coumadin restarted. INR is therapeutic. Pharmacy managing. Mild elevated Troponin due to CKD 
-Troponin peaked at 0.06  
  
A. fib Status post pacemaker insertion 2019 Continue warfarin Rate controlled with diltiazem and metoprolol will continue 
  
History of diabetes mellitus type 2 Off medications Most recent hemoglobin A1c 5.3 March 2019 
  
COPD Not in exacerbation Continue home inhalers 
  
Hypertension Continue home medication hydralazine 3 times daily, metoprolol, diltiazem -- control fluctuates but is fair at this time 
  
Left lower extremity edema Ultrasound Doppler showed no evidence of DVT 
  
Poor oral intake Consulted speech therapy to evaluate for dysphagia -- on pureed diet Dietary following Incidental Baker's cyst 
Outpatient follow-up 
 
  
Code Status: Full code Surrogate Decision Maker: Her sons Marina Snyder 
  
DVT Prophylaxis:  warfarin GI Prophylaxis: not indicated 
  
Baseline: Active, lives with her daughter Body mass index is 25.27 kg/m². Prognosis poor but may be improving if renal recovery continues. Per PT, home health would be appropriate. May be ready for discharge in 1-2 days if she remains stable with respect to respiratory status and renal function. Subjective: Chief Complaint / Reason for Physician Visit: follow up respiratory failure, renal failure Again with good urine output overnight. Patient denies complaints except that she wet the bed. No pain, N/V. No dyspnea. Review of Systems: 
Symptom Y/N Comments  Symptom Y/N Comments Fever/Chills n   Chest Pain n   
Poor Appetite    Edema n   
Cough n   Abdominal Pain n   
Sputum    Joint Pain SOB/HASKINS n   Pruritis/Rash Nausea/vomit n   Tolerating PT/OT Diarrhea    Tolerating Diet y Constipation    Other Could NOT obtain due to:   
 
Objective: VITALS:  
Last 24hrs VS reviewed since prior progress note. Most recent are: 
Patient Vitals for the past 24 hrs: 
 Temp Pulse Resp BP SpO2  
02/10/20 1112 97.4 °F (36.3 °C) 74 17 159/41 94 % 02/10/20 0749 97.4 °F (36.3 °C) 72 18 (!) 162/37 100 % 02/10/20 0400 97 °F (36.1 °C) 71 20 127/42 100 % 02/09/20 2315 99.2 °F (37.3 °C) 79 18 121/41 99 % 02/09/20 2020 99 °F (37.2 °C) 80 18 132/48 95 % 02/09/20 1505 97.5 °F (36.4 °C) 70 18 133/48 96 % Intake/Output Summary (Last 24 hours) at 2/10/2020 1358 Last data filed at 2/10/2020 1206 Gross per 24 hour Intake 360 ml Output 1150 ml Net -790 ml PHYSICAL EXAM: 
General: Elderly AA female, deconditioned, in no acute distress ENT:  Patrick Bloom. Anicteric sclerae. MMM Resp:  Limited auscultation but relatively clear laterally CV:  Regular  rhythm,  no edema GI:  Soft, Non distended, Non tender. +Bowel sounds Neurologic:  Sleepy but arousable, poor short term memory Psych:   No anxiety or agitation noted Skin:  No rashes. No jaundice Reviewed most current lab test results and cultures  YES Reviewed most current radiology test results   YES Review and summation of old records today    NO Reviewed patient's current orders and MAR    YES 
PMH/ reviewed - no change compared to H&P 
 
________________________________________________________________________ Care Plan discussed with: 
  Comments Patient x Family  x Daughter RN x Care Manager Consultant Multidiciplinary team rounds were held today with , nursing, pharmacist and clinical coordinator. Patient's plan of care was discussed; medications were reviewed and discharge planning was addressed. ________________________________________________________________________ Total NON critical care TIME:  30   Minutes Total CRITICAL CARE TIME Spent:   Minutes non procedure based Comments >50% of visit spent in counseling and coordination of care x   
________________________________________________________________________ Thao Fear, MD  
 
Procedures: see electronic medical records for all procedures/Xrays and details which were not copied into this note but were reviewed prior to creation of Plan. LABS: 
I reviewed today's most current labs and imaging studies. Pertinent labs include: 
Recent Labs 02/09/20 
0533 WBC 6.1 HGB 7.2* HCT 24.2*  
 Recent Labs  
  02/10/20 
0427 02/09/20 
0533 02/08/20 
8648  138 136  
K 3.5 3.5 4.2  101 100 CO2 35* 33* 32 * 82 80 BUN 91* 94* 89* CREA 2.60* 3.04* 3.44* CA 8.1* 8.1* 7.8*  
MG 2.0  --   --   
INR 2.6* 2.3* 2.0* Signed: Johana Simpson MD

## 2020-02-10 NOTE — PROGRESS NOTES
2300: Pt. Is stating her right ankle is hurting and temp is 99.1 but refusing tylenol for both. Will continue to monitor. 0025: Tele called and stated pt. Was in a junctional rhythm. Strip printed by tele, VSS, pt. Asymptomatic, charge nurse Shanika Pittman made aware. Per Previous EKG on 2/4, pt has history of junctional beats. /43, HR 78. Will continue to monitor. 0145: Pt. Is now back in a ventricular paced rhythm 
 
0500: PVR bladder scan 0mL. 0700:  
Bedside shift change report GIVEN TO Sagar Isbell RN. Report included the following information SBAR, Kardex, Procedure Summary, Intake/Output, MAR, Recent Results and Cardiac Rhythm Paced/Junctional.  
 
 
 
SIGNIFICANT CHANGES DURING SHIFT:  See above CONCERNS TO ADDRESS WITH MD:  See above [facility] NURSING NOTE Admission Date 1/28/2020 Admission Diagnosis CHF (congestive heart failure) ([address] [I50.9] Consults IP CONSULT TO PALLIATIVE CARE - PROVIDER 
IP CONSULT TO NEPHROLOGY 
IP CONSULT TO PALLIATIVE CARE - PROVIDER 
IP CONSULT TO CARDIOLOGY 
IP CONSULT TO PULMONOLOGY Cardiac Monitoring [] Yes [] No  
  
Purposeful Hourly Rounding [] Yes   
Judd Score Total Score: 4 Judd score 3 or > [] Bed Alarm [] Avasys [] 1:1 sitter [] Patient refused (Signed refusal form in chart) Rafiq Score Rafiq Score: 16 Rafiq score 14 or < [] PMT consult [] Wound Care consult  
 []  Specialty bed  [] Nutrition consult Influenza Vaccine Received Flu Vaccine for Current Season (usually Sept-March): Yes Oxygen needs? [] Room air Oxygen @  []1L    []2L    []3L   []4L    []5L   []6L via NC Chronic home O2 use? [] Yes [] No 
Perform O2 challenge test and document in progress note using smartphrase (.Homeoxygen) Last bowel movement Last Bowel Movement Date: 02/08/20 Urinary Catheter External Female Catheter 02/03/20-Urine Output (mL): 800 ml LDAs Peripheral IV 02/06/20 Right Arm (Active) Site Assessment Clean, dry, & intact 2/9/2020 11:15 PM  
Phlebitis Assessment 0 2/9/2020 11:15 PM  
Infiltration Assessment 0 2/9/2020 11:15 PM  
Dressing Status Clean, dry, & intact 2/9/2020 11:15 PM  
Dressing Type Transparent;Tape 2/9/2020 11:15 PM  
Hub Color/Line Status Flushed;Yellow 2/9/2020 11:15 PM  
      
External Female Catheter 02/03/20 (Active) Site Assessment Clean, dry, & intact 2/9/2020 11:15 PM  
Repositioned Yes 2/9/2020 11:15 PM  
Perineal Care Yes 2/9/2020 11:15 PM  
Wick Changed No 2/9/2020 11:15 PM  
Suction Canister/Tubing Changed No 2/9/2020 11:15 PM  
Urine Output (mL) 800 ml 2/9/2020  6:58 AM  
            
  
Readmission Risk Assessment Tool Score High Risk 39 Total Score 3 Has Seen PCP in Last 6 Months (Yes=3, No=0) 3 Patient Length of Stay (>5 days = 3) 9 IP Visits Last 12 Months (1-3=4, 4=9, >4=11) 5 Pt. Coverage (Medicare=5 , Medicaid, or Self-Pay=4) 16 Charlson Comorbidity Score (Age + Comorbid Conditions) Criteria that do not apply:  
 . Living with Significant Other. Assisted Living. LTAC. SNF. or  
Rehab Expected Length of Stay 4d 2h Actual Length of Stay 13

## 2020-02-10 NOTE — PROGRESS NOTES
Nephrology Progress Note [facility] 
[facility]. [facility]                  Phone - [phone] Patient: Aiyana Delgadillo Date- 2/10/2020 Admit Date: 1/28/2020 YOB: 1935 CC: Follow up for ACUTE KIDNEY INJURY Subjective: Interval History:  
-cr. Improved to 2.6 
k 3.5 - low C/o sob - unchanged On 1 liter nasal canuly 
bp on high side Urine out put 1550 ml No fever No chest pain ROS:-as above Assessment & Plan:  
 
Acute kidney injury LIkely due to dehydration + CARDIORENAL Syndrome- FEUREA 16% AND FENA 0.26% ( WITH DIURETICS USE ) 
 
resp distress - pulmonary edema, ple effusion 
 
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 1.9 DM 2-  Diet CONTROLLED H/o hypertension Resp. Failure, chf 
H/o pulmonary htn 
afib on coumadin 
  
  
PLAN- 
bumex 2 mg bid Avoid acei or arb She is not a good dialysis candidate. No DIALYSIS AT PRESENT PER SON AND DAUGHTER on 2.7.2020 NOTHING MUCH TO ADD. WE WILL SIGN OFF. CALL US IF NEEDED. Physical exam:  
GEN:  NAD NECK:  Supple, no thyromegaly RESP: CTA  b/l, no  wheezing, CVS: RRR,S1,S2 ABDO:  soft , non tender, No mass NEURO: non focal, normal speech EXT: Edema +nt Care Plan discussed with: daughter Objective:  
Visit Vitals BP (!) 162/37 Pulse 72 Temp 97.4 °F (36.3 °C) Resp 18 Ht 5' (1.524 m) Wt 58.7 kg (129 lb 6.6 oz) SpO2 100% BMI 25.27 kg/m² Last 3 Recorded Weights in this Encounter 02/08/20 0400 02/09/20 0734 02/10/20 0451 Weight: 62.1 kg (137 lb) 61.2 kg (135 lb) 58.7 kg (129 lb 6.6 oz) 02/08 1901 - 02/10 0700 In: 290 [P.O.:870] Out: 2800 [Urine:2800] Intake/Output Summary (Last 24 hours) at 2/10/2020 0915 Last data filed at 2/10/2020 0463 Gross per 24 hour Intake 480 ml Output 1550 ml Net -1070 ml Chart reviewed. Pertinent Notes reviewed. Medication list  reviewed Current Facility-Administered Medications Medication  bumetanide (BUMEX) injection 4 mg  LORazepam (ATIVAN) injection 0.5 mg  
 prednisoLONE acetate (PRED FORTE) 1 % ophthalmic suspension 1 Drop  besifloxacin (BESIVANCE) 0.6 % ophthalmic suspension 1 Drop (Patient Supplied)  dorzolamide (TRUSOPT) 2 % ophthalmic solution 1 Drop  WARFARIN INFORMATION NOTE (COUMADIN)  diphenhydrAMINE (BENADRYL) capsule 25 mg  
 melatonin tablet 3 mg  dilTIAZem CD (CARDIZEM CD) capsule 180 mg  
 metoprolol succinate (TOPROL-XL) XL tablet 100 mg  hydrALAZINE (APRESOLINE) 20 mg/mL injection 10 mg  
 albuterol-ipratropium (DUO-NEB) 2.5 MG-0.5 MG/3 ML  
 pantoprazole (PROTONIX) tablet 40 mg  
 levothyroxine (SYNTHROID) tablet 88 mcg  mirtazapine (REMERON) tablet 45 mg  
 polyethylene glycol (MIRALAX) packet 17 g  
 sodium chloride (NS) flush 5-40 mL  sodium chloride (NS) flush 5-40 mL  acetaminophen (TYLENOL) solution 650 mg  
 docusate sodium (COLACE) capsule 100 mg Data Review : 
Recent Labs  
  02/10/20 
0427 02/09/20 
0533 02/08/20 
2532  138 136  
K 3.5 3.5 4.2  101 100 CO2 35* 33* 32 BUN 91* 94* 89* CREA 2.60* 3.04* 3.44* * 82 80  
CA 8.1* 8.1* 7.8* Recent Labs 02/09/20 
0533 WBC 6.1 HGB 7.2* HCT 24.2*  
 No results for input(s): FE, TIBC, PSAT, FERR in the last 72 hours. No results for input(s): CPK, CKNDX, TROIQ in the last 72 hours. No lab exists for component: CPKMB Lab Results Component Value Date/Time  Color YELLOW/STRAW 01/24/2020 05:02 PM  
 Appearance CLEAR 01/24/2020 05:02 PM  
 Specific gravity 1.025 01/24/2020 05:02 PM  
 Specific gravity 1.016 03/16/2019 10:58 AM  
 pH (UA) 5.5 01/24/2020 05:02 PM  
 Protein >300 (A) 01/24/2020 05:02 PM  
 Glucose NEGATIVE  01/24/2020 05:02 PM  
 Ketone NEGATIVE  01/24/2020 05:02 PM  
 Bilirubin NEGATIVE  01/24/2020 05:02 PM  
 Urobilinogen 0.2 01/24/2020 05:02 PM  
 Nitrites NEGATIVE  01/24/2020 05:02 PM  
 Leukocyte Esterase NEGATIVE  01/24/2020 05:02 PM  
 Epithelial cells FEW 01/24/2020 05:02 PM  
 Bacteria NEGATIVE  01/24/2020 05:02 PM  
 WBC 5-10 01/24/2020 05:02 PM  
 RBC 5-10 01/24/2020 05:02 PM  
 
Lab Results Component Value Date/Time Culture result: No growth (<1,000 CFU/ML) 01/24/2020 05:02 PM  
 Culture result: NO GROWTH 6 DAYS 04/06/2019 05:29 AM  
 Culture result: NO GROWTH 6 DAYS 04/06/2019 05:26 AM  
 
No results found for: SDES Lab Results Component Value Date/Time Sodium,urine random 23 02/05/2020 04:01 PM  
 Creatinine, urine 151.00 02/05/2020 04:01 PM  
 
Results from Hospital Encounter encounter on 01/28/20 XR CHEST PORT Narrative EXAM:  CR chest portable INDICATION: Shortness of breath COMPARISON: 2/3/2020 at 0849 hours. TECHNIQUE: Portable AP semiupright chest view at 0227 hours FINDINGS: The left chest ICD and wire are stable. Cardiac monitoring wires 
overlie the thorax. There is stable cardiac silhouette enlargement. Bilateral 
interstitial and airspace opacities and pleural effusions are stable. There is 
no pneumothorax. The bones and upper abdomen are stable. Impression IMPRESSION: Stable bilateral interstitial and airspace opacities and pleural 
effusions. Tu Aguilar MD 
[facility] 
 www. [facility] / [employer] [address] [place], [address] Phone - [phone] Fax - [phone]

## 2020-02-11 NOTE — PROGRESS NOTES
0700  
Bedside shift change report GIVEN TO ROSSY Hanley. Report included the following information SBAR, Kardex, Procedure Summary, Intake/Output, MAR, Recent Results and Cardiac Rhythm Paced/Junctional/1st degree AV block. SIGNIFICANT CHANGES DURING SHIFT:  Na 
 
 
CONCERNS TO ADDRESS WITH MD:  Lizy 
 
 
 
 
[facility] NURSING NOTE Admission Date 1/28/2020 Admission Diagnosis CHF (congestive heart failure) ([facility] [I50.9] Consults IP CONSULT TO PALLIATIVE CARE - PROVIDER 
IP CONSULT TO NEPHROLOGY 
IP CONSULT TO PALLIATIVE CARE - PROVIDER 
IP CONSULT TO CARDIOLOGY 
IP CONSULT TO PULMONOLOGY Cardiac Monitoring [x] Yes [] No  
  
Purposeful Hourly Rounding [x] Yes   
Judd Score Total Score: 4 Judd score 3 or > [] Bed Alarm [] Avasys [] 1:1 sitter [] Patient refused (Signed refusal form in chart) Rafiq Score Rafiq Score: 17 Rafiq score 14 or < [] PMT consult [] Wound Care consult  
 []  Specialty bed  [] Nutrition consult Influenza Vaccine Received Flu Vaccine for Current Season (usually Sept-March): Yes Oxygen needs? [] Room air Oxygen @  [x]1L    []2L    []3L   []4L    []5L   []6L via NC Chronic home O2 use? [] Yes [x] No 
Perform O2 challenge test and document in progress note using smartphEuroCapital BITEXe (.Homeoxygen) Last bowel movement Last Bowel Movement Date: 02/08/20 Urinary Catheter External Female Catheter 02/03/20-Urine Output (mL): 150 ml LDAs Peripheral IV 02/10/20 Right;Posterior Wrist (Active) Site Assessment Clean, dry, & intact 2/11/2020  3:56 AM  
Phlebitis Assessment 0 2/11/2020  3:56 AM  
Infiltration Assessment 0 2/11/2020  3:56 AM  
Dressing Status Clean, dry, & intact 2/11/2020  3:56 AM  
Dressing Type Transparent;Tape 2/11/2020  3:56 AM  
Hub Color/Line Status Yellow 2/11/2020  3:56 AM  
      
External Female Catheter 02/03/20 (Active) Site Assessment Clean, dry, & intact 2/11/2020  3:56 AM  
Repositioned Yes 2/11/2020  3:56 AM  
 Perineal Care Yes 2/11/2020  3:56 AM  
Wick Changed Yes 2/11/2020  3:56 AM  
Suction Canister/Tubing Changed No 2/11/2020  3:56 AM  
Urine Output (mL) 150 ml 2/10/2020 11:12 PM  
            
  
Readmission Risk Assessment Tool Score High Risk 39 Total Score 3 Has Seen PCP in Last 6 Months (Yes=3, No=0) 3 Patient Length of Stay (>5 days = 3) 9 IP Visits Last 12 Months (1-3=4, 4=9, >4=11) 5 Pt. Coverage (Medicare=5 , Medicaid, or Self-Pay=4) 16 Charlson Comorbidity Score (Age + Comorbid Conditions) Criteria that do not apply:  
 . Living with Significant Other. Assisted Living. LTAC. SNF. or  
Rehab Expected Length of Stay 4d 2h Actual Length of Stay 14

## 2020-02-11 NOTE — PROGRESS NOTES
Pharmacy Daily Dosing of Warfarin Indication: A. Fib Goal INR: 2 - 3 PTA Warfarin Dose: 5 mg Mon/Wed/Thur/Sat and 2.5 mg on Tue/Fri/Sun  
 
Concurrent anticoagulants: none Concurrent antiplatelet: none Major Interacting Medications Drugs that may increase INR: none Drugs that may decrease INR: none Conditions that may increase/decrease INR (CHF, C. diff, cirrhosis, thyroid disorder, hypoalbuminemia): CHF, on Levothyroxine Labs: 
Recent Labs  
  02/11/20 
0351 02/10/20 
0427 02/09/20 
0533 INR 2.5* 2.6* 2.3* HGB 8.0*  --  7.2*  
  --  283 Impression/Plan:  
Will order warfarin 3 mg PO x 1 Daily INR 
CBC w/o diff every other day Pharmacy will follow daily and adjust the dose as appropriate. Thanks Tosha Obrien, PHARMD 
 
 
[email or web address]%[email or web address]. pdf

## 2020-02-11 NOTE — PROGRESS NOTES
SURINDER: 
1. Home Health 2. OP f/u appts 3. O2 challenge 24 hrs prior to d/c 
 
10:30am- CM called [place] with Noreen Rodriges about pt's home oxygen. Sidra Emanuel stated that pt will need a new O2 challenge before d/c.  
 
9:57am- CM called Dr. Ayana Meek, PCP office to schedule pt's follow-up appointment. Appointment rescheduled for 2/14/2020 at 2:40pm. 
 
9:00am- CM met with pt and pt's daughter at bedside. No new needs at this time. CM will continue to follow pt for d/c planning needs. Albina Wolfe [address] [place] [address] 
[phone]

## 2020-02-11 NOTE — INTERDISCIPLINARY ROUNDS
[facility] INTERDISCIPLINARY ROUNDS Cardiopulmonary Care Interdisciplinary Rounds were held today to discuss patient's plan of care and outcomes. The following members were present: NP/Physician, Pharmacy, Nursing and Case Management. Expected Length of Stay:  4d 2h 
 
PLAN OF CARE:  
Continue current treatment plan

## 2020-02-11 NOTE — PROGRESS NOTES
Problem: Mobility Impaired (Adult and Pediatric) Goal: *Acute Goals and Plan of Care (Insert Text) Description FUNCTIONAL STATUS PRIOR TO ADMISSION: patient ambulates household distances with rollator and supervision. She requires supervision for ADLS. Her daughter assists as needed. HOME SUPPORT PRIOR TO ADMISSION: The patient lived with her daughter who provides 24/7 assist. 
 
Physical Therapy Goals Initiated 1/30/2020 re-evaluated on 2/8/2020 and goals remain appropriate. 1.  Patient will move from supine to sit and sit to supine , scoot up and down and roll side to side in bed with supervision/set-up within 7 day(s). 2.  Patient will transfer from bed to chair and chair to bed with supervision/set-up using the least restrictive device within 7 day(s). 3.  Patient will perform sit to stand with supervision/set-up within 7 day(s). 4.  Patient will ambulate with supervision/set-up for 50 feet with the least restrictive device within 7 day(s). Outcome: Progressing Towards Goal 
 PHYSICAL THERAPY TREATMENT Patient: Jody Padilla (71 y.o. female) Date: 2/11/2020 Diagnosis: CHF (congestive heart failure) ([facility] [I50.9] <principal problem not specified> Precautions: Bed Alarm, Fall Chart, physical therapy assessment, plan of care and goals were reviewed. ASSESSMENT Patient continues with skilled PT services and is progressing towards goals. Patient making steady gains in strength and balance. Pt requires continuous verbal cueing and encouragement to progress mobility. Limited by decreased insight and minimal safety awareness. Pt requiring up to [address] for safety with RW d/t poor management skills. Ambulated 80ft into hallway with daughter encouraging pt to progress distance. Left up in chair at end of session. Will benefit from HHPT at discharge along with daughters support. Current Level of Function Impacting Discharge (mobility/balance): CGA-Min A for safety Other factors to consider for discharge: none PLAN : 
Patient continues to benefit from skilled intervention to address the above impairments. Continue treatment per established plan of care. to address goals. Recommendation for discharge: (in order for the patient to meet his/her long term goals) Physical therapy at least 2 days/week in the home This discharge recommendation: 
Has been made in collaboration with the attending provider and/or case management IF patient discharges home will need the following DME: patient owns DME required for discharge SUBJECTIVE:  
Patient stated Thania Tillman went a little bit.  OBJECTIVE DATA SUMMARY:  
Critical Behavior: 
Neurologic State: Alert, Confused Orientation Level: Oriented to person, Oriented to place Cognition: Follows commands Safety/Judgement: Fall prevention Functional Mobility Training: 
Bed Mobility: 
  
  
  
  
  
  
Transfers: 
Sit to Stand: Contact guard assistance Stand to Sit: Contact guard assistance Balance: 
Sitting: Intact Sitting - Static: Good (unsupported) Standing: Impaired Standing - Static: Fair;Constant support Standing - Dynamic : Fair;Constant support Ambulation/Gait Training: 
Distance (ft): 80 Feet (ft) Assistive Device: Gait belt;Walker, rolling Ambulation - Level of Assistance: Minimal assistance(Rw management) Gait Abnormalities: Decreased step clearance Speed/Rivka: Slow;Shuffled Step Length: Left shortened;Right shortened Activity Tolerance:  
Fair and requires rest breaks Please refer to the flowsheet for vital signs taken during this treatment. After treatment patient left in no apparent distress:  
Sitting in chair, Call bell within reach, Bed / chair alarm activated and Caregiver / family present COMMUNICATION/COLLABORATION:  
The patients plan of care was discussed with: Registered Nurse Ashley Patrick PT 
 Time Calculation: 30 mins

## 2020-02-11 NOTE — PROGRESS NOTES
[employer] - formerly [employer] Teles[employer] Monitor initiated on 2/11/2020 at 1816 for the following reason(s): Pt pulls pulls at O2 and pt family is concerned. Patient educated on use of camera and is in agreement. If patient unable to verbalize understanding of camera necessity, the responsible party notified and educated:  Yes Bedside(SBAR) Shift report given to ROSSY Hanley

## 2020-02-11 NOTE — PROGRESS NOTES
[address] 
[facility] Pulmonary, Critical Care, and Sleep Medicine Patient Consult Name: Emmett Marinelli MRN: [id number] : 1935 Hospital: [place] Date: 2020 IMPRESSION:  
· Acute respiratory failure- sat 99 % on 1 liter · Pulmonary edema and bilateral pleural effusions-appears stable on CXR that was done this am.  
· BOGDAN · Valvular heart dz - impressive murmur- mild as, moderate ar and moderate mr on echo · afib · Anticoagulation - comadin · Copd by hx - inactive problem currently · Severe pulm htn - 74 pasp RECOMMENDATIONS:  
· Suspect can wean off oxygen at this point. · Continue diuretics as per renal  
· Renal failure worse · Palliative care · Not a good candidate for heroics · Not a good candidate for RRT 
· Noted to be a limted code Subjective: She was in bed when seen this am. Her daughter was at her side. NO chest pain, no back pain, no leg pain. Feels pretty comfortable. NO issues last pm. Has no other acute complaints. Current Facility-Administered Medications Medication Dose Route Frequency  bumetanide (BUMEX) injection 2 mg  2 mg IntraVENous BID  prednisoLONE acetate (PRED FORTE) 1 % ophthalmic suspension 1 Drop  1 Drop Right Eye DAILY  besifloxacin (BESIVANCE) 0.6 % ophthalmic suspension 1 Drop (Patient Supplied)  1 Drop Right Eye DAILY  dorzolamide (TRUSOPT) 2 % ophthalmic solution 1 Drop  1 Drop Right Eye TID  WARFARIN INFORMATION NOTE (COUMADIN)   Other QPM  
 dilTIAZem CD (CARDIZEM CD) capsule 180 mg  180 mg Oral DAILY  metoprolol succinate (TOPROL-XL) XL tablet 100 mg  100 mg Oral DAILY  pantoprazole (PROTONIX) tablet 40 mg  40 mg Oral ACB  levothyroxine (SYNTHROID) tablet 88 mcg  88 mcg Oral ACB  mirtazapine (REMERON) tablet 45 mg  45 mg Oral QHS  sodium chloride (NS) flush 5-40 mL  5-40 mL IntraVENous Q8H  
  docusate sodium (COLACE) capsule 100 mg  100 mg Oral BID Review of Systems: 
Review of systems not obtained due to patient factors. She has no complaints or concerns Objective:  
Vital Signs:   
Visit Vitals /49 Pulse 70 Temp 97.5 °F (36.4 °C) Resp 18 Ht 5' (1.524 m) Wt 57.8 kg (127 lb 6.8 oz) SpO2 99% BMI 24.89 kg/m² O2 Device: Nasal cannula O2 Flow Rate (L/min): 1 l/min Temp (24hrs), Av °F (36.7 °C), Min:97.4 °F (36.3 °C), Max:99.2 °F (37.3 °C) Intake/Output:  
Last shift:      No intake/output data recorded. Last 3 shifts:  1901 -  0700 In: 360 [P.O.:360] Out: 1300 [Urine:1300] Intake/Output Summary (Last 24 hours) at 2020 3584 Last data filed at 2/10/2020 2312 Gross per 24 hour Intake 360 ml Output 750 ml Net -390 ml Physical Exam:  
General:  Alert, cooperative,in distress, appears stated age. Lying in bed. Head:  Normocephalic, without obvious abnormality, atraumatic. Eyes:  Conjunctivae/corneas clear. PERRL, EOMs intact. Nose: Nc oxygen. Throat: Moist   
Neck: Supple, symmetrical, trachea midline, no adenopathy, .  
Back:   No examined Lungs:   Pretty clear, comfortable. Pox was 99% on 1L . Chest wall:  No tenderness or deformity. Heart:  Regular rate and rhythm, S1, S2 normal, blowing heart murmur . Abdomen:   Soft, non-tender. Bowel sounds normal.   
Extremities: Extremities normal, atraumatic, no cyanosis or edema. Pulses: Not examined Skin: Skin color, texture, turgor normal. No rashes or lesions Lymph nodes: Cervical, supraclavicular,  nodes normal.  
Neurologic: Grossly nonfocal, motor is intact. Psych: no overt anxiety or depression. Data review:  
 
Recent Results (from the past 24 hour(s)) PROTHROMBIN TIME + INR Collection Time: 20  3:51 AM  
Result Value Ref Range INR 2.5 (H) 0.9 - 1.1 Prothrombin time 24.0 (H) 9.0 - 11.1 sec CBC W/O DIFF  
 Collection Time: 02/11/20  3:51 AM  
Result Value Ref Range WBC 6.3 3.6 - 11.0 K/uL  
 RBC 2.82 (L) 3.80 - 5.20 M/uL HGB 8.0 (L) 11.5 - 16.0 g/dL HCT 26.8 (L) 35.0 - 47.0 % MCV 95.0 80.0 - 99.0 FL  
 MCH 28.4 26.0 - 34.0 PG  
 MCHC 29.9 (L) 30.0 - 36.5 g/dL  
 RDW 16.3 (H) 11.5 - 14.5 % PLATELET 229 589 - 965 K/uL MPV 11.6 8.9 - 12.9 FL  
 NRBC 0.0 0  WBC ABSOLUTE NRBC 0.00 0.00 - 0.01 K/uL METABOLIC PANEL, BASIC Collection Time: 02/11/20  3:51 AM  
Result Value Ref Range Sodium 141 136 - 145 mmol/L Potassium 4.0 3.5 - 5.1 mmol/L Chloride 101 97 - 108 mmol/L  
 CO2 37 (H) 21 - 32 mmol/L Anion gap 3 (L) 5 - 15 mmol/L Glucose 83 65 - 100 mg/dL BUN 89 (H) 6 - 20 MG/DL Creatinine 2.35 (H) 0.55 - 1.02 MG/DL  
 BUN/Creatinine ratio 38 (H) 12 - 20 GFR est AA 24 (L) >60 ml/min/1.73m2 GFR est non-AA 20 (L) >60 ml/min/1.73m2 Calcium 7.9 (L) 8.5 - 10.1 MG/DL Imaging: 
I have personally reviewed the patients radiographs and have reviewed the reports: CXR: 2-11-20: severe pulmonary edema and bibasilar pleural effusions.    
 
  
Kendra Clark MD

## 2020-02-11 NOTE — PROGRESS NOTES
Hospitalist Progress Note NAME: Suzanne Soto :  1935 MRN:  [id number] Assessment / Plan: 
 
Acute hypoxic respiratory failure Acute on chronic exacerbation of diastolic heart failure Severe pulmonary HTN 
-s/p rapid response early AM , fulminant pulmonary edema on CXR, required initiation of BiPAP, which has since been weaned 
-Ongoing discussions with patient's family by all physicians, including palliative care. At this point, changed to partial code (no CPR) and this will be revisited as needed. 
-Palliative care consulted. Patient deferred discussion with palliative team 
-IV bumex had been increased -- urine output continues to improve and less supplemental oxygen needed. 
-History of heart failure preserved ejection fraction around 60% on the most recent echo 
-Strict I's and O's, daily weights. -VQ mismatch low probability for PE 
  
Acute kidney injury on CKD4 
-Due to cardiorenal syndrome and dehydration on admission 
-Urine output again improving as is creatinine -- perhaps we are starting to see some renal recovery. Bumex decreased per nephrology. 
-Prognosis now improving as she is experiencing some renal recovery. 
-Family do not want to proceed with dialysis at this time 
-Cr 2.3 today after peaking at 3.4 
  
Supratherapeutic INR improved -INR peaked at 6.4 and improved 
-Coumadin restarted. INR is therapeutic. Pharmacy managing. 
  
Mild elevated Troponin due to CKD 
-Troponin peaked at 0.06  
  
A. fib Status post pacemaker insertion 2019 Continue warfarin Rate controlled with diltiazem and metoprolol will continue 
  
History of diabetes mellitus type 2 Off medications Most recent hemoglobin A1c 5.3 2019 
  
COPD Not in exacerbation Continue home inhalers 
  
Hypertension Continue home medication hydralazine 3 times daily, metoprolol, diltiazem -- control fluctuates but is fair at this time 
  
Left lower extremity edema Ultrasound Doppler showed no evidence of DVT 
  
Poor oral intake Consulted speech therapy to evaluate for dysphagia -- on pureed diet Dietary following 
  
Incidental Baker's cyst 
Outpatient follow-up 
  
  
Code Status: Full code Surrogate Decision Maker: Her sons Ewa Snyder 
  
DVT Prophylaxis:  warfarin GI Prophylaxis: not indicated 
  
Baseline: Active, lives with her daughter  
  
Body mass index is 25.27 kg/m². Subjective: Chief Complaint / Reason for Physician Visit \"resting comfortably, denies any shortness of breath, on 1 liter o2 only, denies orthopnea or PND. \".  Discussed with RN events overnight. Review of Systems: 
Symptom Y/N Comments  Symptom Y/N Comments Fever/Chills n   Chest Pain n   
Poor Appetite n   Edema n   
Cough n   Abdominal Pain n   
Sputum n   Joint Pain n   
SOB/HASKINS n   Pruritis/Rash n   
Nausea/vomit n   Tolerating PT/OT Diarrhea n   Tolerating Diet Constipation n   Other Could NOT obtain due to:   
 
Objective: VITALS:  
Last 24hrs VS reviewed since prior progress note. Most recent are: 
Patient Vitals for the past 24 hrs: 
 Temp Pulse Resp BP SpO2  
02/11/20 1107 97.5 °F (36.4 °C) 74 18 133/40 98 % 02/11/20 1043 97.4 °F (36.3 °C) 77 18 (!) 131/36 98 % 02/11/20 0731 97.6 °F (36.4 °C) 70 18 139/48 100 % 02/11/20 0356 97.5 °F (36.4 °C) 70 18 134/49 99 % 02/10/20 2312 99.2 °F (37.3 °C) 77 16 138/40 100 % 02/10/20 2039     95 % 02/10/20 2020     (!) 88 % 02/10/20 1955 98.4 °F (36.9 °C) 75 18 (!) 128/36 93 % 02/10/20 1846 98.4 °F (36.9 °C) 76 18 134/40 92 % 02/10/20 1438 97.6 °F (36.4 °C) 77 19 128/41 99 % Intake/Output Summary (Last 24 hours) at 2/11/2020 1240 Last data filed at 2/11/2020 7047 Gross per 24 hour Intake 480 ml Output 1150 ml Net -670 ml PHYSICAL EXAM: 
General: WD, WN. Alert, cooperative, no acute distress   
EENT:  EOMI. Anicteric sclerae. MMM Resp: CTA bilaterally, no wheezing or rales. No accessory muscle use CV:  Regular  rhythm,  No edema GI:  Soft, Non distended, Non tender.  +Bowel sounds Neurologic:  Alert and oriented X 3, normal speech, Psych:   Good insight. Not anxious nor agitated Skin:  No rashes. No jaundice Reviewed most current lab test results and cultures  YES Reviewed most current radiology test results   YES Review and summation of old records today    NO Reviewed patient's current orders and MAR    YES 
PMH/SH reviewed - no change compared to H&P 
________________________________________________________________________ Care Plan discussed with: 
  Comments Patient x Family  x   
RN Care Manager Consultant     
                 x Multidiciplinary team rounds were held today with , nursing, pharmacist and clinical coordinator. Patient's plan of care was discussed; medications were reviewed and discharge planning was addressed. ________________________________________________________________________ Total NON critical care TIME:  28  Minutes Total CRITICAL CARE TIME Spent:   Minutes non procedure based Comments >50% of visit spent in counseling and coordination of care    
________________________________________________________________________ Cathy Maria MD  
 
Procedures: see electronic medical records for all procedures/Xrays and details which were not copied into this note but were reviewed prior to creation of Plan. LABS: 
I reviewed today's most current labs and imaging studies. Pertinent labs include: 
Recent Labs  
  02/11/20 
0351 02/09/20 
0533 WBC 6.3 6.1 HGB 8.0* 7.2* HCT 26.8* 24.2*  
 283 Recent Labs  
  02/11/20 
0351 02/10/20 
0427 02/09/20 
0533  141 138  
K 4.0 3.5 3.5  101 101 CO2 37* 35* 33* GLU 83 106* 82 BUN 89* 91* 94* CREA 2.35* 2.60* 3.04* CA 7.9* 8.1* 8.1*  
MG  --  2.0  --   
INR 2.5* 2.6* 2.3*  
 
 
 Signed: Kodi Weeks MD

## 2020-02-11 NOTE — PROGRESS NOTES
Problem: Falls - Risk of 
Goal: *Absence of Falls Description Document Sia Gong Fall Risk and appropriate interventions in the flowsheet. Outcome: Progressing Towards Goal 
Note: Fall Risk Interventions: 
Mobility Interventions: Bed/chair exit alarm, Mechanical lift, Communicate number of staff needed for ambulation/transfer, OT consult for ADLs, PT Consult for mobility concerns, Patient to call before getting OOB, PT Consult for assist device competence, Strengthening exercises (ROM-active/passive), Utilize walker, cane, or other assistive device, Utilize gait belt for transfers/ambulation Mentation Interventions: Adequate sleep, hydration, pain control, Bed/chair exit alarm, Door open when patient unattended, Evaluate medications/consider consulting pharmacy, Family/sitter at bedside, Gait belt with transfers/ambulation, Increase mobility, More frequent rounding, Reorient patient, Room close to nurse's station, Self-releasing belt, Update white board, Toileting rounds Medication Interventions: Bed/chair exit alarm, Patient to call before getting OOB, Evaluate medications/consider consulting pharmacy, Teach patient to arise slowly, Utilize gait belt for transfers/ambulation Elimination Interventions: Bed/chair exit alarm, Elevated toilet seat, Call light in reach, Patient to call for help with toileting needs, Stay With Me (per policy), Toilet paper/wipes in reach, Toileting schedule/hourly rounds History of Falls Interventions: Bed/chair exit alarm, Consult care management for discharge planning, Door open when patient unattended, Investigate reason for fall, Evaluate medications/consider consulting pharmacy, Room close to nurse's station, Utilize gait belt for transfer/ambulation, Assess for delayed presentation/identification of injury for 48 hrs (comment for end date)

## 2020-02-11 NOTE — PROGRESS NOTES
Nutrition Assessment: 
 
INTERVENTIONS/RECOMMENDATIONS:  
· Continue current diet · Continue ensure · Please document % meals and supplements consumed in flowsheet I/O's under intake ASSESSMENT:  
Chart reviewed. Pt reports good appetite and eating well. Pt daughter confirms this. Flowsheet documents ~50% meals consumed. Daughter also notes pt is consuming ensure or boost occasionally. Diet Order: Puree(2g Na) 
% Eaten:   
Patient Vitals for the past 72 hrs: 
 % Diet Eaten 02/11/20 0821 50 % 02/10/20 1846 50 % 02/10/20 1324 33 % 02/10/20 1027 33 % 02/09/20 1741 40 % 02/09/20 1248 50 % 02/09/20 0858 40 % 02/08/20 1344 50 % Pertinent Medications: [x]Reviewed: bumex, colace, remeron, PPI, warfarin Pertinent Labs: [x]Reviewed:  
Food Allergies: [x]NKFA  []Other Last BM:  2/8 Edema:      []RUE   []LUE   [x]RLE 1+  [x]LLE 1+ Pressure Ulcer:      [x] Stage I (sacral)  [] Stage II   [] Stage III   [] Stage IV Anthropometrics: Height: 5' (152.4 cm) Weight: 57.8 kg (127 lb 6.8 oz) IBW (%IBW):   ( ) UBW (%UBW):   (  %) BMI: Body mass index is 24.89 kg/m². This BMI is indicative of: 
[]Underweight   [x]Normal   []Overweight   [] Obesity   [] Extreme Obesity (BMI>40) Last Weight Metrics: 
Weight Loss Metrics 2/11/2020 1/28/2020 1/28/2020 1/28/2020 1/24/2020 1/16/2020 12/23/2019 Today's Wt 127 lb 6.8 oz - 131 lb - 137 lb 2 oz 133 lb 138 lb BMI - 24.89 kg/m2 - 25.58 kg/m2 26.78 kg/m2 25.97 kg/m2 26.95 kg/m2 Estimated Nutrition Needs (Based on): 1200 Kcals/day(BMR (975) x AF 1.2) , 50 g(-60 g (0.8-1.0 g/kg BW)) Protein Carbohydrate: At Least 130 g/day  Fluids: 1200 mL/day or per primary team 
 
NUTRITION DIAGNOSES:  
Problem:  Inadequate protein-energy intake Etiology: related to dementia Signs/Symptoms: as evidenced by poor po intake/refusing foods Previous Nutrition Dx:  [x] Resolved  [] Unresolved           [] Progressing NUTRITION INTERVENTIONS: 
 Meals/Snacks: General/healthful diet, Modify diet/texture/consistency/nutrients   Supplements: Commercial supplement GOAL:  
consume ~50% meals 240 mL ONS next 2-4 days NUTRITION MONITORING AND EVALUATION Food/Nutrient Intake Outcomes: Total energy intake Physical Signs/Symptoms Outcomes: Weight/weight change, Glucose profile Previous Goal Met: 
 [] Met              [x] Progressing Towards Goal              [] Not Progressing Towards Goal  
Previous Recommendations: 
 [] Implemented          [] Not Implemented          [x] Not Applicable LEARNING NEEDS (Diet, Food/Nutrient-Drug Interaction):  
 [x] None Identified 
 [] Identified and Education Provided/Documented 
 [] Identified and Pt declined/was not appropriate Cultural, Anglican, OR Ethnic Dietary Needs:  
 [x] None Identified 
 [] Identified and Addressed 
 
 [x] Interdisciplinary Care Plan Reviewed/Documented  
 [x] Discharge Planning: Continue purees with thin liquids. Ensure or Boost shakes 2-3x per day depending on PO intake 
 [] Participated in Interdisciplinary Rounds NUTRITION RISK:  
 [] High              [x] Moderate           []  Low  []  Minimal/Uncompromised Manda Mosley RDN Pager [phone] Weekend Pager [phone]

## 2020-02-11 NOTE — PROGRESS NOTES
Problem: Self Care Deficits Care Plan (Adult) Goal: *Acute Goals and Plan of Care (Insert Text) Description FUNCTIONAL STATUS PRIOR TO ADMISSION: Lives with daughter whom is a CNA and is with pt 24/7, per daughter pt tends to state she cannot do things and performs better by being told what she is going to do, was not on O2, ambulated with one assist CGA with RW, gets fatigued and gets assist with ADLS as needed, can perform UB ADLs on her own and LB ADLS assist needed at times, wears depends, uses wheelchair outside of the [facility] HOME SUPPORT PRIOR TO ADMISSION: The patient lived with daughter to provide assist. 
 
Occupational Therapy Goals: 
Initiated 1/30/2020 Reviewed 2/8/2020 All goals remain appropriate and to continue for next 7 days. 1. Patient will perform grooming standing with supervision/set-up within 7 days. 2. Patient will perform lower body dressing with supervision/set-up within 7 days. 3. Patient will perform toileting with supervision/set-up within 7 days. 4. Patient will transfer from toilet with supervision/set-up using the least restrictive device and appropriate durable medical equipment within 7 days. Outcome: Progressing Towards Goal 
 
OCCUPATIONAL THERAPY TREATMENT Patient: Zoey Ochoa (28 y.o. female) Date: 2/11/2020 Diagnosis: CH[facility]cong[facility] [I50.9] <principal problem not specified> Precautions: Bed Alarm, Fall Chart, occupational therapy assessment, plan of care, and goals were reviewed. ASSESSMENT Patient progressing, but is limited by her baseline resistant behavior, as well as her dementia related cognitive deficits. Patient requiring less coaxing to participate in ADLs and functional mobility, but she does require encouragement when she becomes insistent that she can not do something. Overall she was supervision to mod A for bed mobility and was CGA to min A for transfers and ambulation.  In regards to ADLs she was CGA to min A for most ADLs performed, with the exception of needing total A for doffing/donning socks. Patient demonstrating impairments in her ability to sequence, problem solve and maintain attention to task during ADL performance. Cueing needed for safety and problem solving during ambulation with the RW and transfers. At this point she is benefiting slowly from OT acutely and she will need HHOT after discharge. Patient will also benefit from HHPT and continued 24 hour supervision from family. PLAN : 
Patient continues to benefit from skilled intervention to address the above impairments. Continue treatment per established plan of care. to address goals. Recommendation for discharge: (in order for the patient to meet his/her long term goals) Occupational therapy at least 2 days/week in the home AND ensure assist and/or supervision for safety with ADLs and Functional mobility Equipment recommendations for successful discharge (if) home:none OBJECTIVE DATA SUMMARY:  
Cognitive/Behavioral Status: 
Neurologic State: Alert;Confused Orientation Level: Oriented to person;Disoriented to place; Disoriented to situation;Disoriented to time Cognition: Decreased attention/concentration; Follows commands; Impaired decision making;Poor safety awareness;Memory loss Safety/Judgement: Decreased awareness of need for safety;Decreased insight into deficits Functional Mobility and Transfers for ADLs: 
Bed Mobility: 
Sit to Supine: Moderate assistance Scooting: Supervision Transfers: 
Sit to Stand: Contact guard assistance Functional Transfers Bathroom Mobility: Minimum assistance(ambulating with a RW) Toilet Transfer : Minimum assistance Cues: Tactile cues provided;Verbal cues provided;Visual cues provided Adaptive Equipment: Grab bars; Lonzell Leavens (comment) Bed to Chair: Contact guard assistance;Setup Balance: 
Sitting: Intact Sitting - Static: Good (unsupported) Sitting - Dynamic: Fair (occasional) Standing: Impaired Standing - Static: Fair;Constant support Standing - Dynamic : Fair;Constant support ADL Intervention: 
 
Grooming Washing Hands: Minimum assistance(standing at sink) Cues: Verbal cues provided;Visual cues provided; Tactile cues provided Upper Body Dressing Assistance Hospital Gown: Minimum  assistance Cues: Tactile cues provided;Verbal cues provided;Visual cues provided Lower Body Dressing Assistance Socks: Total assistance (dependent)(seated on BSC, bending foreward) Cues: Tactile cues provided;Verbal cues provided;Visual cues provided Bathing: 
Lower Body: minimal assistance overall (patient unable to reach bottom of feet). Performed seated on BSC and standing s/p bowel/urine incontinence. Toileting Bladder Hygiene: Contact guard assistance Bowel Hygiene: Minimum assistance Clothing Management: Minimum assistance Cues: Tactile cues provided;Verbal cues provided;Visual cues provided Patient was incontinent of stool and urine standing at sink this session, after having already used the toilet to have a BM. Cognitive Retraining Problem Solving: Deductive reason;General alternative solution; Identifying the task; Identifying the problem Organizing/Sequencing: Breaking task down; Two step sequence Attention to Task: Distractibility; Single task Following Commands: Follows one step commands/directions Safety/Judgement: Decreased awareness of need for safety;Decreased insight into deficits Activity Tolerance:  
Fair Please refer to the flowsheet for vital signs taken during this treatment. After treatment patient left in no apparent distress:  
Supine in bed, Call bell within reach, and Caregiver / family present COMMUNICATION/COLLABORATION:  
The patients plan of care was discussed with: Registered Nurse Ean Martínez, OTR/L Time Calculation: 47 mins

## 2020-02-12 NOTE — PROGRESS NOTES
Problem: Falls - Risk of 
Goal: *Absence of Falls Description Document Uriah Cynthiajen Fall Risk and appropriate interventions in the flowsheet. Outcome: Progressing Towards Goal 
Note: Fall Risk Interventions: 
Mobility Interventions: Bed/chair exit alarm, Mechanical lift, Patient to call before getting OOB Mentation Interventions: Adequate sleep, hydration, pain control, Bed/chair exit alarm, Family/sitter at bedside Medication Interventions: Assess postural VS orthostatic hypotension, Bed/chair exit alarm, Patient to call before getting OOB, Teach patient to arise slowly Elimination Interventions: Bed/chair exit alarm, Call light in reach, Patient to call for help with toileting needs History of Falls Interventions: Bed/chair exit alarm, Consult care management for discharge planning, Investigate reason for fall, Room close to nurse's station Problem: Patient Education: Go to Patient Education Activity Goal: Patient/Family Education Outcome: Progressing Towards Goal 
  
Problem: Pressure Injury - Risk of 
Goal: *Prevention of pressure injury Description Document Rafiq Scale and appropriate interventions in the flowsheet. Outcome: Progressing Towards Goal 
Note: Pressure Injury Interventions: 
Sensory Interventions: Assess need for specialty bed, Check visual cues for pain, Discuss PT/OT consult with provider, Float heels, Keep linens dry and wrinkle-free Moisture Interventions: Absorbent underpads, Apply protective barrier, creams and emollients, Check for incontinence Q2 hours and as needed, Internal/External urinary devices Activity Interventions: Chair cushion, Increase time out of bed, Pressure redistribution bed/mattress(bed type), PT/OT evaluation Mobility Interventions: Assess need for specialty bed, Float heels, HOB 30 degrees or less, PT/OT evaluation Nutrition Interventions: Document food/fluid/supplement intake, Discuss nutritional consult with provider Friction and Shear Interventions: Apply protective barrier, creams and emollients, Feet elevated on foot rest, Foam dressings/transparent film/skin sealants, Lift sheet, Lift team/patient mobility team 
 
  
 
 
 
  
Problem: Patient Education: Go to Patient Education Activity Goal: Patient/Family Education Outcome: Progressing Towards Goal 
  
Problem: Breathing Pattern - Ineffective Goal: *Absence of hypoxia Outcome: Progressing Towards Goal 
Goal: *Use of effective breathing techniques Outcome: Progressing Towards Goal 
Goal: *PALLIATIVE CARE:  Alleviation of Dyspnea Outcome: Progressing Towards Goal 
  
Problem: Patient Education: Go to Patient Education Activity Goal: Patient/Family Education Outcome: Progressing Towards Goal 
  
Problem: Patient Education: Go to Patient Education Activity Goal: Patient/Family Education Outcome: Progressing Towards Goal 
  
Problem: Heart Failure: Day 4 Goal: Off Pathway (Use only if patient is Off Pathway) Outcome: Progressing Towards Goal 
Goal: Activity/Safety Outcome: Progressing Towards Goal 
Goal: Diagnostic Test/Procedures Outcome: Progressing Towards Goal 
Goal: Nutrition/Diet Outcome: Progressing Towards Goal 
Goal: Discharge Planning Outcome: Progressing Towards Goal 
Goal: Medications Outcome: Progressing Towards Goal 
Goal: Respiratory Outcome: Progressing Towards Goal 
Goal: Treatments/Interventions/Procedures Outcome: Progressing Towards Goal 
Goal: Psychosocial 
Outcome: Progressing Towards Goal 
Goal: *Oxygen saturation within defined limits Outcome: Progressing Towards Goal 
Goal: *Hemodynamically stable Outcome: Progressing Towards Goal 
Goal: *Optimal pain control at patient's stated goal 
Outcome: Progressing Towards Goal 
Goal: *Anxiety reduced or absent Outcome: Progressing Towards Goal 
Goal: *Demonstrates progressive activity Outcome: Progressing Towards Goal 
  
Problem: Heart Failure: Day 5 
 Goal: Off Pathway (Use only if patient is Off Pathway) Outcome: Progressing Towards Goal 
Goal: Activity/Safety Outcome: Progressing Towards Goal 
Goal: Diagnostic Test/Procedures Outcome: Progressing Towards Goal 
Goal: Nutrition/Diet Outcome: Progressing Towards Goal 
Goal: Discharge Planning Outcome: Progressing Towards Goal 
Goal: Medications Outcome: Progressing Towards Goal 
Goal: Respiratory Outcome: Progressing Towards Goal 
Goal: Treatments/Interventions/Procedures Outcome: Progressing Towards Goal 
Goal: Psychosocial 
Outcome: Progressing Towards Goal 
  
Problem: Heart Failure: Discharge Outcomes Goal: *Demonstrates ability to perform prescribed activity without shortness of breath or discomfort Outcome: Progressing Towards Goal 
Goal: *Left ventricular function assessment completed prior to or during stay, or planned for post-discharge Outcome: Progressing Towards Goal 
Goal: *ACEI prescribed if LVEF less than 40% and no contraindications or ARB prescribed Outcome: Progressing Towards Goal 
Goal: *Verbalizes understanding and describes prescribed diet Outcome: Progressing Towards Goal 
Goal: *Verbalizes understanding/describes prescribed medications Outcome: Progressing Towards Goal 
Goal: *Describes available resources and support systems Description 
(eg: Home Health, Palliative Care, Advanced Medical Directive) Outcome: Progressing Towards Goal 
Goal: *Describes smoking cessation resources Outcome: Progressing Towards Goal 
Goal: *Understands and describes signs and symptoms to report to providers(Stroke Metric) Outcome: Progressing Towards Goal 
Goal: *Describes/verbalizes understanding of follow-up/return appt Description 
(eg: to physicians, diabetes treatment coordinator, and other resources Outcome: Progressing Towards Goal 
Goal: *Describes importance of continuing daily weights and changes to report to physician Outcome: Progressing Towards Goal

## 2020-02-12 NOTE — FACE TO FACE
Home Health Care Discharge Planning: [facility] Face to Face Encounter NAME: Edelmira Melara :  1935 MRN:  [id number] Primary Diagnosis:  
 
Acute hypoxic respiratory failure Acute on chronic exacerbation of diastolic heart failure Severe pulmonary HTN Acute kidney injury on CKD4 Supratherapeutic INR improved Mild elevated Troponin due to CKD Chronic A. fib History of diabetes mellitus type 2 COPD Hypertension Left lower extremity edema Poor oral intake Incidental Baker's cyst 
Date of Face to Face:  2020 8:37 AM        
                        
Face to Face Encounter findings are related to primary reason for home care:   YES 
 
1. I certify that the patient needs intermittent skilled nursing care, physical therapy and/or speech therapy. I will not be following this patient in the Community and Dr. Andra Velazquez, Jill Mock MD will be responsible for signing the [address] 2. Initial Orders for Care: Physical Therapy 3. I certify that this patient is homebound because of illness or injury, need the aid of supportive devices such as crutches, canes, wheelchairs, and walkers; the use of special transportation; or the assistance of another person in order to leave their place of residence. There exists a normal inability to leave home and leaving home requires a considerable and taxing effort. 4. I certify that this patient is under my care and that I had a Face-to-Face Encounter that meets the physician Face-to-Face Encounter requirements. Document the physical findings from the [address] [place] [address] Encounter that support the need for skilled services: Has new finding of weakness and altered mobility that requires skilled physical/occupational and/or speech therapy services for evaluation and interventions. Lianne Alvarez MD 
Discharging Physician Office: [phone] Fax:   [phone]

## 2020-02-12 NOTE — PROGRESS NOTES
Pharmacy Daily Dosing of Warfarin Indication: A. Fib Goal INR: 2 - 3 PTA Warfarin Dose: 5 mg Mon/Wed/Thur/Sat and 2.5 mg on Tue/Fri/Sun  
 
Concurrent anticoagulants: none Concurrent antiplatelet: none Major Interacting Medications Drugs that may increase INR: none Drugs that may decrease INR: none Conditions that may increase/decrease INR (CHF, C. diff, cirrhosis, thyroid disorder, hypoalbuminemia): CHF, on Levothyroxine Labs: 
Recent Labs  
  02/12/20 
0443 02/11/20 
0351  02/10/20 
0427 INR 2.0* 2.5*  --  2.6* HGB 8.0* 8.0*   < >  --   
 308  --   --   
 < > = values in this interval not displayed. Impression/Plan:  
Average Daily Dose PTA = 3.92mg Warfarin 4mg 2/12 - placed order in event pt stays 2/12 Daily INR 
CBC w/o diff every other day Discharge pt and pt instructed to resume PTA regimen Pharmacy will follow daily and adjust the dose as appropriate.  
 
Thank you, 
Sahil Malone, [facility]

## 2020-02-12 NOTE — PROGRESS NOTES
SURINDER: 
1. Home Health 2. OP f/u appts 3. O2 challenge 24 hrs prior to d/c 
4. 2nd IM Letter 9:19am- No further CM needs identified. CM notified pt's nurse of d/c. 
 
9:17am- CM sent O2 challenge to [facility] via [employer]. 9:10am-CM called Dr. Ean Samayoa, Nephrologist office to schedule pt's follow-up appointment. Office will call to schedule pt's follow-up appointment. AVS updated. 8:50am- CM met with pt and pt's daughter at bedside. Medicare pt has received, reviewed, and signed 2nd IM letter signed by pt's daughter due to pt unable to sign informing them of their right to appeal the discharge. Signed copy has been placed on pt bedside chart. Pt's daughter state that pt's son will be transporting at d/c. Care Management Interventions PCP Verified by CM: Yes Mode of Transport at Discharge: Other (see comment)(Pt's son will transport at d/c. ) Transition of Care Consult (CM Consult): Discharge Planning Discharge Durable Medical Equipment: Yes(Home Oxygen with [employer]) Physical Therapy Consult: Yes Occupational Therapy Consult: Yes Speech Therapy Consult: Yes Current Support Network: [employer], Family Lives [place] Confirm Follow Up Transport: Family The Plan for Transition of Care is Related to the Following Treatment Goals : [address] The Patient and/or Patient Representative was Provided with a Choice of Provider and Agrees with the Discharge Plan?: Yes Name of the Patient Representative Who was Provided with a Choice of Provider and Agrees with the Discharge Plan: Harvinder Caldwell Freedom of Choice List was Provided with Basic Dialogue that Supports the Patient's Individualized Plan of Care/Goals, Treatment Preferences and Shares the Quality Data Associated with the Providers?: Yes The [employer] Information Provided?: No 
Discharge Location Discharge Placement: Home with home health(Home with Home Health [facility])) Albina Graf [address] 
[phone]

## 2020-02-12 NOTE — PROGRESS NOTES
Patient discharged. Pt given follow-up instructions, medications list and prescriptions. Pt family able to demonstrate understanding of discharge instructions. Pt IV's and Telemonitor removed. Pt left with all personal belongings. Pt escorted off unit via wheelchair by [employer].

## 2020-02-12 NOTE — PROGRESS NOTES
Transitional Care Team: NELLA Discharge Note Date of Assessment: 02/12/20 Time of Assessment:  9:03 AM 
 
 
Rekha Sanchez is a 82 y.o. female inpatient at [facility] with heart failure on  1/28. Assessment & Plan Pt awake, sometimes nods head yes/ no, but does not answer questions at this time. Pt has been oob ambulating with assist. Pt daughter is prepared to take her home. Has accepted [facility] services, and is aware of follow up appointments with cardiologist and PCP. High risk for readmission Current Code Status:  partial 
 
Medication Reconciliation:  was performed. Can patient afford medications:  yes Who manages medications at home family Emergency [facility] [phone] Chart reviewed by me and NELLA (Healthy Understanding of Goals) program introduced to patient/family. The Transitional Care Team bridges the gaps in care and education surrounding discharge from the acute care facility. The objective is to empower the patient and family in taking a proactive role in the task of preventing readmission within the first thirty days after discharge from the acute care setting, The team is also involved in the efforts to reduce readmission to the acute care setting after stabilization and discharge from the acute care environment either to skilled nursing facilities or community. Discharge With The Following Arrangements in place: 
 
Future Appointments Date Time Provider Fernando Laura 4/23/2020  9:30 AM PACEMAKER, Gorolga Dull MAAME SCHED  
4/23/2020  9:45 AM Santiago Arriaga MD [facility] SCHED  
5/11/2020  9:40 AM Gabriela Bonilla MD [address] [place] [address] Street  
1/20/2021  1:00 PM Juaquin Stephens MD [facility] call information given to : lives outside service area Patient / Family was instructed on specific signs/symptoms to look for with regards to worsening of their medical conditions.  Learning was assessed using teach back. The patient / family have added upcoming appointment dates to their [facility] [place] prior to discharge. Patient education focused on readmission zones as described as: The Red Zone: High risk for readmission, days 1-21 The Yellow Zone: Moderate  risk for readmission, days 22-29 The Green Zone: Lower risk for readmission, days 30 and after [facility] Team will follow patient from a distance while inpatient as well as be available for further transition disposition as needed. The SURINDER TEAM will continue to offer support during the 30- 90 day discharge from acute care setting. Notified Ambulatory {Blank Single Select Template:[id number][de-identified] ,SURINDER RN. Signed By: Maxime Casiano RN February 12, 2020

## 2020-02-12 NOTE — DISCHARGE INSTRUCTIONS
HOSPITALIST DISCHARGE INSTRUCTIONS    NAME: Lyn Webster   :  1935   MRN:  [id number]     Date/Time:  2020 8:28 AM    ADMIT DATE: 2020     DISCHARGE DATE: 2020     DISCHARGE DIAGNOSIS:  Diastolic Congestive Heart Failure  Acute Kidney Injury on Chronic Kidney disease stage 4    MEDICATIONS:  · It is important that you take the medication exactly as they are prescribed. · Keep your medication in the bottles provided by the pharmacist and keep a list of the medication names, dosages, and times to be taken in your wallet. · Do not take other medications without consulting your doctor. Pain Management: per above medications    What to do at Home: Follow salt restriction to 2 gm /day and fluid restriction around 72 ounces per 23 hours    Recommended diet:  Cardiac Diet    Recommended activity: Activity as tolerated    If you have questions regarding the hospital related prescriptions or hospital related issues please call [facility] at [phone]. If you experience any of the following symptoms then please call your primary care physician or return to the emergency room if you cannot get hold of your doctor:  Fever, chills, nausea, vomiting, diarrhea, change in mentation, falling, bleeding, shortness of breath,     Follow Up:  Dr. Jeovanny French, Moises Oliva MD  you are to call and set up an appointment to see them in 7-10 days. Information obtained by :  I understand that if any problems occur once I am at home I am to contact my physician. I understand and acknowledge receipt of the instructions indicated above.                                                                                                                                            Physician's or R.N.'s Signature                                                                  Date/Time Patient or Representative Signature                                                          Date/Time

## 2020-02-12 NOTE — DISCHARGE SUMMARY
Hospitalist Discharge Summary Patient ID: 
Rodo Carr [id number] 
17 y.o. 
1935 
1/28/2020 PCP on record: Isrrael Cooper MD 
 
Admit date: 1/28/2020 Discharge date and time: 2/12/2020 DISCHARGE DIAGNOSIS: 
 
Acute hypoxic respiratory failure Acute on chronic exacerbation of diastolic heart failure Severe pulmonary HTN Acute kidney injury on CKD4 Supratherapeutic INR improved Mild elevated Troponin due to CKD Chronic A. fib History of diabetes mellitus type 2 COPD Hypertension Left lower extremity edema Poor oral intake Incidental Baker's cyst 
 
CONSULTATIONS: 
IP CONSULT TO PALLIATIVE CARE - PROVIDER 
IP CONSULT TO NEPHROLOGY 
IP CONSULT TO PALLIATIVE CARE - PROVIDER 
IP CONSULT TO CARDIOLOGY 
IP CONSULT TO PULMONOLOGY Excerpted HPI from H&P of Renzo Chinchilla MD: The patient is an 82years old female with past medical history A. fib status post pacemaker December 2020 chronically on warfarin, history of diabetes mellitus she has been off medication for a long time, heart failure preserved ejection fraction presented emergency department at [facility] due to shortness of breath started earlier today. Her daughter reported that she woke up having shortness of breath and she measured her oxygen around 3 PM which was around 88% as she said. Patient had a history of COPD not on oxygen. Her daughter reported that she gave her a breathing treatment but her oxygen did not improve for which they called her primary care physician and he recommended to send her to the emergency department. Patient chronically on torsemide 5 mg daily for which she has been taking on a regular basis.   She denied any chest pain, recent travel, sick contact, runny nose or sore throat. 
  
Patient does not smoke, drink alcohol or use illicit drugs. 
  
At [facility], she was found to have supratherapeutic INR without active bleeding. Her x-ray revealed bilateral opacities with pleural effusion for which VQ mismatch was done and revealed low probability for PE. Patient was given Lasix and transferred to [facility] for further evaluation. 
  
Upon arrival here to the floor, she was hemodynamically stable and her family was at bedside. Nurse reported that her blood pressure was elevated for which we resumed her medication and we give him 1 dose of hydralazine 10 mg IV x1. 
  
We were asked to admit for work up and evaluation of the above problems.  
  
 
______________________________________________________________________ DISCHARGE SUMMARY/HOSPITAL COURSE:  for full details see H&P, daily progress notes, labs, consult notes. Acute hypoxic respiratory failure Acute on chronic exacerbation of diastolic heart failure Severe pulmonary HTN 
-Improved clinically 
-Will change diuretics to Bumex 1mg BID 
-renal function has stabilized now 
-History of heart failure preserved ejection fraction around 60% on the most recent echo 
-Strict I's and O's, daily weights. -VQ mismatch low probability for PE 
  
Acute kidney injury on CKD4 
-Due to cardiorenal syndrome and dehydration on admission 
-Urine output again improving as is creatinine - 
-Cr 2.1today after peaking at 3.4 
-Baseline cr 1.8-1.9 
-Out patient f/u with nephrology upon discharge 
  
Supratherapeutic INR improved -INR peaked at 6.4 and improved 
-Coumadin restarted. Louise Murray is therapeutic. -Decrease dose to 3 mg daily 
  
Mild elevated Troponin due to CKD 
-Troponin peaked at 0.06  
  
Chronic A. fib Status post pacemaker insertion December 2019 Continue warfarin Rate controlled with diltiazem and metoprolol will continue 
  
History of diabetes mellitus type 2 Off medications Most recent hemoglobin A1c 5.3 March 2019 
  
COPD Not in exacerbation Continue home inhalers 
  
Hypertension Continue home medication hydralazine 3 times daily, metoprolol, diltiazem -- control fluctuates but is fair at this time 
  
Left lower extremity edema Ultrasound Doppler showed no evidence of DVT Edema is sresolved 
  
Incidental Baker's cyst 
Outpatient follow-up 
 
 
 
_______________________________________________________________________ Patient seen and examined by me on discharge day. Pertinent Findings: 
Gen:    Not in distress Chest: Clear lungs CVS:   Regular rhythm. No edema Abd:  Soft, not distended, not tender Neuro:  Alert, oriented 
_______________________________________________________________________ DISCHARGE MEDICATIONS:  
Current Discharge Medication List  
  
START taking these medications Details  
bumetanide (BUMEX) 1 mg tablet Take 1 Tab by mouth two (2) times a day. Qty: 60 Tab, Refills: 2 CONTINUE these medications which have CHANGED Details  
warfarin (COUMADIN) 3 mg tablet Take 1 Tab by mouth daily. Take 5 mg on Monday, Wednesday, Thursday, and Saturday. Take 2.5 mg on Tuesday, Friday, and Sunday 
Qty: 30 Tab, Refills: 2 CONTINUE these medications which have NOT CHANGED Details  
dilTIAZem CD (CARDIZEM CD) 180 mg ER capsule Take 180 mg by mouth daily. vit C/E/Zn/coppr/lutein/zeaxan (PRESERVISION AREDS-2 PO) Take 1 Cap by mouth two (2) times a day. melatonin 1 mg/mL liqd Take 1-10 mg by mouth nightly as needed (Insomnia). mirtazapine (REMERON) 45 mg tablet Take 1 Tab by mouth nightly. Qty: 90 Tab, Refills: 3  
  
prednisoLONE acetate (PRED FORTE) 1 % ophthalmic suspension Administer 1 Drop to right eye daily. Until 12/25/19. Starting 12/26/19 titrate down to 1 drop right eye daily  
  
ondansetron hcl (ZOFRAN) 4 mg tablet Take 4 mg by mouth every eight (8) hours as needed for Nausea. sodium bicarbonate 650 mg tablet Take 650 mg by mouth three (3) times daily. metoprolol succinate (TOPROL-XL) 100 mg tablet Take 100 mg by mouth daily. besifloxacin (BESIVANCE) 0.6 % drps ophthalmic suspension Administer 1 Drop to right eye daily. brinzolamide (AZOPT) 1 % ophthalmic suspension Administer 1 Drop to right eye three (3) times daily. hydrALAZINE (APRESOLINE) 100 mg tablet Take 1 Tab by mouth three (3) times daily. Qty: 270 Tab, Refills: 2  
  
polyethylene glycol (MIRALAX) 17 gram packet Take 17 g by mouth daily as needed (constipation). albuterol-ipratropium (DUO-NEB) 2.5 mg-0.5 mg/3 ml nebu 3 mL by Nebulization route every six (6) hours as needed for Other (wheeze). Qty: 30 Nebule, Refills: 0  
  
levothyroxine (SYNTHROID) 88 mcg tablet Take 88 mcg by mouth Daily (before breakfast). ALPRAZolam (XANAX) 0.25 mg tablet Take 0.125 mg by mouth daily as needed for Anxiety. esomeprazole (NEXIUM) 40 mg capsule Take 40 mg by mouth daily. STOP taking these medications  
  
 torsemide (DEMADEX) 5 mg tablet Comments:  
Reason for Stopping:   
   
  
 
 
 
Patient Follow Up Instructions: Activity: Activity as tolerated Diet: Cardiac Diet Wound Care: None needed Follow-up with PCP and nephrology in one week, Cardiology in 2 weeks Follow-up tests/labs  INR and BMP in 3-5 days Follow-up Information Follow up With Specialties Details Why Contact Info Joss Alegria MD Family Practice Go on 2/14/2020 Hospital follow-up scheduled at 2:40pm ( If you have questions or need to reschedule please call [phone] [address] 
[phone] Leigh Craig MD Cardiology, Internal Medicine Go on 2/13/2020 For Cardiology follow-up at 2:00 pm. [address] [place] [id number] [phone] [address],[place] [address]  This is your new home health company. Once you attend your PCP appointment they will contact you to begin services. [address] [place] [address] Lara Harika [address] [phone] [facility]  Oxygen supplier [address] [address] 
[phone] Katie Child MD [address] 
[phone] Nathan Mcallister MD Cardiology, Vascular Surgery, General and Vascular Surgery In 2 weeks  [address] [facility] [address] 
[phone] Cliff Morton MD Nephrology In 1 week  [facility] [address] [address] 
[phone] 
  
  
 
________________________________________________________________ Risk of deterioration: Moderate Condition at Discharge:  Stable 
__________________________________________________________________ Disposition Home with family and home health services 
 
____________________________________________________________________ Code Status: Full Code 
___________________________________________________________________ Total time in minutes spent coordinating this discharge (includes going over instructions, follow-up, prescriptions, and preparing report for sign off to her PCP) :  40 minutes Signed: 
Aron Trent MD

## 2020-02-12 NOTE — PROGRESS NOTES
1900:Bedside shift change report given to Soto Johnson (oncoming nurse) by Dayan(offgoing nurse). Report included the following information SBAR and Kardex. 2238: Barcode for Ativan not readable by scanner, additional vial removed from pyxis, then returned with witness Nga Messina, RN 
 
423: Patient had 2 un measurable urine occurences over night  
 
0700: 
Bedside shift change report [address], RN. Report included the following information SBAR and Kardex. SIGNIFICANT CHANGES DURING SHIFT:   
 
 
CONCERNS TO ADDRESS WITH MD:   
 
 
 
 
[facility] NURSING NOTE Admission Date 1/28/2020 Admission Diagnosis CHF (congestive heart failure) ([facility] [address] [I50.9] Consults IP CONSULT TO PALLIATIVE CARE - PROVIDER 
IP CONSULT TO NEPHROLOGY 
IP CONSULT TO PALLIATIVE CARE - PROVIDER 
IP CONSULT TO CARDIOLOGY 
IP CONSULT TO PULMONOLOGY Cardiac Monitoring [x] Yes [] No  
  
Purposeful Hourly Rounding [x] Yes   
Judd Score Total Score: 4 Judd score 3 or > [x] Bed Alarm [x] Avasys [] 1:1 sitter [] Patient refused (Signed refusal form in chart) Rafiq Score Rafiq Score: 17 Rafiq score 14 or < [] PMT consult [] Wound Care consult  
 []  Specialty bed  [] Nutrition consult Influenza Vaccine Received Flu Vaccine for Current Season (usually Sept-March): Yes Oxygen needs? [] Room air Oxygen @  [x]1L    []2L    []3L   []4L    []5L   []6L via NC Chronic home O2 use? [] Yes [x] No 
Perform O2 challenge test and document in progress note using smartphrase (.Homeoxygen) Last bowel movement Last Bowel Movement Date: 02/08/20 Urinary Catheter External Female Catheter 02/03/20-Urine Output (mL): 400 ml LDAs Peripheral IV 02/10/20 Right;Posterior Wrist (Active) Site Assessment Clean, dry, & intact 2/12/2020  4:14 AM  
Phlebitis Assessment 0 2/12/2020  4:14 AM  
Infiltration Assessment 0 2/12/2020  4:14 AM  
Dressing Status Clean, dry, & intact 2/12/2020  4:14 AM  
 Dressing Type Tape;Transparent 2/12/2020  4:14 AM  
Hub Color/Line Status Yellow; Flushed 2/12/2020  4:14 AM  
      
External Female Catheter 02/03/20 (Active) Site Assessment Clean, dry, & intact 2/11/2020  9:35 PM  
Repositioned Yes 2/11/2020  9:35 PM  
Perineal Care Yes 2/11/2020  9:35 PM  
Wick Changed Yes 2/11/2020  9:35 PM  
Suction Canister/Tubing Changed No 2/11/2020  9:35 PM  
Urine Output (mL) 400 ml 2/11/2020  6:37 AM  
            
  
Readmission Risk Assessment Tool Score High Risk 39 Total Score 3 Has Seen PCP in Last 6 Months (Yes=3, No=0) 3 Patient Length of Stay (>5 days = 3) 9 IP Visits Last 12 Months (1-3=4, 4=9, >4=11) 5 Pt. Coverage (Medicare=5 , Medicaid, or Self-Pay=4) 16 Charlson Comorbidity Score (Age + Comorbid Conditions) Criteria that do not apply:  
 . Living with Significant Other. Assisted Living. LTAC. SNF. or  
Rehab Expected Length of Stay 4d 2h Actual Length of Stay 15

## 2020-02-12 NOTE — PROGRESS NOTES
Home Oxygen Test 
Date of test: 02/12/2020 Time of test: 0900 Sa02 94 % on room air AT REST. Sa02 86 % on room air DURING AMBULATION. Sa02 95 % on 1 Liters DURING AMBULATION. Sa02 97 % on 1 Liters AT REST/AFTER AMBULATION.

## 2020-02-13 NOTE — PROGRESS NOTES
Hospital Discharge Follow-Up Date/Time:  2020 11:40 AM 
[facility] Patient was admitted to [facility] on 2020 and discharged on 2020 for SOB. The physician discharge summary was available at the time of outreach. Patient was contacted within 1 business days of discharge. Top Challenges reviewed with the provider Advance Care Planning:  
Does patient have an Advance Directive:  reviewed and current Method of communication with provider :chart routing Was this a readmission? no  
Patient stated reason for the readmission: n/a Care Transition Nurse (CTN) contacted the family by telephone to perform post hospital discharge assessment. Verified name and  with family as identifiers. Provided introduction to self, and explanation of the CTN role. Family received hospital discharge instructions. CTN reviewed discharge instructions and red flags with family who verbalized understanding. Family given an opportunity to ask questions and does not have any further questions or concerns at this time. The family agrees to contact the PCP office for questions related to their healthcare. CTN provided contact information for future reference. Disease Specific:    
 
Patients top risk factors for readmission:  none noted at this time. Home Health orders at discharge: PT, OT, SN Home Health company: [facility] Date of initial visit: 2020 Durable Medical Equipment ordered at discharge: none [address]: n/a Durable Medical Equipment received: n/a Medication(s):  
New Medications at Discharge: START taking these medications  
  Details  
bumetanide (BUMEX) 1 mg tablet Take 1 Tab by mouth two (2) times a day. Qty: 60 Tab, Refills: 2  
   
 
 
Changed Medications at Discharge: CONTINUE these medications which have CHANGED  
  Details  
warfarin (COUMADIN) 3 mg tablet Take 1 Tab by mouth daily.  Take 5 mg on Monday, Wednesday, Thursday, and Saturday. Take 2.5 mg on Tuesday, Friday, and Sunday 
Qty: 30 Tab, Refills: 2 Discontinued Medications at Discharge: STOP taking these medications  
   
  torsemide (DEMADEX) 5 mg tablet Comments:  
Reason for Stopping:   
     
 
 
 
Medication reconciliation was performed with family, who verbalizes understanding of administration of home medications. There were no barriers to obtaining medications identified at this time. Referral to Pharm D needed: no  
 
Current Outpatient Medications Medication Sig  bumetanide (BUMEX) 1 mg tablet Take 1 Tab by mouth two (2) times a day.  warfarin (COUMADIN) 3 mg tablet Take 1 Tab by mouth daily. Take 3 mg daily  dilTIAZem CD (CARDIZEM CD) 180 mg ER capsule Take 180 mg by mouth daily.  vit C/E/Zn/coppr/lutein/zeaxan (PRESERVISION AREDS-2 PO) Take 1 Cap by mouth two (2) times a day.  melatonin 1 mg/mL liqd Take 1-10 mg by mouth nightly as needed (Insomnia).  mirtazapine (REMERON) 45 mg tablet Take 1 Tab by mouth nightly.  prednisoLONE acetate (PRED FORTE) 1 % ophthalmic suspension Administer 1 Drop to right eye daily. Until 12/25/19. Starting 12/26/19 titrate down to 1 drop right eye daily  ondansetron hcl (ZOFRAN) 4 mg tablet Take 4 mg by mouth every eight (8) hours as needed for Nausea.  metoprolol succinate (TOPROL-XL) 100 mg tablet Take 100 mg by mouth daily.  besifloxacin (BESIVANCE) 0.6 % drps ophthalmic suspension Administer 1 Drop to right eye daily.  brinzolamide (AZOPT) 1 % ophthalmic suspension Administer 1 Drop to right eye three (3) times daily.  polyethylene glycol (MIRALAX) 17 gram packet Take 17 g by mouth daily as needed (constipation).  albuterol-ipratropium (DUO-NEB) 2.5 mg-0.5 mg/3 ml nebu 3 mL by Nebulization route every six (6) hours as needed for Other (wheeze).  levothyroxine (SYNTHROID) 88 mcg tablet Take 88 mcg by mouth Daily (before breakfast).  ALPRAZolam (XANAX) 0.25 mg tablet Take 0.125 mg by mouth daily as needed for Anxiety.  esomeprazole (NEXIUM) 40 mg capsule Take 40 mg by mouth daily. No current facility-administered medications for this visit. Medications Discontinued During This Encounter Medication Reason  sodium bicarbonate 650 mg tablet Not A Current Medication [facility] follow up appointment(s):  
Future Appointments Date Time Provider Fernando Keya 2/19/2020 10:00 AM Samuel Lyn MD [address]  
4/23/2020  9:30 AM PACEMAKER, [place] [address]  
4/23/2020  9:45 AM Rebecca Taylor MD [facility]  
5/11/2020  9:40 AM Samuel Lyn MD [address]  
1/20/2021  1:00 PM Henry Sy MD [facility] follow up appointment(s): PCP 2/14/2020, Nephrology 2/13/2020, [facility]:  out of service area Goals  Attends follow-up appointments as directed. 2/13/2020 
-attended Nephrology follow up today with Dr. Durga Mohr. Sodium bicarb discontinued. and Labs ordered for tomorrow 
-will attend PCP follow up on 2/14/2020 
-will attend GI follow up on 2/17/2020 
-will attend cardiology follow up on 2/19/2020 
-CTN to follow up in 1 week Maeve DOVE

## 2020-02-19 PROBLEM — J81.1 PULMONARY EDEMA: Status: ACTIVE | Noted: 2020-01-01

## 2020-02-19 NOTE — PROGRESS NOTES
Pt arrived to [facility]. Pt on BIPAP with transport. Per transport no changes since . 0940:  Dr. Jackie Arevalo at bedside. 0945:  Pt family at bedside. 1005:  Hospitalist placed orders. Will do. 1130:  Nephrology and Pulmonary have seen pt. Orders placed. Will do orders as they are verified. 1150: ABG drawn, carl troponin at the same time. Will watch for results. 1231:  Troponin called at 2.49. Will send message to MD through [address] [place] [address]. 1214:  IV Bumex given and educated family on what Bumex is. 
 
1220:  Dextrose pushed per order. Pushed over 8 minutes since it was going through the 24g in R hand. Then gave IV insulin. 1237:  MD responded to [employer] message, and cardiology consult placed 1240:  Spoke with pharmacy about Calcium gluconate and med should be on its way. 1310:  Spoke with pharmacy again about calcium gluconate, med has not arrived. They will make a new bag. 
 
1320:  Calcium gluconate up from pharmacy, will hang. Arpit Chun NP at bedside speaking with family. 1340:  Per NP, no more blood draw. If pt looses IV do not replaced. NP placed Hospice orders. 1410:  NP placed order for comfort measures only. 1515:  RT placed pt on NC at 2L. Will monitor. 1630:  Hospice speaking with family in CCU conference room. 1830: Went to give pt 1800 dose and daughter states that nurse needs to give Bumex and not ask pt is we can give it. Medication given. Daughter asked if pt was getting a sitter. Explained to her that pt did not meet criteria for a sitter at this time. Per daughter \"but last time she was here she pulled off her O2 and the nurses did not come in until I called out and made them aware. \"  Informed daughter that pt has an order not to be on a monitor and I had not removed it yet since family had been in and out all day long.   Also told her that pt had an order for a medical bed and that when she moves she will not be on a monitor. Daughter is ok with this at this time. 1930:  During report change, family did not want to let this RN explain again. Family over talks RN. After restating the above to the entire family, the son asked this RN to leave.  :Eft night shift nurse to speak with pt. Family does not understand the \"comfort term. \"

## 2020-02-19 NOTE — CONSULTS
Nephrology Consult Note Alcides Han  
 
[facility]. [email or web address]              Phone - [phone] Patient: Aiyana Delgadillo YOB: 1935 Date- 2/19/2020 MRN: [id number] REASON FOR CONSULTATION: sherin, hyperkalemia CONSULTING PHYSICIAN:  ADMIT DATE:2/19/2020 PATIENT PCP:Haley Colby MD  
 
ASSESSMENT & PLAN:  
 
? sherin 
? Hyperkalemia 
? resp failure on bipap 
? hyponatremia · ckd 3/4 bl cr. 1.9 · DM 2-  Diet CONTROLLED 
· H/o hypertension · Resp. Failure, chf 
· H/o pulmonary htn · Valvular heart dis · Recently discharged on HOSPICE PLAN- 
? Iv calcium ? In insulin and d 50 
? She is not a dialysis candidate ? Daughter doesn't want dialysis ? Iv bumex ? Consult palliative care · Active Problems: 
·   CHF (congestive heart failure) ([address] (1/28/2020) ·  
·   Pulmonary edema (2/19/2020) ·  
· Subjective: HPI: Aiyana Delgadillo is a 82 y.o.  female. She came to er with sob. She is found to have cr 5.7 and bun 125 and cr 3.16 She was recently discharged on hospice. She is not able to give me  Much history As per daughter she started having sob last night 
 her bp on lowside Review of Systems:  
Can't access due to patient's current condition Past Medical History:  
Diagnosis Date  Aortic valve disorders AS  Asthma  Benign hypertensive heart disease without heart failure  CKD (chronic kidney disease)  Diabetes ([address]  Fibromyalgia  Gastroparesis  GERD (gastroesophageal reflux disease)  Hypertension  Mitral valve disorders(424.0) MR  
 LIVIA (obstructive sleep apnea)  Paroxysmal atrial fibrillation (HCC)  Pure hypercholesterolemia 9/30/2010 Past Surgical History:  
Procedure Laterality Date  ECHO 2D ADULT  11/2009  LVH, normal LV wall motion and ejection fraction, mild aortic stenosis, moderate aortic regurgitation and mild mitral regurgitation. The ejection fraction was 55-60%.  ECHO 2D ADULT  1/2011 EF 60%, LAE, mild AS, AI, MR, PA low 40s  EVENT MONITOR POST SYMPTOMS  9/2010 Rare PACs, no arrythmia with symptoms  HX CHOLECYSTECTOMY  HX HYSTERECTOMY  LOOP MONITOR  9-10/2011 NSR  
 [place] CARDIOVERSION ELECTIVE ARRHYTHMIA EXTERNAL N/A 12/23/2019 Ep Cardioversion performed by Reji Weathers MD at [address] CARDIAC CATH LAB  [place] ICAR CATHETER ABLATION ATRIOVENTR NODE FUNCTION N/A 12/23/2019 Ablation Av Node performed by Reji Weathers MD at [address] CARDIAC CATH LAB  [place] INS NEW/RPLC PRM PACEMAKER W/TRANSV ELTRD VENTR N/A 12/23/2019 Insert Ppm Single Ventricular performed by Reji Weathers MD [address]  CARDIAC CATH LAB  STRESS TEST MYOVIEW  1/2011  
 no ischemia, EF 63%  US DUPLEX CAROTID BILATERAL  1/2011  
 mild bilat disease Prior to Admission medications Medication Sig Start Date End Date Taking? Authorizing Provider  
haloperidoL (HALDOL) 2 mg tablet Take 2 mg by mouth. 2/18/20   Gudelia Bar MD  
hyoscyamine SL (LEVSIN/SL) 0.125 mg SL tablet Take 0.125 mg by mouth. 2/18/20   Gudelia Bar MD  
LORazepam (ATIVAN) 1 mg tablet Take 1 mg by mouth. 2/18/20   Gudelia Bar MD  
morphine (ROXANOL) 100 mg/5 mL (20 mg/mL) concentrated solution Take 20 mg by mouth. 2/18/20   Gudelia Bar MD  
promethazine (PHENERGAN) 25 mg tablet Take 25 mg by mouth. 2/18/20   Gudelia Bar MD  
bumetanide (BUMEX) 1 mg tablet Take 1 Tab by mouth two (2) times a day. 2/12/20   Alisa Lagos MD  
warfarin (COUMADIN) 3 mg tablet Take 1 Tab by mouth daily. Take 3 mg daily 2/12/20   Alisa Lagos MD  
dilTIAZem CD (CARDIZEM CD) 180 mg ER capsule Take 180 mg by mouth daily. Provider, Scott  
mirtazapine (REMERON) 45 mg tablet Take 1 Tab by mouth nightly.  1/20/20   Rosalie Landeros MD  
prednisoLONE acetate (PRED FORTE) 1 % ophthalmic suspension Administer 1 Drop to right eye daily. Until 19. Starting 19 titrate down to 1 drop right eye daily    Provider, Historical  
ondansetron hcl (ZOFRAN) 4 mg tablet Take 4 mg by mouth every eight (8) hours as needed for Nausea. Provider, Historical  
metoprolol succinate (TOPROL-XL) 100 mg tablet Take 100 mg by mouth daily. Provider, Historical  
besifloxacin (BESIVANCE) 0.6 % drps ophthalmic suspension Administer 1 Drop to right eye daily. 19   Provider, Historical  
brinzolamide (AZOPT) 1 % ophthalmic suspension Administer 1 Drop to right eye three (3) times daily. 19   Provider, Historical  
albuterol-ipratropium (DUO-NEB) 2.5 mg-0.5 mg/3 ml nebu 3 mL by Nebulization route every six (6) hours as needed for Other (wheeze). 19   Jaqui Celeste MD  
levothyroxine (SYNTHROID) 88 mcg tablet Take 88 mcg by mouth Daily (before breakfast). 19   Provider, Historical  
 
Allergies Allergen Reactions  Latex, Natural Rubber Itching  Metformin Nausea and Vomiting Other reaction(s): nausea HEADACHE  Advil [Ibuprofen] Unknown (comments)  Ambien [Zolpidem] Unknown (comments) Altered mental status and per Dr. Aminata Rivera, the patient should NOT be on this medication  Aspirin Unknown (comments)  Citalopram Other (comments) HEADACHE  Codeine Other (comments)  Iodinated Contrast Media Itching  Meloxicam Unknown (comments)  Penicillin G Unknown (comments)  Shellfish Containing Products Rash  Sulfa (Sulfonamide Antibiotics) Unknown (comments)  Tramadol Nausea and Vomiting and Other (comments) HEADACHE  Trazodone Unknown (comments) Altered mental status and per Dr. Aminata Rivera, the patient should NOT be on this medication Social History Tobacco Use  Smoking status: Former Smoker Last attempt to quit: 1963 Years since quittin.1  Smokeless tobacco: Never Used Substance Use Topics  Alcohol use: Not Currently Family History Problem Relation Age of Onset  Heart Disease Father  Dementia Brother  Heart Disease Brother  Diabetes Brother Objective:   
 
Patient Vitals for the past 24 hrs: 
 Temp Pulse Resp BP SpO2  
02/19/20 0939 96.9 °F (36.1 °C) 75 18 (!) 138/39 98 % 02/19/20 0936     93 % No intake/output data recorded. Physical Exam: 
General:Alert, mod. distress, Neck:Supple,no mass palpable,no thyromegaly Lungs:Coarse to auscultation Bilaterally, increasedl respiratory effort CVS:RRR, S1 S2 normal,  No rub, Abdomen:Soft, Non tender, No hepatosplenomegaly Extremities: ++ LE edema Skin:No rash or lesions, Warm and DRY Psych: Can't access due to patient's current condition : no velasquez Musculoskeletal : no redness, no joint tenderness NEURO: Can't access due to patient's current condition CODE STATUS:  dnr 
Care Plan discussed with:  Daughter, nurse Chart reviewed. ECG[de-identified] Rev:yes Xray/CT/US/MRI REV:yes Lab Data Personally Reviewed: (see below) Recent Labs  
  02/19/20 
0415 WBC 11.2* HGB 7.6*  ANEU 10.4* INR 2.3* *  
K 5.7*  
* * CREA 3.16* ALT 43 SGOT 100* TBILI 0.5 * CA 9.0 Lab Results Component Value Date/Time Color YELLOW/STRAW 01/24/2020 05:02 PM  
 Appearance CLEAR 01/24/2020 05:02 PM  
 Specific gravity 1.025 01/24/2020 05:02 PM  
 Specific gravity 1.016 03/16/2019 10:58 AM  
 pH (UA) 5.5 01/24/2020 05:02 PM  
 Protein >300 (A) 01/24/2020 05:02 PM  
 Glucose NEGATIVE  01/24/2020 05:02 PM  
 Ketone NEGATIVE  01/24/2020 05:02 PM  
 Bilirubin NEGATIVE  01/24/2020 05:02 PM  
 Urobilinogen 0.2 01/24/2020 05:02 PM  
 Nitrites NEGATIVE  01/24/2020 05:02 PM  
 Leukocyte Esterase NEGATIVE  01/24/2020 05:02 PM  
 Epithelial cells FEW 01/24/2020 05:02 PM  
 Bacteria NEGATIVE  01/24/2020 05:02 PM  
 WBC 5-10 01/24/2020 05:02 PM  
 RBC 5-10 01/24/2020 05:02 PM  
 
 
Lab Results Component Value Date/Time Iron 33 (L) 03/26/2019 04:01 AM  
 TIBC 174 (L) 03/26/2019 04:01 AM  
 Iron % saturation 19 (L) 03/26/2019 04:01 AM  
 
Lab Results Component Value Date/Time Culture result: No growth (<1,000 CFU/ML) 01/24/2020 05:02 PM  
 Culture result: NO GROWTH 6 DAYS 04/06/2019 05:29 AM  
 Culture result: NO GROWTH 6 DAYS 04/06/2019 05:26 AM  
 
Prior to Admission Medications Prescriptions Last Dose Informant Patient Reported? Taking? LORazepam (ATIVAN) 1 mg tablet   Yes No  
Sig: Take 1 mg by mouth. albuterol-ipratropium (DUO-NEB) 2.5 mg-0.5 mg/3 ml nebu  Child No No  
Sig: 3 mL by Nebulization route every six (6) hours as needed for Other (wheeze). besifloxacin (BESIVANCE) 0.6 % drps ophthalmic suspension  Child Yes No  
Sig: Administer 1 Drop to right eye daily. brinzolamide (AZOPT) 1 % ophthalmic suspension  Child Yes No  
Sig: Administer 1 Drop to right eye three (3) times daily. bumetanide (BUMEX) 1 mg tablet   No No  
Sig: Take 1 Tab by mouth two (2) times a day. dilTIAZem CD (CARDIZEM CD) 180 mg ER capsule   Yes No  
Sig: Take 180 mg by mouth daily. haloperidoL (HALDOL) 2 mg tablet   Yes No  
Sig: Take 2 mg by mouth. hyoscyamine SL (LEVSIN/SL) 0.125 mg SL tablet   Yes No  
Sig: Take 0.125 mg by mouth.  
levothyroxine (SYNTHROID) 88 mcg tablet  Child Yes No  
Sig: Take 88 mcg by mouth Daily (before breakfast). metoprolol succinate (TOPROL-XL) 100 mg tablet  Child Yes No  
Sig: Take 100 mg by mouth daily. mirtazapine (REMERON) 45 mg tablet   No No  
Sig: Take 1 Tab by mouth nightly. morphine (ROXANOL) 100 mg/5 mL (20 mg/mL) concentrated solution   Yes No  
Sig: Take 20 mg by mouth. ondansetron hcl (ZOFRAN) 4 mg tablet  Child Yes No  
Sig: Take 4 mg by mouth every eight (8) hours as needed for Nausea. prednisoLONE acetate (PRED FORTE) 1 % ophthalmic suspension  Child Yes No  
Sig: Administer 1 Drop to right eye daily. Until 12/25/19.  Starting 12/26/19 titrate down to 1 drop right eye daily  
promethazine (PHENERGAN) 25 mg tablet   Yes No  
Sig: Take 25 mg by mouth.  
warfarin (COUMADIN) 3 mg tablet   No No  
Sig: Take 1 Tab by mouth daily. Take 3 mg daily Facility-Administered Medications: None Imaging: 
 
Medications list Personally Reviewed   [x]      Yes     []               No   
Thank you for allowing us to participate in the care this patient. We will follow patient with you. Signed By: Federico Horowitz MD 
[facility] [address] [place], [address] [place] [address] Phone - [phone] Fax - [phone] [place] [address], [address] [facility] Phone - [phone] Fax - [phone]    
www. [facility]

## 2020-02-19 NOTE — PROGRESS NOTES
Reason for Readmission:  Patient came to emergency room for sob. PMHX significant for diabetes, htn, heart failure. Patient was admitted on 1/28 and discharged 2/12/20 for sob. This is a readmit. RUR Score/Risk Level:   35% Is a Care Conference indicated:  Discussed in IDR'S. Did you attend your follow up appointment (s): If not, why not:  Yes Resources/supports as identified by patient/family:   Patient has supportive family. Top Challenges facing patient (as identified by patient/family and CM): Finances/Medication cost?   Family denies. Transportation      Family transports her to appointments. Support system or lack thereof? She has a very supportive family. Living arrangements? Patient lives with her daughter Nitin Tripathi) and her daughter's . Self-care/ADLs/Cognition? Family does her adl's and feeds her at home. They assist with ambulating and uses a wheelchair for her. Current Advanced Directive/Advance Care Plan: On file. Plan for utilizing home health:   She is presently open to [address] [place] [address] for pt,ot and nursing. She was open also to [facility] on 2/15/20. Transition of Care Plan:    Based on readmission, the patient's previous Plan of Care 
 has been evaluated and/or modified. The current Transition of Care Plan is:    Patient is presently on bipap. Family in room at her bedside. Daughter states patient has an appointment with pulmonary associates next month for sleep study overnight. Per daughter states when patient left the hospital she was on oxygen at 1 liter and she went for her follow up appointment and states md placed her on oxygen 6 liters continuously and that is what she has been using at home. Her oxygen is thru Normal states her daughter, Luke Segovia. Patient has a palliative consult ordered and palliative saw her on last admission.   The plan is for patient to return home and family will decide if they will resume hospice with [facility] and have home health with [facility]. Jaquelin Garnica RN BSN CRM  [phone]

## 2020-02-19 NOTE — CONSULTS
[facility] Pulmonary, Critical Care, and Sleep Medicine Initial Patient Consult Name: Augustine Diego MRN: [id number] : 1935 Hospital: [facility] Date: 2020 IMPRESSION:  
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 
· COPD by history, no evidence of exacerbation RECOMMENDATIONS:  
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 She was just discharged with hospice for this same reason. I had a mena discussion with family that the NIV is simply temporizing, but it does not fix her underlying problems. Discussed that she may not survive the hospitalization, which came as a shock to them. Their answer was Yusuf Holcomb is your opinion, but it is in God's hands\". · Remains DNR/DNI, which seems appropriate Subjective: This patient has been seen and evaluated at the request of Dr. Cary Lockhart for respiratory failure. Patient is a 82 y.o. female with refractory diastolic heart failure (and valvular heart disease) and progressive CKD with recurrent episodes of pulmonary edema. She actually just enrolled in hospice because of these issues. However, she became acutely short of breath (had been on room air and became hypoxic) so hospice was rescinded, and she was brought back to the hospital.  The patient is now on BiPAP, resting comfortably, but she does not provide any history. History obtained from chart review and my discussion with the family. Past Medical History:  
Diagnosis Date  Aortic valve disorders AS  Asthma  Benign hypertensive heart disease without heart failure  CKD (chronic kidney disease)  Diabetes [address]  Fibromyalgia  Gastroparesis  GERD (gastroesophageal reflux disease)  Hypertension  Mitral valve disorders(424.0) MR  
 LIVIA (obstructive sleep apnea)  Paroxysmal atrial fibrillation (HCC)  Pure hypercholesterolemia 9/30/2010 Past Surgical History:  
Procedure Laterality Date  ECHO 2D ADULT  11/2009 LVH, normal LV wall motion and ejection fraction, mild aortic stenosis, moderate aortic regurgitation and mild mitral regurgitation. The ejection fraction was 55-60%.  ECHO 2D ADULT  1/2011 EF 60%, LAE, mild AS, AI, MR, PA low 40s  EVENT MONITOR POST SYMPTOMS  9/2010 Rare PACs, no arrythmia with symptoms  HX CHOLECYSTECTOMY  HX HYSTERECTOMY  LOOP MONITOR  9-10/2011 NSR  
 [place] CARDIOVERSION ELECTIVE ARRHYTHMIA EXTERNAL N/A 12/23/2019 Ep Cardioversion performed by Celestino Dominguez MD at [facility] CARDIAC CATH LAB  [place] ICAR CATHETER ABLATION ATRIOVENTR NODE FUNCTION N/A 12/23/2019 Ablation Av Node performed by Celestino Dominguez MD at [facility] CARDIAC CATH LAB  [place] INS NEW/RPLC PRM PACEMAKER W/TRANSV ELTRD VENTR N/A 12/23/2019 Insert Ppm Single Ventricular performed by Celestino Dominguez MD at [facility] CARDIAC CATH LAB  STRESS TEST MYOVIEW  1/2011  
 no ischemia, EF 63%  US DUPLEX CAROTID BILATERAL  1/2011  
 mild bilat disease Prior to Admission medications Medication Sig Start Date End Date Taking? Authorizing Provider  
haloperidoL (HALDOL) 2 mg tablet Take 2 mg by mouth. 2/18/20   Gudelia Bar MD  
hyoscyamine SL (LEVSIN/SL) 0.125 mg SL tablet Take 0.125 mg by mouth. 2/18/20   Gudelia Bar MD  
LORazepam (ATIVAN) 1 mg tablet Take 1 mg by mouth. 2/18/20   Gudelia Bar MD  
morphine (ROXANOL) 100 mg/5 mL (20 mg/mL) concentrated solution Take 20 mg by mouth. 2/18/20   Gudelia Bar MD  
promethazine (PHENERGAN) 25 mg tablet Take 25 mg by mouth.  2/18/20   Gudelia Bar MD  
 bumetanide (BUMEX) 1 mg tablet Take 1 Tab by mouth two (2) times a day. 2/12/20   Massimo Jackson MD  
warfarin (COUMADIN) 3 mg tablet Take 1 Tab by mouth daily. Take 3 mg daily 2/12/20   Massimo Jackson MD  
dilTIAZem CD (CARDIZEM CD) 180 mg ER capsule Take 180 mg by mouth daily. Provider, Historical  
mirtazapine (REMERON) 45 mg tablet Take 1 Tab by mouth nightly. 1/20/20   Nadira Paris MD  
prednisoLONE acetate (PRED FORTE) 1 % ophthalmic suspension Administer 1 Drop to right eye daily. Until 12/25/19. Starting 12/26/19 titrate down to 1 drop right eye daily    Provider, Historical  
ondansetron hcl (ZOFRAN) 4 mg tablet Take 4 mg by mouth every eight (8) hours as needed for Nausea. Provider, Historical  
metoprolol succinate (TOPROL-XL) 100 mg tablet Take 100 mg by mouth daily. Provider, Historical  
besifloxacin (BESIVANCE) 0.6 % drps ophthalmic suspension Administer 1 Drop to right eye daily. 6/6/19   Provider, Historical  
brinzolamide (AZOPT) 1 % ophthalmic suspension Administer 1 Drop to right eye three (3) times daily. 6/6/19   Provider, Historical  
albuterol-ipratropium (DUO-NEB) 2.5 mg-0.5 mg/3 ml nebu 3 mL by Nebulization route every six (6) hours as needed for Other (wheeze). 4/8/19   Royer Delgado MD  
levothyroxine (SYNTHROID) 88 mcg tablet Take 88 mcg by mouth Daily (before breakfast). 4/1/19   Provider, Historical  
 
Allergies Allergen Reactions  Latex, Natural Rubber Itching  Metformin Nausea and Vomiting Other reaction(s): nausea HEADACHE  Advil [Ibuprofen] Unknown (comments)  Ambien [Zolpidem] Unknown (comments) Altered mental status and per Dr. Yenni Renee, the patient should NOT be on this medication  Aspirin Unknown (comments)  Citalopram Other (comments) HEADACHE  Codeine Other (comments)  Iodinated Contrast Media Itching  Meloxicam Unknown (comments)  Penicillin G Unknown (comments)  Shellfish Containing Products Rash  Sulfa (Sulfonamide Antibiotics) Unknown (comments)  Tramadol Nausea and Vomiting and Other (comments) HEADACHE  Trazodone Unknown (comments) Altered mental status and per Dr. Laura Moss, the patient should NOT be on this medication Social History Tobacco Use  Smoking status: Former Smoker Last attempt to quit: 1963 Years since quittin.1  Smokeless tobacco: Never Used Substance Use Topics  Alcohol use: Not Currently Family History Problem Relation Age of Onset  Heart Disease Father  Dementia Brother  Heart Disease Brother  Diabetes Brother Current Facility-Administered Medications Medication Dose Route Frequency  bumetanide (BUMEX) injection 2 mg  2 mg IntraVENous ONCE  
 sodium chloride (NS) flush 5-40 mL  5-40 mL IntraVENous Q8H  
 albuterol-ipratropium (DUO-NEB) 2.5 MG-0.5 MG/3 ML  3 mL Nebulization Q6H RT  
 dilTIAZem CD (CARDIZEM CD) capsule 180 mg  180 mg Oral DAILY  mirtazapine (REMERON) tablet 45 mg  45 mg Oral QHS  levothyroxine (SYNTHROID) tablet 88 mcg  88 mcg Oral 6am  
 metoprolol succinate (TOPROL-XL) XL tablet 100 mg  100 mg Oral DAILY  bumetanide (BUMEX) injection 1 mg  1 mg IntraVENous BID Review of Systems: 
Review of systems not obtained due to patient factors. Objective:  
Vital Signs:   
Visit Vitals BP (!) 138/39 (BP 1 Location: Left arm, BP Patient Position: At rest) Pulse 75 Temp 96.9 °F (36.1 °C) Resp 18 SpO2 98% O2 Device: BIPAP Temp (24hrs), Av.4 °F (36.3 °C), Min:96.9 °F (36.1 °C), Max:97.8 °F (36.6 °C) Intake/Output:  
Last shift:      No intake/output data recorded. Last 3 shifts: No intake/output data recorded. No intake or output data in the 24 hours ending 20 1117 Physical Exam:  
General:  Lethargic, on BiPAP Head:  Normocephalic, without obvious abnormality, atraumatic. Eyes:  Conjunctivae/corneas clear. Nose: Nares normal. Septum midline. Throat: MMM Neck: Supple, symmetrical, trachea midline Lungs:   Diffuse rales Chest wall:  No tenderness or deformity. Heart:  Regular rate and rhythm, +murmur Abdomen:   Soft, non-tender. Bowel sounds normal.   
Extremities: Extremities normal, atraumatic, no cyanosis, +edema Skin: Skin color, texture, turgor normal. No rashes or lesions Lymph nodes: Cervical, supraclavicular nodes normal.  
Neurologic: Lethargic, moves all extremities Data review:  
 
Recent Results (from the past 24 hour(s)) CBC WITH AUTOMATED DIFF Collection Time: 02/19/20  4:15 AM  
Result Value Ref Range WBC 11.2 (H) 3.6 - 11.0 K/uL  
 RBC 2.67 (L) 3.80 - 5.20 M/uL HGB 7.6 (L) 11.5 - 16.0 g/dL HCT 25.7 (L) 35.0 - 47.0 % MCV 96.3 80.0 - 99.0 FL  
 MCH 28.5 26.0 - 34.0 PG  
 MCHC 29.6 (L) 30.0 - 36.5 g/dL  
 RDW 16.7 (H) 11.5 - 14.5 % PLATELET 471 715 - 989 K/uL MPV 11.5 8.9 - 12.9 FL  
 NRBC 0.0 0  WBC ABSOLUTE NRBC 0.00 0.00 - 0.01 K/uL NEUTROPHILS 92 (H) 32 - 75 % LYMPHOCYTES 3 (L) 12 - 49 % MONOCYTES 4 (L) 5 - 13 % EOSINOPHILS 0 0 - 7 % BASOPHILS 0 0 - 1 % IMMATURE GRANULOCYTES 1 (H) 0.0 - 0.5 % ABS. NEUTROPHILS 10.4 (H) 1.8 - 8.0 K/UL  
 ABS. LYMPHOCYTES 0.3 (L) 0.8 - 3.5 K/UL  
 ABS. MONOCYTES 0.4 0.0 - 1.0 K/UL  
 ABS. EOSINOPHILS 0.0 0.0 - 0.4 K/UL  
 ABS. BASOPHILS 0.0 0.0 - 0.1 K/UL  
 ABS. IMM. GRANS. 0.1 (H) 0.00 - 0.04 K/UL  
 DF SMEAR SCANNED    
 RBC COMMENTS NORMOCYTIC, NORMOCHROMIC METABOLIC PANEL, COMPREHENSIVE Collection Time: 02/19/20  4:15 AM  
Result Value Ref Range Sodium 133 (L) 136 - 145 mmol/L Potassium 5.7 (H) 3.5 - 5.1 mmol/L Chloride 94 (L) 97 - 108 mmol/L  
 CO2 30 21 - 32 mmol/L Anion gap 9 5 - 15 mmol/L Glucose 156 (H) 65 - 100 mg/dL  (H) 6 - 20 MG/DL Creatinine 3.16 (H) 0.55 - 1.02 MG/DL  
 BUN/Creatinine ratio 40 (H) 12 - 20 GFR est AA 17 (L) >60 ml/min/1.73m2 GFR est non-AA 14 (L) >60 ml/min/1.73m2 Calcium 9.0 8.5 - 10.1 MG/DL Bilirubin, total 0.5 0.2 - 1.0 MG/DL  
 ALT (SGPT) 43 12 - 78 U/L  
 AST (SGOT) 100 (H) 15 - 37 U/L Alk. phosphatase 212 (H) 45 - 117 U/L Protein, total 7.6 6.4 - 8.2 g/dL Albumin 2.9 (L) 3.5 - 5.0 g/dL Globulin 4.7 (H) 2.0 - 4.0 g/dL A-G Ratio 0.6 (L) 1.1 - 2.2    
TROPONIN I Collection Time: 02/19/20  4:15 AM  
Result Value Ref Range Troponin-I, Qt. 0.66 (H) <0.05 ng/mL NT-PRO BNP Collection Time: 02/19/20  4:15 AM  
Result Value Ref Range NT pro-BNP >35,000 (H) 0 - 450 PG/ML  
PROTHROMBIN TIME + INR Collection Time: 02/19/20  4:15 AM  
Result Value Ref Range INR 2.3 (H) 0.9 - 1.1 Prothrombin time 22.3 (H) 9.0 - 11.1 sec POC EG7 Collection Time: 02/19/20  4:54 AM  
Result Value Ref Range Calcium, ionized (POC) 0.90 (L) 1.12 - 1.32 mmol/L  
 FIO2 (POC) 65 % pH (POC) 7.455 (H) 7.35 - 7.45    
 pCO2 (POC) 37.9 35.0 - 45.0 MMHG  
 pO2 (POC) 56 (L) 80 - 100 MMHG  
 HCO3 (POC) 26.6 (H) 22 - 26 MMOL/L Base excess (POC) 3 mmol/L  
 sO2 (POC) 90 (L) 92 - 97 % Site OTHER Device: BIPAP    
 PEEP/CPAP (POC) 6 cmH2O  
 PIP (POC) 12 Allens test (POC) N/A Bleed In 65 L/min Specimen type (POC) VENOUS BLOOD Total resp. rate 19 Imaging: 
I have personally reviewed the patients radiographs and have reviewed the reports: 
Pulmonary edema Tejas Rodrigez MD

## 2020-02-19 NOTE — PROGRESS NOTES
Spiritual Care Assessment/Progress Note [address] 
 
 
NAME: Ken Jolly      MRN: [id number] AGE: 82 y.o. SEX: female Orthodox Affiliation: [facility]  
Language: [place] 2/19/2020     Total Time (in minutes): 9 Spiritual Assessment begun in [facility] 2 PROGRESSIVE CARE through conversation with: 
  
    []Patient        [x] Family    [] Friend(s) Reason for Consult: Palliative Care, Initial/Spiritual Assessment Spiritual beliefs: (Please include comment if needed) [x] Identifies with a lisa tradition:     
   [x] Supported by a lisa community:        
   [] Claims no spiritual orientation:       
   [] Seeking spiritual identity:            
   [] Adheres to an individual form of spirituality:       
   [] Not able to assess:                   
 
    
Identified resources for coping:  
   [x] Prayer                           
   [] Music                  [] Guided Imagery [x] Family/friends                 [] Pet visits [] Devotional reading                         [] Unknown 
   [] Other:                                         
 
 
Interventions offered during this visit: (See comments for more details) Family/Friend(s): Affirmation of emotions/emotional suffering, Affirmation of lisa, Iconic (affirming the presence of God/Higher Power), Prayer (assurance of) Plan of Care: 
 
 [] Support spiritual and/or cultural needs  
 [] Support AMD and/or advance care planning process    
 [] Support grieving process 
 [] Coordinate Rites and/or Rituals  
 [] Coordination with community clergy 
 [x] No spiritual needs identified at this time 
 [] Detailed Plan of Care below (See Comments)  [] Make referral to Music Therapy 
[] Make referral to Pet Therapy    
[] Make referral to Addiction services 
[] Make referral to [facility] 
[] Make referral to Spiritual Care Partner 
[] No future visits requested       
[x] Follow up visits as needed Comments: The patient was visited in the Progressive Care unit to make a spiritual assessment. The patient was unable to communicate. The patient had several family members present and they were starting to have a meal. The family indicated that they are spiritually inclined and that they have a strong belief in God. The family expressed gratitude for the assurance that the patient and family will be kept in prayer. Advised of  availability. Chaplains will follow as able and/or needed. Rev. Khadra Aguilar EdD MDiv  [facility] Page [phone])

## 2020-02-19 NOTE — CONSULTS
[facility] Initial Patient Consult Name: Cholo Wilde MRN: [id number] : 1935 Hospital: [address] Date: 2020 IMPRESSION:  
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 
· COPD by history, no evidence of exacerbation RECOMMENDATIONS:  
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 She was just discharged with hospice for this same reason. I had a mena discussion with family that the NIV is simply temporizing, but it does not fix her underlying problems. Discussed that she may not survive the hospitalization, which came as a shock to them. Their answer was Ibjal Melania is your opinion, but it is in God's hands\". · Remains DNR/DNI, which seems appropriate Subjective: This patient has been seen and evaluated at the request of Dr. David Howard for respiratory failure. Patient is a 82 y.o. female with refractory diastolic heart failure (and valvular heart disease) and progressive CKD with recurrent episodes of pulmonary edema. She actually just enrolled in hospice because of these issues. However, she became acutely short of breath (had been on room air and became hypoxic) so hospice was rescinded, and she was brought back to the hospital.  The patient is now on BiPAP, resting comfortably, but she does not provide any history. History obtained from chart review and my discussion with the family. Past Medical History:  
Diagnosis Date  Aortic valve disorders AS  Asthma  Benign hypertensive heart disease without heart failure  CKD (chronic kidney disease)  Diabetes ([facility] [address]  Fibromyalgia  Gastroparesis  GERD (gastroesophageal reflux disease)  Hypertension  Mitral valve disorders(424.0) MR  
 LIVIA (obstructive sleep apnea)  Paroxysmal atrial fibrillation (HCC)  Pure hypercholesterolemia 9/30/2010 Past Surgical History:  
Procedure Laterality Date  ECHO 2D ADULT  11/2009 LVH, normal LV wall motion and ejection fraction, mild aortic stenosis, moderate aortic regurgitation and mild mitral regurgitation. The ejection fraction was 55-60%.  ECHO 2D ADULT  1/2011 EF 60%, LAE, mild AS, AI, MR, PA low 40s  EVENT MONITOR POST SYMPTOMS  9/2010 Rare PACs, no arrythmia with symptoms  HX CHOLECYSTECTOMY  HX HYSTERECTOMY  LOOP MONITOR  9-10/2011 NSR  
 WI CARDIOVERSION ELECTIVE ARRHYTHMIA EXTERNAL N/A 12/23/2019 Ep Cardioversion performed by Ty Spencer MD at [facility]  WI ICAR CATHETER ABLATION ATRIOVENTR NODE FUNCTION N/A 12/23/2019 Ablation Av Node performed by Ty Spencer MD at [facility]  WI INS NEW/RPLC PRM PACEMAKER W/TRANSV ELTRD VENTR N/A 12/23/2019 Insert Ppm Single Ventricular performed by Ty Spencer MD at [facility]  STRESS TEST MYOVIEW  1/2011  
 no ischemia, EF 63%  US DUPLEX CAROTID BILATERAL  1/2011  
 mild bilat disease Prior to Admission medications Medication Sig Start Date End Date Taking? Authorizing Provider  
haloperidoL (HALDOL) 2 mg tablet Take 2 mg by mouth. 2/18/20   Gudelia Bar MD  
hyoscyamine SL (LEVSIN/SL) 0.125 mg SL tablet Take 0.125 mg by mouth. 2/18/20   Gudelia Bar MD  
LORazepam (ATIVAN) 1 mg tablet Take 1 mg by mouth. 2/18/20   Gudelia Bar MD  
morphine (ROXANOL) 100 mg/5 mL (20 mg/mL) concentrated solution Take 20 mg by mouth. 2/18/20   Gudelia Bar MD  
promethazine (PHENERGAN) 25 mg tablet Take 25 mg by mouth.  2/18/20   Gudelia Bar MD  
 bumetanide (BUMEX) 1 mg tablet Take 1 Tab by mouth two (2) times a day. 2/12/20   Roland Caro MD  
warfarin (COUMADIN) 3 mg tablet Take 1 Tab by mouth daily. Take 3 mg daily 2/12/20   Roland Caro MD  
dilTIAZem CD (CARDIZEM CD) 180 mg ER capsule Take 180 mg by mouth daily. Provider, Historical  
mirtazapine (REMERON) 45 mg tablet Take 1 Tab by mouth nightly. 1/20/20   Chio Peters MD  
prednisoLONE acetate (PRED FORTE) 1 % ophthalmic suspension Administer 1 Drop to right eye daily. Until 12/25/19. Starting 12/26/19 titrate down to 1 drop right eye daily    Provider, Historical  
ondansetron hcl (ZOFRAN) 4 mg tablet Take 4 mg by mouth every eight (8) hours as needed for Nausea. Provider, Historical  
metoprolol succinate (TOPROL-XL) 100 mg tablet Take 100 mg by mouth daily. Provider, Historical  
besifloxacin (BESIVANCE) 0.6 % drps ophthalmic suspension Administer 1 Drop to right eye daily. 6/6/19   Provider, Historical  
brinzolamide (AZOPT) 1 % ophthalmic suspension Administer 1 Drop to right eye three (3) times daily. 6/6/19   Provider, Historical  
albuterol-ipratropium (DUO-NEB) 2.5 mg-0.5 mg/3 ml nebu 3 mL by Nebulization route every six (6) hours as needed for Other (wheeze). 4/8/19   Jaqui Celeste MD  
levothyroxine (SYNTHROID) 88 mcg tablet Take 88 mcg by mouth Daily (before breakfast). 4/1/19   Provider, Historical  
 
Allergies Allergen Reactions  Latex, Natural Rubber Itching  Metformin Nausea and Vomiting Other reaction(s): nausea HEADACHE  Advil [Ibuprofen] Unknown (comments)  Ambien [Zolpidem] Unknown (comments) Altered mental status and per Dr. Aminata Rivera, the patient should NOT be on this medication  Aspirin Unknown (comments)  Citalopram Other (comments) HEADACHE  Codeine Other (comments)  Iodinated Contrast Media Itching  Meloxicam Unknown (comments)  Penicillin G Unknown (comments)  Shellfish Containing Products Rash  Sulfa (Sulfonamide Antibiotics) Unknown (comments)  Tramadol Nausea and Vomiting and Other (comments) HEADACHE  Trazodone Unknown (comments) Altered mental status and per Dr. Gokul Brumfield, the patient should NOT be on this medication Social History Tobacco Use  Smoking status: Former Smoker Last attempt to quit: 1963 Years since quittin.1  Smokeless tobacco: Never Used Substance Use Topics  Alcohol use: Not Currently Family History Problem Relation Age of Onset  Heart Disease Father  Dementia Brother  Heart Disease Brother  Diabetes Brother Current Facility-Administered Medications Medication Dose Route Frequency  bumetanide (BUMEX) injection 2 mg  2 mg IntraVENous ONCE  
 sodium chloride (NS) flush 5-40 mL  5-40 mL IntraVENous Q8H  
 albuterol-ipratropium (DUO-NEB) 2.5 MG-0.5 MG/3 ML  3 mL Nebulization Q6H RT  
 dilTIAZem CD (CARDIZEM CD) capsule 180 mg  180 mg Oral DAILY  mirtazapine (REMERON) tablet 45 mg  45 mg Oral QHS  levothyroxine (SYNTHROID) tablet 88 mcg  88 mcg Oral 6am  
 metoprolol succinate (TOPROL-XL) XL tablet 100 mg  100 mg Oral DAILY  bumetanide (BUMEX) injection 1 mg  1 mg IntraVENous BID Review of Systems: 
Review of systems not obtained due to patient factors. Objective:  
Vital Signs:   
Visit Vitals BP (!) 138/39 (BP 1 Location: Left arm, BP Patient Position: At rest) Pulse 75 Temp 96.9 °F (36.1 °C) Resp 18 SpO2 98% O2 Device: BIPAP Temp (24hrs), Av.4 °F (36.3 °C), Min:96.9 °F (36.1 °C), Max:97.8 °F (36.6 °C) Intake/Output:  
Last shift:      No intake/output data recorded. Last 3 shifts: No intake/output data recorded. No intake or output data in the 24 hours ending 20 1101 Physical Exam:  
General:  Lethargic, on BiPAP Head:  Normocephalic, without obvious abnormality, atraumatic. Eyes:  Conjunctivae/corneas clear. Nose: Nares normal. Septum midline. Throat: MMM Neck: Supple, symmetrical, trachea midline Lungs:   Diffuse rales Chest wall:  No tenderness or deformity. Heart:  Regular rate and rhythm, +murmur Abdomen:   Soft, non-tender. Bowel sounds normal.   
Extremities: Extremities normal, atraumatic, no cyanosis, +edema Skin: Skin color, texture, turgor normal. No rashes or lesions Lymph nodes: Cervical, supraclavicular nodes normal.  
Neurologic: Lethargic, moves all extremities Data review:  
 
Recent Results (from the past 24 hour(s)) CBC WITH AUTOMATED DIFF Collection Time: 02/19/20  4:15 AM  
Result Value Ref Range WBC 11.2 (H) 3.6 - 11.0 K/uL  
 RBC 2.67 (L) 3.80 - 5.20 M/uL HGB 7.6 (L) 11.5 - 16.0 g/dL HCT 25.7 (L) 35.0 - 47.0 % MCV 96.3 80.0 - 99.0 FL  
 MCH 28.5 26.0 - 34.0 PG  
 MCHC 29.6 (L) 30.0 - 36.5 g/dL  
 RDW 16.7 (H) 11.5 - 14.5 % PLATELET 020 850 - 441 K/uL MPV 11.5 8.9 - 12.9 FL  
 NRBC 0.0 0  WBC ABSOLUTE NRBC 0.00 0.00 - 0.01 K/uL NEUTROPHILS 92 (H) 32 - 75 % LYMPHOCYTES 3 (L) 12 - 49 % MONOCYTES 4 (L) 5 - 13 % EOSINOPHILS 0 0 - 7 % BASOPHILS 0 0 - 1 % IMMATURE GRANULOCYTES 1 (H) 0.0 - 0.5 % ABS. NEUTROPHILS 10.4 (H) 1.8 - 8.0 K/UL  
 ABS. LYMPHOCYTES 0.3 (L) 0.8 - 3.5 K/UL  
 ABS. MONOCYTES 0.4 0.0 - 1.0 K/UL  
 ABS. EOSINOPHILS 0.0 0.0 - 0.4 K/UL  
 ABS. BASOPHILS 0.0 0.0 - 0.1 K/UL  
 ABS. IMM. GRANS. 0.1 (H) 0.00 - 0.04 K/UL  
 DF SMEAR SCANNED    
 RBC COMMENTS NORMOCYTIC, NORMOCHROMIC METABOLIC PANEL, COMPREHENSIVE Collection Time: 02/19/20  4:15 AM  
Result Value Ref Range Sodium 133 (L) 136 - 145 mmol/L Potassium 5.7 (H) 3.5 - 5.1 mmol/L Chloride 94 (L) 97 - 108 mmol/L  
 CO2 30 21 - 32 mmol/L Anion gap 9 5 - 15 mmol/L Glucose 156 (H) 65 - 100 mg/dL  (H) 6 - 20 MG/DL Creatinine 3.16 (H) 0.55 - 1.02 MG/DL  
 BUN/Creatinine ratio 40 (H) 12 - 20 GFR est AA 17 (L) >60 ml/min/1.73m2 GFR est non-AA 14 (L) >60 ml/min/1.73m2 Calcium 9.0 8.5 - 10.1 MG/DL Bilirubin, total 0.5 0.2 - 1.0 MG/DL  
 ALT (SGPT) 43 12 - 78 U/L  
 AST (SGOT) 100 (H) 15 - 37 U/L Alk. phosphatase 212 (H) 45 - 117 U/L Protein, total 7.6 6.4 - 8.2 g/dL Albumin 2.9 (L) 3.5 - 5.0 g/dL Globulin 4.7 (H) 2.0 - 4.0 g/dL A-G Ratio 0.6 (L) 1.1 - 2.2    
TROPONIN I Collection Time: 02/19/20  4:15 AM  
Result Value Ref Range Troponin-I, Qt. 0.66 (H) <0.05 ng/mL NT-PRO BNP Collection Time: 02/19/20  4:15 AM  
Result Value Ref Range NT pro-BNP >35,000 (H) 0 - 450 PG/ML  
PROTHROMBIN TIME + INR Collection Time: 02/19/20  4:15 AM  
Result Value Ref Range INR 2.3 (H) 0.9 - 1.1 Prothrombin time 22.3 (H) 9.0 - 11.1 sec POC EG7 Collection Time: 02/19/20  4:54 AM  
Result Value Ref Range Calcium, ionized (POC) 0.90 (L) 1.12 - 1.32 mmol/L  
 FIO2 (POC) 65 % pH (POC) 7.455 (H) 7.35 - 7.45    
 pCO2 (POC) 37.9 35.0 - 45.0 MMHG  
 pO2 (POC) 56 (L) 80 - 100 MMHG  
 HCO3 (POC) 26.6 (H) 22 - 26 MMOL/L Base excess (POC) 3 mmol/L  
 sO2 (POC) 90 (L) 92 - 97 % Site OTHER Device: BIPAP    
 PEEP/CPAP (POC) 6 cmH2O  
 PIP (POC) 12 Allens test (POC) N/A Bleed In 65 L/min Specimen type (POC) VENOUS BLOOD Total resp. rate 19 Imaging: 
I have personally reviewed the patients radiographs and have reviewed the reports: 
Pulmonary edema Osmani Farr MD

## 2020-02-19 NOTE — ACP (ADVANCE CARE PLANNING)
Advance Care Planning Note NAME: Lyn Webster :  1935 MRN:  [id number] Date/Time:  2020 2:38 PM 
 
Active Diagnoses: 
Hospital Problems  Date Reviewed: 2020 Codes Class Noted POA * (Principal) Pulmonary edema ICD-10-CM: J81.1 ICD-9-CM: 774  2020 Yes CHF (congestive heart failure) (HCC) ICD-10-CM: I50.9 ICD-9-CM: 428.0  2020 Yes Chronic diastolic congestive heart failure (HCC) ICD-10-CM: I50.32 
ICD-9-CM: 428.32, 428.0  2019 Yes Mitral stenosis ICD-10-CM: I05.0 ICD-9-CM: 394.0  3/19/2019 Yes Vaso vagal episode ICD-10-CM: R55 
ICD-9-CM: 780.2  3/18/2019 Yes CKD (chronic kidney disease) ICD-10-CM: N18.9 ICD-9-CM: 585.9  3/16/2019 Yes Persistent atrial fibrillation ICD-10-CM: I48.19 ICD-9-CM: 427.31  2019 Yes Essential hypertension (Chronic) ICD-10-CM: I10 
ICD-9-CM: 401.9  2019 Yes Dementia ([address] (Chronic) ICD-10-CM: F03.90 ICD-9-CM: 294.20  2019 Yes Acquired hypothyroidism (Chronic) ICD-10-CM: E03.9 ICD-9-CM: 244.9  2019 Yes LIVIA on CPAP ICD-10-CM: G47.33, Z99.89 ICD-9-CM: 327.23, V46.8  2012 Yes Paroxysmal atrial fibrillation (HCC) ICD-10-CM: I48.0 ICD-9-CM: 427.31  Unknown Yes Aortic valve disorder ICD-10-CM: I35.9 ICD-9-CM: 424.1  2010 Yes Overview Signed 2010  3:34 PM by Cleopatra Nick AS These active diagnoses are of sufficient risk that focused discussion on advance care planning is indicated in order to allow the patient to thoughtfully consider personal goals of care, and if situations arise that prevent the ability to personally give input, to ensure appropriate representation of their personal desires for different levels and aggressiveness of care. Discussion:  
Code status addressed and wants to be a DNR / DNI.   The pt's MPOAs are Archana Cash and Maria Del Carmen. The family decided goals of care to be comfort care only. No central line and vasopressors No feeding tube, No HD, No further evaluation or blood work. Hospice team has been consulted Persons present and participating in discussion: Evaristo Friday, Sania Carbajal NP, [address] [place] , Jean Carlos Snyder, [place], Yana Muniz, and nephews/nices. Time Spent:  
Total time spent face-to-face in education and discussion:   30  minutes. Sania Carbajal NP Hospitalist

## 2020-02-19 NOTE — PROGRESS NOTES
Spoke with patient's family re consult for inpatient hospice and daughter is agreeable and willing to talk with them. Referral sent to [facility] for evaluation of inpatient . Swetha Edward will get in touch with the family. Jaquelin Garnica  RN BSN CRM  [phone]

## 2020-02-19 NOTE — PROGRESS NOTES
Hospitalist Progress Note NAME: Morales Garcia :  1935 MRN:  [id number] I reviewed with Dr. Elena Sehikh about the medical history and the findings on the physical examination. I discussed with Dr. Elena Sheikh the patient's diagnosis and concur with the plan. Interim Hospital Summary: 82 y.o. female whom presented on 2020 with SOB. Pt was discharged from the hospital with home health service on 2020 after being treated for acute hypoxic respiratory failure. Pt was open to [address] [place] [address] Drive, PT, and OT. Then she was open to [facility] on [facility]/15/2020. The family brought the pt to the hospital as her symptoms got worse. Assessment / Plan: 
I had a family meeting and discussed about the goals of care. The family decided comfort care only; no further evaluation, no further blood work, no cardiac monitor, no pressors, no central line, and no tube feeding. Apply BIPAP as long as she tolerates, otherwise, change to nasal cannula, provide comfort care only. Hospice team has been consulted. **please do not use morphine or hydromorphone per family request (daughter recalls mom has told her that those medication made her really sick and never wanted to use it again.) Fentanyl for pain management. Acute hypoxic respiratory failure secondary to pulmonary edema POA Acute on chronic exacerbation of diastolic heart failure Severe pulmonary HTN 
- appreciate pulmonology input; continue with BIPAP. CXR: Bilateral pleural effusions right greater than left. Mildly improved pulmonary edema. Cardiomegaly. Continue with IV bumex 
  
Acute kidney injury on CKD4 Hyperkalemia POA, K 5.7 at [facility] 
- appreciate nephrology input; not a candidate for HD, medical treatment.  
  
A-fib with RVR Hypercoagulable state secondary to A-fib S/p pacemaker  
- coumadin therapy has been discontinued since the family requested no further blood work. Continue with cardizem and toprol XL as long the pt cant tolerate Elevated troponin 0.66 -> 2.49 could indicate NSTEMI. No further evaluation per family request.  
  
diabetes mellitus type 2 
- Off medications Most recent hemoglobin A1c 5.3 March 2019 
  
COPD 
- Not in exacerbation Continue with duoneb 
  
Hypertension 
- Continue  metoprolol, diltiazem  
   
Code status: DNR Prophylaxis: SCD's Recommended Disposition: inpatient hospice vs home with hospice The family requested for her to have sitter overnight. I reminded the family the sitter will be provide if the pt becomes agitated overnight. Subjective: Chief Complaint / Reason for Physician Visit Placed on BIPAP, open eyes periodically. Discussed with RN events overnight. Review of Systems: 
Symptom Y/N Comments  Symptom Y/N Comments Fever/Chills    Chest Pain Poor Appetite    Edema Cough    Abdominal Pain Sputum    Joint Pain SOB/HASKINS    Pruritis/Rash Nausea/vomit    Tolerating PT/OT Diarrhea    Tolerating Diet Constipation    Other Could NOT obtain due to:   
 
Objective: VITALS:  
Last 24hrs VS reviewed since prior progress note. Most recent are: 
Patient Vitals for the past 24 hrs: 
 Temp Pulse Resp BP SpO2  
02/19/20 1149     99 % 02/19/20 1126 97.5 °F (36.4 °C) 74 16 (!) 135/37 97 % 02/19/20 0939 96.9 °F (36.1 °C) 75 18 (!) 138/39 98 % 02/19/20 0936     93 % No intake or output data in the 24 hours ending 02/19/20 1203 PHYSICAL EXAM: 
General: Ill appearing, no acute distress EENT:  EOMI. Anicteric sclerae. MMM Resp:  no wheezing or rales. No accessory muscle use CV:  regular  rhythm,  No edema GI:  Soft, Non distended, Non tender. +Bowel sounds Neurologic:  Open eyes at times, not following commends Psych:   Poor insight. gets agitated at times Skin:  No rashes. No jaundice Reviewed most current lab test results and cultures  YES Reviewed most current radiology test results   YES Review and summation of old records today    NO Reviewed patient's current orders and MAR    YES 
PMH/SH reviewed - no change compared to H&P 
________________________________________________________________________ Care Plan discussed with: 
  Comments Patient y Family  y 15 family members; sons, daughters, nieces, nephews RN y   
Care Manager y Consultant  y Palliative team  
                  Multidiciplinary team rounds were held today with , nursing, pharmacist and clinical coordinator. Patient's plan of care was discussed; medications were reviewed and discharge planning was addressed. ________________________________________________________________________ Sania Tabares NP Procedures: see electronic medical records for all procedures/Xrays and details which were not copied into this note but were reviewed prior to creation of Plan. LABS: 
I reviewed today's most current labs and imaging studies. Pertinent labs include: 
Recent Labs  
  02/19/20 
0415 WBC 11.2* HGB 7.6* HCT 25.7*  
 Recent Labs  
  02/19/20 
0415 *  
K 5.7* CL 94* CO2 30 * * CREA 3.16* CA 9.0 ALB 2.9* TBILI 0.5 SGOT 100* ALT 43 INR 2.3* Signed: )Johana Heart NP

## 2020-02-19 NOTE — PROGRESS NOTES
[address] [place] [address]  RPL:[id number]   :1935 Patient seen Labs reviewed Daughter at bedside She is not a dialysis candidate Daughter doesn't want dialysis Agree with medically treating high k 
CONSULT PALLIATIVE CARE. She may need in patient hospice Myrtle Hurt, [address] [facility] [address] [place], [address] [place] Phone - [phone] Fax - [phone] [place] [id number], [address] [facility] Phone - [phone] Fax - [phone]    
[email or web address]

## 2020-02-19 NOTE — H&P
Hospitalist Admission Note NAME: Nikkie Lewis :  1935 MRN:  [id number] Date/Time:  2020 10:09 AM 
 
Patient PCP: Katie Child MD 
______________________________________________________________________ Given the patient's current clinical presentation, I have a high level of concern for decompensation if discharged from the emergency department. Complex decision making was performed, which includes reviewing the patient's available past medical records, laboratory results, and x-ray films. My assessment of this patient's clinical condition and my plan of care is as follows. Assessment / Plan: 
Acute hypoxic respiratory failure POA Acute on chronic exacerbation of diastolic heart failure Severe pulmonary HTN 
 
admitted to PCU, continue Bipap 
-get ABG, get CXR 
CXR earlier showed Pulm edema 
-Give Bumex 2 mg Once Strict I & Os 
-History of heart failure preserved ejection fraction around 60% on the most recent echo 
-Pulmonary consult as pr on bipap Acute kidney injury on CKD4 Hyperkalemia POA, K 5.7 at [facility] 
 
-repeat CMP here 
-Bumex Iv once -Nephro consult 
 
 
 H/o Afib s/ pacemaker H/o chronic afib On warfarin, continue Rate controlled with diltiazem and metoprolol will continue 
  
 
 
History of diabetes mellitus type 2 Off medications Most recent hemoglobin A1c 5.3 2019 
  
COPD Not in exacerbation Continue home inhalers 
  
Hypertension Continue  metoprolol, diltiazem  
 
  
 
Code status, DNR, Discussed with Dtr jodi who is MPOA Pt was on Home with Hospice, apparently they revoked it, Family still does not have full grasp of knowledge. Will ask palliative to see her again. Subjective: CHIEF COMPLAINT: SOB HISTORY OF PRESENT ILLNESS:    
Mindi Sever is a 82 y.o.    female who presents with past medical history of A. fib status post pacemaker, heart failure with preserved ejection fraction, who was recently discharged to home with hospice few days ago, went to outside ER because of increased shortness of breath. As per caregiver, daughter she was fine on room air suddenly developed shortness of breath and then the decided to come to ER, at the [facility] ER they wanted to continue BiPAP and medical treatment, but no intubation and no shock and no CPR. Patient was accepted as a transfer. Currently saw the patient patient still on BiPAP with minimal distress. Talk to daughter she denies any recent fever, chills, chest pain. We were asked to admit for work up and evaluation of the above problems. Past Medical History:  
Diagnosis Date  Aortic valve disorders AS  Asthma  Benign hypertensive heart disease without heart failure  CKD (chronic kidney disease)  Diabetes ([address]  Fibromyalgia  Gastroparesis  GERD (gastroesophageal reflux disease)  Hypertension  Mitral valve disorders(424.0) MR  
 LIVIA (obstructive sleep apnea)  Paroxysmal atrial fibrillation (HCC)  Pure hypercholesterolemia 9/30/2010 Past Surgical History:  
Procedure Laterality Date  ECHO 2D ADULT  11/2009 LVH, normal LV wall motion and ejection fraction, mild aortic stenosis, moderate aortic regurgitation and mild mitral regurgitation. The ejection fraction was 55-60%.  ECHO 2D ADULT  1/2011 EF 60%, LAE, mild AS, AI, MR, PA low 40s  EVENT MONITOR POST SYMPTOMS  9/2010 Rare PACs, no arrythmia with symptoms  HX CHOLECYSTECTOMY  HX HYSTERECTOMY  LOOP MONITOR  9-10/2011 NSR  
 HI CARDIOVERSION ELECTIVE ARRHYTHMIA EXTERNAL N/A 12/23/2019 Ep Cardioversion performed by Fernanda Frazier MD at [facility]  HI ICAR CATHETER ABLATION ATRIOVENTR NODE FUNCTION N/A 12/23/2019  Ablation Av Node performed by Fernanda Frazier MD at [facility]  
  OH INS NEW/RPLC PRM PACEMAKER W/TRANSV ELTRD VENTR N/A 2019 Insert Ppm Single Ventricular performed by Moises Durant MD at [facility]  STRESS TEST MYOVIEW  2011  
 no ischemia, EF 63%  US DUPLEX CAROTID BILATERAL  2011  
 mild bilat disease Social History Tobacco Use  Smoking status: Former Smoker Last attempt to quit: 1963 Years since quittin.1  Smokeless tobacco: Never Used Substance Use Topics  Alcohol use: Not Currently Family History Problem Relation Age of Onset  Heart Disease Father  Dementia Brother  Heart Disease Brother  Diabetes Brother Allergies Allergen Reactions  Latex, Natural Rubber Itching  Metformin Nausea and Vomiting Other reaction(s): nausea HEADACHE  Advil [Ibuprofen] Unknown (comments)  Ambien [Zolpidem] Unknown (comments) Altered mental status and per Dr. Stanley Ozuna, the patient should NOT be on this medication  Aspirin Unknown (comments)  Citalopram Other (comments) HEADACHE  Codeine Other (comments)  Iodinated Contrast Media Itching  Meloxicam Unknown (comments)  Penicillin G Unknown (comments)  Shellfish Containing Products Rash  Sulfa (Sulfonamide Antibiotics) Unknown (comments)  Tramadol Nausea and Vomiting and Other (comments) HEADACHE  Trazodone Unknown (comments) Altered mental status and per Dr. Stanley Ozuna, the patient should NOT be on this medication Prior to Admission medications Medication Sig Start Date End Date Taking? Authorizing Provider  
haloperidoL (HALDOL) 2 mg tablet Take 2 mg by mouth. 20   Other, MD Gudelia  
hyoscyamine SL (LEVSIN/SL) 0.125 mg SL tablet Take 0.125 mg by mouth. 20   Other, MD Gudelia  
LORazepam (ATIVAN) 1 mg tablet Take 1 mg by mouth.  20   Other, MD Gudelia  
morphine (ROXANOL) 100 mg/5 mL (20 mg/mL) concentrated solution Take 20 mg by mouth. 2/18/20   Dipika, MD Gudelia  
promethazine (PHENERGAN) 25 mg tablet Take 25 mg by mouth. 2/18/20   Gudelia Bar MD  
bumetanide (BUMEX) 1 mg tablet Take 1 Tab by mouth two (2) times a day. 2/12/20   Tamiko Chappell MD  
warfarin (COUMADIN) 3 mg tablet Take 1 Tab by mouth daily. Take 3 mg daily 2/12/20   Tamiko Chappell MD  
dilTIAZem CD (CARDIZEM CD) 180 mg ER capsule Take 180 mg by mouth daily. Provider, Historical  
mirtazapine (REMERON) 45 mg tablet Take 1 Tab by mouth nightly. 1/20/20   Tien Bernal MD  
prednisoLONE acetate (PRED FORTE) 1 % ophthalmic suspension Administer 1 Drop to right eye daily. Until 12/25/19. Starting 12/26/19 titrate down to 1 drop right eye daily    Provider, Historical  
ondansetron hcl (ZOFRAN) 4 mg tablet Take 4 mg by mouth every eight (8) hours as needed for Nausea. Provider, Historical  
metoprolol succinate (TOPROL-XL) 100 mg tablet Take 100 mg by mouth daily. Provider, Historical  
besifloxacin (BESIVANCE) 0.6 % drps ophthalmic suspension Administer 1 Drop to right eye daily. 6/6/19   Provider, Historical  
brinzolamide (AZOPT) 1 % ophthalmic suspension Administer 1 Drop to right eye three (3) times daily. 6/6/19   Provider, Historical  
albuterol-ipratropium (DUO-NEB) 2.5 mg-0.5 mg/3 ml nebu 3 mL by Nebulization route every six (6) hours as needed for Other (wheeze). 4/8/19   Sebastian Aponte MD  
levothyroxine (SYNTHROID) 88 mcg tablet Take 88 mcg by mouth Daily (before breakfast). 4/1/19   Provider, Historical  
 
 
REVIEW OF SYSTEMS:    
I am not able to complete the review of systems because: The patient is intubated and sedated The patient has altered mental status due to his acute medical problems The patient has baseline aphasia from prior stroke(s) The patient has baseline dementia and is not reliable historian  
x The patient is in acute medical distress and unable to provide information Total of 12 systems reviewed as follows: POSITIVE= underlined text  Negative = text not underlined General:  fever, chills, sweats, generalized weakness, weight loss/gain,  
   loss of appetite Eyes:    blurred vision, eye pain, loss of vision, double vision ENT:    rhinorrhea, pharyngitis Respiratory:   cough, sputum production, SOB, HASKINS, wheezing, pleuritic pain  
Cardiology:   chest pain, palpitations, orthopnea, PND, edema, syncope Gastrointestinal:  abdominal pain , N/V, diarrhea, dysphagia, constipation, bleeding Genitourinary:  frequency, urgency, dysuria, hematuria, incontinence Muskuloskeletal :  arthralgia, myalgia, back pain Hematology:  easy bruising, nose or gum bleeding, lymphadenopathy Dermatological: rash, ulceration, pruritis, color change / jaundice Endocrine:   hot flashes or polydipsia Neurological:  headache, dizziness, confusion, focal weakness, paresthesia, Speech difficulties, memory loss, gait difficulty Psychological: Feelings of anxiety, depression, agitation Objective: VITALS:   
Visit Vitals BP (!) 138/39 (BP 1 Location: Left arm, BP Patient Position: At rest) Pulse 75 Temp 96.9 °F (36.1 °C) Resp 18 SpO2 98% PHYSICAL EXAM: 
 
General:    Distressed, on bipap HEENT: Atraumatic, anicteric sclerae, pink conjunctivae No oral ulcers, mucosa moist, throat clear, dentition fair Neck:  Supple, symmetrical,  thyroid: non tender Lungs:   Coarse sounds Chest wall:  No tenderness  No Accessory muscle use. Heart:   IRRegular  rhythm,  No  murmur  Trace edema b/l legs Abdomen:   Soft, non-tender. Not distended. Bowel sounds normal 
Extremities: No cyanosis. No clubbing,   
   
Skin:     Not pale. Not Jaundiced  No rashes Psych:  . Not depressed. Not anxious or agitated. Neurologic: EOMs intact. No facial asymmetry. No aphasia or slurred speech.  
 
_______________________________________________________________________ Care Plan discussed with: 
  Comments Patient x Family RN x Care Manager Consultant:     
_______________________________________________________________________ Expected  Disposition:  
Home with Family HH/PT/OT/RN   
SNF/LTC   
LUL   
________________________________________________________________________ TOTAL TIME:  50 Minutes Critical Care Provided     Minutes non procedure based Comments Reviewed previous records  
>50% of visit spent in counseling and coordination of care  Discussion with patient and/or family and questions answered 
  
 
________________________________________________________________________ Signed: Yovanny Min MD 
 
Procedures: see electronic medical records for all procedures/Xrays and details which were not copied into this note but were reviewed prior to creation of Plan. LAB DATA REVIEWED:   
Recent Results (from the past 24 hour(s)) CBC WITH AUTOMATED DIFF Collection Time: 02/19/20  4:15 AM  
Result Value Ref Range WBC 11.2 (H) 3.6 - 11.0 K/uL  
 RBC 2.67 (L) 3.80 - 5.20 M/uL HGB 7.6 (L) 11.5 - 16.0 g/dL HCT 25.7 (L) 35.0 - 47.0 % MCV 96.3 80.0 - 99.0 FL  
 MCH 28.5 26.0 - 34.0 PG  
 MCHC 29.6 (L) 30.0 - 36.5 g/dL  
 RDW 16.7 (H) 11.5 - 14.5 % PLATELET 548 244 - 775 K/uL MPV 11.5 8.9 - 12.9 FL  
 NRBC 0.0 0  WBC ABSOLUTE NRBC 0.00 0.00 - 0.01 K/uL NEUTROPHILS 92 (H) 32 - 75 % LYMPHOCYTES 3 (L) 12 - 49 % MONOCYTES 4 (L) 5 - 13 % EOSINOPHILS 0 0 - 7 % BASOPHILS 0 0 - 1 % IMMATURE GRANULOCYTES 1 (H) 0.0 - 0.5 % ABS. NEUTROPHILS 10.4 (H) 1.8 - 8.0 K/UL  
 ABS. LYMPHOCYTES 0.3 (L) 0.8 - 3.5 K/UL  
 ABS. MONOCYTES 0.4 0.0 - 1.0 K/UL  
 ABS. EOSINOPHILS 0.0 0.0 - 0.4 K/UL  
 ABS. BASOPHILS 0.0 0.0 - 0.1 K/UL  
 ABS. IMM. GRANS. 0.1 (H) 0.00 - 0.04 K/UL  
 DF SMEAR SCANNED    
 RBC COMMENTS NORMOCYTIC, NORMOCHROMIC METABOLIC PANEL, COMPREHENSIVE Collection Time: 02/19/20  4:15 AM  
Result Value Ref Range Sodium 133 (L) 136 - 145 mmol/L Potassium 5.7 (H) 3.5 - 5.1 mmol/L Chloride 94 (L) 97 - 108 mmol/L  
 CO2 30 21 - 32 mmol/L Anion gap 9 5 - 15 mmol/L Glucose 156 (H) 65 - 100 mg/dL  (H) 6 - 20 MG/DL Creatinine 3.16 (H) 0.55 - 1.02 MG/DL  
 BUN/Creatinine ratio 40 (H) 12 - 20 GFR est AA 17 (L) >60 ml/min/1.73m2 GFR est non-AA 14 (L) >60 ml/min/1.73m2 Calcium 9.0 8.5 - 10.1 MG/DL Bilirubin, total 0.5 0.2 - 1.0 MG/DL  
 ALT (SGPT) 43 12 - 78 U/L  
 AST (SGOT) 100 (H) 15 - 37 U/L Alk. phosphatase 212 (H) 45 - 117 U/L Protein, total 7.6 6.4 - 8.2 g/dL Albumin 2.9 (L) 3.5 - 5.0 g/dL Globulin 4.7 (H) 2.0 - 4.0 g/dL A-G Ratio 0.6 (L) 1.1 - 2.2    
TROPONIN I Collection Time: 02/19/20  4:15 AM  
Result Value Ref Range Troponin-I, Qt. 0.66 (H) <0.05 ng/mL NT-PRO BNP Collection Time: 02/19/20  4:15 AM  
Result Value Ref Range NT pro-BNP >35,000 (H) 0 - 450 PG/ML  
PROTHROMBIN TIME + INR Collection Time: 02/19/20  4:15 AM  
Result Value Ref Range INR 2.3 (H) 0.9 - 1.1 Prothrombin time 22.3 (H) 9.0 - 11.1 sec POC EG7 Collection Time: 02/19/20  4:54 AM  
Result Value Ref Range Calcium, ionized (POC) 0.90 (L) 1.12 - 1.32 mmol/L  
 FIO2 (POC) 65 % pH (POC) 7.455 (H) 7.35 - 7.45    
 pCO2 (POC) 37.9 35.0 - 45.0 MMHG  
 pO2 (POC) 56 (L) 80 - 100 MMHG  
 HCO3 (POC) 26.6 (H) 22 - 26 MMOL/L Base excess (POC) 3 mmol/L  
 sO2 (POC) 90 (L) 92 - 97 % Site OTHER Device: BIPAP    
 PEEP/CPAP (POC) 6 cmH2O  
 PIP (POC) 12 Allens test (POC) N/A Bleed In 65 L/min Specimen type (POC) VENOUS BLOOD Total resp.  rate 19

## 2020-02-19 NOTE — HOSPICE
[employer] Good Help to Those in Need 
[phone] Nursing Note Patient Name: Rodo Carr YOB: 1935 Age: 82 y.o. [employer] RN Note:  Hospice consult noted. Chart reviewed. Will go by patient's room to assess and see if family is present. Thank you for the opportunity to be of service to this patient and her family. 1730:  Met with large amount of family members. Hannah Snyder/CG was present and Ella Snyder/son/POA was available via phone conference. Discussed Hospice philosophy, general plan of care, levels of care, services and on call procedures. Family information packet provided & reviewed. Pt was [facility] to [facility] yesterday but do to symptoms of shortness of breath, patient was sent to [facility] ER and then transferred to [facility] ED. Pt is off of BiPAP and is alert but lethargic. Spoke with patient and asked if she was having any pain and she stated no. Asked if she was feeling short of breath and she stated no. According to [facility], patient does not want to take any medications and family confirmed Fadi's statement. Family states, \"She's a fighter. \"  Explained to the patient that she has medications for comfort if she needs them. Educated family on inpatient hospice criteria and that the patient does not meet the criteria at this time. Explained that comfort medications and measures have been initiated and that [employer] will monitor patient while here at [facility]. Suggested to family to have Palliative see the patient tomorrow (consult order is in place). Patient is out of the [employer] service area for home hospice. If there are any questions, please call Brannon Rodriguez at [phone]. Thank you for the opportunity to be of service to this patient and her family.

## 2020-02-20 NOTE — HOSPICE
[facility] RN note: In to meet pt and daughter Lucia Carbajal at bedside. Pt lethargic post IV ativan but daughter states that pt was alert and talking earlier. Discussed best plan moving forward, considering taking pt back home and reinstating hospice services with [facility]. Family with additional questions for the medical team, Keya Cordova NP notified and will meet with family. Hospice to follow as clinical status and plans progress. Thank you for the opportunity to care for this pt and family. Please contact hospice at [phone] with any questions or concerns.

## 2020-02-20 NOTE — PROGRESS NOTES
Problem: Falls - Risk of 
Goal: *Absence of Falls Description Document Redgie Curet Fall Risk and appropriate interventions in the flowsheet. Outcome: Progressing Towards Goal 
Note: Fall Risk Interventions: 
  
 
Mentation Interventions: Adequate sleep, hydration, pain control Medication Interventions: Bed/chair exit alarm Elimination Interventions: Call light in reach

## 2020-02-20 NOTE — PROGRESS NOTES
Oncology End of Shift Note Bedside shift change report given to ROSSY Villatoro (incoming nurse) by Jayme Nageotte (outgoing nurse) on Link Perkin. Report included the following information SBAR, Kardex and MAR. Shift Summary:  
Patient was transferred from the PCU to this unit on comfort care. Patient's daughter was at her bedside. The family member was very apprehensive initially on realizing that her mom would not have a continuous pulse oximetry monitoring or tele monitoring. I subsequently informed my Charge Nurse of the daughter's concerns. The Charge Nurse came In the room to address the daughter's concerns. The Nursing Supervisor was subsequently contacted and informed of  the daughter's concerns. The daughter was offered an alternative room to accommodate her mother which she accepted. I subsequently emailed the Tele-hospitalist with respect to placing an order for a tele monitor, this was prescribed. The patient was restless, I gave her lorazepam, see PRN meds. The daughter reiterated several times, \"I do not want my mother to get  Morphine. \"  I emphasized to her that the hands-off report specifically indicated that no morphine was to be given to her mother. Frequent rounding has been done. Patient's is on 3L of oxygen. Teaching was provided to the family member throughout my shift. All schedule medications have been given. Issues for Physician to Address:    
 
Patient on Cardiac Monitoring? [x] Yes 
[] No 
 
Rhythm: Paced Sinus Rhythm Shift Events Jayme Nageotte

## 2020-02-20 NOTE — PROGRESS NOTES
ADULT PROTOCOL: JET AEROSOL ASSESSMENT Patient  Carey Cutler     82 y.o.   female     2020  10:59 PM 
 
Breath Sounds Pre Procedure: Right Breath Sounds: Diminished Left Breath Sounds: Diminished Breath Sounds Post Procedure: Right Breath Sounds: Diminished Left Breath Sounds: Diminished Breathing pattern: Pre procedure Breathing Pattern: Regular Post procedure Breathing Pattern: Regular Heart Rate: Pre procedure Pulse: 70 Post procedure Pulse: 77 Resp Rate: Pre procedure Respirations: 22 Post procedure Respirations: 20 
 
Oxygen: O2 Device: Nasal cannula  2LPM 
   Changed:NO SpO2: Pre procedure SpO2: 95 %  WITH oxygen Post procedure SpO2: 94 % WITH oxygen Nebulizer Therapy: Current medications Aerosolized Medications: DuoNeb Changed: NO 
 
Problem List:  
Patient Active Problem List  
Diagnosis Code  Mitral valve disease I05.9  Benign hypertensive heart disease without heart failure I11.9  Aortic valve disorder I35.9  Pure hypercholesterolemia E78.00  Paroxysmal atrial fibrillation (HCC) I48.0  LIVIA on CPAP G47.33, Z99.89  Persistent atrial fibrillation I48.19  
 CAP (community acquired pneumonia) J18.9  Essential hypertension I10  
 Anticoagulant long-term use Z79.01  
 Dementia (HCC) F03.90  
 Acquired hypothyroidism E03.9  Acute renal failure superimposed on stage 4 chronic kidney disease (HCC) N17.9, N18.4  Hypertensive urgency I16.0  CKD (chronic kidney disease) N18.9  Bilateral carotid artery stenosis I65.23  Vaso vagal episode R55  Aortic insufficiency I35.1  Mitral stenosis I05.0  Shortness of breath R06.02  
 Atrial fibrillation with RVR ([facility]) I48.91  
 Atrial fibrillation, rapid (HCC) I48.91  
 Chronic diastolic congestive heart failure (HCC) I50.32  Atrial flutter ([address] I48.92  
  Atrial fibrillation with rapid ventricular response ([facility]) I48.91  
 CHF (congestive heart failure) ([facility]) I50.9  Pulmonary edema J81.1 Respiratory Therapist: Yvon Tavarez

## 2020-02-20 NOTE — PROGRESS NOTES
ADULT PROTOCOL: JET AEROSOL ASSESSMENT Patient  Ruslan Pulido     82 y.o.   female     2/20/2020  3:43 PM 
 
Breath Sounds Pre Procedure: Right Breath Sounds: Clear Left Breath Sounds: Clear Breath Sounds Post Procedure: Right Breath Sounds: Clear Left Breath Sounds: Clear Breathing pattern: Pre procedure Breathing Pattern: Regular Post procedure Breathing Pattern: Regular Heart Rate: Pre procedure Pulse: 78 Post procedure Pulse: 72 Resp Rate: Pre procedure Respirations: 20 
         Post procedure Respirations: 18 
 
Oxygen: O2 Device: Nasal cannula   2L SpO2: Pre procedure SpO2: 100 % Post procedure SpO2: 98 % Nebulizer Therapy: Current medications Aerosolized Medications: DuoNeb Smoking History: Former Problem List:  
Patient Active Problem List  
Diagnosis Code  Mitral valve disease I05.9  Benign hypertensive heart disease without heart failure I11.9  Aortic valve disorder I35.9  Pure hypercholesterolemia E78.00  Paroxysmal atrial fibrillation (HCC) I48.0  LIVIA on CPAP G47.33, Z99.89  Persistent atrial fibrillation I48.19  
 CAP (community acquired pneumonia) J18.9  Essential hypertension I10  
 Anticoagulant long-term use Z79.01  
 Dementia (HCC) F03.90  
 Acquired hypothyroidism E03.9  Acute renal failure superimposed on stage 4 chronic kidney disease (HCC) N17.9, N18.4  Hypertensive urgency I16.0  CKD (chronic kidney disease) N18.9  Bilateral carotid artery stenosis I65.23  Vaso vagal episode R55  Aortic insufficiency I35.1  Mitral stenosis I05.0  Shortness of breath R06.02  
 Atrial fibrillation with RVR ([facility]) I48.91  
 Atrial fibrillation, rapid ([facility]) I48.91  
 Chronic diastolic congestive heart failure (HCC) I50.32  Atrial flutter ([facility]) I48.92  
 Atrial fibrillation with rapid ventricular response ([facility]) I48.91  
  CHF (congestive heart failure) ([facility]) I50.9  Pulmonary edema J81.1 Respiratory Therapist: Molly Harrison V RT

## 2020-02-20 NOTE — PROGRESS NOTES
1922 Bedside shift change report given to Tiburcio Samson RN (oncoming nurse) by Roanne Barthel, RN (offgoing nurse). Report included the following information Kardex, Intake/Output, MAR, Recent Results and Cardiac Rhythm Paced/Afib. Patient's family at bedside. Daughter staying overnight. 2051 pt trying to get out of bed and attempting to pull off respiratory treatment. 1 mg Ativan given at this time. 2233 Bedside shift change report given to Michael Wilson (oncoming nurse) by Tiburcio Samson RN (offgoing nurse). Report included the following information Kardex, MAR and Quality Measures. 2350 Pt transferred to 1123 on Onconlog[facility] unit.

## 2020-02-20 NOTE — PROGRESS NOTES
[address] [facility]  IVS:[id number]   :1935 Last 24 hours notes reviewed Plan for comfort care noted Agree with the plan 
WE WILL SIGN OFF. Call us if needed. Mai Purdy, [address] [facility] [employer] [facility] [address] [place], [address] [facility] Phone - [phone] Fax - [phone] [address] [place] [address], [address] [facility] Phone - [phone] Fax - [phone]    
[email or web address]

## 2020-02-20 NOTE — PROGRESS NOTES
Events reviewed. Patient has transitioned to comfort care. Will be available PRN. Pedro Najera MD

## 2020-02-20 NOTE — PROGRESS NOTES
2350: Pt arrived to oncolody unit. Pt transferred to bed. Recliner replaced with couch for family comfort. 0005: Primary nurse asked writing nurse to help speak with family regarding need for cardiac monitoring and continuous pulse ox. Writing nurse into to explain written orders to daughter at bedside. Writing nurse explained that cardiac monitoring ordering had been d/c since the goal of care for the pt had changed to comfort measures only. Daughter unhappy about no monitoring. Writing nurse offered to place pt on dynamap with continuous pulse ox on. Pt placed, O2 96% on 3LNC and HR 71. Daughter said pt needed more oxygen, at least 4LNC. Writing nurse explain that 96% with an appropriate level and no increase of oxygen was indicated. Daughter became very upset with the size of the room stating, \"you all lied to us. We were suppose to be getting a bigger room. This room and whole mcclellan feels like death. You are all vultures just watching and waiting for her to die. She is still a live and a human who deserves dignity. \" Writing nurse tried explaining that we are still providing care to her mother, and that our goal is to keep her comfortable. Family member still very upset. Family on the phone, tearful, stating the same things. Writing nurse contacted Nursing Supervisor for assistance. Nursing supervisor said we can offer the hospice suite to the family. 0015: Writing nurse showed daughter the hospice suite. Daughter was agreeable to moving patient to the hospice suite. Patient moved to hospice suite at this time and Nursing Supervisor informed of change. Pt still placed on dynamap with continuous pulse ox. Daughter still upset about not having cardiac monitor. Daughter states: Jeremy Saini deserves some dignity. This is just a nursing home now. How will you know if she passes? \" Primary nurse explained hourly rounding to daughter.   Daughter still very upset. Writing nurse advised primary nurse to contact on-call physician to see if a 24 hr cardiac monitoring order can be placed until primary team is back in morning. 0106: Daughter rang call bell. Writing nurse into assess needs. Daughter said pt stated she was in pain, but did not say where her pain was. Daughter was very clear that she did not want the patient to received any morphine. Writing nurse informed primary nurse. PRN ordered reviewed and primary nurse decided to give SL ativan. 0130: Pt placed on cardiac monitoring. Daughter still at bedside. Daughter appreciative of this measure. Daughter said she is accepting her mother's prognosis, but still wants her to be cared for and comfortable. Writing nurse expressed understanding and agreed that, we as a staff want the patient to remain comfortable as that is her goal of care. Family needs more education and emotional support for helping to cope/greive with mothers prognosis. Suspect pt will need morphine or dilaudid in the future for pain control and SOB. Currently family is adamant that pt does not get any morphine. This really needs to be discussed with the family at a better time when they are receptive.  Family would benefit from discussion with Palliative Team.

## 2020-02-20 NOTE — PROGRESS NOTES
Hospitalist Progress Note NAME: Taran Kearney :  1935 MRN:  [id number] I reviewed with Dr. Lalo Webster about the medical history and the findings on the physical examination. I discussed with Dr. Lalo Webster the patient's diagnosis and concur with the plan. Interim Hospital Summary: 82 y.o. female whom presented on 2020 with SOB. Pt was discharged from the hospital with home health service on 2020 after being treated for acute hypoxic respiratory failure. Pt was open to [address] [place] [address] Drive, PT, and OT. Then she was open to [facility] on 2/15/2020. The family brought the pt to the hospital as her symptoms got worse. Assessment / Plan: 
Had another meeting with pt's youngest daughter. We discussed/addressed all of her concerns. The daughter expressed taking pt to home with home hospice team if she doesn't get accepted as inpatient hospice. She did not qualify for inpatient hospice as of yesterday. We will ask our hospice team to re-evaluate one more time. Started deescalate the medications. Comfort measure only in placed. **please do not use morphine or hydromorphone per family request (daughter recalls mom has told her that those medication made her really sick and never wanted to use it again.) Fentanyl for pain management. Acute hypoxic respiratory failure secondary to pulmonary edema POA Acute on chronic exacerbation of diastolic heart failure Severe pulmonary HTN 
- appreciate pulmonology input; currently on O2 @ 2L via n/c, O2 sat >95% CXR: Bilateral pleural effusions right greater than left. Mildly improved pulmonary edema. Cardiomegaly. Bumex change to PRN 
  
Acute kidney injury on CKD4 Hyperkalemia POA, K 5.7 at hospitals 
- appreciate nephrology input; not a candidate for HD, medical treatment.  
  
A-fib with RVR Hypercoagulable state secondary to A-fib S/p pacemaker  
- coumadin therapy has been discontinued since the family requested no further blood work. Continue with cardizem and toprol XL as long the pt cant tolerate Elevated troponin 0.66 -> 2.49 could indicate NSTEMI. No further evaluation per family request.  
  
diabetes mellitus type 2 
- Off medications Most recent hemoglobin A1c 5.3 March 2019 
  
COPD 
- Not in exacerbation Continue with duoneb 
  
Hypertension 
- Continue  metoprolol, diltiazem  
   
Code status: DNR Prophylaxis: SCD's Recommended Disposition: inpatient hospice vs home with hospice The family requested for her to have sitter overnight. I reminded the family the sitter will be provide if the pt becomes agitated overnight. Subjective: Chief Complaint / Reason for Physician Visit Open eyes, orient to self and recognize her daughter. Discussed with RN events overnight. Review of Systems: 
Symptom Y/N Comments  Symptom Y/N Comments Fever/Chills    Chest Pain Poor Appetite    Edema Cough    Abdominal Pain Sputum    Joint Pain SOB/HASKINS    Pruritis/Rash Nausea/vomit    Tolerating PT/OT Diarrhea    Tolerating Diet Constipation    Other Could NOT obtain due to:   
 
Objective: VITALS:  
Last 24hrs VS reviewed since prior progress note. Most recent are: 
Patient Vitals for the past 24 hrs: 
 Temp Pulse Resp BP SpO2  
02/20/20 0203     98 % 02/19/20 2359 98 °F (36.7 °C) 71 24 (!) 141/33 95 % 02/19/20 2040     95 % 02/19/20 2020 97.5 °F (36.4 °C) 70 23 (!) 126/37 95 % 02/19/20 2012  95     
02/19/20 1515  70 21 (!) 123/34 94 % 02/19/20 1409     100 % 02/19/20 1149     99 % 02/19/20 1126 97.5 °F (36.4 °C) 74 16 (!) 135/37 97 % 02/19/20 0939 96.9 °F (36.1 °C) 75 18 (!) 138/39 98 % 02/19/20 0936     93 % Intake/Output Summary (Last 24 hours) at 2/20/2020 2564 Last data filed at 2/19/2020 1830 Gross per 24 hour Intake  Output 250 ml Net -250 ml PHYSICAL EXAM: 
General: Ill appearing, no acute distress EENT:  EOMI. Anicteric sclerae. MMM Resp:  no wheezing or rales. No accessory muscle use CV:  regular  rhythm,  No edema GI:  Soft, Non distended, Non tender. +Bowel sounds Neurologic:  Open eyes at times, not following commends Psych:   Poor insight. gets agitated at times Skin:  No rashes. No jaundice Reviewed most current lab test results and cultures  YES Reviewed most current radiology test results   YES Review and summation of old records today    NO Reviewed patient's current orders and MAR    YES 
PMH/SH reviewed - no change compared to H&P 
________________________________________________________________________ Care Plan discussed with: 
  Comments Patient y Family  y daughter RN y   
Care Manager y Consultant  y hospice team  
                  Multidiciplinary team rounds were held today with , nursing, pharmacist and clinical coordinator. Patient's plan of care was discussed; medications were reviewed and discharge planning was addressed. ________________________________________________________________________ Sabinoun Lorena Roca NP Procedures: see electronic medical records for all procedures/Xrays and details which were not copied into this note but were reviewed prior to creation of Plan. LABS: 
I reviewed today's most current labs and imaging studies. Pertinent labs include: 
Recent Labs  
  02/19/20 
0415 WBC 11.2* HGB 7.6* HCT 25.7*  
 Recent Labs  
  02/19/20 
0415 *  
K 5.7* CL 94* CO2 30 * * CREA 3.16* CA 9.0 ALB 2.9* TBILI 0.5 SGOT 100* ALT 43 INR 2.3* Signed: )Nancy Mackay, MABEL

## 2020-02-20 NOTE — PROGRESS NOTES
SURINDER: 1)Home with hospice at 9:00 AM tomorrow 2/21/2020  
 
4:00 PM- CM spoke with [place] with [facility] who said they could accommodate pt tomorrow 2/21/2020 at 11:00 AM to re-admitt pt. Pt already has the necessary equipment at the house. CM arranged [facility] transportation for 9:00 AM tomorrow. Bundle paperwork provided. Medicare pt has received, reviewed, and signed 2nd IM letter informing them of their right to appeal the discharge. Signed copy has been placed on pt bedside chart. [facility] paperwork on chart. Attending completed a DDNR. Pt is cleared to d/c from CM perspective, RN informed. 1:55 PM- CM informed by Krista that pt does not qualify for GIP at this time. CM contacting [facility] about re-starting services. 12:00 PM- CM and RN Lead met with several family members including pt's son Anya Chandra (COLBY), [facility] (via phone), dtr Jillianchristina Harada and 3 other family members including 2 cousins. We dicussed goals of care and the difference between home hospice services and GIP. After going back and forth MPOA's Anya Chandra and Becky Granados decided it would be appropriate for pt to get evaled for GIP with [facility]. They did decline getting evaluated by [facility] for GIP if [facility] does not believe pt is appropriate. In the event pt is not appropriate for GIP then pt's family would like for pt to go back home with [facility]. If going home pt will require [facility] transportation, attending updated. RN Lead [facility] who [facility] this afternoon. CM will continue to follow and remain available for support. GIOVANY Shah, MARQUISE [facility] [phone]

## 2020-02-20 NOTE — INTERDISCIPLINARY ROUNDS
Oncology Interdisciplinary rounds were held today to discuss patient plan of care and outcomes. The following members were present: Nursing, Physician, Case Management, Pharmacy, and PT/OT Actual Length of Stay: 1 Expected Length of Stay: - - - Plan            Discharge Family wants to take patient home with hospice. Possible later today 2/20/2020.

## 2020-02-20 NOTE — PROGRESS NOTES
Transitional Care Team: Initial HUG Note Date of Assessment: 02/20/20 Time of Assessment:  2:39 PM 
 
Katelyn Kim is a 82 y.o. female inpatient at  [facility]. Assessment & Plan Pt family has accepted to be discharged to home hospice status. Planning for discharge in AM once all DME and other needs are set up. Primary Diagnosis: heart failure Advance Directive:   See ACP note from me Current Code Status:  Hospital DNR Referral to Hospice/ Palliative Care Appropriate:has been made . Awareness of Medical Conditions: (Trajectory of illness and pts expectations). Family aware pt is at end of life likely within the next few weeks Discharge Needs: (to include safety issues) hospice Barriers Identified: care at home Patient is willing to go to SNF/Inpatient Rehab if recommended: family want her home Medication Review:  Await AVS- likely to change once on Hospice. Can patient afford medications:  yes. Patient is Compliant with Medication regimen:yes Who manages medications at home: family Best Patient Contact Number: [phone] [phone] G (Healthy Understanding of Goals) program introduced to patient/family. The Transitional Care Team bridges the gaps in care and education surrounding discharge from the acute care facility. The objective is to empower the patient and family in taking a proactive role in preventing readmission within the first thirty days after discharge. The team is also involved in the efforts to reduce readmission to the acute care setting after stabilization and discharge from the acute care environment either to skilled nursing facilities or community. G RN/NP will return with [facility] Calendar/ follow up appointments/ Ambulatory Nurse Navigator name and contact information when the patient is ready for discharge. Future Appointments Date Time Provider Fernando Laura 4/23/2020  9:30 AM PACEMAKER, Nick Henning MAAME SCHED  
4/23/2020  9:45 AM [facility]  
5/11/2020  9:40 AM Raffaele Dos Santos MD [address] [place] [address] Street  
1/20/2021  1:00 PM Ni Jonas MD [facility] follow up appointment(s):none yet [facility]: call information given to lives outside service area Patient education focused on readmission zones as described as: The Red Zone: High risk for readmission, days 1-21 The Yellow Zone: Moderate  risk for readmission, days 22-29 The Green Zone: Lower risk for readmission, days 30 and after [facility] Team will attempt to follow the patient from a distance while inpatient as well as be available for further transition/disposition needs. The [facility] team will continue to offer support during the 30- 90 day discharge from acute care setting. Will notify Ambulatory {Blank Single Select Template:20061[de-identified] \"SURINDER   RN. Past Medical History:  
Diagnosis Date  Aortic valve disorders AS  Asthma  Benign hypertensive heart disease without heart failure  CKD (chronic kidney disease)  Diabetes ([address]  Fibromyalgia  Gastroparesis  GERD (gastroesophageal reflux disease)  Hypertension  Mitral valve disorders(424.0) MR  
 LIVIA (obstructive sleep apnea)  Paroxysmal atrial fibrillation (HCC)  Pure hypercholesterolemia 9/30/2010

## 2020-02-20 NOTE — PROGRESS NOTES
Consult noted, chart reviewed, patient is known to Palliative care, case discussed with Sania Martin/attending hospitalist, goal is comfort, and at this point Palliative care assistance is needed, we will sign off . Per hospice evaluation yesterday, patient is not appropriate for inpatient hospice.

## 2020-02-20 NOTE — PROGRESS NOTES
Oncology End of Shift Note Bedside shift change report given to Bard Kanner, RN (incoming nurse) by Anthony Kelley (outgoing nurse) on Efren Friday. Report included the following information SBAR, Kardex, ED Summary, Intake/Output, MAR and Recent Results. Shift Summary:  
No change in patient condition throughout shift. Patient swallowed pills with apple sauce. Patients family bathed her. Patient turned throughout shift. Prn medication given for pain. Patient slept most of shift. Patient family appears anxious and hypervigilant. Issues for Physician to Address:  none Patient on Cardiac Monitoring? [x] Yes 
[] No 
 
Rhythm:   
 
 
 
Shift Events Anthony Kelley

## 2020-02-21 NOTE — PROGRESS NOTES
PCP SURINDER appt scheduled with Dr. Smith Vasquez on 2/24/2020 at 2:40pm Appt added to AVS. A MARQUISE Jiménez Specialist  
 
Cancelled patient going to hospice [phone]

## 2020-02-21 NOTE — DISCHARGE INSTRUCTIONS
Patient Discharge Instructions     Pt Name  Rodo Carr   Date of Birth 1935   Age  82 y.o. Medical Record Number  [id number]   PCP Isrrael Cooper MD    Admit date:  2/19/2020 @    [place] [address]    Room Number  1111/01   Date of Discharge 2/21/2020     Admission Diagnoses:     Pulmonary edema          Allergies   Allergen Reactions    Latex, Natural Rubber Itching    Metformin Nausea and Vomiting     Other reaction(s): nausea  HEADACHE      Advil [Ibuprofen] Unknown (comments)    Ambien [Zolpidem] Unknown (comments)     Altered mental status and per Dr. Neetu Mahoney, the patient should NOT be on this medication    Aspirin Unknown (comments)    Citalopram Other (comments)     HEADACHE    Codeine Other (comments)    Iodinated Contrast Media Itching    Meloxicam Unknown (comments)    Penicillin G Unknown (comments)    Shellfish Containing Products Rash    Sulfa (Sulfonamide Antibiotics) Unknown (comments)    Tramadol Nausea and Vomiting and Other (comments)     HEADACHE      Trazodone Unknown (comments)     Altered mental status and per Dr. Neetu Mahoney, the patient should NOT be on this medication        You were admitted to [address] for  Pulmonary edema    YOUR [place] (BUT NOT LIMITED TO ):  Present on Admission:   Pulmonary edema   CHF (congestive heart failure) (HCC)   Persistent atrial fibrillation   Vaso vagal episode   Paroxysmal atrial fibrillation (HCC)   LIVIA on CPAP   Essential hypertension   Mitral stenosis   Dementia ([address]   CKD (chronic kidney disease)   Chronic diastolic congestive heart failure (HCC)   Aortic valve disorder   Acquired hypothyroidism      DIET:  Comfort feeding       Recommended activity: Activity as tolerated  Follow up :    Follow-up Information     Follow up With Specialties Details Why Contact Info    Alessandro Colby MD [address] [address]  [phone]          Patient will be discharged to home with hospice service. · It is important that you take the medication exactly as they are prescribed. · Keep your medication in the bottles provided by the pharmacist and keep a list of the medication names, dosages, and times to be taken in your wallet. · Do not take other medications without consulting your doctor. ADDITIONAL INFORMATION: If you experience any of the following symptoms or have any health problem not listed below, then please call your primary care physician or return to the emergency room if you cannot get hold of your doctor: Fever, chills, nausea, vomiting, diarrhea, change in mentation, falling, bleeding, shortness of breath. I understand that if any problems occur once I am discharged, I am supposed to call my Primary care physician for further care or seek help in the Emergency Department at the nearest Healthcare facility. I have had an opportunity to discuss my clinical issues with my doctor and nursing staff. I understand and acknowledge receipt of the above instructions.                                                                                                                                            Physician's or R.N.'s Signature                                                            Date/Time                                                                                                                                              Patient or Representative Signature                                                 Date/Time

## 2020-02-21 NOTE — DISCHARGE SUMMARY
Hospitalist Discharge Summary Patient ID: 
Wellington Barger [id number] 
12 y.o. 
1935 PCP on record: Eli Singh MD 
 
Admit date: 2/19/2020 Discharge date and time: 2/21/2020 DISCHARGE DIAGNOSIS: 
Acute hypoxic respiratory failure secondary to pulmonary edema POA Acute on chronic exacerbation of diastolic heart failure Severe pulmonary HTN Acute kidney injury on CKD4 Hyperkalemia POA, K 5.7 at [facility] A-fib with RVR Hypercoagulable state secondary to A-fib S/p pacemaker  
diabetes mellitus type 2 COPD Hypertension CONSULTATIONS: 
IP CONSULT TO PULMONOLOGY 
IP CONSULT TO NEPHROLOGY Excerpted HPI from H&P of Julissa Contreras MD: Porsche Salazar is a 82 y.o.  female who presents with past medical history of A. fib status post pacemaker, heart failure with preserved ejection fraction, who was recently discharged to home with hospice few days ago, went to outside ER because of increased shortness of breath. As per caregiver, daughter she was fine on room air suddenly developed shortness of breath and then the decided to come to ER, at the [address] [place] [address] ER they wanted to continue BiPAP and medical treatment, but no intubation and no shock and no CPR. Patient was accepted as a transfer. Currently saw the patient patient still on BiPAP with minimal distress. Talk to daughter she denies any recent fever, chills, chest pain. 
  
We were asked to admit for work up and evaluation of the above problems 
 
______________________________________________________________________ DISCHARGE SUMMARY/HOSPITAL COURSE:  for full details see H&P, daily progress notes, labs, consult notes. Pt was discharged from the hospital with home health service on 2/12 2020 after being treated for acute hypoxic respiratory failure. Pt was open to [address], PT, and OT.  Then she was open to [address] [facility] on 2/15/2020. The family brought the pt to the hospital as her symptoms got worse. After the multiple family meeting, the family decided to take the pt back home with hospice service. The family understood to call hospice service with worsening of symptoms rather than bring her back to the hospital. 
 
Acute hypoxic respiratory failure secondary to pulmonary edema POA Acute on chronic exacerbation of diastolic heart failure Severe pulmonary HTN 
- appreciate pulmonology input; currently on O2 @ 2L via n/c, O2 sat >95% CXR: Bilateral pleural effusions right greater than left. Mildly improved pulmonary edema. Cardiomegaly. Bumex change to PRN 
  
Acute kidney injury on CKD4 Hyperkalemia POA, K 5.7 at [address] 
- appreciate nephrology input; not a candidate for HD, medical treatment.  
  
A-fib with RVR Hypercoagulable state secondary to A-fib S/p pacemaker  
- coumadin therapy has been discontinued since the family requested no further blood work. Continue with cardizem and toprol XL as long the pt cant tolerate Elevated troponin 0.66 -> 2.49 could indicate NSTEMI. No further evaluation per family request.  
  
diabetes mellitus type 2 
- Off medications Most recent hemoglobin A1c 5.3 March 2019 
  
COPD 
- Not in exacerbation Continue with duoneb 
  
Hypertension 
- Continue  metoprolol, diltiazem  
 
 
 
_______________________________________________________________________ Patient seen and examined by me on discharge day. Pertinent Findings: 
Gen:    Not in distress Chest: Bilateral rhonchi CVS:   Regular rhythm. No edema Abd:  Soft, not distended, not tender Neuro:  Lethargic, only orient to self and able to recognize her family members 
_______________________________________________________________________ DISCHARGE MEDICATIONS:  
Discharge Medication List as of 2/21/2020  8:06 AM  
  
CONTINUE these medications which have CHANGED Details bumetanide (BUMEX) 1 mg tablet Take 1 Tab by mouth two (2) times daily as needed (difficulty with breathing from fluid overload). , No Print, Disp-60 Tab, R-2  
  
  
CONTINUE these medications which have NOT CHANGED Details  
haloperidoL (HALDOL) 2 mg tablet Take 2 mg by mouth., Historical Med  
  
hyoscyamine SL (LEVSIN/SL) 0.125 mg SL tablet Take 0.125 mg by mouth., Historical Med LORazepam (ATIVAN) 1 mg tablet Take 1 mg by mouth., Historical Med  
  
morphine (ROXANOL) 100 mg/5 mL (20 mg/mL) concentrated solution Take 20 mg by mouth., Historical Med  
  
promethazine (PHENERGAN) 25 mg tablet Take 25 mg by mouth., Historical Med  
  
dilTIAZem CD (CARDIZEM CD) 180 mg ER capsule Take 180 mg by mouth daily. , Historical Med  
  
mirtazapine (REMERON) 45 mg tablet Take 1 Tab by mouth nightly., Normal, Disp-90 Tab, R-3  
  
prednisoLONE acetate (PRED FORTE) 1 % ophthalmic suspension Administer 1 Drop to right eye daily. Until 12/25/19. Starting 12/26/19 titrate down to 1 drop right eye daily, Historical Med  
  
ondansetron hcl (ZOFRAN) 4 mg tablet Take 4 mg by mouth every eight (8) hours as needed for Nausea., Historical Med  
  
metoprolol succinate (TOPROL-XL) 100 mg tablet Take 100 mg by mouth daily. , Historical Med  
  
besifloxacin (BESIVANCE) 0.6 % drps ophthalmic suspension Administer 1 Drop to right eye daily. , Historical Med  
  
brinzolamide (AZOPT) 1 % ophthalmic suspension Administer 1 Drop to right eye three (3) times daily. , Historical Med  
  
albuterol-ipratropium (DUO-NEB) 2.5 mg-0.5 mg/3 ml nebu 3 mL by Nebulization route every six (6) hours as needed for Other (wheeze). , Print, Disp-30 Nebule, R-0  
  
levothyroxine (SYNTHROID) 88 mcg tablet Take 88 mcg by mouth Daily (before breakfast). , Historical Med  
  
  
STOP taking these medications  
  
 warfarin (COUMADIN) 3 mg tablet Comments:  
Reason for Stopping: ALPRAZolam (XANAX) 0.25 mg tablet Comments:  
Reason for Stopping: My Recommended Diet, Activity, Wound Care, and follow-up labs are listed in the patient's Discharge Insturctions which I have personally completed and reviewed. _______________________________________________________________________ DISPOSITION:   
Home with Family:   
Home with HH/PT/OT/RN:   
SNF/LTC:   
LUL:   
OTHER: y  
 
 
Condition at Discharge: home with hospice service [facility]) 
_______________________________________________________________________ Follow up with: PCP : Chandler Cifuentes MD 
Follow-up Information None Total time in minutes spent coordinating this discharge (includes going over instructions, follow-up, prescriptions, and preparing report for sign off to her PCP) :  40 minutes Signed: 
Hakan Ball NP

## 2020-02-21 NOTE — PROGRESS NOTES
Oncology End of Shift Note Bedside shift change report given to ROSSY Epperson (incoming nurse) by Daphney Huff RN (outgoing nurse) [facility] Report included the following information SBAR, Kardex and MAR. Shift Summary:  
Patient assigned for three hours. Rounding completed. Given PRN robitussin. Held PO med due to assessment (swallowing and secretion problems). No visual cues of pain. Family at bedside. Issues for Physician to Address:    
 
Patient on Cardiac Monitoring? [] Yes 
[x] No 
 
Rhythm:   
 
 
 
Shift Events Daphney Huff RN

## 2020-02-21 NOTE — PROGRESS NOTES
Pts family was given discharge paperwork, given time to ask question, they verbalized understanding. Pt was discharged home on hospice and taken via stretcher by YUE.

## 2020-12-22 NOTE — PROGRESS NOTES
2:27 PM Called for report. Fredi whitman BG with another patient. Will call back. TRANSFER - IN REPORT: 
 
Verbal report received from [facility] (name) on Aiyana Delgadillo  being received from ED (unit) for routine progression of care Report consisted of patients Situation, Background, Assessment and  
Recommendations(SBAR). Information from the following report(s) SBAR, Kardex, ED Summary, Procedure Summary, Intake/Output, Recent Results and Cardiac Rhythm NSR was reviewed with the receiving nurse. Opportunity for questions and clarification was provided. Assessment completed upon patients arrival to unit and care assumed. Primary Nurse Osiel Monroy and English, RN performed a dual skin assessment on this patient No impairment noted Rafiq score is 18. 
4:43 PM Patient's daughter stated that she makes pureed diet for her mom at home with no salt. Diet order has been changed to pureed. 6:20 PM Patient is going in and out of afib/ NSR but the rate is controlled. Bedside and Verbal shift change report GIVEN TO Baylee Enamorado RN. Report included the following information SBAR, Kardex, ED Summary, Intake/Output and Recent Results. SIGNIFICANT CHANGES DURING SHIFT:   
 
 
CONCERNS TO ADDRESS WITH MD:   
 
 
 
 
[address] NURSING NOTE Admission Date 12/21/2019 Admission Diagnosis Atrial fibrillation with rapid ventricular response ([address] [I48.91] Atrial flutter ([address] [I48.92] Consults IP CONSULT TO CARDIOLOGY Cardiac Monitoring [x] Yes [] No  
  
Purposeful Hourly Rounding [x] Yes   
Judd Score Total Score: 3 Judd score 3 or > [x] Bed Alarm [] Avasys [] 1:1 sitter [] Patient refused (Signed refusal form in chart) Rafiq Score Rafiq Score: 18 Rafiq score 14 or < [] PMT consult [] Wound Care consult  
 []  Specialty bed  [] Nutrition consult Influenza Vaccine Received Flu Vaccine for Current Season (usually Sept-March):  Yes  
    
  
 She found the proscar when he got refills ,she has never set it up for him while setting up his meds. He is also on flomax and has been taking that. Should he be on both? Margo Angulo RN    Oxygen needs? [x] Room air Oxygen @  []1L    []2L    []3L   []4L    []5L   []6L via NC Chronic home O2 use? [] Yes [] No 
Perform O2 challenge test and document in progress note using smartphrase (.Homeoxygen) Last bowel movement Last Bowel Movement Date: 12/20/19 Urinary Catheter LDAs Peripheral IV 12/21/19 Left Antecubital (Active) Site Assessment Clean, dry, & intact 12/21/2019  3:38 PM  
Phlebitis Assessment 0 12/21/2019  3:38 PM  
Infiltration Assessment 0 12/21/2019  3:38 PM  
Dressing Status Clean, dry, & intact 12/21/2019  3:38 PM  
Dressing Type Transparent 12/21/2019  3:38 PM  
Hub Color/Line Status Pink;Flushed 12/21/2019  3:38 PM  
   
Peripheral IV 12/21/19 Right Antecubital (Active) Site Assessment Clean, dry, & intact 12/21/2019  3:38 PM  
Phlebitis Assessment 0 12/21/2019  3:38 PM  
Infiltration Assessment 0 12/21/2019  3:38 PM  
Dressing Status Clean, dry, & intact 12/21/2019  3:38 PM  
Dressing Type Transparent;Tape 12/21/2019  3:38 PM  
Hub Color/Line Status Pink;Flushed 12/21/2019  3:38 PM  
                  
  
Readmission Risk Assessment Tool Score High Risk   
      
 28 Total Score 3 Has Seen PCP in Last 6 Months (Yes=3, No=0)  
 4 IP Visits Last 12 Months (1-3=4, 4=9, >4=11) 5 Pt. Coverage (Medicare=5 , Medicaid, or Self-Pay=4) 16 Charlson Comorbidity Score (Age + Comorbid Conditions) Criteria that do not apply:  
 . Living with Significant Other. Assisted Living. LTAC. SNF. or  
Rehab Patient Length of Stay (>5 days = 3) Expected Length of Stay - - - Actual Length of Stay 0

## 2021-05-11 NOTE — PROGRESS NOTES
4/8/2019 11:52 AM 
 
Admit Date: 4/5/2019 Admit Diagnosis: Shortness of breath [R06.02]; Atrial fibrillation with RVR ([facility] [I48.91]; Atrial fibrillation, rapid ([facility] [I48.91] Subjective:  
 
Suzanne Soto   denies chest pain, chest pressure/discomfort, dyspnea, palpitations, irregular heart beats, near-syncope, syncope, fatigue, orthopnea, paroxysmal nocturnal dyspnea, exertional chest pressure/discomfort. Visit Vitals /46 (BP 1 Location: Right arm, BP Patient Position: At rest;Sitting) Pulse 79 Temp 98.3 °F (36.8 °C) Resp 18 Ht 5' (1.524 m) Wt 155 lb 8 oz (70.5 kg) SpO2 96% Breastfeeding? No  
BMI 30.37 kg/m² Current Facility-Administered Medications Medication Dose Route Frequency  warfarin (COUMADIN) tablet 4 mg  4 mg Oral ONCE  
 furosemide (LASIX) tablet 40 mg  40 mg Oral DAILY  levoFLOXacin (LEVAQUIN) 500 mg in D5W IVPB  500 mg IntraVENous Q48H  
 dilTIAZem (CARDIZEM) IR tablet 30 mg  30 mg Oral AC&HS  sodium chloride (NS) flush 5-40 mL  5-40 mL IntraVENous Q8H  
 sodium chloride (NS) flush 5-40 mL  5-40 mL IntraVENous PRN  
 acetaminophen (TYLENOL) tablet 650 mg  650 mg Oral Q4H PRN  
 ondansetron (ZOFRAN) injection 4 mg  4 mg IntraVENous Q4H PRN  
 ALPRAZolam (XANAX) tablet 0.25 mg  0.25 mg Oral PRN  pantoprazole (PROTONIX) tablet 40 mg  40 mg Oral ACB  levothyroxine (SYNTHROID) tablet 50 mcg  50 mcg Oral ACB  mirtazapine (REMERON) tablet 30 mg  30 mg Oral QHS  albuterol-ipratropium (DUO-NEB) 2.5 MG-0.5 MG/3 ML  3 mL Nebulization Q4H PRN  
 hydrALAZINE (APRESOLINE) tablet 50 mg  50 mg Oral TID  metoprolol tartrate (LOPRESSOR) tablet 100 mg  100 mg Oral BID  WARFARIN INFORMATION NOTE (COUMADIN)   Other QPM  
 ferrous sulfate tablet 325 mg  1 Tab Oral DAILY WITH BREAKFAST Objective:  
  
Visit Vitals /46 (BP 1 Location: Right arm, BP Patient Position: At rest;Sitting) Pulse 79 Temp 98.3 °F (36.8 °C) NEUROSURGERY OPERATIVE NOTE  DATE:  11:09 AM 2021    PATIENT NAME:  Gary Yost   1959 MRN [id number]      PROCEDURE:  1.  Removal bilateral DBS system  2.  Modifier 22:  Significant increase in surgical time and difficulty due to extensive scar formation    Surgeon:  Eber Vyas MD, PhD  Assistant: none    Anesthesia:  GETA    Diagnosis:  Bilateral DBS     Indication: 61-year-old male who had bilateral DBS placed at an outside institution for tardive dyskinesia.  This never provided relief.  The battery is nonfunctional.  He wishes a system removed.    Procedure:  The patient was identified in the holding area, and the surgery site marked, consent was obtained.  The patinet was brought back to the operating room and intubated by anesthesia service.  2 grams Ancef was administered intravenously.  He was transferred to the operating room table in a supine manner and all pressure points well padded.      His scalp neck and chest were prepped with hair clipping, chlorhexidine scrub and ChloraPrep.  Sterile drapes were applied liberally of Ioban.  All previous incisions were infiltrated with 0.5% Marcaine with epinephrine.  The DBS leads were removed first.  The skin was incised over the left DBS electrode site using the PlasmaBlade, dissection carried deeply in like manner.  The covers were removed, and the electrode removed easily from the brain.  Dense scar tissue surrounding the electrode in the subcutaneous tissue was then dissected free using a combination of blunt dissection and PlasmaBlade.  The right lead was removed in like manner.  Again dense scar tissue was present requiring extensive lysis of adhesions.  The neck incision was opened in like manner, exposing the proximal to distal lead connectors.  The battery pack incision was then opened up, the battery was densely adherent and scar tissue.  This required extensive lysis of adhesions, eventually the battery was able to be removed.   Resp 18 Ht 5' (1.524 m) Wt 155 lb 8 oz (70.5 kg) SpO2 96% Breastfeeding? No  
BMI 30.37 kg/m² Physical Exam: Abdomen: soft, non-tender. Bowel sounds normal.  
Extremities: no cyanosis or edema Heart: regular rate and rhythm, S1, S2 normal, no click, rub or gallop. 2/6 SM LSB Lungs: clear to auscultation bilaterally Neurologic: Grossly normal 
 
Data Review:  
Labs:   
Recent Results (from the past 24 hour(s)) PROTHROMBIN TIME + INR Collection Time: 04/07/19  1:50 PM  
Result Value Ref Range INR 2.5 (H) 0.9 - 1.1 Prothrombin time 24.5 (H) 9.0 - 11.1 sec PROTHROMBIN TIME + INR Collection Time: 04/08/19  4:02 AM  
Result Value Ref Range INR 2.4 (H) 0.9 - 1.1 Prothrombin time 23.4 (H) 9.0 - 11.1 sec METABOLIC PANEL, BASIC Collection Time: 04/08/19  4:02 AM  
Result Value Ref Range Sodium 140 136 - 145 mmol/L Potassium 3.6 3.5 - 5.1 mmol/L Chloride 109 (H) 97 - 108 mmol/L  
 CO2 23 21 - 32 mmol/L Anion gap 8 5 - 15 mmol/L Glucose 100 65 - 100 mg/dL BUN 36 (H) 6 - 20 MG/DL Creatinine 1.85 (H) 0.55 - 1.02 MG/DL  
 BUN/Creatinine ratio 19 12 - 20 GFR est AA 32 (L) >60 ml/min/1.73m2 GFR est non-AA 26 (L) >60 ml/min/1.73m2 Calcium 8.1 (L) 8.5 - 10.1 MG/DL Telemetry: normal sinus rhythm Assessment:  
 
Active Problems: 
  Mitral valve disease () Overview: MR Aortic valve disorder (9/30/2010) Overview: AS Shortness of breath (4/6/2019) Atrial fibrillation with RVR ([address] (4/6/2019) Atrial fibrillation, rapid ([address] (4/8/2019) Plan: 1. PAF: in SR now. Continue CCB and BB. On oral AC. Ok to dc from cardiac standpoint. Will f/u with her on Thursday as per previously scheduled appt. 2. Valvular heart dz: as per previous recommendations and d/w family, no further intervention. Conservative management. 3 CKD: BP elevated. The scar tissue had partially ossified.  The leads were removed from the dense scar tissue.  At this point, with further dissection all leads were able to be removed completely.  Good hemostasis was noted.  All operative sites were lalito irrigated with normal saline Ancef irrigation.    The dermis was reapproximated with 3-0 Vicryl suture in an inverted interrupted manner.  The skin was reapproximated with 4-0 Nurolon suture in a running subcuticular manner.  Dermabond was placed over the incisions.  Final counts were correct.    FINDINGS: Successful removal of entire DBS system.  SPECIMEN:  None  DRAINS: None  EBL:  5 CC  COMPLICATIONS: None apparent at end of procedure.

## 2021-06-18 NOTE — PROGRESS NOTES
2200:Marqui Telesitter Monitor initiated on 2/11/2020 at 2205 for the following reason(s): *Patient pulls at lines and oxygen . Patient's educated on use of camera and is in agreement. If patient unable to verbalize understanding of camera necessity, the responsible party notified and educated:  Daughter, Patti Christopher Principal Discharge DX:	Chest pain

## 2021-06-29 NOTE — PROGRESS NOTES
Problem: Mobility Impaired (Adult and Pediatric) Goal: *Acute Goals and Plan of Care (Insert Text) Description Physical Therapy Goals Reviewed 3/25/2019 1. Patient will move from supine to sit and sit to supine , scoot up and down and roll side to side in bed with modified independence within 7 day(s). 2.  Patient will transfer from bed to chair and chair to bed with supervision/set-up using the least restrictive device within 7 day(s). 3.  Patient will perform sit to stand with modified independence within 7 day(s). 4.  Patient will ambulate with supervision/set-up for 250 feet with the least restrictive device within 7 day(s). 5.  Patient will ascend/descend 2 stairs with 1 handrail(s) with supervision/set-up within 7 day(s). Initiated 3/18/2019 1. Patient will move from supine to sit and sit to supine , scoot up and down and roll side to side in bed with modified independence within 7 day(s). 2.  Patient will transfer from bed to chair and chair to bed with supervision/set-up using the least restrictive device within 7 day(s). 3.  Patient will perform sit to stand with modified independence within 7 day(s). 4.  Patient will ambulate with supervision/set-up for 125 feet with the least restrictive device within 7 day(s). 5.  Patient will ascend/descend 2 stairs with 1 handrail(s) with supervision/set-up within 7 day(s). Outcome: Progressing Towards Goal 
 PHYSICAL THERAPY TREATMENT: WEEKLY REASSESSMENT Patient: Sergio Paris (52 y.o. female) Date: 3/25/2019 Diagnosis: Hypertensive urgency [I16.0] <principal problem not specified> Procedure(s) (LRB): LEFT AND RIGHT HEART CATH / CORONARY ANGIOGRAPHY (N/A) Precautions: Fall(dementia) Chart, physical therapy assessment, plan of care and goals were reviewed. ASSESSMENT: 
Patient agreeable to intervention but required encouragement for progress.  She fatigued quickly and increasingly asked to return to the room with gait distance. Cued for walker placement and posture but improved overall distance this date. Discharge recommendations TBD with pending surgery. Patient's progression toward goals since last assessment: Patient has made slow progress towards goals over the last week d/t PLAN: 
Goals have been updated based on progression since last assessment. Patient continues to benefit from skilled intervention to address the above impairments. Continue to follow the patient 4 times a week to address goals. Planned Interventions: 
?              Bed Mobility Training             ? Neuromuscular Re-Education ? Transfer Training                   ? Orthotic/Prosthetic Training 
? Gait Training                         ? Modalities ? Therapeutic Exercises           ? Edema Management/Control ? Therapeutic Activities            ? Patient and Family Training/Education ? Other (comment): 
Discharge Recommendations: To Be Determined Further Equipment Recommendations for Discharge: to be determined SUBJECTIVE:  
Patient stated ? Can we turn around here? Is this my room? .? OBJECTIVE DATA SUMMARY:  
Critical Behavior: 
Neurologic State: Alert Orientation Level: Oriented X4 Cognition: Appropriate decision making, Appropriate for age attention/concentration Safety/Judgement: Awareness of environment, Decreased awareness of need for safety Strength:  
  
  
  
  
  
  
  
 
Functional Mobility Training: 
Bed Mobility: 
Rolling: Supervision Supine to Sit: Supervision Sit to Supine: Supervision Scooting: Supervision Transfers: 
Sit to Stand: Supervision Stand to Sit: Supervision Balance: 
  
Ambulation/Gait Training: 
Distance (ft): 100 Feet (ft) Assistive Device: Gait belt;Walker, rolling Ambulation - Level of Assistance: Contact guard assistance Gait Abnormalities: Decreased step clearance Base of Support: Narrowed Speed/Rivka: Shuffled;Pace decreased (<100 feet/min) Step Length: Left shortened;Right shortened Stairs: 
  
  
  
Neuro Re-Education: 
Therapeutic Exercises:  
Seated ankle pumps, LAQs, marching x10 each with cues for ROM and speed Pain: 
Pain Scale 1: Numeric (0 - 10) Pain Intensity 1: 0 Activity Tolerance:  
Please refer to the flowsheet for vital signs taken during this treatment. After treatment:  
?  Patient left in no apparent distress sitting up in chair ? Patient left in no apparent distress in bed 
? Call bell left within reach ? Nursing notified ? Caregiver present ? Bed alarm activated COMMUNICATION/COLLABORATION:  
The patient?s plan of care was discussed with: Registered Nurse Jordan Lamb PT, DPT Time Calculation: 23 mins My signature below certifies that the above stated patient is homebound and upon completion of the Face-To-Face encounter, has the need for intermittent skilled nursing, physical therapy and/or speech or occupational therapy services in their home for their current diagnosis as outlined in their initial plan of care. These services will continue to be monitored by myself or another physician.

## 2022-05-25 NOTE — TELEPHONE ENCOUNTER
Daughter Lewis Parry is on hospital PHI-she is also on HIPPA form from 2012. She is the emergency contact. Upcoming appt on 4/11/19 with Renny Gavin. Last seen by Madhu on 5/18/12. Hospital f/u from cath and IAN. [Negative] : Heme/Lymph

## 2022-09-14 NOTE — PROGRESS NOTES
LMOM to clarify his \"water pill.\"      I let him know that his current diuretic dosing is taking Bumex 1 mg tablet, one tablet daily alternating with two tablets.  He is to take one tablet on day 1, two tablets the next day, one tablet the day after, and so on.     He was encouraged to call back if he has further questions about his medication.    Problem: Falls - Risk of 
Goal: *Absence of Falls Description Document Bishop Delgado Fall Risk and appropriate interventions in the flowsheet. Outcome: Progressing Towards Goal 
Note: Fall Risk Interventions: 
Mobility Interventions: Bed/chair exit alarm, Patient to call before getting OOB Mentation Interventions: Adequate sleep, hydration, pain control, Bed/chair exit alarm, Door open when patient unattended, Increase mobility, More frequent rounding, Reorient patient, Family/sitter at bedside, Update white board, Room close to nurse's station Medication Interventions: Bed/chair exit alarm, Patient to call before getting OOB Elimination Interventions: Bed/chair exit alarm, Call light in reach, Patient to call for help with toileting needs History of Falls Interventions: Bed/chair exit alarm

## 2024-06-24 NOTE — CONSULTS
Hello,  Pt cannot make afternoon appts.  He is not understanding messages we're leaving for him.  Why would he be coming in he asks?   He is returning our calls regarding Dr. Nye.     [facility] Initial Patient Consult Name: Haider Hill MRN: [id number] : 1935 Hospital: [facility] Date: 2020 IMPRESSION:  
· Acute respiratory failure · Pulmonary edema · BOGDAN  
  
RECOMMENDATIONS:  
· BIPAP + O2 
· Needs higher doses of diuretics · Renal failure worse · Poor overall prognosis · Needs palliative care consult · Not a good candidate for heroics · Not a good candidate for RRT 
· Still a full code Subjective: This patient has been seen and evaluated at the request of Dr. Enriqueta Solomon for acute respiratory failure. Patient is a 82 y.o. female admitted  with pulmonary edema and BOGDAN, BNP > 39194!!! Pt not improving Now on bipap In some distress Past Medical History:  
Diagnosis Date  Aortic valve disorders AS  Asthma  Benign hypertensive heart disease without heart failure  CKD (chronic kidney disease)  Diabetes ([address]  Fibromyalgia  Gastroparesis  GERD (gastroesophageal reflux disease)  Hypertension  Mitral valve disorders(424.0) MR  
 LIVIA (obstructive sleep apnea)  Paroxysmal atrial fibrillation (HCC)  Pure hypercholesterolemia 2010 Past Surgical History:  
Procedure Laterality Date  ECHO 2D ADULT  2009 LVH, normal LV wall motion and ejection fraction, mild aortic stenosis, moderate aortic regurgitation and mild mitral regurgitation. The ejection fraction was 55-60%.  ECHO 2D ADULT  2011 EF 60%, LAE, mild AS, AI, MR, PA low 40s  EVENT MONITOR POST SYMPTOMS  2010 Rare PACs, no arrythmia with symptoms  HX CHOLECYSTECTOMY  HX HYSTERECTOMY  LOOP MONITOR  9-10/2011 NSR  
 MO CARDIOVERSION ELECTIVE ARRHYTHMIA EXTERNAL N/A 2019  Ep Cardioversion performed by Carolyn Gordon MD at [facility]  
  WA ICAR CATHETER ABLATION ATRIOVENTR NODE FUNCTION N/A 12/23/2019 Ablation Av Node performed by Tony Mackay MD at [facility]  WA INS NEW/RPLC PRM PACEMAKER W/TRANSV ELTRD VENTR N/A 12/23/2019 Insert Ppm Single Ventricular performed by Tony Mackay MD at [facility]  STRESS TEST MYOVIEW  1/2011  
 no ischemia, EF 63%  US DUPLEX CAROTID BILATERAL  1/2011  
 mild bilat disease Prior to Admission medications Medication Sig Start Date End Date Taking? Authorizing Provider  
dilTIAZem CD (CARDIZEM CD) 180 mg ER capsule Take 180 mg by mouth daily. Yes Provider, Historical  
warfarin (COUMADIN) 5 mg tablet Take 5 mg by mouth four (4) days a week. Take 5 mg on Monday, Wednesday, Thursday, and Saturday. Take 2.5 mg on Tuesday, Friday, and Sunday   Yes Provider, Historical  
warfarin (COUMADIN) 5 mg tablet Take 2.5 mg by mouth three (3) days a week. Take 2.5 mg on Tuesday, Friday, and Sunday. Take 5 mg on Monday, Wednesday, Thursday, and Saturday   Yes Provider, Historical  
vit C/E/Zn/coppr/lutein/zeaxan (PRESERVISION AREDS-2 PO) Take 1 Cap by mouth two (2) times a day. Yes Provider, Historical  
melatonin 1 mg/mL liqd Take 1-10 mg by mouth nightly as needed (Insomnia). Yes Provider, Historical  
mirtazapine (REMERON) 45 mg tablet Take 1 Tab by mouth nightly. 1/20/20  Yes Soni Saleh MD  
torsemide (DEMADEX) 5 mg tablet Take 5 mg by mouth daily. Yes Provider, Historical  
prednisoLONE acetate (PRED FORTE) 1 % ophthalmic suspension Administer 1 Drop to right eye daily. Until 12/25/19. Starting 12/26/19 titrate down to 1 drop right eye daily   Yes Provider, Historical  
ondansetron hcl (ZOFRAN) 4 mg tablet Take 4 mg by mouth every eight (8) hours as needed for Nausea. Yes Provider, Historical  
sodium bicarbonate 650 mg tablet Take 650 mg by mouth three (3) times daily.    Yes Provider, Historical  
 metoprolol succinate (TOPROL-XL) 100 mg tablet Take 100 mg by mouth daily. Yes Provider, Historical  
besifloxacin (BESIVANCE) 0.6 % drps ophthalmic suspension Administer 1 Drop to right eye daily. 6/6/19  Yes Provider, Historical  
brinzolamide (AZOPT) 1 % ophthalmic suspension Administer 1 Drop to right eye three (3) times daily. 6/6/19  Yes Provider, Historical  
hydrALAZINE (APRESOLINE) 100 mg tablet Take 1 Tab by mouth three (3) times daily. 7/2/19  Yes Humble Perry, MABEL  
polyethylene glycol (MIRALAX) 17 gram packet Take 17 g by mouth daily as needed (constipation). 12/16/14  Yes Provider, Historical  
albuterol-ipratropium (DUO-NEB) 2.5 mg-0.5 mg/3 ml nebu 3 mL by Nebulization route every six (6) hours as needed for Other (wheeze). 4/8/19  Yes Kenji Washington MD  
levothyroxine (SYNTHROID) 88 mcg tablet Take 88 mcg by mouth Daily (before breakfast). 4/1/19  Yes Provider, Historical  
ALPRAZolam (XANAX) 0.25 mg tablet Take 0.125 mg by mouth daily as needed for Anxiety. Yes Provider, Historical  
esomeprazole (NEXIUM) 40 mg capsule Take 40 mg by mouth daily. Yes Provider, Historical  
 
Allergies Allergen Reactions  Latex, Natural Rubber Itching  Metformin Nausea and Vomiting Other reaction(s): nausea HEADACHE  Advil [Ibuprofen] Unknown (comments)  Ambien [Zolpidem] Unknown (comments) Altered mental status and per Dr. Kennedi Sierra, the patient should NOT be on this medication  Aspirin Unknown (comments)  Citalopram Other (comments) HEADACHE  Codeine Other (comments)  Iodinated Contrast Media Itching  Meloxicam Unknown (comments)  Penicillin G Unknown (comments)  Shellfish Containing Products Rash  Sulfa (Sulfonamide Antibiotics) Unknown (comments)  Tramadol Nausea and Vomiting and Other (comments) HEADACHE  Trazodone Unknown (comments) Altered mental status and per Dr. Kennedi Sierra, the patient should NOT be on this medication Social History Tobacco Use  Smoking status: Former Smoker Last attempt to quit: 1963 Years since quittin.1  Smokeless tobacco: Never Used Substance Use Topics  Alcohol use: Not Currently Family History Problem Relation Age of Onset  Heart Disease Father  Dementia Brother  Heart Disease Brother  Diabetes Brother Current Facility-Administered Medications Medication Dose Route Frequency  bumetanide (BUMEX) injection 4 mg  4 mg IntraVENous BID  chlorothiazide (DIURIL) 500 mg in sterile water (preservative free) 18 mL injection  500 mg IntraVENous ONCE  prednisoLONE acetate (PRED FORTE) 1 % ophthalmic suspension 1 Drop  1 Drop Right Eye DAILY  besifloxacin (BESIVANCE) 0.6 % ophthalmic suspension 1 Drop (Patient Supplied)  1 Drop Right Eye DAILY  dorzolamide (TRUSOPT) 2 % ophthalmic solution 1 Drop  1 Drop Right Eye TID  WARFARIN INFORMATION NOTE (COUMADIN)   Other QPM  
 dilTIAZem CD (CARDIZEM CD) capsule 180 mg  180 mg Oral DAILY  metoprolol succinate (TOPROL-XL) XL tablet 100 mg  100 mg Oral DAILY  pantoprazole (PROTONIX) tablet 40 mg  40 mg Oral ACB  levothyroxine (SYNTHROID) tablet 88 mcg  88 mcg Oral ACB  mirtazapine (REMERON) tablet 45 mg  45 mg Oral QHS  sodium chloride (NS) flush 5-40 mL  5-40 mL IntraVENous Q8H  
 docusate sodium (COLACE) capsule 100 mg  100 mg Oral BID Review of Systems: 
Review of systems not obtained due to patient factors. Objective:  
Vital Signs:   
Visit Vitals /40 Pulse 73 Temp 97.6 °F (36.4 °C) Resp 20 Ht 5' (1.524 m) Wt 58.9 kg (129 lb 12.8 oz) SpO2 98% BMI 25.35 kg/m² O2 Device: BIPAP  
O2 Flow Rate (L/min): 4 l/min Temp (24hrs), Av.9 °F (36.6 °C), Min:97.4 °F (36.3 °C), Max:98.8 °F (37.1 °C) Intake/Output:  
Last shift:      No intake/output data recorded. Last 3 shifts:  1901 -  0700 In: 260 [P.O.:260] Out: 400 [Urine:400] Intake/Output Summary (Last 24 hours) at 2/6/2020 1030 Last data filed at 2/6/2020 4071 Gross per 24 hour Intake 60 ml Output 200 ml Net -140 ml Physical Exam:  
General:  Alert, cooperative,in distress, appears stated age. Head:  Normocephalic, without obvious abnormality, atraumatic. Eyes:  Conjunctivae/corneas clear. PERRL, EOMs intact. Nose: bipap mask in place Throat: bipap mask in place. Neck: Supple, symmetrical, trachea midline, no adenopathy, thyroid: no enlargment/tenderness/nodules, no carotid bruit and no JVD. Back:   Symmetric, no curvature. ROM normal.  
Lungs:   Rales bilaterally. Chest wall:  No tenderness or deformity. Heart:  Regular rate and rhythm, S1, S2 normal, no murmur, click, rub or gallop. Abdomen:   Soft, non-tender. Bowel sounds normal. No masses,  No organomegaly. Extremities: Extremities normal, atraumatic, no cyanosis or edema. Pulses: 2+ and symmetric all extremities. Skin: Skin color, texture, turgor normal. No rashes or lesions Lymph nodes: Cervical, supraclavicular, and axillary nodes normal.  
Neurologic: Grossly nonfocal  
 
Data review:  
 
Recent Results (from the past 24 hour(s)) SODIUM, UR, RANDOM Collection Time: 02/05/20  4:01 PM  
Result Value Ref Range Sodium,urine random 23 MMOL/L  
CHLORIDE, URINE RANDOM Collection Time: 02/05/20  4:01 PM  
Result Value Ref Range Chloride,urine random 24 MMOL/L  
CREATININE, UR, RANDOM Collection Time: 02/05/20  4:01 PM  
Result Value Ref Range Creatinine, urine 151.00 mg/dL UREA NITROGEN, UR, RANDOM Collection Time: 02/05/20  4:01 PM  
Result Value Ref Range Urea Nitrogen,urine random 559 MG/DL  
POC EG7 Collection Time: 02/06/20  4:04 AM  
Result Value Ref Range  Calcium, ionized (POC) 1.15 1.12 - 1.32 mmol/L  
 pH (POC) 7.287 (L) 7.35 - 7.45    
 pCO2 (POC) 56.4 (H) 35.0 - 45.0 MMHG  
 pO2 (POC) 50 (L) 80 - 100 MMHG  
 HCO3 (POC) 26.9 (H) 22 - 26 MMOL/L  
 Base excess (POC) 0 mmol/L  
 sO2 (POC) 79 (L) 92 - 97 % Site LEFT RADIAL Device: NASAL CANNULA Flow rate (POC) 5 L/M Allens test (POC) YES Specimen type (POC) ARTERIAL PROTHROMBIN TIME + INR Collection Time: 02/06/20  5:01 AM  
Result Value Ref Range INR 1.8 (H) 0.9 - 1.1 Prothrombin time 17.7 (H) 9.0 - 11.1 sec POC EG7 Collection Time: 02/06/20  7:21 AM  
Result Value Ref Range Calcium, ionized (POC) 1.17 1.12 - 1.32 mmol/L  
 FIO2 (POC) 50 % pH (POC) 7.355 7.35 - 7.45    
 pCO2 (POC) 51.1 (H) 35.0 - 45.0 MMHG  
 pO2 (POC) 60 (L) 80 - 100 MMHG  
 HCO3 (POC) 28.5 (H) 22 - 26 MMOL/L Base excess (POC) 3 mmol/L  
 sO2 (POC) 89 (L) 92 - 97 % Site LEFT RADIAL Device: BIPAP    
 PEEP/CPAP (POC) 6 cmH2O  
 PIP (POC) 12 Allens test (POC) YES Specimen type (POC) ARTERIAL Total resp. rate 20 Imaging: 
I have personally reviewed the patients radiographs and have reviewed the reports: CXR: severe pulmonary edema Darien Ring MD

## (undated) DEVICE — PACK PROCEDURE SURG HRT CATH

## (undated) DEVICE — Z DISCONTINUED NO SUB IDED SET EXTN W/ 4 W STPCOCK M SPIN LOK 36IN

## (undated) DEVICE — CATHETER ANGIO L100CM OD5FR ID.046IN L2.5CM .038IN PROG R

## (undated) DEVICE — INTRO SHTH 7FR 13X20CM -- TEARAWAY

## (undated) DEVICE — RADIFOCUS OPTITORQUE ANGIOGRAPHIC CATHETER: Brand: OPTITORQUE

## (undated) DEVICE — PREP CHLORAPREP 10.5 ML ORG --

## (undated) DEVICE — CABLE RMFG EXT CATH --

## (undated) DEVICE — PACEMAKER PACK: Brand: MEDLINE INDUSTRIES, INC.

## (undated) DEVICE — GUIDEWIRE VASC L260CM 0.035IN J TIP L3MM PTFE FIX COR NAMIC

## (undated) DEVICE — KIT ACCS INTRO 4FR L10CM NDL 21GA L7CM GWIRE L40CM

## (undated) DEVICE — ANGIOGRAPHY KIT CUST [K0910930B] [MERIT MEDICAL SYSTEMS INC]

## (undated) DEVICE — CABLE RMFG RSPONS ELEC EXT RED --

## (undated) DEVICE — REM POLYHESIVE ADULT PATIENT RETURN ELECTRODE: Brand: VALLEYLAB

## (undated) DEVICE — SYR POWER 150ML 8IN FILL TUBE --

## (undated) DEVICE — GLIDESHEATH SLENDER ACCESS KIT: Brand: GLIDESHEATH SLENDER

## (undated) DEVICE — SYRINGE ANGIO 10 CC BRL STD PRNT POLYCARB LT BLU MEDALLION

## (undated) DEVICE — Device: Brand: MEDICAL ACTION INDUSTRIES

## (undated) DEVICE — PATCH CARTO 3 EXT REF --

## (undated) DEVICE — TORCON NB, ADVANTAGE CATHETER: Brand: TORCON NB

## (undated) DEVICE — CABLE RMFG EP MAP NAVISTAR RED -- F/CARTO 3 SYS - OEM 323590

## (undated) DEVICE — CONN POS PRES CLR MAX+ --

## (undated) DEVICE — CABLE RMFG SUPREME BPTPLR/QPLR --

## (undated) DEVICE — DRESSING HEMOSTATIC SFT INTVENT W/O SLT DBL WRP QUIKCLOT LF

## (undated) DEVICE — 3M™ TEGADERM™ TRANSPARENT FILM DRESSING FRAME STYLE, 1626W, 4 IN X 4-3/4 IN (10 CM X 12 CM), 50/CT 4CT/CASE: Brand: 3M™ TEGADERM™

## (undated) DEVICE — CATHETER ANGIO JR4 AD 5 FRX100 CM 25 CM PERFORMA

## (undated) DEVICE — TR BAND RADIAL ARTERY COMPRESSION DEVICE: Brand: TR BAND

## (undated) DEVICE — TUBING PRSS MON L6IN PVC M FEM CONN

## (undated) DEVICE — HI-TORQUE VERSACORE FLOPPY GUIDE WIRE SYSTEM 145 CM: Brand: HI-TORQUE VERSACORE

## (undated) DEVICE — Z DISCONTINUED NO SUB IDED CATHETER ANGIO 5-6FR L110CM GWIRE 0.038IN 145DEG PGTL 5-8

## (undated) DEVICE — AIRLIFE™  ADULT CUSHION NASAL CANNULA WITH 7 FOOT (2.1 M) CRUSH-RESISTANT OXYGEN TUBING, AND U/CONNECT-IT ADAPTER: Brand: AIRLIFE™

## (undated) DEVICE — MEDI-TRACE CADENCE ADULT, DEFIBRILLATION ELECTRODE -RTS  (10 PR/PK) - PHILIPS: Brand: MEDI-TRACE CADENCE

## (undated) DEVICE — SPLINT WR POS F/ARTERIAL ACC -- BX/10

## (undated) DEVICE — INTRODUCER SHTH J 0.038 IN 3 MM 8 FRX60 CM DIL FAST-CATH

## (undated) DEVICE — DRAPE PRB US TRNSDCR 6X96IN --

## (undated) DEVICE — ZIP 8I SURGICAL SKIN CLOSURE DEVICE: Brand: ZIP 8I SURGICAL SKIN CLOSURE DEVICE

## (undated) DEVICE — SUT SLK 0 30IN SH BLK --

## (undated) DEVICE — ADULT SPO2 SENSOR: Brand: NELLCOR

## (undated) DEVICE — CATH RMFG SUP 6F 5MM 120 --

## (undated) DEVICE — PINNACLE INTRODUCER SHEATH: Brand: PINNACLE

## (undated) DEVICE — 3M™ IOBAN™ 2 ANTIMICROBIAL INCISE DRAPE 6650EZ: Brand: IOBAN™ 2

## (undated) DEVICE — SUTURE VCRL SZ 2-0 L36IN ABSRB UD L40MM CT 1/2 CIR J957H

## (undated) DEVICE — CATH EP CRV 7F DUO 2/8 2M LG -- LIVEWIRE STRL

## (undated) DEVICE — CATH IV AUTOGRD PNK 20GA 48MM -- INSYTE-N

## (undated) DEVICE — SWAN-GANZ TRUE SIZE THERMODULTION CATHETER, 5F: Brand: SWAN-GANZ TRUE SIZE

## (undated) DEVICE — TRAY,IRRIGATION,PISTON SYRINGE,60ML,STRL: Brand: MEDLINE

## (undated) DEVICE — CATH IV AUTOGRD BC GRY 16GA 30 -- INSYTE

## (undated) DEVICE — CATH NAVISTAR BIDIR FJ 8MM --